# Patient Record
Sex: FEMALE | NOT HISPANIC OR LATINO | Employment: OTHER | URBAN - METROPOLITAN AREA
[De-identification: names, ages, dates, MRNs, and addresses within clinical notes are randomized per-mention and may not be internally consistent; named-entity substitution may affect disease eponyms.]

---

## 2017-08-11 ENCOUNTER — TRANSFERRED RECORDS (OUTPATIENT)
Dept: HEALTH INFORMATION MANAGEMENT | Facility: CLINIC | Age: 62
End: 2017-08-11

## 2017-11-03 ENCOUNTER — TRANSFERRED RECORDS (OUTPATIENT)
Dept: HEALTH INFORMATION MANAGEMENT | Facility: CLINIC | Age: 62
End: 2017-11-03

## 2017-11-03 LAB
EJECTION FRACTION: 63
EJECTION FRACTION: 90

## 2018-10-03 ENCOUNTER — TRANSFERRED RECORDS (OUTPATIENT)
Dept: HEALTH INFORMATION MANAGEMENT | Facility: CLINIC | Age: 63
End: 2018-10-03

## 2018-11-07 ENCOUNTER — MEDICAL CORRESPONDENCE (OUTPATIENT)
Dept: HEALTH INFORMATION MANAGEMENT | Facility: CLINIC | Age: 63
End: 2018-11-07

## 2018-12-11 ENCOUNTER — REFERRAL (OUTPATIENT)
Dept: TRANSPLANT | Facility: CLINIC | Age: 63
End: 2018-12-11

## 2018-12-11 NOTE — LETTER
19    Ethel Blow  [id number]  [place] [address]      Dear Ethel,    Thank you for your interest in the Transplant Center at [facility], [facility]. We look forward to being a part of your care team and assisting you through the transplant process.    As we discussed, your transplant coordinator is Tiffany Mcdaniels (Kidney).  You may call your coordinator at any time with questions or concerns.  Your first scheduled call will be on 1/15/19.  If this needs to change, call [phone].    Please complete the followin. Fill out and return the enclosed forms    Authorization for Electronic Communication    Authorization to Discuss Protected Health Information    Authorization for Release of Protected Health Information    Authorization for Care Everywhere Release of Information    2. Sign up for:    [email or web address], access to your electronic medical record (see enclosed pamphlet)    [employer], a transplant education website    You can use these tools to learn more about your transplant, communicate with your care team, and track your medical details    Sincerely,    Solid Organ Transplant  [facility], [facility]    cc: Evens Encinas MD; Ilsa Oh

## 2019-01-02 ENCOUNTER — DOCUMENTATION ONLY (OUTPATIENT)
Dept: TRANSPLANT | Facility: CLINIC | Age: 64
End: 2019-01-02

## 2019-01-02 VITALS — HEIGHT: 59 IN | WEIGHT: 130 LBS | BODY MASS INDEX: 26.21 KG/M2

## 2019-01-02 SDOH — HEALTH STABILITY: MENTAL HEALTH: HOW OFTEN DO YOU HAVE A DRINK CONTAINING ALCOHOL?: NEVER

## 2019-01-02 ASSESSMENT — MIFFLIN-ST. JEOR: SCORE: 1045.31

## 2019-01-02 NOTE — TELEPHONE ENCOUNTER
PCP: Ilsa Oh   Referring Provider: Evens Encinas  Referring Diagnosis: Glomerulosclerosis    Insurance information:  Medicare  Policy vidales:  self  Subscriber/policy/ID number:  [id number]  Group Number:     Insurance Info:  Policy Vidales: Spouse Maxim  Policy #      Is patient in a group home/assisted living? no  Does patient have a guardian? no    Referral intake process completed.  Patient is aware that after financial approval is received, medical records will be requested.   Patient confirmed for a callback from transplant coordinator on 1/15/19. (within 2 weeks)  Tentative evaluation date 1/28/19. (within 4 weeks)    Confirmed coordinator will discuss evaluation process in more detail at the time of their call.   Patient is aware of the need to arrange age appropriate cancer screening, vaccinations, and dental care.  Reminded patient to complete questionnaire, complete medical records release, and review packet prior to evaluation visit .  Assessed patient for special needs (ie--wheelchair, assistance, guardian, and ):  no   Patient instructed to call [phone] with questions.

## 2019-01-15 DIAGNOSIS — I10 ESSENTIAL HYPERTENSION: ICD-10-CM

## 2019-01-15 DIAGNOSIS — G47.33 OBSTRUCTIVE SLEEP APNEA: ICD-10-CM

## 2019-01-15 DIAGNOSIS — Z76.82 ORGAN TRANSPLANT CANDIDATE: ICD-10-CM

## 2019-01-15 DIAGNOSIS — N18.9 CHRONIC RENAL FAILURE: ICD-10-CM

## 2019-01-15 DIAGNOSIS — N05.1 FSGS (FOCAL SEGMENTAL GLOMERULOSCLEROSIS): ICD-10-CM

## 2019-01-15 DIAGNOSIS — E78.5 HYPERLIPIDEMIA: ICD-10-CM

## 2019-01-15 NOTE — TELEPHONE ENCOUNTER
Call placed to Ethel today as scheduled.   answered with her in the background.  He answered most of my questions but she interjected as well.  He says she is being prepared for access and is quickly becoming more symptomatic with anemia, fatigue, nausea, most recent GFR-12.  PMH; FSGS, Htn, hypothyroid, SADIE on C-pap.  He states they have been educating themselves on kidney transplant (You-tube, etc).  They had limited time to talk with me but I described the typical evaluation process and provided my contact information (but informed them I won't be her coordinator for very long).  Orders to .    Health maintenance:  Pap: no longer does since CAYLA in 2003.  Mammogram: is due.  Colonoscopy: done 1/5/2011-normal (repeat in 10 years;2021).  Dental: has been undergoing implants and bridge work so should be up to date.  Vaccines: will probably need Pneumovax.   Records show HBcAb+ with HbsAb+, HBsAg- but she knows nothing of this.  Has had + Mantoux in past but never treated for TB.

## 2019-01-22 ENCOUNTER — TELEPHONE (OUTPATIENT)
Dept: TRANSPLANT | Facility: CLINIC | Age: 64
End: 2019-01-22

## 2019-01-22 ENCOUNTER — DOCUMENTATION ONLY (OUTPATIENT)
Dept: TRANSPLANT | Facility: CLINIC | Age: 64
End: 2019-01-22

## 2019-01-28 ENCOUNTER — ANCILLARY PROCEDURE (OUTPATIENT)
Dept: GENERAL RADIOLOGY | Facility: CLINIC | Age: 64
End: 2019-01-28
Payer: MEDICARE

## 2019-01-28 ENCOUNTER — DOCUMENTATION ONLY (OUTPATIENT)
Dept: TRANSPLANT | Facility: CLINIC | Age: 64
End: 2019-01-28

## 2019-01-28 ENCOUNTER — OFFICE VISIT (OUTPATIENT)
Dept: TRANSPLANT | Facility: CLINIC | Age: 64
End: 2019-01-28
Attending: TRANSPLANT SURGERY
Payer: MEDICARE

## 2019-01-28 ENCOUNTER — ALLIED HEALTH/NURSE VISIT (OUTPATIENT)
Dept: RESEARCH | Facility: CLINIC | Age: 64
End: 2019-01-28

## 2019-01-28 ENCOUNTER — ALLIED HEALTH/NURSE VISIT (OUTPATIENT)
Dept: TRANSPLANT | Facility: CLINIC | Age: 64
End: 2019-01-28
Attending: INTERNAL MEDICINE
Payer: MEDICARE

## 2019-01-28 ENCOUNTER — APPOINTMENT (OUTPATIENT)
Dept: LAB | Facility: CLINIC | Age: 64
End: 2019-01-28
Payer: MEDICARE

## 2019-01-28 ENCOUNTER — ANCILLARY PROCEDURE (OUTPATIENT)
Dept: CARDIOLOGY | Facility: CLINIC | Age: 64
End: 2019-01-28
Payer: MEDICARE

## 2019-01-28 VITALS
WEIGHT: 129.5 LBS | SYSTOLIC BLOOD PRESSURE: 167 MMHG | HEART RATE: 83 BPM | OXYGEN SATURATION: 100 % | BODY MASS INDEX: 26.11 KG/M2 | HEIGHT: 59 IN | DIASTOLIC BLOOD PRESSURE: 80 MMHG | TEMPERATURE: 97.7 F

## 2019-01-28 DIAGNOSIS — D63.1 ANEMIA IN STAGE 5 CHRONIC KIDNEY DISEASE, NOT ON CHRONIC DIALYSIS (H): ICD-10-CM

## 2019-01-28 DIAGNOSIS — Z76.82 ORGAN TRANSPLANT CANDIDATE: Primary | ICD-10-CM

## 2019-01-28 DIAGNOSIS — N18.9 CHRONIC RENAL FAILURE: ICD-10-CM

## 2019-01-28 DIAGNOSIS — E78.2 MIXED HYPERLIPIDEMIA: ICD-10-CM

## 2019-01-28 DIAGNOSIS — Z00.6 EXAMINATION OF PARTICIPANT IN CLINICAL TRIAL: Primary | ICD-10-CM

## 2019-01-28 DIAGNOSIS — N05.1 FSGS (FOCAL SEGMENTAL GLOMERULOSCLEROSIS): ICD-10-CM

## 2019-01-28 DIAGNOSIS — Z76.82 ORGAN TRANSPLANT CANDIDATE: ICD-10-CM

## 2019-01-28 DIAGNOSIS — I10 ESSENTIAL HYPERTENSION: ICD-10-CM

## 2019-01-28 DIAGNOSIS — Z87.891 FORMER SMOKER: ICD-10-CM

## 2019-01-28 DIAGNOSIS — G47.33 OBSTRUCTIVE SLEEP APNEA: ICD-10-CM

## 2019-01-28 DIAGNOSIS — I15.0 RENOVASCULAR HYPERTENSION: ICD-10-CM

## 2019-01-28 DIAGNOSIS — E78.5 HYPERLIPIDEMIA: ICD-10-CM

## 2019-01-28 DIAGNOSIS — Z11.59 ENCOUNTER FOR SCREENING FOR OTHER VIRAL DISEASES: ICD-10-CM

## 2019-01-28 DIAGNOSIS — Z01.818 PRE-TRANSPLANT EVALUATION FOR CKD (CHRONIC KIDNEY DISEASE): Primary | ICD-10-CM

## 2019-01-28 DIAGNOSIS — N18.5 CHRONIC RENAL FAILURE, STAGE 5 (H): ICD-10-CM

## 2019-01-28 DIAGNOSIS — R76.8 HEPATITIS B CORE ANTIBODY POSITIVE: ICD-10-CM

## 2019-01-28 DIAGNOSIS — N18.5 ANEMIA IN STAGE 5 CHRONIC KIDNEY DISEASE, NOT ON CHRONIC DIALYSIS (H): ICD-10-CM

## 2019-01-28 DIAGNOSIS — N18.6 ESRD (END STAGE RENAL DISEASE) (H): Primary | ICD-10-CM

## 2019-01-28 LAB
ABO + RH BLD: NORMAL
ALBUMIN SERPL-MCNC: 3.6 G/DL (ref 3.4–5)
ALBUMIN UR-MCNC: >499 MG/DL
ALP SERPL-CCNC: 52 U/L (ref 40–150)
ALT SERPL W P-5'-P-CCNC: 27 U/L (ref 0–50)
ANION GAP SERPL CALCULATED.3IONS-SCNC: 10 MMOL/L (ref 3–14)
APPEARANCE UR: CLEAR
APTT PPP: 29 SEC (ref 22–37)
AST SERPL W P-5'-P-CCNC: 23 U/L (ref 0–45)
BASOPHILS # BLD AUTO: 0 10E9/L (ref 0–0.2)
BASOPHILS NFR BLD AUTO: 0.4 %
BILIRUB SERPL-MCNC: 0.4 MG/DL (ref 0.2–1.3)
BILIRUB UR QL STRIP: NEGATIVE
BLOOD BANK CMNT PATIENT-IMP: NORMAL
BLOOD BANK CMNT PATIENT-IMP: NORMAL
BUN SERPL-MCNC: 38 MG/DL (ref 7–30)
CALCIUM SERPL-MCNC: 9.2 MG/DL (ref 8.5–10.1)
CHLORIDE SERPL-SCNC: 104 MMOL/L (ref 94–109)
CO2 SERPL-SCNC: 26 MMOL/L (ref 20–32)
COLOR UR AUTO: YELLOW
CREAT SERPL-MCNC: 3.74 MG/DL (ref 0.52–1.04)
DIFFERENTIAL METHOD BLD: ABNORMAL
EOSINOPHIL # BLD AUTO: 0.2 10E9/L (ref 0–0.7)
EOSINOPHIL NFR BLD AUTO: 2.3 %
ERYTHROCYTE [DISTWIDTH] IN BLOOD BY AUTOMATED COUNT: 13.3 % (ref 10–15)
GFR SERPL CREATININE-BSD FRML MDRD: 12 ML/MIN/{1.73_M2}
GLUCOSE SERPL-MCNC: 130 MG/DL (ref 70–99)
GLUCOSE UR STRIP-MCNC: NEGATIVE MG/DL
HCT VFR BLD AUTO: 30.8 % (ref 35–47)
HGB BLD-MCNC: 9.3 G/DL (ref 11.7–15.7)
HGB UR QL STRIP: NEGATIVE
IMM GRANULOCYTES # BLD: 0 10E9/L (ref 0–0.4)
IMM GRANULOCYTES NFR BLD: 0.4 %
INR PPP: 0.94 (ref 0.86–1.14)
KETONES UR STRIP-MCNC: NEGATIVE MG/DL
LEUKOCYTE ESTERASE UR QL STRIP: ABNORMAL
LYMPHOCYTES # BLD AUTO: 2.2 10E9/L (ref 0.8–5.3)
LYMPHOCYTES NFR BLD AUTO: 32 %
MCH RBC QN AUTO: 29.9 PG (ref 26.5–33)
MCHC RBC AUTO-ENTMCNC: 30.2 G/DL (ref 31.5–36.5)
MCV RBC AUTO: 99 FL (ref 78–100)
MONOCYTES # BLD AUTO: 0.3 10E9/L (ref 0–1.3)
MONOCYTES NFR BLD AUTO: 4.8 %
MUCOUS THREADS #/AREA URNS LPF: PRESENT /LPF
NEUTROPHILS # BLD AUTO: 4.1 10E9/L (ref 1.6–8.3)
NEUTROPHILS NFR BLD AUTO: 60.1 %
NITRATE UR QL: NEGATIVE
NRBC # BLD AUTO: 0 10*3/UL
NRBC BLD AUTO-RTO: 0 /100
PH UR STRIP: 5 PH (ref 5–7)
PHOSPHATE SERPL-MCNC: 3.8 MG/DL (ref 2.5–4.5)
PLATELET # BLD AUTO: 363 10E9/L (ref 150–450)
POTASSIUM SERPL-SCNC: 3.4 MMOL/L (ref 3.4–5.3)
PROT SERPL-MCNC: 7.8 G/DL (ref 6.8–8.8)
RBC # BLD AUTO: 3.11 10E12/L (ref 3.8–5.2)
RBC #/AREA URNS AUTO: 0 /HPF (ref 0–2)
SODIUM SERPL-SCNC: 140 MMOL/L (ref 133–144)
SOURCE: ABNORMAL
SP GR UR STRIP: 1.01 (ref 1–1.03)
SPECIMEN EXP DATE BLD: NORMAL
SPECIMEN EXP DATE BLD: NORMAL
SQUAMOUS #/AREA URNS AUTO: <1 /HPF (ref 0–1)
UROBILINOGEN UR STRIP-MCNC: 0 MG/DL (ref 0–2)
WBC # BLD AUTO: 6.9 10E9/L (ref 4–11)
WBC #/AREA URNS AUTO: 4 /HPF (ref 0–5)

## 2019-01-28 PROCEDURE — G0499 HEPB SCREEN HIGH RISK INDIV: HCPCS | Performed by: NURSE PRACTITIONER

## 2019-01-28 PROCEDURE — 86480 TB TEST CELL IMMUN MEASURE: CPT | Performed by: NURSE PRACTITIONER

## 2019-01-28 PROCEDURE — 86905 BLOOD TYPING RBC ANTIGENS: CPT | Performed by: NURSE PRACTITIONER

## 2019-01-28 PROCEDURE — 86705 HEP B CORE ANTIBODY IGM: CPT | Performed by: NURSE PRACTITIONER

## 2019-01-28 PROCEDURE — 86850 RBC ANTIBODY SCREEN: CPT | Performed by: NURSE PRACTITIONER

## 2019-01-28 PROCEDURE — 36415 COLL VENOUS BLD VENIPUNCTURE: CPT | Performed by: NURSE PRACTITIONER

## 2019-01-28 PROCEDURE — 86706 HEP B SURFACE ANTIBODY: CPT | Performed by: NURSE PRACTITIONER

## 2019-01-28 PROCEDURE — 86147 CARDIOLIPIN ANTIBODY EA IG: CPT | Performed by: NURSE PRACTITIONER

## 2019-01-28 PROCEDURE — 87517 HEPATITIS B DNA QUANT: CPT | Performed by: NURSE PRACTITIONER

## 2019-01-28 PROCEDURE — 85610 PROTHROMBIN TIME: CPT | Performed by: NURSE PRACTITIONER

## 2019-01-28 PROCEDURE — 85670 THROMBIN TIME PLASMA: CPT | Performed by: NURSE PRACTITIONER

## 2019-01-28 PROCEDURE — 86886 COOMBS TEST INDIRECT TITER: CPT | Performed by: NURSE PRACTITIONER

## 2019-01-28 PROCEDURE — G0463 HOSPITAL OUTPT CLINIC VISIT: HCPCS | Mod: ZF

## 2019-01-28 PROCEDURE — 85730 THROMBOPLASTIN TIME PARTIAL: CPT | Performed by: NURSE PRACTITIONER

## 2019-01-28 PROCEDURE — 86900 BLOOD TYPING SEROLOGIC ABO: CPT | Mod: 91 | Performed by: NURSE PRACTITIONER

## 2019-01-28 PROCEDURE — 86787 VARICELLA-ZOSTER ANTIBODY: CPT | Performed by: NURSE PRACTITIONER

## 2019-01-28 PROCEDURE — 81241 F5 GENE: CPT | Performed by: NURSE PRACTITIONER

## 2019-01-28 PROCEDURE — G0472 HEP C SCREEN HIGH RISK/OTHER: HCPCS | Performed by: NURSE PRACTITIONER

## 2019-01-28 PROCEDURE — 85730 THROMBOPLASTIN TIME PARTIAL: CPT | Mod: 91 | Performed by: NURSE PRACTITIONER

## 2019-01-28 PROCEDURE — 86780 TREPONEMA PALLIDUM: CPT | Performed by: NURSE PRACTITIONER

## 2019-01-28 PROCEDURE — 85025 COMPLETE CBC W/AUTO DIFF WBC: CPT | Performed by: NURSE PRACTITIONER

## 2019-01-28 PROCEDURE — 86644 CMV ANTIBODY: CPT | Performed by: NURSE PRACTITIONER

## 2019-01-28 PROCEDURE — 84100 ASSAY OF PHOSPHORUS: CPT | Performed by: NURSE PRACTITIONER

## 2019-01-28 PROCEDURE — 86665 EPSTEIN-BARR CAPSID VCA: CPT | Performed by: NURSE PRACTITIONER

## 2019-01-28 PROCEDURE — 85613 RUSSELL VIPER VENOM DILUTED: CPT | Performed by: NURSE PRACTITIONER

## 2019-01-28 PROCEDURE — 40000866 ZZHCL STATISTIC HIV 1/2 ANTIGEN/ANTIBODY PRETRANSPLANT ONLY: Performed by: NURSE PRACTITIONER

## 2019-01-28 PROCEDURE — 81240 F2 GENE: CPT | Performed by: NURSE PRACTITIONER

## 2019-01-28 PROCEDURE — 80053 COMPREHEN METABOLIC PANEL: CPT | Performed by: NURSE PRACTITIONER

## 2019-01-28 PROCEDURE — 86704 HEP B CORE ANTIBODY TOTAL: CPT | Performed by: NURSE PRACTITIONER

## 2019-01-28 PROCEDURE — 81001 URINALYSIS AUTO W/SCOPE: CPT | Performed by: NURSE PRACTITIONER

## 2019-01-28 RX ORDER — BUMETANIDE 1 MG/1
2 TABLET ORAL 2 TIMES DAILY
Status: ON HOLD | COMMUNITY
End: 2020-09-06

## 2019-01-28 RX ORDER — UREA 10 %
500 LOTION (ML) TOPICAL DAILY
COMMUNITY
End: 2020-10-26

## 2019-01-28 RX ORDER — CHLORAL HYDRATE 500 MG
1 CAPSULE ORAL DAILY
COMMUNITY
End: 2020-10-26

## 2019-01-28 RX ORDER — ATORVASTATIN CALCIUM 40 MG/1
40 TABLET, FILM COATED ORAL DAILY
Status: ON HOLD | COMMUNITY
Start: 2019-01-14 | End: 2020-09-06

## 2019-01-28 RX ORDER — FENOFIBRATE 134 MG/1
CAPSULE ORAL
COMMUNITY
Start: 2019-01-14 | End: 2020-05-08

## 2019-01-28 RX ORDER — ASPIRIN 81 MG/1
81 TABLET ORAL DAILY
COMMUNITY
End: 2020-10-26

## 2019-01-28 RX ORDER — LANOLIN ALCOHOL/MO/W.PET/CERES
6 CREAM (GRAM) TOPICAL AT BEDTIME
COMMUNITY

## 2019-01-28 RX ORDER — MULTIVITAMIN WITH IRON
100 TABLET ORAL DAILY
COMMUNITY
End: 2020-10-26

## 2019-01-28 RX ORDER — LEVOTHYROXINE SODIUM 112 UG/1
TABLET ORAL
COMMUNITY
Start: 2019-01-14 | End: 2023-11-01

## 2019-01-28 RX ORDER — AMLODIPINE BESYLATE 10 MG/1
10 TABLET ORAL
Status: ON HOLD | COMMUNITY
Start: 2018-10-19 | End: 2020-09-06

## 2019-01-28 RX ORDER — CALCITRIOL 0.25 UG/1
0.25 CAPSULE, LIQUID FILLED ORAL
Status: ON HOLD | COMMUNITY
End: 2020-09-06

## 2019-01-28 ASSESSMENT — MIFFLIN-ST. JEOR: SCORE: 1038.04

## 2019-01-28 NOTE — PROGRESS NOTES
"  Assessment and Plan:  # Kidney transplant evaluation - patient is a good candidate overall. Benefits of a living donor transplant were discussed.  # CKD with a biopsy in 2012 that showed \"global glomerulocslerosis\" -  with history of a atrophic right kidney (7.7cm ). She is nearing dialysis with an recent AVF placed and a current serum creatinine of 3.74 and eGFR of 12. Follows with Dr. Encinas. When ready, she would likely benefit from a kidney transplant.   # Cardiac risk  - no history of cardiac disease or events. ECHO pending. Given cardiac risk factors, will need cardiac risk assessment.   # Former smoker - with an 18-pack-year smoking history, quit in 1994. Will need PFTs.   #History of elevated free light chains (8/2017) - with normal SPEP and K/L ratio.   #Hepatitis B core positive, antibody undetectable, antigen negative - will check DNA PCR. If negative, plan will be to vaccinate and recheck titers 6 weeks after 1/3 vaccines.   #Health maintenance - mammogram UTD from 1/2019, s/p hysterectomy d/t fibroids. Normal colonoscopy in 2011.       Discussed the risks and benefits of a transplant, including the risk of surgery and immunosuppression medications.  Patients overall evaluation will be discussed in the Transplant Program's regular meeting with a final recommendation on the patients suitability for transplant to be made at that time.  Patient was seen in conjunction with Dr. Devon Vinson as part of a shared visit.      Evaluation:  Ethel Thompson was seen in consultation at the request of Dr. Keegan Urbina for evaluation as a potential Kidney transplant recipient.    Reason for Visit:  Ethel Thompson is a 64 year old female with CKD from FSGS, who presents for Kidney transplant evaluation.    HPI:  Ms. Thompson is a 64-year-old [place] female, that presents with CKD with a biopsy in 2012 showing \"global glomerulosclerosis\" that was done in [place]. She also has history of a small right kidney measuring 7.7 " "cm. While living in  for a few years her renal function was followed both by nephrology and PCP, however, she reports missing labs for a \"few years\" sometime around [id number]. She and her  moved back to [place] in September 2018 and was found to have significant renal decline and established care with [facility] with Dr. Encinas. She presents today with a current serum creatinine of 3.74 and eGFR of 12. She had a fistula placed last week with worsening uremic symptoms including nausea, poor appetite and poor energy. She is making good urine output and denies dysuria, hematuria or trouble emptying her bladder or starting her stream. She has no history of cardiac disease or events. She use to exercise regularly, but not as much anymore with worsening uremic symptoms. With exertion she denies chest pain, shortness of breath or claudication symptoms. She is a former smoker an 18-pack-year smoking history, quit iin 1994. Additional PMG includes hearing loss,  hypothyroid, history of elevated free light chains with a normal ratio.            Kidney Disease Hx:        Kidney Disease Dx: GN        Biopsy Proven: Yes; 2012          On Dialysis: No       Primary Nephrologist: Dr. Encinas          Medical Hx:       h/o HTN: Yes         h/o DM:  No       h/o Protein in Urine: Yes        h/o Blood in Urine:  No       h/o Kidney Stones:  No       h/o UTI: No       h/o Chronic NSAID Use: No         Previous Transplant Hx:        No         Transplant Sensitization Hx:       Previous Tx: No       Blood Transfusion: No       Pregnancy: Yes         Uremic Symptoms:       Fatigue: Yes; Nausea: Yes; Poor Appetite: Yes;         Cardiovascular Hx:       h/o Cardiac Issues: No       Exercise Tolerance: no chest pain or shortness of breath with exertion.         Health Maintenance:       Colonoscopy: Up to date, Mammogram: Up to date, PAP: Not indicated and Dental: Up to date         Potential Donor(s): No    ROS:  A comprehensive review of " systems was obtained and negative, except as noted in the HPI or PMH.    PMH:   Medical record was reviewed and PMH was discussed with patient and noted below.  Past Medical History:   Diagnosis Date     Chronic renal failure      Glomerulonephritis, focal sclerosing      Herpes simplex      Hyperlipidemia      Hypertension      Hypothyroidism      SADIE on CPAP        PSH:   Past Surgical History:   Procedure Laterality Date     ABDOMINOPLASTY      tummy tuck- [place]     BIOPSY  2016    kidney, [place] Dr. Hammonds     COLONOSCOPY  2010    x 2, [facility]     COSMETIC SURGERY       EYE SURGERY Bilateral 2017    cataracts-[place]     GYN SURGERY       x 2, [facility], [place]     GYN SURGERY      hysterectomy     ORTHOPEDIC SURGERY  2016    trigger finger- both hands, [place]     VASCULAR SURGERY Left 2018    dialysis access-[facility] [place] [facility], [place]     Personal or family history of bleeding or anesthesia problems: No    Family Hx:  Family History   Problem Relation Age of Onset     Hypertension Mother      Anuerysm Mother      Cancer No family hx of      Kidney Disease No family hx of      Diabetes No family hx of        Personal Hx:   Social History     Socioeconomic History     Marital status:      Spouse name: Not on file     Number of children: Not on file     Years of education: Not on file     Highest education level: Not on file   Social Needs     Financial resource strain: Not on file     Food insecurity - worry: Not on file     Food insecurity - inability: Not on file     Transportation needs - medical: Not on file     Transportation needs - non-medical: Not on file   Occupational History     Not on file   Tobacco Use     Smoking status: Former Smoker     Packs/day: 1.00     Types: Cigarettes     Start date:      Last attempt to quit:      Years since quittin.0     Smokeless tobacco: Never Used   Substance and Sexual Activity     Alcohol use: No      "Frequency: Never     Drug use: No     Sexual activity: Not on file   Other Topics Concern     Parent/sibling w/ CABG, MI or angioplasty before 65F 55M? Not Asked   Social History Narrative     Not on file       Allergies:  Allergies   Allergen Reactions     Amoxicillin-Pot Clavulanate Nausea and Vomiting       Medications:  Prior to Admission medications    Not on File       Vitals:  /80   Pulse 83   Temp 97.7  F (36.5  C)   Ht 1.499 m (4' 11\")   Wt 58.7 kg (129 lb 8 oz)   SpO2 100%   BMI 26.16 kg/m      Exam:  GENERAL APPEARANCE: alert and no distress  HENT: mouth without ulcers or lesions  LYMPHATICS: no cervical or supraclavicular nodes  RESP: lungs clear to auscultation - no rales, rhonchi or wheezes  CV: regular rhythm, normal rate, no rub, no murmur  FEMORAL PULSES: 2+ equal bilaterally.   EDEMA: no LE edema bilaterally  ABDOMEN: soft, nondistended, nontender, bowel sounds normal  MS: extremities normal - no gross deformities noted, no evidence of inflammation in joints, no muscle tenderness  SKIN: no rash    Results:   Recent Results (from the past 336 hour(s))   UA with Microscopic reflex to Culture    Collection Time: 01/28/19  1:34 PM   Result Value Ref Range    Color Urine Yellow     Appearance Urine Clear     Glucose Urine Negative NEG^Negative mg/dL    Bilirubin Urine Negative NEG^Negative    Ketones Urine Negative NEG^Negative mg/dL    Specific Gravity Urine 1.013 1.003 - 1.035    Blood Urine Negative NEG^Negative    pH Urine 5.0 5.0 - 7.0 pH    Protein Albumin Urine >499 (A) NEG^Negative mg/dL    Urobilinogen mg/dL 0.0 0.0 - 2.0 mg/dL    Nitrite Urine Negative NEG^Negative    Leukocyte Esterase Urine Trace (A) NEG^Negative    Source Midstream Urine     WBC Urine 4 0 - 5 /HPF    RBC Urine 0 0 - 2 /HPF    Squamous Epithelial /HPF Urine <1 0 - 1 /HPF    Mucous Urine Present (A) NEG^Negative /LPF   ABO type [[id number]]    Collection Time: 01/28/19  1:49 PM   Result Value Ref Range    ABO B     " RH(D) Pos     Specimen Expires 01/31/2019    ABO type [[id number]]    Collection Time: 01/28/19  1:51 PM   Result Value Ref Range    ABO B     RH(D) Pos     Specimen Expires 01/31/2019    Antibody titer red cell [[id number]]    Collection Time: 01/28/19  1:51 PM   Result Value Ref Range    Antibody Titer Anti A1: IgM 64, IgG 64  Anti A2: IgM 16, IgG 8      Blood Group A Subtype [[id number]]    Collection Time: 01/28/19  1:51 PM   Result Value Ref Range    Antigen Type Canceled, Test credited     Blood Bank Comment PT IS B POS. TESTING NOT INDICATED. 1/28/19 WW    Comprehensive metabolic panel [LAB17]    Collection Time: 01/28/19  1:51 PM   Result Value Ref Range    Sodium 140 133 - 144 mmol/L    Potassium 3.4 3.4 - 5.3 mmol/L    Chloride 104 94 - 109 mmol/L    Carbon Dioxide 26 20 - 32 mmol/L    Anion Gap 10 3 - 14 mmol/L    Glucose 130 (H) 70 - 99 mg/dL    Urea Nitrogen 38 (H) 7 - 30 mg/dL    Creatinine 3.74 (H) 0.52 - 1.04 mg/dL    GFR Estimate 12 (L) >60 mL/min/[1.73_m2]    GFR Estimate If Black 14 (L) >60 mL/min/[1.73_m2]    Calcium 9.2 8.5 - 10.1 mg/dL    Bilirubin Total 0.4 0.2 - 1.3 mg/dL    Albumin 3.6 3.4 - 5.0 g/dL    Protein Total 7.8 6.8 - 8.8 g/dL    Alkaline Phosphatase 52 40 - 150 U/L    ALT 27 0 - 50 U/L    AST 23 0 - 45 U/L   Phosphorus    Collection Time: 01/28/19  1:51 PM   Result Value Ref Range    Phosphorus 3.8 2.5 - 4.5 mg/dL   CBC with platelets differential [[id number]]    Collection Time: 01/28/19  1:51 PM   Result Value Ref Range    WBC 6.9 4.0 - 11.0 10e9/L    RBC Count 3.11 (L) 3.8 - 5.2 10e12/L    Hemoglobin 9.3 (L) 11.7 - 15.7 g/dL    Hematocrit 30.8 (L) 35.0 - 47.0 %    MCV 99 78 - 100 fl    MCH 29.9 26.5 - 33.0 pg    MCHC 30.2 (L) 31.5 - 36.5 g/dL    RDW 13.3 10.0 - 15.0 %    Platelet Count 363 150 - 450 10e9/L    Diff Method Automated Method     % Neutrophils 60.1 %    % Lymphocytes 32.0 %    % Monocytes 4.8 %    % Eosinophils 2.3 %    % Basophils 0.4 %    % Immature Granulocytes 0.4 %     Nucleated RBCs 0 0 /100    Absolute Neutrophil 4.1 1.6 - 8.3 10e9/L    Absolute Lymphocytes 2.2 0.8 - 5.3 10e9/L    Absolute Monocytes 0.3 0.0 - 1.3 10e9/L    Absolute Eosinophils 0.2 0.0 - 0.7 10e9/L    Absolute Basophils 0.0 0.0 - 0.2 10e9/L    Abs Immature Granulocytes 0.0 0 - 0.4 10e9/L    Absolute Nucleated RBC 0.0    Cardiolipin Diya IgG and IgM [LAB 6836]    Collection Time: 01/28/19  1:51 PM   Result Value Ref Range    Cardiolipin Antibody IgG <1.6 0.0 - 19.9 GPL-U/mL    Cardiolipin Antibody IgM 0.2 0.0 - 19.9 MPL-U/mL   INR [[id number]]    Collection Time: 01/28/19  1:51 PM   Result Value Ref Range    INR 0.94 0.86 - 1.14   Partial thromboplastin time [LAB56]    Collection Time: 01/28/19  1:51 PM   Result Value Ref Range    PTT 29 22 - 37 sec   Thrombin time [[id number]]    Collection Time: 01/28/19  1:51 PM   Result Value Ref Range    Thrombin Time 18.8 13.0 - 19.0 sec   CMV Antibody IgG [[id number]]    Collection Time: 01/28/19  1:51 PM   Result Value Ref Range    CMV Antibody IgG >8.0 (H) 0.0 - 0.8 AI   EBV Capsid Antibody IgG [[id number]]    Collection Time: 01/28/19  1:51 PM   Result Value Ref Range    EBV Capsid Antibody IgG >8.0 (H) 0.0 - 0.8 AI   Hepatitis B core antibody [[id number]]    Collection Time: 01/28/19  1:51 PM   Result Value Ref Range    Hepatitis B Core Diya Reactive (AA) NR^Nonreactive   Hepatitis B Surface Antibody [[id number]]    Collection Time: 01/28/19  1:51 PM   Result Value Ref Range    Hepatitis B Surface Antibody 1.45 <8.00 m[IU]/mL   Hepatitis B surface antigen [[id number]]    Collection Time: 01/28/19  1:51 PM   Result Value Ref Range    Hep B Surface Agn Nonreactive NR^Nonreactive   Hepatitis C antibody [[id number]]    Collection Time: 01/28/19  1:51 PM   Result Value Ref Range    Hepatitis C Antibody Nonreactive NR^Nonreactive   HIV Antigen Antibody Combo Pretransplant    Collection Time: 01/28/19  1:51 PM   Result Value Ref Range    HIV Antigen Antibody Combo Pretransplant Nonreactive NR^Nonreactive    Varicella Zoster Virus Antibody IgG [[id number]]    Collection Time: 01/28/19  1:51 PM   Result Value Ref Range    Varicella Zoster Virus Antibody IgG 4.4 (H) 0.0 - 0.8 AI   Treponema Abs w Reflex to RPR and Titer    Collection Time: 01/28/19  1:51 PM   Result Value Ref Range    Treponema Antibodies Nonreactive NR^Nonreactive   Antibody screen red cell    Collection Time: 01/28/19  1:51 PM   Result Value Ref Range    Antibody Screen Neg    EKG 12-lead, tracing only [EKG1]    Collection Time: 01/28/19  3:06 PM   Result Value Ref Range    Interpretation ECG Click View Image link to view waveform and result

## 2019-01-28 NOTE — PROGRESS NOTES
Surgery Clinical Trials Office Notification of Study Eligibility  Study Name: Randomized Controlled Trial to Evaluate the Effect of Lost Wage Reimbursement to Potential Kidney  Donors On Living Donation Rates (Donor Lost Wages Study) (IRB #[id number])    ICF Version Date / IRB Approval Date: 11-AUG-2018/ 21-AUG-2018    Date of Approach/Consent: 28-JAN-2019    The subject was screened and meets all of the inclusion criteria.   The subject was told:  -that the study involves research  -the purpose of the research study  -the expected duration of the study and the approximate number of subject sought  -of any benefits to the subject or others that may be expected from the research  -how the confidentiality of records would be maintained  -who to contact if they have questions related to the research study or questions regarding research subjects' rights  -that participation is completely voluntary and that their decision to or not to participate will have no impact on their relationships with the N and the staff    The use of historical information (lab or assessments) used for the purpose of the study was approved by subject.  The subject was fully aware that we would be reviewing their medical record for the study.  The subject demonstrated an understanding of what the study involved.  Specifically, how this study differed from standard of care at our center and what was required of the subject as part of the study.  The subject reviewed the consent form and was given the opportunity to ask questions before signing.  Questions and concerns were answered by the study staff and/or study physician.  A copy of the signed informed consent document was provided to the subject.  The consent require the use of a :   [] Yes [x]  No     A 'short form' consent was used:     [] Yes [x] No         If yes, provide name of witness:  N/A  The subject required a legally-authorized representative (LAR) to sign on their  behalf:                                                   [] Yes  [x] No   If yes, please record name of LAR:  N/A    Questions to Evaluate Subject Comprehension of Study  Adult or Legally Authorized Representative (LAR) for a Dependent:  Question: Adequate Response? If No, explain what actions were taken   What is the purpose of this study and how long will it take? [x] Yes  [] No   Will you be required to do anything extra during the course of the study? [x] Yes  [] No   What risks are involved in participating in this study? [x] Yes  [] No

## 2019-01-28 NOTE — PROGRESS NOTES
Transplant Surgery Consult Note     Medical record number: [id number]  YOB: 1955,   Consult requested by Dr. Encinas for evaluation of kidney transplant candidacy.    Assessment and Recommendations:Ms. Thompson appears to be a good candidate for kidney transplantation and has a good understanding of the risks and benefits of this approach to the management of renal failure. The following issues should be addressed prior to finalizing her transplant candidacy:     Cardiology eval      The majority of our visit was spent in counselling, discussing the medical and surgical risks of kidney transplantation. We discussed approximate wait time and how that is influenced by issues such as blood type and sensitization (PRA) and access to a living donor. I contrasted potential waiting time for living vs  donor kidneys from  normal (0-85%) or higher (%) kidney donor profile index (KDPI) donors and their associated outcomes. I would not recommend this individual to consider kidneys from high KDPI donors. The reason for this decision is best summarized as: improved long term graft survival. Potential surgical complications of kidney transplantation include bleeding, superficial or deep wound complications (infection, hernia, lymphocele), ureteral anastomotic failure (leak or stenosis), graft thrombosis, need for reoperation and other issues such as cardiac complications, pneumonia, deep venous thrombosis, pulmonary embolism, post transplant diabetes and death. The potential for recurrent disease or need for retransplantation was also addressed. We discussed the possible need for ureteral stent (and subsequent removal), and the utility of protocol biopsy and laboratory studies to evaluate for rejection or recurrent disease. We discussed the risk of graft rejection, our center's average graft and patient survival rates, immunosuppression protocols, as well as the potential opportunity to participate in  clinical trials.  We also discussed the average length of stay, recovery process, and posttransplant lab and monitoring protocol.  I emphasized the need for strict immunosuppression medication adherence and the potential for complications of immunosuppression such as skin cancer or lymphoma, as well as a very low but not zero risk of donor-derived disease transmission risks (infection, cancer). Ms. Thompson asked good questions and her candidacy will be reviewed at our Multidisciplinary Selection Committee. Thank you for the opportunity to participate in Ms. Thompson's care.      Total time: 45 minutes  Counselling time: 40 minutes    EDGAR Cardenas MD  ---------------------------------------------------------------------------------------------------    HPI: Ms. Thompson has End stage renal failure due to focal segmental glomerulosclerosis (FSGS). The patient is non-diabetic.       The patient is not on dialysis.    Has potential kidney donors:  Doesn't know .  Interested in participation in paired exchange if a donor is willing: Doesn't know     The patient has the following pertinent history:       No    Yes  Dialysis:    [x]      [] via:       Blood Transfusion                  [x]      []  Number of units:   Most recently:  Pregnancy:    []      [x] Number:  twice     Previous Transplant:  [x]      [] Details:    Cancer    [x]      [] Comment:   Kidney stones   [x]      [] Comment:      Recurrent infections  [x]      []  Type:                  Bladder dysfunction  [x]      [] Cause:    Claudication   [x]      [] Distance:    Previous Amputation  [x]      [] Cause:     Chronic anticoagulation  [x]      [] Indication:   Yazidi  [x]      []      Past Medical History:   Diagnosis Date     Chronic renal failure      Glomerulonephritis, focal sclerosing      Herpes simplex      Hyperlipidemia      Hypertension      Hypothyroidism      SADIE on CPAP      Past Surgical History:   Procedure  Laterality Date     ABDOMINOPLASTY  2001    tummy tuck- [place]     BIOPSY  2016    kidney, [place] Dr. Hammonds     COLONOSCOPY  2010    x 2, [facility]     COSMETIC SURGERY       EYE SURGERY Bilateral 2017    cataracts-[place]     GYN SURGERY       x 2, [place], [place]     GYN SURGERY      hysterectomy     ORTHOPEDIC SURGERY  2016    trigger finger- both hands, [place]     VASCULAR SURGERY Left 2018    dialysis access-[place], [place] [facility], [place]     Family History   Problem Relation Age of Onset     Hypertension Mother      Anuerysm Mother      Cancer No family hx of      Kidney Disease No family hx of      Diabetes No family hx of      Social History     Socioeconomic History     Marital status:      Spouse name: Not on file     Number of children: Not on file     Years of education: Not on file     Highest education level: Not on file   Social Needs     Financial resource strain: Not on file     Food insecurity - worry: Not on file     Food insecurity - inability: Not on file     Transportation needs - medical: Not on file     Transportation needs - non-medical: Not on file   Occupational History     Not on file   Tobacco Use     Smoking status: Former Smoker     Packs/day: 1.00     Types: Cigarettes     Start date:      Last attempt to quit:      Years since quittin.0     Smokeless tobacco: Never Used   Substance and Sexual Activity     Alcohol use: No     Frequency: Never     Drug use: No     Sexual activity: Not on file   Other Topics Concern     Parent/sibling w/ CABG, MI or angioplasty before 65F 55M? Not Asked   Social History Narrative     Not on file       ROS:   CONSTITUTIONAL:  No fevers or chills  EYES: negative for icterus  ENT:  negative for hearing loss, tinnitus and sore throat  RESPIRATORY:  negative for cough, sputum, dyspnea  CARDIOVASCULAR:  negative for chest pain. Positive for  Fatigue  Renal Insufficiency  GASTROINTESTINAL:  negative for  nausea, vomiting, diarrhea or constipation  GENITOURINARY:  negative for incontinence, dysuria, bladder emptying problems  HEME:  No easy bruising  INTEGUMENT:  negative for rash and pruritus  NEURO:  Negative for headache, seizure disorder  Allergies:   Allergies   Allergen Reactions     Amoxicillin-Pot Clavulanate Nausea and Vomiting     Medications:  Prescription Medications as of 1/28/2019       Rx Number Disp Refills Start End Last Dispensed Date Next Fill Date Owning Pharmacy    amLODIPine (NORVASC) 10 MG tablet    10/19/2018        Sig: Take 10 mg by mouth    Class: Historical    Route: Oral    aspirin 81 MG EC tablet        [address]    Sig: Take 81 mg by mouth daily    Class: Historical    Route: Oral    atorvastatin (LIPITOR) 40 MG tablet    1/14/2019        Class: Historical    bumetanide (BUMEX) 1 MG tablet        [address] [facility]    Sig: Take 1 mg by mouth daily    Class: Historical    Route: Oral    calcitRIOL (ROCALTROL) 0.25 MCG capsule        [address] [facility]    Sig: Take 0.25 mcg by mouth daily    Class: Historical    Route: Oral    fenofibrate micronized (LOFIBRA) 134 MG capsule    1/14/2019        Class: Historical    fish oil-omega-3 fatty acids 1000 MG capsule        [address] [facility]    Sig: Take 2 g by mouth 3 times daily    Class: Historical    Route: Oral    levothyroxine (SYNTHROID/LEVOTHROID) 112 MCG tablet    1/14/2019        Class: Historical    melatonin 3 MG tablet            Sig: Take 3 mg by mouth    Class: Historical    Route: Oral    ranitidine (ZANTAC) 300 MG tablet    3/17/2018        Class: Historical    vitamin B complex with vitamin C (VITAMIN  B COMPLEX) tablet        [facility]  [address]    Sig: Take 1 tablet by mouth daily    Class: Historical    Route: Oral    vitamin B-12 (CYANOCOBALAMIN) 500 MCG tablet        [address]    Sig: Take 500 mcg by mouth daily    Class: Historical    Route: Oral    vitamin B6 (PYRIDOXINE) 100 MG tablet        [address]    Sig: Take 100 mg by mouth daily    Class: Historical    Route: Oral        Exam:   Temp:  [97.7  F (36.5  C)] 97.7  F (36.5  C)  Pulse:  [83] 83  BP: (167)/(80) 167/80  SpO2:  [100 %] 100 %  Appearance: in no apparent distress.   Skin: normal  Head and Neck: Normal, no rashes or jaundice  Respiratory: easy respirations, no audible wheezing.  Cardiovascular: RRR  Abdomen: rounded, Surgical scars consistent with history   Extremeties: femoral 2+/2+, Edema, none  Neuro: without deficit     Diagnostics:   No results found for this or any previous visit (from the past 672 hour(s)).  No results found for: CPRA

## 2019-01-28 NOTE — PROGRESS NOTES
Patient was seen by myself, Dr. Devon Vinson, in conjunction with Yennifer Topete, as part of a shared visit.    I personally reviewed past medical and surgical history, vital signs, medications and labs.  Present and past medical history, along with significant physical exam findings were all reviewed with SHAHRIAR.    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  Patient was diagnosed with CKD in 2012 with biopsy. She moved to  at that time, missed labs for a few years. Kidney function has deteriorated and last GFR was 12 ml / min.     She had AVF placed. Has nausea, low energy and is planning HD once AVF mature.     No donors.    CAD risk: no known CAD. No prior workup. No symptoms with exertion. Poor exercise tolerance of late thought due to uremia    Ca screens: up to date.    Hx of cigs - quit a few years ago. 18 pack years.     No cp, sob, no n/v/d, no f/s/c.     Key management decisions made by me and discussed with SHAHRIAR:  1. Kidney transplant evaluation - patient is a good candidate overall. Benefits of a living donor transplant were discussed.  The patient has no living donors at this time however she has not asked anyone either.  I have recommended that she aggressively seek out a living donor as she is imminently needing to start dialysis in the coming weeks.  As she is not yet on dialysis I have recommended that she become listed as inactive until she completes her evaluation to accrue as much time as possible while not yet on dialysis.  2. 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    3. Cad risk: The patient has risk of coronary artery disease due to chronic kidney disease hypertension and age and as such will need evaluation by cardiology.  4. Ca screens: The patient's cancer  screens are up-to-date and we will continue with monitoring this while she is wait listed.  5. Hx of tobacco use: The patient quit smoking multiple years ago.  We will perform pulmonary function test due to her exposure.

## 2019-01-28 NOTE — LETTER
1/28/2019      RE: Ethel Thompson  [address] [facility] [place] [address]             Assessment and Plan:  # Kidney transplant evaluation - patient is a good candidate overall. Benefits of a living donor transplant were discussed.  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 Follows with Dr. Encinas. When ready, she would likely benefit from a kidney transplant.   # Cardiac risk  - no history of cardiac disease or events. ECHO pending. Given cardiac risk factors, will need cardiac risk assessment.   # Former smoker - with an 18-pack-year smoking history, quit in 1994. Will need PFTs.   #History of elevated free light chains (8/2017) - with normal SPEP and K/L ratio.   #Hepatitis B core positive, antibody undetectable, antigen negative - will check DNA PCR. If negative, plan will be to vaccinate and recheck titers 6 weeks after 1/3 vaccines.   #Health maintenance - mammogram UTD from 1/2019, s/p hysterectomy d/t fibroids. Normal colonoscopy in 2011.       Discussed the risks and benefits of a transplant, including the risk of surgery and immunosuppression medications.  Patients overall evaluation will be discussed in the Transplant Program's regular meeting with a final recommendation on the patients suitability for transplant to be made at that time.  Patient was seen in conjunction with Dr. Devon Vinson as part of a shared visit.      Evaluation:  Ethel Thompson was seen in consultation at the request of Dr. Keegan Urbina for evaluation as a potential Kidney transplant recipient.    Reason for Visit:  Ethel Thompson is a 64 year old female with CKD from FSGS, who presents for Kidney transplant evaluation.    HPI:  Ms. 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 Of note, she had an US in 2004 showing a small right kidney measuring 7.7 cm. While living in  for a few years her renal function was followed both by nephrology and PCP, however, she reports missing labs for a \"few years\" sometime around [id number]. She and her  moved back to [place] in September and was found to have significant renal decline and was started following with [facility] again with Dr. Encinas. She presents today with a current serum creatinine of 3.74 and eGFR of 12. She had a fistula placed last week with worsening uremic symptoms including nausea, poor appetite and poor energy. She is making good urine output and denies dysuria, hematuria or trouble emptying her bladder or starting her stream. She has no history of cardiac disease or events. She use to exercise regularly, but not as much anymore with worsening uremic symptoms. With exertion she denies chest pain, shortness of breath or claudication symptoms. She is a former smoker an 18-pack-year smoking history, quit iin 1994. Additional PMG includes hearing loss,  hypothyroid, history of elevated free light chains with a normal ratio.            Kidney Disease Hx:        Kidney Disease Dx: GN        Biopsy Proven: Yes; 2012          On Dialysis: No       Primary Nephrologist: Dr. Encinas          Medical Hx:       h/o HTN: Yes         h/o DM:  No       h/o Protein in Urine: Yes        h/o Blood in Urine:  No       h/o Kidney Stones:  No       h/o UTI: No       h/o Chronic NSAID Use: No         Previous Transplant Hx:        No         Transplant Sensitization Hx:       Previous Tx: No       Blood Transfusion: No       Pregnancy: Yes         Uremic Symptoms:       Fatigue: Yes; Nausea: Yes; Poor Appetite: Yes;         Cardiovascular Hx:       h/o Cardiac Issues: No       Exercise Tolerance: no chest pain or shortness of breath with exertion.         Health Maintenance:       Colonoscopy: Up to date, " Mammogram: Up to date, PAP: Not indicated and Dental: Up to date         Potential Donor(s): No    ROS:  A comprehensive review of systems was obtained and negative, except as noted in the HPI or PMH.    PMH:   Medical record was reviewed and PMH was discussed with patient and noted below.  Past Medical History:   Diagnosis Date     Chronic renal failure      Glomerulonephritis, focal sclerosing      Herpes simplex      Hyperlipidemia      Hypertension      Hypothyroidism      SADIE on CPAP        PSH:   Past Surgical History:   Procedure Laterality Date     ABDOMINOPLASTY  2001    tummy tuck- [place]     BIOPSY  2016    kidney, [place] Dr. Hammonds     COLONOSCOPY  2010    x 2,      COSMETIC SURGERY       EYE SURGERY Bilateral 2017    cataracts-[place]     GYN SURGERY       x 2, [place], [place]     GYN SURGERY      hysterectomy     ORTHOPEDIC SURGERY  2016    trigger finger- both hands, [place]     VASCULAR SURGERY Left 2018    [facility]ialysis access-[facility]     Personal or family history of bleeding or anesthesia problems: No    Family Hx:  Family History   Problem Relation Age of Onset     Hypertension Mother      Anuerysm Mother      Cancer No family hx of      Kidney Disease No family hx of      Diabetes No family hx of        Personal Hx:   Social History     Socioeconomic History     Marital status:      Spouse name: Not on file     Number of children: Not on file     Years of education: Not on file     Highest education level: Not on file   Social Needs     Financial resource strain: Not on file     Food insecurity - worry: Not on file     Food insecurity - inability: Not on file     Transportation needs - medical: Not on file     Transportation needs - non-medical: Not on file   Occupational History     Not on file   Tobacco Use     Smoking status: Former Smoker     Packs/day: 1.00     Types: Cigarettes     Start date:      Last attempt to quit:  "1995     Years since quittin.0     Smokeless tobacco: Never Used   Substance and Sexual Activity     Alcohol use: No     Frequency: Never     Drug use: No     Sexual activity: Not on file   Other Topics Concern     Parent/sibling w/ CABG, MI or angioplasty before 65F 55M? Not Asked   Social History Narrative     Not on file       Allergies:  Allergies   Allergen Reactions     Amoxicillin-Pot Clavulanate Nausea and Vomiting       Medications:  Prior to Admission medications    Not on File       Vitals:  /80   Pulse 83   Temp 97.7  F (36.5  C)   Ht 1.499 m (4' 11\")   Wt 58.7 kg (129 lb 8 oz)   SpO2 100%   BMI 26.16 kg/m       Exam:  GENERAL APPEARANCE: alert and no distress  HENT: mouth without ulcers or lesions  LYMPHATICS: no cervical or supraclavicular nodes  RESP: lungs clear to auscultation - no rales, rhonchi or wheezes  CV: regular rhythm, normal rate, no rub, no murmur  FEMORAL PULSES: 2+ equal bilaterally.   EDEMA: no LE edema bilaterally  ABDOMEN: soft, nondistended, nontender, bowel sounds normal  MS: extremities normal - no gross deformities noted, no evidence of inflammation in joints, no muscle tenderness  SKIN: no rash    Results:   Recent Results (from the past 336 hour(s))   UA with Microscopic reflex to Culture    Collection Time: 19  1:34 PM   Result Value Ref Range    Color Urine Yellow     Appearance Urine Clear     Glucose Urine Negative NEG^Negative mg/dL    Bilirubin Urine Negative NEG^Negative    Ketones Urine Negative NEG^Negative mg/dL    Specific Gravity Urine 1.013 1.003 - 1.035    Blood Urine Negative NEG^Negative    pH Urine 5.0 5.0 - 7.0 pH    Protein Albumin Urine >499 (A) NEG^Negative mg/dL    Urobilinogen mg/dL 0.0 0.0 - 2.0 mg/dL    Nitrite Urine Negative NEG^Negative    Leukocyte Esterase Urine Trace (A) NEG^Negative    Source Midstream Urine     WBC Urine 4 0 - 5 /HPF    RBC Urine 0 0 - 2 /HPF    Squamous Epithelial /HPF Urine <1 0 - 1 /HPF    Mucous Urine " Present (A) NEG^Negative /LPF   ABO type [QUI0104]    Collection Time: 01/28/19  1:49 PM   Result Value Ref Range    ABO B     RH(D) Pos     Specimen Expires 01/31/2019    ABO type [[id number]]    Collection Time: 01/28/19  1:51 PM   Result Value Ref Range    ABO B     RH(D) Pos     Specimen Expires 01/31/2019    Antibody titer red cell [[id number]]    Collection Time: 01/28/19  1:51 PM   Result Value Ref Range    Antibody Titer Anti A1: IgM 64, IgG 64  Anti A2: IgM 16, IgG 8      Blood Group A Subtype [[id number]]    Collection Time: 01/28/19  1:51 PM   Result Value Ref Range    Antigen Type Canceled, Test credited     Blood Bank Comment PT IS B POS. TESTING NOT INDICATED. 1/28/19 WW    Comprehensive metabolic panel [LAB17]    Collection Time: 01/28/19  1:51 PM   Result Value Ref Range    Sodium 140 133 - 144 mmol/L    Potassium 3.4 3.4 - 5.3 mmol/L    Chloride 104 94 - 109 mmol/L    Carbon Dioxide 26 20 - 32 mmol/L    Anion Gap 10 3 - 14 mmol/L    Glucose 130 (H) 70 - 99 mg/dL    Urea Nitrogen 38 (H) 7 - 30 mg/dL    Creatinine 3.74 (H) 0.52 - 1.04 mg/dL    GFR Estimate 12 (L) >60 mL/min/[1.73_m2]    GFR Estimate If Black 14 (L) >60 mL/min/[1.73_m2]    Calcium 9.2 8.5 - 10.1 mg/dL    Bilirubin Total 0.4 0.2 - 1.3 mg/dL    Albumin 3.6 3.4 - 5.0 g/dL    Protein Total 7.8 6.8 - 8.8 g/dL    Alkaline Phosphatase 52 40 - 150 U/L    ALT 27 0 - 50 U/L    AST 23 0 - 45 U/L   Phosphorus    Collection Time: 01/28/19  1:51 PM   Result Value Ref Range    Phosphorus 3.8 2.5 - 4.5 mg/dL   CBC with platelets differential [AHT192]    Collection Time: 01/28/19  1:51 PM   Result Value Ref Range    WBC 6.9 4.0 - 11.0 10e9/L    RBC Count 3.11 (L) 3.8 - 5.2 10e12/L    Hemoglobin 9.3 (L) 11.7 - 15.7 g/dL    Hematocrit 30.8 (L) 35.0 - 47.0 %    MCV 99 78 - 100 fl    MCH 29.9 26.5 - 33.0 pg    MCHC 30.2 (L) 31.5 - 36.5 g/dL    RDW 13.3 10.0 - 15.0 %    Platelet Count 363 150 - 450 10e9/L    Diff Method Automated Method     % Neutrophils 60.1 %    %  Lymphocytes 32.0 %    % Monocytes 4.8 %    % Eosinophils 2.3 %    % Basophils 0.4 %    % Immature Granulocytes 0.4 %    Nucleated RBCs 0 0 /100    Absolute Neutrophil 4.1 1.6 - 8.3 10e9/L    Absolute Lymphocytes 2.2 0.8 - 5.3 10e9/L    Absolute Monocytes 0.3 0.0 - 1.3 10e9/L    Absolute Eosinophils 0.2 0.0 - 0.7 10e9/L    Absolute Basophils 0.0 0.0 - 0.2 10e9/L    Abs Immature Granulocytes 0.0 0 - 0.4 10e9/L    Absolute Nucleated RBC 0.0    Cardiolipin Diya IgG and IgM [LAB 6836]    Collection Time: 01/28/19  1:51 PM   Result Value Ref Range    Cardiolipin Antibody IgG <1.6 0.0 - 19.9 GPL-U/mL    Cardiolipin Antibody IgM 0.2 0.0 - 19.9 MPL-U/mL   INR [[id number]]    Collection Time: 01/28/19  1:51 PM   Result Value Ref Range    INR 0.94 0.86 - 1.14   Partial thromboplastin time [LAB56]    Collection Time: 01/28/19  1:51 PM   Result Value Ref Range    PTT 29 22 - 37 sec   Thrombin time [[id number]]    Collection Time: 01/28/19  1:51 PM   Result Value Ref Range    Thrombin Time 18.8 13.0 - 19.0 sec   CMV Antibody IgG [[id number]]    Collection Time: 01/28/19  1:51 PM   Result Value Ref Range    CMV Antibody IgG >8.0 (H) 0.0 - 0.8 AI   EBV Capsid Antibody IgG [[id number]]    Collection Time: 01/28/19  1:51 PM   Result Value Ref Range    EBV Capsid Antibody IgG >8.0 (H) 0.0 - 0.8 AI   Hepatitis B core antibody [[id number]]    Collection Time: 01/28/19  1:51 PM   Result Value Ref Range    Hepatitis B Core Diya Reactive (AA) NR^Nonreactive   Hepatitis B Surface Antibody [[id number]]    Collection Time: 01/28/19  1:51 PM   Result Value Ref Range    Hepatitis B Surface Antibody 1.45 <8.00 m[IU]/mL   Hepatitis B surface antigen [MBD268]    Collection Time: 01/28/19  1:51 PM   Result Value Ref Range    Hep B Surface Agn Nonreactive NR^Nonreactive   Hepatitis C antibody [TRL950]    Collection Time: 01/28/19  1:51 PM   Result Value Ref Range    Hepatitis C Antibody Nonreactive NR^Nonreactive   HIV Antigen Antibody Combo Pretransplant    Collection  Time: 01/28/19  1:51 PM   Result Value Ref Range    HIV Antigen Antibody Combo Pretransplant Nonreactive NR^Nonreactive   Varicella Zoster Virus Antibody IgG [[id number]]    Collection Time: 01/28/19  1:51 PM   Result Value Ref Range    Varicella Zoster Virus Antibody IgG 4.4 (H) 0.0 - 0.8 AI   Treponema Abs w Reflex to RPR and Titer    Collection Time: 01/28/19  1:51 PM   Result Value Ref Range    Treponema Antibodies Nonreactive NR^Nonreactive   Antibody screen red cell    Collection Time: 01/28/19  1:51 PM   Result Value Ref Range    Antibody Screen Neg    EKG 12-lead, tracing only [EKG1]    Collection Time: 01/28/19  3:06 PM   Result Value Ref Range    Interpretation ECG Click View Image link to view waveform and result            Patient was seen by myself, Dr. Devon Vinson, in conjunction with Yennifer Topete, as part of a shared visit.    I personally reviewed past medical and surgical history, vital signs, medications and labs.  Present and past medical history, along with significant physical exam findings were all reviewed with SHAHRIAR.    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  Patient was diagnosed with CKD in 2012 with biopsy. She moved to  at that time, missed labs for a few years. Kidney function has deteriorated and last GFR was 12 ml / min.     She had AVF placed. Has nausea, low energy and is planning HD once AVF mature.     No donors.    CAD risk: no known CAD. No prior workup. No symptoms with exertion. Poor exercise tolerance of late thought due to uremia    Ca screens: up to date.    Hx of cigs - quit a few years ago. 18 pack years.     No cp, sob, no n/v/d, no f/s/c.     Key management decisions made by me and discussed with SHAHRIAR:  1. Kidney transplant evaluation - patient is a good candidate overall. Benefits of a living donor transplant were discussed.  The patient has no living  donors at this time however she has not asked anyone either.  I have recommended that she aggressively seek out a living donor as she is imminently needing to start dialysis in the coming weeks.  As she is not yet on dialysis I have recommended that she become listed as inactive until she completes her evaluation to accrue as much time as possible while not yet on dialysis.  2. CKD from GN: We will get the results of her prior biopsy from [place] to inform her of any risk of recurrence of glomerulonephritis in her transplanted kidney.    3. Cad risk: The patient has risk of coronary artery disease due to chronic kidney disease hypertension and age and as such will need evaluation by cardiology.  4. Ca screens: The patient's cancer screens are up-to-date and we will continue with monitoring this while she is wait listed.  5. Hx of tobacco use: The patient quit smoking multiple years ago.  We will perform pulmonary function test due to her exposure.  LETTY

## 2019-01-28 NOTE — NURSING NOTE
"Chief Complaint   Patient presents with     Transplant Evaluation     kidney     Blood pressure 167/80, pulse 83, temperature 97.7  F (36.5  C), height 1.499 m (4' 11\"), weight 58.7 kg (129 lb 8 oz), SpO2 100 %.    Fátima Rubio CMA     "

## 2019-01-28 NOTE — PROGRESS NOTES
"Kidney Transplant Referral - 12/4/2018  Ethel Thompson attended the pre-transplant patient education class today along with her . The [facility] website pre-transplant modules were viewed; class participants were educated on using the site. Both were attentive and engaged during class. Both asked good questions which I answered to the best of my ability.   Content reviewed:    Living Donation and how to access that program    Paired exchange    Kidney Donor Profile Index (KDPI)    Waiting list issues (right to decline without penalty, high PHS risk donors, what to expect when called with an offer)    Hospital experience,  length of stay , need to stay locally post-discharge (2-4 weeks)    Surgical options (with pictures)                             Post-surgery lifting and driving restrictions    Post-transplant routines, frequency of lab work and clinic visits    Need to stay locally post-discharge (2-4 weeks)    Role of Transplant Coordinator    Participants were informed of the benefits of transplant as well as potential risks such as infection, cancer, and death.  The need for total adherence with immunosuppression medications and following transplant regimens was stressed.  The overall evaluation/approval/listing process was reviewed.        The patient was provided with the following documents:  What You Need to Know About a Kidney Transplant  Adult Kidney Transplant - A Guide for Patients  SRTR Data Sheet - Kidney  Brochure - Kidney Allocation  Brochure - Multiple Listing and Waiting Time Transfer  What Every Patient Needs to Know (UNOS)  UNOS Facts and Figures  Finding a Donor  [facility] - Quick Start Guide  KDPI Consent  Receipt of Information form    Ethel Thompson signed the  Receipt of Information for Organ Transplant Recipient.\" She was provided Juanita Hankins's business card and instructed to call with additional questions.      Summary    Team s concerns/comments: No significant " concerns from surg/neph/SW. BMI 26. Recommend requesting native kidney biopsy path from 2012 done in [place]    Candidacy category: GREEN     Action/Plan: Continue evaluation    Expected Selection Meeting Discussion: 02-
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
## 2019-01-28 NOTE — PROGRESS NOTES
Surgery Clinical Trials Office Notification of Study Randomization  Study Name: Randomized Controlled Trial to Evaluate the Effect of Lost Wage Reimbursement to Potential Kidney  Donors On Living Donation Rates (Donor Lost Wages Study) (IRB #[id number])    ICF Version Date / IRB Approval Date: 11-AUG-2018 / 21-AUG-2018    Date of Approach/Consent: 28-JAN-2019  Patient consented to participate in the Donor Lost Wages study and has been randomized to the Treatment arm of the study (donors ARE eligible for wage reimbursement).  Patient has been informed of the results.

## 2019-01-28 NOTE — PROGRESS NOTES
Outpatient MNT: Kidney Transplant Evaluation    Current BMI: 26.2 (HT 59 in,  lbs/59 kg)  BMI is within criteria of <35 for kidney transplant     Time Spent: 30 minutes  Visit Type: Initial  Referring Physician: Ciara  Pt accompanied by: her , Maxim     History of previous txp: none  Dialysis: no    Nutrition Assessment  Pt is not on dialysis but does have fistula in place. The only recommendation that she has received about diet is to limit protein <60 g/day. Pt has taken this somewhat to an extreme and has limited all animal proteins and reports she accidentally follows a vegan diet. She noticed her eGFR improved a few points after eating less protein, so she thinks this is directly correlated and nervous to eat protein.     Appetite: poor x 3-6 months; reports taste is off and has some nausea     Vitamins, Supplements, Pertinent Meds: omega 3, B12, B complex, B6, vit D, iron, vit C with zinc   Herbal Medicines/Supplements: melatonin     Diet Recall  Breakfast Cereal with almond milk + fruit or brown rice with kimchi and cucumber/other veggies    Lunch Vegan meat s/w with some cheese or homemade soup   Dinner White rice, tofu, some beans/lentils    Snacks None; used to take a whey protein shake b/t meals mixed with water, but no longer does this   Beverages Water, coffee, some tea    Alcohol None    Dining out 2x/month      Physical Activity  None now d/t fatigue; most recently (1 year ago) she would walk on the treadmill and/or bike 5x/week      Anthropometrics  Height:   59 in   BMI:    26.2    Weight Status:Overweight BMI 25-29.9   Weight:  129 lbs            IBW (lb): 98  % IBW: 132    Wt Hx: Stable weight per pt. Edema has resolved     Adj/dosing BW: 106 lbs/48 kg       Malnutrition  % Intake: Decreased intake does not meet criteria for malnutrition   % Weight Loss: None noted  Subcutaneous Fat Loss: None  Muscle Loss: None  Fluid Accumulation/Edema: None noted  Malnutrition Diagnosis: Patient  does not meet two of the above criteria necessary for diagnosing malnutrition     Estimated Nutrition Needs  Energy  7850-7712     (25-30 kcal/kg for maintenance)     Protein  29-37    (0.6-0.8 g/kg for CKD)        Fluid  1 ml/kcal or per MD   Micronutrient   Na+: <2000 mg/day  K+: 6157-2483 mg/day  Phos: 800-1000 mg/day            Nutrition Diagnosis  Food and nutrition related knowledge deficit r/t pre kidney transplant eval AEB pt verbalized not hearing pre/post transplant diet guidelines.    Nutrition Intervention  Nutrition education provided:  Discussed sodium intake (low sodium foods and drinks, seasoning food without salt and tips for low sodium diet).    Spent majority of visit today discussing need for adequate protein and ramifications of very little protein intake. Acknowledged pt's mental state that she described - where her GFR improved with reduction in dietary protein intake. Pt can consume 40 g protein/day safely. We discussed various forms of protein and that she doesn't need to consume 40 g/day, but more than she currently is eating. Could also do 1 protein shake/day as an alternative to more protein-rich foods. Also discussed the fact that her protein needs with increase significantly when she starts dialysis.    Reviewed post txp diet guidelines in brief (will review in further detail post txp):  (1) Review of proper food safety measures d/t immunosuppressant therapy post-op and increased risk for food-borne illness    (2) Avoid the following post txp d/t risk for rejection, unknown effects on the organs, and/or potential interactions with immunosuppressants:  - Herbal, Chinese, holistic, chiropractic, natural, alternative medicines and supplements  - Detoxes and cleanses  - Weight loss pills  - Protein powders or other products with extracts or herbs (ie green tea extract)    (3) Med regimen and possible side effects    Patient Understanding: Pt verbalized understanding of education  provided.  Expected Compliance: Fair/Good  Follow-Up Plans: PRN     Nutrition Goals  1. Limit Na+ <2000mg/day  2. Pt to verbalize understanding of 3 aspects of post txp education provided  3. Increase protein intake, up to 40 g/day prior to dialysis start    Provided pt with contact info.   Laura Topete RD, LD  Pgr [phone]

## 2019-01-28 NOTE — LETTER
2019       RE: Ethel Thompson  [address]   [place] [address]     Dear Colleague,    Thank you for referring your patient, Ethel Thompson, to the [facility] at [facility]. Please see a copy of my visit note below.        Transplant Surgery Consult Note     Medical record number: [id number]  YOB: 1955,   Consult requested by Dr. Encinas for evaluation of kidney transplant candidacy.    Assessment and Recommendations:Ms. Thompson appears to be a good candidate for kidney transplantation and has a good understanding of the risks and benefits of this approach to the management of renal failure. The following issues should be addressed prior to finalizing her transplant candidacy:     Cardiology eval      The majority of our visit was spent in counselling, discussing the medical and surgical risks of kidney transplantation. We discussed approximate wait time and how that is influenced by issues such as blood type and sensitization (PRA) and access to a living donor. I contrasted potential waiting time for living vs  donor kidneys from  normal (0-85%) or higher (%) kidney donor profile index (KDPI) donors and their associated outcomes. I would not recommend this individual to consider kidneys from high KDPI donors. The reason for this decision is best summarized as: improved long term graft survival. Potential surgical complications of kidney transplantation include bleeding, superficial or deep wound complications (infection, hernia, lymphocele), ureteral anastomotic failure (leak or stenosis), graft thrombosis, need for reoperation and other issues such as cardiac complications, pneumonia, deep venous thrombosis, pulmonary embolism, post transplant diabetes and death. The potential for recurrent disease or need for retransplantation was also addressed. We discussed the possible need for ureteral stent (and subsequent removal), and the  utility of protocol biopsy and laboratory studies to evaluate for rejection or recurrent disease. We discussed the risk of graft rejection, our center's average graft and patient survival rates, immunosuppression protocols, as well as the potential opportunity to participate in clinical trials.  We also discussed the average length of stay, recovery process, and posttransplant lab and monitoring protocol.  I emphasized the need for strict immunosuppression medication adherence and the potential for complications of immunosuppression such as skin cancer or lymphoma, as well as a very low but not zero risk of donor-derived disease transmission risks (infection, cancer). Ms. Thompson asked good questions and her candidacy will be reviewed at our Multidisciplinary Selection Committee. Thank you for the opportunity to participate in Ms. Thompson's care.      Total time: 45 minutes  Counselling time: 40 minutes    EDGAR Cardenas MD  ---------------------------------------------------------------------------------------------------    HPI: Ms. Thompson has End stage renal failure due to focal segmental glomerulosclerosis (FSGS). The patient is non-diabetic.       The patient is not on dialysis.    Has potential kidney donors:  Doesn't know .  Interested in participation in paired exchange if a donor is willing: Doesn't know     The patient has the following pertinent history:       No    Yes  Dialysis:    [x]      [] via:       Blood Transfusion                  [x]      []  Number of units:   Most recently:  Pregnancy:    []      [x] Number:  twice     Previous Transplant:  [x]      [] Details:    Cancer    [x]      [] Comment:   Kidney stones   [x]      [] Comment:      Recurrent infections  [x]      []  Type:                  Bladder dysfunction  [x]      [] Cause:    Claudication   [x]      [] Distance:    Previous Amputation  [x]      [] Cause:     Chronic anticoagulation  [x]      [] Indication:    Spiritism  [x]      []      Past Medical History:   Diagnosis Date     Chronic renal failure      Glomerulonephritis, focal sclerosing      Herpes simplex      Hyperlipidemia      Hypertension      Hypothyroidism      SADIE on CPAP      Past Surgical History:   Procedure Laterality Date     ABDOMINOPLASTY  2001    tummy tuck- [place]     BIOPSY  2016    kidney, [place] [address]     COLONOSCOPY  2010    x 2, [facility]     COSMETIC SURGERY       EYE SURGERY Bilateral 2017    cataracts-[place]     GYN SURGERY       x 2, [place], [place]     GYN SURGERY      hysterectomy     ORTHOPEDIC SURGERY  2016    trigger finger- both hands, [place]     VASCULAR SURGERY Left 2018    dialysis access-[place], [place] [facility], [place]     Family History   Problem Relation Age of Onset     Hypertension Mother      Anuerysm Mother      Cancer No family hx of      Kidney Disease No family hx of      Diabetes No family hx of      Social History     Socioeconomic History     Marital status:      Spouse name: Not on file     Number of children: Not on file     Years of education: Not on file     Highest education level: Not on file   Social Needs     Financial resource strain: Not on file     Food insecurity - worry: Not on file     Food insecurity - inability: Not on file     Transportation needs - medical: Not on file     Transportation needs - non-medical: Not on file   Occupational History     Not on file   Tobacco Use     Smoking status: Former Smoker     Packs/day: 1.00     Types: Cigarettes     Start date:      Last attempt to quit:      Years since quittin.0     Smokeless tobacco: Never Used   Substance and Sexual Activity     Alcohol use: No     Frequency: Never     Drug use: No     Sexual activity: Not on file   Other Topics Concern     Parent/sibling w/ CABG, MI or angioplasty before 65F 55M? Not Asked   Social History Narrative     Not on file       ROS:   CONSTITUTIONAL:   No fevers or chills  EYES: negative for icterus  ENT:  negative for hearing loss, tinnitus and sore throat  RESPIRATORY:  negative for cough, sputum, dyspnea  CARDIOVASCULAR:  negative for chest pain. Positive for  Fatigue  Renal Insufficiency  GASTROINTESTINAL:  negative for nausea, vomiting, diarrhea or constipation  GENITOURINARY:  negative for incontinence, dysuria, bladder emptying problems  HEME:  No easy bruising  INTEGUMENT:  negative for rash and pruritus  NEURO:  Negative for headache, seizure disorder  Allergies:   Allergies   Allergen Reactions     Amoxicillin-Pot Clavulanate Nausea and Vomiting     Medications:  Prescription Medications as of 1/28/2019       Rx Number Disp Refills Start End Last Dispensed Date Next Fill Date Owning Pharmacy    amLODIPine (NORVASC) 10 MG tablet    10/19/2018        Sig: Take 10 mg by mouth    Class: Historical    Route: Oral    aspirin 81 MG EC tablet        [place] [address]    Sig: Take 81 mg by mouth daily    Class: Historical    Route: Oral    atorvastatin (LIPITOR) 40 MG tablet    1/14/2019        Class: Historical    bumetanide (BUMEX) 1 MG tablet        [place] [address]    Sig: Take 1 mg by mouth daily    Class: Historical    Route: Oral    calcitRIOL (ROCALTROL) 0.25 MCG capsule        [address]    Sig: Take 0.25 mcg by mouth daily    Class: Historical    Route: Oral    fenofibrate micronized (LOFIBRA) 134 MG capsule    1/14/2019        Class: Historical    fish oil-omega-3 fatty acids 1000 MG capsule        [address]    Sig: Take 2 g by mouth 3 times daily    Class: Historical    Route: Oral    levothyroxine (SYNTHROID/LEVOTHROID) 112 MCG tablet    1/14/2019        Class: Historical     melatonin 3 MG tablet            Sig: Take 3 mg by mouth    Class: Historical    Route: Oral    ranitidine (ZANTAC) 300 MG tablet    3/17/2018        Class: Historical    vitamin B complex with vitamin C (VITAMIN  B COMPLEX) tablet        [address]    Sig: Take 1 tablet by mouth daily    Class: Historical    Route: Oral    vitamin B-12 (CYANOCOBALAMIN) 500 MCG tablet        [address]    Sig: Take 500 mcg by mouth daily    Class: Historical    Route: Oral    vitamin B6 (PYRIDOXINE) 100 MG tablet        [address]    Sig: Take 100 mg by mouth daily    Class: Historical    Route: Oral        Exam:   Temp:  [97.7  F (36.5  C)] 97.7  F (36.5  C)  Pulse:  [83] 83  BP: (167)/(80) 167/80  SpO2:  [100 %] 100 %  Appearance: in no apparent distress.   Skin: normal  Head and Neck: Normal, no rashes or jaundice  Respiratory: easy respirations, no audible wheezing.  Cardiovascular: RRR  Abdomen: rounded, Surgical scars consistent with history   Extremeties: femoral 2+/2+, Edema, none  Neuro: without deficit     Diagnostics:   No results found for this or any previous visit (from the past 672 hour(s)).  No results found for: CPRA  PKE

## 2019-01-29 ENCOUNTER — TELEPHONE (OUTPATIENT)
Dept: TRANSPLANT | Facility: CLINIC | Age: 64
End: 2019-01-29

## 2019-01-29 LAB
BLD GP AB SCN SERPL QL: NORMAL
BLD GP AB SCN TITR SERPL: NORMAL {TITER}
CARDIOLIPIN ANTIBODY IGG: <1.6 GPL-U/ML (ref 0–19.9)
CARDIOLIPIN ANTIBODY IGM: 0.2 MPL-U/ML (ref 0–19.9)
CMV IGG SERPL QL IA: >8 AI (ref 0–0.8)
EBV VCA IGG SER QL IA: >8 AI (ref 0–0.8)
HBV CORE AB SERPL QL IA: REACTIVE
HBV CORE IGM SERPL QL IA: NONREACTIVE
HBV SURFACE AB SERPL IA-ACNC: 1.45 M[IU]/ML
HBV SURFACE AG SERPL QL IA: NONREACTIVE
HCV AB SERPL QL IA: NONREACTIVE
HIV 1+2 AB+HIV1 P24 AG SERPL QL IA: NONREACTIVE
INTERPRETATION ECG - MUSE: NORMAL
T PALLIDUM AB SER QL: NONREACTIVE
THROMBIN TIME: 18.8 SEC (ref 13–19)
VZV IGG SER QL IA: 4.4 AI (ref 0–0.8)

## 2019-01-29 NOTE — TELEPHONE ENCOUNTER
Call from  who says [facility], casually telling his nurse there about Ethel's visit to [facility] to start the transplant process.  Per Maxim, this nurse volunteered to be tested as a donor (is ABO-O).  [facility].  I reviewed the donor process with Maxim.

## 2019-01-30 LAB
GAMMA INTERFERON BACKGROUND BLD IA-ACNC: 0.05 IU/ML
LA PPP-IMP: NEGATIVE
M TB IFN-G BLD-IMP: NEGATIVE
M TB IFN-G CD4+ BCKGRND COR BLD-ACNC: >10 IU/ML
MITOGEN IGNF BCKGRD COR BLD-ACNC: 0 IU/ML
MITOGEN IGNF BCKGRD COR BLD-ACNC: 0 IU/ML

## 2019-01-31 LAB
A* LOCUS: NORMAL
A*: NORMAL
ABTEST METHOD: NORMAL
B* LOCUS: NORMAL
B*: NORMAL
BW-1: NORMAL
BW-2: NORMAL
C* LOCUS: NORMAL
C*: NORMAL
COPATH REPORT: NORMAL
DPA1* LOCUS NMDP: NORMAL
DPA1* NMDP: NORMAL
DPA1*: NORMAL
DPA1*LOCUS: NORMAL
DPB1* LOCUS NMDP: NORMAL
DPB1* NMDP: NORMAL
DPB1*: NORMAL
DPB1*LOCUS: NORMAL
DQA1*: NORMAL
DQA1*LOCUS: NORMAL
DQB1* LOCUS: NORMAL
DQB1*: NORMAL
DRB1* LOCUS: NORMAL
DRB1*: NORMAL
DRB3* LOCUS: NORMAL
DRB4*: NORMAL
DRSSO TEST METHOD: NORMAL
PROTOCOL CUTOFF: NORMAL
SA1 CELL: NORMAL
SA1 COMMENTS: NORMAL
SA1 HI RISK ABY: NORMAL
SA1 MOD RISK ABY: NORMAL
SA1 TEST METHOD: NORMAL
SA2 CELL: NORMAL
SA2 COMMENTS: NORMAL
SA2 HI RISK ABY UA: NORMAL
SA2 MOD RISK ABY: NORMAL
SA2 TEST METHOD: NORMAL
UNACCEPTABLE ANTIGEN: NORMAL
UNOS CPRA: 0

## 2019-02-01 LAB
HBV DNA SERPL NAA+PROBE-ACNC: NORMAL [IU]/ML
HBV DNA SERPL NAA+PROBE-LOG IU: NORMAL {LOG_IU}/ML

## 2019-02-05 ENCOUNTER — DOCUMENTATION ONLY (OUTPATIENT)
Dept: TRANSPLANT | Facility: CLINIC | Age: 64
End: 2019-02-05

## 2019-02-06 ENCOUNTER — COMMITTEE REVIEW (OUTPATIENT)
Dept: TRANSPLANT | Facility: CLINIC | Age: 64
End: 2019-02-06

## 2019-02-06 NOTE — COMMITTEE REVIEW
Abdominal Committee Review Note     Evaluation Date: 1/28/2019  Committee Review Date: 2/6/2019    Organ being evaluated for: Kidney    Transplant Phase: Evaluation  Transplant Status: Active    Transplant Coordinator: Tiffany Mcdaniels  Transplant Surgeon:       Referring Physician: Evens Encinas    Primary Diagnosis:   Secondary Diagnosis:     Committee Review Members:  Nephrology Devon Vinson MD, Mikel Topete, APRN CNP   Nutrition Laura Topete, YUMIKO    - Clinical Pratibha Vazquez, [facility], Krystyna Fitzgerald, MSW   Transplant Daniela Ambrocio PA-C, Mary Molina, RN, Monika Tinoco, NP, Juanita Hankins, SUN, Lara Landa, RN, Tiffany Mcdaniels, SUN, Heri Ribeiro MD, Angel Luis Sheridan MD, Christiane Granados RN   Transplant Surgery Billy Clinton MD       Transplant Eligibility: Irreversible chronic kidney disease treated w/dialysis or expected need for dialysis    Committee Review Decision: Approved for Kidney list ON HOLD.    Relative Contraindications: None    Absolute Contraindications: None    Committee Chair Heri Ribeiro MD verbally attested to the committee's decision.    Committee Discussion Details: Team thinks Ethel will be a good candidate.  Dr Vinson reviewed kidney biopsy report.  OK to list to hold now.    Outstanding issues:  1.  Cardiology consult  2.  PFTs  3.  Hep B core atb positive; neg DNA.  Vaccinate & repeat titers in 6 weeks.    I spoke with Ethel and her .  They state understanding of plan.

## 2019-02-11 ENCOUNTER — DOCUMENTATION ONLY (OUTPATIENT)
Dept: TRANSPLANT | Facility: CLINIC | Age: 64
End: 2019-02-11

## 2019-02-12 DIAGNOSIS — Z76.82 ORGAN TRANSPLANT CANDIDATE: ICD-10-CM

## 2019-02-12 DIAGNOSIS — N18.6 ESRD (END STAGE RENAL DISEASE) (H): ICD-10-CM

## 2019-02-13 ENCOUNTER — TELEPHONE (OUTPATIENT)
Dept: TRANSPLANT | Facility: CLINIC | Age: 64
End: 2019-02-13

## 2019-02-13 NOTE — TELEPHONE ENCOUNTER
Pt's , Maxim, called. Hoping to speak with our  to schedule remaining items needed for active status consideration on the kidney transplant list. Provided Maxim with our 's contact information and transferred him. Also provided coordinator's contact information.     Items needing to be scheduled:  1. Cards  2. Abd US r/o AAA  3. PFTs    Maxim states they are working w/ pt's nephrologist for Hep B vaccines and will complete titers.

## 2019-02-14 ENCOUNTER — DOCUMENTATION ONLY (OUTPATIENT)
Dept: CARE COORDINATION | Facility: CLINIC | Age: 64
End: 2019-02-14

## 2019-02-15 ENCOUNTER — HEALTH MAINTENANCE LETTER (OUTPATIENT)
Age: 64
End: 2019-02-15

## 2019-02-25 ENCOUNTER — ANCILLARY PROCEDURE (OUTPATIENT)
Dept: ULTRASOUND IMAGING | Facility: CLINIC | Age: 64
End: 2019-02-25
Attending: INTERNAL MEDICINE
Payer: MEDICARE

## 2019-02-25 ENCOUNTER — OFFICE VISIT (OUTPATIENT)
Dept: CARDIOLOGY | Facility: CLINIC | Age: 64
End: 2019-02-25
Attending: INTERNAL MEDICINE
Payer: MEDICARE

## 2019-02-25 VITALS
SYSTOLIC BLOOD PRESSURE: 145 MMHG | HEART RATE: 62 BPM | BODY MASS INDEX: 26.51 KG/M2 | HEIGHT: 59 IN | WEIGHT: 131.5 LBS | DIASTOLIC BLOOD PRESSURE: 74 MMHG | OXYGEN SATURATION: 100 %

## 2019-02-25 DIAGNOSIS — N18.5 CHRONIC RENAL FAILURE, STAGE 5 (H): ICD-10-CM

## 2019-02-25 DIAGNOSIS — N05.1 FSGS (FOCAL SEGMENTAL GLOMERULOSCLEROSIS): ICD-10-CM

## 2019-02-25 DIAGNOSIS — Z01.818 PRE-TRANSPLANT EVALUATION FOR CKD (CHRONIC KIDNEY DISEASE): ICD-10-CM

## 2019-02-25 DIAGNOSIS — Z76.82 ORGAN TRANSPLANT CANDIDATE: ICD-10-CM

## 2019-02-25 DIAGNOSIS — I12.9 RENAL HYPERTENSION: Primary | ICD-10-CM

## 2019-02-25 DIAGNOSIS — Z87.891 FORMER SMOKER: ICD-10-CM

## 2019-02-25 DIAGNOSIS — N18.6 ESRD (END STAGE RENAL DISEASE) (H): ICD-10-CM

## 2019-02-25 PROCEDURE — 99204 OFFICE O/P NEW MOD 45 MIN: CPT | Mod: GC | Performed by: INTERNAL MEDICINE

## 2019-02-25 PROCEDURE — 93005 ELECTROCARDIOGRAM TRACING: CPT | Mod: ZF

## 2019-02-25 PROCEDURE — G0463 HOSPITAL OUTPT CLINIC VISIT: HCPCS

## 2019-02-25 ASSESSMENT — PAIN SCALES - GENERAL: PAINLEVEL: NO PAIN (0)

## 2019-02-25 ASSESSMENT — MIFFLIN-ST. JEOR: SCORE: 1047.11

## 2019-02-25 NOTE — LETTER
2/25/2019      RE: Ethel Thompson  [address] [facility] [address]       Dear Colleague,    Thank you for the opportunity to participate in the care of your patient, Ethel Thompson, at the [facility]. Please see a copy of my visit note below.    CARDIOLOGY CLINIC NOTE  02/25/2019    HPI:  The patient is a 64-year-old female with a past medical history significant for FSGS and resultant chronic kidney disease who presents for cardiology evaluation prior to proposed kidney transplant.      The patient reports originally being diagnosed with kidney issues in 1986 when she was diagnosed with preeclampsia.  She reports that in 2004 she developed proteinuria.  She had been able to maintain a preserved renal function by following a vegan diet and healthy lifestyle.  Unfortunately, over the past 2-3 years she has had a significant worsening of her renal function.  She has been followed in [place].  She moved to the [facility] in September of this past year.  She is now being evaluated as a part of our renal transplant program which is what prompted her referral.    From a symptomatic standpoint, she does endorse dyspnea, especially worse when walking up steps.  Per her and her , this dyspnea is parallel to the worsening of her renal function.  She has no chest pressure, chest pain, or chest tightness associated with this.  She has no orthopnea or paroxysmal nocturnal dyspnea.    She is a previous 5-10-pack-year smoker.  Her blood pressure has also worsened with her worsening renal function, however this has been managed by her nephrologist.  She has no family history of coronary artery disease.  She is a rare drinker.  She is no family history or personal history of diabetes.    She has been managed with aspirin and atorvastatin.    ROS:  A complete 10-point ROS was negative except as above.    PAST MEDICAL HISTORY:  Past Medical History:   Diagnosis Date      Anemia in chronic kidney disease      Chronic renal failure      GERD (gastroesophageal reflux disease)      Glomerulonephritis, focal sclerosing      Hepatitis B core antibody positive      Herpes simplex      Hyperlipidemia      Hypertension      Hypothyroidism      SADIE on CPAP        PAST SURGICAL HISTORY:  Past Surgical History:   Procedure Laterality Date     ABDOMINOPLASTY      tummy tuck- [place]     BIOPSY  2016    kidney, [place] [address] [facility]     COLONOSCOPY  2010    x 2, [facility]     COSMETIC SURGERY       EYE SURGERY Bilateral 2017    cataracts-[place]     GYN SURGERY       x 2, [place], [place]     GYN SURGERY      hysterectomy     ORTHOPEDIC SURGERY  2016    trigger finger- both hands, [place]     VASCULAR SURGERY Left 2018    dialysis access-[place], [facility] [place]       FAMILY HISTORY:  Family History   Problem Relation Age of Onset     Hypertension Mother      Anuerysm Mother      Cancer No family hx of      Kidney Disease No family hx of      Diabetes No family hx of        SOCIAL HISTORY:  Social History     Socioeconomic History     Marital status:      Spouse name: Not on file     Number of children: Not on file     Years of education: Not on file     Highest education level: Not on file   Occupational History     Not on file   Social Needs     Financial resource strain: Not on file     Food insecurity:     Worry: Not on file     Inability: Not on file     Transportation needs:     Medical: Not on file     Non-medical: Not on file   Tobacco Use     Smoking status: Former Smoker     Packs/day: 1.00     Types: Cigarettes     Start date:      Last attempt to quit:      Years since quittin.1     Smokeless tobacco: Never Used   Substance and Sexual Activity     Alcohol use: No     Frequency: Never     Drug use: No     Sexual activity: Not on file   Lifestyle     Physical activity:     Days per week: Not on file     Minutes per session: Not  "on file     Stress: Not on file   Relationships     Social connections:     Talks on phone: Not on file     Gets together: Not on file     Attends Restorationist service: Not on file     Active member of club or organization: Not on file     Attends meetings of clubs or organizations: Not on file     Relationship status: Not on file     Intimate partner violence:     Fear of current or ex partner: Not on file     Emotionally abused: Not on file     Physically abused: Not on file     Forced sexual activity: Not on file   Other Topics Concern     Parent/sibling w/ CABG, MI or angioplasty before 65F 55M? Not Asked   Social History Narrative     Not on file       MEDICATIONS:  Current Outpatient Medications   Medication     amLODIPine (NORVASC) 10 MG tablet     aspirin 81 MG EC tablet     atorvastatin (LIPITOR) 40 MG tablet     bumetanide (BUMEX) 1 MG tablet     calcitRIOL (ROCALTROL) 0.25 MCG capsule     fenofibrate micronized (LOFIBRA) 134 MG capsule     fish oil-omega-3 fatty acids 1000 MG capsule     levothyroxine (SYNTHROID/LEVOTHROID) 112 MCG tablet     melatonin 3 MG tablet     Psyllium (METAMUCIL FIBER PO)     ranitidine (ZANTAC) 300 MG tablet     vitamin B complex with vitamin C (VITAMIN  B COMPLEX) tablet     vitamin B-12 (CYANOCOBALAMIN) 500 MCG tablet     vitamin B6 (PYRIDOXINE) 100 MG tablet     No current facility-administered medications for this visit.        ALLERGIES:     Allergies   Allergen Reactions     Amoxicillin-Pot Clavulanate Nausea and Vomiting       PHYSICAL EXAM:  /74 (BP Location: Right arm, Patient Position: Chair, Cuff Size: Adult Regular)   Pulse 62   Ht 1.499 m (4' 11\")   Wt 59.6 kg (131 lb 8 oz)   SpO2 100%   BMI 26.56 kg/m     In general, the patient is a pleasant female in no apparent distress.      HEENT: NC/AT.  PERRLA.  EOMI.  Sclerae white, not injected.    Neck: Carotids 2+ bilaterally without bruits.  No jugular venous distension.   Lymph: No cervical adenopathy. No " thyromegaly.   Heart: RRR. Normal S1, S2. No murmur, rub, click, or gallop. There is no heave.    Lungs: Clear bilaterally.  No rhonchi, wheezes, rales.   GI: Soft, nontender, nondistended.   Extremities: No edema.  LUE fistula in place. The pulses are 2+at the radial on the R and DP bilaterally.  Neuro: grossly non focal.   Skin: no rashes.  Endocrine: no thyromegaly  Musculoskeletal: no joint swelling or tenderness, gait normal.  Psych: pleasant and conversant      LABS:    Chemistry panel:   Recent Labs   Lab Test 01/28/19  1351      POTASSIUM 3.4   CHLORIDE 104   CO2 26   ANIONGAP 10   *   BUN 38*   CR 3.74*   EVELIO 9.2   GFRESTIMATED 12*   AST 23   ALT 27       CBC:   Recent Labs   Lab Test 01/28/19  1351   WBC 6.9   RBC 3.11*   HGB 9.3*   HCT 30.8*   MCV 99   MCH 29.9   MCHC 30.2*   RDW 13.3          Lipid panel 10/18/18  CHOLESTEROL,TOTAL 231  TRIGLYCERIDES 166  HDL CHOLESTEROL 67  NON-HDL CHOLESTEROL 164  CHOL/HDL RATIO           3.45  LDL CHOLESTEROL 131      CARDIAC DATA:    ECG today - sinus rhythm, non specific TWI in V2 (appears new from prior ECG)      TTE 1/28/19  Global and regional left ventricular function is normal with an EF of 60-65%.  Mild concentric wall thickening consistent with left ventricular hypertrophy is present.  Grade I or early diastolic dysfunction.  Right ventricular function, chamber size, wall motion, and thickness are normal.  Estimated mean right atrial pressure is 3 mmHg (normal).  The inferior vena cava is normal.  No pericardial effusion is present.      ASSESSMENT/PLAN:  In summary, this is a 64-year-old female with a medical history notable for FSGS now with progressively worsening renal function who is presenting today for cardiac risk assessment prior to planned kidney transplant.    1.  Cardiac risk assessment: Her current RCRI score is 1, placing her at a 6% chance of major adverse cardiac event. She is of intermediate probability of having coronary  artery disease and it is unclear to which her dyspnea may be attributable to this.  Given that she is not on dialysis, coronary angiogram, coronary CT, and MRI are currently contraindicated.  She reports previously having had a stress test within the last 5 months. We will attempt to obtain records of this.     2.  Hypertension, uncontrolled, defer management to her current nephrologist.  Recently increased amlodipine.  3.  Hyperlipidemia: Currently on atorvastatin 40 mg.  Last .  4.  Chronic kidney disease stage V, secondary to FSGS.  5.  History of preeclampsia  6.  History of tobacco use    Return to clinic pending results of stress test.    Pt seen and discussed with Dr. Canales.    Stveen Carter MD  Cardiology Fellow, PGY-4  [phone]    ATTENDING ATTESTATION:  This patient has been seen and examined by me February 25, 2019 with Dr. Carter. I have reviewed the vitals, laboratory and imaging data relevant to this patient's care. I have edited this note to reflect our joint assessment and plan, and discussed the plan with the patient.    Ethel Thompson is a 64 year old year old female with     End stage renal disease  Preeclampia in 1986  Normal biventricular function  Essential hypertension  Mixed dyslipidemia  NYHA Class II     Patient is without angina or heart failure symptoms. She is euvolemic on exam, blood pressure is mildly elevated. ECG shows sinus rhythm and labs reviewed. Recent LDL-c 131 mg/dL, hemoglobin is low in the 9 gm/dL range, GFR ~12.  Echo from Jan 2019 notable for normal left and right ventricular size and function with no significant valvular disease or pulmonary hypertensi    Her cardiac risk factors including dyslipidemia, hypertension and renal disease. She is on guideline directed medical therapy of aspirin, lipitor and antihypertensives. She informs me that she underwent a stress test in the last 6 months at [place] that I do not have for review today. We will attempt to get this to  ensure that she does not need additional testing.  She also informs me that she has a living donor.     Follow up here in 2 years provided she is active on the kidney transplant list, this is also if the above stress test is without any significant abnormalities.    Joann Canales MD, MS  Staff Cardiologist, [facility]  Pager: [phone]

## 2019-02-25 NOTE — NURSING NOTE
Chief Complaint   Patient presents with     New Patient      risk stratify for kidney transplant     Vitals were taken and medications were reconciled. EKG was performed.    Ciara Maza CMA    9:43 AM

## 2019-02-25 NOTE — PROGRESS NOTES
CARDIOLOGY CLINIC NOTE  02/25/2019    HPI:  The patient is a 64-year-old female with a past medical history significant for FSGS and resultant chronic kidney disease who presents for cardiology evaluation prior to proposed kidney transplant.      The patient reports originally being diagnosed with kidney issues in 1986 when she was diagnosed with preeclampsia.  She reports that in 2004 she developed proteinuria.  She had been able to maintain a preserved renal function by following a vegan diet and healthy lifestyle.  Unfortunately, over the past 2-3 years she has had a significant worsening of her renal function.  She has been followed in [place].  She moved to the [facility] in September of this past year.  She is now being evaluated as a part of our renal transplant program which is what prompted her referral.    From a symptomatic standpoint, she does endorse dyspnea, especially worse when walking up steps.  Per her and her , this dyspnea is parallel to the worsening of her renal function.  She has no chest pressure, chest pain, or chest tightness associated with this.  She has no orthopnea or paroxysmal nocturnal dyspnea.    She is a previous 5-10-pack-year smoker.  Her blood pressure has also worsened with her worsening renal function, however this has been managed by her nephrologist.  She has no family history of coronary artery disease.  She is a rare drinker.  She is no family history or personal history of diabetes.    She has been managed with aspirin and atorvastatin.    ROS:  A complete 10-point ROS was negative except as above.    PAST MEDICAL HISTORY:  Past Medical History:   Diagnosis Date     Anemia in chronic kidney disease      Chronic renal failure      GERD (gastroesophageal reflux disease)      Glomerulonephritis, focal sclerosing      Hepatitis B core antibody positive      Herpes simplex      Hyperlipidemia      Hypertension      Hypothyroidism      SADIE on CPAP        PAST SURGICAL  HISTORY:  Past Surgical History:   Procedure Laterality Date     ABDOMINOPLASTY  2001    tummy tuck- [place]     BIOPSY  2016    kidney, [place] [address]     COLONOSCOPY  2010    x 2, [facility]     COSMETIC SURGERY       EYE SURGERY Bilateral 2017    cataracts-[place]     GYN SURGERY       x 2, [place], [place]     GYN SURGERY      hysterectomy     ORTHOPEDIC SURGERY  2016    trigger finger- both hands, [place]     VASCULAR SURGERY Left 2018    dialysis access[place], [facility], [place]       FAMILY HISTORY:  Family History   Problem Relation Age of Onset     Hypertension Mother      Anuerysm Mother      Cancer No family hx of      Kidney Disease No family hx of      Diabetes No family hx of        SOCIAL HISTORY:  Social History     Socioeconomic History     Marital status:      Spouse name: Not on file     Number of children: Not on file     Years of education: Not on file     Highest education level: Not on file   Occupational History     Not on file   Social Needs     Financial resource strain: Not on file     Food insecurity:     Worry: Not on file     Inability: Not on file     Transportation needs:     Medical: Not on file     Non-medical: Not on file   Tobacco Use     Smoking status: Former Smoker     Packs/day: 1.00     Types: Cigarettes     Start date:      Last attempt to quit:      Years since quittin.1     Smokeless tobacco: Never Used   Substance and Sexual Activity     Alcohol use: No     Frequency: Never     Drug use: No     Sexual activity: Not on file   Lifestyle     Physical activity:     Days per week: Not on file     Minutes per session: Not on file     Stress: Not on file   Relationships     Social connections:     Talks on phone: Not on file     Gets together: Not on file     Attends [employer] service: Not on file     Active member of club or organization: Not on file     Attends meetings of clubs or organizations: Not on file      "Relationship status: Not on file     Intimate partner violence:     Fear of current or ex partner: Not on file     Emotionally abused: Not on file     Physically abused: Not on file     Forced sexual activity: Not on file   Other Topics Concern     Parent/sibling w/ CABG, MI or angioplasty before 65F 55M? Not Asked   Social History Narrative     Not on file       MEDICATIONS:  Current Outpatient Medications   Medication     amLODIPine (NORVASC) 10 MG tablet     aspirin 81 MG EC tablet     atorvastatin (LIPITOR) 40 MG tablet     bumetanide (BUMEX) 1 MG tablet     calcitRIOL (ROCALTROL) 0.25 MCG capsule     fenofibrate micronized (LOFIBRA) 134 MG capsule     fish oil-omega-3 fatty acids 1000 MG capsule     levothyroxine (SYNTHROID/LEVOTHROID) 112 MCG tablet     melatonin 3 MG tablet     Psyllium (METAMUCIL FIBER PO)     ranitidine (ZANTAC) 300 MG tablet     vitamin B complex with vitamin C (VITAMIN  B COMPLEX) tablet     vitamin B-12 (CYANOCOBALAMIN) 500 MCG tablet     vitamin B6 (PYRIDOXINE) 100 MG tablet     No current facility-administered medications for this visit.        ALLERGIES:     Allergies   Allergen Reactions     Amoxicillin-Pot Clavulanate Nausea and Vomiting       PHYSICAL EXAM:  /74 (BP Location: Right arm, Patient Position: Chair, Cuff Size: Adult Regular)   Pulse 62   Ht 1.499 m (4' 11\")   Wt 59.6 kg (131 lb 8 oz)   SpO2 100%   BMI 26.56 kg/m    In general, the patient is a pleasant female in no apparent distress.      HEENT: NC/AT.  PERRLA.  EOMI.  Sclerae white, not injected.    Neck: Carotids 2+ bilaterally without bruits.  No jugular venous distension.   Lymph: No cervical adenopathy. No thyromegaly.   Heart: RRR. Normal S1, S2. No murmur, rub, click, or gallop. There is no heave.    Lungs: Clear bilaterally.  No rhonchi, wheezes, rales.   GI: Soft, nontender, nondistended.   Extremities: No edema.  LUE fistula in place. The pulses are 2+at the radial on the R and DP " bilaterally.  Neuro: grossly non focal.   Skin: no rashes.  Endocrine: no thyromegaly  Musculoskeletal: no joint swelling or tenderness, gait normal.  Psych: pleasant and conversant      LABS:    Chemistry panel:   Recent Labs   Lab Test 01/28/19  1351      POTASSIUM 3.4   CHLORIDE 104   CO2 26   ANIONGAP 10   *   BUN 38*   CR 3.74*   EVELIO 9.2   GFRESTIMATED 12*   AST 23   ALT 27       CBC:   Recent Labs   Lab Test 01/28/19  1351   WBC 6.9   RBC 3.11*   HGB 9.3*   HCT 30.8*   MCV 99   MCH 29.9   MCHC 30.2*   RDW 13.3          Lipid panel 10/18/18  CHOLESTEROL,TOTAL 231  TRIGLYCERIDES 166  HDL CHOLESTEROL 67  NON-HDL CHOLESTEROL 164  CHOL/HDL RATIO           3.45  LDL CHOLESTEROL 131      CARDIAC DATA:    ECG today - sinus rhythm, non specific TWI in V2 (appears new from prior ECG)      TTE 1/28/19  Global and regional left ventricular function is normal with an EF of 60-65%.  Mild concentric wall thickening consistent with left ventricular hypertrophy is present.  Grade I or early diastolic dysfunction.  Right ventricular function, chamber size, wall motion, and thickness are normal.  Estimated mean right atrial pressure is 3 mmHg (normal).  The inferior vena cava is normal.  No pericardial effusion is present.      ASSESSMENT/PLAN:  In summary, this is a 64-year-old female with a medical history notable for FSGS now with progressively worsening renal function who is presenting today for cardiac risk assessment prior to planned kidney transplant.    1.  Cardiac risk assessment: Her current RCRI score is 1, placing her at a 6% chance of major adverse cardiac event. She is of intermediate probability of having coronary artery disease and it is unclear to which her dyspnea may be attributable to this.  Given that she is not on dialysis, coronary angiogram, coronary CT, and MRI are currently contraindicated.  She reports previously having had a stress test within the last 5 months. We will attempt to  obtain records of this.     2.  Hypertension, uncontrolled, defer management to her current nephrologist.  Recently increased amlodipine.  3.  Hyperlipidemia: Currently on atorvastatin 40 mg.  Last .  4.  Chronic kidney disease stage V, secondary to FSGS.  5.  History of preeclampsia  6.  History of tobacco use    Return to clinic pending results of stress test.    Pt seen and discussed with Dr. Canales.    Steven Carter MD  Cardiology Fellow, PGY-4  [phone]    ATTENDING ATTESTATION:  This patient has been seen and examined by me February 25, 2019 with Dr. Carter. I have reviewed the vitals, laboratory and imaging data relevant to this patient's care. I have edited this note to reflect our joint assessment and plan, and discussed the plan with the patient.    Ethel Thompson is a 64 year old year old female with     End stage renal disease  Preeclampia in 1986  Normal biventricular function  Essential hypertension  Mixed dyslipidemia  NYHA Class II     Patient is without angina or heart failure symptoms. She is euvolemic on exam, blood pressure is mildly elevated. ECG shows sinus rhythm and labs reviewed. Recent LDL-c 131 mg/dL, hemoglobin is low in the 9 gm/dL range, GFR ~12.  Echo from Jan 2019 notable for normal left and right ventricular size and function with no significant valvular disease or pulmonary hypertensi    Her cardiac risk factors including dyslipidemia, hypertension and renal disease. She is on guideline directed medical therapy of aspirin, lipitor and antihypertensives. She informs me that she underwent a stress test in the last 6 months at [place] that I do not have for review today. We will attempt to get this to ensure that she does not need additional testing.  She also informs me that she has a living donor.     Follow up here in 2 years provided she is active on the kidney transplant list, this is also if the above stress test is without any significant abnormalities.    Joann Canales MD,  MS  Staff Cardiologist, [facility]  Pager: [phone]

## 2019-02-26 ENCOUNTER — TELEPHONE (OUTPATIENT)
Dept: TRANSPLANT | Facility: CLINIC | Age: 64
End: 2019-02-26

## 2019-02-26 LAB — INTERPRETATION ECG - MUSE: NORMAL

## 2019-02-26 NOTE — TELEPHONE ENCOUNTER
Pt's , Maxim, called. [facility] nephrology nurse is finding pt's recent stress test and will forward results to our office for Dr. Canales to review. Pt has started her Hep B vaccine series (Received one vaccine)

## 2019-02-28 LAB
DLCOUNC-%PRED-PRE: 103 %
DLCOUNC-PRE: 17.19 ML/MIN/MMHG
DLCOUNC-PRED: 16.62 ML/MIN/MMHG
ERV-%PRED-PRE: 76 %
ERV-PRE: 0.44 L
ERV-PRED: 0.57 L
EXPTIME-PRE: 7.34 SEC
FEF2575-%PRED-PRE: 98 %
FEF2575-PRE: 1.72 L/SEC
FEF2575-PRED: 1.74 L/SEC
FEFMAX-%PRED-PRE: 106 %
FEFMAX-PRE: 5.62 L/SEC
FEFMAX-PRED: 5.26 L/SEC
FEV1-%PRED-PRE: 109 %
FEV1-PRE: 2.02 L
FEV1FEV6-PRE: 77 %
FEV1FEV6-PRED: 80 %
FEV1FVC-PRE: 77 %
FEV1FVC-PRED: 80 %
FEV1SVC-PRE: 80 %
FEV1SVC-PRED: 73 %
FIFMAX-PRE: 4.25 L/SEC
FRCPLETH-%PRED-PRE: 107 %
FRCPLETH-PRE: 2.6 L
FRCPLETH-PRED: 2.42 L
FVC-%PRED-PRE: 113 %
FVC-PRE: 2.62 L
FVC-PRED: 2.3 L
IC-%PRED-PRE: 106 %
IC-PRE: 2.09 L
IC-PRED: 1.96 L
RVPLETH-%PRED-PRE: 123 %
RVPLETH-PRE: 2.16 L
RVPLETH-PRED: 1.75 L
TLCPLETH-%PRED-PRE: 114 %
TLCPLETH-PRE: 4.69 L
TLCPLETH-PRED: 4.1 L
VA-%PRED-PRE: 96 %
VA-PRE: 3.7 L
VC-%PRED-PRE: 99 %
VC-PRE: 2.52 L
VC-PRED: 2.53 L

## 2019-03-06 ENCOUNTER — TELEPHONE (OUTPATIENT)
Dept: TRANSPLANT | Facility: CLINIC | Age: 64
End: 2019-03-06

## 2019-03-06 NOTE — TELEPHONE ENCOUNTER
Maxim, pt's , called to see if we had received pt's stress test, which we have not. [facility]. Once stress test received, will forward to cardiology team.

## 2019-03-13 ENCOUNTER — TELEPHONE (OUTPATIENT)
Dept: TRANSPLANT | Facility: CLINIC | Age: 64
End: 2019-03-13

## 2019-03-13 NOTE — TELEPHONE ENCOUNTER
Coordinator called pt and spoke to , Maxim. Received stress test dating 11/3/17. Will forward to cardiology.

## 2019-03-14 ENCOUNTER — MEDICAL CORRESPONDENCE (OUTPATIENT)
Dept: TRANSPLANT | Facility: CLINIC | Age: 64
End: 2019-03-14

## 2019-03-20 NOTE — TELEPHONE ENCOUNTER
Coordinator spoke with pt's , Maxim. Still waiting to hear back from cardiology if pt is cleared from transplant. Will call back once hear back from cardiology.

## 2019-03-26 ENCOUNTER — TELEPHONE (OUTPATIENT)
Dept: CARDIOLOGY | Facility: CLINIC | Age: 64
End: 2019-03-26

## 2019-03-26 NOTE — TELEPHONE ENCOUNTER
Called patient and let them know that Dr. Canales will review the stress test [place].

## 2019-04-02 ENCOUNTER — DOCUMENTATION ONLY (OUTPATIENT)
Dept: TRANSPLANT | Facility: CLINIC | Age: 64
End: 2019-04-02

## 2019-04-02 ENCOUNTER — TELEPHONE (OUTPATIENT)
Dept: TRANSPLANT | Facility: CLINIC | Age: 64
End: 2019-04-02

## 2019-04-02 DIAGNOSIS — I10 ESSENTIAL HYPERTENSION: Primary | ICD-10-CM

## 2019-04-02 DIAGNOSIS — R06.09 DYSPNEA ON EXERTION: ICD-10-CM

## 2019-04-02 NOTE — TELEPHONE ENCOUNTER
Reviewed pt has been approved by cardiology and her status was changed to active on kidney wait list. Pt phone numbers reviewed in EPIC. Reviewed what to do when pt gets a call with a kidney offer.  Reviewed general components of inpt stay and post-transplant routines including frequent appointments here as an outpt x1-2 weeks post-transplant.  Reviewed that pt will be receiving a listing letter and ALA order in the mail.  Next ALA sample is due end of April (pt will have drawn at local lab).  Encouraged pt to share living donor web site to anyone who expresses interest in getting tested as a living donor. Requested pt contact me with any changes in her health, insurance, or contact information.  Instructed pt to call with any questions.  verbalized good understanding and had no further questions.       UNOS updated, immunology & PFR notified, change in status letter routed to admin team to send out.

## 2019-04-02 NOTE — PROGRESS NOTES
Called  and let him know that Dr. Canales had reviewed the stress test from 2017 and will have patient see cardiology in fall of 2019 with another stress test if not transplanted.

## 2019-04-03 ENCOUNTER — TEAM CONFERENCE (OUTPATIENT)
Dept: TRANSPLANT | Facility: CLINIC | Age: 64
End: 2019-04-03

## 2019-04-03 NOTE — TELEPHONE ENCOUNTER
TEAM CONFERENCE:     ATTENDEES: Kandaswamy, Spong, Keys, Marita, Cristóbal, Coordinators, Social Work, Pharmacy, Finance, and Dietary    DISCUSSION: Pt approved by cardiology for transplant.    OUTCOME: Approved for active status

## 2019-04-08 ENCOUNTER — TELEPHONE (OUTPATIENT)
Dept: TRANSPLANT | Facility: CLINIC | Age: 64
End: 2019-04-08

## 2019-04-08 NOTE — TELEPHONE ENCOUNTER
Coordinator spoke with pt's , Maxim. Maxim was wondering if they could get any idea if there were any responders to pt's FB posting on needing a donor. Coordinator explained recipient team is unaware of any potential donors and is only notified by the donor team is a donor is approved. Also explained to Maxim that any potential donor that has diabetes would not be able to donate, even if diabetes is well controlled. Maxim verbalized understanding and has no further questions at this time.

## 2019-04-15 ENCOUNTER — TELEPHONE (OUTPATIENT)
Dept: TRANSPLANT | Facility: CLINIC | Age: 64
End: 2019-04-15

## 2019-04-15 NOTE — TELEPHONE ENCOUNTER
called to see what donor rules are for smokers. Reviewed per donor team they need to stop smoking three weeks prior to surgery.

## 2019-06-17 ENCOUNTER — TELEPHONE (OUTPATIENT)
Dept: TRANSPLANT | Facility: CLINIC | Age: 64
End: 2019-06-17

## 2019-06-17 DIAGNOSIS — Z76.82 ORGAN TRANSPLANT CANDIDATE: ICD-10-CM

## 2019-06-17 DIAGNOSIS — N18.6 ESRD (END STAGE RENAL DISEASE) (H): ICD-10-CM

## 2019-06-17 NOTE — LETTER
SAMPLES ARE NEXT DUE:  ASAP & EVERY 12 WEEKS THEREAFTER    PHYSICIAN ORDER   ALA/PRA BLOOD      DATE & TIME ISSUED: 2019  PATIENT NAME: Ethel Thompson   : 1955     [facility] MR# [if applicable]: [id number]     DIAGNOSIS/ICD-10  CODE: Z76.82 Awaiting Organ Transplant, N18.6 ESRD    EXPIRES: (1 YEAR AFTER DATE ISSUED)  DRAW AND SEND SAMPLES EVERY 12 Weeks - Please include 2 patient identifiers on all tubes.      1. Please draw 20ml of blood in red top (plain) tube for Antileukocyte Antibody (ALA or PRA).   2. Label tubes with the patient s name, , and complete lab slip.       3. Mailers, lab slips with instructions are sent to patient separately.      4. Call the Outreach Lab at [phone] to reorder mailers.       5. Mail blood to (this address is also on the mailers):    [facility]  [facility]  [address]  [address] [place] [address]  [phone]          Heri Ribeiro M.D., FACS  Professor of Surgery

## 2019-06-17 NOTE — TELEPHONE ENCOUNTER
Spoke with patient's , Maxim, prem reports that Ethel has started home PD and is working with [facility]. Writer informed Maxim that we would send [facility] Ethel's ALA orders to be done very 12 weeks. Encouraged patient to call with any questions or concerns.

## 2019-06-18 NOTE — PROGRESS NOTES
"Sent ALA/PRA Mailers with labeled tubes to [facility]  [employer]   Tuesday, June 18, 2019 Wednesday, June 19, 2019 End of Day   [facility]  [place] [address]   [facility] [place] [address]     Message:  --   [facility]  --   Other Packaging: 10.00\" x 10.00\" x 10.00\" inches 8.00 lbs. (billed weight)    [id number] Track this shipment    NOTE:  Tracking information may not be available until several hours after the carrier picks up the package(s). Carriers normally  in the late afternoon.   [id number]    [id number]    [id number]    If you have questions regarding this shipment, have the tracking number ready and then contact UPS directly:        Or contact the facility listed in the Shipped Through section above.       "

## 2019-08-05 DIAGNOSIS — N18.6 ESRD (END STAGE RENAL DISEASE) (H): ICD-10-CM

## 2019-08-05 DIAGNOSIS — Z76.82 ORGAN TRANSPLANT CANDIDATE: ICD-10-CM

## 2019-08-14 LAB
PROTOCOL CUTOFF: NORMAL
SA1 CELL: NORMAL
SA1 COMMENTS: NORMAL
SA1 HI RISK ABY: NORMAL
SA1 MOD RISK ABY: NORMAL
SA1 TEST METHOD: NORMAL
SA2 CELL: NORMAL
SA2 COMMENTS: NORMAL
SA2 HI RISK ABY UA: NORMAL
SA2 MOD RISK ABY: NORMAL
SA2 TEST METHOD: NORMAL
UNACCEPTABLE ANTIGEN: NORMAL
UNOS CPRA: 29

## 2019-09-11 ENCOUNTER — DOCUMENTATION ONLY (OUTPATIENT)
Dept: TRANSPLANT | Facility: CLINIC | Age: 64
End: 2019-09-11

## 2019-09-11 DIAGNOSIS — Z76.82 ORGAN TRANSPLANT CANDIDATE: ICD-10-CM

## 2019-09-11 DIAGNOSIS — Z01.810 ENCOUNTER FOR PRE-OPERATIVE CARDIOVASCULAR CLEARANCE: Primary | ICD-10-CM

## 2019-09-11 DIAGNOSIS — N18.6 ESRD (END STAGE RENAL DISEASE) (H): ICD-10-CM

## 2019-11-05 DIAGNOSIS — Z76.82 ORGAN TRANSPLANT CANDIDATE: ICD-10-CM

## 2019-11-05 DIAGNOSIS — N18.6 ESRD (END STAGE RENAL DISEASE) (H): ICD-10-CM

## 2019-11-25 ENCOUNTER — OFFICE VISIT (OUTPATIENT)
Dept: CARDIOLOGY | Facility: CLINIC | Age: 64
End: 2019-11-25
Attending: INTERNAL MEDICINE
Payer: MEDICARE

## 2019-11-25 VITALS
OXYGEN SATURATION: 98 % | SYSTOLIC BLOOD PRESSURE: 135 MMHG | DIASTOLIC BLOOD PRESSURE: 70 MMHG | HEIGHT: 59 IN | BODY MASS INDEX: 26.04 KG/M2 | WEIGHT: 129.2 LBS | HEART RATE: 83 BPM

## 2019-11-25 DIAGNOSIS — E78.5 DYSLIPIDEMIA: ICD-10-CM

## 2019-11-25 DIAGNOSIS — I12.9 RENAL HYPERTENSION: ICD-10-CM

## 2019-11-25 DIAGNOSIS — Z76.82 ORGAN TRANSPLANT CANDIDATE: Primary | ICD-10-CM

## 2019-11-25 LAB — INTERPRETATION ECG - MUSE: NORMAL

## 2019-11-25 PROCEDURE — 93005 ELECTROCARDIOGRAM TRACING: CPT | Mod: ZF

## 2019-11-25 PROCEDURE — 99213 OFFICE O/P EST LOW 20 MIN: CPT | Mod: ZP | Performed by: INTERNAL MEDICINE

## 2019-11-25 PROCEDURE — G0463 HOSPITAL OUTPT CLINIC VISIT: HCPCS | Mod: 25,ZF

## 2019-11-25 ASSESSMENT — MIFFLIN-ST. JEOR: SCORE: 1036.68

## 2019-11-25 ASSESSMENT — PAIN SCALES - GENERAL: PAINLEVEL: NO PAIN (0)

## 2019-11-25 NOTE — PATIENT INSTRUCTIONS
Patient Instructions:  It was a pleasure to see you in the cardiology clinic today.      If you have any questions, call  Yin Huston RN, at [phone].  Press Option #1 for the [facility], and then press Option #4  We are encouraging the use of NEXAGEt to communicate with your HealthCare Provider    Note the new or changes to your medications: None  Stop the following medications: None    Please follow up with Dr. Canales in 1 year with Echo prior, please have lipid panel drawn by your primary care provider.      If you have an urgent need after hours (8:00 am to 4:30 pm) please call [phone] and ask for the cardiology fellow on call.

## 2019-11-25 NOTE — PROGRESS NOTES
CARDIOLOGY CLINIC NOTE  2019    HPI:  The patient is a 64-year-old female with a past medical history significant for FSGS and resultant chronic kidney disease who presents for cardiology evaluation prior to proposed kidney transplant.      The patient reports originally being diagnosed with kidney issues in  when she was diagnosed with preeclampsia. She is now being evaluated as a part of our renal transplant program which is what prompted her referral.    From a symptomatic standpoint, she does endorse fatigue and exercise toelrance in parallel to the worsening of her renal function.  She is now on peritoneal dialysis 7 days a week. She has no chest pressure, chest pain, or chest tightness associated with this.  She has no orthopnea or paroxysmal nocturnal dyspnea.      ROS:  A complete 10-point ROS was negative except as above.    PAST MEDICAL HISTORY:  Past Medical History:   Diagnosis Date     Anemia in chronic kidney disease      Chronic renal failure      GERD (gastroesophageal reflux disease)      Glomerulonephritis, focal sclerosing      Hepatitis B core antibody positive      Herpes simplex      Hyperlipidemia      Hypertension      Hypothyroidism      SADIE on CPAP        PAST SURGICAL HISTORY:  Past Surgical History:   Procedure Laterality Date     ABDOMINOPLASTY      tummy tuck- [place]     BIOPSY  2016    kidney, [place] Dr[address] [facility]     COLONOSCOPY  2010    x 2, [facility]     COSMETIC SURGERY       EYE SURGERY Bilateral 2017    cataracts-[place]     GYN SURGERY       x 2, [facility], [place]     GYN SURGERY      hysterectomy     ORTHOPEDIC SURGERY  2016    trigger finger- both hands, [place]     VASCULAR SURGERY Left 2018    [facility] [place] [facility]       FAMILY HISTORY:  Family History   Problem Relation Age of Onset     Hypertension Mother      Anuerysm Mother      Cancer No family hx of      Kidney Disease No family hx of      Diabetes No  family hx of        SOCIAL HISTORY:  Social History     Socioeconomic History     Marital status:      Spouse name: Not on file     Number of children: Not on file     Years of education: Not on file     Highest education level: Not on file   Occupational History     Not on file   Social Needs     Financial resource strain: Not on file     Food insecurity:     Worry: Not on file     Inability: Not on file     Transportation needs:     Medical: Not on file     Non-medical: Not on file   Tobacco Use     Smoking status: Former Smoker     Packs/day: 1.00     Types: Cigarettes     Start date:      Last attempt to quit:      Years since quittin.1     Smokeless tobacco: Never Used   Substance and Sexual Activity     Alcohol use: No     Frequency: Never     Drug use: No     Sexual activity: Not on file   Lifestyle     Physical activity:     Days per week: Not on file     Minutes per session: Not on file     Stress: Not on file   Relationships     Social connections:     Talks on phone: Not on file     Gets together: Not on file     Attends [employer] service: Not on file     Active member of club or organization: Not on file     Attends meetings of clubs or organizations: Not on file     Relationship status: Not on file     Intimate partner violence:     Fear of current or ex partner: Not on file     Emotionally abused: Not on file     Physically abused: Not on file     Forced sexual activity: Not on file   Other Topics Concern     Parent/sibling w/ CABG, MI or angioplasty before 65F 55M? Not Asked   Social History Narrative     Not on file       MEDICATIONS:  Current Outpatient Medications   Medication     amLODIPine (NORVASC) 10 MG tablet     aspirin 81 MG EC tablet     atorvastatin (LIPITOR) 40 MG tablet     bumetanide (BUMEX) 1 MG tablet     calcitRIOL (ROCALTROL) 0.25 MCG capsule     fenofibrate micronized (LOFIBRA) 134 MG capsule     fish oil-omega-3 fatty acids 1000 MG capsule     levothyroxine  "(SYNTHROID/LEVOTHROID) 112 MCG tablet     melatonin 3 MG tablet     Psyllium (METAMUCIL FIBER PO)     ranitidine (ZANTAC) 300 MG tablet     vitamin B complex with vitamin C (VITAMIN  B COMPLEX) tablet     vitamin B-12 (CYANOCOBALAMIN) 500 MCG tablet     vitamin B6 (PYRIDOXINE) 100 MG tablet     No current facility-administered medications for this visit.        ALLERGIES:     Allergies   Allergen Reactions     Amoxicillin-Pot Clavulanate Nausea and Vomiting       PHYSICAL EXAM:  /70 (BP Location: Right arm, Patient Position: Chair, Cuff Size: Adult Regular)   Pulse 83   Ht 1.499 m (4' 11\")   Wt 58.6 kg (129 lb 3.2 oz)   SpO2 98%   BMI 26.10 kg/m    In general, the patient is a pleasant female in no apparent distress.      HEENT: NC/AT.  PERRLA.  EOMI.  Sclerae white, not injected.    Neck: Carotids 2+ bilaterally without bruits.  No jugular venous distension.   Lymph: No cervical adenopathy. No thyromegaly.   Heart: RRR. Normal S1, S2. No murmur, rub, click, or gallop. There is no heave.    Lungs: Clear bilaterally.  No rhonchi, wheezes, rales.   GI: Soft, nontender, nondistended.   Extremities: No edema.  LUE fistula in place. The pulses are 2+at the radial on the R and DP bilaterally.  Neuro: grossly non focal.   Skin: no rashes.  Endocrine: no thyromegaly  Musculoskeletal: no joint swelling or tenderness, gait normal.  Psych: pleasant and conversant      LABS:    Chemistry panel:   Recent Labs   Lab Test 01/28/19  1351      POTASSIUM 3.4   CHLORIDE 104   CO2 26   ANIONGAP 10   *   BUN 38*   CR 3.74*   EVELIO 9.2   GFRESTIMATED 12*   AST 23   ALT 27       CBC:   Recent Labs   Lab Test 01/28/19  1351   WBC 6.9   RBC 3.11*   HGB 9.3*   HCT 30.8*   MCV 99   MCH 29.9   MCHC 30.2*   RDW 13.3          Lipid panel 10/18/18  CHOLESTEROL,TOTAL 231  TRIGLYCERIDES 166  HDL CHOLESTEROL 67  NON-HDL CHOLESTEROL 164  CHOL/HDL RATIO           3.45  LDL CHOLESTEROL 131      CARDIAC DATA:    ECG today - " sinus rhythm, no acute ST - T [facility]        Nuclear medicine cardiac MPI stress test 10/7/2019 ([facility])    Other Result Information   This result has an attachment that is not available.   Result Narrative   *THIS IS A DUAL READ REPORT INVOLVING CARDIOLOGY AND RADIOLOGY. BE SURE TO   READ THE RADIOLOGY OVER READ LOCATED AT THE BOTTOM OF THIS REPORT FOR   POTENTIAL INCIDENTAL FINDINGS.*    PHARMACOLOGIC MYOCARDIAL PERFUSION IMAGING REPORT  REST/STRESS SINGLE ISOTOPE ONE DAY PHARMACOLOGIC GATED SPECT IMAGING WITH   CT ATTENUATION CORRECTION AND CORONARY CALCIUM SCORING    Study: [place] CARDIAC MPI STRESS TEST-DUAL READ [[id number]]    FINAL CONCLUSION  1. Pharmacologic stress myocardial perfusion imaging is NORMAL.  2. The pharmacological stress ECG was negative for ischemia and does not   meet criteria for ischemia    3. Overall left ventricular systolic function was normal without wall   motion abnormalities.   4. There are no prior studies available for comparison.      5. The semi-quantitative calcium score is moderately abnormal found in the   LAD territory. (Reference norms: Minimal Calcium: 1-10; Mild Calcium:   ; Moderate Calcium: 101-400; High Calcium: 401 and above).  6. Please see radiology report for non-cardiac findings.         TTE 1/28/19  Global and regional left ventricular function is normal with an EF of 60-65%.  Mild concentric wall thickening consistent with left ventricular hypertrophy is present.  Grade I or early diastolic dysfunction.  Right ventricular function, chamber size, wall motion, and thickness are normal.  Estimated mean right atrial pressure is 3 mmHg (normal).  The inferior vena cava is normal.  No pericardial effusion is present.      ASSESSMENT/PLAN:    Ethel Thompson is a 64 year old year old female with     End stage renal disease - secondary to FSGS.  Peritoneal dialysis   Preeclampia in 1986  Normal biventricular function  Essential hypertension  Mixed  dyslipidemia  NYHA Class II  Recent MPI stress test 10/2019 without inducible ischemia     Patient is without angina or heart failure symptoms. She is euvolemic on exam, blood pressure is mildly elevated. ECG shows sinus rhythm and labs reviewed. Recent LDL-c 131 mg/dL, hemoglobin is low in the 9 gm/dL range, GFR ~12.  Echo from Jan 2019 notable for normal left and right ventricular size and function with no significant valvular disease or pulmonary hypertension.    Her cardiac risk factors including dyslipidemia, hypertension and renal disease are being addressed. She is on guideline directed medical therapy of aspirin, lipitor and antihypertensives. We reviewed her recent stress test that did not indicate any inducible ischemia. She also informs me that she does not have a living donor at this point.     Follow up here in 2 years provided she is active on the kidney transplant list.    Joann Canales MD, MS  Staff Cardiologist, [facility]  Pager: [phone]

## 2019-11-25 NOTE — LETTER
2019      RE: Ethel Thompson  [address] [place] [facility] [address]       Dear Colleague,    Thank you for the opportunity to participate in the care of your patient, Ethel Thompson, at the [facility]. Please see a copy of my visit note below.    CARDIOLOGY CLINIC NOTE  2019    HPI:  The patient is a 64-year-old female with a past medical history significant for FSGS and resultant chronic kidney disease who presents for cardiology evaluation prior to proposed kidney transplant.      The patient reports originally being diagnosed with kidney issues in  when she was diagnosed with preeclampsia. She is now being evaluated as a part of our renal transplant program which is what prompted her referral.    From a symptomatic standpoint, she does endorse fatigue and exercise toelrance in parallel to the worsening of her renal function.  She is now on peritoneal dialysis 7 days a week. She has no chest pressure, chest pain, or chest tightness associated with this.  She has no orthopnea or paroxysmal nocturnal dyspnea.      ROS:  A complete 10-point ROS was negative except as above.    PAST MEDICAL HISTORY:  Past Medical History:   Diagnosis Date     Anemia in chronic kidney disease      Chronic renal failure      GERD (gastroesophageal reflux disease)      Glomerulonephritis, focal sclerosing      Hepatitis B core antibody positive      Herpes simplex      Hyperlipidemia      Hypertension      Hypothyroidism      SADIE on CPAP        PAST SURGICAL HISTORY:  Past Surgical History:   Procedure Laterality Date     ABDOMINOPLASTY      tummy tuck- [place]     BIOPSY  2016    kidney, [place] Dr[facility]     COLONOSCOPY  2010    x 2, [facility]     COSMETIC SURGERY       EYE SURGERY Bilateral 2017    cataracts-[place]     GYN SURGERY       x 2, [facility], [place]     GYN SURGERY      hysterectomy     ORTHOPEDIC SURGERY  2016    trigger finger-  both hands, [place]     VASCULAR SURGERY Left 2018    [place]ialysis access[place] [facility] [place]       FAMILY HISTORY:  Family History   Problem Relation Age of Onset     Hypertension Mother      Anuerysm Mother      Cancer No family hx of      Kidney Disease No family hx of      Diabetes No family hx of        SOCIAL HISTORY:  Social History     Socioeconomic History     Marital status:      Spouse name: Not on file     Number of children: Not on file     Years of education: Not on file     Highest education level: Not on file   Occupational History     Not on file   Social Needs     Financial resource strain: Not on file     Food insecurity:     Worry: Not on file     Inability: Not on file     Transportation needs:     Medical: Not on file     Non-medical: Not on file   Tobacco Use     Smoking status: Former Smoker     Packs/day: 1.00     Types: Cigarettes     Start date:      Last attempt to quit:      Years since quittin.     Smokeless tobacco: Never Used   Substance and Sexual Activity     Alcohol use: No     Frequency: Never     Drug use: No     Sexual activity: Not on file   Lifestyle     Physical activity:     Days per week: Not on file     Minutes per session: Not on file     Stress: Not on file   Relationships     Social connections:     Talks on phone: Not on file     Gets together: Not on file     Attends [employer] service: Not on file     Active member of club or organization: Not on file     Attends meetings of clubs or organizations: Not on file     Relationship status: Not on file     Intimate partner violence:     Fear of current or ex partner: Not on file     Emotionally abused: Not on file     Physically abused: Not on file     Forced sexual activity: Not on file   Other Topics Concern     Parent/sibling w/ CABG, MI or angioplasty before 65F 55M? Not Asked   Social History Narrative     Not on file       MEDICATIONS:  Current Outpatient Medications   Medication  "    amLODIPine (NORVASC) 10 MG tablet     aspirin 81 MG EC tablet     atorvastatin (LIPITOR) 40 MG tablet     bumetanide (BUMEX) 1 MG tablet     calcitRIOL (ROCALTROL) 0.25 MCG capsule     fenofibrate micronized (LOFIBRA) 134 MG capsule     fish oil-omega-3 fatty acids 1000 MG capsule     levothyroxine (SYNTHROID/LEVOTHROID) 112 MCG tablet     melatonin 3 MG tablet     Psyllium (METAMUCIL FIBER PO)     ranitidine (ZANTAC) 300 MG tablet     vitamin B complex with vitamin C (VITAMIN  B COMPLEX) tablet     vitamin B-12 (CYANOCOBALAMIN) 500 MCG tablet     vitamin B6 (PYRIDOXINE) 100 MG tablet     No current facility-administered medications for this visit.        ALLERGIES:     Allergies   Allergen Reactions     Amoxicillin-Pot Clavulanate Nausea and Vomiting       PHYSICAL EXAM:  /70 (BP Location: Right arm, Patient Position: Chair, Cuff Size: Adult Regular)   Pulse 83   Ht 1.499 m (4' 11\")   Wt 58.6 kg (129 lb 3.2 oz)   SpO2 98%   BMI 26.10 kg/m     In general, the patient is a pleasant female in no apparent distress.      HEENT: NC/AT.  PERRLA.  EOMI.  Sclerae white, not injected.    Neck: Carotids 2+ bilaterally without bruits.  No jugular venous distension.   Lymph: No cervical adenopathy. No thyromegaly.   Heart: RRR. Normal S1, S2. No murmur, rub, click, or gallop. There is no heave.    Lungs: Clear bilaterally.  No rhonchi, wheezes, rales.   GI: Soft, nontender, nondistended.   Extremities: No edema.  LUE fistula in place. The pulses are 2+at the radial on the R and DP bilaterally.  Neuro: grossly non focal.   Skin: no rashes.  Endocrine: no thyromegaly  Musculoskeletal: no joint swelling or tenderness, gait normal.  Psych: pleasant and conversant      LABS:    Chemistry panel:   Recent Labs   Lab Test 01/28/19  1351      POTASSIUM 3.4   CHLORIDE 104   CO2 26   ANIONGAP 10   *   BUN 38*   CR 3.74*   EVELIO 9.2   GFRESTIMATED 12*   AST 23   ALT 27       CBC:   Recent Labs   Lab Test " 01/28/19  1351   WBC 6.9   RBC 3.11*   HGB 9.3*   HCT 30.8*   MCV 99   MCH 29.9   MCHC 30.2*   RDW 13.3          Lipid panel 10/18/18  CHOLESTEROL,TOTAL 231  TRIGLYCERIDES 166  HDL CHOLESTEROL 67  NON-HDL CHOLESTEROL 164  CHOL/HDL RATIO           3.45  LDL CHOLESTEROL 131      CARDIAC DATA:    ECG today - sinus rhythm, no acute ST - T [facility]        Nuclear medicine cardiac MPI stress test 10/7/2019 ([facility])    Other Result Information   This result has an attachment that is not available.   Result Narrative   *THIS IS A DUAL READ REPORT INVOLVING CARDIOLOGY AND RADIOLOGY. BE SURE TO   READ THE RADIOLOGY OVER READ LOCATED AT THE BOTTOM OF THIS REPORT FOR   POTENTIAL INCIDENTAL FINDINGS.*    PHARMACOLOGIC MYOCARDIAL PERFUSION IMAGING REPORT  REST/STRESS SINGLE ISOTOPE ONE DAY PHARMACOLOGIC GATED SPECT IMAGING WITH   CT ATTENUATION CORRECTION AND CORONARY CALCIUM SCORING    Study: [place] CARDIAC MPI STRESS TEST-DUAL READ [[id number]]    FINAL CONCLUSION  1. Pharmacologic stress myocardial perfusion imaging is NORMAL.  2. The pharmacological stress ECG was negative for ischemia and does not   meet criteria for ischemia    3. Overall left ventricular systolic function was normal without wall   motion abnormalities.   4. There are no prior studies available for comparison.      5. The semi-quantitative calcium score is moderately abnormal found in the   LAD territory. (Reference norms: Minimal Calcium: 1-10; Mild Calcium:   ; Moderate Calcium: 101-400; High Calcium: 401 and above).  6. Please see radiology report for non-cardiac findings.         TTE 1/28/19  Global and regional left ventricular function is normal with an EF of 60-65%.  Mild concentric wall thickening consistent with left ventricular hypertrophy is present.  Grade I or early diastolic dysfunction.  Right ventricular function, chamber size, wall motion, and thickness are normal.  Estimated mean right atrial pressure is 3 mmHg  (normal).  The inferior vena cava is normal.  No pericardial effusion is present.      ASSESSMENT/PLAN:    Ethel Thompson is a 64 year old year old female with     End stage renal disease - secondary to FSGS.  Peritoneal dialysis   Preeclampia in 1986  Normal biventricular function  Essential hypertension  Mixed dyslipidemia  NYHA Class II  Recent MPI stress test 10/2019 without inducible ischemia     Patient is without angina or heart failure symptoms. She is euvolemic on exam, blood pressure is mildly elevated. ECG shows sinus rhythm and labs reviewed. Recent LDL-c 131 mg/dL, hemoglobin is low in the 9 gm/dL range, GFR ~12.  Echo from Jan 2019 notable for normal left and right ventricular size and function with no significant valvular disease or pulmonary hypertension.    Her cardiac risk factors including dyslipidemia, hypertension and renal disease are being addressed. She is on guideline directed medical therapy of aspirin, lipitor and antihypertensives. We reviewed her recent stress test that did not indicate any inducible ischemia. She also informs me that she does not have a living donor at this point.     Follow up here in 2 years provided she is active on the kidney transplant list.    Joann Canales MD, MS  Staff Cardiologist, [facility]  Pager: [phone]

## 2019-11-25 NOTE — NURSING NOTE
Chief Complaint   Patient presents with     Follow Up     Return Visit, waiting on transplant.      Vitals were taken and medications were reconciled. EKG was performed.    Sonya French  11:34 AM

## 2020-01-14 DIAGNOSIS — Z76.82 ORGAN TRANSPLANT CANDIDATE: ICD-10-CM

## 2020-01-14 DIAGNOSIS — N18.6 ESRD (END STAGE RENAL DISEASE) (H): ICD-10-CM

## 2020-02-23 ENCOUNTER — HEALTH MAINTENANCE LETTER (OUTPATIENT)
Age: 65
End: 2020-02-23

## 2020-04-15 DIAGNOSIS — Z76.82 ORGAN TRANSPLANT CANDIDATE: ICD-10-CM

## 2020-04-15 DIAGNOSIS — N18.6 ESRD (END STAGE RENAL DISEASE) (H): ICD-10-CM

## 2020-05-07 NOTE — PROGRESS NOTES
"Ethel Thompson is a 65 year old female who is being evaluated via a billable telephone visit.       The patient has been notified of following:      \"This telephone visit will be conducted via a call between you and your physician/provider. We have found that certain health care needs can be provided without the need for a physical exam.  This service lets us provide the care you need with a short phone conversation.  If a prescription is necessary we can send it directly to your pharmacy.  If lab work is needed we can place an order for that and you can then stop by our lab to have the test done at a later time.     Telephone visits are billed at different rates depending on your insurance coverage. During this emergency period, for some insurers they may be billed the same as an in-person visit.  Please reach out to your insurance provider with any questions.     If during the course of the call the physician/provider feels a telephone visit is not appropriate, you will not be charged for this service.\"     Patient has given verbal consent for Telephone visit?  Yes     What phone number would you like to be contacted at? [phone]     How would you like to obtain your AVS? MyChart     Weight and height given by the patient and medications were reconciled.      Ciara Maza CMA     12:30 PM    Phone call duration: 12 min    CARDIOLOGY CLINIC FOLLOW UP    HPI:  Ms. Ethel Thompson is a 64 yo female who receives primary care within the [facility] system. She has been seen by my colleague Dr. Joann Canales for preoperative evaluation for kidney transplant. She presents today to check in again. I am meeting her for the first time.    Ms. Thompson is a pleasant women with no prior CV events. 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 She is currently treated with peritoneal dialysis daily. Weight fluctuates a little bit but is able to adjust the PD solution and decrease weight. She lives in a 3 story house and is " able to walk up 2-3 flights of stairs without chest pain or dyspnea. She denies palpitations, lightheadedness, orthopnea, PND, LE edema.     BP today: 135/78    11/29/19 Lipid panel at [facility]      HDL 42  LDL 55    Last stress test was in November 2019 and was normal.     ASSESSMENT AND PLAN  Ms. 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 She is on the kidney transplant list. She is able to perform >4 METS of activity without agina or heart failure symptoms. She does have some evidence of CAD on her calcium scan. Given this disease and her risk factors, a low dose aspirin could be considered. She plans to discuss this with her PCP. She is already on a high intensity statin with well controlled LDL. Her blood pressure control is adequate.      No additional cardiovascular workup is needed prior to surgery. Follow up for repeat evaluation in 2 years if still on the transplant list.     Thank you for the opportunity to participate in the care of Ms. Ethel Thompson. Please feel free to contact me with any additional questions or concerns.    Tutu Magana MD    Preventive Cardiology  Pager: [phone]    PAST MEDICAL HISTORY:  Patient Active Problem List   Diagnosis     Elevated serum immunoglobulin free light chain level     Essential hypertension     FSGS (focal segmental glomerulosclerosis)     Hyperlipidemia     Hypothyroidism     SADIE on CPAP     Sensorineural hearing loss (SNHL) of both ears     Anemia in chronic kidney disease     GERD (gastroesophageal reflux disease)     Hypertension     Hepatitis B core antibody positive     Past Medical History:   Diagnosis Date     Anemia in chronic kidney disease      Chronic renal failure      GERD (gastroesophageal reflux disease)      Glomerulonephritis, focal sclerosing      Hepatitis B core antibody positive      Herpes simplex      Hyperlipidemia      Hypertension      Hypothyroidism      SADIE on CPAP         CURRENT MEDICATIONS:  Current Outpatient Medications   Medication Sig Dispense Refill     amLODIPine (NORVASC) 10 MG tablet Take 10 mg by mouth       atorvastatin (LIPITOR) 40 MG tablet        bumetanide (BUMEX) 1 MG tablet Take 1 mg by mouth daily       calcitRIOL (ROCALTROL) 0.25 MCG capsule Take 0.25 mcg by mouth daily       famotidine (PEPCID) 20 MG tablet Take 20 mg by mouth 2 times daily       fish oil-omega-3 fatty acids 1000 MG capsule Take 2 g by mouth 3 times daily       levothyroxine (SYNTHROID/LEVOTHROID) 112 MCG tablet        melatonin 3 MG tablet Take 3 mg by mouth       Psyllium (METAMUCIL FIBER PO) Per pt uses one tablespoon per day       vitamin B-12 (CYANOCOBALAMIN) 500 MCG tablet Take 500 mcg by mouth daily       vitamin B6 (PYRIDOXINE) 100 MG tablet Take 100 mg by mouth daily       aspirin 81 MG EC tablet Take 81 mg by mouth daily       QUEtiapine (SEROQUEL) 25 MG tablet        ranitidine (ZANTAC) 300 MG tablet        vitamin B complex with vitamin C (VITAMIN  B COMPLEX) tablet Take 1 tablet by mouth daily         PAST SURGICAL HISTORY:  Past Surgical History:   Procedure Laterality Date     ABDOMINOPLASTY  2001    tummy tuck- [place]     BIOPSY  2016    kidney, [place] Dr. Hammonds     COLONOSCOPY  2010    x 2, [facility]     COSMETIC SURGERY       EYE SURGERY Bilateral 2017    cataracts-[place]     GYN SURGERY       x 2, [facility], [place]     GYN SURGERY      hysterectomy     ORTHOPEDIC SURGERY  2016    trigger finger- both hands, [place]     VASCULAR SURGERY Left 2018    dialysis access-[place] [place] [facility], [place]       ALLERGIES  Amoxicillin-pot clavulanate    FAMILY HX:  Family History   Problem Relation Age of Onset     Hypertension Mother      Anuerysm Mother      Cancer No family hx of      Kidney Disease No family hx of      Diabetes No family hx of        SOCIAL HX:  Social History     Tobacco Use     Smoking status: Former Smoker     Packs/day: 1.00      "Types: Cigarettes     Start date:      Last attempt to quit:      Years since quittin.3     Smokeless tobacco: Never Used   Substance Use Topics     Alcohol use: No     Frequency: Never     Drug use: No        ROS:  Comprehensive ROS is negative except as noted in HPI.    VITAL SIGNS:  Ht 1.499 m (4' 11\")   Wt 57.2 kg (126 lb)   BMI 25.45 kg/m    Body mass index is 25.45 kg/m .  Wt Readings from Last 2 Encounters:   20 57.2 kg (126 lb)   19 58.6 kg (129 lb 3.2 oz)       PHYSICAL EXAM  No physical exam for this telephone encounter.     LABS: personally reviewed  CMP  Recent Labs   Lab Test 19  1351      POTASSIUM 3.4   CHLORIDE 104   CO2 26   ANIONGAP 10   *   BUN 38*   CR 3.74*   GFRESTIMATED 12*   GFRESTBLACK 14*   EVELIO 9.2   PHOS 3.8   PROTTOTAL 7.8   ALBUMIN 3.6   BILITOTAL 0.4   ALKPHOS 52   AST 23   ALT 27     CBC  Recent Labs   Lab Test 19  1351   WBC 6.9   RBC 3.11*   HGB 9.3*   HCT 30.8*   MCV 99   MCH 29.9   MCHC 30.2*   RDW 13.3        INR  Recent Labs   Lab Test 19  1351   INR 0.94     No lab results found.    Most recent EKG: reviewed  19: NSR, prolonged QT    Most recent ECHO: reviewed  1/15/19 TTE  Global and regional left ventricular function is normal with an EF of 60-65%.  Mild concentric wall thickening consistent with left ventricular hypertrophy is present.  Grade I or early diastolic dysfunction.  Right ventricular function, chamber size, wall motion, and thickness are normal.  Estimated mean right atrial pressure is 3 mmHg (normal).  The inferior vena cava is normal.  No pericardial effusion is present.     No prior for comparison.    Most recent STRESS TEST:  reviewed and discussed with the patient  10/7/19 Nuclear MPI at [facility]  FINAL CONCLUSION  1. Pharmacologic stress myocardial perfusion imaging is NORMAL.  2. The pharmacological stress ECG was negative for ischemia and does not meet criteria for ischemia  3. Overall " left ventricular systolic function was normal without wall   motion abnormalities.   4. There are no prior studies available for comparison.    5. The semi-quantitative calcium score is moderately abnormal found in the LAD territory. (Reference norms: Minimal Calcium: 1-10; Mild Calcium: ; Moderate Calcium: 101-400; High Calcium: 401 and above).  6. Please see radiology report for non-cardiac findings.

## 2020-05-08 ENCOUNTER — VIRTUAL VISIT (OUTPATIENT)
Dept: CARDIOLOGY | Facility: CLINIC | Age: 65
End: 2020-05-08
Attending: INTERNAL MEDICINE
Payer: MEDICARE

## 2020-05-08 VITALS — HEIGHT: 59 IN | WEIGHT: 126 LBS | BODY MASS INDEX: 25.4 KG/M2

## 2020-05-08 DIAGNOSIS — I15.0 RENOVASCULAR HYPERTENSION: ICD-10-CM

## 2020-05-08 DIAGNOSIS — E78.2 MIXED HYPERLIPIDEMIA: ICD-10-CM

## 2020-05-08 DIAGNOSIS — Z01.810 PRE-OPERATIVE CARDIOVASCULAR EXAMINATION: Primary | ICD-10-CM

## 2020-05-08 PROCEDURE — 99442 ZZC PHYSICIAN TELEPHONE EVALUATION 11-20 MIN: CPT | Mod: ZP | Performed by: INTERNAL MEDICINE

## 2020-05-08 RX ORDER — FAMOTIDINE 20 MG/1
20 TABLET, FILM COATED ORAL EVERY EVENING
COMMUNITY

## 2020-05-08 RX ORDER — QUETIAPINE FUMARATE 25 MG/1
TABLET, FILM COATED ORAL
Status: ON HOLD | COMMUNITY
Start: 2020-03-10 | End: 2020-09-04

## 2020-05-08 ASSESSMENT — PAIN SCALES - GENERAL: PAINLEVEL: NO PAIN (0)

## 2020-05-08 ASSESSMENT — MIFFLIN-ST. JEOR: SCORE: 1017.16

## 2020-05-08 NOTE — PROGRESS NOTES
"Ethel Thompson is a 65 year old female who is being evaluated via a billable telephone visit.      The patient has been notified of following:     \"This telephone visit will be conducted via a call between you and your physician/provider. We have found that certain health care needs can be provided without the need for a physical exam.  This service lets us provide the care you need with a short phone conversation.  If a prescription is necessary we can send it directly to your pharmacy.  If lab work is needed we can place an order for that and you can then stop by our lab to have the test done at a later time.    Telephone visits are billed at different rates depending on your insurance coverage. During this emergency period, for some insurers they may be billed the same as an in-person visit.  Please reach out to your insurance provider with any questions.    If during the course of the call the physician/provider feels a telephone visit is not appropriate, you will not be charged for this service.\"    Patient has given verbal consent for Telephone visit?  Yes    What phone number would you like to be contacted at? [phone]    How would you like to obtain your AVS? MyChart    Weight and height given by the patient and medications were reconciled.     Ciara Maza CMA    12:30 PM                    "

## 2020-05-08 NOTE — PATIENT INSTRUCTIONS
"You were seen today in the Cardiovascular Clinic at the [facility].     Cardiology Providers you saw during your visit: Dr. Tutu Magana    Diagnosis:   No diagnosis found.     Results: Discussed with you today stress test, labs      Orders:   No orders of the defined types were placed in this encounter.      Recommendations:   1. Talk with your primary doctor about restarting a low dose aspirin.  2. No additional workup needed before your kidney transplant.  3. Follow up in 2 years if you are still on the transplant list.       Please feel free to call me with any questions or concerns.       Petty LAWTON LPN     Questions: [phone]  First press #1 for [facility] for \"Medical Questions\" to reach us Cardiology Nurses.   [phone]   First press #1 for the [facility] and then press #1     On Call Cardiologist for after hours or on weekends: [phone]   option #4 and ask to speak to the on-call Cardiologist.          If you need a medication refill please contact your pharmacy.  Please allow 3 business days for your refill to be completed.  --------------------------------------------------------------    "

## 2020-06-17 NOTE — PROGRESS NOTES
"Ethel Thompson is a 65 year old female who is being evaluated via a billable video visit.      The patient has been notified of following:     \"This video visit will be conducted via a call between you and your physician/provider. We have found that certain health care needs can be provided without the need for an in-person physical exam.  This service lets us provide the care you need with a video conversation.  If a prescription is necessary we can send it directly to your pharmacy.  If lab work is needed we can place an order for that and you can then stop by our lab to have the test done at a later time.    Video visits are billed at different rates depending on your insurance coverage.  Please reach out to your insurance provider with any questions.    If during the course of the call the physician/provider feels a video visit is not appropriate, you will not be charged for this service.\"    Patient has given verbal consent for Video visit? Yes    Will anyone else be joining your video visit? No        Video-Visit Details    Type of service:  Video Visit    Video Start Time: 1500  Video End Time: 1530    Originating Location (pt. Location): Home    Distant Location (provider location):  [facility]     Platform used for Video Visit: CODY Crump CNP          Assessment and Plan:  #Kidney Transplant Wait List Evaluation: Patient is a good candidate overall. Patient should be active on the wait list pending completion of health maintenance and neuropsych evaluation.     # ESKD from focal segmental glomerulosclerosis (FSGS): with a biopsy in 2012, will request these biopsy records from [facility]) as they are still not available. History of atrophic right kidney. Although doing OK on dialysis (HD->PD) since June 2019, she would likely benefit from a kidney transplant.     # Cardiac Risk: a 1/2019 ECHO showed normal EF and a 10/2019 stress test was negative. She had a " virtual visit with cardiology in May 2020 and was cleared for 2 years. She is asymptomatic with exertion.    # New cognitive impairment, behavioral changes: with an admit in 3/2020 for AMS and a psychiatry consult noting visual and auditory hallucinations, crying spells and poor concentration. Neuropsychological testing was strongly recommended, but not yet has been complete. Her delirium has significantly improved, however, she still reports ongoing trouble with her memory. She does appear to be managing medical follow up, PD and medications OK. Will need neuropsychology evaluation for further assessment.     # MGUS: will check SPEP and serum FLC.     # Health Maintenance:  colonoscopy (normal) UTD, needs mammogram, no longer needs pap smears as she is s/p total hysterectomy for fibroids. Dental UTD.       Discussed the risks and benefits of a transplant, including the risk of surgery and immunosuppression medications.    KDPI: We discussed approximate remaining wait time and how that is influenced by issues such as blood type and sensitization (PRA) and access to a living donor. I contrasted potential waiting time for living vs  donor kidneys from  normal (0-85%) or higher (%) kidney donor profile index (KDPI) donors and their associated outcomes. I would recommend Ms. Thompson to consider kidneys from high KDPI donors. The reason for this decision is best summarized as: decreased dialysis related morbidity/mortality, accepting lower kidney graft survival rates.    Patient s overall re-evaluation may require further discussion in the Transplant Program s multidisciplinary selection committee for a final recommendation on the patient s suitability for transplant.     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     Date of Initial Transplant Evaluation:  2019        Current  Transplant Phase: Waitlist: Active  Official UNOS Listing Date: 2/7/2019  Blood Type: B       cPRA: 29       Date of cPRA: 1/14/2020  Transplant Coordinator: Mary Molina Transplant Office phone number [phone]     Previous Medical Issues:  # PFTs: normal in 2019  # Cardiology: completed.       History of Present Illness:   Ethel Thompson is a 65-year-old female, originally from [place], who presents for wait list evaluation with history of ESKD from FSGS with a kidney biopsy in 2012,  atrophic right kidney, long standing hypertension, on  dialysis since June 2019, former tobacco use and heptatitis B core blanquita positive.            Interim Events:   Since she was last seen in 2018, she started dialysis in June 2019 and later transitioned from hemo to peritoneal dialysis. This has been going well without any episodes of peritonitis.  She continues to follow with Dr. Encinas.  She had one admission in 3/2020 with AMS of unclear cause and all medical work up was unremarkable.  A psychiatry consult noted recent changes in behavior including visual and auditory hallucinations, crying spells and poor concentration. Neuropsychological testing was strongly recommended, but not yet has been complete. Her delirium has significantly improved, however, she still reports trouble with her memory.           Kidney Disease:        Kidney Disease Dx: Focal segmental glomerulosclerosis (FSGS)        On Dialysis: Yes, Date initiated: June 2019 and Dialysis Type: PD;       Primary Nephrologist: Dr. Encinas          Cardiac/Vascular Disease History:       Known CAD: No       Known PAD/Claudication Symptoms: No       Known Heart Failure: No       Arrhythmia:  No       Pulmonary Hypertension: No        Valvular Disease: No       Other: None       New Cardiac/Vascular Events: No         Functional Capacity/Frailty:        Able to walk around the store for 30-45 minutes, limited by fatigue.          Fatigue/Decreased Energy: [] No [x] Yes     Chest Pain or SOB with Exertion: [x] No [] Yes    Significant Weight Change: [x] No [] Yes    Nausea, Vomiting or Diarrhea: [] No [x] Yes Some nausea   Fever, Sweats or Chills:  [x] No [] Yes    Leg Swelling [x] No [] Yes        Other Pertinent Transplant Surgical Issues:  Recent Blood Transfusion: No  Previous Abdominal Transplant: No  Bladder Dysfunction: No  Chronic/Recurrent Infections: No  Chronic Anticoagulation: No  Jehovah s Witness: No    Review Of Systems:  A comprehensive review of systems was obtained and negative, except as noted in the HPI or PMH.     Active Problem List:   Patient Active Problem List   Diagnosis     Elevated serum immunoglobulin free light chain level     Essential hypertension     FSGS (focal segmental glomerulosclerosis)     Hyperlipidemia     Hypothyroidism     SADIE on CPAP     Sensorineural hearing loss (SNHL) of both ears     Anemia in chronic kidney disease     GERD (gastroesophageal reflux disease)     Hypertension     Hepatitis B core antibody positive       Personal History:  Social History     Socioeconomic History     Marital status:      Spouse name: Not on file     Number of children: Not on file     Years of education: Not on file     Highest education level: Not on file   Occupational History     Not on file   Social Needs     Financial resource strain: Not on file     Food insecurity     Worry: Not on file     Inability: Not on file     Transportation needs     Medical: Not on file     Non-medical: Not on file   Tobacco Use     Smoking status: Former Smoker     Packs/day: 1.00     Types: Cigarettes     Start date:      Last attempt to quit:      Years since quittin.4     Smokeless tobacco: Never Used   Substance and Sexual Activity     Alcohol use: No     Frequency: Never     Drug use: No     Sexual activity: Not on file   Lifestyle     Physical activity     Days per week: Not on file     Minutes per session: Not on file     Stress: Not on file    Relationships     Social connections     Talks on phone: Not on file     Gets together: Not on file     Attends Restorationist service: Not on file     Active member of club or organization: Not on file     Attends meetings of clubs or organizations: Not on file     Relationship status: Not on file     Intimate partner violence     Fear of current or ex partner: Not on file     Emotionally abused: Not on file     Physically abused: Not on file     Forced sexual activity: Not on file   Other Topics Concern     Parent/sibling w/ CABG, MI or angioplasty before 65F 55M? Not Asked   Social History Narrative     Not on file        Allergies:  Allergies   Allergen Reactions     Amoxicillin-Pot Clavulanate Nausea and Vomiting        Medications:  Current Outpatient Medications   Medication Sig     amLODIPine (NORVASC) 10 MG tablet Take 10 mg by mouth     aspirin 81 MG EC tablet Take 81 mg by mouth daily     atorvastatin (LIPITOR) 40 MG tablet      bumetanide (BUMEX) 1 MG tablet Take 1 mg by mouth daily     calcitRIOL (ROCALTROL) 0.25 MCG capsule Take 0.25 mcg by mouth daily     famotidine (PEPCID) 20 MG tablet Take 20 mg by mouth 2 times daily     fish oil-omega-3 fatty acids 1000 MG capsule Take 2 g by mouth 3 times daily     levothyroxine (SYNTHROID/LEVOTHROID) 112 MCG tablet      melatonin 3 MG tablet Take 3 mg by mouth     Psyllium (METAMUCIL FIBER PO) Per pt uses one tablespoon per day     vitamin B-12 (CYANOCOBALAMIN) 500 MCG tablet Take 500 mcg by mouth daily     vitamin B6 (PYRIDOXINE) 100 MG tablet Take 100 mg by mouth daily     QUEtiapine (SEROQUEL) 25 MG tablet      ranitidine (ZANTAC) 300 MG tablet      vitamin B complex with vitamin C (VITAMIN  B COMPLEX) tablet Take 1 tablet by mouth daily     No current facility-administered medications for this visit.         Vitals:  There were no vitals taken for this visit.     Exam:  GENERAL: Healthy, alert and no distress  EYES: Eyes grossly normal to inspection.  No  discharge or erythema, or obvious scleral/conjunctival abnormalities.  RESP: No audible wheeze, cough, or visible cyanosis.  No visible retractions or increased work of breathing.    SKIN: Visible skin clear. No significant rash, abnormal pigmentation or lesions.  NEURO: Cranial nerves grossly intact.  Mentation and speech appropriate for age.  PSYCH: Mentation appears normal, affect normal/bright, judgement and insight intact, normal speech and appearance well-groomed.

## 2020-06-18 ENCOUNTER — VIRTUAL VISIT (OUTPATIENT)
Dept: NEPHROLOGY | Facility: CLINIC | Age: 65
End: 2020-06-18
Attending: INTERNAL MEDICINE
Payer: MEDICARE

## 2020-06-18 ENCOUNTER — VIRTUAL VISIT (OUTPATIENT)
Dept: TRANSPLANT | Facility: CLINIC | Age: 65
End: 2020-06-18
Attending: NURSE PRACTITIONER
Payer: MEDICARE

## 2020-06-18 DIAGNOSIS — N18.6 END STAGE RENAL DISEASE (H): Primary | ICD-10-CM

## 2020-06-18 DIAGNOSIS — Z76.82 ORGAN TRANSPLANT CANDIDATE: ICD-10-CM

## 2020-06-18 DIAGNOSIS — R41.3 MEMORY DEFICITS: ICD-10-CM

## 2020-06-18 DIAGNOSIS — R41.89 COGNITIVE IMPAIRMENT: ICD-10-CM

## 2020-06-18 DIAGNOSIS — N05.1 FSGS (FOCAL SEGMENTAL GLOMERULOSCLEROSIS): ICD-10-CM

## 2020-06-18 DIAGNOSIS — Z76.82 AWAITING ORGAN TRANSPLANT: Primary | ICD-10-CM

## 2020-06-18 DIAGNOSIS — N25.81 SECONDARY HYPERPARATHYROIDISM (H): ICD-10-CM
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
## 2020-06-18 NOTE — LETTER
"6/18/2020       RE: Ethel Thompson  [address] [facility] [place] [address]     Dear Colleague,    Thank you for referring your patient, Ethel Thompson, to the [facility] NEPHROLOGY at [facility]. Please see a copy of my visit note below.    Ethel Thompson is a 65 year old female who is being evaluated via a billable video visit.      The patient has been notified of following:     \"This video visit will be conducted via a call between you and your physician/provider. We have found that certain health care needs can be provided without the need for an in-person physical exam.  This service lets us provide the care you need with a video conversation.  If a prescription is necessary we can send it directly to your pharmacy.  If lab work is needed we can place an order for that and you can then stop by our lab to have the test done at a later time.    Video visits are billed at different rates depending on your insurance coverage.  Please reach out to your insurance provider with any questions.    If during the course of the call the physician/provider feels a video visit is not appropriate, you will not be charged for this service.\"    Patient has given verbal consent for Video visit? Yes    Will anyone else be joining your video visit? No        Video-Visit Details    Type of service:  Video Visit    Video Start Time: 1500  Video End Time: 1530    Originating Location (pt. Location): Home    Distant Location (provider location):  [facility] NEPHROLOGY     Platform used for Video Visit: CODY Ibrahim CNP          Assessment and Plan:  #Kidney Transplant Wait List Evaluation: Patient is a good candidate overall. Patient should be active on the wait list pending completion of health maintenance and neuropsych evaluation.     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 History of atrophic right kidney. Although doing OK on dialysis (HD->PD) since 2019, she would likely benefit from a kidney transplant.     # Cardiac Risk: a 2019 ECHO showed normal EF and a 10/2019 stress test was negative. She had a virtual visit with cardiology in May 2020 and was cleared for 2 years. She is asymptomatic with exertion.    # New cognitive impairment, behavioral changes: with an admit in 3/2020 for AMS and a psychiatry consult noting visual and auditory hallucinations, crying spells and poor concentration. Neuropsychological testing was strongly recommended, but not yet has been complete. Her delirium has significantly improved, however, she still reports ongoing trouble with her memory. She does appear to be managing medical follow up, PD and medications OK. Will need neuropsychology evaluation for further assessment.     # MGUS: will check SPEP and serum FLC.     # Health Maintenance:  colonoscopy (normal) UTD, needs mammogram, no longer needs pap smears as she is s/p total hysterectomy for fibroids. Dental UTD.       Discussed the risks and benefits of a transplant, including the risk of surgery and immunosuppression medications.    KDPI: We discussed approximate remaining wait time and how that is influenced by issues such as blood type and sensitization (PRA) and access to a living donor. I contrasted potential waiting time for living vs  donor kidneys from  normal (0-85%) or higher (%) kidney donor profile index (KDPI) donors and their associated outcomes. I would recommend Ms. Thompson to consider kidneys from high KDPI donors. The reason for this decision is best summarized as: decreased dialysis related morbidity/mortality, accepting lower kidney graft survival rates.    Patient s overall re-evaluation may require further discussion in the Transplant Program s multidisciplinary selection committee for a final recommendation on the patient s  suitability for transplant.     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     Date of Initial Transplant Evaluation:  1/2019        Current Transplant Phase: Waitlist: Active  Official UNOS Listing Date: 2/7/2019  Blood Type: B       cPRA: 29       Date of cPRA: 1/14/2020  Transplant Coordinator: Mary Molina Transplant Office phone number [phone]     Previous Medical Issues:  # PFTs: normal in 2019  # Cardiology: completed.       History of Present Illness:   Ethel Thompson is a 65-year-old female, originally from [place], who presents for wait list evaluation with history of ESKD from FSGS with a kidney biopsy in 2012,  atrophic right kidney, long standing hypertension, on  dialysis since June 2019, former tobacco use and heptatitis B core blanquita positive.            Interim Events:   Since she was last seen in 2018, she started dialysis in June 2019 and later transitioned from hemo to peritoneal dialysis. This has been going well without any episodes of peritonitis.  She continues to follow with Dr. Encinas.  She had one admission in 3/2020 with AMS of unclear cause and all medical work up was unremarkable.  A psychiatry consult noted recent changes in behavior including visual and auditory hallucinations, crying spells and poor concentration. Neuropsychological testing was strongly recommended, but not yet has been complete. Her delirium has significantly improved, however, she still reports trouble with her memory.           Kidney Disease:        Kidney Disease Dx: Focal segmental glomerulosclerosis (FSGS)        On Dialysis: Yes, Date initiated: June 2019 and Dialysis Type: PD;       Primary Nephrologist: Dr. Encinas          Cardiac/Vascular Disease History:       Known CAD: No       Known PAD/Claudication Symptoms: No       Known Heart Failure: No       Arrhythmia:  No       Pulmonary  Hypertension: No        Valvular Disease: No       Other: None       New Cardiac/Vascular Events: No         Functional Capacity/Frailty:        Able to walk around the store for 30-45 minutes, limited by fatigue.          Fatigue/Decreased Energy: [] No [x] Yes    Chest Pain or SOB with Exertion: [x] No [] Yes    Significant Weight Change: [x] No [] Yes    Nausea, Vomiting or Diarrhea: [] No [x] Yes Some nausea   Fever, Sweats or Chills:  [x] No [] Yes    Leg Swelling [x] No [] Yes        Other Pertinent Transplant Surgical Issues:  Recent Blood Transfusion: No  Previous Abdominal Transplant: No  Bladder Dysfunction: No  Chronic/Recurrent Infections: No  Chronic Anticoagulation: No  Jehovah s Witness: No    Review Of Systems:  A comprehensive review of systems was obtained and negative, except as noted in the HPI or PMH.     Active Problem List:   Patient Active Problem List   Diagnosis     Elevated serum immunoglobulin free light chain level     Essential hypertension     FSGS (focal segmental glomerulosclerosis)     Hyperlipidemia     Hypothyroidism     SADIE on CPAP     Sensorineural hearing loss (SNHL) of both ears     Anemia in chronic kidney disease     GERD (gastroesophageal reflux disease)     Hypertension     Hepatitis B core antibody positive       Personal History:  Social History     Socioeconomic History     Marital status:      Spouse name: Not on file     Number of children: Not on file     Years of education: Not on file     Highest education level: Not on file   Occupational History     Not on file   Social Needs     Financial resource strain: Not on file     Food insecurity     Worry: Not on file     Inability: Not on file     Transportation needs     Medical: Not on file     Non-medical: Not on file   Tobacco Use     Smoking status: Former Smoker     Packs/day: 1.00     Types: Cigarettes     Start date:      Last attempt to quit:      Years since quittin.4     Smokeless tobacco:  Never Used   Substance and Sexual Activity     Alcohol use: No     Frequency: Never     Drug use: No     Sexual activity: Not on file   Lifestyle     Physical activity     Days per week: Not on file     Minutes per session: Not on file     Stress: Not on file   Relationships     Social connections     Talks on phone: Not on file     Gets together: Not on file     Attends Sabianism service: Not on file     Active member of club or organization: Not on file     Attends meetings of clubs or organizations: Not on file     Relationship status: Not on file     Intimate partner violence     Fear of current or ex partner: Not on file     Emotionally abused: Not on file     Physically abused: Not on file     Forced sexual activity: Not on file   Other Topics Concern     Parent/sibling w/ CABG, MI or angioplasty before 65F 55M? Not Asked   Social History Narrative     Not on file        Allergies:  Allergies   Allergen Reactions     Amoxicillin-Pot Clavulanate Nausea and Vomiting        Medications:  Current Outpatient Medications   Medication Sig     amLODIPine (NORVASC) 10 MG tablet Take 10 mg by mouth     aspirin 81 MG EC tablet Take 81 mg by mouth daily     atorvastatin (LIPITOR) 40 MG tablet      bumetanide (BUMEX) 1 MG tablet Take 1 mg by mouth daily     calcitRIOL (ROCALTROL) 0.25 MCG capsule Take 0.25 mcg by mouth daily     famotidine (PEPCID) 20 MG tablet Take 20 mg by mouth 2 times daily     fish oil-omega-3 fatty acids 1000 MG capsule Take 2 g by mouth 3 times daily     levothyroxine (SYNTHROID/LEVOTHROID) 112 MCG tablet      melatonin 3 MG tablet Take 3 mg by mouth     Psyllium (METAMUCIL FIBER PO) Per pt uses one tablespoon per day     vitamin B-12 (CYANOCOBALAMIN) 500 MCG tablet Take 500 mcg by mouth daily     vitamin B6 (PYRIDOXINE) 100 MG tablet Take 100 mg by mouth daily     QUEtiapine (SEROQUEL) 25 MG tablet      ranitidine (ZANTAC) 300 MG tablet      vitamin B complex with vitamin C (VITAMIN  B COMPLEX)  tablet Take 1 tablet by mouth daily     No current facility-administered medications for this visit.         Vitals:  There were no vitals taken for this visit.     Exam:  GENERAL: Healthy, alert and no distress  EYES: Eyes grossly normal to inspection.  No discharge or erythema, or obvious scleral/conjunctival abnormalities.  RESP: No audible wheeze, cough, or visible cyanosis.  No visible retractions or increased work of breathing.    SKIN: Visible skin clear. No significant rash, abnormal pigmentation or lesions.  NEURO: Cranial nerves grossly intact.  Mentation and speech appropriate for age.  PSYCH: Mentation appears normal, affect normal/bright, judgement and insight intact, normal speech and appearance well-groomed.        Again, thank you for allowing me to participate in the care of your patient.      Sincerely,    CODY Gan CNP
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
## 2020-06-18 NOTE — PROGRESS NOTES
"Ethel Thompson is a 65 year old female who is being evaluated via a billable video visit.      The patient has been notified of following:     \"This video visit will be conducted via a call between you and your physician/provider. We have found that certain health care needs can be provided without the need for an in-person physical exam.  This service lets us provide the care you need with a video conversation.  If a prescription is necessary we can send it directly to your pharmacy.  If lab work is needed we can place an order for that and you can then stop by our lab to have the test done at a later time.    Video visits are billed at different rates depending on your insurance coverage.  Please reach out to your insurance provider with any questions.    If during the course of the call the physician/provider feels a video visit is not appropriate, you will not be charged for this service.\"    Patient has given verbal consent for Video visit? Yes    Will anyone else be joining your video visit? No  {If patient encounters technical issues they should call [phone] :[id number]}      Video-Visit Details    Type of service:  Video Visit    Video Start Time: {video visit start/end time:152948}  Video End Time: {video visit start/end time:152948}    Originating Location (pt. Location): {video visit patient location:332541::\"Home\"}    Distant Location (provider location):  [facility] NEPHROLOGY     Platform used for Video Visit: {Virtual Visit Platforms:886427::\"[employer]\"}    {signature options:839591}        "

## 2020-06-23 ENCOUNTER — DOCUMENTATION ONLY (OUTPATIENT)
Dept: TRANSPLANT | Facility: CLINIC | Age: 65
End: 2020-06-23

## 2020-06-23 ENCOUNTER — TELEPHONE (OUTPATIENT)
Dept: TRANSPLANT | Facility: CLINIC | Age: 65
End: 2020-06-23

## 2020-06-23 NOTE — TELEPHONE ENCOUNTER
Spoke with patient's  Maxim, who asked that we send orders to dialysis center to draw. Due to the complex nature of these tests, I requested they go to their nearest [facility] lab for draws; requested admin email orders to Maxim at [email or web address].

## 2020-06-25 DIAGNOSIS — N18.6 END STAGE RENAL DISEASE (H): ICD-10-CM

## 2020-06-25 DIAGNOSIS — Z76.82 ORGAN TRANSPLANT CANDIDATE: ICD-10-CM

## 2020-06-25 PROCEDURE — 84165 PROTEIN E-PHORESIS SERUM: CPT | Performed by: NURSE PRACTITIONER

## 2020-06-25 PROCEDURE — 00000402 ZZHCL STATISTIC TOTAL PROTEIN: Performed by: NURSE PRACTITIONER

## 2020-06-25 PROCEDURE — 83883 ASSAY NEPHELOMETRY NOT SPEC: CPT | Performed by: NURSE PRACTITIONER

## 2020-06-25 PROCEDURE — 36415 COLL VENOUS BLD VENIPUNCTURE: CPT | Performed by: NURSE PRACTITIONER

## 2020-06-26 LAB
ALBUMIN SERPL ELPH-MCNC: 4.5 G/DL (ref 3.7–5.1)
ALPHA1 GLOB SERPL ELPH-MCNC: 0.3 G/DL (ref 0.2–0.4)
ALPHA2 GLOB SERPL ELPH-MCNC: 0.8 G/DL (ref 0.5–0.9)
B-GLOBULIN SERPL ELPH-MCNC: 0.7 G/DL (ref 0.6–1)
GAMMA GLOB SERPL ELPH-MCNC: 0.8 G/DL (ref 0.7–1.6)
M PROTEIN SERPL ELPH-MCNC: 0 G/DL
PROT PATTERN SERPL ELPH-IMP: NORMAL

## 2020-06-28 LAB
KAPPA LC UR-MCNC: 12.56 MG/DL (ref 0.33–1.94)
KAPPA LC/LAMBDA SER: 1.24 {RATIO} (ref 0.26–1.65)
LAMBDA LC SERPL-MCNC: 10.15 MG/DL (ref 0.57–2.63)

## 2020-07-07 DIAGNOSIS — Z76.82 ORGAN TRANSPLANT CANDIDATE: ICD-10-CM

## 2020-07-07 DIAGNOSIS — N18.6 ESRD (END STAGE RENAL DISEASE) (H): ICD-10-CM

## 2020-07-08 ENCOUNTER — DOCUMENTATION ONLY (OUTPATIENT)
Dept: TRANSPLANT | Facility: CLINIC | Age: 65
End: 2020-07-08

## 2020-07-09 DIAGNOSIS — N18.6 ESRD (END STAGE RENAL DISEASE) (H): ICD-10-CM

## 2020-07-09 DIAGNOSIS — Z76.82 ORGAN TRANSPLANT CANDIDATE: ICD-10-CM

## 2020-07-17 ENCOUNTER — DOCUMENTATION ONLY (OUTPATIENT)
Dept: TRANSPLANT | Facility: CLINIC | Age: 65
End: 2020-07-17

## 2020-09-03 ENCOUNTER — APPOINTMENT (OUTPATIENT)
Dept: GENERAL RADIOLOGY | Facility: CLINIC | Age: 65
DRG: 652 | End: 2020-09-03
Attending: NURSE PRACTITIONER
Payer: MEDICARE

## 2020-09-03 ENCOUNTER — ORGAN (OUTPATIENT)
Dept: TRANSPLANT | Facility: CLINIC | Age: 65
End: 2020-09-03

## 2020-09-03 ENCOUNTER — RESULTS ONLY (OUTPATIENT)
Dept: OTHER | Facility: CLINIC | Age: 65
End: 2020-09-03

## 2020-09-03 ENCOUNTER — DOCUMENTATION ONLY (OUTPATIENT)
Dept: TRANSPLANT | Facility: CLINIC | Age: 65
End: 2020-09-03

## 2020-09-03 ENCOUNTER — ANESTHESIA (OUTPATIENT)
Dept: SURGERY | Facility: CLINIC | Age: 65
DRG: 652 | End: 2020-09-03
Payer: MEDICARE

## 2020-09-03 ENCOUNTER — HOSPITAL ENCOUNTER (INPATIENT)
Facility: CLINIC | Age: 65
LOS: 3 days | Discharge: HOME | DRG: 652 | End: 2020-09-06
Attending: SURGERY | Admitting: SURGERY
Payer: MEDICARE

## 2020-09-03 ENCOUNTER — ANESTHESIA EVENT (OUTPATIENT)
Dept: SURGERY | Facility: CLINIC | Age: 65
DRG: 652 | End: 2020-09-03
Payer: MEDICARE

## 2020-09-03 DIAGNOSIS — Z94.0 KIDNEY REPLACED BY TRANSPLANT: Primary | ICD-10-CM

## 2020-09-03 DIAGNOSIS — Z76.82 AWAITING ORGAN TRANSPLANT: Primary | ICD-10-CM

## 2020-09-03 LAB
ABO + RH BLD: NORMAL
ABO + RH BLD: NORMAL
ALBUMIN SERPL-MCNC: 3.5 G/DL (ref 3.4–5)
ALBUMIN UR-MCNC: 30 MG/DL
ALP SERPL-CCNC: 102 U/L (ref 40–150)
ALT SERPL W P-5'-P-CCNC: 52 U/L (ref 0–50)
ANION GAP SERPL CALCULATED.3IONS-SCNC: 10 MMOL/L (ref 3–14)
APPEARANCE UR: CLEAR
APTT PPP: 28 SEC (ref 22–37)
AST SERPL W P-5'-P-CCNC: 34 U/L (ref 0–45)
BACTERIA #/AREA URNS HPF: ABNORMAL /HPF
BASE DEFICIT BLDV-SCNC: 0.1 MMOL/L
BASE DEFICIT BLDV-SCNC: 5.4 MMOL/L
BASOPHILS # BLD AUTO: 0 10E9/L (ref 0–0.2)
BASOPHILS NFR BLD AUTO: 0.4 %
BILIRUB SERPL-MCNC: 0.4 MG/DL (ref 0.2–1.3)
BILIRUB UR QL STRIP: NEGATIVE
BLD GP AB SCN SERPL QL: NORMAL
BLD PROD TYP BPU: NORMAL
BLD PROD TYP BPU: NORMAL
BLD UNIT ID BPU: 0
BLOOD BANK CMNT PATIENT-IMP: NORMAL
BLOOD PRODUCT CODE: NORMAL
BPU ID: NORMAL
BUN SERPL-MCNC: 45 MG/DL (ref 7–30)
CA-I BLD-MCNC: 4.4 MG/DL (ref 4.4–5.2)
CA-I BLD-MCNC: 5.7 MG/DL (ref 4.4–5.2)
CALCIUM SERPL-MCNC: 9.7 MG/DL (ref 8.5–10.1)
CHLORIDE SERPL-SCNC: 97 MMOL/L (ref 94–109)
CHOLEST SERPL-MCNC: 196 MG/DL
CO2 SERPL-SCNC: 29 MMOL/L (ref 20–32)
COLOR UR AUTO: YELLOW
CREAT SERPL-MCNC: 6.78 MG/DL (ref 0.52–1.04)
DIFFERENTIAL METHOD BLD: ABNORMAL
EOSINOPHIL # BLD AUTO: 0.3 10E9/L (ref 0–0.7)
EOSINOPHIL NFR BLD AUTO: 3.5 %
ERYTHROCYTE [DISTWIDTH] IN BLOOD BY AUTOMATED COUNT: 13.2 % (ref 10–15)
GFR SERPL CREATININE-BSD FRML MDRD: 6 ML/MIN/{1.73_M2}
GLUCOSE BLD-MCNC: 117 MG/DL (ref 70–99)
GLUCOSE BLD-MCNC: 120 MG/DL (ref 70–99)
GLUCOSE BLDC GLUCOMTR-MCNC: 100 MG/DL (ref 70–99)
GLUCOSE SERPL-MCNC: 118 MG/DL (ref 70–99)
GLUCOSE UR STRIP-MCNC: NEGATIVE MG/DL
HBA1C MFR BLD: 5.4 % (ref 0–5.6)
HCO3 BLDV-SCNC: 21 MMOL/L (ref 21–28)
HCO3 BLDV-SCNC: 25 MMOL/L (ref 21–28)
HCT VFR BLD AUTO: 30.1 % (ref 35–47)
HDLC SERPL-MCNC: 43 MG/DL
HGB BLD-MCNC: 7.5 G/DL (ref 11.7–15.7)
HGB BLD-MCNC: 8.4 G/DL (ref 11.7–15.7)
HGB BLD-MCNC: 9.8 G/DL (ref 11.7–15.7)
HGB UR QL STRIP: ABNORMAL
IMM GRANULOCYTES # BLD: 0.1 10E9/L (ref 0–0.4)
IMM GRANULOCYTES NFR BLD: 1 %
INR PPP: 0.89 (ref 0.86–1.14)
KETONES UR STRIP-MCNC: NEGATIVE MG/DL
LABORATORY COMMENT REPORT: NORMAL
LACTATE BLD-SCNC: 3.5 MMOL/L (ref 0.7–2)
LACTATE BLD-SCNC: 4.6 MMOL/L (ref 0.7–2)
LDLC SERPL CALC-MCNC: ABNORMAL MG/DL
LEUKOCYTE ESTERASE UR QL STRIP: NEGATIVE
LYMPHOCYTES # BLD AUTO: 1.8 10E9/L (ref 0.8–5.3)
LYMPHOCYTES NFR BLD AUTO: 22.8 %
MCH RBC QN AUTO: 35 PG (ref 26.5–33)
MCHC RBC AUTO-ENTMCNC: 32.6 G/DL (ref 31.5–36.5)
MCV RBC AUTO: 108 FL (ref 78–100)
MONOCYTES # BLD AUTO: 0.6 10E9/L (ref 0–1.3)
MONOCYTES NFR BLD AUTO: 7.7 %
MUCOUS THREADS #/AREA URNS LPF: PRESENT /LPF
NEUTROPHILS # BLD AUTO: 5 10E9/L (ref 1.6–8.3)
NEUTROPHILS NFR BLD AUTO: 64.6 %
NITRATE UR QL: NEGATIVE
NONHDLC SERPL-MCNC: 154 MG/DL
NRBC # BLD AUTO: 0 10*3/UL
NRBC BLD AUTO-RTO: 0 /100
NUM BPU REQUESTED: 2
O2/TOTAL GAS SETTING VFR VENT: 30 %
O2/TOTAL GAS SETTING VFR VENT: 33 %
PCO2 BLDV: 44 MM HG (ref 40–50)
PCO2 BLDV: 44 MM HG (ref 40–50)
PH BLDV: 7.29 PH (ref 7.32–7.43)
PH BLDV: 7.37 PH (ref 7.32–7.43)
PH UR STRIP: 6.5 PH (ref 5–7)
PLATELET # BLD AUTO: 249 10E9/L (ref 150–450)
PO2 BLDV: 39 MM HG (ref 25–47)
PO2 BLDV: 45 MM HG (ref 25–47)
POTASSIUM BLD-SCNC: 3.3 MMOL/L (ref 3.4–5.3)
POTASSIUM BLD-SCNC: 3.6 MMOL/L (ref 3.4–5.3)
POTASSIUM SERPL-SCNC: 3.3 MMOL/L (ref 3.4–5.3)
PROT SERPL-MCNC: 7.6 G/DL (ref 6.8–8.8)
PROT UR-MCNC: 0.97 G/L
PROT/CREAT 24H UR: 1.92 G/G CR (ref 0–0.2)
RBC # BLD AUTO: 2.8 10E12/L (ref 3.8–5.2)
RBC #/AREA URNS AUTO: <1 /HPF (ref 0–2)
SARS-COV-2 RNA SPEC QL NAA+PROBE: NEGATIVE
SARS-COV-2 RNA SPEC QL NAA+PROBE: NORMAL
SODIUM BLD-SCNC: 137 MMOL/L (ref 133–144)
SODIUM BLD-SCNC: 139 MMOL/L (ref 133–144)
SODIUM SERPL-SCNC: 135 MMOL/L (ref 133–144)
SOURCE: ABNORMAL
SP GR UR STRIP: 1.01 (ref 1–1.03)
SPECIMEN EXP DATE BLD: NORMAL
SPECIMEN SOURCE: NORMAL
SPECIMEN SOURCE: NORMAL
SQUAMOUS #/AREA URNS AUTO: <1 /HPF (ref 0–1)
TRANSFUSION STATUS PATIENT QL: NORMAL
TRANSFUSION STATUS PATIENT QL: NORMAL
TRIGL SERPL-MCNC: 617 MG/DL
UROBILINOGEN UR STRIP-MCNC: NORMAL MG/DL (ref 0–2)
WBC # BLD AUTO: 7.8 10E9/L (ref 4–11)
WBC #/AREA URNS AUTO: <1 /HPF (ref 0–5)

## 2020-09-03 PROCEDURE — 25000125 ZZHC RX 250: Performed by: NURSE PRACTITIONER

## 2020-09-03 PROCEDURE — 71046 X-RAY EXAM CHEST 2 VIEWS: CPT

## 2020-09-03 PROCEDURE — 00000146 ZZHCL STATISTIC GLUCOSE BY METER IP

## 2020-09-03 PROCEDURE — 87389 HIV-1 AG W/HIV-1&-2 AB AG IA: CPT | Performed by: NURSE PRACTITIONER

## 2020-09-03 PROCEDURE — 82947 ASSAY GLUCOSE BLOOD QUANT: CPT

## 2020-09-03 PROCEDURE — 82803 BLOOD GASES ANY COMBINATION: CPT

## 2020-09-03 PROCEDURE — 86901 BLOOD TYPING SEROLOGIC RH(D): CPT | Performed by: NURSE PRACTITIONER

## 2020-09-03 PROCEDURE — 85730 THROMBOPLASTIN TIME PARTIAL: CPT | Performed by: NURSE PRACTITIONER

## 2020-09-03 PROCEDURE — 71000015 ZZH RECOVERY PHASE 1 LEVEL 2 EA ADDTL HR: Performed by: SURGERY

## 2020-09-03 PROCEDURE — 40000170 ZZH STATISTIC PRE-PROCEDURE ASSESSMENT II: Performed by: SURGERY

## 2020-09-03 PROCEDURE — 12000026 ZZH R&B TRANSPLANT

## 2020-09-03 PROCEDURE — 37000008 ZZH ANESTHESIA TECHNICAL FEE, 1ST 30 MIN: Performed by: SURGERY

## 2020-09-03 PROCEDURE — 25800030 ZZH RX IP 258 OP 636: Performed by: STUDENT IN AN ORGANIZED HEALTH CARE EDUCATION/TRAINING PROGRAM

## 2020-09-03 PROCEDURE — 80053 COMPREHEN METABOLIC PANEL: CPT | Performed by: NURSE PRACTITIONER

## 2020-09-03 PROCEDURE — 81200002 ZZH ACQUISITION KIDNEY CADAVER

## 2020-09-03 PROCEDURE — 36415 COLL VENOUS BLD VENIPUNCTURE: CPT | Performed by: NURSE PRACTITIONER

## 2020-09-03 PROCEDURE — 0TY10Z0 TRANSPLANTATION OF LEFT KIDNEY, ALLOGENEIC, OPEN APPROACH: ICD-10-PCS | Performed by: SURGERY

## 2020-09-03 PROCEDURE — 84156 ASSAY OF PROTEIN URINE: CPT | Performed by: NURSE PRACTITIONER

## 2020-09-03 PROCEDURE — 36000062 ZZH SURGERY LEVEL 4 1ST 30 MIN - UMMC: Performed by: SURGERY

## 2020-09-03 PROCEDURE — 27210794 ZZH OR GENERAL SUPPLY STERILE: Performed by: SURGERY

## 2020-09-03 PROCEDURE — U0003 INFECTIOUS AGENT DETECTION BY NUCLEIC ACID (DNA OR RNA); SEVERE ACUTE RESPIRATORY SYNDROME CORONAVIRUS 2 (SARS-COV-2) (CORONAVIRUS DISEASE [COVID-19]), AMPLIFIED PROBE TECHNIQUE, MAKING USE OF HIGH THROUGHPUT TECHNOLOGIES AS DESCRIBED BY CMS-2020-01-R: HCPCS | Performed by: NURSE PRACTITIONER

## 2020-09-03 PROCEDURE — 30233S1 TRANSFUSION OF NONAUTOLOGOUS GLOBULIN INTO PERIPHERAL VEIN, PERCUTANEOUS APPROACH: ICD-10-PCS | Performed by: SURGERY

## 2020-09-03 PROCEDURE — 40000065 ZZH STATISTIC EKG NON-CHARGEABLE

## 2020-09-03 PROCEDURE — 83036 HEMOGLOBIN GLYCOSYLATED A1C: CPT | Performed by: NURSE PRACTITIONER

## 2020-09-03 PROCEDURE — 86665 EPSTEIN-BARR CAPSID VCA: CPT | Performed by: NURSE PRACTITIONER

## 2020-09-03 PROCEDURE — 87086 URINE CULTURE/COLONY COUNT: CPT | Performed by: NURSE PRACTITIONER

## 2020-09-03 PROCEDURE — 0WPG03Z REMOVAL OF INFUSION DEVICE FROM PERITONEAL CAVITY, OPEN APPROACH: ICD-10-PCS | Performed by: SURGERY

## 2020-09-03 PROCEDURE — 84295 ASSAY OF SERUM SODIUM: CPT

## 2020-09-03 PROCEDURE — P9016 RBC LEUKOCYTES REDUCED: HCPCS | Performed by: NURSE PRACTITIONER

## 2020-09-03 PROCEDURE — 81001 URINALYSIS AUTO W/SCOPE: CPT | Performed by: NURSE PRACTITIONER

## 2020-09-03 PROCEDURE — 71000014 ZZH RECOVERY PHASE 1 LEVEL 2 FIRST HR: Performed by: SURGERY

## 2020-09-03 PROCEDURE — 25000128 H RX IP 250 OP 636: Performed by: STUDENT IN AN ORGANIZED HEALTH CARE EDUCATION/TRAINING PROGRAM

## 2020-09-03 PROCEDURE — 25000566 ZZH SEVOFLURANE, EA 15 MIN: Performed by: SURGERY

## 2020-09-03 PROCEDURE — 86645 CMV ANTIBODY IGM: CPT | Performed by: NURSE PRACTITIONER

## 2020-09-03 PROCEDURE — 82330 ASSAY OF CALCIUM: CPT

## 2020-09-03 PROCEDURE — 86923 COMPATIBILITY TEST ELECTRIC: CPT | Performed by: NURSE PRACTITIONER

## 2020-09-03 PROCEDURE — G0499 HEPB SCREEN HIGH RISK INDIV: HCPCS | Performed by: NURSE PRACTITIONER

## 2020-09-03 PROCEDURE — 27110028 ZZH OR GENERAL SUPPLY NON-STERILE: Performed by: SURGERY

## 2020-09-03 PROCEDURE — 86850 RBC ANTIBODY SCREEN: CPT | Performed by: NURSE PRACTITIONER

## 2020-09-03 PROCEDURE — G0472 HEP C SCREEN HIGH RISK/OTHER: HCPCS | Performed by: NURSE PRACTITIONER

## 2020-09-03 PROCEDURE — 80061 LIPID PANEL: CPT | Performed by: NURSE PRACTITIONER

## 2020-09-03 PROCEDURE — 86705 HEP B CORE ANTIBODY IGM: CPT | Performed by: NURSE PRACTITIONER

## 2020-09-03 PROCEDURE — 85025 COMPLETE CBC W/AUTO DIFF WBC: CPT | Performed by: NURSE PRACTITIONER

## 2020-09-03 PROCEDURE — 25800030 ZZH RX IP 258 OP 636: Performed by: NURSE PRACTITIONER

## 2020-09-03 PROCEDURE — 86900 BLOOD TYPING SEROLOGIC ABO: CPT | Performed by: NURSE PRACTITIONER

## 2020-09-03 PROCEDURE — 86644 CMV ANTIBODY: CPT | Performed by: NURSE PRACTITIONER

## 2020-09-03 PROCEDURE — 93010 ELECTROCARDIOGRAM REPORT: CPT | Mod: 59 | Performed by: INTERNAL MEDICINE

## 2020-09-03 PROCEDURE — 40000141 ZZH STATISTIC PERIPHERAL IV START W/O US GUIDANCE

## 2020-09-03 PROCEDURE — 85610 PROTHROMBIN TIME: CPT | Performed by: NURSE PRACTITIONER

## 2020-09-03 PROCEDURE — 84132 ASSAY OF SERUM POTASSIUM: CPT

## 2020-09-03 PROCEDURE — 25000128 H RX IP 250 OP 636: Performed by: NURSE PRACTITIONER

## 2020-09-03 PROCEDURE — 83605 ASSAY OF LACTIC ACID: CPT

## 2020-09-03 PROCEDURE — 37000009 ZZH ANESTHESIA TECHNICAL FEE, EACH ADDTL 15 MIN: Performed by: SURGERY

## 2020-09-03 PROCEDURE — 36000064 ZZH SURGERY LEVEL 4 EA 15 ADDTL MIN - UMMC: Performed by: SURGERY

## 2020-09-03 PROCEDURE — 25000125 ZZHC RX 250: Performed by: STUDENT IN AN ORGANIZED HEALTH CARE EDUCATION/TRAINING PROGRAM

## 2020-09-03 PROCEDURE — C2617 STENT, NON-COR, TEM W/O DEL: HCPCS | Performed by: SURGERY

## 2020-09-03 DEVICE — STENT URETERAL PERCUFLEX PLUS 4.8FRX16CM M0061751980
Type: IMPLANTABLE DEVICE | Site: URETER | Status: NON-FUNCTIONAL
Removed: 2020-09-03

## 2020-09-03 RX ORDER — HEPARIN SODIUM 1000 [USP'U]/ML
INJECTION, SOLUTION INTRAVENOUS; SUBCUTANEOUS PRN
Status: DISCONTINUED | OUTPATIENT
Start: 2020-09-03 | End: 2020-09-04

## 2020-09-03 RX ORDER — MANNITOL 20 G/100ML
INJECTION, SOLUTION INTRAVENOUS PRN
Status: DISCONTINUED | OUTPATIENT
Start: 2020-09-03 | End: 2020-09-04

## 2020-09-03 RX ORDER — LIDOCAINE 40 MG/G
CREAM TOPICAL
Status: DISCONTINUED | OUTPATIENT
Start: 2020-09-03 | End: 2020-09-04

## 2020-09-03 RX ORDER — CALCIUM CHLORIDE 100 MG/ML
INJECTION INTRAVENOUS; INTRAVENTRICULAR PRN
Status: DISCONTINUED | OUTPATIENT
Start: 2020-09-03 | End: 2020-09-04

## 2020-09-03 RX ORDER — LIDOCAINE 40 MG/G
CREAM TOPICAL
Status: DISCONTINUED | OUTPATIENT
Start: 2020-09-03 | End: 2020-09-04 | Stop reason: HOSPADM

## 2020-09-03 RX ORDER — DEXAMETHASONE SODIUM PHOSPHATE 4 MG/ML
INJECTION, SOLUTION INTRA-ARTICULAR; INTRALESIONAL; INTRAMUSCULAR; INTRAVENOUS; SOFT TISSUE PRN
Status: DISCONTINUED | OUTPATIENT
Start: 2020-09-03 | End: 2020-09-04

## 2020-09-03 RX ORDER — SODIUM CHLORIDE, SODIUM GLUCONATE, SODIUM ACETATE, POTASSIUM CHLORIDE AND MAGNESIUM CHLORIDE 526; 502; 368; 37; 30 MG/100ML; MG/100ML; MG/100ML; MG/100ML; MG/100ML
INJECTION, SOLUTION INTRAVENOUS CONTINUOUS PRN
Status: DISCONTINUED | OUTPATIENT
Start: 2020-09-03 | End: 2020-09-04

## 2020-09-03 RX ORDER — CEFUROXIME SODIUM 1.5 G/16ML
1.5 INJECTION, POWDER, FOR SOLUTION INTRAVENOUS ONCE
Status: COMPLETED | OUTPATIENT
Start: 2020-09-03 | End: 2020-09-03

## 2020-09-03 RX ORDER — LIDOCAINE HYDROCHLORIDE 20 MG/ML
INJECTION, SOLUTION INFILTRATION; PERINEURAL PRN
Status: DISCONTINUED | OUTPATIENT
Start: 2020-09-03 | End: 2020-09-04

## 2020-09-03 RX ORDER — ACETAMINOPHEN 325 MG/1
975 TABLET ORAL ONCE
Status: DISCONTINUED | OUTPATIENT
Start: 2020-09-03 | End: 2020-09-04 | Stop reason: HOSPADM

## 2020-09-03 RX ORDER — SODIUM CHLORIDE, SODIUM LACTATE, POTASSIUM CHLORIDE, CALCIUM CHLORIDE 600; 310; 30; 20 MG/100ML; MG/100ML; MG/100ML; MG/100ML
INJECTION, SOLUTION INTRAVENOUS CONTINUOUS
Status: DISCONTINUED | OUTPATIENT
Start: 2020-09-03 | End: 2020-09-04 | Stop reason: HOSPADM

## 2020-09-03 RX ORDER — PROPOFOL 10 MG/ML
INJECTION, EMULSION INTRAVENOUS PRN
Status: DISCONTINUED | OUTPATIENT
Start: 2020-09-03 | End: 2020-09-04

## 2020-09-03 RX ORDER — FENTANYL CITRATE 50 UG/ML
INJECTION, SOLUTION INTRAMUSCULAR; INTRAVENOUS PRN
Status: DISCONTINUED | OUTPATIENT
Start: 2020-09-03 | End: 2020-09-04

## 2020-09-03 RX ORDER — FUROSEMIDE 10 MG/ML
INJECTION INTRAMUSCULAR; INTRAVENOUS PRN
Status: DISCONTINUED | OUTPATIENT
Start: 2020-09-03 | End: 2020-09-04

## 2020-09-03 RX ADMIN — PHENYLEPHRINE HYDROCHLORIDE 100 MCG: 10 INJECTION INTRAVENOUS at 22:31

## 2020-09-03 RX ADMIN — CALCIUM CHLORIDE 200 MG: 100 INJECTION, SOLUTION INTRAVENOUS at 23:53

## 2020-09-03 RX ADMIN — MANNITOL 12.5 G: 20 INJECTION, SOLUTION INTRAVENOUS at 23:42

## 2020-09-03 RX ADMIN — SODIUM CHLORIDE, SODIUM GLUCONATE, SODIUM ACETATE, POTASSIUM CHLORIDE AND MAGNESIUM CHLORIDE: 526; 502; 368; 37; 30 INJECTION, SOLUTION INTRAVENOUS at 20:43

## 2020-09-03 RX ADMIN — PHENYLEPHRINE HYDROCHLORIDE 200 MCG: 10 INJECTION INTRAVENOUS at 21:15

## 2020-09-03 RX ADMIN — PHENYLEPHRINE HYDROCHLORIDE 100 MCG: 10 INJECTION INTRAVENOUS at 21:35

## 2020-09-03 RX ADMIN — ANTI-THYMOCYTE GLOBULIN (RABBIT) 125 MG: 5 INJECTION, POWDER, LYOPHILIZED, FOR SOLUTION INTRAVENOUS at 21:30

## 2020-09-03 RX ADMIN — PROPOFOL 20 MG: 10 INJECTION, EMULSION INTRAVENOUS at 20:52

## 2020-09-03 RX ADMIN — SODIUM CHLORIDE, SODIUM GLUCONATE, SODIUM ACETATE, POTASSIUM CHLORIDE AND MAGNESIUM CHLORIDE: 526; 502; 368; 37; 30 INJECTION, SOLUTION INTRAVENOUS at 21:12

## 2020-09-03 RX ADMIN — LIDOCAINE HYDROCHLORIDE 60 MG: 20 INJECTION, SOLUTION INFILTRATION; PERINEURAL at 20:50

## 2020-09-03 RX ADMIN — FENTANYL CITRATE 50 MCG: 50 INJECTION, SOLUTION INTRAMUSCULAR; INTRAVENOUS at 21:46

## 2020-09-03 RX ADMIN — DEXAMETHASONE SODIUM PHOSPHATE 4 MG: 4 INJECTION, SOLUTION INTRA-ARTICULAR; INTRALESIONAL; INTRAMUSCULAR; INTRAVENOUS; SOFT TISSUE at 21:41

## 2020-09-03 RX ADMIN — ROCURONIUM BROMIDE 30 MG: 10 INJECTION INTRAVENOUS at 21:37

## 2020-09-03 RX ADMIN — MIDAZOLAM 2 MG: 1 INJECTION INTRAMUSCULAR; INTRAVENOUS at 22:10

## 2020-09-03 RX ADMIN — PROPOFOL 90 MG: 10 INJECTION, EMULSION INTRAVENOUS at 20:50

## 2020-09-03 RX ADMIN — FUROSEMIDE 60 MG: 10 INJECTION, SOLUTION INTRAVENOUS at 23:42

## 2020-09-03 RX ADMIN — CEFUROXIME SODIUM 1.5 G: 1.5 INJECTION, POWDER, FOR SOLUTION INTRAVENOUS at 21:12

## 2020-09-03 RX ADMIN — CALCIUM CHLORIDE 300 MG: 100 INJECTION, SOLUTION INTRAVENOUS at 23:50

## 2020-09-03 RX ADMIN — PHENYLEPHRINE HYDROCHLORIDE 50 MCG: 10 INJECTION INTRAVENOUS at 22:27

## 2020-09-03 RX ADMIN — CALCIUM CHLORIDE 300 MG: 100 INJECTION, SOLUTION INTRAVENOUS at 23:44

## 2020-09-03 RX ADMIN — HEPARIN SODIUM 2000 UNITS: 1000 INJECTION INTRAVENOUS; SUBCUTANEOUS at 23:02

## 2020-09-03 RX ADMIN — FENTANYL CITRATE 100 MCG: 50 INJECTION, SOLUTION INTRAMUSCULAR; INTRAVENOUS at 20:50

## 2020-09-03 RX ADMIN — ROCURONIUM BROMIDE 10 MG: 10 INJECTION INTRAVENOUS at 23:26

## 2020-09-03 RX ADMIN — ROCURONIUM BROMIDE 40 MG: 10 INJECTION INTRAVENOUS at 20:51

## 2020-09-03 RX ADMIN — PHENYLEPHRINE HYDROCHLORIDE 200 MCG: 10 INJECTION INTRAVENOUS at 21:28

## 2020-09-03 RX ADMIN — MYCOPHENOLATE MOFETIL 1000 MG: 500 INJECTION, POWDER, LYOPHILIZED, FOR SOLUTION INTRAVENOUS at 21:30

## 2020-09-03 RX ADMIN — FENTANYL CITRATE 100 MCG: 50 INJECTION, SOLUTION INTRAMUSCULAR; INTRAVENOUS at 21:41

## 2020-09-03 RX ADMIN — SODIUM CHLORIDE 500 MG: 9 INJECTION, SOLUTION INTRAVENOUS at 21:12

## 2020-09-03 ASSESSMENT — MIFFLIN-ST. JEOR: SCORE: 1015.8

## 2020-09-03 ASSESSMENT — ACTIVITIES OF DAILY LIVING (ADL): ADLS_ACUITY_SCORE: 20

## 2020-09-03 ASSESSMENT — LIFESTYLE VARIABLES: TOBACCO_USE: 1

## 2020-09-03 NOTE — PROGRESS NOTES
PATHOLOGY HLA CROSSMATCH CONSULTATION: DONOR/RECIPIENT  VIRTUAL CROSSMATCH - Kidney  Consultation Date: 9/3/2020  Consultation Requested by: Dr. Vera    Regarding: Compatibility of  donor organ UNOS #[id number] from OPO: [place] with Ethel Thompson    Findings: Regarding a virtual crossmatch between Ethel Thompson and  donor listed above (match ID [id number]):  The most recent (2020) and 4 additional patient serum/sera  were analyzed.  The patient has no antibodies listed with HLA specificity against the donor organ.      Record Review Indicates: I personally reviewed the most recent serum, the historic peak sera, and all other sera with solid-phase HLA Single Antigen test results:  The patient has no HLA antibodies against the donor organ.     The results of this virtual XM are:   -most recent serum: compatible   -peak #1: compatible  -peak #2: compatible    Disclaimer: Clinical judgement must take into account other factors, such as non-HLA antibodies not detected in the assay. The VXM gives probabilities only.  The probability does not account for the potential for auto-antibodies that may be present in the patient's serum.  These autoantibodies may render the physical crossmatch falsely positive, and would be detected by an autologous crossmatch.  When possible, confirm findings with prospective allogeneic and autologous flow crossmatches before going to transplant as clinically indicated.     Medina Segura MD  Medical Director, Immunology/Histocompatibility Laboratory

## 2020-09-03 NOTE — H&P
[facility] History and Physical    Ethel Thompson MRN# [id number]   Age: 65 year old YOB: 1955     Date of Admission:  9/3/2020    Primary care provider: Joann Canales          Assessment and Plan:   65 year old female with a significant past medical history of ESKD secondary to FSGS, initially on HD since June 2019, now on PD. Makes some urine. PMH also significant for atrophic right kidney, MGUS, HTN, Hep B core antibody positive and former tobacco use (PFTs normal in 2019).     -Admit to 7A, kidney transplant surgery  -NPO  -Consent to be completed by surgeon  -Labs  -EKG  -CXR  -STAT COVID  -Crossmatch   -Transplant nephrology consult  -PTA med rec to be completed     D/w team who discussed with staff         Chief Complaint:   Admitted for possible kidney transplant.     History is obtained from the patient         History of Present Illness:   This patient is a 65 year old female with a significant past medical history of ESKD secondary to FSGS, initially on HD since June 2019, now on PD. Makes moderate amount of urine. PMH also significant for atrophic right kidney, MGUS, HTN, Hep B core antibody positive and former tobacco use (PFTs normal in 2019).      ECHO  In 1/2019 showed normal EF and a 10/2019 stress test was negative. She had a virtual visit with cardiology in May 2020 and was cleared for 2 years. She is asymptomatic with exertion.    Last PRA 29%.     Colonoscopy in 2011 without concern. Mammogram completed per patient    Recent admit for AMS. Patient was advised to complete neuropsychiatric testing.    Patient is currently feeling well. Denies SOB, chest pain, nausea, vomiting.           Past Medical History:   I have reviewed this patient's past medical history         Past Surgical History:   I have reviewed this patient's past surgical history         Social History:   I have reviewed this patient's social history         Family History:   I have reviewed this patient's family  history         Immunizations:   Immunizations are up to date         Allergies:   All allergies reviewed and addressed         Medications:     Current Facility-Administered Medications   Medication     acetaminophen (TYLENOL) tablet 975 mg     anti-thymocyte globulin (THYMOGLOBULIN - Rabbit) 2 mg/kg in sodium chloride 0.9 % intermittent infusion     cefuroxime (ZINACEF) 1.5 g vial to attach to  ml bag for ADULTS or NS 50 ml bag for PEDS     lidocaine (LMX4) cream     lidocaine 1 % 0.1-1 mL     methylPREDNISolone sodium succinate (solu-MEDROL) 500 mg in sodium chloride 0.9 % 100 mL intermittent infusion     mycophenolate mofetil (CELLCEPT) 1,000 mg in D5W intra-operative intermittent infusion     sodium chloride (PF) 0.9% PF flush 3 mL     sodium chloride (PF) 0.9% PF flush 3 mL             Review of Systems:   CONSTITUTIONAL: NEGATIVE for fever, chills, change in weight  ENT/MOUTH: NEGATIVE for ear, mouth and throat problems  RESP: NEGATIVE for significant cough or SOB  CV: NEGATIVE for chest pain, palpitations or peripheral edema         Physical Exam:   Vitals were reviewed  Abdomen:   No scars, normal bowel sounds, soft, non-distended, non-tender, no masses palpated, no hepatosplenomegally            Data:   Labs, CXR & EKG pending.   COVID swab sent     Aicha Lorenz PA-C   I have reviewed history, examined patient and discussed plan with the fellow/resident/SHAHRIAR.    I concur with the findings in this note.    Time spent on admission activities: 45 minutes.

## 2020-09-03 NOTE — TELEPHONE ENCOUNTER
"TRANSPLANT OR REPORT    Organ: Kidney  Laterality (if known): Pending  Organ Location: Local    UNOS ID: [id number]  Donor OR Time: 4:00 PM  Expected/Actual Cross Clamp Time: 5:30 PM  Expected Organ Arrival Time: 6:00 PM    Surgeon: Quintin  Time in OR: 7:00 PM  Time in 3C (N/A for LI): 6:00 PM    Recipient Details  Admission ETA: 4:45 PM  Unit: Pending  Isolation: No  Latex Allergy: No  : No   Diagnosis: ESRD    Liver Transplants  Bypass: NA  Hemodialysis: NA  ~ \"RENAL STAFF TEACHING SERVICE MEDICINE\" : NA  ~ CRRT Resource Nurse: NICOLE  (Telephone Number for CRRT [phone])    Kidney/Panc Transplants  XM Status (Need to wait for XM?): No- will go with vXM    Liver or KP/PA Recipients:  Can Vessels be Banked: NICOLE      Transplant Coordinator Contact Info: Victoria [phone] until 7:00 PM then Cristiane [phone]      Vessel Bank Information  Transplant hospitals must not store a donor s extra vessels if the donor has tested positive for any of the following:   - HIV by antibody, antigen, or nucleic acid test (MARIAM)   - Hepatitis B surface antigen (HBsAg)   - Hepatitis B (HBV) by MARIAM   - Hepatitis C (HCV) by antibody or MARIAM     Extra vessels from donors that do not test positive for HIV, HBV, or HCV as above may be stored      "

## 2020-09-04 ENCOUNTER — DOCUMENTATION ONLY (OUTPATIENT)
Dept: TRANSPLANT | Facility: CLINIC | Age: 65
End: 2020-09-04

## 2020-09-04 ENCOUNTER — APPOINTMENT (OUTPATIENT)
Dept: ULTRASOUND IMAGING | Facility: CLINIC | Age: 65
DRG: 652 | End: 2020-09-04
Attending: SURGERY
Payer: MEDICARE

## 2020-09-04 ENCOUNTER — APPOINTMENT (OUTPATIENT)
Dept: ULTRASOUND IMAGING | Facility: CLINIC | Age: 65
DRG: 652 | End: 2020-09-04
Attending: NURSE PRACTITIONER
Payer: MEDICARE

## 2020-09-04 ENCOUNTER — APPOINTMENT (OUTPATIENT)
Dept: GENERAL RADIOLOGY | Facility: CLINIC | Age: 65
DRG: 652 | End: 2020-09-04
Attending: SURGERY
Payer: MEDICARE

## 2020-09-04 LAB
ALBUMIN SERPL-MCNC: 2.3 G/DL (ref 3.4–5)
ALP SERPL-CCNC: 60 U/L (ref 40–150)
ALT SERPL W P-5'-P-CCNC: 41 U/L (ref 0–50)
ANION GAP SERPL CALCULATED.3IONS-SCNC: 14 MMOL/L (ref 3–14)
APTT PPP: 29 SEC (ref 22–37)
AST SERPL W P-5'-P-CCNC: 38 U/L (ref 0–45)
BACTERIA SPEC CULT: NORMAL
BASE DEFICIT BLDA-SCNC: 3.1 MMOL/L
BILIRUB DIRECT SERPL-MCNC: 0.1 MG/DL (ref 0–0.2)
BILIRUB SERPL-MCNC: 1.1 MG/DL (ref 0.2–1.3)
BLD PROD TYP BPU: NORMAL
BLD PROD TYP BPU: NORMAL
BLD UNIT ID BPU: 0
BLD UNIT ID BPU: 0
BLOOD PRODUCT CODE: NORMAL
BLOOD PRODUCT CODE: NORMAL
BPU ID: NORMAL
BPU ID: NORMAL
BUN SERPL-MCNC: 42 MG/DL (ref 7–30)
CALCIUM SERPL-MCNC: 8.8 MG/DL (ref 8.5–10.1)
CHLORIDE SERPL-SCNC: 101 MMOL/L (ref 94–109)
CK SERPL-CCNC: 84 U/L (ref 30–225)
CMV IGG SERPL QL IA: >8 AI (ref 0–0.8)
CMV IGM SERPL QL IA: <0.2 AI (ref 0–0.8)
CO2 SERPL-SCNC: 22 MMOL/L (ref 20–32)
CREAT SERPL-MCNC: 6.07 MG/DL (ref 0.52–1.04)
DONOR IDENTIFICATION: NORMAL
DSA COMMENTS: NORMAL
DSA PRESENT: NO
DSA TEST METHOD: NORMAL
EBV VCA IGG SER QL IA: >8 AI (ref 0–0.8)
EBV VCA IGM SER QL IA: <0.2 AI (ref 0–0.8)
ERYTHROCYTE [DISTWIDTH] IN BLOOD BY AUTOMATED COUNT: 16.6 % (ref 10–15)
FIBRINOGEN PPP-MCNC: 266 MG/DL (ref 200–420)
GFR SERPL CREATININE-BSD FRML MDRD: 7 ML/MIN/{1.73_M2}
GLUCOSE SERPL-MCNC: 144 MG/DL (ref 70–99)
HBV CORE IGM SERPL QL IA: NONREACTIVE
HBV SURFACE AG SERPL QL IA: NONREACTIVE
HCO3 BLD-SCNC: 23 MMOL/L (ref 21–28)
HCT VFR BLD AUTO: 25.8 % (ref 35–47)
HCV AB SERPL QL IA: NONREACTIVE
HGB BLD-MCNC: 6.3 G/DL (ref 11.7–15.7)
HGB BLD-MCNC: 6.5 G/DL (ref 11.7–15.7)
HGB BLD-MCNC: 7.9 G/DL (ref 11.7–15.7)
HGB BLD-MCNC: 8.3 G/DL (ref 11.7–15.7)
HGB BLD-MCNC: 8.6 G/DL (ref 11.7–15.7)
HIV 1+2 AB+HIV1 P24 AG SERPL QL IA: NONREACTIVE
INR PPP: 1.08 (ref 0.86–1.14)
INTERPRETATION ECG - MUSE: NORMAL
LACTATE BLD-SCNC: 3.4 MMOL/L (ref 0.7–2)
LACTATE BLD-SCNC: 4.7 MMOL/L (ref 0.7–2)
LACTATE BLD-SCNC: 5.7 MMOL/L (ref 0.7–2)
LACTATE BLD-SCNC: 6 MMOL/L (ref 0.7–2)
LACTATE BLD-SCNC: 6.5 MMOL/L (ref 0.7–2)
LACTATE BLD-SCNC: 7.1 MMOL/L (ref 0.7–2)
MAGNESIUM SERPL-MCNC: 2.5 MG/DL (ref 1.6–2.3)
MCH RBC QN AUTO: 34.1 PG (ref 26.5–33)
MCHC RBC AUTO-ENTMCNC: 33.3 G/DL (ref 31.5–36.5)
MCV RBC AUTO: 102 FL (ref 78–100)
O2/TOTAL GAS SETTING VFR VENT: ABNORMAL %
ORGAN: NORMAL
PCO2 BLD: 43 MM HG (ref 35–45)
PH BLD: 7.33 PH (ref 7.35–7.45)
PHOSPHATE SERPL-MCNC: 4.4 MG/DL (ref 2.5–4.5)
PLATELET # BLD AUTO: 139 10E9/L (ref 150–450)
PO2 BLD: 31 MM HG (ref 80–105)
POTASSIUM SERPL-SCNC: 3.3 MMOL/L (ref 3.4–5.3)
POTASSIUM SERPL-SCNC: 3.4 MMOL/L (ref 3.4–5.3)
POTASSIUM SERPL-SCNC: 3.6 MMOL/L (ref 3.4–5.3)
POTASSIUM SERPL-SCNC: 3.9 MMOL/L (ref 3.4–5.3)
POTASSIUM SERPL-SCNC: 4 MMOL/L (ref 3.4–5.3)
PROT SERPL-MCNC: 5.2 G/DL (ref 6.8–8.8)
RBC # BLD AUTO: 2.52 10E12/L (ref 3.8–5.2)
SODIUM SERPL-SCNC: 137 MMOL/L (ref 133–144)
SPECIMEN SOURCE: NORMAL
TRANSFUSION STATUS PATIENT QL: NORMAL
WBC # BLD AUTO: 7.7 10E9/L (ref 4–11)

## 2020-09-04 PROCEDURE — 25000132 ZZH RX MED GY IP 250 OP 250 PS 637: Mod: GY | Performed by: STUDENT IN AN ORGANIZED HEALTH CARE EDUCATION/TRAINING PROGRAM

## 2020-09-04 PROCEDURE — 25800030 ZZH RX IP 258 OP 636: Performed by: NURSE PRACTITIONER

## 2020-09-04 PROCEDURE — 12000026 ZZH R&B TRANSPLANT

## 2020-09-04 PROCEDURE — 36600 WITHDRAWAL OF ARTERIAL BLOOD: CPT

## 2020-09-04 PROCEDURE — 25000128 H RX IP 250 OP 636: Performed by: STUDENT IN AN ORGANIZED HEALTH CARE EDUCATION/TRAINING PROGRAM

## 2020-09-04 PROCEDURE — 25000131 ZZH RX MED GY IP 250 OP 636 PS 637: Mod: GY

## 2020-09-04 PROCEDURE — 36415 COLL VENOUS BLD VENIPUNCTURE: CPT | Performed by: SURGERY

## 2020-09-04 PROCEDURE — 36415 COLL VENOUS BLD VENIPUNCTURE: CPT | Performed by: NURSE PRACTITIONER

## 2020-09-04 PROCEDURE — 25000128 H RX IP 250 OP 636: Performed by: SURGERY

## 2020-09-04 PROCEDURE — 93979 VASCULAR STUDY: CPT | Mod: TC

## 2020-09-04 PROCEDURE — 99291 CRITICAL CARE FIRST HOUR: CPT | Performed by: PHYSICIAN ASSISTANT

## 2020-09-04 PROCEDURE — 83605 ASSAY OF LACTIC ACID: CPT | Performed by: NURSE PRACTITIONER

## 2020-09-04 PROCEDURE — 84100 ASSAY OF PHOSPHORUS: CPT | Performed by: SURGERY

## 2020-09-04 PROCEDURE — 83605 ASSAY OF LACTIC ACID: CPT

## 2020-09-04 PROCEDURE — 80076 HEPATIC FUNCTION PANEL: CPT | Performed by: SURGERY

## 2020-09-04 PROCEDURE — P9016 RBC LEUKOCYTES REDUCED: HCPCS | Performed by: NURSE PRACTITIONER

## 2020-09-04 PROCEDURE — 82550 ASSAY OF CK (CPK): CPT | Performed by: SURGERY

## 2020-09-04 PROCEDURE — 85018 HEMOGLOBIN: CPT | Performed by: NURSE PRACTITIONER

## 2020-09-04 PROCEDURE — 25000125 ZZHC RX 250: Performed by: STUDENT IN AN ORGANIZED HEALTH CARE EDUCATION/TRAINING PROGRAM

## 2020-09-04 PROCEDURE — 25000128 H RX IP 250 OP 636: Performed by: NURSE PRACTITIONER

## 2020-09-04 PROCEDURE — 85018 HEMOGLOBIN: CPT | Performed by: SURGERY

## 2020-09-04 PROCEDURE — 36592 COLLECT BLOOD FROM PICC: CPT | Performed by: SURGERY

## 2020-09-04 PROCEDURE — 84132 ASSAY OF SERUM POTASSIUM: CPT | Performed by: SURGERY

## 2020-09-04 PROCEDURE — 85027 COMPLETE CBC AUTOMATED: CPT | Performed by: SURGERY

## 2020-09-04 PROCEDURE — 25000131 ZZH RX MED GY IP 250 OP 636 PS 637: Mod: GY | Performed by: SURGERY

## 2020-09-04 PROCEDURE — 80048 BASIC METABOLIC PNL TOTAL CA: CPT | Performed by: SURGERY

## 2020-09-04 PROCEDURE — 40000275 ZZH STATISTIC RCP TIME EA 10 MIN

## 2020-09-04 PROCEDURE — 40000985 XR CHEST PORT 1 VW

## 2020-09-04 PROCEDURE — 27210995 ZZH RX 272: Performed by: SURGERY

## 2020-09-04 PROCEDURE — 85384 FIBRINOGEN ACTIVITY: CPT | Performed by: SURGERY

## 2020-09-04 PROCEDURE — 25800030 ZZH RX IP 258 OP 636: Performed by: SURGERY

## 2020-09-04 PROCEDURE — 87517 HEPATITIS B DNA QUANT: CPT | Performed by: SURGERY

## 2020-09-04 PROCEDURE — 85610 PROTHROMBIN TIME: CPT | Performed by: SURGERY

## 2020-09-04 PROCEDURE — 36592 COLLECT BLOOD FROM PICC: CPT | Performed by: NURSE PRACTITIONER

## 2020-09-04 PROCEDURE — 25000132 ZZH RX MED GY IP 250 OP 250 PS 637: Mod: GY | Performed by: SURGERY

## 2020-09-04 PROCEDURE — 76705 ECHO EXAM OF ABDOMEN: CPT

## 2020-09-04 PROCEDURE — 82803 BLOOD GASES ANY COMBINATION: CPT | Performed by: NURSE PRACTITIONER

## 2020-09-04 PROCEDURE — 85730 THROMBOPLASTIN TIME PARTIAL: CPT | Performed by: SURGERY

## 2020-09-04 PROCEDURE — P9041 ALBUMIN (HUMAN),5%, 50ML: HCPCS | Performed by: NURSE PRACTITIONER

## 2020-09-04 PROCEDURE — 76776 US EXAM K TRANSPL W/DOPPLER: CPT

## 2020-09-04 PROCEDURE — 83735 ASSAY OF MAGNESIUM: CPT | Performed by: SURGERY

## 2020-09-04 RX ORDER — FLUTICASONE PROPIONATE 50 MCG
1 SPRAY, SUSPENSION (ML) NASAL 2 TIMES DAILY PRN
COMMUNITY
End: 2023-06-22

## 2020-09-04 RX ORDER — ONDANSETRON 4 MG/1
4 TABLET, ORALLY DISINTEGRATING ORAL EVERY 30 MIN PRN
Status: DISCONTINUED | OUTPATIENT
Start: 2020-09-04 | End: 2020-09-04 | Stop reason: HOSPADM

## 2020-09-04 RX ORDER — ACETAMINOPHEN 325 MG/1
975 TABLET ORAL EVERY 8 HOURS
Status: DISCONTINUED | OUTPATIENT
Start: 2020-09-04 | End: 2020-09-06 | Stop reason: HOSPADM

## 2020-09-04 RX ORDER — DEXTROSE, SODIUM CHLORIDE, SODIUM LACTATE, POTASSIUM CHLORIDE, AND CALCIUM CHLORIDE 5; .6; .31; .03; .02 G/100ML; G/100ML; G/100ML; G/100ML; G/100ML
INJECTION, SOLUTION INTRAVENOUS CONTINUOUS
Status: DISCONTINUED | OUTPATIENT
Start: 2020-09-04 | End: 2020-09-04

## 2020-09-04 RX ORDER — ACETAMINOPHEN 325 MG/1
650 TABLET ORAL EVERY 4 HOURS PRN
Status: DISCONTINUED | OUTPATIENT
Start: 2020-09-07 | End: 2020-09-06 | Stop reason: HOSPADM

## 2020-09-04 RX ORDER — ONDANSETRON 4 MG/1
4 TABLET, ORALLY DISINTEGRATING ORAL EVERY 6 HOURS PRN
Status: DISCONTINUED | OUTPATIENT
Start: 2020-09-04 | End: 2020-09-06 | Stop reason: HOSPADM

## 2020-09-04 RX ORDER — AMOXICILLIN 250 MG
1 CAPSULE ORAL 2 TIMES DAILY
Status: DISCONTINUED | OUTPATIENT
Start: 2020-09-04 | End: 2020-09-06 | Stop reason: HOSPADM

## 2020-09-04 RX ORDER — DIPHENHYDRAMINE HCL 25 MG
25 CAPSULE ORAL EVERY 6 HOURS PRN
Status: DISCONTINUED | OUTPATIENT
Start: 2020-09-04 | End: 2020-09-06 | Stop reason: HOSPADM

## 2020-09-04 RX ORDER — LIDOCAINE 4 G/G
1 PATCH TOPICAL
Status: DISCONTINUED | OUTPATIENT
Start: 2020-09-04 | End: 2020-09-06 | Stop reason: HOSPADM

## 2020-09-04 RX ORDER — METHOCARBAMOL 500 MG/1
500 TABLET, FILM COATED ORAL 4 TIMES DAILY PRN
Status: DISCONTINUED | OUTPATIENT
Start: 2020-09-04 | End: 2020-09-06 | Stop reason: HOSPADM

## 2020-09-04 RX ORDER — ATORVASTATIN CALCIUM 10 MG/1
10 TABLET, FILM COATED ORAL DAILY
Status: DISCONTINUED | OUTPATIENT
Start: 2020-09-05 | End: 2020-09-04

## 2020-09-04 RX ORDER — MAGNESIUM OXIDE 400 MG/1
400 TABLET ORAL
Status: DISCONTINUED | OUTPATIENT
Start: 2020-09-05 | End: 2020-09-06 | Stop reason: HOSPADM

## 2020-09-04 RX ORDER — SEVELAMER CARBONATE 800 MG/1
TABLET, FILM COATED ORAL
Status: ON HOLD | COMMUNITY
End: 2020-09-06

## 2020-09-04 RX ORDER — ATORVASTATIN CALCIUM 20 MG/1
20 TABLET, FILM COATED ORAL DAILY
Status: DISCONTINUED | OUTPATIENT
Start: 2020-09-05 | End: 2020-09-06 | Stop reason: HOSPADM

## 2020-09-04 RX ORDER — PREDNISONE 5 MG/1
5 TABLET ORAL DAILY
Status: DISCONTINUED | OUTPATIENT
Start: 2020-10-01 | End: 2020-09-06 | Stop reason: HOSPADM

## 2020-09-04 RX ORDER — ASPIRIN 81 MG/1
81 TABLET ORAL DAILY
Status: DISCONTINUED | OUTPATIENT
Start: 2020-09-04 | End: 2020-09-06 | Stop reason: HOSPADM

## 2020-09-04 RX ORDER — PREDNISONE 20 MG/1
40 TABLET ORAL DAILY
Status: DISCONTINUED | OUTPATIENT
Start: 2020-09-08 | End: 2020-09-06 | Stop reason: HOSPADM

## 2020-09-04 RX ORDER — LEVOTHYROXINE SODIUM 112 UG/1
112 TABLET ORAL
Status: DISCONTINUED | OUTPATIENT
Start: 2020-09-04 | End: 2020-09-06 | Stop reason: HOSPADM

## 2020-09-04 RX ORDER — ONDANSETRON 2 MG/ML
4 INJECTION INTRAMUSCULAR; INTRAVENOUS EVERY 6 HOURS PRN
Status: DISCONTINUED | OUTPATIENT
Start: 2020-09-04 | End: 2020-09-06 | Stop reason: HOSPADM

## 2020-09-04 RX ORDER — MYCOPHENOLATE MOFETIL 250 MG/1
750 CAPSULE ORAL
Status: DISCONTINUED | OUTPATIENT
Start: 2020-09-04 | End: 2020-09-06 | Stop reason: HOSPADM

## 2020-09-04 RX ORDER — NALOXONE HYDROCHLORIDE 0.4 MG/ML
.1-.4 INJECTION, SOLUTION INTRAMUSCULAR; INTRAVENOUS; SUBCUTANEOUS
Status: ACTIVE | OUTPATIENT
Start: 2020-09-04 | End: 2020-09-05

## 2020-09-04 RX ORDER — PREDNISONE 20 MG/1
20 TABLET ORAL DAILY
Status: DISCONTINUED | OUTPATIENT
Start: 2020-09-10 | End: 2020-09-06 | Stop reason: HOSPADM

## 2020-09-04 RX ORDER — PANTOPRAZOLE SODIUM 40 MG/1
40 TABLET, DELAYED RELEASE ORAL DAILY
Status: DISCONTINUED | OUTPATIENT
Start: 2020-09-04 | End: 2020-09-06 | Stop reason: HOSPADM

## 2020-09-04 RX ORDER — SULFAMETHOXAZOLE AND TRIMETHOPRIM 400; 80 MG/1; MG/1
1 TABLET ORAL
Status: DISCONTINUED | OUTPATIENT
Start: 2020-09-04 | End: 2020-09-06 | Stop reason: HOSPADM

## 2020-09-04 RX ORDER — PREDNISONE 10 MG/1
10 TABLET ORAL DAILY
Status: DISCONTINUED | OUTPATIENT
Start: 2020-09-24 | End: 2020-09-06 | Stop reason: HOSPADM

## 2020-09-04 RX ORDER — METHYLPREDNISOLONE SODIUM SUCCINATE 125 MG/2ML
100 INJECTION, POWDER, LYOPHILIZED, FOR SOLUTION INTRAMUSCULAR; INTRAVENOUS ONCE
Status: DISCONTINUED | OUTPATIENT
Start: 2020-09-06 | End: 2020-09-04

## 2020-09-04 RX ORDER — PREDNISONE 10 MG/1
10 TABLET ORAL DAILY
Status: DISCONTINUED | OUTPATIENT
Start: 2020-09-25 | End: 2020-09-04

## 2020-09-04 RX ORDER — DIPHENHYDRAMINE HYDROCHLORIDE 50 MG/ML
25 INJECTION INTRAMUSCULAR; INTRAVENOUS EVERY 6 HOURS PRN
Status: DISCONTINUED | OUTPATIENT
Start: 2020-09-04 | End: 2020-09-06 | Stop reason: HOSPADM

## 2020-09-04 RX ORDER — FUROSEMIDE 10 MG/ML
40 INJECTION INTRAMUSCULAR; INTRAVENOUS
Status: COMPLETED | OUTPATIENT
Start: 2020-09-04 | End: 2020-09-06

## 2020-09-04 RX ORDER — PREDNISONE 5 MG/1
5 TABLET ORAL DAILY
Status: DISCONTINUED | OUTPATIENT
Start: 2020-10-02 | End: 2020-09-04

## 2020-09-04 RX ORDER — SODIUM CHLORIDE 9 MG/ML
1000 INJECTION, SOLUTION INTRAVENOUS CONTINUOUS PRN
Status: DISCONTINUED | OUTPATIENT
Start: 2020-09-04 | End: 2020-09-04

## 2020-09-04 RX ORDER — HYDROMORPHONE HYDROCHLORIDE 1 MG/ML
.3-.5 INJECTION, SOLUTION INTRAMUSCULAR; INTRAVENOUS; SUBCUTANEOUS EVERY 5 MIN PRN
Status: DISCONTINUED | OUTPATIENT
Start: 2020-09-04 | End: 2020-09-04 | Stop reason: HOSPADM

## 2020-09-04 RX ORDER — AMOXICILLIN 250 MG
2 CAPSULE ORAL 2 TIMES DAILY
Status: DISCONTINUED | OUTPATIENT
Start: 2020-09-04 | End: 2020-09-06 | Stop reason: HOSPADM

## 2020-09-04 RX ORDER — SODIUM CHLORIDE, SODIUM LACTATE, POTASSIUM CHLORIDE, CALCIUM CHLORIDE 600; 310; 30; 20 MG/100ML; MG/100ML; MG/100ML; MG/100ML
INJECTION, SOLUTION INTRAVENOUS CONTINUOUS
Status: DISCONTINUED | OUTPATIENT
Start: 2020-09-04 | End: 2020-09-04 | Stop reason: HOSPADM

## 2020-09-04 RX ORDER — METHYLPREDNISOLONE SODIUM SUCCINATE 125 MG/2ML
100 INJECTION, POWDER, LYOPHILIZED, FOR SOLUTION INTRAMUSCULAR; INTRAVENOUS ONCE
Status: COMPLETED | OUTPATIENT
Start: 2020-09-05 | End: 2020-09-05

## 2020-09-04 RX ORDER — FENTANYL CITRATE 50 UG/ML
25-50 INJECTION, SOLUTION INTRAMUSCULAR; INTRAVENOUS EVERY 5 MIN PRN
Status: DISCONTINUED | OUTPATIENT
Start: 2020-09-04 | End: 2020-09-04 | Stop reason: HOSPADM

## 2020-09-04 RX ORDER — LANOLIN ALCOHOL/MO/W.PET/CERES
3 CREAM (GRAM) TOPICAL
Status: DISCONTINUED | OUTPATIENT
Start: 2020-09-04 | End: 2020-09-06 | Stop reason: HOSPADM

## 2020-09-04 RX ORDER — OXYCODONE HYDROCHLORIDE 5 MG/1
5 TABLET ORAL
Status: DISCONTINUED | OUTPATIENT
Start: 2020-09-04 | End: 2020-09-05

## 2020-09-04 RX ORDER — PREDNISONE 20 MG/1
60 TABLET ORAL DAILY
Status: DISCONTINUED | OUTPATIENT
Start: 2020-09-06 | End: 2020-09-06 | Stop reason: HOSPADM

## 2020-09-04 RX ORDER — VALGANCICLOVIR 450 MG/1
450 TABLET, FILM COATED ORAL
Status: DISCONTINUED | OUTPATIENT
Start: 2020-09-07 | End: 2020-09-06 | Stop reason: HOSPADM

## 2020-09-04 RX ORDER — FUROSEMIDE 10 MG/ML
60 INJECTION INTRAMUSCULAR; INTRAVENOUS ONCE
Status: COMPLETED | OUTPATIENT
Start: 2020-09-04 | End: 2020-09-04

## 2020-09-04 RX ORDER — PREDNISONE 20 MG/1
40 TABLET ORAL DAILY
Status: DISCONTINUED | OUTPATIENT
Start: 2020-09-09 | End: 2020-09-04

## 2020-09-04 RX ORDER — ALBUMIN, HUMAN INJ 5% 5 %
25 SOLUTION INTRAVENOUS ONCE
Status: COMPLETED | OUTPATIENT
Start: 2020-09-04 | End: 2020-09-04

## 2020-09-04 RX ORDER — ONDANSETRON 2 MG/ML
4 INJECTION INTRAMUSCULAR; INTRAVENOUS EVERY 30 MIN PRN
Status: DISCONTINUED | OUTPATIENT
Start: 2020-09-04 | End: 2020-09-04 | Stop reason: HOSPADM

## 2020-09-04 RX ORDER — VALACYCLOVIR HYDROCHLORIDE 500 MG/1
500 TABLET, FILM COATED ORAL DAILY
Status: ON HOLD | COMMUNITY
End: 2020-09-06

## 2020-09-04 RX ORDER — HYDROMORPHONE HCL/0.9% NACL/PF 0.2MG/0.2
0.2 SYRINGE (ML) INTRAVENOUS
Status: DISCONTINUED | OUTPATIENT
Start: 2020-09-04 | End: 2020-09-05

## 2020-09-04 RX ORDER — NALOXONE HYDROCHLORIDE 0.4 MG/ML
.1-.4 INJECTION, SOLUTION INTRAMUSCULAR; INTRAVENOUS; SUBCUTANEOUS
Status: DISCONTINUED | OUTPATIENT
Start: 2020-09-04 | End: 2020-09-06 | Stop reason: HOSPADM

## 2020-09-04 RX ORDER — SODIUM CHLORIDE 450 MG/100ML
INJECTION, SOLUTION INTRAVENOUS CONTINUOUS PRN
Status: DISCONTINUED | OUTPATIENT
Start: 2020-09-04 | End: 2020-09-04

## 2020-09-04 RX ORDER — PREDNISONE 20 MG/1
20 TABLET ORAL DAILY
Status: DISCONTINUED | OUTPATIENT
Start: 2020-09-11 | End: 2020-09-04

## 2020-09-04 RX ADMIN — HYDROMORPHONE HYDROCHLORIDE 0.5 MG: 1 INJECTION, SOLUTION INTRAMUSCULAR; INTRAVENOUS; SUBCUTANEOUS at 00:10

## 2020-09-04 RX ADMIN — SODIUM CHLORIDE, SODIUM LACTATE, POTASSIUM CHLORIDE, CALCIUM CHLORIDE AND DEXTROSE MONOHYDRATE: 5; 600; 310; 30; 20 INJECTION, SOLUTION INTRAVENOUS at 02:20

## 2020-09-04 RX ADMIN — FENTANYL CITRATE 25 MCG: 50 INJECTION, SOLUTION INTRAMUSCULAR; INTRAVENOUS at 02:53

## 2020-09-04 RX ADMIN — TACROLIMUS 1.5 MG: 1 CAPSULE ORAL at 17:13

## 2020-09-04 RX ADMIN — SODIUM CHLORIDE 1000 ML: 9 INJECTION, SOLUTION INTRAVENOUS at 02:57

## 2020-09-04 RX ADMIN — FENTANYL CITRATE 25 MCG: 50 INJECTION, SOLUTION INTRAMUSCULAR; INTRAVENOUS at 02:27

## 2020-09-04 RX ADMIN — METHYLPREDNISOLONE SODIUM SUCCINATE 250 MG: 125 INJECTION, POWDER, LYOPHILIZED, FOR SOLUTION INTRAMUSCULAR; INTRAVENOUS at 15:47

## 2020-09-04 RX ADMIN — SODIUM CHLORIDE 500 ML: 9 INJECTION, SOLUTION INTRAVENOUS at 07:38

## 2020-09-04 RX ADMIN — HYDROMORPHONE HYDROCHLORIDE 0.5 MG: 1 INJECTION, SOLUTION INTRAMUSCULAR; INTRAVENOUS; SUBCUTANEOUS at 00:06

## 2020-09-04 RX ADMIN — SODIUM CHLORIDE 20 MG: 9 INJECTION, SOLUTION INTRAVENOUS at 16:35

## 2020-09-04 RX ADMIN — LEVOTHYROXINE SODIUM 112 MCG: 0.11 TABLET ORAL at 07:44

## 2020-09-04 RX ADMIN — LIDOCAINE 1 PATCH: 560 PATCH PERCUTANEOUS; TOPICAL; TRANSDERMAL at 07:35

## 2020-09-04 RX ADMIN — DIPHENHYDRAMINE HYDROCHLORIDE 25 MG: 25 CAPSULE ORAL at 14:00

## 2020-09-04 RX ADMIN — DEXTROSE AND SODIUM CHLORIDE: 5; 900 INJECTION, SOLUTION INTRAVENOUS at 20:09

## 2020-09-04 RX ADMIN — FUROSEMIDE 40 MG: 10 INJECTION, SOLUTION INTRAVENOUS at 07:36

## 2020-09-04 RX ADMIN — FUROSEMIDE 60 MG: 10 INJECTION, SOLUTION INTRAVENOUS at 02:13

## 2020-09-04 RX ADMIN — SULFAMETHOXAZOLE AND TRIMETHOPRIM 1 TABLET: 400; 80 TABLET ORAL at 11:00

## 2020-09-04 RX ADMIN — MYCOPHENOLATE MOFETIL 750 MG: 250 CAPSULE ORAL at 17:13

## 2020-09-04 RX ADMIN — MELATONIN TAB 3 MG 3 MG: 3 TAB at 22:38

## 2020-09-04 RX ADMIN — DOCUSATE SODIUM 50 MG AND SENNOSIDES 8.6 MG 2 TABLET: 8.6; 5 TABLET, FILM COATED ORAL at 07:34

## 2020-09-04 RX ADMIN — ONDANSETRON 4 MG: 2 INJECTION INTRAMUSCULAR; INTRAVENOUS at 03:16

## 2020-09-04 RX ADMIN — DIPHENHYDRAMINE HYDROCHLORIDE 25 MG: 25 CAPSULE ORAL at 06:02

## 2020-09-04 RX ADMIN — ALBUMIN HUMAN 25 G: 0.05 INJECTION, SOLUTION INTRAVENOUS at 13:08

## 2020-09-04 RX ADMIN — DEXTROSE AND SODIUM CHLORIDE: 5; 900 INJECTION, SOLUTION INTRAVENOUS at 09:24

## 2020-09-04 RX ADMIN — ROCURONIUM BROMIDE 10 MG: 10 INJECTION INTRAVENOUS at 00:50

## 2020-09-04 RX ADMIN — DOCUSATE SODIUM 50 MG AND SENNOSIDES 8.6 MG 2 TABLET: 8.6; 5 TABLET, FILM COATED ORAL at 20:10

## 2020-09-04 RX ADMIN — FUROSEMIDE 40 MG: 10 INJECTION, SOLUTION INTRAVENOUS at 15:47

## 2020-09-04 RX ADMIN — MYCOPHENOLATE MOFETIL 750 MG: 250 CAPSULE ORAL at 07:44

## 2020-09-04 RX ADMIN — ASPIRIN 81 MG: 81 TABLET, COATED ORAL at 07:34

## 2020-09-04 RX ADMIN — ACETAMINOPHEN 975 MG: 325 TABLET, FILM COATED ORAL at 06:02

## 2020-09-04 RX ADMIN — ACETAMINOPHEN 975 MG: 325 TABLET, FILM COATED ORAL at 22:38

## 2020-09-04 RX ADMIN — OXYCODONE HYDROCHLORIDE 5 MG: 5 TABLET ORAL at 20:54

## 2020-09-04 RX ADMIN — PANTOPRAZOLE SODIUM 40 MG: 40 TABLET, DELAYED RELEASE ORAL at 07:34

## 2020-09-04 RX ADMIN — ACETAMINOPHEN 975 MG: 325 TABLET, FILM COATED ORAL at 14:00

## 2020-09-04 RX ADMIN — OXYCODONE HYDROCHLORIDE 5 MG: 5 TABLET ORAL at 14:00

## 2020-09-04 RX ADMIN — OXYCODONE HYDROCHLORIDE 5 MG: 5 TABLET ORAL at 10:59

## 2020-09-04 ASSESSMENT — ACTIVITIES OF DAILY LIVING (ADL)
ADLS_ACUITY_SCORE: 23
ADLS_ACUITY_SCORE: 19
ADLS_ACUITY_SCORE: 19
ADLS_ACUITY_SCORE: 21
ADLS_ACUITY_SCORE: 19

## 2020-09-04 ASSESSMENT — PAIN DESCRIPTION - DESCRIPTORS: DESCRIPTORS: DISCOMFORT

## 2020-09-04 NOTE — PROGRESS NOTES
Rapid Response Team Note    Admission Diagnosis: ESRD (end stage renal disease) (H) [N18.6]     Hospital Course   Brief Summary of events leading to rapid response:   A rapid response was called for Ethel Thompson due to lactic acidosis >4 (NOT sepsis protocol).      The patients is not known to have an infection.    Significant Comorbidities:   s/p kidney transplant 9/3 just returned to floor from OR 3 hours ago    Medications   Scheduled     acetaminophen  975 mg Oral Q8H     aspirin  81 mg Oral Daily     [START ON 2020] atorvastatin  10 mg Oral Daily     furosemide  40 mg Intravenous BID     levothyroxine  112 mcg Oral QAM AC     lidocaine  1 patch Transdermal Q24H     lidocaine   Transdermal Q8H     [START ON 2020] magnesium oxide  400 mg Oral Daily with lunch     mycophenolate  750 mg Oral BID IS     pantoprazole  40 mg Oral Daily     senna-docusate  1 tablet Oral BID    Or     senna-docusate  2 tablet Oral BID     sodium chloride (PF)  10 mL Intracatheter Q8H     sulfamethoxazole-trimethoprim  1 tablet Oral Once per day on      tacrolimus  1.5 mg Oral BID IS     [START ON 2020] valGANciclovir  450 mg Oral Once per day on       PRN   [START ON 2020] acetaminophen, diphenhydrAMINE **OR** diphenhydrAMINE, HYDROmorphone, methocarbamol, naloxone, naloxone, ondansetron **OR** ondansetron, oxyCODONE IR, sodium chloride (PF), NaCl, NaCl   Allergies   Allergies   Allergen Reactions     Amoxicillin-Pot Clavulanate Nausea and Vomiting        Physical Exam   Temp: 98.1  F (36.7  C) Temp  Min: 97.9  F (36.6  C)  Max: 98.8  F (37.1  C)  Resp: 13 Resp  Min: 10  Max: 20  SpO2: 100 % SpO2  Min: 98 %  Max: 100 %  Pulse: 96 Pulse  Min: 89  Max: 98    No data recorded  BP: 130/67 Systolic (24hrs), Av , Min:122 , Max:142   Diastolic (24hrs), Av, Min:60, Max:84     I/Os: I/O last 3 completed shifts:  In: 2912.92 [I.V.:412.92]  Out: 770 [Urine:620; Blood:150]     Exam:   General: in no acute  distress  Mental Status: AAOx4.  Having mild discomfort over RLQ and nausea but otherwise no SOB.   Heart RRR  Lung clear bilaterally    Significant Results and Procedures   Lactic Acid:   Recent Labs   Lab Test 09/04/20  0624 09/03/20  2359 09/03/20  2242   LACT 5.7* 4.6* 3.5*     CBC:   Recent Labs   Lab Test 09/04/20  0544 09/04/20  0212 09/03/20  2359  09/03/20  1756 01/28/19  1351   WBC  --  7.7  --   --  7.8 6.9   HGB 7.9* 8.6* 8.4*   < > 9.8* 9.3*   HCT  --  25.8*  --   --  30.1* 30.8*   PLT  --  139*  --   --  249 363    < > = values in this interval not displayed.        Assessment   In assessment a rapid response was called on Ethel Thompson due to lactic acidosis.     This patient is post kidney transplant and just returned from PACU. I spoke with transplant surgery and they were concerned about uptrending lactate and thus this repeat lactate was ordered.  There is no abnormal vital sign triggered protocol, however, per transplant surgery, this ongoing rise is concerning in the setting of acute post transplant, which could indicate complications. The patient is NOT critically ill at this time. No evidence of sepsis    Sepsis Evaluation   NO EVIDENCE OF SEPSIS at this time.  Vital sign, physical exam, and lab findings are likely due to post op setting but concerning for possible complications.    Plan   - spoke with transplant surgery, they will evaluate and determine IVF need or additional antibiotic need (currently on valgancyclovir, bactrim, received ancef).  Given stable VS, this would be the safest approach at this time.    Disposition: The patient will remain on the current unit. We will continue to monitor this patient closely..    The transplant surgery primary team was able to be reached and they are in agreement with the above plan.    Reassessment   Reassessment and plan follow-up will be performed by the primary team. The current agreed upon plan for reassessment/follow-up includes bedside exam and  will be completed in 30 minutes.      Time Spent on this Encounter   Total Critical Care time spent by me, excluding procedures, was 15 minutes.    Karolina Dave MD  [facility] RRT [facility]COM Job Code Contact #[phone]

## 2020-09-04 NOTE — PROVIDER NOTIFICATION
-------------------CRITICAL LAB VALUE-------------------    Lab Value: Lactic Acid 7.1  Time of notification: 9:36 AM  MD notified: Alicia Garcia CNP  Patient status:  Temp:  [97.7  F (36.5  C)-98.8  F (37.1  C)] 97.7  F (36.5  C)  Pulse:  [89-98] 93  Resp:  [10-20] 14  BP: (121-142)/(60-84) 121/66  SpO2:  [98 %-100 %] 100 %  Orders received: None at this time.

## 2020-09-04 NOTE — PROGRESS NOTES
Admission          9/4/2020  4:00 AM  -----------------------------------------------------------  Reason for admission: DDKT  Primary team notified of pt arrival.  Admitted from: PACU  Via: bed  Accompanied by: self  Belongings: Placed in closet; valuables sent home with family  Admission Profile: incomplete  Teaching: orientation to unit and call light- call light within reach, call don't fall, use of console, meal times, when to call for the RN, and enforced importance of safety   Access: PIV, CVC  Telemetry: Hardwired  Ht./Wt.: no, pt still needs to stand for weight  Code Status verified on armband: yes  2 RN Skin Assessment Completed By: Kingsley Li RN & Travis Lemus RN   Med Rec completed: no  Bed surface reassessed with algorithm and charted: yes  New bed surface ordered: no    Pt status: stable    Temp:  [97.9  F (36.6  C)-98.8  F (37.1  C)] 98.1  F (36.7  C)  Pulse:  [89-98] 98  Resp:  [10-20] 10  BP: (122-142)/(60-84) 137/65  SpO2:  [98 %-100 %] 100 %

## 2020-09-04 NOTE — PROGRESS NOTES
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 Makes some urine. PMH also significant for atrophic right kidney, MGUS, HTN, Hep B core antibody positive and former tobacco use (PFTs normal in 2019), now s/p DDKT.    Pt reports moderate pain. Denies SOB, chest pain, or dizziness. No BM / flatus. Voiding with Plata.    BP (!) 142/84 (Cuff Size: Adult Regular)   Temp 98  F (36.7  C)   Resp 14   Ht 1.499 m (4' 11\")   Wt 57 kg (125 lb 11.2 oz)   SpO2 100%   BMI 25.39 kg/m       downtrending to 80  CVP 7 downtrending to 5    Gen: A&O x3, NAD, resting in bed  Chest: breathing non-labored on 2L NC  Abdomen: soft, non-tender, non-distended, r abdominal incision  Incision: clean, dry, intact  Extremities: warm and well perfused  Plata with light yellow pinkish urine output    Post op Renal US - patent w/ RIs 0.71 to 0.75    Continue plan of care per primary team. Please call with any questions.    Rito Lyons MD JENNIFER  PGY-1 Surgery  Pager [phone]      "

## 2020-09-04 NOTE — PHARMACY-ADMISSION MEDICATION HISTORY
Admission Medication History Completed by Pharmacy    See [facility] Admission Navigator for allergy information, preferred outpatient pharmacy, prior to admission medications and immunization status.     Medication History Sources:     Patient's , at bedside, over the phone per COVID-19 precaution, outside dispense records per Jeimy     Changes made to PTA medication list (reason):    Added: Flonase, sevelamer valacyclovir    Deleted: Quetiapine (patient only taking briefly for single episode of delirium, per ), ranitidine, vitamin B complex    Changed:   o Atorvastatin (previously no sig)  o Bumetanide to 2mg tablets; Take 2 mg (1 tablet) by mouth twice daily  o Calcitriol to 1 capsule daily in the morning  o Famotidine to 1 tablet daily every evening  o Fish oil-omega-2 fatty acid to 1 capsule daily  o Levothyroxine to 112 mcg daily every Monday, Tuesday, Wednesday, Thursday, Friday  o Melatonin to 1 tablet by mouth at bedtime    Additional Information:    Patient's  is reliable historian who was able to name strengths and frequencies of most medications, and was able to confirm questions with patient at bedside.    Prior to Admission medications    Medication Sig Last Dose Taking? Auth Provider   amLODIPine (NORVASC) 10 MG tablet Take 10 mg by mouth 9/3/2020 at 1500 Yes Reported, Patient   aspirin 81 MG EC tablet Take 81 mg by mouth daily 9/3/2020 at 0800 Yes Reported, Patient   atorvastatin (LIPITOR) 40 MG tablet Take 40 mg by mouth daily  9/3/2020 at 1500 Yes Reported, Patient   bumetanide (BUMEX) 1 MG tablet Take 2 mg by mouth 2 times daily  9/3/2020 at 1600 Yes Reported, Patient   calcitRIOL (ROCALTROL) 0.25 MCG capsule Take 0.25 mcg by mouth daily before breakfast  9/3/2020 at AM Yes Reported, Patient   famotidine (PEPCID) 20 MG tablet Take 20 mg by mouth every evening  9/2/2020 at 1800 Yes Reported, Patient   fish oil-omega-3 fatty acids 1000 MG capsule Take 1 g by mouth daily   9/3/2020 at 0800 Yes Reported, Patient   fluticasone (FLONASE) 50 MCG/ACT nasal spray Spray 1 spray into both nostrils 2 times daily as needed for rhinitis or allergies Past Month at PRN Yes Unknown, Entered By History   levothyroxine (SYNTHROID/LEVOTHROID) 112 MCG tablet Take 112 mcg (1 tablet) by mouth every Monday, Tuesday, Wednesday, Thursday, Friday. 9/3/2020 at 1500 Yes Reported, Patient   melatonin 3 MG tablet Take 3 mg by mouth At Bedtime  9/2/2020 at 2000 Yes Reported, Patient   Psyllium (METAMUCIL FIBER PO) Take one tablespoon by mouth per day every evening 9/2/2020 at 2000 Yes Reported, Patient   sevelamer carbonate (RENVELA) 800 MG tablet Take 2 tablets by mouth three times daily with meals and take 2 tablets twice daily with snacks. 9/3/2020 at 1500 (3 Doses) Yes Unknown, Entered By History   vitamin B-12 (CYANOCOBALAMIN) 500 MCG tablet Take 500 mcg by mouth daily 9/3/2020 at 0800 Yes Reported, Patient   vitamin B6 (PYRIDOXINE) 100 MG tablet Take 100 mg by mouth daily 9/3/2020 at 0800 Yes Reported, Patient   valACYclovir (VALTREX) 500 MG tablet Take 500 mg by mouth daily As needed for each out break More than a month at PRN  Unknown, Entered By History       Date completed: 09/04/20    Medication history completed by: Ky Galeas, PharmD Candidate 2021

## 2020-09-04 NOTE — PROGRESS NOTES
Pt arrived to unit 6B room 33-1 from PACU via bed. Pt A&O X4, VSS, afebrile, HR sinus rhythm 90's, BP's 130's/60's, O2 sats > 90% on 2L via NC, Capno in place, CO2's 40's, IPI's > 8, CVP's 5. LS clear, BS+ X4, CMS intact. Pt sleepy but arousable, reported itchiness, reported tolerable incisional pain. CVC X3 in R neck, blue port patent & infusing D5/LR @ 100 mL/hr, white port patent & infusing NS @ TKO, brown patent & transduced for CVP's. PIV in R arm patent & SL. Pt has abdominal binder in place, RLQ incision HENRIK & WDL w/ liquid bandage, LLQ Primapore dressing CDI. R big/2nd toe has small scrape, covered w/ bandage. Plata patent & draining clear maria del carmen urine adequately (see I&O's), no BM. Tolerating ice chips with no nausea/emesis. Has not been out of bed yet. Has call light within reach, able to make needs known, will continue to monitor per POC and update primary team with changes.

## 2020-09-04 NOTE — PROGRESS NOTES
Rapid Response Team Note    Admission Diagnosis: ESRD (end stage renal disease) (H) [N18.6] s/p renal tranplant    Hospital Course   Brief Summary of events leading to rapid response:   A rapid response was called for Ethel Thompson due to repeat elevated LA>4 (not triggered by sepsis BPA).      The patients is not known to have an infection.    Significant Comorbidities:   ESRD s/p renal tranplant 9/3    Medications   Scheduled     acetaminophen  975 mg Oral Q8H     aspirin  81 mg Oral Daily     [START ON 2020] atorvastatin  10 mg Oral Daily     furosemide  40 mg Intravenous BID     levothyroxine  112 mcg Oral QAM AC     lidocaine  1 patch Transdermal Q24H     lidocaine   Transdermal Q8H     [START ON 2020] magnesium oxide  400 mg Oral Daily with lunch     mycophenolate  750 mg Oral BID IS     pantoprazole  40 mg Oral Daily     senna-docusate  1 tablet Oral BID    Or     senna-docusate  2 tablet Oral BID     sodium chloride (PF)  10 mL Intracatheter Q8H     sulfamethoxazole-trimethoprim  1 tablet Oral Once per day on      tacrolimus  1.5 mg Oral BID IS     [START ON 2020] valGANciclovir  450 mg Oral Once per day on       PRN   [START ON 2020] acetaminophen, diphenhydrAMINE **OR** diphenhydrAMINE, HYDROmorphone, methocarbamol, naloxone, naloxone, ondansetron **OR** ondansetron, oxyCODONE IR, sodium chloride (PF), NaCl, NaCl   Allergies   Allergies   Allergen Reactions     Amoxicillin-Pot Clavulanate Nausea and Vomiting        Physical Exam   Temp: 97.7  F (36.5  C) Temp  Min: 97.7  F (36.5  C)  Max: 98.8  F (37.1  C)  Resp: 14 Resp  Min: 10  Max: 20  SpO2: 100 % SpO2  Min: 98 %  Max: 100 %  Pulse: 93 Pulse  Min: 89  Max: 98    No data recorded  BP: 121/66 Systolic (24hrs), Av , Min:121 , Max:142   Diastolic (24hrs), Av, Min:60, Max:84     I/Os: I/O last 3 completed shifts:  In: 2912.92 [I.V.:412.92]  Out: 770 [Urine:620; Blood:150]     Exam:   General: in no acute  distress  Mental Status: baseline mental status.  LUNGS: CTAB  CV: RRR  ABD: renal transplant incision healing well without e/o infection or dehiscence  SKIN: No acute rashes noted on exposed areas  EXT: Trace BLE edema  NEURO: AAOx3    Significant Results and Procedures   Lactic Acid:   Recent Labs   Lab Test 09/04/20  0908 09/04/20  0624 09/03/20  2359   LACT 7.1* 5.7* 4.6*     CBC:   Recent Labs   Lab Test 09/04/20  0544 09/04/20  0212 09/03/20  2359  09/03/20  1756 01/28/19  1351   WBC  --  7.7  --   --  7.8 6.9   HGB 7.9* 8.6* 8.4*   < > 9.8* 9.3*   HCT  --  25.8*  --   --  30.1* 30.8*   PLT  --  139*  --   --  249 363    < > = values in this interval not displayed.        Assessment   In assessment a rapid response was called on Ethel Thompson due to lactic acidosis.     This presentation is likely due to hypovolemia and worsened by the comorbidities listed above. The patient is NOT critically ill at this time.    Sepsis Evaluation   NO EVIDENCE OF SEPSIS at this time.  Vital sign, physical exam, and lab findings are likely due to hypovolemia s/p renal transplant earlier in the morning.    Plan   D/w primary team and they are aware of repeat elevated LA and are in process of continuing to work it up.     Disposition: The patient will remain on the current unit. We will continue to monitor this patient closely..    The Transplant Surgery primary team was able to be reached and they are in agreement with the above plan.    Reassessment   Reassessment and plan follow-up will be performed by the primary team. The current agreed upon plan for reassessment/follow-up includes bedside exam, vitals check, neuro check and further workup that may include but not limited to repeat labs and imaging and will be completed in 30 minutes.      Time Spent on this Encounter   Total Critical Care time spent by me, excluding procedures, was 30 minutes.    Michael Pritchard PA-C  [facility] [place] RRT [facility] Job Code Contact #[id number]

## 2020-09-04 NOTE — PROGRESS NOTES
09/04/20 1600   Call Information   Date of Call 09/04/20   Time of Call 1604   Name of person requesting the team Laura MOY   Title of person requesting team RN   RRT Arrival time 1606   Time RRT ended 1615   Reason for call   Type of RRT Adult   Primary reason for call Sepsis suspected   Sepsis Suspected Elevated Lactate level   Was patient transferred from the ED, ICU, or PACU within last 24 hours prior to RRT call? Yes   SBAR   Situation LA 6.3   Background see previous   Notable History/Conditions   (see previous)   Assessment pt is alert and oriented. AVSS.    Interventions Other (describe)  (hgb 6.5, 1 unit PRBC's)   Patient Outcome   Patient Outcome Stabilized on unit   RRT Team   Attending/Primary/Covering Physician Transplant   Date Attending Physician notified 09/04/20   Time Attending Physician notified 1604   Physician(s) Danya MOY   Post RRT Intervention Assessment   Post RRT Assessment Stable/Improved   Date Follow Up Done 09/04/20   Time Follow Up Done 1815   Comments pt had transfered to , recieving PRBC's currently...

## 2020-09-04 NOTE — PROVIDER NOTIFICATION
09/04/20 0600   Call Information   Date of Call 09/04/20   Time of Call 0655   Name of person requesting the team Kingsley   Title of person requesting team RN   RRT Arrival time 0657   Time RRT ended 0710   Reason for call   Type of RRT Adult   Primary reason for call Sepsis suspected   Sepsis Suspected Elevated Lactate level   Was patient transferred from the ED, ICU, or PACU within last 24 hours prior to RRT call? 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 Makes moderate amount of urine. PMH also significant for atrophic right kidney, MGUS, HTN, Hep B core antibody positive and former tobacco use (PFTs normal in 2019).    Notable History/Conditions Hypertension;Organ failure;Recent surgery;Transplant  (Kidney transplant)   Assessment AOx4, AVSS, LS clear, pain at surgical site   Interventions Fluid bolus;Labs   Patient Outcome   Patient Outcome Stabilized on unit   RRT Team   Physician(s) Karolina Dave MD   Lead RN Gladys Casey     Fresh post-op that came up from her kidney tx at 0400.  Lactic acid protocol did not flag but checked and result was 5.7 so RRT was called.      CODY Osorio-CNP (Transplant) ordered a bolus to be given and lactic acid recheck at 0900.      Will continue to monitor closely.

## 2020-09-04 NOTE — PROGRESS NOTES
Brief Nephrology Note     Patient was briefly seen in PACU just before being brought to the OR. Dialysis consent was obtained. Patient is aware of the risks from her transplant, and understands that her kidney may not function as expected right away. She has a PD catheter internal jugular place, and a LUE AVF with moderate thrill and no bruit.     Her PD catheter may remain in place after surgery, at the surgeon discretion.    Full consult note to follow tomorrow.    Jody Franco  Nephrology Fellow

## 2020-09-04 NOTE — PROGRESS NOTES
Rapid Response Team Note    Admission Diagnosis: ESRD (end stage renal disease) (H) [N18.6]     Hospital Course   Brief Summary of events leading to rapid response:   A rapid response was called for Ethel Thompson due to elevated lactate      The patients is not known to have an infection.    Significant Comorbidities:   Immunosuppression from recent kidney transplant    Medications   Scheduled     acetaminophen  975 mg Oral Q8H     aspirin  81 mg Oral Daily     [START ON 9/5/2020] atorvastatin  20 mg Oral Daily     basiliximab (SIMULECT) infusion  20 mg Intravenous Once     [START ON 9/8/2020] basiliximab (SIMULECT) infusion  20 mg Intravenous Once     furosemide  40 mg Intravenous BID     levothyroxine  112 mcg Oral QAM AC     lidocaine  1 patch Transdermal Q24H     lidocaine   Transdermal Q8H     [START ON 9/5/2020] magnesium oxide  400 mg Oral Daily with lunch     [START ON 9/5/2020] methylPREDNISolone  100 mg Intravenous Once     mycophenolate  750 mg Oral BID IS     pantoprazole  40 mg Oral Daily     [START ON 9/6/2020] predniSONE  60 mg Oral Daily    Followed by     [START ON 9/8/2020] predniSONE  40 mg Oral Daily    Followed by     [START ON 9/10/2020] predniSONE  20 mg Oral Daily    Followed by     [START ON 9/17/2020] predniSONE  15 mg Oral Daily    Followed by     [START ON 9/24/2020] predniSONE  10 mg Oral Daily    Followed by     [START ON 10/1/2020] predniSONE  5 mg Oral Daily     senna-docusate  1 tablet Oral BID    Or     senna-docusate  2 tablet Oral BID     sodium chloride (PF)  10 mL Intracatheter Q8H     sulfamethoxazole-trimethoprim  1 tablet Oral Once per day on Mon Wed Fri     tacrolimus  1.5 mg Oral BID IS     [START ON 9/7/2020] valGANciclovir  450 mg Oral Once per day on Mon Thu      PRN   [START ON 9/7/2020] acetaminophen, diphenhydrAMINE **OR** diphenhydrAMINE, HYDROmorphone, methocarbamol, naloxone, naloxone, ondansetron **OR** ondansetron, oxyCODONE IR, sodium chloride (PF)   Allergies    Allergies   Allergen Reactions     Amoxicillin-Pot Clavulanate Nausea and Vomiting        Physical Exam   Temp: 98.8  F (37.1  C) Temp  Min: 97.7  F (36.5  C)  Max: 98.8  F (37.1  C)  Resp: 14 Resp  Min: 10  Max: 20  SpO2: 98 % SpO2  Min: 98 %  Max: 100 %  Pulse: 93 Pulse  Min: 89  Max: 102    No data recorded  BP: 134/64 Systolic (24hrs), Av , Min:121 , Max:142   Diastolic (24hrs), Av, Min:60, Max:84     I/Os: I/O last 3 completed shifts:  In: 4840.42 [P.O.:920; I.V.:920.42; IV Piggyback:500]  Out: 1770 [Urine:1620; Blood:150]       Significant Results and Procedures   Lactic Acid:   Recent Labs   Lab Test 20  1544 20  1408 20  1118   LACT 4.7* 6.5* 6.0*     CBC:   Recent Labs   Lab Test 20  1544 20  1408 20  0544 20  0212  20  1756 19  1351   WBC  --   --   --  7.7  --  7.8 6.9   HGB 6.3* 6.5* 7.9* 8.6*   < > 9.8* 9.3*   HCT  --   --   --  25.8*  --  30.1* 30.8*   PLT  --   --   --  139*  --  249 363    < > = values in this interval not displayed.        Assessment   In assessment a rapid response was called on Ethel Stollfatoumata due to lactic acidosis.     This presentation is likely due to kidney transplant and anemia and worsened by the comorbidities listed above. The patient is NOT critically ill at this time.    Sepsis Evaluation   NO EVIDENCE OF SEPSIS at this time.  Vital sign, physical exam, and lab findings are likely due to kidney transplant and anemia.    Plan   -Transfuse 1U PRBC  -Repeat lacte in 4hr      Disposition: The patient will remain on the current unit. We will continue to monitor this patient closely..    The kidney transplant primary team was able to be reached and they are in agreement with the above plan.    Reassessment   Reassessment and plan follow-up will be performed by the primary team. The current agreed upon plan for reassessment/follow-up includes the following labs: lactate and will be completed in 4 hours.    Time Spent on  this Encounter   Total Critical Care time spent by me, excluding procedures, was 30 minutes.    BEE Jarvis  [facility] [place] RRT AMCOM Job Code Contact #[phone]

## 2020-09-04 NOTE — OP NOTE
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 Left kidney transplant,  Donation after Brain Death, Right  iliac fossa, without vascular reconstruction. A J-J ureteral stent was not placed.  2. Kidney allograft preparation on Back Table  3. Removal of peritoneal dialysis catheter.      Surgeon: Heri Ribeiro MD    Fellow/Assistant:  Manas Bansal, co-surgeon, Kj Vera, fellow, Gilda Peres, resident. There was no qualified resident to assist with this procedure.    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 After discussing the risks and benefits of proceeding, the patient agreed to proceed with surgery and provided informed consent.  Findings: Integrity of recipient artery: Normal. Small donor left kidney with single vessels. Recipient artery and vein normal quality. Anastomoses to external iliac vessels. Good urine output on reperfusion. URETERAL STENT NOT USED. Liche Gregoir anterior multistitch anastomosis.     Intraoperative Events: None    Final ABO/Crossmatch verification: After the donor organ arrived to the operating room and prior to anastomosis, I participated in the transplant pre-verification upon organ receipt timeout by visually verifying the donor ID, organ and laterality, donor blood type, recipient unique identifier, recipient blood type, and that the donor and recipient are blood type compatible. The crossmatch was done prospectively; the T cell flow crossmatch result was negative and B cell flow crossmatch result was  negative prior to anastomosis.  The patient received Thymoglobulin, Cellcept and Solumedrol on induction.    Donor Organ Information:   Donor UNOS ID:  [id number]    Donor arterial clamp on:  9/3/2020  5:59 PM    Total ischemic time:  354 min    Cold ischemic time:  312 min    Warm ischemic time:  42 min    Preservation fluid:  UW      Back Table Details:   Procedure:  Bench preparation of the kidney allograft for transplantation without vascular reconstruction    Surgeon:  VENKAT GARCIA    Faculty Co-Surgeon:  TOMY MEANS    Fellow/Assistant:  Kj Vera, fellow, resident Sparkle    Donor arrival to recipient room:  9/3/2020  7:45 PM    Graft injury:  No    Graft biopsy:  yes    Organ received on:  Ice    Pump resistance:      Pump flow:      Arterial anatomy:  Single    Donor arterial quality:  Normal    Venous anatomy:  Single    Ureteral anatomy:  Single    Any reconstruction:  No    Artery:  single, normal quality    Vein:  single, normal quality     Complications: None.    Findings: Normal. As above      None.    Back Table Preparation:  The donor kidney was received and inspected. It had been flushed with UW. The graft was prepared on the back table by removing perinephric fat and ligating venous tributaries and lymphatics. The ureter was also cleaned of excess tissue. If required, reconstruction was performed as detailed above. The kidney was stored in iced cold preservation solution until ready for transplantation. Faculty was present for the critical portions of the procedure.    Operative Procedure:   Arterial anastomosis start:  9/3/2020 11:11 PM    Arterial unclamp:  9/3/2020 11:53 PM    Extra vessels used:   no      The patient was brought to the operating room, placed in a supine position, and a time out was performed. Sequential compression devices were placed on both lower extremities and general endotracheal anesthesia was induced.  The patient was given IV antibiotics  and a Plata catheter. A central line was placed by Anesthesia service. The abdomen was then shaved, prepped, and draped in the usual sterile fashion.  An incision was made in the right lower quadrant and carried down through the subcutaneous tissue and the abdominal wall fascia. If encountered, the epigastric vessels were ligated in continuity, divided and secured with surgical clips. The right iliac artery and vein were exposed. The retractor system was placed and the lymphatics overlying the vessels were serially ligated and divided.     The patient was heparinized. We applied atraumatic vascular clamps and the donor kidney was brought to the operative field. We made a venotomy and the renal vein was anastomosed to the recipient right External Iliac vein in an end-to-side fashion. An arteriotomy was made and the donor renal artery was anastomosed to the recipient right external iliac artery  in an end to side fashion. The patient was simultaneously loaded with IV mannitol, Lasix and volume. The renal artery was protected and the clamps were removed. After several cardiac cycles, we opened the renal artery and the kidney had Good reperfusion and was soft and pink .    The transplant ureter was managed by creating a Liche (anterior multistitch) anastomosis with absorbable suture. No The kidney made Yes urine prior to implantation.    Hemostasis was obtained, the anastomoses inspected, and the kidney placed in the iliac fossa. After placement, the vessel lay was inspected and found to be acceptable. The kidney position was Retroperitoneal. The field was irrigated with antibiotic solution. No drain was placed. The retractor was removed and the abdominal wall fascia reapproximated. Subcutaneous tissues were irrigated and hemostasis obtained.  The skin was reapproximated with running subcuticular stitch and dermabond was applied. Following this we turned attention to removal of the peritoneal dialysis catheter that had  been separately prepped but excluded on draping. The prior fascial cuff insertion site was opened and the subcutaneous tissues were dissected free of the fascial cuff. The cuff was removed from the fascia. The fascia was closed with a single figure of 8 prolene stitch. The wound was closed in layers and dressed with dermabond. The subcutaneous cuff was also dissected free and the catheter removed from the prior skin exit site. This site was packed and allowed to drain. All needle, sponge and instrument counts were correct x 2. The patient was awakened, extubated, and transferred to PACU for post-op monitoring. Faculty was present for key portions of the procedure.    I was present during the key portions of the procedure, and I was immediately available for the entire procedure.

## 2020-09-04 NOTE — PROVIDER NOTIFICATION
-------------------CRITICAL LAB VALUE-------------------    Lab Value: Lactic 5.7  Time of notification: 7:00 AM  MD notified: Dr. Valenzuela  Patient status: stable  Temp:  [97.9  F (36.6  C)-98.8  F (37.1  C)] 98.1  F (36.7  C)  Pulse:  [89-98] 96  Resp:  [10-20] 13  BP: (122-142)/(60-84) 130/67  SpO2:  [98 %-100 %] 100 %  Orders received: no new orders at this time, will continue to monitor.

## 2020-09-04 NOTE — OR NURSING
Spoke with patient spouse, Maxim. This RN had gone to  to retrieve spouse form room but hospital visiting hours were complete for the day. Spouse and patient understood. Spouse will return to hospital to see patient at 0800.

## 2020-09-04 NOTE — PROGRESS NOTES
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 Made some urine prior to admission. PMH also significant for atrophic right kidney, MGUS, HTN, Hep B core antibody positive, and recent hypomanic episode. S/p  donor kidney transplant with ureteral stent and removal of PD catheter on 2020.    Graft function: POD #0. Cr 6.8->6.1, acceptable UOP.   Immunosuppression management: Induction with thymoglobulin, basiliximab, and steroid pulse. PRA 29%.  Thymo: 125mg, 2.2mg/kg completed.  Basiliximab: Will receive doses POD #1 & 5 (ordered).  Methylpred:  500mg halie-op  MMF: 750mg BID  Tacro: Goal level 8-10  Complexity of management:High. Contributing factors: induction.  Hematology:   Anemia: Hgb 7.9, monitor.  Cardiorespiratory:   Hx HTN: Antihypertensives currently on hold. Monitor.  GI/Nutrition:   Diet: ADAT  Bowel regimen:  Senna/docusate.  Endocrine:   Hx hypothyroidism: Continue synthroid.  Fluid/Electrolytes: MIVF: D5NS @ 100  Elevated lactate: Starting halie-op, now up to 7.1. VSS and asymptomatic aside from expected incisional discomfort. No sign of sepsis or hypoperfusion of extremities. No acute abdomen. No hypoxia. Will check US liver and mesentery.   : Plata to remain due to new surgical anastomosis  Infectious disease: Afebrile  Prophylaxis: DVT, fall, GI, viral (Valcyte), pneumocystis (Bactrim)  Disposition: 6B    Medical Decision Making: Medium  Subsequent visit 84399 (moderate level decision making)    SHAHRIAR/Fellow/Resident Provider: Alicia Garcia NP [id number]    Faculty: Heri Ribeiro M.D.  _________________________________________________________________  Transplant History:   9/3/2020 (Kidney), Postoperative day: 1     Interval History: History is obtained from the patient  Overnight events: Mild incisional pain. Mild abdominal distention, + flatus. Early AM nausea now resolved.    ROS:   NOBLE  "10-point review of systems was negative except as noted above.    Meds:    acetaminophen  975 mg Oral Q8H     albumin human  25 g Intravenous Once     aspirin  81 mg Oral Daily     [START ON 9/5/2020] atorvastatin  20 mg Oral Daily     furosemide  40 mg Intravenous BID     levothyroxine  112 mcg Oral QAM AC     lidocaine  1 patch Transdermal Q24H     lidocaine   Transdermal Q8H     [START ON 9/5/2020] magnesium oxide  400 mg Oral Daily with lunch     mycophenolate  750 mg Oral BID IS     pantoprazole  40 mg Oral Daily     senna-docusate  1 tablet Oral BID    Or     senna-docusate  2 tablet Oral BID     sodium chloride (PF)  10 mL Intracatheter Q8H     sulfamethoxazole-trimethoprim  1 tablet Oral Once per day on Mon Wed Fri     tacrolimus  1.5 mg Oral BID IS     [START ON 9/7/2020] valGANciclovir  450 mg Oral Once per day on Mon Thu       Physical Exam:     Admit Weight: 57 kg (125 lb 11.2 oz)    Current vitals:   /67 (BP Location: Right arm)   Pulse 89   Temp 98  F (36.7  C) (Oral)   Resp 12   Ht 1.499 m (4' 11\")   Wt 57 kg (125 lb 11.2 oz)   SpO2 98%   BMI 25.39 kg/m      Vital sign ranges:    Temp:  [97.7  F (36.5  C)-98.8  F (37.1  C)] 98  F (36.7  C)  Pulse:  [89-98] 89  Resp:  [10-20] 12  BP: (121-142)/(60-84) 125/67  SpO2:  [98 %-100 %] 98 %    General Appearance: in no apparent distress.   Skin: Warm, perfused  Heart: NSR  Lungs: Unlabored  Abdomen: The abdomen is mildly distended, tympanic, non-tender, incision covered  : andre is present.  Urine is yellow.  Extremities: edema: none, strength 5/5  Neurologic: A&Ox4    Data:   CMP  Recent Labs   Lab 09/04/20  1012 09/04/20  0544 09/04/20  0212 09/03/20  2359 09/03/20  2242 09/03/20  1756   NA  --   --  137 137 139 135   POTASSIUM  --  3.9 4.0 3.6 3.3* 3.3*   CHLORIDE  --   --  101  --   --  97   CO2  --   --  22  --   --  29   GLC  --   --  144* 120* 117* 118*   BUN  --   --  42*  --   --  45*   CR  --   --  6.07*  --   --  6.78*   GFRESTIMATED "  --   --  7*  --   --  6*   GFRESTBLACK  --   --  8*  --   --  7*   EVELIO  --   --  8.8  --   --  9.7   ICAW  --   --   --  5.7* 4.4  --    MAG  --   --  2.5*  --   --   --    PHOS  --   --  4.4  --   --   --    ALBUMIN 2.3*  --   --   --   --  3.5   BILITOTAL 1.1  --   --   --   --  0.4   ALKPHOS 60  --   --   --   --  102   AST 38  --   --   --   --  34   ALT 41  --   --   --   --  52*     CBC  Recent Labs   Lab 09/04/20  0544 09/04/20  0212  09/03/20  1756   HGB 7.9* 8.6*   < > 9.8*   WBC  --  7.7  --  7.8   PLT  --  139*  --  249   A1C  --   --   --  5.4    < > = values in this interval not displayed.

## 2020-09-04 NOTE — ANESTHESIA PROCEDURE NOTES
Central Line Procedure Note  Staff -   Anesthesiologist:  Demetria Mcdonald MD  Resident/Fellow: Radha Hemphill MD    Performed By: resident        Location: In OR after induction  Procedure Start/Stop Times:     patient identified, IV checked, site marked, risks and benefits discussed, informed consent, monitors and equipment checked, pre-op evaluation and at physician/surgeon's request      Correct Patient: Yes      Correct Position: Yes      Correct Site: Yes      Correct Procedure: Yes      Correct Laterality:  Yes    Site Marked:  Yes  Line Placement:     Procedure:  Central Line    Insertion laterality:  Right    Insertion site:  Internal Jugular    Position:  Trendelenburg    Sterility preparation included the following: hand hygiene performed prior to central venous catheter insertion, maximum sterile barriers were used: cap, mask, sterile gown, sterile gloves, and large sterile sheet, antiseptic used during central venous catheter insertion and skin prep agent completely dried prior to procedure         Injection Technique:  Ultrasound guided    Sterile Ultrasound Technique:  Sterile probe cover and Sterile gel    Vein evaluated via U/S for patency/adequacy of catheter insertion and is adequate.  Using realtime U/S imaging the vein was punctured, and needle was observed entering vein on U/S      A permanent image is NOT entered into the patient's record.      Local skin infiltration:  None    Catheter size:  7 Fr, 3 lumen, 20 cm    Catheter length at skin (cm):  13    Cath secured with: suture      Dressing:  Tegaderm and Biopatch    Complications:  None obvious    Blood aspirated all lumens: Yes      All Lumens Flushed: Yes      Verification method:  Placement to be verified post-op{

## 2020-09-04 NOTE — PROVIDER NOTIFICATION
09/04/20 1400   Call Information   Date of Call 09/04/20   Time of Call 1434   Name of person requesting the team Laura MOY   Title of person requesting team RN   RRT Arrival time 1437   Time RRT ended 1445   Reason for call   Type of RRT Adult   Primary reason for call Sepsis suspected   Sepsis Suspected Elevated Lactate level   Was patient transferred from the ED, ICU, or PACU within last 24 hours prior to RRT call? Yes   SBAR   Situation LA 6.5   Background see previous   Notable History/Conditions   (see previous)   Assessment pt is alert and oriented. VSS for pt.    Adjustments to Recommend continue to monitor and recheck LA   Patient Outcome   Patient Outcome Stabilized on unit   RRT Team   Attending/Primary/Covering Physician Transplant   Date Attending Physician notified 09/04/20   Time Attending Physician notified 1434   Physician(s) Michael MOY   Post RRT Intervention Assessment   Post RRT Assessment Stable/Improved   Date Follow Up Done 09/04/20   Time Follow Up Done 1614   Comments repeat RRT was called for ontinued elevated lactic

## 2020-09-04 NOTE — TELEPHONE ENCOUNTER
Organ Offer Encounter Information    Organ Offer Information  Organ offer date & time:  9/3/2020  3:21 PM  Coordinator/Fellow/Attending name:  Cassia Jean RN   Organ(s):   Organ UNOS ID Match Run ID Comment Organ Laterality   Kidney [id number] MNOP       Recent infections?:  No    New medications?:  No Recent pregnancy?:  No   Angicoagulation medications?:  Yes (Comment: 81 mg ASA) Recent vaccinations?:  No   Recent blood transfusions?:  No Recent hospitalizations?:  No   Has your insurance changed in the last 6-12 months?:  Neg    Patient last dialyzed:  9/2/2020 10:00 PM  Dialysis type:  Peritoneal  Discussed organ offer with:  Patient, Spouse  Patient/Caregiver name:  Ethel and Maxim Thompson  Discussed risk category with Patient/Other:  N/A  Understood donor criteria, verbalized understanding  Patient/Other asked to speak to a surgeon?:  No  Discussed program-specific outcomes:  Did not have questions regarding SRTR  Right to decline organ offer without penalty, Patient/Other:  Aware of option to decline without penalty  Organ offer decision status Patient/Other:  Accepted Offer  Organ disposition:  Transplanted  Additional Comments:  9/3/2020 3:21 PM  Kidney: Local DD  MD: Quintin/Chuy  OPO Contact: Ciara Joaquin [phone]  VXM Results: Compatible, no DSA  Plan (XM, NPO, Donor OR): Reviewed offer with /Mrs Thompson.  Made NPO and gave admission instructions.  Donor OR is 4:00 PM.  Will arrive between 4:30-5:00 PM.  In the past month, have you had:    Any close contact with a suspect or laboratory-confirmed COVID-19 patient: No  Travel anywhere: No    Fever: No  Cough: No  Short of Breath: No  Rash: No    Admissions: Done 3:39 PM- Bridget  Unit: Done 3:37 PM Ivelisse; may not have a bed ready  Update Provider Entering Orders (XM Plan & COVID Testing):  Done 3:55- Aicha  Immunology: Done3:43 PM [facility]  Inpatient Lab (COVID Testing [phone], Option 2): Done 3:54 PM- Arieo  Book OR: Done 3:45 PM- Peggy;  will no wait for xmatch  Vessel Storage Confirmation: NA  Blood Bank: Done 3:51 PM- Guido  Research: NA  TransNet/ABO Verification: Pending  Add Organ: Pending  Cassia Jean RN, BA  On Call Organ Coordinator    9/3/2020 8:18 PM:  Patient getting LEFT kidney per Dr. Yin. Notified OR and blood bank. Printed OR ppw and added organ.  Sada Abdalla  Transplant Coordinator        Attestation I have discussed all of the above with the Patient/Legal Guardian/Caregiver regarding this organ offer.:  Yes  Coordinator/Fellow/Attending name:  Cassia Jean RN

## 2020-09-04 NOTE — PROGRESS NOTES
Care Coordinator Progress Note    Admission Date/Time:  9/3/2020  Attending MD:  Heri Ribeiro MD    Data  Chart reviewed, discussed with interdisciplinary team.   Patient was admitted for:  Kidney Transplant    Concerns with insurance coverage for discharge needs: None identified  Current Living Situation: Patient lives with   Support System: Involved and supportive  Services Involved: PD  Transportation at Discharge:   Transportation to Medical Appointments:   Barriers to Discharge: Medical clearance    Coordination of Care and Referrals: Provided patient/family with options for Home Care.     Assessment  Pt s/p DD Kidney transplant 9/3/20. I have met [facility], [facility] Pt and , Maxim to assist with discharge planning. Prior to admission Pt was independent living with her  in [facility]. I have informed Pt and  of daily ATC Clinic visits starting the morning after discharge.  Home Care discussed which Pt ti s agreeable to. Choice of Home Care agencies offered, [facility] is preferred.  I have  Made a referral to [facility] and added Nursing to the discharge orders.     Plan  Anticipated Discharge Date:  TBD  Anticipated Discharge Plan:  Discharge to home.  Intermittent Nursing visits provided by  Home Care following completion of daily ATC Clinic visits.    Lilliana Blanton RN   6B care coordinator #[phone]

## 2020-09-04 NOTE — PROVIDER NOTIFICATION
09/04/20 0900   Call Information   Date of Call 09/04/20   Time of Call 0926   Name of person requesting the team Laura MOY   Title of person requesting team RN   RRT Arrival time 0929   Time RRT ended 0935   Reason for call   Type of RRT Adult   Primary reason for call Sepsis suspected   Sepsis Suspected Elevated Lactate level   Was patient transferred from the ED, ICU, or PACU within last 24 hours prior to RRT call? Yes   SBAR   Situation LA 7.1   Background see previous   Notable History/Conditions Hypertension;Organ failure;Transplant;Recent surgery  (just came to floor after kidney transplant at 0400)   Assessment pt is alert and oriented. AVSS surgical site pain   Interventions   (possible abdominal scan)   Patient Outcome   Patient Outcome Stabilized on unit   RRT Team   Attending/Primary/Covering Physician Transplant   Date Attending Physician notified 09/04/20   Time Attending Physician notified 0930   Physician(s) Ivan Brasher   RT Liset   Post RRT Intervention Assessment   Post RRT Assessment Stable/Improved   Date Follow Up Done 09/04/20   Time Follow Up Done 1111   Comments pt just abdominal ultrasound.ABG drawn..  will continue to monitor

## 2020-09-04 NOTE — PROGRESS NOTES
Patient removed from [facility] waitlist after  donor kidney transplant. [facility]OS ID [id number].   Donor PHS increased risk: No.   If Yes, MD documentation NA.

## 2020-09-04 NOTE — PROGRESS NOTES
"CLINICAL NUTRITION SERVICES - ASSESSMENT NOTE     Nutrition Prescription    RECOMMENDATIONS FOR MDs/PROVIDERS TO ORDER:  Liberalize diet as medically able to promote calorie/protien intake to promote healing.     Malnutrition Status:    Unable to determine due to inability to complete all parameters of malnutrition    Recommendations already ordered by Registered Dietitian (RD):  Discharge diet instruction written     Future/Additional Recommendations:  If suspect eating poorly, recommend starting oral nutrition supplements. If intakes are variable, recommend start calorie count.     Education review on acutely increased energy and protein needs, 6-8 weeks post surgery and long term recommendation for heart healthy (low saturated fat, low sodium) diet and food safety precautions.        REASON FOR ASSESSMENT  Pt is a 65 year old female seen by the dietitian for MD Order- Assess & Educate post-op SOT    CLINICAL HISTORY   past medical history of ESKD secondary to FSGS, initially on HD since June 2019, now on PD. Makes some urine. PMH also significant for atrophic right kidney, MGUS, HTN, Hep B core antibody positive and former tobacco use     NUTRITION HISTORY  Patient saw outpatient RD prior to transplant 1/2019.   Unable to reach patient today     CURRENT NUTRITION ORDERS  Diet: Regular  Intake/Tolerance: No intakes documented     LABS  K+ 3.9 (WNL)  Creatinine 6.07 (H- down trending)   Po4 4.4 (WNL)    Medications  Thymoglobulin   Lasix   Levothyroxine   Mycophenolate  Protonix   Tacrolimus     ANTHROPOMETRICS  Height: 149.9 cm (4' 11\")  Most Recent Weight: 57 kg (125 lb  11.2oz)    IBW: 44.3 kg  BMI: Overweight BMI 25-29.9  Weight History:   Wt Readings from Last 15 Encounters:   09/03/20 57 kg (125 lb 11.2 oz)   05/08/20 57.2 kg (126 lb)   11/25/19 58.6 kg (129 lb 3.2 oz)   02/25/19 59.6 kg (131 lb 8 oz)   01/28/19 58.7 kg (129 lb 8 oz)   01/02/19 59 kg (130 lb)     Dosing Weight: 47 kg (adjusted)     ASSESSED " NUTRITION NEEDS:  Estimated Energy Needs: 6596-5815 kcals (30-35 Kcal/Kg)  Justification: increased needs post-op SOT  Estimated Protein Needs: 61-94 grams protein (1.3-2 gm/Kg)  Justification: increased needs post op SOT  Estimated Fluid Needs: 1083-8925  mL (25-30 mL/Kg)  Justification: maintenance, or per MD pending fluid status and adequate UOP    PHYSICAL FINDINGS  Reviewed per physician/nursign notes.     MALNUTRITION  % Intake: Unable to assess  % Weight Loss: None noted  Subcutaneous Fat Loss: Unable to assess  Muscle Loss: Unable to assess  Fluid Accumulation/Edema: None noted  Malnutrition Diagnosis: Unable to determine due to inability to complete all parameters of malnutrition     NUTRITION DIAGNOSIS:  Food and nutrition-related knowledge deficit r/t length of time since previous post-transplant education AEB patient verbal report, review of chart record, and MD consult for nutrition education.     INTERVENTIONS  Implementation  Nutrition Education:  Provided handout: Post-transplant diet guidelines and Food Safety Booklet     Goals  1. Patient will verbalize understanding of 3 important aspects of post-transplant diet guidelines.   2. PO intake >50% meals TID.    Monitoring/Evaluation  Progress toward goals will be monitored and evaluated per protocol.    Roma Quiñonez RD, LD  6B pager: [phone]

## 2020-09-04 NOTE — PROGRESS NOTES
"S: Elevated lactate 5.7 post-op (was 4.6 in OR). Reporting minor incisional pain, mild nausea, mild abdominal distention, and mild pain in right elbow/high forearm. Denies dyspnea, N/T in extremities, other pain, or any other complaint.     O: Vital signs:  Temp: 98.1  F (36.7  C) Temp src: Axillary BP: 130/67 Pulse: 96   Resp: 13 SpO2: 100 % O2 Device: Nasal cannula Oxygen Delivery: 2 LPM Height: 149.9 cm (4' 11\") Weight: 57 kg (125 lb 11.2 oz)  Estimated body mass index is 25.39 kg/m  as calculated from the following:    Height as of this encounter: 1.499 m (4' 11\").    Weight as of this encounter: 57 kg (125 lb 11.2 oz).     Gen: NAD  CV: RRR  Resp: 2L O2  GI: Abdomen mildly distended L>R, tympanic. Incision covered. PD site dermabond.  : Plata, cl yellow  M/S: Peripheral pulses palpable, all extremities warm. +1 edema RUE  Neuro: A&Ox4  Skin: Pink, warm, no obvious bruising    A/P: Isidra and post-op elevated lactate. No obvious source. VSS.  -Check CK  -Give 500ml bolus  -Surgeon on call notified  -Monitor hgb as ordered  -Recheck lactate after bolus    -Alicia Garcia NP             "

## 2020-09-04 NOTE — PHARMACY-TRANSPLANT NOTE
Adult Kidney Transplant Post Operative Note    65 year old female s/p  donor kidney transplant on  for Focal segmental glomerular sclerosis.      Planned immunosuppression regimen per kidney transplant protocol:  INDUCTION: thymoglobulin to total ~ 2mg/kg x1 and  methylprednisolone IV daily x 3 doses and basilixumab 20 mg POD1 and POD5.  MAINTENANCE:  mycophenolate and tacrolimus with goal trough levels of 8-10 mcg/L for first 6 months post-transplant.    Opportunistic pathogen prophylaxis includes: trimethoprim/sulfamethoxazole and valganciclovir.    Patient is not enrolled in medication study.    Pharmacy will monitor for medication interactions and immunosuppression levels in conjunction with the team. Medication therapy needs for discharge planning will continue to be addressed throughout the current admission via multidisciplinary rounds and order review.  Pharmacy will make recommendations as appropriate.    Ky Galeas, PharmD Candidate

## 2020-09-04 NOTE — PROVIDER NOTIFICATION
Time of notification: 2:43 PM  Lactic acid 6.5  Provider notified: Alicia Garcia CNP  Patient status:   Temp:  [97.7  F (36.5  C)-98.8  F (37.1  C)] 98  F (36.7  C)  Pulse:  [] 94  Resp:  [10-20] 12  BP: (121-142)/(60-84) 132/66  SpO2:  [98 %-100 %] 98 %  Orders received: continue to monitor. Recheck at 1600

## 2020-09-04 NOTE — PROGRESS NOTES
{ TIP  Improve documentation with new tip texts.  DO NOT DELETE TIPS. Once your note is signed tips will disappear. Learn more- Malnutrition tip sheet, Sepsis tip sheet, & QuickLearn videos                                    :[id number]}      Rapid Response Team Note    Admission Diagnosis: ESRD (end stage renal disease) (H) [N18.6]     Hospital Course   Brief Summary of events leading to rapid response:   A rapid response was called for Ethel Thompson due to recurrent lactic acidosis >4.    The patients is not known to have an infection.    Significant Comorbidities:   ESRD, POD#1 renal transplant    Medications   Scheduled     acetaminophen  975 mg Oral Q8H     aspirin  81 mg Oral Daily     [START ON 9/5/2020] atorvastatin  20 mg Oral Daily     basiliximab (SIMULECT) infusion  20 mg Intravenous Once     [START ON 9/8/2020] basiliximab (SIMULECT) infusion  20 mg Intravenous Once     furosemide  40 mg Intravenous BID     levothyroxine  112 mcg Oral QAM AC     lidocaine  1 patch Transdermal Q24H     lidocaine   Transdermal Q8H     [START ON 9/5/2020] magnesium oxide  400 mg Oral Daily with lunch     methylPREDNISolone  250 mg Intravenous Once     [START ON 9/5/2020] methylPREDNISolone  100 mg Intravenous Once     mycophenolate  750 mg Oral BID IS     pantoprazole  40 mg Oral Daily     [START ON 9/6/2020] predniSONE  60 mg Oral Daily    Followed by     [START ON 9/8/2020] predniSONE  40 mg Oral Daily    Followed by     [START ON 9/10/2020] predniSONE  20 mg Oral Daily    Followed by     [START ON 9/17/2020] predniSONE  15 mg Oral Daily    Followed by     [START ON 9/24/2020] predniSONE  10 mg Oral Daily    Followed by     [START ON 10/1/2020] predniSONE  5 mg Oral Daily     senna-docusate  1 tablet Oral BID    Or     senna-docusate  2 tablet Oral BID     sodium chloride (PF)  10 mL Intracatheter Q8H     sulfamethoxazole-trimethoprim  1 tablet Oral Once per day on Mon Wed Fri     tacrolimus  1.5 mg Oral BID IS     [START ON  2020] valGANciclovir  450 mg Oral Once per day on Mon Thu      PRN   [START ON 2020] acetaminophen, diphenhydrAMINE **OR** diphenhydrAMINE, HYDROmorphone, methocarbamol, naloxone, naloxone, ondansetron **OR** ondansetron, oxyCODONE IR, sodium chloride (PF)   Allergies   Allergies   Allergen Reactions     Amoxicillin-Pot Clavulanate Nausea and Vomiting        Physical Exam   Temp: 98  F (36.7  C) Temp  Min: 97.7  F (36.5  C)  Max: 98.8  F (37.1  C)  Resp: 12 Resp  Min: 10  Max: 20  SpO2: 98 % SpO2  Min: 98 %  Max: 100 %  Pulse: 94 Pulse  Min: 89  Max: 102    No data recorded  BP: 132/66 Systolic (24hrs), Av , Min:121 , Max:142   Diastolic (24hrs), Av, Min:60, Max:84     I/Os: I/O last 3 completed shifts:  In: 2912.92 [I.V.:412.92]  Out: 770 [Urine:620; Blood:150]     Exam:   General: in no acute distress  Mental Status: baseline mental status.  Unchanged from prior exam today    Significant Results and Procedures   Lactic Acid:   Recent Labs   Lab Test 20  1408 20  1118 20  0908   LACT 6.5* 6.0* 7.1*     CBC:   Recent Labs   Lab Test 20  0544 20  0212 20  2359  20  1756 19  1351   WBC  --  7.7  --   --  7.8 6.9   HGB 7.9* 8.6* 8.4*   < > 9.8* 9.3*   HCT  --  25.8*  --   --  30.1* 30.8*   PLT  --  139*  --   --  249 363    < > = values in this interval not displayed.        Assessment   In assessment a rapid response was called on Ethel Stollfatoumata due to lactic acidosis.     This presentation is likely due to hypovolemia, recent renal transplant and possibly liver disease of unclear severity as per today's RUQ abd US and worsened by the comorbidities listed above. The patient is NOT critically ill at this time.    Sepsis Evaluation   NO EVIDENCE OF SEPSIS at this time.  Vital sign, physical exam, and lab findings are likely due to hypovolemia, recent renal transplant, and possibly new diagnosis of liver disease of unclear severity based on today's RUQ abd  US.    Plan   Treatment of recurrent elevated LA>4 per transplant team    Disposition: The patient is to be transferred to station 7A for closer clinical monitoring. .    The Transplant Surgery primary team was able to be reached and they are in agreement with the above plan.    Reassessment   Reassessment and plan follow-up will be performed by the primary team. The current agreed upon plan for reassessment/follow-up includes but not limited to repeat lactic acid, and possibly repeat imaging and labs and will be completed in 2 hours.      Time Spent on this Encounter   Total Critical Care time spent by me, excluding procedures, was 30 minutes.    Michael Pritchard PA-C  [facility] RRT [facility] Job Code Contact #[phone]

## 2020-09-04 NOTE — PROVIDER NOTIFICATION
09/04/20 0600   Post RRT Intervention Assessment   Post RRT Assessment Stable/Improved   Date Follow Up Done 09/04/20   Time Follow Up Done 0845

## 2020-09-04 NOTE — ANESTHESIA PREPROCEDURE EVALUATION
"Anesthesia Pre-Procedure Evaluation    Patient: Ethel Thompson   MRN:     [id number] Gender:   female   Age:    65 year old :      1955        Preoperative Diagnosis: ESRD (end stage renal disease) (H) [N18.6]   Procedure(s):  TRANSPLANT, KIDNEY, RECIPIENT,  DONOR     LABS:  CBC:   Lab Results   Component Value Date    WBC 7.8 2020    WBC 6.9 2019    HGB 9.8 (L) 2020    HGB 9.3 (L) 2019    HCT 30.1 (L) 2020    HCT 30.8 (L) 2019     2020     2019     BMP:   Lab Results   Component Value Date     2020     2019    POTASSIUM 3.3 (L) 2020    POTASSIUM 3.4 2019    CHLORIDE 97 2020    CHLORIDE 104 2019    CO2 29 2020    CO2 26 2019    BUN 45 (H) 2020    BUN 38 (H) 2019    CR 6.78 (H) 2020    CR 3.74 (H) 2019     (H) 2020     (H) 2019     COAGS:   Lab Results   Component Value Date    PTT 28 2020    INR 0.89 2020     POC:   Lab Results   Component Value Date     (H) 2020     OTHER:   Lab Results   Component Value Date    A1C 5.4 2020    EVELIO 9.7 2020    PHOS 3.8 2019    ALBUMIN 3.5 2020    PROTTOTAL 7.6 2020    ALT 52 (H) 2020    AST 34 2020    ALKPHOS 102 2020    BILITOTAL 0.4 2020        Preop Vitals    BP Readings from Last 3 Encounters:   19 135/70   19 145/74   19 167/80    Pulse Readings from Last 3 Encounters:   19 83   19 62   19 83      Resp Readings from Last 3 Encounters:   20 20    SpO2 Readings from Last 3 Encounters:   20 99%   19 98%   19 100%      Temp Readings from Last 1 Encounters:   19 36.5  C (97.7  F)    Ht Readings from Last 1 Encounters:   20 1.499 m (4' 11\")      Wt Readings from Last 1 Encounters:   20 57 kg (125 lb 11.2 oz)    Estimated body mass index is 25.39 " "kg/m  as calculated from the following:    Height as of this encounter: 1.499 m (4' 11\").    Weight as of this encounter: 57 kg (125 lb 11.2 oz).     LDA:  Peripheral IV 20 Right Lower forearm (Active)   Site Assessment WDL 20   Line Status Infusing 20   Phlebitis Scale 0-->no symptoms 20   Infiltration Scale 0 20   Infiltration Site Treatment Method  None 20   Extravasation? No 20   Number of days: 0       ETT (Active)   Number of days: 0        Past Medical History:   Diagnosis Date     Anemia in chronic kidney disease      Chronic renal failure      GERD (gastroesophageal reflux disease)      Glomerulonephritis, focal sclerosing      Hepatitis B core antibody positive      Herpes simplex      Hyperlipidemia      Hypertension      Hypothyroidism      Memory deficits      SADIE on CPAP      Secondary hyperparathyroidism (H)       Past Surgical History:   Procedure Laterality Date     ABDOMINOPLASTY      tummy tuck- [place]     BIOPSY  2016    kidney, [facility]     COLONOSCOPY  2010    x 2, [facility]     COSMETIC SURGERY       EYE SURGERY Bilateral 2017    cataracts-[facility]     GYN SURGERY       x 2, [place], [place]     GYN SURGERY      hysterectomy     ORTHOPEDIC SURGERY  2016    trigger finger- both hands, [place]     VASCULAR SURGERY Left 2018    dialysis access-[place], [facility], [place]      Allergies   Allergen Reactions     Amoxicillin-Pot Clavulanate Nausea and Vomiting        Anesthesia Evaluation     .             ROS/MED HX    ENT/Pulmonary: Comment: PFTs wnl 2019    (+)sleep apnea, tobacco use, Past use uses CPAP , . .    Neurologic: Comment: Recent admission for AMS      Cardiovascular: Comment: EF 60%, stress nl 10/19    (+) Dyslipidemia, hypertension----. : . . . :. .       METS/Exercise Tolerance:  >4 METS   Hematologic:         Musculoskeletal:         GI/Hepatic:     (+) GERD     "   Renal/Genitourinary:     (+) chronic renal disease, type: ESRD, Pt requires dialysis,       Endo:     (+) thyroid problem hypothyroidism, .      Psychiatric:         Infectious Disease:         Malignancy:         Other:                     JOG FRANCISCO AN PHYSICAL EXAM    Assessment:   ASA SCORE: 3    H&P: History and physical reviewed and following examination; no interval change.   Smoking Status:  Non-Smoker/Unknown   NPO Status: NPO Appropriate     Plan:   Anes. Type:  General   Pre-Medication: None   Induction:  IV (Standard)   Airway: ETT; Oral   Access/Monitoring: PIV; CVL   Maintenance: Balanced     Postop Plan:   Postop Pain: Opioids  Postop Sedation/Airway: Not planned  Disposition: Inpatient/Admit     PONV Management:   Adult Risk Factors: Female, Non-Smoker, Postop Opioids   Prevention: Ondansetron, Dexamethasone     CONSENT: Direct conversation   Plan and risks discussed with: Patient                      Demetria Mcdonald MD

## 2020-09-04 NOTE — CONSULTS
BRIEF SOCIAL WORK NOTE:    SOT SW will complete kidney transplant assessment and 2728 form on 9/8/2020. If patient has been discharged, SW will reach out to OP SW to complete assessment and 2728 form.     RONEN Abbott, LGSW  7A   Ph: [phone]  Pager: [phone]

## 2020-09-04 NOTE — PLAN OF CARE
"Transfer  Transferred to: 7A   Via:bed  Reason for transfer:Pt no longer appropriate for 6B- improved patient condition  Family: Aware of transfer  Belongings: Packed and sent with pt  Chart: Delivered with pt to next unit  Medications: Meds sent to new unit with pt  Report given to: David RN  Pt status:  /60   Pulse 90   Temp 98.5  F (36.9  C) (Oral)   Resp 12   Ht 1.499 m (4' 11\")   Wt 57 kg (125 lb 11.2 oz)   SpO2 99%   BMI 25.39 kg/m      "

## 2020-09-04 NOTE — CONSULTS
[facility], [place]  Transplant Nephrology Consult  Date of Admission:  9/3/2020  Today's Date: 09/04/2020  Requesting physician: Heri Ribeiro MD    Recommendations:  - Monitor Hb and consider OR takeback for increasing fluid (blood) perinephric fluid collection   -For lactic acidosis consider echo, CT A/P to look for signs of organ ischemia  -Repeat UPCR tmrw AM due to FSGS diagnosis  -Simulect today     Assessment & Plan   # DDKT: Trend down with UOP ~100cc/hr   - Baseline Cr ~ not at baseline yet   - Proteinuria: FSGS diagnosis with UPCR 1.9g/g pre op. Obtain UPCR on 9/5 and then weekly   - Date DSA Last Checked: Sep/2020      Latest DSA: No DSA at time of transplant   - BK Viremia: Not checked post transplant   - PD Catheter removed intraop   -Stent in place, remove 4-6 weeks post op    # Immunosuppression: Tacrolimus immediate release (goal 8-10), Mycophenolate mofetil (dose 750 mg every 12 hours) and Prednisone (dose taper)   - Changes: Not at this time    -Moderate risk induction. Give simulect today    # Infection Prophylaxis:   - PJP: Sulfa/TMP (Bactrim)  - CMV: Valcyte x3m, CMV IgG recipient +    # Hypertension: Controlled;  Goal BP: < 150/90   - Volume status: Mildly hypervolemic  EDW ~ 54kg   - Changes: Not at this time    # Elevated Blood Glucose: Glucose generally running ~ 120   - Management as per primary team.    # Anemia in Chronic Renal Disease: Hgb: Trend down      JHONATAN: No   - Iron studies: Unknown at this time, but checked with dialysis    -Hb downtrending, renal txp U/ with fluid collection 8cm, concerning for bleeding. With rising lactate consider repeat imaging and possibly OR Takeback per transplant surgery.    -Transfuse for Hb<7    # Mineral Bone Disorder:   - Calcium; level: Normal        On supplement: No  - Phosphorus; level: Normal        On supplement: No     # Lactic acidosis:    -Unclear etiology. Pt is perfusing it seems, good UOP,  normal liver  function.    -Recommend continuing to trend lactate, obtain echo,  obtain CT A/P to look for evidence of intestinal  hypoperfusion, consider central venous mixed O2   -Consider OR takeback for concern for bleeding and to  look for evidence of intestinal hypoperfusion   -Pt not on Metformin, no longer receiving propofol, low  concern for toxic alcohols    # FSGS:   -Recommend weekly UPCR to start 9/5. Pre op UPCR  1.9g/g        # Transplant History:  Etiology of Kidney Failure: Focal segmental glomerulosclerosis (FSGS)  Tx: DDKT  Transplant: 9/3/2020 (Kidney)  Donor Type: Donation after Brain Death Donor Class:   Crossmatch at time of Tx: negative  Significant changes in immunosuppression: None  Significant transplant-related complications: None    Recommendations were communicated to the primary team verbally.      Carter Wilson MD  Pager: [phone]    REASON FOR CONSULT   Post kidney transplant, immunosuppression    History of Present Illness   Ethel Thompson is a 65 year old female with PMH of FSGS, ESRD on PD with RRT start date 6/2019, now s/p DDKT on 9/3/2020 with rising lactic acid, good UOP for whom transplant nephrology was consulted for assistance with management. The patient staets that she has mild pain. She has andre in place with 100cc/hr UOP. She was noted to have increasing lactic acid but Bps are adequate with MAPs all>70. She does have renal transplant U/S with 8.7cm collection adjacent to the superior pole concerning for bleeding with Hb drop. She denies abdominal pain, fever, chills, N/V/D.     Review of Systems    The 10 point Review of Systems is negative other than noted in the HPI or here.     Past Medical History    I have reviewed this patient's medical history and updated it with pertinent information if needed.   Past Medical History:   Diagnosis Date     Anemia in chronic kidney disease      Chronic renal failure      GERD (gastroesophageal reflux disease)      Glomerulonephritis, focal  sclerosing      Hepatitis B core antibody positive      Herpes simplex      Hyperlipidemia      Hypertension      Hypothyroidism      Memory deficits      SADIE on CPAP      Secondary hyperparathyroidism (H)        Past Surgical History   I have reviewed this patient's surgical history and updated it with pertinent information if needed.  Past Surgical History:   Procedure Laterality Date     ABDOMINOPLASTY      tummy tuck- [place]     BIOPSY  2016    kidney, [place] Dr. Hammonds     COLONOSCOPY  2010    x 2, [facility]     COSMETIC SURGERY       EYE SURGERY Bilateral 2017    cataracts-[place]     GYN SURGERY       x 2, [place], [place]     GYN SURGERY      hysterectomy     ORTHOPEDIC SURGERY  2016    trigger finger- both hands, [place]     VASCULAR [facility]GERY Left 2018    [facility] [place] [facility]       Family History   I have reviewed this patient's family history and updated it with pertinent information if needed.   Family History   Problem Relation Age of Onset     Hypertension Mother      Anuerysm Mother      Cancer No family hx of      Kidney Disease No family hx of      Diabetes No family hx of        Social History   I have reviewed this patient's social history and updated it with pertinent information if needed. Ethel Thomposn  reports that she quit smoking about 25 years ago. Her smoking use included cigarettes. She started smoking about 40 years ago. She smoked 1.00 pack per day. She has never used smokeless tobacco. She reports that she does not drink alcohol or use drugs.    Allergies   Allergies   Allergen Reactions     Amoxicillin-Pot Clavulanate Nausea and Vomiting     Prior to Admission Medications     acetaminophen  975 mg Oral Q8H     aspirin  81 mg Oral Daily     [START ON 2020] atorvastatin  10 mg Oral Daily     furosemide  40 mg Intravenous BID     levothyroxine  112 mcg Oral QAM AC     lidocaine  1 patch Transdermal Q24H     lidocaine   Transdermal  "Q8H     [START ON 2020] magnesium oxide  400 mg Oral Daily with lunch     mycophenolate  750 mg Oral BID IS     pantoprazole  40 mg Oral Daily     senna-docusate  1 tablet Oral BID    Or     senna-docusate  2 tablet Oral BID     sodium chloride (PF)  10 mL Intracatheter Q8H     sulfamethoxazole-trimethoprim  1 tablet Oral Once per day on      tacrolimus  1.5 mg Oral BID IS     [START ON 2020] valGANciclovir  450 mg Oral Once per day on Mon Th       dextrose 5% and 0.9% NaCl 100 mL/hr at 20 1000     NaCl       NaCl 50 mL/hr at 20 1000       Physical Exam   Temp  Av  F (36.7  C)  Min: 97.7  F (36.5  C)  Max: 98.8  F (37.1  C)      Pulse  Av.9  Min: 89  Max: 98 Resp  Av  Min: 10  Max: 20  SpO2  Av.7 %  Min: 98 %  Max: 100 %    CVP (mmHg): 6 mmHgBP 121/66 (BP Location: Right arm)   Pulse 93   Temp 97.7  F (36.5  C) (Oral)   Resp 14   Ht 1.499 m (4' 11\")   Wt 57 kg (125 lb 11.2 oz)   SpO2 100%   BMI 25.39 kg/m     Date 20 0700 - 20 0659   Shift 6007-6220 6982-6572 2680-1029 24 Hour Total   INTAKE   P.O. 300   300   I.V. 357.5   357.5   IV Piggyback 500   500   Shift Total(mL/kg) 1157.5(20.3)   1157.5(20.3)   OUTPUT   Urine 575   575   Shift Total(mL/kg) 575(10.08)   575(10.08)   Weight (kg) 57.02 57.02 57.02 57.02      Admit Weight: 57 kg (125 lb 11.2 oz)     GENERAL APPEARANCE: alert and no distress, facial puffiness  HENT: mouth without ulcers or lesions  LYMPHATICS: no cervical or supraclavicular nodes  RESP: lungs clear to auscultation - no rales, rhonchi or wheezes  CV: regular rhythm, normal rate, no rub, no murmur  EDEMA: no LE edema bilaterally  ABDOMEN: soft, nondistended, nontender, bowel sounds normal  MS: extremities normal - no gross deformities noted, no evidence of inflammation in joints, no muscle tenderness. RLQ incision well approximated  SKIN: no rash  : andre in place    Data   CMP  Recent Labs   Lab 20  0544 20  0212 " 09/03/20 2359 09/03/20 2242 09/03/20 1756   NA  --  137 137 139 135   POTASSIUM 3.9 4.0 3.6 3.3* 3.3*   CHLORIDE  --  101  --   --  97   CO2  --  22  --   --  29   ANIONGAP  --  14  --   --  10   GLC  --  144* 120* 117* 118*   BUN  --  42*  --   --  45*   CR  --  6.07*  --   --  6.78*   GFRESTIMATED  --  7*  --   --  6*   GFRESTBLACK  --  8*  --   --  7*   EVELIO  --  8.8  --   --  9.7   MAG  --  2.5*  --   --   --    PHOS  --  4.4  --   --   --    PROTTOTAL  --   --   --   --  7.6   ALBUMIN  --   --   --   --  3.5   BILITOTAL  --   --   --   --  0.4   ALKPHOS  --   --   --   --  102   AST  --   --   --   --  34   ALT  --   --   --   --  52*     CBC  Recent Labs   Lab 09/04/20  0544 09/04/20 0212 09/03/20 2359 09/03/20 2242 09/03/20 1756   HGB 7.9* 8.6* 8.4* 7.5* 9.8*   WBC  --  7.7  --   --  7.8   RBC  --  2.52*  --   --  2.80*   HCT  --  25.8*  --   --  30.1*   MCV  --  102*  --   --  108*   MCH  --  34.1*  --   --  35.0*   MCHC  --  33.3  --   --  32.6   RDW  --  16.6*  --   --  13.2   PLT  --  139*  --   --  249     INR  Recent Labs   Lab 09/03/20 1756   INR 0.89   PTT 28     ABG  Recent Labs   Lab 09/03/20 2359 09/03/20 2242   O2PER 33.0 30.0      Urine Studies  Recent Labs   Lab Test 09/03/20  1905 01/28/19  1334   COLOR Yellow Yellow   APPEARANCE Clear Clear   URINEGLC Negative Negative   URINEBILI Negative Negative   URINEKETONE Negative Negative   SG 1.008 1.013   UBLD Small* Negative   URINEPH 6.5 5.0   PROTEIN 30* >499*   NITRITE Negative Negative   LEUKEST Negative Trace*   RBCU <1 0   WBCU <1 4     Recent Labs   Lab Test 09/03/20  1905   UTPG 1.92*     PTH  No lab results found.  Iron Studies  No lab results found.    IMAGING:  All imaging studies reviewed by me.

## 2020-09-04 NOTE — ANESTHESIA POSTPROCEDURE EVALUATION
Anesthesia POST Procedure Evaluation    Patient: Ethel Thompson   MRN:     [id number] Gender:   female   Age:    65 year old :      1955        Preoperative Diagnosis: ESRD (end stage renal disease) (H) [N18.6]   Procedure(s):  TRANSPLANT, KIDNEY, RECIPIENT,  DONOR and removal of peritoneal dialysis catheter   Postop Comments: No value filed.     Anesthesia Type: General       Disposition: Admission   Postop Pain Control: Uneventful            Sign Out: Well controlled pain   PONV: No   Neuro/Psych: Uneventful            Sign Out: Acceptable/Baseline neuro status   Airway/Respiratory: Uneventful            Sign Out: Acceptable/Baseline resp. status   CV/Hemodynamics: Uneventful            Sign Out: Acceptable CV status   Other NRE: NONE   DID A NON-ROUTINE EVENT OCCUR? No         Last Anesthesia Record Vitals:  CRNA VITALS  2020 0130 - 2020 0230      2020             EKG:  Sinus rhythm          Last PACU Vitals:  Vitals Value Taken Time   /67 2020  7:00 AM   Temp 36.7  C (98.1  F) 2020  4:00 AM   Pulse 91 2020  7:07 AM   Resp 13 2020  7:00 AM   SpO2 100 % 2020  7:07 AM   Temp src     NIBP     Pulse     SpO2     Resp     Temp     Ht Rate     Temp 2     Vitals shown include unvalidated device data.      Electronically Signed By: Demetria Mcdonald MD, 2020, 7:08 AM

## 2020-09-05 LAB
ALBUMIN SERPL-MCNC: 2.8 G/DL (ref 3.4–5)
ALP SERPL-CCNC: 53 U/L (ref 40–150)
ALT SERPL W P-5'-P-CCNC: 52 U/L (ref 0–50)
ANION GAP SERPL CALCULATED.3IONS-SCNC: 9 MMOL/L (ref 3–14)
AST SERPL W P-5'-P-CCNC: 50 U/L (ref 0–45)
BASOPHILS # BLD AUTO: 0 10E9/L (ref 0–0.2)
BASOPHILS NFR BLD AUTO: 0.1 %
BILIRUB DIRECT SERPL-MCNC: 0.2 MG/DL (ref 0–0.2)
BILIRUB SERPL-MCNC: 0.4 MG/DL (ref 0.2–1.3)
BUN SERPL-MCNC: 44 MG/DL (ref 7–30)
CALCIUM SERPL-MCNC: 8.2 MG/DL (ref 8.5–10.1)
CHLORIDE SERPL-SCNC: 106 MMOL/L (ref 94–109)
CO2 SERPL-SCNC: 25 MMOL/L (ref 20–32)
CREAT SERPL-MCNC: 3.43 MG/DL (ref 0.52–1.04)
DIFFERENTIAL METHOD BLD: ABNORMAL
EOSINOPHIL # BLD AUTO: 0 10E9/L (ref 0–0.7)
EOSINOPHIL NFR BLD AUTO: 0 %
ERYTHROCYTE [DISTWIDTH] IN BLOOD BY AUTOMATED COUNT: 17.2 % (ref 10–15)
GFR SERPL CREATININE-BSD FRML MDRD: 13 ML/MIN/{1.73_M2}
GLUCOSE SERPL-MCNC: 181 MG/DL (ref 70–99)
HBV DNA SERPL NAA+PROBE-ACNC: NORMAL [IU]/ML
HBV DNA SERPL NAA+PROBE-LOG IU: NORMAL {LOG_IU}/ML
HCT VFR BLD AUTO: 22 % (ref 35–47)
HGB BLD-MCNC: 7.3 G/DL (ref 11.7–15.7)
HGB BLD-MCNC: 8 G/DL (ref 11.7–15.7)
HGB BLD-MCNC: 8.7 G/DL (ref 11.7–15.7)
IMM GRANULOCYTES # BLD: 0.1 10E9/L (ref 0–0.4)
IMM GRANULOCYTES NFR BLD: 0.9 %
LACTATE BLD-SCNC: 2.5 MMOL/L (ref 0.7–2)
LACTATE BLD-SCNC: 3 MMOL/L (ref 0.7–2)
LACTATE BLD-SCNC: 3.2 MMOL/L (ref 0.7–2)
LACTATE BLD-SCNC: 3.2 MMOL/L (ref 0.7–2)
LACTATE BLD-SCNC: 3.3 MMOL/L (ref 0.7–2)
LACTATE BLD-SCNC: 3.9 MMOL/L (ref 0.7–2)
LYMPHOCYTES # BLD AUTO: 0.2 10E9/L (ref 0.8–5.3)
LYMPHOCYTES NFR BLD AUTO: 1.2 %
MAGNESIUM SERPL-MCNC: 2.4 MG/DL (ref 1.6–2.3)
MCH RBC QN AUTO: 33.3 PG (ref 26.5–33)
MCHC RBC AUTO-ENTMCNC: 33.2 G/DL (ref 31.5–36.5)
MCV RBC AUTO: 101 FL (ref 78–100)
MONOCYTES # BLD AUTO: 0.4 10E9/L (ref 0–1.3)
MONOCYTES NFR BLD AUTO: 3.2 %
NEUTROPHILS # BLD AUTO: 13.1 10E9/L (ref 1.6–8.3)
NEUTROPHILS NFR BLD AUTO: 94.6 %
NRBC # BLD AUTO: 0 10*3/UL
NRBC BLD AUTO-RTO: 0 /100
PHOSPHATE SERPL-MCNC: 4.7 MG/DL (ref 2.5–4.5)
PLATELET # BLD AUTO: 96 10E9/L (ref 150–450)
POTASSIUM SERPL-SCNC: 3.3 MMOL/L (ref 3.4–5.3)
POTASSIUM SERPL-SCNC: 3.4 MMOL/L (ref 3.4–5.3)
PROT SERPL-MCNC: 5.4 G/DL (ref 6.8–8.8)
PROT UR-MCNC: 0.55 G/L
PROT/CREAT 24H UR: 1.17 G/G CR (ref 0–0.2)
RBC # BLD AUTO: 2.19 10E12/L (ref 3.8–5.2)
SODIUM SERPL-SCNC: 140 MMOL/L (ref 133–144)
WBC # BLD AUTO: 13.9 10E9/L (ref 4–11)

## 2020-09-05 PROCEDURE — 84100 ASSAY OF PHOSPHORUS: CPT | Performed by: SURGERY

## 2020-09-05 PROCEDURE — 85018 HEMOGLOBIN: CPT | Performed by: SURGERY

## 2020-09-05 PROCEDURE — 25000132 ZZH RX MED GY IP 250 OP 250 PS 637: Mod: GY | Performed by: STUDENT IN AN ORGANIZED HEALTH CARE EDUCATION/TRAINING PROGRAM

## 2020-09-05 PROCEDURE — 84132 ASSAY OF SERUM POTASSIUM: CPT | Performed by: SURGERY

## 2020-09-05 PROCEDURE — 25000131 ZZH RX MED GY IP 250 OP 636 PS 637: Mod: GY | Performed by: SURGERY

## 2020-09-05 PROCEDURE — 25000128 H RX IP 250 OP 636: Performed by: SURGERY

## 2020-09-05 PROCEDURE — 83735 ASSAY OF MAGNESIUM: CPT | Performed by: SURGERY

## 2020-09-05 PROCEDURE — 85025 COMPLETE CBC W/AUTO DIFF WBC: CPT | Performed by: SURGERY

## 2020-09-05 PROCEDURE — 25000131 ZZH RX MED GY IP 250 OP 636 PS 637: Mod: GY

## 2020-09-05 PROCEDURE — 80076 HEPATIC FUNCTION PANEL: CPT | Performed by: SURGERY

## 2020-09-05 PROCEDURE — 12000026 ZZH R&B TRANSPLANT

## 2020-09-05 PROCEDURE — 80048 BASIC METABOLIC PNL TOTAL CA: CPT | Performed by: SURGERY

## 2020-09-05 PROCEDURE — 36592 COLLECT BLOOD FROM PICC: CPT

## 2020-09-05 PROCEDURE — 25000132 ZZH RX MED GY IP 250 OP 250 PS 637: Mod: GY | Performed by: SURGERY

## 2020-09-05 PROCEDURE — 84156 ASSAY OF PROTEIN URINE: CPT | Performed by: NURSE PRACTITIONER

## 2020-09-05 PROCEDURE — 83605 ASSAY OF LACTIC ACID: CPT

## 2020-09-05 PROCEDURE — 25000132 ZZH RX MED GY IP 250 OP 250 PS 637: Mod: GY | Performed by: NURSE PRACTITIONER

## 2020-09-05 PROCEDURE — 85018 HEMOGLOBIN: CPT

## 2020-09-05 PROCEDURE — 25800030 ZZH RX IP 258 OP 636: Performed by: NURSE PRACTITIONER

## 2020-09-05 PROCEDURE — 25000128 H RX IP 250 OP 636: Performed by: NURSE PRACTITIONER

## 2020-09-05 PROCEDURE — 36592 COLLECT BLOOD FROM PICC: CPT | Performed by: SURGERY

## 2020-09-05 RX ORDER — ACETAMINOPHEN 325 MG/1
325 TABLET ORAL ONCE
Status: COMPLETED | OUTPATIENT
Start: 2020-09-05 | End: 2020-09-05

## 2020-09-05 RX ORDER — POTASSIUM CHLORIDE 1.5 G/1.58G
20 POWDER, FOR SOLUTION ORAL ONCE
Status: COMPLETED | OUTPATIENT
Start: 2020-09-05 | End: 2020-09-05

## 2020-09-05 RX ORDER — OXYCODONE HYDROCHLORIDE 5 MG/1
5 TABLET ORAL EVERY 4 HOURS PRN
Status: DISCONTINUED | OUTPATIENT
Start: 2020-09-05 | End: 2020-09-06 | Stop reason: HOSPADM

## 2020-09-05 RX ORDER — FAMOTIDINE 20 MG/1
20 TABLET, FILM COATED ORAL 2 TIMES DAILY PRN
Status: DISCONTINUED | OUTPATIENT
Start: 2020-09-05 | End: 2020-09-06 | Stop reason: HOSPADM

## 2020-09-05 RX ORDER — LANOLIN ALCOHOL/MO/W.PET/CERES
3 CREAM (GRAM) TOPICAL ONCE
Status: COMPLETED | OUTPATIENT
Start: 2020-09-05 | End: 2020-09-05

## 2020-09-05 RX ADMIN — TACROLIMUS 1.5 MG: 1 CAPSULE ORAL at 19:10

## 2020-09-05 RX ADMIN — MYCOPHENOLATE MOFETIL 750 MG: 250 CAPSULE ORAL at 08:14

## 2020-09-05 RX ADMIN — DEXTROSE AND SODIUM CHLORIDE: 5; 900 INJECTION, SOLUTION INTRAVENOUS at 06:13

## 2020-09-05 RX ADMIN — MELATONIN TAB 3 MG 3 MG: 3 TAB at 02:45

## 2020-09-05 RX ADMIN — TACROLIMUS 1.5 MG: 1 CAPSULE ORAL at 08:15

## 2020-09-05 RX ADMIN — Medication 1 TABLET: at 04:50

## 2020-09-05 RX ADMIN — MAGNESIUM OXIDE 400 MG: 400 TABLET ORAL at 12:24

## 2020-09-05 RX ADMIN — OXYCODONE HYDROCHLORIDE 5 MG: 5 TABLET ORAL at 11:37

## 2020-09-05 RX ADMIN — ACETAMINOPHEN 975 MG: 325 TABLET, FILM COATED ORAL at 22:58

## 2020-09-05 RX ADMIN — ACETAMINOPHEN 975 MG: 325 TABLET, FILM COATED ORAL at 05:55

## 2020-09-05 RX ADMIN — OXYCODONE HYDROCHLORIDE 5 MG: 5 TABLET ORAL at 21:32

## 2020-09-05 RX ADMIN — LIDOCAINE 1 PATCH: 560 PATCH PERCUTANEOUS; TOPICAL; TRANSDERMAL at 08:25

## 2020-09-05 RX ADMIN — LEVOTHYROXINE SODIUM 112 MCG: 0.11 TABLET ORAL at 08:14

## 2020-09-05 RX ADMIN — DEXTROSE AND SODIUM CHLORIDE: 5; 900 INJECTION, SOLUTION INTRAVENOUS at 19:51

## 2020-09-05 RX ADMIN — ATORVASTATIN CALCIUM 20 MG: 20 TABLET, FILM COATED ORAL at 08:14

## 2020-09-05 RX ADMIN — METHYLPREDNISOLONE SODIUM SUCCINATE 100 MG: 125 INJECTION, POWDER, FOR SOLUTION INTRAMUSCULAR; INTRAVENOUS at 08:18

## 2020-09-05 RX ADMIN — FUROSEMIDE 40 MG: 10 INJECTION, SOLUTION INTRAVENOUS at 15:40

## 2020-09-05 RX ADMIN — FUROSEMIDE 40 MG: 10 INJECTION, SOLUTION INTRAVENOUS at 08:17

## 2020-09-05 RX ADMIN — MYCOPHENOLATE MOFETIL 750 MG: 250 CAPSULE ORAL at 19:09

## 2020-09-05 RX ADMIN — FAMOTIDINE 20 MG: 20 TABLET ORAL at 11:40

## 2020-09-05 RX ADMIN — DOCUSATE SODIUM 50 MG AND SENNOSIDES 8.6 MG 2 TABLET: 8.6; 5 TABLET, FILM COATED ORAL at 19:10

## 2020-09-05 RX ADMIN — ASPIRIN 81 MG: 81 TABLET, COATED ORAL at 08:14

## 2020-09-05 RX ADMIN — PANTOPRAZOLE SODIUM 40 MG: 40 TABLET, DELAYED RELEASE ORAL at 08:14

## 2020-09-05 RX ADMIN — DOCUSATE SODIUM 50 MG AND SENNOSIDES 8.6 MG 2 TABLET: 8.6; 5 TABLET, FILM COATED ORAL at 08:31

## 2020-09-05 RX ADMIN — POTASSIUM CHLORIDE 20 MEQ: 1.5 POWDER, FOR SOLUTION ORAL at 08:31

## 2020-09-05 RX ADMIN — MELATONIN TAB 3 MG 3 MG: 3 TAB at 22:04

## 2020-09-05 RX ADMIN — ACETAMINOPHEN 975 MG: 325 TABLET, FILM COATED ORAL at 14:34

## 2020-09-05 RX ADMIN — ACETAMINOPHEN 325 MG: 325 TABLET, FILM COATED ORAL at 02:44

## 2020-09-05 RX ADMIN — OXYCODONE HYDROCHLORIDE 5 MG: 5 TABLET ORAL at 07:04

## 2020-09-05 ASSESSMENT — ACTIVITIES OF DAILY LIVING (ADL)
ADLS_ACUITY_SCORE: 20
ADLS_ACUITY_SCORE: 19
ADLS_ACUITY_SCORE: 19
ADLS_ACUITY_SCORE: 20
ADLS_ACUITY_SCORE: 19
ADLS_ACUITY_SCORE: 19

## 2020-09-05 ASSESSMENT — MIFFLIN-ST. JEOR
SCORE: 1078.63
SCORE: 1068.42

## 2020-09-05 ASSESSMENT — PAIN DESCRIPTION - DESCRIPTORS: DESCRIPTORS: ACHING

## 2020-09-05 NOTE — PROGRESS NOTES
Rapid Response Team Note    Admission Diagnosis: ESRD (end stage renal disease) (H) [N18.6]   S/p Left  donor kidney transplant on 20    Hospital Course   Brief Summary of events leading to rapid response:   A rapid response was called for Ethel Thompson due to elevated lactic acid.  Scheduled lactic acid 3.9 this AM.  Vitals stable.  Patient denies any complaints other than pain at surgical site, which is stable and well controlled with current regimen.    The patients is not known to have an infection.    Significant Comorbidities:   ESRD  kidney  Transplant (date: 20)    Medications   Scheduled     acetaminophen  975 mg Oral Q8H     aspirin  81 mg Oral Daily     atorvastatin  20 mg Oral Daily     [START ON 2020] basiliximab (SIMULECT) infusion  20 mg Intravenous Once     furosemide  40 mg Intravenous BID     levothyroxine  112 mcg Oral QAM AC     lidocaine  1 patch Transdermal Q24H     lidocaine   Transdermal Q8H     magnesium oxide  400 mg Oral Daily with lunch     mycophenolate  750 mg Oral BID IS     pantoprazole  40 mg Oral Daily     [START ON 2020] predniSONE  60 mg Oral Daily    Followed by     [START ON 2020] predniSONE  40 mg Oral Daily    Followed by     [START ON 9/10/2020] predniSONE  20 mg Oral Daily    Followed by     [START ON 2020] predniSONE  15 mg Oral Daily    Followed by     [START ON 2020] predniSONE  10 mg Oral Daily    Followed by     [START ON 10/1/2020] predniSONE  5 mg Oral Daily     senna-docusate  1 tablet Oral BID    Or     senna-docusate  2 tablet Oral BID     sodium chloride (PF)  10 mL Intracatheter Q8H     sulfamethoxazole-trimethoprim  1 tablet Oral Once per day on      tacrolimus  1.5 mg Oral BID IS     [START ON 2020] valGANciclovir  450 mg Oral Once per day on       PRN   [START ON 2020] acetaminophen, diphenhydrAMINE **OR** diphenhydrAMINE, HYDROmorphone, melatonin, methocarbamol, naloxone, ondansetron **OR**  ondansetron, oxyCODONE IR, sodium chloride (PF)   Allergies   Allergies   Allergen Reactions     Amoxicillin-Pot Clavulanate Nausea and Vomiting        Physical Exam   Temp: 98.5  F (36.9  C) Temp  Min: 98  F (36.7  C)  Max: 98.8  F (37.1  C)  Resp: 16 Resp  Min: 12  Max: 18  SpO2: 97 % SpO2  Min: 97 %  Max: 99 %  Pulse: 91 Pulse  Min: 87  Max: 102    No data recorded  BP: 124/71 Systolic (24hrs), Av , Min:124 , Max:152   Diastolic (24hrs), Av, Min:60, Max:77     I/Os: I/O last 3 completed shifts:  In: 4955.83 [P.O.:1480; I.V.:2475.83; IV Piggyback:500]  Out: 2775 [Urine:2775]     Exam:   General: in no acute distress  Mental Status: AAOx4.   HEENT:  No scleral icterus. Mucous membranes moist.   Cardiovascular:  RRR. S1, S2. No murmur. Pedal pulses 2+ bilaterally.  Respiratory:  Normal effort. Lungs CTAB. No wheezing, rhonchi or rales.  Gastrointestinal:  Abdomen soft, non-distended. Active bowel sounds. Slightly tender around incisions. No guarding or rebound. No erythema, induration or purulent drainage noted at surgical sites.     Neurological:  Grossly non-focal. Moves all extremities.    Extremities:  Warm. No peripheral edema. No calf tenderness.   Skin:  Dry. No visible rash.     Significant Results and Procedures   Lactic Acid:   Recent Labs   Lab Test 20  0806 20  0535 20  0203   LACT 3.9* 2.5* 3.3*     CBC:   Recent Labs   Lab Test 20  0535 20  0203 20  2128  20  0212  20  1756   WBC 13.9*  --   --   --  7.7  --  7.8   HGB 7.3* 8.7* 8.3*   < > 8.6*   < > 9.8*   HCT 22.0*  --   --   --  25.8*  --  30.1*   PLT 96*  --   --   --  139*  --  249    < > = values in this interval not displayed.        Assessment   In assessment a rapid response was called on Ethel Thompson due to lactic acidosis.     This presentation is likely due to anemia and recent kidney transplant and worsened by the comorbidities listed above. The patient is NOT critically ill at this  time.    Noted to have an overall down-trending lactic acid level following transplant on 9/4.  Patient does not meet SIRS criteria given labs and vitals.  Rising WBC noted, suspect secondary to high dose IV steroids following transplant.      Sepsis Evaluation   NO EVIDENCE OF SEPSIS at this time.  Vital sign, physical exam, and lab findings are likely due to anemia and recent kidney transplant.    Plan   - trend lactate per primary team    Disposition: The patient will remain on the current unit. We will continue to monitor this patient closely..    The Transplant Surgery primary team was able to be reached and they are in agreement with the above plan.    Reassessment   Reassessment and plan follow-up will be performed by the primary team. The current agreed upon plan for reassessment/follow-up includes bedside exam, vitals check and the following labs: repeat lactate per primary team and will be completed at primary team's discretion.    Bedside nurse to notify provider responsible for patient reassessment or primary team if no reassessment indicated for lactic acidosis.    Time Spent on this Encounter   Total Critical Care time spent by me, excluding procedures, was <30 minutes.    Elio Landeros PA-C  [facility] Job Code Contact #[phone]

## 2020-09-05 NOTE — PLAN OF CARE
"BP (!) 146/73 (BP Location: Right arm)   Pulse 89   Temp 98.6  F (37  C) (Oral)   Resp 18   Ht 1.499 m (4' 11\")   Wt 62.3 kg (137 lb 4.8 oz)   SpO2 99%   BMI 27.73 kg/m      0732-1389  Hypertensive, did not meet parameters to DONOVAN sawyer on RA. A+Ox4. Plata in place, good UOP. No BM since surgery, +BS and passing gas. Regular diet. Tylenol given x1 and oxy given x1 for incisional pain. Billie-seltzer given x1 for heart burn. Up SBA, bed alarm on due to impulsiveness, setting off. R triple lumen internal jugular, D5NS @100ml/hr, other 2 lumens SL. Abdominal incision x2 derma bonded (PD cath removed and kidney incision). Preventative mepilex on coccyx, UTV. L fistula, +bruit and thrill, no dressing. Will continue to monitor and notify team with changes.   "

## 2020-09-05 NOTE — PLAN OF CARE
"VS: BP (!) 146/73 (BP Location: Right arm)   Pulse 89   Temp 98.6  F (37  C) (Oral)   Resp 18   Ht 1.499 m (4' 11\")   Wt 57 kg (125 lb 11.2 oz)   SpO2 99%   BMI 25.39 kg/m      Current condition: Pt. Arrived to floor at 1745. Stable on RA  Neuro: WDL, impulsive   Cardio: WDL  Respiratory: WDL  GI/: Voiding adequate amounts into andre. No BM, but passing gas and positive bowel sounds.   Skin: WDL  Diet:   Regular.   Labs: Q4 lactic and Hgb. Last lactic: 3.4 Last Hgb: 8.3  LDA:  RCVC 3x lumen with d5NS at 100mL/hr. Andre. Abd inc x2 dermabonded HENRIK.   Mobility:  Up with stand by. Is impulsive and will stand up and walk with disregard to lines.   Pain: abd pain and right elbow pain.   PRN medications: Oxycodone x1.   Changes: Arrived to unit.   Plan of Care: Will continue to monitor and assess for any changes.   "

## 2020-09-05 NOTE — PROGRESS NOTES
[facility], [place]  Transplant Nephrology Consult  Date of Admission:  9/3/2020  Today's Date: 09/05/2020  Requesting physician: Heri Ribeiro MD    Recommendations:  - renal function improving  - f/up UPC given FSGS hx  - consider repeat US kidney to monitor fluid collection  - monitor FK trough tomorrow    Assessment & Plan   # DDKT: Trend down    - Baseline Cr ~ not at baseline yet   - Proteinuria: FSGS diagnosis with UPCR 1.9g/g pre op. Obtain UPCR on 9/5 and then weekly   - Date DSA Last Checked: Sep/2020      Latest DSA: No DSA at time of transplant   - BK Viremia: Not checked post transplant   - PD Catheter removed intraop   -Stent in place, remove 4-6 weeks post op    # Immunosuppression: Tacrolimus immediate release (goal 8-10), Mycophenolate mofetil (dose 750 mg every 12 hours) and Prednisone (dose taper)   - Changes: Not at this time    -Moderate risk induction. Give simulect today    # Infection Prophylaxis:   - PJP: Sulfa/TMP (Bactrim)  - CMV: Valcyte x3m, CMV IgG recipient +    # Hypertension: Controlled;  Goal BP: < 150/90   - Volume status: Mildly hypervolemic  EDW ~ 54kg   - Changes: Not at this time    # Elevated Blood Glucose: Glucose generally running ~ 120   - Management as per primary team.    # Anemia in Chronic Renal Disease: Hgb: Trend down      JHONATAN: No   - Iron studies: Unknown at this time, but checked with dialysis    -Hb downtrended, stable on repeat again today, lactate downtrending--                  # Mineral Bone Disorder:   - Calcium; level: Normal        On supplement: No  - Phosphorus; level: Normal        On supplement: No     # Lactic acidosis:    - likely hypoperfusion in the setting of blood loss anemia, improved with prbc    # FSGS:   -Recommend weekly UPCR to start 9/5. Pre op UPCR  1.9g/g        # Transplant History:  Etiology of Kidney Failure: Focal segmental glomerulosclerosis (FSGS)  Tx: DDKT  Transplant: 9/3/2020 (Kidney)  Donor Type:  Donation after Brain Death Donor Class:   Crossmatch at time of Tx: negative  Significant changes in immunosuppression: None  Significant transplant-related complications: None    Recommendations were communicated to the primary team verbally.      Jordy Dewitt MD  Pager: [phone]    Interval Hx  Feels ok  Reports some constipation  Urine output  ml/hr, bp controlled      Review of Systems    The 4 point Review of Systems is negative other than noted in the HPI or here.     Prior to Admission Medications     acetaminophen  975 mg Oral Q8H     aspirin  81 mg Oral Daily     atorvastatin  20 mg Oral Daily     [START ON 2020] basiliximab (SIMULECT) infusion  20 mg Intravenous Once     furosemide  40 mg Intravenous BID     levothyroxine  112 mcg Oral QAM AC     lidocaine  1 patch Transdermal Q24H     lidocaine   Transdermal Q8H     magnesium oxide  400 mg Oral Daily with lunch     mycophenolate  750 mg Oral BID IS     pantoprazole  40 mg Oral Daily     [START ON 2020] predniSONE  60 mg Oral Daily    Followed by     [START ON 2020] predniSONE  40 mg Oral Daily    Followed by     [START ON 9/10/2020] predniSONE  20 mg Oral Daily    Followed by     [START ON 2020] predniSONE  15 mg Oral Daily    Followed by     [START ON 2020] predniSONE  10 mg Oral Daily    Followed by     [START ON 10/1/2020] predniSONE  5 mg Oral Daily     senna-docusate  1 tablet Oral BID    Or     senna-docusate  2 tablet Oral BID     sodium chloride (PF)  10 mL Intracatheter Q8H     sulfamethoxazole-trimethoprim  1 tablet Oral Once per day on      tacrolimus  1.5 mg Oral BID IS     [START ON 2020] valGANciclovir  450 mg Oral Once per day on Mon Thu       dextrose 5% and 0.9% NaCl 50 mL/hr at 20 1229       Physical Exam   Temp  Av  F (36.7  C)  Min: 97.7  F (36.5  C)  Max: 98.8  F (37.1  C)      Pulse  Av.9  Min: 89  Max: 98 Resp  Av  Min: 10  Max: 20  SpO2  Av.7 %  Min: 98 %  Max:  "100 %    CVP (mmHg): 6 mmHgBP 124/71 (BP Location: Right arm)   Pulse 89   Temp 98.5  F (36.9  C) (Oral)   Resp 16   Ht 1.499 m (4' 11\")   Wt 62.3 kg (137 lb 4.8 oz)   SpO2 98%   BMI 27.73 kg/m     Date 09/04/20 0700 - 09/05/20 0659   Shift 8864-3033 8627-2297 7547-0448 24 Hour Total   INTAKE   P.O. 300   300   I.V. 357.5   357.5   IV Piggyback 500   500   Shift Total(mL/kg) 1157.5(20.3)   1157.5(20.3)   OUTPUT   Urine 575   575   Shift Total(mL/kg) 575(10.08)   575(10.08)   Weight (kg) 57.02 57.02 57.02 57.02      Admit Weight: 57 kg (125 lb 11.2 oz)     GENERAL APPEARANCE: alert and no distress, facial puffiness  HENT: mouth without ulcers or lesions  LYMPHATICS: no cervical or supraclavicular nodes  RESP: lungs clear to auscultation - no rales, rhonchi or wheezes  CV: regular rhythm, normal rate, no rub, no murmur  EDEMA: no LE edema bilaterally  ABDOMEN: soft, nondistended, nontender, bowel sounds normal  MS: extremities normal - no gross deformities noted, no evidence of inflammation in joints, no muscle tenderness. RLQ incision well approximated  SKIN: no rash  : andre in place    Data   CMP  Recent Labs   Lab 09/05/20  0535 09/05/20  0203 09/04/20  2106 09/04/20  1544  09/04/20  1012  09/04/20  0212 09/03/20  2359 09/03/20  2242 09/03/20  1756     --   --   --   --   --   --  137 137 139 135   POTASSIUM 3.3* 3.4 3.3* 3.4   < >  --    < > 4.0 3.6 3.3* 3.3*   CHLORIDE 106  --   --   --   --   --   --  101  --   --  97   CO2 25  --   --   --   --   --   --  22  --   --  29   ANIONGAP 9  --   --   --   --   --   --  14  --   --  10   *  --   --   --   --   --   --  144* 120* 117* 118*   BUN 44*  --   --   --   --   --   --  42*  --   --  45*   CR 3.43*  --   --   --   --   --   --  6.07*  --   --  6.78*   GFRESTIMATED 13*  --   --   --   --   --   --  7*  --   --  6*   GFRESTBLACK 15*  --   --   --   --   --   --  8*  --   --  7*   EVELIO 8.2*  --   --   --   --   --   --  8.8  --   --  9.7 "   MAG 2.4*  --   --   --   --   --   --  2.5*  --   --   --    PHOS 4.7*  --   --   --   --   --   --  4.4  --   --   --    PROTTOTAL  --   --   --   --   --  5.2*  --   --   --   --  7.6   ALBUMIN  --   --   --   --   --  2.3*  --   --   --   --  3.5   BILITOTAL  --   --   --   --   --  1.1  --   --   --   --  0.4   ALKPHOS  --   --   --   --   --  60  --   --   --   --  102   AST  --   --   --   --   --  38  --   --   --   --  34   ALT  --   --   --   --   --  41  --   --   --   --  52*    < > = values in this interval not displayed.     CBC  Recent Labs   Lab 09/05/20  0940 09/05/20  0535 09/05/20  0203 09/04/20 2128  09/04/20  0212 09/03/20  1756   HGB 8.0* 7.3* 8.7* 8.3*   < > 8.6*   < > 9.8*   WBC  --  13.9*  --   --   --  7.7  --  7.8   RBC  --  2.19*  --   --   --  2.52*  --  2.80*   HCT  --  22.0*  --   --   --  25.8*  --  30.1*   MCV  --  101*  --   --   --  102*  --  108*   MCH  --  33.3*  --   --   --  34.1*  --  35.0*   MCHC  --  33.2  --   --   --  33.3  --  32.6   RDW  --  17.2*  --   --   --  16.6*  --  13.2   PLT  --  96*  --   --   --  139*  --  249    < > = values in this interval not displayed.     INR  Recent Labs   Lab 09/04/20  1544 09/03/20  1756   INR 1.08 0.89   PTT 29 28     ABG  Recent Labs   Lab 09/04/20  1118 09/03/20  2359 09/03/20  2242   PH 7.33*  --   --    PCO2 43  --   --    PO2 31*  --   --    HCO3 23  --   --    O2PER REPORT AMENDED: 33.0 30.0      Urine Studies  Recent Labs   Lab Test 09/03/20 1905 01/28/19  1334   COLOR Yellow Yellow   APPEARANCE Clear Clear   URINEGLC Negative Negative   URINEBILI Negative Negative   URINEKETONE Negative Negative   SG 1.008 1.013   UBLD Small* Negative   URINEPH 6.5 5.0   PROTEIN 30* >499*   NITRITE Negative Negative   LEUKEST Negative Trace*   RBCU <1 0   WBCU <1 4     Recent Labs   Lab Test 09/03/20 1905   UTPG 1.92*     PTH  No lab results found.  Iron Studies  No lab results found.    IMAGING:  All imaging studies reviewed by me.

## 2020-09-05 NOTE — PLAN OF CARE
"/74 (BP Location: Right arm)   Pulse 90   Temp 98.3  F (36.8  C) (Oral)   Resp 16   Ht 1.499 m (4' 11\")   Wt 62.3 kg (137 lb 4.8 oz)   SpO2 95%   BMI 27.73 kg/m    1478-3100  VSS on RA. Pt had lactic of 3.9 at 0800 this am, rapid response was called per protocol. Pt then had a lactic of 3.2 at 1200, team was made aware and they did not want a rapid response called. Pt received PRN oxycodone 1x for incisional pain. Pt denied any nausea, regular diet. Pt has internal jugular that has D5NS at 50. PT has PIV SL. Pt has andre with 1250 out this shift. NO BM but passing gas-senna was given. Pt us SBA due to lines. Bed alarm on due to pts impulsiveness and disregard to LDAs. Pt incision CDI and approximated with staples. Plan for andre out at 0600 9/6 per order. Continue plan  of care and notify MD of any changes.   "

## 2020-09-05 NOTE — PROGRESS NOTES
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 Made some urine prior to admission. PMH also significant for atrophic right kidney, MGUS, HTN, Hep B core antibody positive, and recent hypomanic episode. S/p  donor kidney transplant with ureteral stent and removal of PD catheter on 2020.    Graft function: POD #2. Cr 6.1 -> 3.43, acceptable UOP.   Immunosuppression management: Induction with thymoglobulin, basiliximab, and steroid pulse. PRA 29%.  Thymo: 125mg, 2.2mg/kg completed.  Basiliximab: Received first dose POD1, will receive next POD 5 (ordered).  Methylpred:  500mg halie-op, 250 POD1, and 100mg today  MMF: 750mg BID  Tacro: Goal level 8-10, 1.5mg BID started 20 evening  Complexity of management:High. Contributing factors: induction.  Hematology:   Anemia: Received 1u pRBC yesterday for Hb 6.3, responded appropriately. This AM Hb 7.3 then 8.0 on recheck, will continue to monitor   Cardiorespiratory:   Hx HTN: Antihypertensives currently on hold. Monitor.  GI/Nutrition:   Diet: Regular diet  Bowel regimen:  Senna/docusate.  Endocrine:   Hx hypothyroidism: Continue synthroid.  Fluid/Electrolytes: MIVF: D5NS @ 100. Fluid up, Lasix 40 IV BID. Likely decrease mIVF rate following lactate recheck at 1200.  Elevated lactate: Starting halie-op, decreased throughout the day yesterday to 2.5, increased again to 3.5 0800, will recheck at 1200. VSS and asymptomatic aside from expected incisional discomfort. No sign of sepsis or hypoperfusion of extremities. No acute abdomen. No hypoxia. Abdominal ultrasound yesterday showed 5.0x2.1x8.7 halie transplant fluid collection and mildly elevated RI and velocities likely related to post transplant edema. CK level was normal.   : Plata to remain due to new surgical anastomosis  Infectious disease: Afebrile  Prophylaxis: DVT, fall, GI, viral (Valcyte), pneumocystis  (Bactrim)  Disposition: 7A    Medical Decision Making: Medium  Subsequent visit 04122 (moderate level decision making)    SHAHRIAR/Fellow/Resident Provider: Gilda Peres DO, PGY1    Faculty: Heri Ribeiro M.D.  _________________________________________________________________  Transplant History:   9/3/2020 (Kidney), Postoperative day: 2     Interval History: History is obtained from the patient  Overnight events: Mild incisional pain. Mild abdominal distention, + flatus. Denies nausea or vomiting. Tolerating diet. Ambulates well.     ROS:   A 10-point review of systems was negative except as noted above.    Meds:    acetaminophen  975 mg Oral Q8H     aspirin  81 mg Oral Daily     atorvastatin  20 mg Oral Daily     [START ON 9/8/2020] basiliximab (SIMULECT) infusion  20 mg Intravenous Once     furosemide  40 mg Intravenous BID     levothyroxine  112 mcg Oral QAM AC     lidocaine  1 patch Transdermal Q24H     lidocaine   Transdermal Q8H     magnesium oxide  400 mg Oral Daily with lunch     mycophenolate  750 mg Oral BID IS     pantoprazole  40 mg Oral Daily     [START ON 9/6/2020] predniSONE  60 mg Oral Daily    Followed by     [START ON 9/8/2020] predniSONE  40 mg Oral Daily    Followed by     [START ON 9/10/2020] predniSONE  20 mg Oral Daily    Followed by     [START ON 9/17/2020] predniSONE  15 mg Oral Daily    Followed by     [START ON 9/24/2020] predniSONE  10 mg Oral Daily    Followed by     [START ON 10/1/2020] predniSONE  5 mg Oral Daily     senna-docusate  1 tablet Oral BID    Or     senna-docusate  2 tablet Oral BID     sodium chloride (PF)  10 mL Intracatheter Q8H     sulfamethoxazole-trimethoprim  1 tablet Oral Once per day on Mon Wed Fri     tacrolimus  1.5 mg Oral BID IS     [START ON 9/7/2020] valGANciclovir  450 mg Oral Once per day on Mon Thu       Physical Exam:     Admit Weight: 57 kg (125 lb 11.2 oz)    Current vitals:   /71 (BP Location: Right arm)   Pulse 91   Temp 98.5  F (36.9  C) (Oral)   " Resp 16   Ht 1.499 m (4' 11\")   Wt 62.3 kg (137 lb 4.8 oz)   SpO2 97%   BMI 27.73 kg/m      Vital sign ranges:    Temp:  [97.7  F (36.5  C)-98.8  F (37.1  C)] 98.5  F (36.9  C)  Pulse:  [] 91  Resp:  [12-18] 16  BP: (121-152)/(60-77) 124/71  SpO2:  [97 %-99 %] 97 %    General Appearance: in no apparent distress.   Skin: Warm, perfused  Heart: NSR  Lungs: Unlabored  Abdomen: The abdomen is mildly distended, tympanic, non-tender, incision c/d/i with staples in place. No erythema, edema or other signs of superficial infection  : andre is present.  Urine is yellow.  Extremities: edema: none, strength 5/5  Neurologic: A&Ox4    Data:   CMP  Recent Labs   Lab 09/05/20  0535 09/05/20  0203  09/04/20  1012  09/04/20  0212 09/03/20  2359 09/03/20  2242 09/03/20  1756     --   --   --   --  137 137 139 135   POTASSIUM 3.3* 3.4   < >  --    < > 4.0 3.6 3.3* 3.3*   CHLORIDE 106  --   --   --   --  101  --   --  97   CO2 25  --   --   --   --  22  --   --  29   *  --   --   --   --  144* 120* 117* 118*   BUN 44*  --   --   --   --  42*  --   --  45*   CR 3.43*  --   --   --   --  6.07*  --   --  6.78*   GFRESTIMATED 13*  --   --   --   --  7*  --   --  6*   GFRESTBLACK 15*  --   --   --   --  8*  --   --  7*   EVELIO 8.2*  --   --   --   --  8.8  --   --  9.7   ICAW  --   --   --   --   --   --  5.7* 4.4  --    MAG 2.4*  --   --   --   --  2.5*  --   --   --    PHOS 4.7*  --   --   --   --  4.4  --   --   --    ALBUMIN  --   --   --  2.3*  --   --   --   --  3.5   BILITOTAL  --   --   --  1.1  --   --   --   --  0.4   ALKPHOS  --   --   --  60  --   --   --   --  102   AST  --   --   --  38  --   --   --   --  34   ALT  --   --   --  41  --   --   --   --  52*    < > = values in this interval not displayed.     CBC  Recent Labs   Lab 09/05/20  0535 09/05/20  0203  09/04/20  0212  09/03/20  1756   HGB 7.3* 8.7*   < > 8.6*   < > 9.8*   WBC 13.9*  --   --  7.7  --  7.8   PLT 96*  --   --  139*  --  249   A1C  " --   --   --   --   --  5.4    < > = values in this interval not displayed.     Renal Transplant ultrasound 9/4/20                                                               IMPRESSION:  1. Normal grayscale evaluation of the right lower quadrant transplant  kidney. There is a 5.0 x 2.1 x 8.7 cm peritransplant fluid collection  at the superior renal pole.  2. Mildly elevated resistive indices and velocities throughout the  transplant arterial vasculature. Findings are likely secondary to post  transplant edema. Attention on short-term follow-up.    Abdominal ultrasound 9/4/20  Impression:   1.  Very poor sonographic visualization. Nonvisualization of numerous  vascular structures including main portal vein, hepatic veins and main  hepatic artery. There does appear to be some normal antegrade flow in  portions of visualized right and left portal veins and flow within  portions of visualized right and left hepatic arteries.  2.  Hepatic steatosis suspected. Limited evaluation for any focal  liver lesions.  3.  Layering sludge in the gallbladder suspected.

## 2020-09-05 NOTE — PROVIDER NOTIFICATION
Notified Intern at 0840 AM regarding critical results read back.      Spoke with: Dr. Peres, Kidney Intern  Orders pending.    Comments: Pt had lactic of 3.9, most recent of serial lactic levels.  RRT called and Kidney Intern informed.  RN present at bedside, frequent VS initiated.

## 2020-09-06 VITALS
DIASTOLIC BLOOD PRESSURE: 78 MMHG | HEART RATE: 87 BPM | SYSTOLIC BLOOD PRESSURE: 141 MMHG | BODY MASS INDEX: 28.13 KG/M2 | OXYGEN SATURATION: 95 % | RESPIRATION RATE: 16 BRPM | WEIGHT: 139.55 LBS | HEIGHT: 59 IN | TEMPERATURE: 98.1 F

## 2020-09-06 PROBLEM — Z76.82 KIDNEY TRANSPLANT CANDIDATE: Status: RESOLVED | Noted: 2020-09-03 | Resolved: 2020-09-06

## 2020-09-06 PROBLEM — D64.9 ACUTE ANEMIA: Status: ACTIVE | Noted: 2020-09-06

## 2020-09-06 PROBLEM — R79.89 ELEVATED LACTIC ACID LEVEL: Status: ACTIVE | Noted: 2020-09-06

## 2020-09-06 LAB
ABO + RH BLD: NORMAL
ABO + RH BLD: NORMAL
ANION GAP SERPL CALCULATED.3IONS-SCNC: 7 MMOL/L (ref 3–14)
BASOPHILS # BLD AUTO: 0 10E9/L (ref 0–0.2)
BASOPHILS NFR BLD AUTO: 0.1 %
BLD GP AB SCN SERPL QL: NORMAL
BLD PROD TYP BPU: NORMAL
BLD PROD TYP BPU: NORMAL
BLD UNIT ID BPU: 0
BLOOD BANK CMNT PATIENT-IMP: NORMAL
BLOOD PRODUCT CODE: NORMAL
BPU ID: NORMAL
BUN SERPL-MCNC: 40 MG/DL (ref 7–30)
CALCIUM SERPL-MCNC: 8.3 MG/DL (ref 8.5–10.1)
CHLORIDE SERPL-SCNC: 109 MMOL/L (ref 94–109)
CO2 SERPL-SCNC: 26 MMOL/L (ref 20–32)
CREAT SERPL-MCNC: 2.06 MG/DL (ref 0.52–1.04)
DIFFERENTIAL METHOD BLD: ABNORMAL
EOSINOPHIL # BLD AUTO: 0 10E9/L (ref 0–0.7)
EOSINOPHIL NFR BLD AUTO: 0 %
ERYTHROCYTE [DISTWIDTH] IN BLOOD BY AUTOMATED COUNT: 17.2 % (ref 10–15)
GFR SERPL CREATININE-BSD FRML MDRD: 24 ML/MIN/{1.73_M2}
GLUCOSE SERPL-MCNC: 156 MG/DL (ref 70–99)
HCT VFR BLD AUTO: 21.1 % (ref 35–47)
HGB BLD-MCNC: 6.9 G/DL (ref 11.7–15.7)
HGB BLD-MCNC: 7.1 G/DL (ref 11.7–15.7)
HGB BLD-MCNC: 8.2 G/DL (ref 11.7–15.7)
IMM GRANULOCYTES # BLD: 0.2 10E9/L (ref 0–0.4)
IMM GRANULOCYTES NFR BLD: 1.8 %
LACTATE BLD-SCNC: 1.8 MMOL/L (ref 0.7–2)
LACTATE BLD-SCNC: 2.1 MMOL/L (ref 0.7–2)
LACTATE BLD-SCNC: 2.9 MMOL/L (ref 0.7–2)
LYMPHOCYTES # BLD AUTO: 0.2 10E9/L (ref 0.8–5.3)
LYMPHOCYTES NFR BLD AUTO: 1.8 %
MAGNESIUM SERPL-MCNC: 2.4 MG/DL (ref 1.6–2.3)
MCH RBC QN AUTO: 33.8 PG (ref 26.5–33)
MCHC RBC AUTO-ENTMCNC: 32.7 G/DL (ref 31.5–36.5)
MCV RBC AUTO: 103 FL (ref 78–100)
MONOCYTES # BLD AUTO: 0.6 10E9/L (ref 0–1.3)
MONOCYTES NFR BLD AUTO: 6.1 %
NEUTROPHILS # BLD AUTO: 8.9 10E9/L (ref 1.6–8.3)
NEUTROPHILS NFR BLD AUTO: 90.2 %
NRBC # BLD AUTO: 0 10*3/UL
NRBC BLD AUTO-RTO: 0 /100
NUM BPU REQUESTED: 2
PHOSPHATE SERPL-MCNC: 3 MG/DL (ref 2.5–4.5)
PLATELET # BLD AUTO: 92 10E9/L (ref 150–450)
POTASSIUM SERPL-SCNC: 3 MMOL/L (ref 3.4–5.3)
RBC # BLD AUTO: 2.04 10E12/L (ref 3.8–5.2)
SODIUM SERPL-SCNC: 142 MMOL/L (ref 133–144)
SPECIMEN EXP DATE BLD: NORMAL
TACROLIMUS BLD-MCNC: <3 UG/L (ref 5–15)
TME LAST DOSE: ABNORMAL H
TRANSFUSION STATUS PATIENT QL: NORMAL
TRANSFUSION STATUS PATIENT QL: NORMAL
WBC # BLD AUTO: 9.9 10E9/L (ref 4–11)

## 2020-09-06 PROCEDURE — 25000132 ZZH RX MED GY IP 250 OP 250 PS 637: Mod: GY | Performed by: SURGERY

## 2020-09-06 PROCEDURE — 86923 COMPATIBILITY TEST ELECTRIC: CPT | Performed by: STUDENT IN AN ORGANIZED HEALTH CARE EDUCATION/TRAINING PROGRAM

## 2020-09-06 PROCEDURE — P9016 RBC LEUKOCYTES REDUCED: HCPCS | Performed by: STUDENT IN AN ORGANIZED HEALTH CARE EDUCATION/TRAINING PROGRAM

## 2020-09-06 PROCEDURE — 25000132 ZZH RX MED GY IP 250 OP 250 PS 637: Mod: GY

## 2020-09-06 PROCEDURE — 85025 COMPLETE CBC W/AUTO DIFF WBC: CPT | Performed by: SURGERY

## 2020-09-06 PROCEDURE — 25000128 H RX IP 250 OP 636: Performed by: SURGERY

## 2020-09-06 PROCEDURE — 25000132 ZZH RX MED GY IP 250 OP 250 PS 637: Mod: GY | Performed by: STUDENT IN AN ORGANIZED HEALTH CARE EDUCATION/TRAINING PROGRAM

## 2020-09-06 PROCEDURE — 85018 HEMOGLOBIN: CPT | Performed by: STUDENT IN AN ORGANIZED HEALTH CARE EDUCATION/TRAINING PROGRAM

## 2020-09-06 PROCEDURE — 25000131 ZZH RX MED GY IP 250 OP 636 PS 637: Mod: GY

## 2020-09-06 PROCEDURE — 36415 COLL VENOUS BLD VENIPUNCTURE: CPT

## 2020-09-06 PROCEDURE — 86900 BLOOD TYPING SEROLOGIC ABO: CPT | Performed by: STUDENT IN AN ORGANIZED HEALTH CARE EDUCATION/TRAINING PROGRAM

## 2020-09-06 PROCEDURE — 25000132 ZZH RX MED GY IP 250 OP 250 PS 637: Mod: GY | Performed by: NURSE PRACTITIONER

## 2020-09-06 PROCEDURE — 25000131 ZZH RX MED GY IP 250 OP 636 PS 637: Mod: GY | Performed by: SURGERY

## 2020-09-06 PROCEDURE — 80197 ASSAY OF TACROLIMUS: CPT | Performed by: SURGERY

## 2020-09-06 PROCEDURE — 86850 RBC ANTIBODY SCREEN: CPT | Performed by: STUDENT IN AN ORGANIZED HEALTH CARE EDUCATION/TRAINING PROGRAM

## 2020-09-06 PROCEDURE — 36415 COLL VENOUS BLD VENIPUNCTURE: CPT | Performed by: SURGERY

## 2020-09-06 PROCEDURE — 83735 ASSAY OF MAGNESIUM: CPT | Performed by: SURGERY

## 2020-09-06 PROCEDURE — 36592 COLLECT BLOOD FROM PICC: CPT | Performed by: STUDENT IN AN ORGANIZED HEALTH CARE EDUCATION/TRAINING PROGRAM

## 2020-09-06 PROCEDURE — 80048 BASIC METABOLIC PNL TOTAL CA: CPT | Performed by: SURGERY

## 2020-09-06 PROCEDURE — 25000131 ZZH RX MED GY IP 250 OP 636 PS 637: Mod: GY | Performed by: NURSE PRACTITIONER

## 2020-09-06 PROCEDURE — 36592 COLLECT BLOOD FROM PICC: CPT

## 2020-09-06 PROCEDURE — 86901 BLOOD TYPING SEROLOGIC RH(D): CPT | Performed by: STUDENT IN AN ORGANIZED HEALTH CARE EDUCATION/TRAINING PROGRAM

## 2020-09-06 PROCEDURE — 84100 ASSAY OF PHOSPHORUS: CPT | Performed by: SURGERY

## 2020-09-06 PROCEDURE — 83605 ASSAY OF LACTIC ACID: CPT

## 2020-09-06 PROCEDURE — 85018 HEMOGLOBIN: CPT

## 2020-09-06 RX ORDER — PREDNISONE 5 MG/1
5 TABLET ORAL DAILY
Qty: 7 TABLET | Refills: 0 | Status: SHIPPED | OUTPATIENT
Start: 2020-10-01 | End: 2020-09-06

## 2020-09-06 RX ORDER — LIDOCAINE 4 G/G
1 PATCH TOPICAL EVERY 24 HOURS
Qty: 7 PATCH | Refills: 0 | Status: SHIPPED | OUTPATIENT
Start: 2020-09-06 | End: 2020-09-14

## 2020-09-06 RX ORDER — MAGNESIUM OXIDE 400 MG/1
400 TABLET ORAL
Qty: 30 TABLET | Refills: 5 | Status: SHIPPED | OUTPATIENT
Start: 2020-09-06 | End: 2020-09-07

## 2020-09-06 RX ORDER — PREDNISONE 20 MG/1
40 TABLET ORAL DAILY
Qty: 4 TABLET | Refills: 0 | Status: SHIPPED | OUTPATIENT
Start: 2020-09-08 | End: 2020-09-06

## 2020-09-06 RX ORDER — VALGANCICLOVIR 450 MG/1
TABLET, FILM COATED ORAL
Qty: 60 TABLET | Refills: 2 | Status: SHIPPED | OUTPATIENT
Start: 2020-09-06 | End: 2020-09-07

## 2020-09-06 RX ORDER — POTASSIUM CHLORIDE 750 MG/1
40 TABLET, EXTENDED RELEASE ORAL ONCE
Status: COMPLETED | OUTPATIENT
Start: 2020-09-06 | End: 2020-09-06

## 2020-09-06 RX ORDER — PREDNISONE 5 MG/1
15 TABLET ORAL DAILY
Qty: 21 TABLET | Refills: 0 | Status: SHIPPED | OUTPATIENT
Start: 2020-09-17 | End: 2020-09-06

## 2020-09-06 RX ORDER — PREDNISONE 10 MG/1
10 TABLET ORAL DAILY
Qty: 7 TABLET | Refills: 0 | Status: SHIPPED | OUTPATIENT
Start: 2020-09-24 | End: 2020-09-06

## 2020-09-06 RX ORDER — PREDNISONE 20 MG/1
20 TABLET ORAL DAILY
Qty: 7 TABLET | Refills: 0 | Status: SHIPPED | OUTPATIENT
Start: 2020-09-10 | End: 2020-09-06

## 2020-09-06 RX ORDER — BISACODYL 10 MG
10 SUPPOSITORY, RECTAL RECTAL DAILY PRN
Status: DISCONTINUED | OUTPATIENT
Start: 2020-09-06 | End: 2020-09-06 | Stop reason: HOSPADM

## 2020-09-06 RX ORDER — PREDNISONE 10 MG/1
TABLET ORAL
Qty: 49 TABLET | Refills: 0 | Status: SHIPPED | OUTPATIENT
Start: 2020-09-06 | End: 2020-10-26

## 2020-09-06 RX ORDER — TACROLIMUS 1 MG/1
2 CAPSULE ORAL 2 TIMES DAILY
Qty: 120 CAPSULE | Refills: 11 | Status: SHIPPED | OUTPATIENT
Start: 2020-09-06 | End: 2020-09-07

## 2020-09-06 RX ORDER — MYCOPHENOLATE MOFETIL 250 MG/1
750 CAPSULE ORAL 2 TIMES DAILY
Qty: 180 CAPSULE | Refills: 11 | Status: SHIPPED | OUTPATIENT
Start: 2020-09-06 | End: 2020-09-07

## 2020-09-06 RX ORDER — TACROLIMUS 1 MG/1
2 CAPSULE ORAL
Status: DISCONTINUED | OUTPATIENT
Start: 2020-09-06 | End: 2020-09-06 | Stop reason: HOSPADM

## 2020-09-06 RX ORDER — ONDANSETRON 4 MG/1
4 TABLET, ORALLY DISINTEGRATING ORAL EVERY 6 HOURS PRN
Qty: 5 TABLET | Refills: 0 | Status: SHIPPED | OUTPATIENT
Start: 2020-09-06 | End: 2020-10-26

## 2020-09-06 RX ORDER — AMOXICILLIN 250 MG
1 CAPSULE ORAL 2 TIMES DAILY
Qty: 60 TABLET | Refills: 0 | Status: SHIPPED | OUTPATIENT
Start: 2020-09-06 | End: 2020-10-26

## 2020-09-06 RX ORDER — ACETAMINOPHEN 325 MG/1
650 TABLET ORAL EVERY 4 HOURS PRN
Qty: 1 BOTTLE | Refills: 0 | Status: SHIPPED | OUTPATIENT
Start: 2020-09-07

## 2020-09-06 RX ORDER — ACETAMINOPHEN 325 MG/1
325 TABLET ORAL ONCE
Status: COMPLETED | OUTPATIENT
Start: 2020-09-06 | End: 2020-09-06

## 2020-09-06 RX ORDER — ATORVASTATIN CALCIUM 40 MG/1
20 TABLET, FILM COATED ORAL DAILY
Start: 2020-09-06 | End: 2023-11-01

## 2020-09-06 RX ORDER — TACROLIMUS 0.5 MG/1
CAPSULE ORAL
Qty: 30 CAPSULE | Refills: 0 | Status: SHIPPED | OUTPATIENT
Start: 2020-09-06 | End: 2020-09-18

## 2020-09-06 RX ORDER — SULFAMETHOXAZOLE AND TRIMETHOPRIM 400; 80 MG/1; MG/1
1 TABLET ORAL DAILY
Qty: 30 TABLET | Refills: 11 | Status: SHIPPED | OUTPATIENT
Start: 2020-09-06 | End: 2020-09-07

## 2020-09-06 RX ORDER — OXYCODONE HYDROCHLORIDE 5 MG/1
5 TABLET ORAL EVERY 4 HOURS PRN
Qty: 16 TABLET | Refills: 0 | Status: SHIPPED | OUTPATIENT
Start: 2020-09-06 | End: 2020-09-09

## 2020-09-06 RX ORDER — PREDNISONE 20 MG/1
60 TABLET ORAL DAILY
Qty: 3 TABLET | Refills: 0 | Status: SHIPPED | OUTPATIENT
Start: 2020-09-07 | End: 2020-09-06

## 2020-09-06 RX ADMIN — OXYCODONE HYDROCHLORIDE 5 MG: 5 TABLET ORAL at 05:47

## 2020-09-06 RX ADMIN — FUROSEMIDE 40 MG: 10 INJECTION, SOLUTION INTRAVENOUS at 08:06

## 2020-09-06 RX ADMIN — OXYCODONE HYDROCHLORIDE 5 MG: 5 TABLET ORAL at 14:07

## 2020-09-06 RX ADMIN — TACROLIMUS 1.5 MG: 1 CAPSULE ORAL at 08:08

## 2020-09-06 RX ADMIN — BISACODYL 10 MG: 10 SUPPOSITORY RECTAL at 10:00

## 2020-09-06 RX ADMIN — DOCUSATE SODIUM 50 MG AND SENNOSIDES 8.6 MG 2 TABLET: 8.6; 5 TABLET, FILM COATED ORAL at 08:07

## 2020-09-06 RX ADMIN — ACETAMINOPHEN 650 MG: 325 TABLET, FILM COATED ORAL at 13:17

## 2020-09-06 RX ADMIN — POTASSIUM CHLORIDE 40 MEQ: 750 TABLET, EXTENDED RELEASE ORAL at 08:07

## 2020-09-06 RX ADMIN — PREDNISONE 60 MG: 20 TABLET ORAL at 08:07

## 2020-09-06 RX ADMIN — PANTOPRAZOLE SODIUM 40 MG: 40 TABLET, DELAYED RELEASE ORAL at 08:08

## 2020-09-06 RX ADMIN — LIDOCAINE 1 PATCH: 560 PATCH PERCUTANEOUS; TOPICAL; TRANSDERMAL at 08:08

## 2020-09-06 RX ADMIN — OXYCODONE HYDROCHLORIDE 5 MG: 5 TABLET ORAL at 01:33

## 2020-09-06 RX ADMIN — LEVOTHYROXINE SODIUM 112 MCG: 0.11 TABLET ORAL at 08:08

## 2020-09-06 RX ADMIN — ATORVASTATIN CALCIUM 20 MG: 20 TABLET, FILM COATED ORAL at 08:07

## 2020-09-06 RX ADMIN — OXYCODONE HYDROCHLORIDE 5 MG: 5 TABLET ORAL at 10:06

## 2020-09-06 RX ADMIN — ASPIRIN 81 MG: 81 TABLET, COATED ORAL at 08:07

## 2020-09-06 RX ADMIN — ACETAMINOPHEN 975 MG: 325 TABLET, FILM COATED ORAL at 05:47

## 2020-09-06 RX ADMIN — MYCOPHENOLATE MOFETIL 750 MG: 250 CAPSULE ORAL at 08:08

## 2020-09-06 RX ADMIN — MAGNESIUM OXIDE 400 MG: 400 TABLET ORAL at 13:18

## 2020-09-06 RX ADMIN — ACETAMINOPHEN 325 MG: 325 TABLET, FILM COATED ORAL at 00:47

## 2020-09-06 ASSESSMENT — ACTIVITIES OF DAILY LIVING (ADL)
ADLS_ACUITY_SCORE: 20
ADLS_ACUITY_SCORE: 18
ADLS_ACUITY_SCORE: 20

## 2020-09-06 NOTE — PLAN OF CARE
"5538-6724  BP (!) 146/70 (BP Location: Right arm)   Pulse 91   Temp 98.4  F (36.9  C) (Oral)   Resp 16   Ht 1.499 m (4' 11\")   Wt 63.3 kg (139 lb 8.8 oz)   SpO2 95%   BMI 28.19 kg/m    VSS on RA. Pt  Received one unit of blood. Denied nausea, regular diet. Pt has internal jugular that has blood infusing in white lumen, others SL. PIV removed this shift due to pt discharging after blood. Pt voiding on own. Pt received PRN suppository and a tap water enema thi shift. PT has had 3 BMS since.   Pt us SBA. . Pt incision CDI and approximated with staples. Plan ofr dischage after blood and internal jugular removal.   "

## 2020-09-06 NOTE — PLAN OF CARE
"BP (!) 156/85 (BP Location: Right arm)   Pulse 95   Temp 98.2  F (36.8  C) (Oral)   Resp 16   Ht 1.499 m (4' 11\")   Wt 63.3 kg (139 lb 8.8 oz)   SpO2 97%   BMI 28.19 kg/m      9848-5666  Hypertensive, did not meet parameters to DONOVAN sawyer on RA. A+Ox4. Plata pulled at 6am, good UOP prior to removal. Pt due to void @1200, hat in toilet. No BM since surgery, +BS and is passing gas. Pt tried multiple times overnight to have a BM with no success. Sticky note placed to team.  Regular diet. Incisional pain controlled with scheduled Tylenol, prn Tylenol and oxy x2. R triple lumen internal jugular w/D5NS @50ml/hr, other lumens SL. Abdominal incision derma bonded, open to air. Incision from PD cath removal also derma bonded. L fistula +bruit and thrill, no dressing. Lactic checks q4, 2.1 and 1.8 overnight. Rapid response to be called for lactic greater than 4 . Hemoglobin 6.9 (team notified of critical value), 7.1 on recheck. Will continue to monitor and notify team with changes.     "

## 2020-09-06 NOTE — DISCHARGE SUMMARY
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 Made some urine prior to admission. PMH also significant for atrophic right kidney, MGUS, HTN, Hep B core antibody positive, and recent hypomanic episode. S/p  donor kidney transplant and removal of PD catheter on 2020.    Hospital Course   Kidney transplant: Left kidney from DBD donor transplanted to R groin without stent and PD catheter removal 9/3/20. PRA 29%. Perinephric fluid collection noted on post-op US. Creatinine improved to 2.1 by day of discharge with good urine output.  Immunosuppression:  Induction immunosuppression with thymoglobulin 125mg (2.2mg/kg), basiliximab (dose given POD #1 and schedule outpatient for POD #5), and steroid taper. Maintenance immunosuppression with tacrolimus, prednisone taper, and mycophenolate. Tacrolimus goal level 8-10 (12 hour trough). Tacro dose increased on day of discharge. Infectious prophylaxis with valganciclovir (x12 weeks) and bactrim (indefinitely).     Transplant coordinator: [phone]  Donor type: DBD  DSA at time of transplant:  None  Ureteral stent: Stent was removed intra-op, NO STENT  CMV:  Donor positive / Recipient positive  EBV:  Donor positive / Recipient positive  Thymoglobulin: 125mg (2.2mg/kg)  Basiliximab: Will receive 40mg total    Elevated lactate: Elevated lactate halie- and post-op to 7.1. Unclear etiology. VSS and completely asymptomatic aside from minor expected incisional pain. No sign of sepsis or hypoperfusion. No acute abdomen.  No hypoxia. Liver US was poor quality but showed forward flow and suspected steatosis. Mesenteric US was attempted but non diagnostic. Lactate trended down prior to discharge.     Anemia: Hgb 6.3 on POD1, received 1 unit of blood and responded appropriately. POD3 Hgb fell to 6.9 and she received one more unit prior to discharge. She remained hemodynamically stable with normal vitals. May be due in part to hemodilution as weight is up ~5kg.    History of HTN: Amlodipine currently on hold. BP controlled.    Hypokalemia: Secondary to lasix. Replaced prior to discharge.    Significant Results and Procedures    Procedure date:  Case start 9/3/20, complete 09/04/20    Preoperative diagnosis:  End Stage renal failure due to focal segmental glomerulosclerosis (FSGS)    Postoperative diagnosis:  Same    Procedure:  1. Left kidney transplant,  Donation after Brain Death, Right  iliac fossa, without vascular reconstruction. A J-J ureteral stent was not placed.  2. Kidney allograft preparation on Back Table  3. Removal of peritoneal dialysis catheter.       Surgeon: Heri Ribeiro MD       - Renal transplant ultrasound 9/4/20  IMPRESSION:   1. Normal grayscale evaluation of the right lower quadrant transplant  kidney. There is a 5.0 x 2.1 x 8.7 cm peritransplant fluid collection  at the superior renal pole.  2. Mildly elevated resistive indices and velocities throughout the  transplant arterial vasculature. Findings are likely secondary to post  transplant edema. Attention on short-term follow-up    Pending Results     Unresulted Labs Ordered in the Past 30 Days of this Admission     Date and Time Order Name Status Description    9/6/2020 0932 Hemoglobin In process           Code Status   Full    Primary Care Physician   Joann Canales    Physical Exam   Temp: 98.1  F (36.7  C) Temp src: Oral BP: 134/71 Pulse: 85   Resp: 16 SpO2: 97 % O2 Device: None (Room air)    Vitals:    09/03/20 1818 09/05/20 0207 09/05/20 2347   Weight:  57 kg (125 lb 11.2 oz) 62.3 kg (137 lb 4.8 oz) 63.3 kg (139 lb 8.8 oz)     Vital Signs with Ranges  Temp:  [98.1  F (36.7  C)-98.7  F (37.1  C)] 98.1  F (36.7  C)  Pulse:  [85-96] 89  Resp:  [16] 16  BP: (134-156)/() 135/101  SpO2:  [95 %-98 %] 95 %  I/O last 3 completed shifts:  In: 2625 [P.O.:1000; I.V.:1625]  Out: 2400 [Urine:2400]    General Appearance: in no apparent distress.   Skin: Warm, well perfused  Heart: Perfused, no cyanosis  Lungs: Unlabored on room air, no wheezes or cough  Abdomen: The abdomen is mildly distended, tympanic, appropriately tender around surgical site, incision c/d/i with dissolvable sutures and dermabond. No erythema, edema or other signs of superficial infection   : andre removed.   Extremities: edema: none, strength 5/5  Neurologic: A&Ox4    Time Spent on this Encounter   IGilda DO, personally saw the patient today and spent greater than 30 minutes discharging this patient.    Discharge Disposition   Discharged to home  Condition at discharge: Stable    Consultations This Hospital Stay   NEPHROLOGY KIDNEY/PANCREAS TRANSPLANT ADULT IP CONSULT  VASCULAR ACCESS CARE ADULT IP CONSULT  SOT MEDICATION HISTORY IP PHARMACY CONSULT  SOCIAL WORK IP CONSULT  PHARMACY IP CONSULT  NUTRITION SERVICES ADULT IP CONSULT  NEPHROLOGY KIDNEY/PANCREAS TRANSPLANT ADULT IP CONSULT    Discharge Orders      Home care nursing referral      MD face to face encounter    Documentation of Face to Face and Certification for Home Health Services    I certify that patient: Ethel Thompson is under my care and that I, or a nurse practitioner or physician's assistant working with me, had a face-to-face encounter that meets the physician face-to-face encounter requirements with this patient on: 9/6/20    This encounter with the patient was in whole, or in part, for the following medical condition, which is the primary reason for home health care: Kidney Transplant    I certify that, based on my findings,  the following services are medically necessary home health services: Nursing    Further, I certify that my clinical findings support that this patient is homebound (i.e. absences from home require considerable and taxing effort and are for medical reasons or [employer] services or infrequently or of short duration when for other reasons.    Based on the above findings. I certify that this patient is confined to the home and needs intermittent skilled nursing care, physical therapy and/or speech therapy.  The patient is under my care, and I have initiated the establishment of the plan of care.  This patient will be followed by a physician who will periodically review the plan of care.  Physician/Provider to provide follow up care: Joann Canales    Attending hospital physician (the Medicare certified CRISTHIAN provider):   Physician Signature: See electronic signature associated with these discharge orders.  Date: 9/6/2020     Reason for your hospital stay    Kidney replaced by transplant     Adult [facility] Follow-up and recommended labs and tests    1. [facility] ([address] [place] [address])  Starting Monday 9/7 at 8AM.  Please bring all medications, lab book, and medication card with you.  Do not take your morning dose of tacrolimus until after labs are drawn.     2. Labs daily in [facility] and then every Monday, Thursday: BMP, CBC, tacrolimus level. Magnesium and phosphorus weekly.  3. Dr. Ribeiro, Transplant Clinic, 1-2 weeks  4. Basiliximab infusion in [facility] on 9/8.     Activity    Your activity upon discharge: No lifting greater than 10 pounds for at least 6 weeks, no driving while taking narcotic pain medications, avoid bath tubs, hot tubs, and swimming for at least 3 weeks. Avoid gatherings with people that do not live in your house, wear a mask and social distance in public.     When to contact your care team    Notify your coordinator if you have pain over your kidney, fever  greater than 100F, redness or drainage at incision, or decreased urine output.    Notify your coordinator immediately if you are ever unable to take your immunosuppressive medications for any reason.    Transplant Coordinator [phone]     Wound care and dressings    Instructions to care for your wound at home: Keep your incision clean and dry. Wash daily with soap and water in the shower, but do not soak or scrub.     Diet    Follow this diet upon discharge: Regular diet. No undercooked meat or fish.     Discharge Medications   Current Discharge Medication List      START taking these medications    Details   acetaminophen (TYLENOL) 325 MG tablet Take 2 tablets (650 mg) by mouth every 4 hours as needed for pain  Qty: 1 Bottle, Refills: 0    Associated Diagnoses: Kidney replaced by transplant      Lidocaine (LIDOCARE) 4 % Patch Place 1 patch onto the skin every 24 hours To prevent lidocaine toxicity, patient should be patch free for 12 hrs daily.  Qty: 7 patch, Refills: 0    Associated Diagnoses: Kidney replaced by transplant      magnesium oxide (MAG-OX) 400 MG tablet Take 1 tablet (400 mg) by mouth daily (with lunch)  Qty: 30 tablet, Refills: 5    Associated Diagnoses: Kidney replaced by transplant      mycophenolate (GENERIC EQUIVALENT) 250 MG capsule Take 3 capsules (750 mg) by mouth 2 times daily  Qty: 180 capsule, Refills: 11    Associated Diagnoses: Kidney replaced by transplant      ondansetron (ZOFRAN-ODT) 4 MG ODT tab Take 1 tablet (4 mg) by mouth every 6 hours as needed for nausea or vomiting  Qty: 5 tablet, Refills: 0    Associated Diagnoses: Kidney replaced by transplant      oxyCODONE (ROXICODONE) 5 MG tablet Take 1 tablet (5 mg) by mouth every 4 hours as needed for moderate to severe pain  Qty: 16 tablet, Refills: 0    Associated Diagnoses: Kidney replaced by transplant      predniSONE (DELTASONE) 10 MG tablet On 9/7 take 60mg (6 tabs)  9/8-9/9 take 40mg (4 tabs daily)  9/10-9/16 take 20mg (2 tabs  daily)  9/17-9/23 take 15mg (1.5 tabs daily)  9/24-9/30 take 10mg (1 tab daily)  10/1-10/7 take 5mg (half tab daily)  Qty: 49 tablet, Refills: 0    Associated Diagnoses: Kidney replaced by transplant      senna-docusate (SENOKOT-S/PERICOLACE) 8.6-50 MG tablet Take 1 tablet by mouth 2 times daily Hold for loose stools  Qty: 60 tablet, Refills: 0    Associated Diagnoses: Kidney replaced by transplant      sulfamethoxazole-trimethoprim (BACTRIM) 400-80 MG tablet Take 1 tablet by mouth daily  Qty: 30 tablet, Refills: 11    Associated Diagnoses: Kidney replaced by transplant      tacrolimus (GENERIC EQUIVALENT) 0.5 MG capsule Take 1 cap by mouth twice daily as directed by Transplant Center for dose adjustment  Qty: 30 capsule, Refills: 0    Associated Diagnoses: Kidney replaced by transplant      tacrolimus (GENERIC EQUIVALENT) 1 MG capsule Take 2 capsules (2 mg) by mouth 2 times daily  Qty: 120 capsule, Refills: 11    Associated Diagnoses: Kidney replaced by transplant      valGANciclovir (VALCYTE) 450 MG tablet Take 1 tab every other day. Titrate dose up to a max of 2 tabs (900 mg) by mouth daily when directed by your transplant team.  Qty: 60 tablet, Refills: 2    Associated Diagnoses: Kidney replaced by transplant         CONTINUE these medications which have CHANGED    Details   atorvastatin (LIPITOR) 40 MG tablet Take 0.5 tablets (20 mg) by mouth daily    Associated Diagnoses: Kidney replaced by transplant         CONTINUE these medications which have NOT CHANGED    Details   aspirin 81 MG EC tablet Take 81 mg by mouth daily      famotidine (PEPCID) 20 MG tablet Take 20 mg by mouth every evening       fish oil-omega-3 fatty acids 1000 MG capsule Take 1 g by mouth daily       fluticasone (FLONASE) 50 MCG/ACT nasal spray Spray 1 spray into both nostrils 2 times daily as needed for rhinitis or allergies      levothyroxine (SYNTHROID/LEVOTHROID) 112 MCG tablet Take 112 mcg (1 tablet) by mouth every Monday, Tuesday,  Wednesday, Thursday, Friday.      melatonin 3 MG tablet Take 3 mg by mouth At Bedtime       Psyllium (METAMUCIL FIBER PO) Take one tablespoon by mouth per day every evening      vitamin B-12 (CYANOCOBALAMIN) 500 MCG tablet Take 500 mcg by mouth daily      vitamin B6 (PYRIDOXINE) 100 MG tablet Take 100 mg by mouth daily         STOP taking these medications       amLODIPine (NORVASC) 10 MG tablet Comments:   Reason for Stopping:         bumetanide (BUMEX) 1 MG tablet Comments:   Reason for Stopping:         calcitRIOL (ROCALTROL) 0.25 MCG capsule Comments:   Reason for Stopping:         sevelamer carbonate (RENVELA) 800 MG tablet Comments:   Reason for Stopping:         valACYclovir (VALTREX) 500 MG tablet Comments:   Reason for Stopping:             Allergies   Allergies   Allergen Reactions     Amoxicillin-Pot Clavulanate Nausea and Vomiting     Data   Most Recent 3 CBC's:  Recent Labs   Lab Test 09/06/20  0615 09/06/20  0430 09/05/20  0940 09/05/20 0535  09/04/20  0212   WBC  --  9.9  --  13.9*  --  7.7   HGB 7.1* 6.9* 8.0* 7.3*   < > 8.6*   MCV  --  103*  --  101*  --  102*   PLT  --  92*  --  96*  --  139*    < > = values in this interval not displayed.      Most Recent 3 BMP's:  Recent Labs   Lab Test 09/06/20  0430 09/05/20 0535 09/05/20  0203  09/04/20  0212    140  --   --  137   POTASSIUM 3.0* 3.3* 3.4   < > 4.0   CHLORIDE 109 106  --   --  101   CO2 26 25  --   --  22   BUN 40* 44*  --   --  42*   CR 2.06* 3.43*  --   --  6.07*   ANIONGAP 7 9  --   --  14   EVELIO 8.3* 8.2*  --   --  8.8   * 181*  --   --  144*    < > = values in this interval not displayed.     Most Recent 2 LFT's:  Recent Labs   Lab Test 09/05/20 0535 09/04/20  1012   AST 50* 38   ALT 52* 41   ALKPHOS 53 60   BILITOTAL 0.4 1.1     Renal transplant ultrasound 9/4/20  IMPRESSION:  1. Normal grayscale evaluation of the right lower quadrant transplant  kidney. There is a 5.0 x 2.1 x 8.7 cm peritransplant fluid collection  at the  superior renal pole.  2. Mildly elevated resistive indices and velocities throughout the  transplant arterial vasculature. Findings are likely secondary to post  transplant edema. Attention on short-term follow-up.    CXR 9/4/20  IMPRESSION:  1. Right transjugular central venous catheter tip projects at the mid  SVC.  2. Findings suggesting mild pulmonary edema and bibasilar atelectasis.  Infection is a less likely consideration.    Abdominal Ultrasound 9/4/20  Impression:   1.  Very poor sonographic visualization. Nonvisualization of numerous  vascular structures including main portal vein, hepatic veins and main  hepatic artery. There does appear to be some normal antegrade flow in  portions of visualized right and left portal veins and flow within  portions of visualized right and left hepatic arteries.  2.  Hepatic steatosis suspected. Limited evaluation for any focal  liver lesions.  3.  Layering sludge in the gallbladder suspected    I have reviewed history, examined patient and discussed plan with the fellow/resident/SHAHRIAR.    I concur with the findings in this note.    Time spent on discharge activities: 45 minutes.

## 2020-09-06 NOTE — PLAN OF CARE
"VS: BP (!) 151/65 (BP Location: Right arm)   Pulse 96   Temp 98.7  F (37.1  C) (Oral)   Resp 16   Ht 1.499 m (4' 11\")   Wt 62.3 kg (137 lb 4.8 oz)   SpO2 98%   BMI 27.73 kg/m      Current condition: Stable on RA  Neuro: WDL  Cardio: WDL  Respiratory: WDL  GI/: Voiding adequate amounts into andre. No BM but passing gas and positive bowel sounds.   Skin: WDL  Diet:   Regular.   Labs: Q4 Lactic's. Latest 3.2, Per team to call rapid if lactic over 4.   LDA:  RCVC triple lumen with d5NS at 50. RPIV saline locked. Left AV fistula, abd incisions derma bonded. Andre.   Mobility:  Up with stand by.   Pain: abd pain.   PRN medications:   Plan of Care: Will continue to monitor and assess for any changes.   "

## 2020-09-06 NOTE — PROGRESS NOTES
[facility], [place]  Transplant Nephrology Consult  Date of Admission:  9/3/2020  Today's Date: 09/06/2020  Requesting physician: Heri Ribeiro MD    Recommendations:  - renal function improving  - increase tac to 2 mg po bid, trough 9/8  - prednisone taper (s/p moderate risk induction: ATG, simulect)  -replete K  - further drop in H/H & lactic acidosis of unclear etiology  - consider repeat US kidney to monitor fluid collection      Assessment & Plan   # DDKT: Trend down    - Baseline Cr ~ not at baseline yet   - Proteinuria: +1.1 g/g on 9/5 (1.9 g/g preop)   - Date DSA Last Checked: Sep/2020      Latest DSA: No DSA at time of transplant   - BK Viremia: Not checked post transplant   - PD Catheter removed intraop   -Stent in place, remove 4-6 weeks post op    # Immunosuppression: Tacrolimus immediate release (goal 8-10), Mycophenolate mofetil (dose 750 mg every 12 hours) and Prednisone (dose taper)   - Changes: increase tac to 2 mg po bid, monitor trough on 9/8   -Moderate risk induction. S/p ATG, simulect POD1, 5, pred taper  # Infection Prophylaxis:   - PJP: Sulfa/TMP (Bactrim)  - CMV: Valcyte x3m, CMV IgG recipient +    # Hypertension: Controlled;  Goal BP: < 150/90   - Volume status: Mildly hypervolemic  EDW ~ 54kg   - Changes: Not at this time    # Elevated Blood Glucose: Glucose generally running ~ 120   - Management as per primary team.    # Anemia in Chronic Renal Disease: Hgb: Trend down      JHONATAN: No   - Iron studies: Unknown at this time, but checked with dialysis    -Hb downtrended, stable on repeat again today, lactate downtrending--                  # Mineral Bone Disorder:   - Calcium; level: Normal        On supplement: No  - Phosphorus; level: Normal        On supplement: No     # Lactic acidosis:    - likely hypoperfusion in the setting of blood loss anemia, improved with prbc    # FSGS:   -Recommend weekly UPCR Pre op UPCR  1.9g/g--->1.1 g/g on 9/5        #  Transplant History:  Etiology of Kidney Failure: Focal segmental glomerulosclerosis (FSGS)  Tx: DDKT  Transplant: 9/3/2020 (Kidney)  Donor Type: Donation after Brain Death Donor Class:   Crossmatch at time of Tx: negative  Significant changes in immunosuppression: None  Significant transplant-related complications: None    Recommendations were communicated to the primary team verbally.      Jordy Dewitt MD      Interval Hx  Patient feels ok, hemodynamically stable  Constipated, no nausea/vomiting, pain well controlled  No signs or sx's of bleed      Review of Systems    The 4 point Review of Systems is negative other than noted in the HPI or here.     Prior to Admission Medications     acetaminophen  975 mg Oral Q8H     aspirin  81 mg Oral Daily     atorvastatin  20 mg Oral Daily     [START ON 2020] basiliximab (SIMULECT) infusion  20 mg Intravenous Once     levothyroxine  112 mcg Oral QAM AC     lidocaine  1 patch Transdermal Q24H     lidocaine   Transdermal Q8H     magnesium oxide  400 mg Oral Daily with lunch     mycophenolate  750 mg Oral BID IS     pantoprazole  40 mg Oral Daily     predniSONE  60 mg Oral Daily    Followed by     [START ON 2020] predniSONE  40 mg Oral Daily    Followed by     [START ON 9/10/2020] predniSONE  20 mg Oral Daily    Followed by     [START ON 2020] predniSONE  15 mg Oral Daily    Followed by     [START ON 2020] predniSONE  10 mg Oral Daily    Followed by     [START ON 10/1/2020] predniSONE  5 mg Oral Daily     senna-docusate  1 tablet Oral BID    Or     senna-docusate  2 tablet Oral BID     sodium chloride (PF)  10 mL Intracatheter Q8H     sulfamethoxazole-trimethoprim  1 tablet Oral Once per day on      tacrolimus  2 mg Oral BID IS     [START ON 2020] valGANciclovir  450 mg Oral Once per day on u         Physical Exam   Temp  Av  F (36.7  C)  Min: 97.7  F (36.5  C)  Max: 98.8  F (37.1  C)      Pulse  Av.9  Min: 89  Max: 98 Resp   "Av  Min: 10  Max: 20  SpO2  Av.7 %  Min: 98 %  Max: 100 %    CVP (mmHg): 6 mmHgBP (!) (P) 142/80   Pulse (P) 86   Temp 98.1  F (36.7  C) (Oral)   Resp (P) 16   Ht 1.499 m (4' 11\")   Wt 63.3 kg (139 lb 8.8 oz)   SpO2 (P) 94%   BMI 28.19 kg/m     Date 20 07 - 20 0659   Shift 4678-7169 1929-6349 9415-6946 24 Hour Total   INTAKE   P.O. 300   300   I.V. 357.5   357.5   IV Piggyback 500   500   Shift Total(mL/kg) 1157.5(20.3)   1157.5(20.3)   OUTPUT   Urine 575   575   Shift Total(mL/kg) 575(10.08)   575(10.08)   Weight (kg) 57.02 57.02 57.02 57.02      Admit Weight: 57 kg (125 lb 11.2 oz)     GENERAL APPEARANCE: alert and no distress, facial puffiness  HENT: mouth without ulcers or lesions  LYMPHATICS: no cervical or supraclavicular nodes  RESP: lungs clear to auscultation - no rales, rhonchi or wheezes  CV: regular rhythm, normal rate, no rub, no murmur  EDEMA: no LE edema bilaterally  ABDOMEN: soft, nondistended, nontender, bowel sounds normal, RLQ allograft non tender  MS: extremities normal - no gross deformities noted, no evidence of inflammation in joints, no muscle tenderness. RLQ incision well approximated  SKIN: no rash  : andre in place    Data   CMP  Recent Labs   Lab 20  0430 20  0535 20  0203 20  2106  20  1012  20  0212 20  2359  20  1756    140  --   --   --   --   --  137 137   < > 135   POTASSIUM 3.0* 3.3* 3.4 3.3*   < >  --    < > 4.0 3.6   < > 3.3*   CHLORIDE 109 106  --   --   --   --   --  101  --   --  97   CO2 26 25  --   --   --   --   --  22  --   --  29   ANIONGAP 7 9  --   --   --   --   --  14  --   --  10   * 181*  --   --   --   --   --  144* 120*   < > 118*   BUN 40* 44*  --   --   --   --   --  42*  --   --  45*   CR 2.06* 3.43*  --   --   --   --   --  6.07*  --   --  6.78*   GFRESTIMATED 24* 13*  --   --   --   --   --  7*  --   --  6*   GFRESTBLACK 28* 15*  --   --   --   --   --  8*  --   --  " 7*   EVELIO 8.3* 8.2*  --   --   --   --   --  8.8  --   --  9.7   MAG 2.4* 2.4*  --   --   --   --   --  2.5*  --   --   --    PHOS 3.0 4.7*  --   --   --   --   --  4.4  --   --   --    PROTTOTAL  --  5.4*  --   --   --  5.2*  --   --   --   --  7.6   ALBUMIN  --  2.8*  --   --   --  2.3*  --   --   --   --  3.5   BILITOTAL  --  0.4  --   --   --  1.1  --   --   --   --  0.4   ALKPHOS  --  53  --   --   --  60  --   --   --   --  102   AST  --  50*  --   --   --  38  --   --   --   --  34   ALT  --  52*  --   --   --  41  --   --   --   --  52*    < > = values in this interval not displayed.     CBC  Recent Labs   Lab 09/06/20  1217 09/06/20  0615 09/06/20  0430 09/05/20  0940 09/05/20  0535  09/04/20  0212  09/03/20  1756   HGB 8.2* 7.1* 6.9* 8.0* 7.3*   < > 8.6*   < > 9.8*   WBC  --   --  9.9  --  13.9*  --  7.7  --  7.8   RBC  --   --  2.04*  --  2.19*  --  2.52*  --  2.80*   HCT  --   --  21.1*  --  22.0*  --  25.8*  --  30.1*   MCV  --   --  103*  --  101*  --  102*  --  108*   MCH  --   --  33.8*  --  33.3*  --  34.1*  --  35.0*   MCHC  --   --  32.7  --  33.2  --  33.3  --  32.6   RDW  --   --  17.2*  --  17.2*  --  16.6*  --  13.2   PLT  --   --  92*  --  96*  --  139*  --  249    < > = values in this interval not displayed.     INR  Recent Labs   Lab 09/04/20  1544 09/03/20  1756   INR 1.08 0.89   PTT 29 28     ABG  Recent Labs   Lab 09/04/20  1118 09/03/20  2359 09/03/20  2242   PH 7.33*  --   --    PCO2 43  --   --    PO2 31*  --   --    HCO3 23  --   --    O2PER REPORT AMENDED: 33.0 30.0      Urine Studies  Recent Labs   Lab Test 09/03/20  1905 01/28/19  1334   COLOR Yellow Yellow   APPEARANCE Clear Clear   URINEGLC Negative Negative   URINEBILI Negative Negative   URINEKETONE Negative Negative   SG 1.008 1.013   UBLD Small* Negative   URINEPH 6.5 5.0   PROTEIN 30* >499*   NITRITE Negative Negative   LEUKEST Negative Trace*   RBCU <1 0   WBCU <1 4     Recent Labs   Lab Test 09/05/20 0930 09/03/20 1905    UTPG 1.17* 1.92*     PTH  No lab results found.  Iron Studies  No lab results found.    IMAGING:  All imaging studies reviewed by me.

## 2020-09-06 NOTE — PROGRESS NOTES
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 Made some urine prior to admission. PMH also significant for atrophic right kidney, MGUS, HTN, Hep B core antibody positive, and recent hypomanic episode. S/p  donor kidney transplant with ureteral stent and removal of PD catheter on 2020.    Graft function: POD #3, Cr 2.06, acceptable UOP.   Immunosuppression management: Induction with thymoglobulin, basiliximab, and steroid pulse. PRA 29%.  Thymo: 125mg, 2.2mg/kg completed.  Basiliximab: Received first dose POD1, will receive next POD 5 (ordered).  Methylpred:  500mg halie-op, 250 POD1, 100mg POD2. Start taper today (20) 60mgx2 days, 40mg x2 days, then 20mg x7days  MMF: 750mg BID  Tacro: Goal level 8-10, 1.5mg BID started 20 evening, increased to 2.0 BID today. Tac level <3 this AM. Redraw 48 hours.  Complexity of management:High. Contributing factors: induction.  Hematology:   Anemia: Received 1u pRBC 20 for Hb 6.3, responded appropriately. This AM Hb 6.9 then 7.1 on recheck, 1u of blood this afternoon per patient request. Will continue to monitor Hb in outpatient follow up labs  Cardiorespiratory:   Hx HTN: Antihypertensives currently on hold. Monitor. sBP 130's.  GI/Nutrition:   Diet: Regular diet  Bowel regimen:  Senna/docusate. Dulcolex suppository for constipation. Enema per pt. Request.  Endocrine:   Hx hypothyroidism: Continue synthroid.  Fluid/Electrolytes: MIVF: discontinued. Hypervolemic, Lasix 40 IV this AM, then discontinue current BID dosing. K replaced this AM for K:3.0.  Elevated lactate: Starting halie-op, trended down since. 2.9 this AM. VSS and asymptomatic aside from expected incisional discomfort. No sign of sepsis or hypoperfusion of extremities. No acute abdomen. No hypoxia. Abdominal ultrasound 20 showed 5.0x2.1x8.7 halie transplant fluid collection and mildly elevated  RI and velocities likely related to post transplant edema. CK level was normal.   : Plata removed at 0600  Infectious disease: Afebrile  Prophylaxis: DVT, fall, GI, viral (Valcyte), pneumocystis (Bactrim)  Disposition: 7A. Home later today.    Medical Decision Making: Medium  Subsequent visit 35613 (moderate level decision making)    SHAHRIAR/Fellow/Resident Provider: Gilda Peres DO, PGY1    Faculty: Heri Ribeiro M.D.  _________________________________________________________________  Transplant History:   9/3/2020 (Kidney), Postoperative day: 3     Interval History: History is obtained from the patient  Overnight events: Mild incisional pain. Mild abdominal distention, + flatus, feels constipated, suppository this AM has not helped yet. Asymptomatic despite low Hb but requesting to get a unit before she discharges to be sure. Denies nausea or vomiting. Tolerating diet. Ambulates well. Ready to go home following medication education, 1u of blood, and enema.    ROS:   A 10-point review of systems was negative except as noted above.    Meds:    acetaminophen  975 mg Oral Q8H     aspirin  81 mg Oral Daily     atorvastatin  20 mg Oral Daily     [START ON 9/8/2020] basiliximab (SIMULECT) infusion  20 mg Intravenous Once     furosemide  40 mg Intravenous BID     levothyroxine  112 mcg Oral QAM AC     lidocaine  1 patch Transdermal Q24H     lidocaine   Transdermal Q8H     magnesium oxide  400 mg Oral Daily with lunch     mycophenolate  750 mg Oral BID IS     pantoprazole  40 mg Oral Daily     potassium chloride  40 mEq Oral Once     predniSONE  60 mg Oral Daily    Followed by     [START ON 9/8/2020] predniSONE  40 mg Oral Daily    Followed by     [START ON 9/10/2020] predniSONE  20 mg Oral Daily    Followed by     [START ON 9/17/2020] predniSONE  15 mg Oral Daily    Followed by     [START ON 9/24/2020] predniSONE  10 mg Oral Daily    Followed by     [START ON 10/1/2020] predniSONE  5 mg Oral Daily     senna-docusate  1  "tablet Oral BID    Or     senna-docusate  2 tablet Oral BID     sodium chloride (PF)  10 mL Intracatheter Q8H     sulfamethoxazole-trimethoprim  1 tablet Oral Once per day on Mon Wed Fri     tacrolimus  1.5 mg Oral BID IS     [START ON 9/7/2020] valGANciclovir  450 mg Oral Once per day on Mon Thu       Physical Exam:     Admit Weight: 57 kg (125 lb 11.2 oz)    Current vitals:   /71 (BP Location: Right arm)   Pulse 85   Temp 98.1  F (36.7  C) (Oral)   Resp 16   Ht 1.499 m (4' 11\")   Wt 63.3 kg (139 lb 8.8 oz)   SpO2 97%   BMI 28.19 kg/m      Vital sign ranges:    Temp:  [98.1  F (36.7  C)-98.7  F (37.1  C)] 98.1  F (36.7  C)  Pulse:  [85-96] 85  Resp:  [16] 16  BP: (124-156)/(65-85) 134/71  SpO2:  [95 %-98 %] 97 %    General Appearance: in no apparent distress.   Skin: Warm, perfused  Heart: NSR  Lungs: Unlabored  Abdomen: The abdomen is mildly distended, tympanic, non-tender, incision c/d/i with staples in place. No erythema, edema or other signs of superficial infection  : andre removed.   Extremities: edema: none, strength 5/5  Neurologic: A&Ox4    Data:   CMP  Recent Labs   Lab 09/06/20  0430 09/05/20  0535  09/04/20  1012  09/03/20  2359 09/03/20  2242    140  --   --    < > 137 139   POTASSIUM 3.0* 3.3*   < >  --    < > 3.6 3.3*   CHLORIDE 109 106  --   --    < >  --   --    CO2 26 25  --   --    < >  --   --    * 181*  --   --    < > 120* 117*   BUN 40* 44*  --   --    < >  --   --    CR 2.06* 3.43*  --   --    < >  --   --    GFRESTIMATED 24* 13*  --   --    < >  --   --    GFRESTBLACK 28* 15*  --   --    < >  --   --    EVELIO 8.3* 8.2*  --   --    < >  --   --    ICAW  --   --   --   --   --  5.7* 4.4   MAG 2.4* 2.4*  --   --    < >  --   --    PHOS 3.0 4.7*  --   --    < >  --   --    ALBUMIN  --  2.8*  --  2.3*  --   --   --    BILITOTAL  --  0.4  --  1.1  --   --   --    ALKPHOS  --  53  --  60  --   --   --    AST  --  50*  --  38  --   --   --    ALT  --  52*  --  41  --   --   " --     < > = values in this interval not displayed.     CBC  Recent Labs   Lab 09/06/20  0615 09/06/20  0430  09/05/20  0535  09/03/20  1756   HGB 7.1* 6.9*   < > 7.3*   < > 9.8*   WBC  --  9.9  --  13.9*   < > 7.8   PLT  --  92*  --  96*   < > 249   A1C  --   --   --   --   --  5.4    < > = values in this interval not displayed.     Renal Transplant ultrasound 9/4/20                                                               IMPRESSION:  1. Normal grayscale evaluation of the right lower quadrant transplant  kidney. There is a 5.0 x 2.1 x 8.7 cm peritransplant fluid collection  at the superior renal pole.  2. Mildly elevated resistive indices and velocities throughout the  transplant arterial vasculature. Findings are likely secondary to post  transplant edema. Attention on short-term follow-up.    Abdominal ultrasound 9/4/20  Impression:   1.  Very poor sonographic visualization. Nonvisualization of numerous  vascular structures including main portal vein, hepatic veins and main  hepatic artery. There does appear to be some normal antegrade flow in  portions of visualized right and left portal veins and flow within  portions of visualized right and left hepatic arteries.  2.  Hepatic steatosis suspected. Limited evaluation for any focal  liver lesions.  3.  Layering sludge in the gallbladder suspected.

## 2020-09-06 NOTE — DISCHARGE SUMMARY
DISCHARGE:  D: Patient with orders to discharge to home .   I: Discharge instructions, medications, & follow ups reviewed with Pt. And . Copy of discharge summary given to patient.  IJ removed. All belongings packed & sent with patient. Medications filled & picked up at discharge pharmacy. Paperwork faxed.   A: Patient in stable condition. AVSS. Patient had no further questions regarding discharge instructions and medications. Patient transferred out by wheelchair & left with .   P: Plan for discharge pharmacy then to home.

## 2020-09-07 ENCOUNTER — PATIENT OUTREACH (OUTPATIENT)
Dept: CARE COORDINATION | Facility: CLINIC | Age: 65
End: 2020-09-07

## 2020-09-07 ENCOUNTER — INFUSION THERAPY VISIT (OUTPATIENT)
Dept: INFUSION THERAPY | Facility: CLINIC | Age: 65
DRG: 652 | End: 2020-09-07
Attending: NURSE PRACTITIONER
Payer: MEDICARE

## 2020-09-07 ENCOUNTER — TELEPHONE (OUTPATIENT)
Dept: TRANSPLANT | Facility: CLINIC | Age: 65
End: 2020-09-07

## 2020-09-07 VITALS
SYSTOLIC BLOOD PRESSURE: 150 MMHG | TEMPERATURE: 98 F | BODY MASS INDEX: 28.18 KG/M2 | DIASTOLIC BLOOD PRESSURE: 79 MMHG | OXYGEN SATURATION: 98 % | WEIGHT: 139.5 LBS | HEART RATE: 97 BPM

## 2020-09-07 DIAGNOSIS — N18.6 END STAGE RENAL DISEASE (H): Primary | ICD-10-CM

## 2020-09-07 DIAGNOSIS — Z94.0 KIDNEY REPLACED BY TRANSPLANT: ICD-10-CM

## 2020-09-07 DIAGNOSIS — E83.39 HYPOPHOSPHATEMIA: Primary | ICD-10-CM

## 2020-09-07 LAB
ANION GAP SERPL CALCULATED.3IONS-SCNC: 7 MMOL/L (ref 3–14)
BASOPHILS # BLD AUTO: 0 10E9/L (ref 0–0.2)
BASOPHILS NFR BLD AUTO: 0.2 %
BUN SERPL-MCNC: 41 MG/DL (ref 7–30)
CALCIUM SERPL-MCNC: 8.9 MG/DL (ref 8.5–10.1)
CHLORIDE SERPL-SCNC: 108 MMOL/L (ref 94–109)
CO2 SERPL-SCNC: 26 MMOL/L (ref 20–32)
CREAT SERPL-MCNC: 1.48 MG/DL (ref 0.52–1.04)
DIFFERENTIAL METHOD BLD: ABNORMAL
EOSINOPHIL # BLD AUTO: 0 10E9/L (ref 0–0.7)
EOSINOPHIL NFR BLD AUTO: 0 %
ERYTHROCYTE [DISTWIDTH] IN BLOOD BY AUTOMATED COUNT: 16.2 % (ref 10–15)
GFR SERPL CREATININE-BSD FRML MDRD: 36 ML/MIN/{1.73_M2}
GLUCOSE SERPL-MCNC: 222 MG/DL (ref 70–99)
HCT VFR BLD AUTO: 31.1 % (ref 35–47)
HGB BLD-MCNC: 10.7 G/DL (ref 11.7–15.7)
IMM GRANULOCYTES # BLD: 0.3 10E9/L (ref 0–0.4)
IMM GRANULOCYTES NFR BLD: 2.8 %
LYMPHOCYTES # BLD AUTO: 0.2 10E9/L (ref 0.8–5.3)
LYMPHOCYTES NFR BLD AUTO: 1.8 %
MAGNESIUM SERPL-MCNC: 2.3 MG/DL (ref 1.6–2.3)
MCH RBC QN AUTO: 33.9 PG (ref 26.5–33)
MCHC RBC AUTO-ENTMCNC: 34.4 G/DL (ref 31.5–36.5)
MCV RBC AUTO: 98 FL (ref 78–100)
MONOCYTES # BLD AUTO: 0.4 10E9/L (ref 0–1.3)
MONOCYTES NFR BLD AUTO: 4.4 %
NEUTROPHILS # BLD AUTO: 8.9 10E9/L (ref 1.6–8.3)
NEUTROPHILS NFR BLD AUTO: 90.8 %
NRBC # BLD AUTO: 0 10*3/UL
NRBC BLD AUTO-RTO: 0 /100
PHOSPHATE SERPL-MCNC: 1.9 MG/DL (ref 2.5–4.5)
PLATELET # BLD AUTO: 134 10E9/L (ref 150–450)
POTASSIUM SERPL-SCNC: 3.6 MMOL/L (ref 3.4–5.3)
RBC # BLD AUTO: 3.16 10E12/L (ref 3.8–5.2)
SODIUM SERPL-SCNC: 141 MMOL/L (ref 133–144)
TACROLIMUS BLD-MCNC: <3 UG/L (ref 5–15)
TME LAST DOSE: ABNORMAL H
WBC # BLD AUTO: 9.8 10E9/L (ref 4–11)

## 2020-09-07 PROCEDURE — 83735 ASSAY OF MAGNESIUM: CPT | Performed by: NURSE PRACTITIONER

## 2020-09-07 PROCEDURE — 85025 COMPLETE CBC W/AUTO DIFF WBC: CPT | Performed by: NURSE PRACTITIONER

## 2020-09-07 PROCEDURE — 80048 BASIC METABOLIC PNL TOTAL CA: CPT | Performed by: NURSE PRACTITIONER

## 2020-09-07 PROCEDURE — 84100 ASSAY OF PHOSPHORUS: CPT | Performed by: NURSE PRACTITIONER

## 2020-09-07 PROCEDURE — 80197 ASSAY OF TACROLIMUS: CPT | Performed by: NURSE PRACTITIONER

## 2020-09-07 RX ORDER — MAGNESIUM OXIDE 400 MG/1
400 TABLET ORAL
Qty: 30 TABLET | Refills: 5 | Status: SHIPPED | OUTPATIENT
Start: 2020-09-07 | End: 2021-02-23

## 2020-09-07 RX ORDER — SULFAMETHOXAZOLE AND TRIMETHOPRIM 400; 80 MG/1; MG/1
1 TABLET ORAL DAILY
Qty: 30 TABLET | Refills: 11 | Status: SHIPPED | OUTPATIENT
Start: 2020-09-07 | End: 2021-01-27

## 2020-09-07 RX ORDER — VALGANCICLOVIR 450 MG/1
TABLET, FILM COATED ORAL
Qty: 60 TABLET | Refills: 2 | Status: SHIPPED | OUTPATIENT
Start: 2020-09-07 | End: 2020-09-18

## 2020-09-07 RX ORDER — MYCOPHENOLATE MOFETIL 250 MG/1
750 CAPSULE ORAL 2 TIMES DAILY
Qty: 180 CAPSULE | Refills: 11 | Status: SHIPPED | OUTPATIENT
Start: 2020-09-07 | End: 2020-09-18

## 2020-09-07 RX ORDER — TACROLIMUS 1 MG/1
4 CAPSULE ORAL 2 TIMES DAILY
Qty: 240 CAPSULE | Refills: 11 | Status: SHIPPED | OUTPATIENT
Start: 2020-09-07 | End: 2020-09-18

## 2020-09-07 NOTE — LETTER
"    9/7/2020         RE: Ethel Thompson  [address]  [place] [address]        Dear Colleague,    Thank you for referring your patient, Ethel Thompson, to the [facility]. Please see a copy of my visit note below.    Ethel Thompson came to [facility] today for a lab and assess following a kidney  transplant on 9/4.      Discharge date: 9/6  Transplant coordinator: Tiffany BOUDREAUX  Phone number patient can be reached at: [phone] ()      Physical Assessment:  See physical assessment located under \"Document Flowsheets\".  Incision site: c/d/i dermabond. No s/s infection  Lines: NA  Plata: NA  Urine clarity: pink tinged per pt report, good UOP  Hydration: Pt reports being very thirsty, understands fluid intake recommendations but also reports being up 14 lbs from dry weight   Nutrition: Appetite suppressed; intermittent nausea, no vomiting. Nausea resolves by eating or drinking something per pt   Last BM: Likely constipated. Pt reports receiving enema inpatient yesterday followed by BM x3, nothing since. Verbalized understanding of bowel med regimen and importance of adherence while on oxy  Pain: pt reports after taking 1 oxy, pain is 'barely there'; incisional only      Laboratory tests: Standard labs drawn.    Plan of care for today: Labs drawn and pt assessed. Post transplant education reviewed with pt and spouse. Medications reviewed. Pt reports she did not receive med box; writer unable to obtain on a holiday- will need at tomorrow's appt. Labs and vitals reviewed via telephone with provider. Orders received for phos supp and return tomorrow.     Medication changes: Start phosphorus 500 mg BID    Medications administered: pt self administered morning meds      Patient education:    The following teaching topics were addressed: Importance of drinking 2L of non-caffeinated fluids daily, Incisional care, Signs/symptoms of infection, Good handwashing, Phone numbers to call with " concenrs (Transplant coordinator, [facility] [address] and [facility]) and Plan of care   Patient verbalized understanding and all questions answered.    Drug level:  Tac level today to be followed by transplant coordinators. Pt was unclear about med order and took 1 mg tacrolimus last evening. Pt's med card updated to reflect current order of 2 mg Q12H. Pt took 2mg this morning after labs.     Total nursing time: 45 mins    Discharge Plan    Pt will follow up with [facility] tomorrow for LBA.   Discharge instructions reviewed with patient: YES  Patient/Representative verbalized understanding, all questions answered: YES    Discharged from unit at    with whom: spouse to home.    Mohini Mccabe RN      Again, thank you for allowing me to participate in the care of your patient.        Sincerely,        [facility]

## 2020-09-07 NOTE — PROGRESS NOTES
"Ethel Thompson came to [facility] today for a lab and assess following a kidney  transplant on 9/4.      Discharge date: 9/6  Transplant coordinator: Tiffany BOUDREAUX  Phone number patient can be reached at: [phone] (M)      Physical Assessment:  See physical assessment located under \"Document Flowsheets\".  Incision site: c/d/i dermabond. No s/s infection  Lines: NA  Plata: NA  Urine clarity: pink tinged per pt report, good UOP  Hydration: Pt reports being very thirsty, understands fluid intake recommendations but also reports being up 14 lbs from dry weight   Nutrition: Appetite suppressed; intermittent nausea, no vomiting. Nausea resolves by eating or drinking something per pt   Last BM: Likely constipated. Pt reports receiving enema inpatient yesterday followed by BM x3, nothing since. Verbalized understanding of bowel med regimen and importance of adherence while on oxy  Pain: pt reports after taking 1 oxy, pain is 'barely there'; incisional only      Laboratory tests: Standard labs drawn.    Plan of care for today: Labs drawn and pt assessed. Post transplant education reviewed with pt and spouse. Medications reviewed. Pt reports she did not receive med box; writer unable to obtain on a holiday- will need at tomorrow's appt. Labs and vitals reviewed via telephone with provider. Orders received for phos supp and return tomorrow.     Medication changes: Start phosphorus 500 mg BID    Medications administered: pt self administered morning meds      Patient education:    The following teaching topics were addressed: Importance of drinking 2L of non-caffeinated fluids daily, Incisional care, Signs/symptoms of infection, Good handwashing, Phone numbers to call with concenrs (Transplant coordinator, [facility] and [facility]) and Plan of care   Patient verbalized understanding and all questions answered.    Drug level:  Tac level today to be followed by transplant coordinators. Pt was unclear about med order and took 1 mg tacrolimus " last evening. Pt's med card updated to reflect current order of 2 mg Q12H. Pt took 2mg this morning after labs.     Total nursing time: 45 mins    Discharge Plan    [facility].   Discharge instructions reviewed with patient: YES  Patient/Representative verbalized understanding, all questions answered: YES    Discharged from unit at    with whom: spouse to home.    Mohini Mccabe RN

## 2020-09-07 NOTE — PROGRESS NOTES
Date: 9/8/2020 Status: Malathi   Time: 8:00 AM Length: 60   Visit Type: UMP KIDNEY TX DISCHARGE [[id number]] DANIELLE: [id number]   Provider: Henry Sifuentes MD Department:  SPECIALTY INFUSION   Special Needs: Hearing Impaired Comments: Wears hearing aids Uses C-pap @ [facility]     Patient has clinic visit within 24-48 hours of Discharge so no post DC follow up call is needed

## 2020-09-07 NOTE — TELEPHONE ENCOUNTER
Ethel is a post kidney transplant patient who discharged 09/06/2020.   Outpatient [facility] pharmacist notified for medication review with patient.     MARYJO Ramon.Ph.  [facility]  [phone] pager

## 2020-09-07 NOTE — TELEPHONE ENCOUNTER
ISSUE:   Tacrolimus IR level < 3 on 9/7, goal 8-10, dose 2 mg BID.    PLAN:   Please call patient and confirm this was an accurate 12-hour trough. Verify Tacrolimus IR dose 2 mg BID. Confirm no new medications or illness. Confirm no missed doses. If accurate trough and accurate dose, increase tacrolimus dose to 4 mg BID and repeat labs on 9/8/2020.    OUTCOME:   Spoke with patient's , Maxim, , they confirm accurate trough level and they only took 1 mg instead of the 2 mg last night, but did take 2 mg this morning after blood draw. Patient's , Maxim, confirmed dose change to 4 mg BID and to repeat labs tomorrow. Orders sent to preferred pharmacy for dose change and lab for repeat labs. Patient voiced understanding of plan.   Maxim reports that they prefer to use [facility] pharmacy for medications moving forward.

## 2020-09-07 NOTE — PHARMACY-TRANSPLANT NOTE
Solid Organ Transplant Recipient Prior to Discharge Note    65 year old female s/p kidney transplant on 9/3/2020.    Pharmacy has monitored for medication interactions and immunosuppression levels in conjunction with the multidisciplinary team. In anticipation for discharge, medication therapy needs have been addressed daily throughout the current admission via multidisciplinary rounds and/or discussions, order verification, daily clinical pharmacy review, and communication with prescribers.  Alyssa Jones, Pharm.D., [facility]  Pager [phone]

## 2020-09-08 ENCOUNTER — RESULTS ONLY (OUTPATIENT)
Dept: OTHER | Facility: CLINIC | Age: 65
End: 2020-09-08

## 2020-09-08 ENCOUNTER — OFFICE VISIT (OUTPATIENT)
Dept: INFUSION THERAPY | Facility: CLINIC | Age: 65
End: 2020-09-08
Attending: INTERNAL MEDICINE
Payer: MEDICARE

## 2020-09-08 VITALS
TEMPERATURE: 97.8 F | SYSTOLIC BLOOD PRESSURE: 181 MMHG | OXYGEN SATURATION: 99 % | DIASTOLIC BLOOD PRESSURE: 93 MMHG | HEART RATE: 80 BPM | WEIGHT: 142 LBS | BODY MASS INDEX: 28.68 KG/M2

## 2020-09-08 DIAGNOSIS — N18.6 ESRD (END STAGE RENAL DISEASE) (H): ICD-10-CM

## 2020-09-08 DIAGNOSIS — Z76.82 ORGAN TRANSPLANT CANDIDATE: ICD-10-CM

## 2020-09-08 DIAGNOSIS — Z94.0 KIDNEY REPLACED BY TRANSPLANT: Primary | ICD-10-CM

## 2020-09-08 DIAGNOSIS — Z48.298 AFTERCARE FOLLOWING ORGAN TRANSPLANT: ICD-10-CM

## 2020-09-08 DIAGNOSIS — Z79.899 ENCOUNTER FOR LONG-TERM CURRENT USE OF MEDICATION: ICD-10-CM

## 2020-09-08 DIAGNOSIS — N25.81 SECONDARY RENAL HYPERPARATHYROIDISM (H): ICD-10-CM

## 2020-09-08 DIAGNOSIS — N18.5 ANEMIA OF CHRONIC RENAL FAILURE, STAGE 5 (H): ICD-10-CM

## 2020-09-08 DIAGNOSIS — E83.39 LOW PHOSPHATE LEVELS: ICD-10-CM

## 2020-09-08 DIAGNOSIS — Z48.298 CARE AFTER ORGAN TRANSPLANT: ICD-10-CM

## 2020-09-08 DIAGNOSIS — N18.6 END STAGE RENAL DISEASE (H): Primary | ICD-10-CM

## 2020-09-08 DIAGNOSIS — Z13.21 ENCOUNTER FOR VITAMIN DEFICIENCY SCREENING: ICD-10-CM

## 2020-09-08 DIAGNOSIS — Z94.0 KIDNEY REPLACED BY TRANSPLANT: ICD-10-CM

## 2020-09-08 DIAGNOSIS — D63.1 ANEMIA OF CHRONIC RENAL FAILURE, STAGE 5 (H): ICD-10-CM

## 2020-09-08 DIAGNOSIS — B37.0 THRUSH: ICD-10-CM

## 2020-09-08 DIAGNOSIS — I15.0 RENOVASCULAR HYPERTENSION: Primary | ICD-10-CM

## 2020-09-08 DIAGNOSIS — R79.0 LOW MAGNESIUM LEVEL: ICD-10-CM

## 2020-09-08 DIAGNOSIS — E55.9 VITAMIN D DEFICIENCY: ICD-10-CM

## 2020-09-08 LAB
ANION GAP SERPL CALCULATED.3IONS-SCNC: 5 MMOL/L (ref 3–14)
ANISOCYTOSIS BLD QL SMEAR: SLIGHT
BASOPHILS # BLD AUTO: 0 10E9/L (ref 0–0.2)
BASOPHILS NFR BLD AUTO: 0 %
BUN SERPL-MCNC: 34 MG/DL (ref 7–30)
CALCIUM SERPL-MCNC: 8.4 MG/DL (ref 8.5–10.1)
CELL TYPE ALLO: NORMAL
CELL TYPE AUTO: NORMAL
CHANNELSHIFTALLOB1: -13
CHANNELSHIFTALLOB2: -18
CHANNELSHIFTALLOT1: -46
CHANNELSHIFTALLOT2: -46
CHANNELSHIFTAUTOB1: -68
CHANNELSHIFTAUTOB2: -68
CHANNELSHIFTAUTOT1: -43
CHANNELSHIFTAUTOT2: -40
CHLORIDE SERPL-SCNC: 110 MMOL/L (ref 94–109)
CO2 SERPL-SCNC: 27 MMOL/L (ref 20–32)
COMMENT ALLOB2: NORMAL
CREAT SERPL-MCNC: 0.96 MG/DL (ref 0.52–1.04)
CROSSMATCHDATEALLO: NORMAL
CROSSMATCHDATEAUTO: NORMAL
DIFFERENTIAL METHOD BLD: ABNORMAL
DONOR ALLO: NORMAL
DONOR AUTO: NORMAL
DONORCELLDATE ALLO: NORMAL
DONORCELLDATE AUTO: NORMAL
EOSINOPHIL # BLD AUTO: 0 10E9/L (ref 0–0.7)
EOSINOPHIL NFR BLD AUTO: 0 %
ERYTHROCYTE [DISTWIDTH] IN BLOOD BY AUTOMATED COUNT: 15.6 % (ref 10–15)
GFR SERPL CREATININE-BSD FRML MDRD: 61 ML/MIN/{1.73_M2}
GLUCOSE SERPL-MCNC: 210 MG/DL (ref 70–99)
HCT VFR BLD AUTO: 29.9 % (ref 35–47)
HGB BLD-MCNC: 10.1 G/DL (ref 11.7–15.7)
LYMPHOCYTES # BLD AUTO: 0.2 10E9/L (ref 0.8–5.3)
LYMPHOCYTES NFR BLD AUTO: 3.6 %
MAGNESIUM SERPL-MCNC: 2.3 MG/DL (ref 1.6–2.3)
MCH RBC QN AUTO: 34 PG (ref 26.5–33)
MCHC RBC AUTO-ENTMCNC: 33.8 G/DL (ref 31.5–36.5)
MCV RBC AUTO: 101 FL (ref 78–100)
MONOCYTES # BLD AUTO: 0.1 10E9/L (ref 0–1.3)
MONOCYTES NFR BLD AUTO: 1.8 %
MYELOCYTES # BLD: 0.3 10E9/L
MYELOCYTES NFR BLD MANUAL: 4.5 %
NEUTROPHILS # BLD AUTO: 5.6 10E9/L (ref 1.6–8.3)
NEUTROPHILS NFR BLD AUTO: 90.1 %
NRBC # BLD AUTO: 0.1 10*3/UL
NRBC BLD AUTO-RTO: 1 /100
PHOSPHATE SERPL-MCNC: 1.1 MG/DL (ref 2.5–4.5)
PLATELET # BLD AUTO: 144 10E9/L (ref 150–450)
PLATELET # BLD EST: ABNORMAL 10*3/UL
POS CUT OFF ALLO B: >93
POS CUT OFF ALLO T: >79
POS CUT OFF AUTO B: >93
POS CUT OFF AUTO T: >79
POTASSIUM SERPL-SCNC: 3.4 MMOL/L (ref 3.4–5.3)
RBC # BLD AUTO: 2.97 10E12/L (ref 3.8–5.2)
RESULT ALLO B1: NORMAL
RESULT ALLO B2: NORMAL
RESULT ALLO T1: NORMAL
RESULT ALLO T2: NORMAL
RESULT AUTO B1: NORMAL
RESULT AUTO B2: NORMAL
RESULT AUTO T1: NORMAL
RESULT AUTO T2: NORMAL
SA1 CELL: NORMAL
SA1 COMMENTS: NORMAL
SA1 HI RISK ABY: NORMAL
SA1 MOD RISK ABY: NORMAL
SA1 TEST METHOD: NORMAL
SA2 CELL: NORMAL
SA2 COMMENTS: NORMAL
SA2 HI RISK ABY UA: NORMAL
SA2 MOD RISK ABY: NORMAL
SA2 TEST METHOD: NORMAL
SERUM DATE ALLO B1: NORMAL
SERUM DATE ALLO B2: NORMAL
SERUM DATE ALLO T1: NORMAL
SERUM DATE ALLO T2: NORMAL
SERUM DATE AUTO B1: NORMAL
SERUM DATE AUTO B2: NORMAL
SERUM DATE AUTO T1: NORMAL
SERUM DATE AUTO T2: NORMAL
SODIUM SERPL-SCNC: 142 MMOL/L (ref 133–144)
TACROLIMUS BLD-MCNC: 6 UG/L (ref 5–15)
TESTMETHODALLO: NORMAL
TESTMETHODAUTO: NORMAL
TME LAST DOSE: 1900 H
TREATMENT ALLO B1: NORMAL
TREATMENT ALLO B2: NORMAL
TREATMENT ALLO T1: NORMAL
TREATMENT ALLO T2: NORMAL
TREATMENT AUTO B1: NORMAL
TREATMENT AUTO B2: NORMAL
TREATMENT AUTO T1: NORMAL
TREATMENT AUTO T2: NORMAL
UNACCEPTABLE ANTIGEN: NORMAL
UNOS CPRA: 29
WBC # BLD AUTO: 6.2 10E9/L (ref 4–11)

## 2020-09-08 PROCEDURE — 25000132 ZZH RX MED GY IP 250 OP 250 PS 637: Mod: GY,ZF

## 2020-09-08 PROCEDURE — 25800030 ZZH RX IP 258 OP 636: Mod: ZF | Performed by: NURSE PRACTITIONER

## 2020-09-08 PROCEDURE — 84100 ASSAY OF PHOSPHORUS: CPT | Performed by: INTERNAL MEDICINE

## 2020-09-08 PROCEDURE — 80048 BASIC METABOLIC PNL TOTAL CA: CPT | Performed by: INTERNAL MEDICINE

## 2020-09-08 PROCEDURE — 96365 THER/PROPH/DIAG IV INF INIT: CPT

## 2020-09-08 PROCEDURE — 80197 ASSAY OF TACROLIMUS: CPT | Performed by: INTERNAL MEDICINE

## 2020-09-08 PROCEDURE — 86828 HLA CLASS I&II ANTIBODY QUAL: CPT | Mod: XU | Performed by: SURGERY

## 2020-09-08 PROCEDURE — 86832 HLA CLASS I HIGH DEFIN QUAL: CPT | Performed by: SURGERY

## 2020-09-08 PROCEDURE — 25000128 H RX IP 250 OP 636: Mod: ZF | Performed by: NURSE PRACTITIONER

## 2020-09-08 PROCEDURE — 86833 HLA CLASS II HIGH DEFIN QUAL: CPT | Performed by: SURGERY

## 2020-09-08 PROCEDURE — 83735 ASSAY OF MAGNESIUM: CPT | Performed by: INTERNAL MEDICINE

## 2020-09-08 PROCEDURE — 36415 COLL VENOUS BLD VENIPUNCTURE: CPT | Performed by: INTERNAL MEDICINE

## 2020-09-08 PROCEDURE — 85025 COMPLETE CBC W/AUTO DIFF WBC: CPT | Performed by: INTERNAL MEDICINE

## 2020-09-08 PROCEDURE — G0463 HOSPITAL OUTPT CLINIC VISIT: HCPCS | Mod: 25

## 2020-09-08 RX ORDER — NYSTATIN 100000/ML
500000 SUSPENSION, ORAL (FINAL DOSE FORM) ORAL 4 TIMES DAILY
Qty: 280 ML | Refills: 0 | Status: SHIPPED | OUTPATIENT
Start: 2020-09-08 | End: 2020-09-22

## 2020-09-08 RX ORDER — POTASSIUM CHLORIDE 1.5 G/1.58G
40 POWDER, FOR SOLUTION ORAL ONCE
Status: COMPLETED | OUTPATIENT
Start: 2020-09-08 | End: 2020-09-08

## 2020-09-08 RX ORDER — AMLODIPINE BESYLATE 5 MG/1
5 TABLET ORAL DAILY
Qty: 90 TABLET | Refills: 3 | Status: SHIPPED | OUTPATIENT
Start: 2020-09-08 | End: 2020-10-05

## 2020-09-08 RX ORDER — FUROSEMIDE 20 MG
20 TABLET ORAL DAILY
Qty: 30 TABLET | Refills: 3 | Status: SHIPPED | OUTPATIENT
Start: 2020-09-08 | End: 2020-09-15

## 2020-09-08 RX ADMIN — SODIUM CHLORIDE 20 MG: 9 INJECTION, SOLUTION INTRAVENOUS at 08:38

## 2020-09-08 RX ADMIN — POTASSIUM CHLORIDE 40 MEQ: 1.5 POWDER, FOR SOLUTION ORAL at 09:15

## 2020-09-08 NOTE — PATIENT INSTRUCTIONS
Start Amlodipine 5 mg daily  Start Lasix 20 mg daily   Start Vitamin D3 2000 units once daily   Start Phosphorus 500 mg twice daily      Bring the medications on your med card that have stars by them tomorrow so that we can fill your box.

## 2020-09-08 NOTE — PROGRESS NOTES
ACUTE TRANSPLANT NEPHROLOGY VISIT    Assessment & Plan   # DDKT: Trend down               - Baseline Cr ~ TBD. But has trended down well to 0.96 now              - Proteinuria: FSGS diagnosis with UPCR 1.9g/g pre op.                 UPCR on 9/5 : 1.17 g/gCr              - Date DSA Last Checked: Sep/2020      Latest DSA: No DSA at time of transplant              - BK Viremia: Not checked post transplant              - PD Catheter removed intraop              -Stent in place, remove 4-6 weeks post op              - PRA 29%. Perinephric fluid collection noted on post-op US.      # Immunosuppression: Tacrolimus immediate release (goal 8-10), Mycophenolate mofetil (dose 750 mg every 12 hours) and Prednisone (dose taper)  Induction immunosuppression with thymoglobulin 125mg (2.2mg/kg), basiliximab (dose given POD #1 and schedule outpatient for POD #5  TODAY), and steroid taper.             - Changes: Not at this time               -Moderate risk induction. Give simulect today   mg daily  Tacrolimus 4 mg BID --> Tac level < 3 ( 9/7)   On Steroid taper    # Infection Prophylaxis:   - PJP: Sulfa/TMP (Bactrim)  - CMV: Valcyte x3m, CMV IgG recipient +. Based on improved function will increase dose to 450 mg BID   - Thrush - start Nystatin swish 4 times daily     # Hypertension: Controlled;   Goal BP: < 150/90              - Volume status: Mildly hypervolemic             EDW ~ 54kg. Today weight is  64.4 kg  Wt Readings from Last 3 Encounters:   09/08/20 64.4 kg (142 lb)   09/07/20 63.3 kg (139 lb 8 oz)   09/05/20 63.3 kg (139 lb 8.8 oz)                   - Changes: Yes. Start amlodipine 5 mg daily and Lasix 20 mg Daily       # Electrolytes              - mild hypokalemia 3.4 - will give KCL 40 MEQ x1 dose today . Repeat labs tomorrow               - Magnesium normal . On mag oxide supplement 400 mg daily               - Phos  LOW  At 1.1  On phosporus supplement - she will pick it up today and start     # Anemia :  Iron  panel - not recently checked       # Elevated Blood Glucose: Glucose generally running ~ 120              - Management as per primary team.     # Anemia in Chronic Renal Disease: Hgb:  Hb 10.1 . Patient needed prbc transfusion post transplant .                       -  JHONATAN: No              - Iron studies: Unknown at this time, but checked with dialysis               -Hb downtrended, stable on repeat again today, lactate downtrending--                   # Mineral Bone Disorder:   - Calcium; level: Normal        On supplement: No  - Phosphorus; level: Normal    On supplement: No               - restart Vit D 2000 units daily     # FSGS:              - Recommend weekly UPCR              - Pre op UPCR  1.9g/gCr              - UPCR on  : 1.17 g/gCr             # Transplant History:  Etiology of Kidney Failure: Focal segmental glomerulosclerosis (FSGS)  Tx: DDKT  Transplant: 9/3/2020 (Kidney)  Donor Type: Donation after Brain Death        Donor Class:   Crossmatch at time of Tx: negative  Significant changes in immunosuppression: None  Significant transplant-related complications: None     Recommendations were communicated to the primary team verbally.     Transplant Office Phone Number: [phone]    Assessment and plan was discussed with the patient and she voiced her understanding and agreement.    Return visit: No follow-ups on file.    Dinah Strange MD    Chief Complaint   Ms. Thompson is a 65 year old here for routine follow up and hospital follow up.     History of Present Illness      65 year old female with history of ESKD secondary to FSGS, on PD. Made some urine prior to admission. PMH also significant for atrophic right kidney, MGUS, HTN, Hep B core antibody positive, and recent hypomanic episode. S/p  donor kidney transplant and removal of PD catheter on 2020.    Patient feels bloated and her weight has gone up     Recent Hospitalizations:  [] No [x] Yes    New Medical Issues: [] No [x] Yes  feeling bloated   Decreased energy: [x] No [] Yes    Chest pain or SOB with exertion:  [x] No [] Yes    Appetite change or weight change: [x] No [] Yes    Nausea, vomiting or diarrhea:  [x] No [] Yes    Fever, sweats or chills: [x] No [] Yes    Leg swelling: [] No [x] Yes      Home BP: Not checked    Review of Systems   A comprehensive review of systems was obtained and negative, except as noted in the HPI or PMH.    Problem List   Patient Active Problem List   Diagnosis     Elevated serum immunoglobulin free light chain level     Renovascular hypertension     FSGS (focal segmental glomerulosclerosis)     Hyperlipidemia     Hypothyroidism     SADIE on CPAP     Sensorineural hearing loss (SNHL) of both ears     Anemia in chronic kidney disease     GERD (gastroesophageal reflux disease)     Hypertension     Hepatitis B core antibody positive     End stage renal disease (H)     Secondary hyperparathyroidism (H)     Memory deficits     Kidney replaced by transplant     Immunosuppressed status (H)     Elevated lactic acid level     Acute anemia       Social History   Social History     Tobacco Use     Smoking status: Former Smoker     Packs/day: 1.00     Types: Cigarettes     Start date:      Last attempt to quit:      Years since quittin.7     Smokeless tobacco: Never Used   Substance Use Topics     Alcohol use: No     Frequency: Never     Drug use: No       Allergies   Allergies   Allergen Reactions     Amoxicillin-Pot Clavulanate Nausea and Vomiting       Medications   Current Outpatient Medications   Medication Sig     acetaminophen (TYLENOL) 325 MG tablet Take 2 tablets (650 mg) by mouth every 4 hours as needed for pain     aspirin 81 MG EC tablet Take 81 mg by mouth daily     atorvastatin (LIPITOR) 40 MG tablet Take 0.5 tablets (20 mg) by mouth daily     famotidine (PEPCID) 20 MG tablet Take 20 mg by mouth every evening      fish oil-omega-3 fatty acids 1000 MG capsule Take 1 g by mouth daily       fluticasone (FLONASE) 50 MCG/ACT nasal spray Spray 1 spray into both nostrils 2 times daily as needed for rhinitis or allergies     levothyroxine (SYNTHROID/LEVOTHROID) 112 MCG tablet Take 112 mcg (1 tablet) by mouth every Monday, Tuesday, Wednesday, Thursday, Friday.     Lidocaine (LIDOCARE) 4 % Patch Place 1 patch onto the skin every 24 hours To prevent lidocaine toxicity, patient should be patch free for 12 hrs daily.     magnesium oxide (MAG-OX) 400 MG tablet Take 1 tablet (400 mg) by mouth daily (with lunch)     melatonin 3 MG tablet Take 3 mg by mouth At Bedtime      mycophenolate (GENERIC EQUIVALENT) 250 MG capsule Take 3 capsules (750 mg) by mouth 2 times daily     ondansetron (ZOFRAN-ODT) 4 MG ODT tab Take 1 tablet (4 mg) by mouth every 6 hours as needed for nausea or vomiting     oxyCODONE (ROXICODONE) 5 MG tablet Take 1 tablet (5 mg) by mouth every 4 hours as needed for moderate to severe pain     phosphorus tablet 250 mg (PHOSPHORUS TABLET 250 MG) 250 MG per tablet Take 2 tablets (500 mg) by mouth 2 times daily     predniSONE (DELTASONE) 10 MG tablet On 9/7 take 60mg (6 tabs)  9/8-9/9 take 40mg (4 tabs daily)  9/10-9/16 take 20mg (2 tabs daily)  9/17-9/23 take 15mg (1.5 tabs daily)  9/24-9/30 take 10mg (1 tab daily)  10/1-10/7 take 5mg (half tab daily)     Psyllium (METAMUCIL FIBER PO) Take one tablespoon by mouth per day every evening     senna-docusate (SENOKOT-S/PERICOLACE) 8.6-50 MG tablet Take 1 tablet by mouth 2 times daily Hold for loose stools     sulfamethoxazole-trimethoprim (BACTRIM) 400-80 MG tablet Take 1 tablet by mouth daily     tacrolimus (GENERIC EQUIVALENT) 0.5 MG capsule Take 1 cap by mouth twice daily as directed by Transplant Center for dose adjustment     tacrolimus (GENERIC EQUIVALENT) 1 MG capsule Take 4 capsules (4 mg) by mouth 2 times daily     valGANciclovir (VALCYTE) 450 MG tablet Take 1 tab every other day. Titrate dose up to a max of 2 tabs (900 mg) by mouth daily when  directed by your transplant team.     vitamin B-12 (CYANOCOBALAMIN) 500 MCG tablet Take 500 mcg by mouth daily     vitamin B6 (PYRIDOXINE) 100 MG tablet Take 100 mg by mouth daily     No current facility-administered medications for this visit.      Facility-Administered Medications Ordered in Other Visits   Medication     basiliximab (SIMULECT) 20 mg in sodium chloride 0.9 % 50 mL infusion     There are no discontinued medications.    Physical Exam   Vital Signs: There were no vitals taken for this visit.    GENERAL APPEARANCE: alert and no distress  HENT:Thrush present   LYMPHATICS: no cervical or supraclavicular nodes  RESP: lungs clear to auscultation - no rales, rhonchi or wheezes  CV: regular rhythm, normal rate, no rub, no murmur  EDEMA: 1+ bilaterally LE edema   ABDOMEN: soft, nondistended, nontender, bowel sounds normal  MS: extremities normal - no gross deformities noted, no evidence of inflammation in joints, no muscle tenderness  SKIN: no rash    Data     Renal Latest Ref Rng & Units 9/8/2020 9/7/2020 9/6/2020   Na 133 - 144 mmol/L 142 141 142   K 3.4 - 5.3 mmol/L 3.4 3.6 3.0(L)   Cl 94 - 109 mmol/L 110(H) 108 109   CO2 20 - 32 mmol/L 27 26 26   BUN 7 - 30 mg/dL 34(H) 41(H) 40(H)   Cr 0.52 - 1.04 mg/dL 0.96 1.48(H) 2.06(H)   Glucose 70 - 99 mg/dL 210(H) 222(H) 156(H)   Ca  8.5 - 10.1 mg/dL 8.4(L) 8.9 8.3(L)   Mg 1.6 - 2.3 mg/dL 2.3 2.3 2.4(H)     Bone Health Latest Ref Rng & Units 9/8/2020 9/7/2020 9/6/2020   Phos 2.5 - 4.5 mg/dL 1.1(L) 1.9(L) 3.0     Heme Latest Ref Rng & Units 9/8/2020 9/7/2020 9/6/2020   WBC 4.0 - 11.0 10e9/L 6.2 9.8 -   Hgb 11.7 - 15.7 g/dL 10.1(L) 10.7(L) 8.2(L)   Plt 150 - 450 10e9/L 144(L) 134(L) -   ABSOLUTE NEUTROPHIL 1.6 - 8.3 10e9/L - 8.9(H) -   ABSOLUTE LYMPHOCYTES 0.8 - 5.3 10e9/L - 0.2(L) -   ABSOLUTE MONOCYTES 0.0 - 1.3 10e9/L - 0.4 -   ABSOLUTE EOSINOPHILS 0.0 - 0.7 10e9/L - 0.0 -   ABSOLUTE BASOPHILS 0.0 - 0.2 10e9/L - 0.0 -   ABS IMMATURE GRANULOCYTES 0 - 0.4 10e9/L -  0.3 -   ABSOLUTE NUCLEATED RBC - - 0.0 -     Liver Latest Ref Rng & Units 9/5/2020 9/4/2020 9/3/2020   AP 40 - 150 U/L 53 60 102   TBili 0.2 - 1.3 mg/dL 0.4 1.1 0.4   DBili 0.0 - 0.2 mg/dL 0.2 0.1 -   ALT 0 - 50 U/L 52(H) 41 52(H)   AST 0 - 45 U/L 50(H) 38 34   Tot Protein 6.8 - 8.8 g/dL 5.4(L) 5.2(L) 7.6   Albumin 3.4 - 5.0 g/dL 2.8(L) 2.3(L) 3.5     Pancreas Latest Ref Rng & Units 9/3/2020   A1C 0 - 5.6 % 5.4        [facility] Txp Virology Latest Ref Rng & Units 9/3/2020 1/28/2019   EBV CAPSID ANTIBODY IGG 0.0 - 0.8 AI >8.0(H) >8.0(H)   Hep B Core NR:Nonreactive - Reactive(AA)        Recent Labs   Lab Test 09/06/20  0430 09/07/20  0738   DOSTAC Not Provided Not Provided   TACROL <3.0* <3.0*          Patient was seen and evaluated by me, Henry Sifuentes MD. I have reviewed the note and agree with the the plan of care as documented by the fellow.

## 2020-09-08 NOTE — PROGRESS NOTES
"  Ethel Thompson came to [facility] today for a lab and assess following a kidney  transplant on 9/4.      Discharge date: 9/6  Transplant coordinator: Tiffany BOUDREAUX  Phone number patient can be reached at: [phone] (M)      Physical Assessment:  See physical assessment located under \"Document Flowsheets\".  Incision site: c/d/i dermabond. No s/s infection  Lines: NA  Plata: NA  Urine clarity: pink tinged per pt report, good UOP  Hydration: Encouraged to continue fluid intake recommendations despite fluid retention and lasix   Nutrition: Appetite suppressed; intermittent nausea, no vomiting. Nausea resolves by eating or drinking something per pt   Last BM: constipated. Did not take senna yesterday- encouraged to do so today and added to med box. Verbalized understanding of bowel med regimen and importance of adherence while on oxy  Pain: pt reports after taking 1 oxy, pain is 'barely there'; incisional only      Laboratory tests: Standard labs drawn.    Plan of care for today: Labs drawn and pt assessed. Post transplant education reviewed with pt and spouse. Medications reviewed. Pt reports she did not receive med box; writer called pharmacy for med box which was obtained and filled with pt. Pt seen by fellow and Dr Sifuentes; new order below as well as one time 40 meq PO K+ administered in [facility]. POD 5 Simulect administered per orders.   Administrations This Visit     basiliximab (SIMULECT) 20 mg in sodium chloride 0.9 % 50 mL infusion     Admin Date  09/08/2020 Action  New Bag Dose  20 mg Rate  120 mL/hr Route  Intravenous Administered By  Mohini Mccabe RN                Medication changes:   Start phosphorus 500 mg BID   Start amlodipine 5mg at bedtime   Start furosemide 20 mg daily  Start Nystatin QID    Medications administered: pt self administered morning meds      Patient education:    The following teaching topics were addressed: Importance of drinking 2L of non-caffeinated fluids daily, Incisional care, Signs/symptoms of " infection, Good handwashing, Phone numbers to call with concenrs (Transplant coordinator, [facility]) and Plan of care   Patient verbalized understanding and all questions answered.    Drug level:  Tac level today to be followed by transplant coordinators. (increased to 4mg last evening per pt report.)    Total nursing time: 40 mins    Discharge Plan    Pt will follow up with [facility] tomorrow for LBA.   Discharge instructions reviewed with patient: YES  Patient/Representative verbalized understanding, all questions answered: YES    Discharged from unit at    with whom: spouse to home.    Mohini Mccabe RN

## 2020-09-08 NOTE — LETTER
9/8/2020     RE: Ethel Thompson  [address]  [place] [address]    Dear Colleague,    Thank you for referring your patient, Ethel Thompson, to the [facility]. Please see a copy of my visit note below.    ACUTE TRANSPLANT NEPHROLOGY VISIT    Assessment & Plan   # DDKT: Trend down               - Baseline Cr ~ TBD. But has trended down well to 0.96 now              - Proteinuria: FSGS diagnosis with UPCR 1.9g/g pre op.                 UPCR on 9/5 : 1.17 g/gCr              - Date DSA Last Checked: Sep/2020      Latest DSA: No DSA at time of transplant              - BK Viremia: Not checked post transplant              - PD Catheter removed intraop              -Stent in place, remove 4-6 weeks post op              - PRA 29%. Perinephric fluid collection noted on post-op US.      # Immunosuppression: Tacrolimus immediate release (goal 8-10), Mycophenolate mofetil (dose 750 mg every 12 hours) and Prednisone (dose taper)  Induction immunosuppression with thymoglobulin 125mg (2.2mg/kg), basiliximab (dose given POD #1 and schedule outpatient for POD #5  TODAY), and steroid taper.             - Changes: Not at this time               -Moderate risk induction. Give simulect today   mg daily  Tacrolimus 4 mg BID --> Tac level < 3 ( 9/7)   On Steroid taper    # Infection Prophylaxis:   - PJP: Sulfa/TMP (Bactrim)  - CMV: Valcyte x3m, CMV IgG recipient +. Based on improved function will increase dose to 450 mg BID   - Thrush - start Nystatin swish 4 times daily     # Hypertension: Controlled;   Goal BP: < 150/90              - Volume status: Mildly hypervolemic             EDW ~ 54kg. Today weight is  64.4 kg  Wt Readings from Last 3 Encounters:   09/08/20 64.4 kg (142 lb)   09/07/20 63.3 kg (139 lb 8 oz)   09/05/20 63.3 kg (139 lb 8.8 oz)                   - Changes: Yes. Start amlodipine 5 mg daily and Lasix 20 mg Daily       # Electrolytes              - mild hypokalemia  3.4 - will give KCL 40 MEQ x1 dose today . Repeat labs tomorrow               - Magnesium normal . On mag oxide supplement 400 mg daily               - Phos  LOW  At 1.1  On phosporus supplement - she will pick it up today and start     # Anemia :  Iron panel - not recently checked       # Elevated Blood Glucose: Glucose generally running ~ 120              - Management as per primary team.     # Anemia in Chronic Renal Disease: Hgb:  Hb 10.1 . Patient needed prbc transfusion post transplant .                       -  JHONATAN: No              - Iron studies: Unknown at this time, but checked with dialysis               -Hb downtrended, stable on repeat again today, lactate downtrending--                   # Mineral Bone Disorder:   - Calcium; level: Normal        On supplement: No  - Phosphorus; level: Normal    On supplement: No               - restart Vit D 2000 units daily     # FSGS:              - Recommend weekly UPCR              - Pre op UPCR  1.9g/gCr              - UPCR on 9/5 : 1.17 g/gCr             # Transplant History:  Etiology of Kidney Failure: Focal segmental glomerulosclerosis (FSGS)  Tx: DDKT  Transplant: 9/3/2020 (Kidney)  Donor Type: Donation after Brain Death        Donor Class:   Crossmatch at time of Tx: negative  Significant changes in immunosuppression: None  Significant transplant-related complications: None     Recommendations were communicated to the primary team verbally.     Transplant Office Phone Number: [phone]    Assessment and plan was discussed with the patient and she voiced her understanding and agreement.    Return visit: No follow-ups on file.    Dinah Strange MD    Chief Complaint   Ms. Thompson is a 65 year old here for routine follow up and hospital follow up.     History of Present Illness      65 year old female with history of ESKD secondary to FSGS, on PD. Made some urine prior to admission. PMH also significant for atrophic right kidney, MGUS, HTN, Hep B core antibody  positive, and recent hypomanic episode. S/p  donor kidney transplant and removal of PD catheter on 2020.    Patient feels bloated and her weight has gone up     Recent Hospitalizations:  [] No [x] Yes    New Medical Issues: [] No [x] Yes feeling bloated   Decreased energy: [x] No [] Yes    Chest pain or SOB with exertion:  [x] No [] Yes    Appetite change or weight change: [x] No [] Yes    Nausea, vomiting or diarrhea:  [x] No [] Yes    Fever, sweats or chills: [x] No [] Yes    Leg swelling: [] No [x] Yes      Home BP: Not checked    Review of Systems   A comprehensive review of systems was obtained and negative, except as noted in the HPI or PMH.    Problem List   Patient Active Problem List   Diagnosis     Elevated serum immunoglobulin free light chain level     Renovascular hypertension     FSGS (focal segmental glomerulosclerosis)     Hyperlipidemia     Hypothyroidism     SADIE on CPAP     Sensorineural hearing loss (SNHL) of both ears     Anemia in chronic kidney disease     GERD (gastroesophageal reflux disease)     Hypertension     Hepatitis B core antibody positive     End stage renal disease (H)     Secondary hyperparathyroidism (H)     Memory deficits     Kidney replaced by transplant     Immunosuppressed status (H)     Elevated lactic acid level     Acute anemia       Social History   Social History     Tobacco Use     Smoking status: Former Smoker     Packs/day: 1.00     Types: Cigarettes     Start date:      Last attempt to quit:      Years since quittin.7     Smokeless tobacco: Never Used   Substance Use Topics     Alcohol use: No     Frequency: Never     Drug use: No       Allergies   Allergies   Allergen Reactions     Amoxicillin-Pot Clavulanate Nausea and Vomiting       Medications   Current Outpatient Medications   Medication Sig     acetaminophen (TYLENOL) 325 MG tablet Take 2 tablets (650 mg) by mouth every 4 hours as needed for pain     aspirin 81 MG EC tablet Take 81 mg  by mouth daily     atorvastatin (LIPITOR) 40 MG tablet Take 0.5 tablets (20 mg) by mouth daily     famotidine (PEPCID) 20 MG tablet Take 20 mg by mouth every evening      fish oil-omega-3 fatty acids 1000 MG capsule Take 1 g by mouth daily      fluticasone (FLONASE) 50 MCG/ACT nasal spray Spray 1 spray into both nostrils 2 times daily as needed for rhinitis or allergies     levothyroxine (SYNTHROID/LEVOTHROID) 112 MCG tablet Take 112 mcg (1 tablet) by mouth every Monday, Tuesday, Wednesday, Thursday, Friday.     Lidocaine (LIDOCARE) 4 % Patch Place 1 patch onto the skin every 24 hours To prevent lidocaine toxicity, patient should be patch free for 12 hrs daily.     magnesium oxide (MAG-OX) 400 MG tablet Take 1 tablet (400 mg) by mouth daily (with lunch)     melatonin 3 MG tablet Take 3 mg by mouth At Bedtime      mycophenolate (GENERIC EQUIVALENT) 250 MG capsule Take 3 capsules (750 mg) by mouth 2 times daily     ondansetron (ZOFRAN-ODT) 4 MG ODT tab Take 1 tablet (4 mg) by mouth every 6 hours as needed for nausea or vomiting     oxyCODONE (ROXICODONE) 5 MG tablet Take 1 tablet (5 mg) by mouth every 4 hours as needed for moderate to severe pain     phosphorus tablet 250 mg (PHOSPHORUS TABLET 250 MG) 250 MG per tablet Take 2 tablets (500 mg) by mouth 2 times daily     predniSONE (DELTASONE) 10 MG tablet On 9/7 take 60mg (6 tabs)  9/8-9/9 take 40mg (4 tabs daily)  9/10-9/16 take 20mg (2 tabs daily)  9/17-9/23 take 15mg (1.5 tabs daily)  9/24-9/30 take 10mg (1 tab daily)  10/1-10/7 take 5mg (half tab daily)     Psyllium (METAMUCIL FIBER PO) Take one tablespoon by mouth per day every evening     senna-docusate (SENOKOT-S/PERICOLACE) 8.6-50 MG tablet Take 1 tablet by mouth 2 times daily Hold for loose stools     sulfamethoxazole-trimethoprim (BACTRIM) 400-80 MG tablet Take 1 tablet by mouth daily     tacrolimus (GENERIC EQUIVALENT) 0.5 MG capsule Take 1 cap by mouth twice daily as directed by Transplant Center for dose  adjustment     tacrolimus (GENERIC EQUIVALENT) 1 MG capsule Take 4 capsules (4 mg) by mouth 2 times daily     valGANciclovir (VALCYTE) 450 MG tablet Take 1 tab every other day. Titrate dose up to a max of 2 tabs (900 mg) by mouth daily when directed by your transplant team.     vitamin B-12 (CYANOCOBALAMIN) 500 MCG tablet Take 500 mcg by mouth daily     vitamin B6 (PYRIDOXINE) 100 MG tablet Take 100 mg by mouth daily     No current facility-administered medications for this visit.      Facility-Administered Medications Ordered in Other Visits   Medication     basiliximab (SIMULECT) 20 mg in sodium chloride 0.9 % 50 mL infusion     There are no discontinued medications.    Physical Exam   Vital Signs: There were no vitals taken for this visit.    GENERAL APPEARANCE: alert and no distress  HENT:Thrush present   LYMPHATICS: no cervical or supraclavicular nodes  RESP: lungs clear to auscultation - no rales, rhonchi or wheezes  CV: regular rhythm, normal rate, no rub, no murmur  EDEMA: 1+ bilaterally LE edema   ABDOMEN: soft, nondistended, nontender, bowel sounds normal  MS: extremities normal - no gross deformities noted, no evidence of inflammation in joints, no muscle tenderness  SKIN: no rash    Data     Renal Latest Ref Rng & Units 9/8/2020 9/7/2020 9/6/2020   Na 133 - 144 mmol/L 142 141 142   K 3.4 - 5.3 mmol/L 3.4 3.6 3.0(L)   Cl 94 - 109 mmol/L 110(H) 108 109   CO2 20 - 32 mmol/L 27 26 26   BUN 7 - 30 mg/dL 34(H) 41(H) 40(H)   Cr 0.52 - 1.04 mg/dL 0.96 1.48(H) 2.06(H)   Glucose 70 - 99 mg/dL 210(H) 222(H) 156(H)   Ca  8.5 - 10.1 mg/dL 8.4(L) 8.9 8.3(L)   Mg 1.6 - 2.3 mg/dL 2.3 2.3 2.4(H)     Bone Health Latest Ref Rng & Units 9/8/2020 9/7/2020 9/6/2020   Phos 2.5 - 4.5 mg/dL 1.1(L) 1.9(L) 3.0     Heme Latest Ref Rng & Units 9/8/2020 9/7/2020 9/6/2020   WBC 4.0 - 11.0 10e9/L 6.2 9.8 -   Hgb 11.7 - 15.7 g/dL 10.1(L) 10.7(L) 8.2(L)   Plt 150 - 450 10e9/L 144(L) 134(L) -   ABSOLUTE NEUTROPHIL 1.6 - 8.3 10e9/L -  8.9(H) -   ABSOLUTE LYMPHOCYTES 0.8 - 5.3 10e9/L - 0.2(L) -   ABSOLUTE MONOCYTES 0.0 - 1.3 10e9/L - 0.4 -   ABSOLUTE EOSINOPHILS 0.0 - 0.7 10e9/L - 0.0 -   ABSOLUTE BASOPHILS 0.0 - 0.2 10e9/L - 0.0 -   ABS IMMATURE GRANULOCYTES 0 - 0.4 10e9/L - 0.3 -   ABSOLUTE NUCLEATED RBC - - 0.0 -     Liver Latest Ref Rng & Units 9/5/2020 9/4/2020 9/3/2020   AP 40 - 150 U/L 53 60 102   TBili 0.2 - 1.3 mg/dL 0.4 1.1 0.4   DBili 0.0 - 0.2 mg/dL 0.2 0.1 -   ALT 0 - 50 U/L 52(H) 41 52(H)   AST 0 - 45 U/L 50(H) 38 34   Tot Protein 6.8 - 8.8 g/dL 5.4(L) 5.2(L) 7.6   Albumin 3.4 - 5.0 g/dL 2.8(L) 2.3(L) 3.5     Pancreas Latest Ref Rng & Units 9/3/2020   A1C 0 - 5.6 % 5.4        [facility] Txp Virology Latest Ref Rng & Units 9/3/2020 1/28/2019   EBV CAPSID ANTIBODY IGG 0.0 - 0.8 AI >8.0(H) >8.0(H)   Hep B Core NR:Nonreactive - Reactive(AA)        Recent Labs   Lab Test 09/06/20  0430 09/07/20  0738   DOSTAC Not Provided Not Provided   TACROL <3.0* <3.0*          Patient was seen and evaluated by me, Henry Sifuentes MD. I have reviewed the note and agree with the the plan of care as documented by the fellow.    Again, thank you for allowing me to participate in the care of your patient.      Sincerely,      Henry Sifuentes MD

## 2020-09-08 NOTE — LETTER
"    9/8/2020         RE: Ethel Thompson  [address]  [place] [address]        Dear Colleague,    Thank you for referring your patient, Ethel Thompson, to the [facility] SPECIAL[facility] AND PROCEDURE. Please see a copy of my visit note below.      Ethel Thompson came to [facility] today for a lab and assess following a kidney  transplant on 9/4.      Discharge date: 9/6  Transplant coordinator: Tiffany BOUDREAUX  Phone number patient can be reached at: [phone] ()      Physical Assessment:  See physical assessment located under \"Document Flowsheets\".  Incision site: c/d/i dermabond. No s/s infection  Lines: NA  Plata: NA  Urine clarity: pink tinged per pt report, good UOP  Hydration: Encouraged to continue fluid intake recommendations despite fluid retention and lasix   Nutrition: Appetite suppressed; intermittent nausea, no vomiting. Nausea resolves by eating or drinking something per pt   Last BM: constipated. Did not take senna yesterday- encouraged to do so today and added to med box. Verbalized understanding of bowel med regimen and importance of adherence while on oxy  Pain: pt reports after taking 1 oxy, pain is 'barely there'; incisional only      Laboratory tests: Standard labs drawn.    Plan of care for today: Labs drawn and pt assessed. Post transplant education reviewed with pt and spouse. Medications reviewed. Pt reports she did not receive med box; writer called pharmacy for med box which was obtained and filled with pt. Pt seen by fellow and Dr Sifuentes; new order below as well as one time 40 meq PO K+ administered in [facility]. POD 5 Simulect administered per orders.   Administrations This Visit     basiliximab (SIMULECT) 20 mg in sodium chloride 0.9 % 50 mL infusion     Admin Date  09/08/2020 Action  New Bag Dose  20 mg Rate  120 mL/hr Route  Intravenous Administered By  Mohini Mccabe, SUN                Medication changes:   Start phosphorus 500 mg BID   Start amlodipine 5mg at bedtime   Start furosemide " 20 mg daily  Start Nystatin QID    Medications administered: pt self administered morning meds      Patient education:    The following teaching topics were addressed: Importance of drinking 2L of non-caffeinated fluids daily, Incisional care, Signs/symptoms of infection, Good handwashing, Phone numbers to call with concenrs (Transplant coordinator, [facility]) and Plan of care   Patient verbalized understanding and all questions answered.    Drug level:  Tac level today to be followed by transplant coordinators. (increased to 4mg last evening per pt report.)    Total nursing time: 40 mins    Discharge Plan    Pt will follow up with [facility] tomorrow for LBA.   Discharge instructions reviewed with patient: YES  Patient/Representative verbalized understanding, all questions answered: YES    Discharged from unit at    with whom: spouse to home.    Mohini Mccabe RN      Again, thank you for allowing me to participate in the care of your patient.        Sincerely,        [facility]

## 2020-09-09 ENCOUNTER — ALLIED HEALTH/NURSE VISIT (OUTPATIENT)
Dept: TRANSPLANT | Facility: CLINIC | Age: 65
End: 2020-09-09

## 2020-09-09 ENCOUNTER — OFFICE VISIT (OUTPATIENT)
Dept: TRANSPLANT | Facility: CLINIC | Age: 65
End: 2020-09-09

## 2020-09-09 ENCOUNTER — OFFICE VISIT (OUTPATIENT)
Dept: INFUSION THERAPY | Facility: CLINIC | Age: 65
End: 2020-09-09
Attending: INTERNAL MEDICINE
Payer: MEDICARE

## 2020-09-09 ENCOUNTER — TELEPHONE (OUTPATIENT)
Dept: TRANSPLANT | Facility: CLINIC | Age: 65
End: 2020-09-09

## 2020-09-09 VITALS
TEMPERATURE: 97.8 F | HEART RATE: 82 BPM | BODY MASS INDEX: 28.76 KG/M2 | OXYGEN SATURATION: 99 % | SYSTOLIC BLOOD PRESSURE: 169 MMHG | DIASTOLIC BLOOD PRESSURE: 90 MMHG | WEIGHT: 142.4 LBS

## 2020-09-09 DIAGNOSIS — N25.81 SECONDARY RENAL HYPERPARATHYROIDISM (H): ICD-10-CM

## 2020-09-09 DIAGNOSIS — D63.1 ANEMIA OF CHRONIC RENAL FAILURE, STAGE 5 (H): ICD-10-CM

## 2020-09-09 DIAGNOSIS — N18.5 ANEMIA OF CHRONIC RENAL FAILURE, STAGE 5 (H): ICD-10-CM

## 2020-09-09 DIAGNOSIS — Z94.0 KIDNEY REPLACED BY TRANSPLANT: Primary | ICD-10-CM

## 2020-09-09 DIAGNOSIS — K59.00 CONSTIPATION, UNSPECIFIED CONSTIPATION TYPE: ICD-10-CM

## 2020-09-09 DIAGNOSIS — Z94.0 KIDNEY REPLACED BY TRANSPLANT: ICD-10-CM

## 2020-09-09 DIAGNOSIS — R79.0 LOW MAGNESIUM LEVEL: ICD-10-CM

## 2020-09-09 DIAGNOSIS — E83.39 LOW PHOSPHATE LEVELS: ICD-10-CM

## 2020-09-09 DIAGNOSIS — Z13.21 ENCOUNTER FOR VITAMIN DEFICIENCY SCREENING: ICD-10-CM

## 2020-09-09 DIAGNOSIS — E55.9 VITAMIN D DEFICIENCY: ICD-10-CM

## 2020-09-09 DIAGNOSIS — Z48.298 CARE AFTER ORGAN TRANSPLANT: Primary | ICD-10-CM

## 2020-09-09 DIAGNOSIS — Z94.0 STATUS POST KIDNEY TRANSPLANT: Primary | ICD-10-CM

## 2020-09-09 DIAGNOSIS — Z48.298 CARE AFTER ORGAN TRANSPLANT: ICD-10-CM

## 2020-09-09 LAB
ALBUMIN SERPL-MCNC: 2.9 G/DL (ref 3.4–5)
ANION GAP SERPL CALCULATED.3IONS-SCNC: 4 MMOL/L (ref 3–14)
ANISOCYTOSIS BLD QL SMEAR: SLIGHT
BASOPHILS # BLD AUTO: 0.1 10E9/L (ref 0–0.2)
BASOPHILS NFR BLD AUTO: 0.9 %
BUN SERPL-MCNC: 32 MG/DL (ref 7–30)
CALCIUM SERPL-MCNC: 8.6 MG/DL (ref 8.5–10.1)
CHLORIDE SERPL-SCNC: 111 MMOL/L (ref 94–109)
CO2 SERPL-SCNC: 29 MMOL/L (ref 20–32)
CREAT SERPL-MCNC: 1.03 MG/DL (ref 0.52–1.04)
DEPRECATED CALCIDIOL+CALCIFEROL SERPL-MC: 20 UG/L (ref 20–75)
DIFFERENTIAL METHOD BLD: ABNORMAL
EOSINOPHIL # BLD AUTO: 0 10E9/L (ref 0–0.7)
EOSINOPHIL NFR BLD AUTO: 0 %
ERYTHROCYTE [DISTWIDTH] IN BLOOD BY AUTOMATED COUNT: 15.6 % (ref 10–15)
FERRITIN SERPL-MCNC: 1757 NG/ML (ref 8–252)
GFR SERPL CREATININE-BSD FRML MDRD: 56 ML/MIN/{1.73_M2}
GLUCOSE SERPL-MCNC: 112 MG/DL (ref 70–99)
HCT VFR BLD AUTO: 28.7 % (ref 35–47)
HGB BLD-MCNC: 9.6 G/DL (ref 11.7–15.7)
IRON SATN MFR SERPL: 92 % (ref 15–46)
IRON SERPL-MCNC: 162 UG/DL (ref 35–180)
LYMPHOCYTES # BLD AUTO: 0.3 10E9/L (ref 0.8–5.3)
LYMPHOCYTES NFR BLD AUTO: 4.4 %
MAGNESIUM SERPL-MCNC: 2 MG/DL (ref 1.6–2.3)
MCH RBC QN AUTO: 33.8 PG (ref 26.5–33)
MCHC RBC AUTO-ENTMCNC: 33.4 G/DL (ref 31.5–36.5)
MCV RBC AUTO: 101 FL (ref 78–100)
METAMYELOCYTES # BLD: 0.1 10E9/L
METAMYELOCYTES NFR BLD MANUAL: 0.9 %
MONOCYTES # BLD AUTO: 0.9 10E9/L (ref 0–1.3)
MONOCYTES NFR BLD AUTO: 14 %
NEUTROPHILS # BLD AUTO: 5.2 10E9/L (ref 1.6–8.3)
NEUTROPHILS NFR BLD AUTO: 78.9 %
PHOSPHATE SERPL-MCNC: 1.1 MG/DL (ref 2.5–4.5)
PLATELET # BLD AUTO: 143 10E9/L (ref 150–450)
PLATELET # BLD EST: ABNORMAL 10*3/UL
POTASSIUM SERPL-SCNC: 3.9 MMOL/L (ref 3.4–5.3)
PROMYELOCYTES # BLD MANUAL: 0.1 10E9/L
PROMYELOCYTES NFR BLD MANUAL: 0.9 %
PTH-INTACT SERPL-MCNC: 357 PG/ML (ref 18–80)
RBC # BLD AUTO: 2.84 10E12/L (ref 3.8–5.2)
SODIUM SERPL-SCNC: 144 MMOL/L (ref 133–144)
TACROLIMUS BLD-MCNC: 7.6 UG/L (ref 5–15)
TIBC SERPL-MCNC: 176 UG/DL (ref 240–430)
TME LAST DOSE: NORMAL H
WBC # BLD AUTO: 6.6 10E9/L (ref 4–11)

## 2020-09-09 PROCEDURE — 83735 ASSAY OF MAGNESIUM: CPT | Performed by: INTERNAL MEDICINE

## 2020-09-09 PROCEDURE — 85025 COMPLETE CBC W/AUTO DIFF WBC: CPT | Performed by: INTERNAL MEDICINE

## 2020-09-09 PROCEDURE — 83970 ASSAY OF PARATHORMONE: CPT | Performed by: INTERNAL MEDICINE

## 2020-09-09 PROCEDURE — 83540 ASSAY OF IRON: CPT | Performed by: INTERNAL MEDICINE

## 2020-09-09 PROCEDURE — 80197 ASSAY OF TACROLIMUS: CPT | Performed by: INTERNAL MEDICINE

## 2020-09-09 PROCEDURE — 83550 IRON BINDING TEST: CPT | Performed by: INTERNAL MEDICINE

## 2020-09-09 PROCEDURE — 36415 COLL VENOUS BLD VENIPUNCTURE: CPT | Performed by: INTERNAL MEDICINE

## 2020-09-09 PROCEDURE — 80069 RENAL FUNCTION PANEL: CPT | Performed by: INTERNAL MEDICINE

## 2020-09-09 PROCEDURE — 82306 VITAMIN D 25 HYDROXY: CPT | Performed by: INTERNAL MEDICINE

## 2020-09-09 PROCEDURE — 82728 ASSAY OF FERRITIN: CPT | Performed by: INTERNAL MEDICINE

## 2020-09-09 PROCEDURE — G0463 HOSPITAL OUTPT CLINIC VISIT: HCPCS

## 2020-09-09 RX ORDER — OXYCODONE HYDROCHLORIDE 5 MG/1
5 TABLET ORAL EVERY 8 HOURS PRN
Qty: 10 TABLET | Refills: 0 | Status: SHIPPED | OUTPATIENT
Start: 2020-09-09 | End: 2020-09-17

## 2020-09-09 NOTE — PATIENT INSTRUCTIONS
The medications on your med card that have a star by them still need to be added to your md box- please do so.    Transplant coordinator's office [phone]    Your transplant coordinator is Tiffany Paul [phone]    To reach the after hours transplant coordinator please call [phone] and ask the  for the on-call adult kidney transplant coordinator.     Dear Ethel Thompson    Thank you for choosing [facility] ([facility]) for your transplant cares.  The following information is a summary of our appointment as well as important reminders.      We look forward in seeing you on your next appointment here at [facility] ([facility]).  Please don t hesitate to call us at [phone] to reschedule any of your appointments or to speak with one of the [facility] registered nurses.  It was a pleasure taking care of you today.    Sincerely,  Mohini    [facility]  [address] [place] [address]  [id number]  Phone:  [phone]

## 2020-09-09 NOTE — TELEPHONE ENCOUNTER
Post Kidney and Pancreas Transplant Team Conference  Date: 9/9/2020  Transplant Coordinator: Tiffany Paul RN     Attendees:  [x]  Dr. Sheridan  [x] Bridget Marquis LPN     [x]  Dr. Sifuentes [] Tiffany Paul RN [] Alyssa Parisi LPN   [x]  Dr. Wilson [] Sade Mckeon, RN     [] Ade Marx RN [x] Patrick Mireles, NilaD   [x] Dr. Awad [x] Hui Crowley, SUN    [] Dr. Ribeiro [] Cesar Max RN    [x] Dr. Alonzo [] Jennie Dela Cruz, SUN    [x] Dr. Phoenix [] Janett Smith, RN     [] Aleja Francis, SUN    [x] Dr. Dewitt [x] Crissy Pulido, SUN    [x] Monika Tinoco, YAKELIN [] Laura Romero, RN        Verbal Plan Read Back:   No changes at this time    Routed to RN Coordinator   Bridget Marquis LPN

## 2020-09-09 NOTE — TELEPHONE ENCOUNTER
ISSUE:   Tacrolimus IR level 6 on 9/8, goal 8-10, dose 4 mg BID.    PLAN:   Please call patient and confirm this was an accurate 12-hour trough. Verify Tacrolimus IR dose 4 mg BID. Confirm no new medications or illness.   Confirm dose not recently INCREASED by Dr. Sifuentes in clinic.  If accurate trough and accurate dose, increase Tacrolimus IR dose to 5 mg BID and repeat labs as scheduled.    Hui Crowley RN, BSN, [facility]  Transplant Care Coordinator      OUTCOME:   Spoke with patient, they confirm accurate trough level and current dose 4 mg BID. Patient is currently in [facility] and had her labs drawn again today.  Patient just increased her dose.  Mohini was present during phone conversation and we will wait for lab results from today before increasing dose.

## 2020-09-09 NOTE — LETTER
2020         RE: Ethel Thompson  [address]  [place] [address]        Dear Colleague,    Thank you for referring your patient, Ethel Thompson, to the [facility] SOLID ORGAN TRANSPLANT. Please see a copy of my visit note below.    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 Made some urine prior to admission. PMH also significant for atrophic right kidney, MGUS, HTN, Hep B core antibody positive, and recent hypomanic episode. S/p  donor kidney transplant with ureteral stent and removal of PD catheter on 2020.    Denies any fevers, chills, nausea or vomiting      No hematuria, dysuria or frequency.     States she is having incisional pain. She states it is a dull ache.      Transplant History:    Transplant Type:  DDKT  Donor Type: Donation after Brain Death   Transplant Date:  9/3/2020 (Kidney)   Ureteral Stent:  Yes   Crossmatch:  negative   DSA at Tx:  No  Baseline Cr: TBD   DeNovo DSA: No    Acute Rejection Hx:  No    Present Maintenance Immunosuppression:  Tacrolimus and Mycophenolate mofetil    CMV IgG Ab Discordance:  No  EBV IgG Ab Discordance:  No    BK Viremia:  No  EBV Viremia:  No    Transplant Coordinator: Tiffany Paul     Transplant Office Phone Number: [phone]     Immunosuppressant Medications     Immunosuppressive Agents Disp Start End     mycophenolate (GENERIC EQUIVALENT) 250 MG capsule    180 capsule 2020     Sig - Route: Take 4 capsules (1,000 mg) by mouth 2 times daily - Oral    Class: E-Prescribe     tacrolimus (GENERIC EQUIVALENT) 0.5 MG capsule    60 capsule 2020     Sig - Route: Take 1 capsule (0.5 mg) by mouth 2 times daily Take total dose 4.5 mg twice per day - Oral    Class: E-Prescribe     tacrolimus (GENERIC EQUIVALENT) 1 MG capsule    240 capsule 2020     Sig - Route: Take 4 capsules (4 mg) by mouth 2 times daily Take tacrolimus  4.5 mg twice per day  - Oral    Class: E-Prescribe          Possible Immunosuppression-related side effects:   []             headache  []             vivid dreams  []             irritability  []             cognitive difficuties  []             fine tremor  []             nausea  []             diarrhea  []             neuropathy      []             edema  []             renal calcineurin toxicity  []             hyperkalemia  []             post-transplant diabetes  []             decreased appetite  []             increased appetite  []             other:  []             none    Prescription Medications as of 9/28/2020       Rx Number Disp Refills Start End Last Dispensed Date Next Fill Date Owning Pharmacy    acetaminophen (TYLENOL) 325 MG tablet  1 Bottle 0 9/7/2020    [place], [place] - [address] [facility]    Sig: Take 2 tablets (650 mg) by mouth every 4 hours as needed for pain    Class: E-Prescribe    Route: Oral    amLODIPine (NORVASC) 5 MG tablet  90 tablet 3 9/8/2020    [place], [place] - [address] [place] [address] 1-735    Sig: Take 1 tablet (5 mg) by mouth daily    Class: E-Prescribe    Route: Oral    aspirin (ASA) 81 MG EC tablet  90 tablet 3 9/22/2020    [address] - [address] [facility]    Sig: Take 1 tablet (81 mg) by mouth daily    Class: E-Prescribe    Route: Oral    aspirin 81 MG EC tablet        [place], [place] - [address] [place] [address] 1-375    Sig: Take 81 mg by mouth daily    Class: Historical    Route: Oral    atorvastatin (LIPITOR) 40 MG tablet    9/6/2020        Sig: Take 0.5 tablets (20 mg) by mouth daily    Class: No Print Out    Route: Oral    calcium carbonate 600 mg-vitamin D 400 units (CALTRATE) 600-400 MG-UNIT per tablet            Sig: Take 1 tablet by mouth 2 times daily    Class: Historical    Route: Oral    famotidine (PEPCID) 20 MG tablet        [address]  [facility] [address] [facility]    Sig: Take 20 mg by mouth every evening     Class: Historical    Route: Oral    fish oil-omega-3 fatty acids 1000 MG capsule        [address] 7[address]768    Sig: Take 1 g by mouth daily     Class: Historical    Route: Oral    fluticasone (FLONASE) 50 MCG/ACT nasal spray            Sig: Spray 1 spray into both nostrils 2 times daily as needed for rhinitis or allergies    Class: Historical    Route: Both Nostrils    furosemide (LASIX) 20 MG tablet  30 tablet 1 9/15/2020    [address] [address] [facility]    Sig: Take 1 tablet (20 mg) by mouth daily    Class: E-Prescribe    Route: Oral    gabapentin (NEURONTIN) 100 MG capsule  30 capsule 0 2020    [address] [address] [facility]    Simg in AM, 200mg in PM    Class: E-Prescribe    levothyroxine (SYNTHROID/LEVOTHROID) 112 MCG tablet    2019        Sig: Take 112 mcg (1 tablet) by mouth every Monday, Tuesday, Wednesday, Thursday, Friday.    Class: Historical    Lidocaine (LIDOCARE) 4 % Patch  10 patch 3 2020    [address] [address] [facility]    Sig: Place 1 patch onto the skin every 24 hours To prevent lidocaine toxicity, patient should be patch free for 12 hrs daily.    Class: E-Prescribe    Route: Transdermal    magnesium oxide (MAG-OX) 400 MG tablet  30 tablet 5 2020    [address] [address] [facility]    Sig: Take 1 tablet (400 mg) by mouth daily (with lunch)    Class: E-Prescribe    Route: Oral    melatonin 3 MG tablet            Sig: Take 6 mg by mouth At Bedtime     Class: Historical    Route: Oral    mycophenolate (GENERIC EQUIVALENT) 250 MG capsule  180 capsule 11 2020    [address] [address] [facility]    Sig: Take 4 capsules (1,000 mg) by mouth 2 times daily    Class:  E-Prescribe    Route: Oral    ondansetron (ZOFRAN-ODT) 4 MG ODT tab  5 tablet 0 9/6/2020    [address]    Sig: Take 1 tablet (4 mg) by mouth every 6 hours as needed for nausea or vomiting    Class: E-Prescribe    Route: Oral    phosphorus tablet 250 mg (PHOSPHORUS TABLET 250 MG) 250 MG per tablet  30 tablet 0 9/11/2020    [id number] [place] [address]    Sig: Take 2 tablets (500 mg) by mouth 2 times daily    Class: E-Prescribe    Route: Oral    polyethylene glycol (MIRALAX) 17 GM/Dose powder  507 g 0 9/14/2020    [id number] [place] [address]    Sig: Take 17 g (1 capful) by mouth daily    Class: E-Prescribe    Route: Oral    polyethylene glycol (MIRALAX) 17 GM/Dose powder            Sig: Take 1 capful by mouth daily    Class: Historical    Route: Oral    predniSONE (DELTASONE) 10 MG tablet  49 tablet 0 9/6/2020    [address]    Sig: On 9/7 take 60mg (6 tabs)  9/8-9/9 take 40mg (4 tabs daily)  9/10-9/16 take 20mg (2 tabs daily)  9/17-9/23 take 15mg (1.5 tabs daily)  9/24-9/30 take 10mg (1 tab daily)  10/1-10/7 take 5mg (half tab daily)    Class: Local Print    Renewals     Renewal requests to authorizing provider (Alicia Garcia, CODY CNP) <b>prohibited</b>          senna-docusate (SENOKOT-S/PERICOLACE) 8.6-50 MG tablet  60 tablet 0 9/6/2020    [address]    Sig: Take 1 tablet by mouth 2 times daily Hold for loose stools    Class: E-Prescribe    Route: Oral    sulfamethoxazole-trimethoprim (BACTRIM) 400-80 MG tablet  30 tablet 11 9/7/2020    [address] [address] [facility]    Sig: Take 1 tablet by mouth daily    Class: E-Prescribe    Route: Oral    tacrolimus (GENERIC EQUIVALENT) 0.5 MG capsule  60 capsule 11 9/22/2020    [facility] [address] [place] [address] [facility]    Sig: Take 1 capsule (0.5 mg) by mouth 2 times daily Take total dose 4.5 mg twice per day    Class: E-Prescribe    Route: Oral    tacrolimus (GENERIC EQUIVALENT) 1 MG capsule  240 capsule 11 9/22/2020    [address] [address] [facility]    Sig: Take 4 capsules (4 mg) by mouth 2 times daily Take tacrolimus  4.5 mg twice per day    Class: E-Prescribe    Route: Oral    traMADol (ULTRAM) 50 MG tablet  14 tablet 0 9/23/2020    [address]    Sig: Take 1 tablet (50 mg) by mouth every 8 hours as needed for severe pain    Class: Local Print    Route: Oral    valGANciclovir (VALCYTE) 450 MG tablet  60 tablet 2 9/18/2020    [address] [address] [facility]    Sig: Take 2 tablets (900 mg) by mouth daily    Class: Historical    Route: Oral    vitamin B-12 (CYANOCOBALAMIN) 500 MCG tablet        [address]    Sig: Take 500 mcg by mouth daily    Class: Historical    Route: Oral    vitamin B6 (PYRIDOXINE) 100 MG tablet        [address]    Sig: Take 100 mg by mouth daily    Class: Historical    Route: Oral    Vitamin D, Cholecalciferol, 25 MCG (1000 UT) TABS            Sig: Take 2,000 Units by mouth daily    Class: Historical    Route: Oral          O:      General Appearance: in no apparent distress.   Skin: Normal, no rashes or jaundice  Heart: regular rate and rhythm, normal S1 and S2  Lungs: easy respirations, no audible wheezing.  Abdomen: rounded, The wound is dry and intact, without hernia. The abdomen is non-tender. The kidney graft is not tender.  There is no ascites.  Extremities: Tremor absent.   Edema: present bilaterally. Mild      Transplant Immunosuppression Labs Latest Ref Rng & Units  9/28/2020 9/24/2020 9/21/2020 9/18/2020 9/14/2020   Tacro Level 5.0 - 15.0 ug/L - 9.6 11.5 7.3 7.1   Tacro Level - - Not Provided 09/20/20 @ 2000 LAST DOSE 09/17/20 @ 2000 Not Provided   Creat 0.52 - 1.04 mg/dL 0.98 0.94 0.98 0.96 0.92   BUN 7 - 30 mg/dL 29 28 29 33(H) 24   WBC 4.0 - 11.0 10e9/L 5.5 5.1 5.8 7.0 8.7   Neutrophil % 76.2 77.2 - - -   ANEU 1.6 - 8.3 10e9/L 4.2 4.0 - - -       Chemistries:   Recent Labs   Lab Test 09/28/20  0804   BUN 29   CR 0.98   GFRESTIMATED 60*   *     Lab Results   Component Value Date    A1C 5.4 09/03/2020     Recent Labs   Lab Test 09/09/20  0637 09/05/20  0535   ALBUMIN 2.9* 2.8*   BILITOTAL  --  0.4   ALKPHOS  --  53   AST  --  50*   ALT  --  52*     Urine Studies:  Recent Labs   Lab Test 09/03/20  1905   COLOR Yellow   APPEARANCE Clear   URINEGLC Negative   URINEBILI Negative   URINEKETONE Negative   SG 1.008   UBLD Small*   URINEPH 6.5   PROTEIN 30*   NITRITE Negative   LEUKEST Negative   RBCU <1   WBCU <1     Recent Labs   Lab Test 09/05/20  0930 09/03/20  1905   UTPG 1.17* 1.92*     Hematology:   Recent Labs   Lab Test 09/28/20  0804 09/24/20  0803 09/21/20  0805   HGB 10.3* 9.9* 10.3*    282 253   WBC 5.5 5.1 5.8     Coags:   Recent Labs   Lab Test 09/04/20  1544 09/03/20  1756   INR 1.08 0.89     HLA antibodies:   SA1 Hi Risk Diya   Date Value Ref Range Status   09/08/2020 None  Final     SA1 Mod Risk Diya   Date Value Ref Range Status   09/08/2020 B:76  Final     SA2 Hi Risk Diya   Date Value Ref Range Status   09/08/2020 None  Final     SA2 Mod Risk Diya   Date Value Ref Range Status   09/08/2020 None  Final       Assessment: Ethel Thompson is doing fairly well s/p DDKT:  Issues we addressed during her visit include:    Plan:    1. Graft function: stable  2. Immunosuppression Management: No change continue MMF and prograf .  Complexity of management:Medium.  Contributing factors: recent txp  3. Pain: has incisional pain.  Use pain medications as directed.   Lidocaine patches as directed.  Informed patient she would have some pain  Followup: as directed    Total Time: 20 min,   Counselling Time: 10 min.          Again, thank you for allowing me to participate in the care of your patient.        Sincerely,        Monika Tinoco NP

## 2020-09-09 NOTE — PROGRESS NOTES
"Ethel Thompson came to [facility] today for a lab and assess following a kidney  transplant on 9/4.      Discharge date: 9/6  Transplant coordinator: Tiffany BOUDREAUX  Phone number patient can be reached at: [phone] (M)      Physical Assessment:  See physical assessment located under \"Document Flowsheets\".  Incision site: c/d/i dermabond. No s/s infection; bruising down to groin and upper thigh  Lines: NA  Plata: NA  Urine clarity: pink tinged per pt report, good UOP  Hydration: Encouraged to continue fluid intake recommendations despite fluid retention and lasix   Nutrition: Appetite suppressed; intermittent nausea described , no vomiting. Nausea resolves by eating or drinking something per pt   Last BM: constipated. Reports small BM this morning but increased abd pain. Now having senna x 2 days; pt encouraged to incorporate metamucil as well.   Pain: pt reports increased abd pain today 7/10 before pain meds; requesting more pain meds.   Laboratory tests: Standard labs drawn.    Plan of care for today: Labs drawn and pt assessed. Post transplant education reviewed with pt and spouse. Medications reviewed, med box updated with yesterdays changes. Pt seen by fellow and Dr Sifuentes, and Monika Tinoco; new orders received to start miralax in addition to senna.       Medication changes:   Start Miralax     Medications administered: pt self administered morning meds      Patient education:    The following teaching topics were addressed: Importance of drinking 2L of non-caffeinated fluids daily, Incisional care, Signs/symptoms of infection, Good handwashing, Phone numbers to call with concenrs (Transplant coordinator, [facility] and [facility]) and Plan of care   Patient verbalized understanding and all questions answered.    Drug level:  Tac level today to be followed by transplant coordinators.    Total nursing time: 40 mins    Discharge Plan    Pt will follow up with home care labs Monday & Thurs, writer called an spoke with Charlene at " [facility] and voicemail left with [facility] Bridget Chang. Message sent to [place] to schedule in transplant clinic video visit 9/18 per MD request.   Discharge instructions reviewed with patient: YES  Patient/Representative verbalized understanding, all questions answered: YES    Discharged from unit with spouse to home.    Mohini Mccabe RN

## 2020-09-09 NOTE — LETTER
"    9/9/2020         RE: Ethel Thompson  [address]  [place] [address]        Dear Colleague,    Thank you for referring your patient, Ethel Thompson, to the [facility] PROCED[facility]. Please see a copy of my visit note below.    Ethel Thompson came to [facility] today for a lab and assess following a kidney  transplant on 9/4.      Discharge date: 9/6  Transplant coordinator: Tiffany BOUDREAUX  Phone number patient can be reached at: [phone] ()      Physical Assessment:  See physical assessment located under \"Document Flowsheets\".  Incision site: c/d/i dermabond. No s/s infection; bruising down to groin and upper thigh  Lines: NA  Plata: NA  Urine clarity: pink tinged per pt report, good UOP  Hydration: Encouraged to continue fluid intake recommendations despite fluid retention and lasix   Nutrition: Appetite suppressed; intermittent nausea described , no vomiting. Nausea resolves by eating or drinking something per pt   Last BM: constipated. Reports small BM this morning but increased abd pain. Now having senna x 2 days; pt encouraged to incorporate metamucil as well.   Pain: pt reports increased abd pain today 7/10 before pain meds; requesting more pain meds.   Laboratory tests: Standard labs drawn.    Plan of care for today: Labs drawn and pt assessed. Post transplant education reviewed with pt and spouse. Medications reviewed, med box updated with yesterdays changes. Pt seen by fellow and Dr Sifuentes, and Monika Tinoco; new orders received to start miralax in addition to senna.       Medication changes:   Start Miralax     Medications administered: pt self administered morning meds      Patient education:    The following teaching topics were addressed: Importance of drinking 2L of non-caffeinated fluids daily, Incisional care, Signs/symptoms of infection, Good handwashing, Phone numbers to call with concenrs (Transplant coordinator, Unit [facility] and [facility]) and Plan of care   Patient " verbalized understanding and all questions answered.    Drug level:  Tac level today to be followed by transplant coordinators.    Total nursing time: 40 mins    Discharge Plan    Pt will follow up with home care labs Monday & Thurs, writer called an spoke with Charlene at [facility] and voicemail left with [facility] Bridget Chang. Message sent to [place] to schedule in transplant clinic video visit 9/18 per MD request.   Discharge instructions reviewed with patient: YES  Patient/Representative verbalized understanding, all questions answered: YES    Discharged from unit with spouse to home.    Mohini Mccabe RN      Again, thank you for allowing me to participate in the care of your patient.        Sincerely,        [facility] [place]

## 2020-09-09 NOTE — PATIENT INSTRUCTIONS
RECOMMENDATIONS   Start taking miralax  oncce and senna together to improve constipation  Do urine tests   Today   Continue to push fluids  Check BP and Heart rate daily  Do your scheduled labs

## 2020-09-09 NOTE — PROGRESS NOTES
ACUTE TRANSPLANT NEPHROLOGY VISIT    Assessment & Plan      RECOMMENDATIONS   Start taking miralax  oncce and senna together to improve constipation  Do urine tests   Today   Continue to push fluids  Check BP and Heart rate daily  Do your scheduled labs   F/u with video visit next Friday with Transplant nephrology clinic      # DDKT: Trend down               - Baseline Cr ~ TBD.  Currently 1.03              - Proteinuria: FSGS diagnosis with UPCR 1.9g/g pre op.                 UPCR on 9/5 : 1.17 g/gCr --> will               - Date DSA Last Checked: Sep/2020      Latest DSA: No DSA at time of transplant              - BK Viremia: Not checked post transplant              - PD Catheter removed intraop              -Stent in place, remove 4-6 weeks post op              - PRA 29%. Perinephric fluid collection noted on post-op US.      # Immunosuppression: Tacrolimus immediate release (goal 8-10), Mycophenolate mofetil (dose 750 mg every 12 hours) and Prednisone (dose taper)  Induction immunosuppression with thymoglobulin 125mg (2.2mg/kg), basiliximab (dose given POD #1 and schedule outpatient for POD #5  TODAY), and steroid taper.             - Changes: Not at this time               -Moderate risk induction. Give simulect today   mg daily  Tacrolimus 4 mg BID --> Tac level < 3 ( 9/7)   On Steroid taper     # Infection Prophylaxis:   - PJP: Sulfa/TMP (Bactrim)  - CMV: Valcyte x3m, CMV IgG recipient +. Based on improved function will increase dose to 450 mg BID   - Thrush - start Nystatin swish 4 times daily     # Hypertension: Controlled;   Goal BP: < 150/90              - Volume status: Mildly hypervolemic             EDW ~ 54kg. Today weight is  64.4 kg  Wt Readings from Last 3 Encounters:   09/09/20 64.6 kg (142 lb 6.4 oz)   09/08/20 64.4 kg (142 lb)   09/07/20 63.3 kg (139 lb 8 oz)     Continue Amlodipine 5 mg daily  Continue Lasix 20 mg daily                - Changes: Yes. No      # Electrolytes               -   Potassium : Hypokalemia resolved with KCL 40 meq oral on 9/8/2020  Magnesium normal . On mag oxide supplement 400 mg daily       # Elevated Blood Glucose: Glucose improved  222 ( 9/7) --> 210 ( 9/8)_ --> 112 ( 9/9)   9/8 blood sugar was not fasting              - Management as per primary team.     # Anemia in Chronic Renal Disease: Hgb:  Hb 10.1 . Patient needed prbc transfusion post transplant  JHONATNA: No   Hb 10.1 --> 9.6  Ferritin 1757  Iron sat  92                 # Mineral Bone Disorder:   - Calcium; level: Normal        On supplement: No  - Phosphorus; level: LOW  At 1.1  On phosporus supplement - Phosphorus tablets 500 mg  BID               - Restart Vit D 2000 units daily     # FSGS:              - Recommend weekly UPCR              - Pre op UPCR  1.9g/gCr              - UPCR on 9/5 : 1.17 g/gCr              - Will check UPCR with next visit           # Transplant History:  Etiology of Kidney Failure: Focal segmental glomerulosclerosis (FSGS)  Tx: DDKT  Transplant: 9/3/2020 (Kidney)  Donor Type: Donation after Brain Death        Donor Class:   Crossmatch at time of Tx: negative  Significant changes in immunosuppression: None  Significant transplant-related complications: None     Recommendations were communicated to the primary team verbally.     Transplant Office Phone Number: [phone]     Assessment and plan was discussed with the patient and she voiced her understanding and agreement.     Return visit: 1 week      Dinah Strange MD  Patient staffed with Dr Sifuentes          Return visit: as scheduled    Dinah Strange MD    Chief Complaint   Ms. Thompson is a 65 year old here for routine follow up.     History of Present Illness   Post transplant acute care  Visit  C/o pain in at incision site   Had 2 BM   Afebrile      Recent Hospitalizations:  [x] No [x] Yes    New Medical Issues: [x] No [] Yes    Decreased energy: [x] No [] Yes    Chest pain or SOB with exertion:  [x] No [] Yes    Appetite change or  weight change: [x] No [] Yes    Nausea, vomiting or diarrhea:  [x] No [] Yes    Fever, sweats or chills: [x] No [] Yes    Leg swelling: [x] No [] Yes        Review of Systems   A comprehensive review of systems was obtained and negative, except as noted in the HPI or PMH.    Problem List   Patient Active Problem List   Diagnosis     Elevated serum immunoglobulin free light chain level     Renovascular hypertension     FSGS (focal segmental glomerulosclerosis)     Hyperlipidemia     Hypothyroidism     SADIE on CPAP     Sensorineural hearing loss (SNHL) of both ears     Anemia in chronic kidney disease     GERD (gastroesophageal reflux disease)     Hypertension     Hepatitis B core antibody positive     End stage renal disease (H)     Secondary hyperparathyroidism (H)     Memory deficits     Kidney replaced by transplant     Immunosuppressed status (H)     Elevated lactic acid level     Acute anemia       Social History   Social History     Tobacco Use     Smoking status: Former Smoker     Packs/day: 1.00     Types: Cigarettes     Start date:      Last attempt to quit:      Years since quittin.7     Smokeless tobacco: Never Used   Substance Use Topics     Alcohol use: No     Frequency: Never     Drug use: No       Allergies   Allergies   Allergen Reactions     Amoxicillin-Pot Clavulanate Nausea and Vomiting       Medications   Current Outpatient Medications   Medication Sig     acetaminophen (TYLENOL) 325 MG tablet Take 2 tablets (650 mg) by mouth every 4 hours as needed for pain     amLODIPine (NORVASC) 5 MG tablet Take 1 tablet (5 mg) by mouth daily     aspirin 81 MG EC tablet Take 81 mg by mouth daily     atorvastatin (LIPITOR) 40 MG tablet Take 0.5 tablets (20 mg) by mouth daily     famotidine (PEPCID) 20 MG tablet Take 20 mg by mouth every evening      fish oil-omega-3 fatty acids 1000 MG capsule Take 1 g by mouth daily      fluticasone (FLONASE) 50 MCG/ACT nasal spray Spray 1 spray into both nostrils  2 times daily as needed for rhinitis or allergies     furosemide (LASIX) 20 MG tablet Take 1 tablet (20 mg) by mouth daily     levothyroxine (SYNTHROID/LEVOTHROID) 112 MCG tablet Take 112 mcg (1 tablet) by mouth every Monday, Tuesday, Wednesday, Thursday, Friday.     Lidocaine (LIDOCARE) 4 % Patch Place 1 patch onto the skin every 24 hours To prevent lidocaine toxicity, patient should be patch free for 12 hrs daily.     magnesium oxide (MAG-OX) 400 MG tablet Take 1 tablet (400 mg) by mouth daily (with lunch)     melatonin 3 MG tablet Take 3 mg by mouth At Bedtime      mycophenolate (GENERIC EQUIVALENT) 250 MG capsule Take 3 capsules (750 mg) by mouth 2 times daily     nystatin (MYCOSTATIN) 118549 UNIT/ML suspension Take 5 mLs (500,000 Units) by mouth 4 times daily for 14 days     ondansetron (ZOFRAN-ODT) 4 MG ODT tab Take 1 tablet (4 mg) by mouth every 6 hours as needed for nausea or vomiting     oxyCODONE (ROXICODONE) 5 MG tablet Take 1 tablet (5 mg) by mouth every 4 hours as needed for moderate to severe pain     phosphorus tablet 250 mg (PHOSPHORUS TABLET 250 MG) 250 MG per tablet Take 2 tablets (500 mg) by mouth 2 times daily     predniSONE (DELTASONE) 10 MG tablet On 9/7 take 60mg (6 tabs)  9/8-9/9 take 40mg (4 tabs daily)  9/10-9/16 take 20mg (2 tabs daily)  9/17-9/23 take 15mg (1.5 tabs daily)  9/24-9/30 take 10mg (1 tab daily)  10/1-10/7 take 5mg (half tab daily)     Psyllium (METAMUCIL FIBER PO) Take one tablespoon by mouth per day every evening     senna-docusate (SENOKOT-S/PERICOLACE) 8.6-50 MG tablet Take 1 tablet by mouth 2 times daily Hold for loose stools     sulfamethoxazole-trimethoprim (BACTRIM) 400-80 MG tablet Take 1 tablet by mouth daily     tacrolimus (GENERIC EQUIVALENT) 0.5 MG capsule Take 1 cap by mouth twice daily as directed by Transplant Center for dose adjustment     tacrolimus (GENERIC EQUIVALENT) 1 MG capsule Take 4 capsules (4 mg) by mouth 2 times daily     valGANciclovir (VALCYTE)  450 MG tablet Take 1 tab every other day. Titrate dose up to a max of 2 tabs (900 mg) by mouth daily when directed by your transplant team.     vitamin B-12 (CYANOCOBALAMIN) 500 MCG tablet Take 500 mcg by mouth daily     vitamin B6 (PYRIDOXINE) 100 MG tablet Take 100 mg by mouth daily     No current facility-administered medications for this visit.      There are no discontinued medications.    Physical Exam   Vital Signs: reviewed     GENERAL APPEARANCE: alert and no distress  HENT: mouth without ulcers or lesions  LYMPHATICS: no cervical or supraclavicular nodes  RESP: lungs clear to auscultation - no rales, rhonchi or wheezes  CV: regular rhythm, normal rate, no rub, no murmur  EDEMA: no LE edema bilaterally  ABDOMEN: soft, mild TTP in incision site . Nondistended,  bowel sounds normal  MS: extremities normal - no gross deformities noted, no evidence of inflammation in joints, no muscle tenderness  SKIN: no rash    Data     Renal Latest Ref Rng & Units 9/9/2020 9/8/2020 9/7/2020   Na 133 - 144 mmol/L 144 142 141   K 3.4 - 5.3 mmol/L 3.9 3.4 3.6   Cl 94 - 109 mmol/L 111(H) 110(H) 108   CO2 20 - 32 mmol/L 29 27 26   BUN 7 - 30 mg/dL 32(H) 34(H) 41(H)   Cr 0.52 - 1.04 mg/dL 1.03 0.96 1.48(H)   Glucose 70 - 99 mg/dL 112(H) 210(H) 222(H)   Ca  8.5 - 10.1 mg/dL 8.6 8.4(L) 8.9   Mg 1.6 - 2.3 mg/dL 2.0 2.3 2.3     Bone Health Latest Ref Rng & Units 9/9/2020 9/8/2020 9/7/2020   Phos 2.5 - 4.5 mg/dL 1.1(L) 1.1(L) 1.9(L)     Heme Latest Ref Rng & Units 9/9/2020 9/8/2020 9/7/2020   WBC 4.0 - 11.0 10e9/L 6.6 6.2 9.8   Hgb 11.7 - 15.7 g/dL 9.6(L) 10.1(L) 10.7(L)   Plt 150 - 450 10e9/L 143(L) 144(L) 134(L)   ABSOLUTE NEUTROPHIL 1.6 - 8.3 10e9/L 5.2 5.6 8.9(H)   ABSOLUTE LYMPHOCYTES 0.8 - 5.3 10e9/L 0.3(L) 0.2(L) 0.2(L)   ABSOLUTE MONOCYTES 0.0 - 1.3 10e9/L 0.9 0.1 0.4   ABSOLUTE EOSINOPHILS 0.0 - 0.7 10e9/L 0.0 0.0 0.0   ABSOLUTE BASOPHILS 0.0 - 0.2 10e9/L 0.1 0.0 0.0   ABS IMMATURE GRANULOCYTES 0 - 0.4 10e9/L - - 0.3    ABSOLUTE NUCLEATED RBC - - 0.1 0.0     Liver Latest Ref Rng & Units 9/9/2020 9/5/2020 9/4/2020   AP 40 - 150 U/L - 53 60   TBili 0.2 - 1.3 mg/dL - 0.4 1.1   DBili 0.0 - 0.2 mg/dL - 0.2 0.1   ALT 0 - 50 U/L - 52(H) 41   AST 0 - 45 U/L - 50(H) 38   Tot Protein 6.8 - 8.8 g/dL - 5.4(L) 5.2(L)   Albumin 3.4 - 5.0 g/dL 2.9(L) 2.8(L) 2.3(L)     Pancreas Latest Ref Rng & Units 9/3/2020   A1C 0 - 5.6 % 5.4     Iron studies Latest Ref Rng & Units 9/9/2020   Iron 35 - 180 ug/dL 162   Iron sat 15 - 46 % 92(H)   Ferritin 8 - 252 ng/mL 1,757(H)     [facility] Txp Virology Latest Ref Rng & Units 9/3/2020 1/28/2019   EBV CAPSID ANTIBODY IGG 0.0 - 0.8 AI >8.0(H) >8.0(H)   Hep B Core NR:Nonreactive - Reactive(AA)        Recent Labs   Lab Test 09/06/20  0430 09/07/20  0738 09/08/20  0637   DOSTAC Not Provided Not Provided 1,900   TACROL <3.0* <3.0* 6.0
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
## 2020-09-10 ENCOUNTER — TELEPHONE (OUTPATIENT)
Dept: TRANSPLANT | Facility: CLINIC | Age: 65
End: 2020-09-10

## 2020-09-10 DIAGNOSIS — Z94.0 KIDNEY REPLACED BY TRANSPLANT: Primary | ICD-10-CM

## 2020-09-10 LAB
ANION GAP SERPL CALCULATED.3IONS-SCNC: 5 MMOL/L (ref 3–14)
BLD PROD TYP BPU: NORMAL
BLD UNIT ID BPU: 0
BLOOD PRODUCT CODE: NORMAL
BPU ID: NORMAL
BUN SERPL-MCNC: 28 MG/DL (ref 7–30)
CALCIUM SERPL-MCNC: 8.3 MG/DL (ref 8.5–10.1)
CHLORIDE SERPL-SCNC: 110 MMOL/L (ref 94–109)
CO2 SERPL-SCNC: 28 MMOL/L (ref 20–32)
CREAT SERPL-MCNC: 0.92 MG/DL (ref 0.52–1.04)
ERYTHROCYTE [DISTWIDTH] IN BLOOD BY AUTOMATED COUNT: 15.9 % (ref 10–15)
GFR SERPL CREATININE-BSD FRML MDRD: 65 ML/MIN/{1.73_M2}
GLUCOSE SERPL-MCNC: 110 MG/DL (ref 70–99)
HCT VFR BLD AUTO: 30.7 % (ref 35–47)
HGB BLD-MCNC: 9.9 G/DL (ref 11.7–15.7)
MCH RBC QN AUTO: 32.9 PG (ref 26.5–33)
MCHC RBC AUTO-ENTMCNC: 32.2 G/DL (ref 31.5–36.5)
MCV RBC AUTO: 102 FL (ref 78–100)
PLATELET # BLD AUTO: 177 10E9/L (ref 150–450)
POTASSIUM SERPL-SCNC: 4.1 MMOL/L (ref 3.4–5.3)
RBC # BLD AUTO: 3.01 10E12/L (ref 3.8–5.2)
SODIUM SERPL-SCNC: 143 MMOL/L (ref 133–144)
TACROLIMUS BLD-MCNC: 9.2 UG/L (ref 5–15)
TME LAST DOSE: NORMAL H
TRANSFUSION STATUS PATIENT QL: NORMAL
TRANSFUSION STATUS PATIENT QL: NORMAL
WBC # BLD AUTO: 7.8 10E9/L (ref 4–11)

## 2020-09-10 PROCEDURE — 80048 BASIC METABOLIC PNL TOTAL CA: CPT | Performed by: SURGERY

## 2020-09-10 PROCEDURE — 85027 COMPLETE CBC AUTOMATED: CPT | Performed by: SURGERY

## 2020-09-10 PROCEDURE — 80197 ASSAY OF TACROLIMUS: CPT | Performed by: SURGERY

## 2020-09-10 NOTE — TELEPHONE ENCOUNTER
Provider Call: General    Reason for call: Bridget indicated they saw Ethel this morning to do her admission to Home Care. They were unable to do Ethel's admission in 1 day due to staff constraints so Bridget would like permission for them to complete admission on Monday when Ethel's next transplant labs are due. If you could call Bridget back and let her know that's ok, she would appreciate it. Thank you.    Call back needed? Yes    Return Call Needed  Same as documented in contacts section  When to return call?: Same day: Route High Priority

## 2020-09-11 ENCOUNTER — TELEPHONE (OUTPATIENT)
Dept: TRANSPLANT | Facility: CLINIC | Age: 65
End: 2020-09-11

## 2020-09-11 ENCOUNTER — ALLIED HEALTH/NURSE VISIT (OUTPATIENT)
Dept: PHARMACY | Facility: CLINIC | Age: 65
End: 2020-09-11
Payer: OTHER GOVERNMENT

## 2020-09-11 DIAGNOSIS — E83.39 HYPOPHOSPHATEMIA: ICD-10-CM

## 2020-09-11 DIAGNOSIS — R60.9 EDEMA, UNSPECIFIED TYPE: ICD-10-CM

## 2020-09-11 DIAGNOSIS — Z94.0 KIDNEY REPLACED BY TRANSPLANT: Primary | ICD-10-CM

## 2020-09-11 DIAGNOSIS — R52 PAIN: ICD-10-CM

## 2020-09-11 LAB
DONOR IDENTIFICATION: NORMAL
DSA COMMENTS: NORMAL
DSA PRESENT: NO
DSA TEST METHOD: NORMAL
INTERFERING SUBST TEST METHOD: NORMAL
INTERFERING SUBSTANCE COMMENT: NORMAL
INTERFERING SUBSTANCE RESULT: NORMAL
INTERFERING SUBSTANCE: NORMAL
ORGAN: NORMAL
SA1 CELL: NORMAL
SA1 COMMENTS: NORMAL
SA1 HI RISK ABY: NORMAL
SA1 MOD RISK ABY: NORMAL
SA1 TEST METHOD: NORMAL
SA2 CELL: NORMAL
SA2 COMMENTS: NORMAL
SA2 HI RISK ABY UA: NORMAL
SA2 MOD RISK ABY: NORMAL
SA2 TEST METHOD: NORMAL
UNACCEPTABLE ANTIGEN: NORMAL
UNOS CPRA: 29

## 2020-09-11 PROCEDURE — 99605 MTMS BY PHARM NP 15 MIN: CPT | Performed by: PHARMACIST

## 2020-09-11 PROCEDURE — 99607 MTMS BY PHARM ADDL 15 MIN: CPT | Performed by: PHARMACIST

## 2020-09-11 RX ORDER — MULTIVIT-MIN/IRON/FOLIC ACID/K 18-600-40
2000 CAPSULE ORAL DAILY
COMMUNITY
End: 2020-11-18

## 2020-09-11 RX ORDER — POLYETHYLENE GLYCOL 3350 17 G/17G
1 POWDER, FOR SOLUTION ORAL DAILY
COMMUNITY
End: 2020-10-26

## 2020-09-11 NOTE — TELEPHONE ENCOUNTER
Provider Call: Home Care  Route to N  Facility Name: [facility]  Facility Location: [place]  Reason for Call: has questions regarding the Magnesium   Callback needed? Yes    Return Call Needed  Same as documented in contacts section  When to return call?: Same day: Route High Priority

## 2020-09-11 NOTE — TELEPHONE ENCOUNTER
Spoke with Maxim who is requesting neutra phos be sent to [facility] pharmacy in [place]. Rx sent.

## 2020-09-11 NOTE — TELEPHONE ENCOUNTER
"Patient Call: Voicemail  Date/Time: 9/11/20 12:14PM  Reason for call: Maxim calling on behalf of pt to speak with Tiffany (coordinator). Tiffany told Maxim to \"call this number\" and noted that the phone number in the pt's discharge paperwork is incorrect and needs to be changed in [facility]'s system.     Maxim calling as 1 (one) prescription for the patient was never called into their pharmacy and they need it called in ASAP so they can get it filled/picked up today.    Please call Maxim back at [phone]    "

## 2020-09-11 NOTE — PATIENT INSTRUCTIONS
Recommendations from today's MTM visit:                                                      1. Increase your water intake up to 60 ounces (which is about five to six 12 ounce bottles).    2. Prednisone taper:  9/10-9/16 take 20mg (2 tabs daily) 9/17-9/23 take 15mg (1.5 tabs daily) 9/24-9/30 take 10mg (1 tab daily) 10/1-10/7 take 5mg (half tab daily)    3. I will have Tiffany send over Furosemide and Amlodipine refills to [facility] for you.     It was great to speak with you today.  I value your experience and would be very thankful for your time with providing feedback on our clinic survey. You may receive a survey via email or text message in the next few days.     Next MTM visit: 9/16 at 11AM    To schedule another MTM appointment, please call the clinic directly or you may call the MTM scheduling line at [phone] or toll-free at [phone].     My Clinical Pharmacist's contact information:                                                      It was a pleasure talking with you today!  Please feel free to contact me with any questions or concerns you have.      Patrick Mireles, PharmD  MTM Pharmacist    Phone: [phone]

## 2020-09-11 NOTE — PROGRESS NOTES
MTM ENCOUNTER  SUBJECTIVE/OBJECTIVE:                           Ethel Thompson is a 65 year old female called for a transitions of care visit. She was discharged from [facility] on 9/6/20 for kidney transplant. Patient was accompanied by Maxim, her .     Patient consented to a telehealth visit: yes  Telemedicine Visit Details  Type of service:  Telephone visit  Start Time: 11:06 AM  End Time: 11:49 AM  Originating Location (pt. Location): Home  Distant Location (provider location):  [facility] MEDICATION THERAPY MANAGEMENT  Mode of Communication:  Telephone    Chief Complaint: Initial post txp med review.  1. Prescriptions sent to [facility].    Allergies/ADRs: Reviewed in EHR  Tobacco: She reports that she quit smoking about 25 years ago. Her smoking use included cigarettes. She started smoking about 40 years ago. She smoked 1.00 pack per day. She has never used smokeless tobacco.  Alcohol: not currently using  Caffeine: 2-3 cups/day of coffee,  2 water bottles.   PMH: Reviewed in EHR    Medication Adherence/Access: Patient uses pill box(es) and has assistance with medication administration: family member.  Patient takes medications 3 time(s) per day. 8am and 8pm 2pm  Per patient, misses medication 0 times per week.   Medication barriers: none.   The patient fills medications at [place]: NO, fills medications at [facility].  Stools: 1-2 soft BMs daily. Senna 2 tablet BID, Miralax 17g daily    Renal Transplant:  Current immunosuppressants include TAC 4mg BID and MMF 750mg BID, Prednisone taper unsure of taper schedule.  Pt reports no side effects  Transplant date: 9/3/20  Estimated Creatinine Clearance: 61.3 mL/min (based on SCr of 0.92 mg/dL).  CMV prophylaxis: CrCl >/=60 mL/minute: Valcyte 900mg daily Treat 3 months post tx   PCP prophylaxis: Bactrim S S daily  Antifungal Prophylaxis: Nystatin 5mL QID until gone.  PPI use: Famotidine 20mg prn.   Current supplements for replacement: Phosphorus 500mg BID (appetite is good),  Magnesium 400mg once daily, Vitamin D 2000 units daily.  Holding Vitamin B supplements and Fish oil currently.   Magnesium   Date Value Ref Range Status   09/09/2020 2.0 1.6 - 2.3 mg/dL Final   Tx Coordinator: Sandro Paul MD: Dr. Sifuentes, Using Med Card: Yes  Immunizations: annual flu shot 2019; [id number]: unknown ; Prevnar 13: 2019; TDaP:  2019; Shingrix: unknown    Edema: Pt is taking Furosemide 20mg daily, restarted Amlodipine 5mg daily this week, Today: 115/52, and 140/83, Yesterday 160/90, a little dizzy when standing up.   Weights: over the last few days 139-142#, pretty stable. Dry weight is ~125#  BP Readings from Last 3 Encounters:   09/09/20 (!) 169/90   09/08/20 (!) 181/93   09/07/20 (!) 150/79     Pain: Pt is taking APAP 650mg every 6 hours, Oxycodone 5mg every 4-8 hours, Lidocaine patches 4% daily.     Today's Vitals: There were no vitals taken for this visit.      ASSESSMENT:                            Medication Adherence: No issues identified. Pt unsure of prednisone taper, we discussed this in length. [facility] and [facility]semide.     Renal Transplant: See above.    Edema: Patient having some orthostatic hypotension, possibly due to not drinking enough water (only 2x 12 ounces a day. Instructed patient to increase. She has not noticed a increase in Edema since restarting Amlodipine, but we will continue to monitor this. Weight is up about 15# from dry weight with little change. Will follow up next week.     Pain: Stable.    PLAN:                          Post Discharge Medication Reconciliation Status: discharge medications reconciled, continue medications without change.    Tiffany Beverly...  1. Pt is requesting Amlodipine and Furosemide refills be sent over the [facility]  2. FYI patients weight stable at ~140#. Pt states dry weight is ~125#.   3. Having some orthostatic hypotension, but only drinking about 24 ounces of water daily. Instructed to increase to 60  ounces.    on 9/7 take 60mg (6 tabs) 9/8-9/9 take 40mg (4 tabs daily)    I spent 40 minutes with this patient today. I offer these suggestions for consideration by txp team. A copy of the visit note was provided to the patient's referring provider.    Will follow up in 1 week.    The patient was given a summary of these recommendations.     Patrick Mireles PharmD  [facility] Pharmacist    Phone: [phone]

## 2020-09-11 NOTE — TELEPHONE ENCOUNTER
Patient Call: Voicemail  Date/Time: 091120 12:14 pm  Reason for call: Maxim calling to speak to Tiffany, patient was discharged but is missing a prescription that was not called in, please call them at [phone], also noted that our contact number is wrong on the discharge paperwork.

## 2020-09-11 NOTE — TELEPHONE ENCOUNTER
RNCC returned call to April evans to dose mag ox at any time, as long as it is >2h away from either of the MMF dosing times.    April voiced understanding and will relay the information to Ethel.

## 2020-09-11 NOTE — TELEPHONE ENCOUNTER
Patient Call: Medication Refill  Route to LPN  Instruct the patient to first contact their pharmacy. If they have called their pharmacy and require further assistance, route to LPN.    Pharmacy Name: [facility]  Pharmacy Location: [facility]   Name of Medication: Phosphorus Dose: 250 mg   When will the patient be out of this medication?: Less than 3 days (Route high priority)

## 2020-09-14 ENCOUNTER — TELEPHONE (OUTPATIENT)
Dept: CARE COORDINATION | Facility: CLINIC | Age: 65
End: 2020-09-14

## 2020-09-14 ENCOUNTER — TELEPHONE (OUTPATIENT)
Dept: TRANSPLANT | Facility: CLINIC | Age: 65
End: 2020-09-14

## 2020-09-14 ENCOUNTER — OFFICE VISIT (OUTPATIENT)
Dept: TRANSPLANT | Facility: CLINIC | Age: 65
End: 2020-09-14
Attending: NURSE PRACTITIONER
Payer: MEDICARE

## 2020-09-14 ENCOUNTER — ANCILLARY PROCEDURE (OUTPATIENT)
Dept: ULTRASOUND IMAGING | Facility: CLINIC | Age: 65
End: 2020-09-14
Attending: NURSE PRACTITIONER
Payer: MEDICARE

## 2020-09-14 VITALS
TEMPERATURE: 98.3 F | BODY MASS INDEX: 27.63 KG/M2 | HEART RATE: 72 BPM | DIASTOLIC BLOOD PRESSURE: 79 MMHG | SYSTOLIC BLOOD PRESSURE: 158 MMHG | WEIGHT: 136.8 LBS | OXYGEN SATURATION: 98 %

## 2020-09-14 DIAGNOSIS — K59.00 CONSTIPATION, UNSPECIFIED CONSTIPATION TYPE: ICD-10-CM

## 2020-09-14 DIAGNOSIS — M79.2 NERVE PAIN: Primary | ICD-10-CM

## 2020-09-14 DIAGNOSIS — R60.0 LEG EDEMA, RIGHT: ICD-10-CM

## 2020-09-14 DIAGNOSIS — Z94.0 KIDNEY REPLACED BY TRANSPLANT: ICD-10-CM

## 2020-09-14 LAB
ANION GAP SERPL CALCULATED.3IONS-SCNC: 9 MMOL/L (ref 3–14)
BUN SERPL-MCNC: 24 MG/DL (ref 7–30)
CALCIUM SERPL-MCNC: 8.3 MG/DL (ref 8.5–10.1)
CHLORIDE SERPL-SCNC: 110 MMOL/L (ref 94–109)
CO2 SERPL-SCNC: 23 MMOL/L (ref 20–32)
CREAT SERPL-MCNC: 0.92 MG/DL (ref 0.52–1.04)
ERYTHROCYTE [DISTWIDTH] IN BLOOD BY AUTOMATED COUNT: 16.6 % (ref 10–15)
GFR SERPL CREATININE-BSD FRML MDRD: 65 ML/MIN/{1.73_M2}
GLUCOSE SERPL-MCNC: 101 MG/DL (ref 70–99)
HCT VFR BLD AUTO: 30.7 % (ref 35–47)
HGB BLD-MCNC: 9.8 G/DL (ref 11.7–15.7)
MAGNESIUM SERPL-MCNC: 1.6 MG/DL (ref 1.6–2.3)
MCH RBC QN AUTO: 33.2 PG (ref 26.5–33)
MCHC RBC AUTO-ENTMCNC: 31.9 G/DL (ref 31.5–36.5)
MCV RBC AUTO: 104 FL (ref 78–100)
MYCOPHENOLATE SERPL LC/MS/MS-MCNC: 0.31 MG/L (ref 1–3.5)
MYCOPHENOLATE-G SERPL LC/MS/MS-MCNC: 64 MG/L (ref 30–95)
PHOSPHATE SERPL-MCNC: 2.4 MG/DL (ref 2.5–4.5)
PLATELET # BLD AUTO: 237 10E9/L (ref 150–450)
POTASSIUM SERPL-SCNC: 4.7 MMOL/L (ref 3.4–5.3)
RBC # BLD AUTO: 2.95 10E12/L (ref 3.8–5.2)
SODIUM SERPL-SCNC: 142 MMOL/L (ref 133–144)
TACROLIMUS BLD-MCNC: 7.1 UG/L (ref 5–15)
TME LAST DOSE: ABNORMAL H
TME LAST DOSE: NORMAL H
WBC # BLD AUTO: 8.7 10E9/L (ref 4–11)

## 2020-09-14 PROCEDURE — 80197 ASSAY OF TACROLIMUS: CPT | Performed by: SURGERY

## 2020-09-14 PROCEDURE — 80048 BASIC METABOLIC PNL TOTAL CA: CPT | Performed by: SURGERY

## 2020-09-14 PROCEDURE — 83735 ASSAY OF MAGNESIUM: CPT | Performed by: SURGERY

## 2020-09-14 PROCEDURE — 80180 DRUG SCRN QUAN MYCOPHENOLATE: CPT | Performed by: SURGERY

## 2020-09-14 PROCEDURE — G0463 HOSPITAL OUTPT CLINIC VISIT: HCPCS | Mod: ZF

## 2020-09-14 PROCEDURE — 84100 ASSAY OF PHOSPHORUS: CPT | Performed by: SURGERY

## 2020-09-14 PROCEDURE — 85027 COMPLETE CBC AUTOMATED: CPT | Performed by: SURGERY

## 2020-09-14 RX ORDER — GABAPENTIN 100 MG/1
100 CAPSULE ORAL AT BEDTIME
Qty: 30 CAPSULE | Refills: 0 | Status: SHIPPED | OUTPATIENT
Start: 2020-09-14 | End: 2020-09-17

## 2020-09-14 RX ORDER — LIDOCAINE 4 G/G
1 PATCH TOPICAL EVERY 24 HOURS
Qty: 10 PATCH | Refills: 3 | Status: SHIPPED | OUTPATIENT
Start: 2020-09-14 | End: 2021-01-06

## 2020-09-14 RX ORDER — POLYETHYLENE GLYCOL 3350 17 G/17G
1 POWDER, FOR SOLUTION ORAL DAILY
Qty: 507 G | Refills: 0 | Status: SHIPPED | OUTPATIENT
Start: 2020-09-14 | End: 2021-01-06

## 2020-09-14 ASSESSMENT — PAIN SCALES - GENERAL: PAINLEVEL: EXTREME PAIN (8)

## 2020-09-14 NOTE — TELEPHONE ENCOUNTER
..[facility] now requests orders and shares plan of care/discharge summaries for some patients through [employer].  Please REPLY TO THIS MESSAGE OR ROUTE BACK TO THE AUTHOR in order to give authorization for orders when needed.  This is considered a verbal order, you will still receive a faxed copy of orders for signature.  Thank you for your assistance in improving collaboration for our patients.    ORDER  -- SN 1w1, 2w3 for labs and assessment of medications and post transplant care. -- PT to eval and treat for strengthening and core muscle work post transplant/abdominal surgery.     MD SUMMARY/PLAN OF CARE  SITUATION Met with patient and spoue for home care admission after she had a kidney transplant on 9/3/2020. Home care admission done in 2 visits, today is part 2. She was admitted at [facility] from 9/3 to 9/6/2020. Labs drawn today from right arm and sent to [facility] lab. Lungs are clear and denies SOB. Feels fluid retention throughout her body, especially in the abdomen, upper thighs and ankles. At first, she felt her face was puffy, but that is resolving. Weight this morning is 138.6lb. Weight initially after surgery was 142lb. Her goal weight is 120lb. Upper thighs have bruising from surgery, particularly the right thigh/groin area. Abdominal pain radiates to this area. Educated on using ice packs and lidocaine patches in the area (but not at the same time). Continue to monitor your weight. Fluid retention will decrease as your body adjusts to the new kidney. She has oral pain medications as well. Surgical site on the right lower abdomen is intact with surgical glue. WOUND MEASURES 20 x 0.3 cm. Glue came off from the most distal end of the wound, wound edges are well approximated. WOUND 2 old PD site is healed and intact since 1st home care visit on 9/10. WOUND 3 PD site on left lower abdomen MEASURES 2.8 x 0.3cm and is open to air with scabbing present. No discharge from any surgical  sites. No s/sx infection. Her body is weak with poor endurance r/t lack of activity during the last 1 1/2 years of dialysis as well as the surgery. She ambulates without an AD and is able to climb the stairs in her home. But she gets tired easily and must rest. She is not able to go for walks with her  or cook meals in the kitchen and clean the house as she would like. She is looking forward to getting back into physical shape, with more energy. She found dialysis tx to be exhausting. She has a healthy appetite and reports eating a variety of foods. She has been having normal BMs since returning home, with scheduled senna S. She is passing urine without any difficulty, voiding about every 2-3 hours during the day and at night.  Denies incontinence.    BACKGROUND 64 yo female who lives in a single family home with her . She immigrated from [place] and has traveled the country with her , who is a reitred  [employer] member. She was on Peritoneal dialysis for 1 1/2 years, doing this from home. Also has a hemodialysis fistula, but it was never used because the PD worked well. Medical hx includes hx ESRD secondary to FSGS, atrophic right kidney, MGUS, HTN, Hep B core antibody positive, acute anemia and elevated lactic acid, and recent hypomanic episode. S/P  donor kidney transplant and removal of PD catheter on 2020. She has also had 2  sections when she was younger. She has been on blood pressure medications since her pregnancy 30 years ago.    ANALYSIS This recent transplant patient needs education and assessment post transplant to understand her medications, infection prevention and transplant care. She will likely rebound quickly and will be referred to outpatient setting for labs when ready.    RECOMMENDATION Skilled nursing 1w1, 2w3 for labs and assessment of medications and post transplant care. PT to eval and treat for strengthening and core muscle work post  transplant/abdominal surgery.     Thank you for this referral.  April Mast RN  [phone]

## 2020-09-14 NOTE — NURSING NOTE
Chief Complaint   Patient presents with     RECHECK     Wound Check; pain meds, not sleeping and Tyenol not enough     Blood pressure (!) 158/79, pulse 72, temperature 98.3  F (36.8  C), temperature source Oral, weight 62.1 kg (136 lb 12.8 oz), SpO2 98 %.    Erna Rosen, CMA

## 2020-09-14 NOTE — LETTER
2020         RE: Ethel Blow  [address]  [place] [address]      Transplant Surgery Progress Note    Transplants:  9/3/2020 (Kidney);   S: 65 year old female with history of ESKD secondary to FSGS, on PD. Made some urine prior to admission. PMH also significant for atrophic right kidney, MGUS, HTN, Hep B core antibody positive, and recent hypomanic episode. S/p  donor kidney transplant with ureteral stent and removal of PD catheter on 2020.    Denies any fevers, chills, nausea or vomiting      No hematuria, dysuria or frequency.     States she is having incisional pain. She states it feels like a sharp pain.      She also states she's constipated.        Transplant History:    Transplant Type:  DDKT  Donor Type: Donation after Brain Death   Transplant Date:  9/3/2020 (Kidney)   Ureteral Stent:  Yes   Crossmatch:  negative   DSA at Tx:  No  Baseline Cr: TBD   DeNovo DSA: No    Acute Rejection Hx:  No    Present Maintenance Immunosuppression:  Tacrolimus and Mycophenolate mofetil    CMV IgG Ab Discordance:  No  EBV IgG Ab Discordance:  No    BK Viremia:  No  EBV Viremia:  No    Transplant Coordinator: Tiffany Paul     Transplant Office Phone Number: [phone]     Immunosuppressant Medications     Immunosuppressive Agents Disp Start End     mycophenolate (GENERIC EQUIVALENT) 250 MG capsule    180 capsule 2020     Sig - Route: Take 4 capsules (1,000 mg) by mouth 2 times daily - Oral    Class: E-Prescribe     tacrolimus (GENERIC EQUIVALENT) 0.5 MG capsule    60 capsule 2020     Sig - Route: Take 1 capsule (0.5 mg) by mouth 2 times daily Take total dose 4.5 mg twice per day - Oral    Class: E-Prescribe     tacrolimus (GENERIC EQUIVALENT) 1 MG capsule    240 capsule 2020     Sig - Route: Take 4 capsules (4 mg) by mouth 2 times daily Take tacrolimus  4.5 mg twice per day - Oral    Class: E-Prescribe          Possible Immunosuppression-related side effects:   []              headache  []             vivid dreams  []             irritability  []             cognitive difficuties  []             fine tremor  []             nausea  []             diarrhea  []             neuropathy      []             edema  []             renal calcineurin toxicity  []             hyperkalemia  []             post-transplant diabetes  []             decreased appetite  []             increased appetite  []             other:  []             none    Prescription Medications as of 9/28/2020       Rx Number Disp Refills Start End Last Dispensed Date Next Fill Date Owning Pharmacy    acetaminophen (TYLENOL) 325 MG tablet  1 Bottle 0 9/7/2020    [place], [place] - [address] [facility]    Sig: Take 2 tablets (650 mg) by mouth every 4 hours as needed for pain    Class: E-Prescribe    Route: Oral    amLODIPine (NORVASC) 5 MG tablet  90 tablet 3 9/8/2020    [address]    Sig: Take 1 tablet (5 mg) by mouth daily    Class: E-Prescribe    Route: Oral    aspirin (ASA) 81 MG EC tablet  90 tablet 3 9/22/2020    [address] [address] [facility]    Sig: Take 1 tablet (81 mg) by mouth daily    Class: E-Prescribe    Route: Oral    aspirin 81 MG EC tablet        [address]    Sig: Take 81 mg by mouth daily    Class: Historical    Route: Oral    atorvastatin (LIPITOR) 40 MG tablet    9/6/2020        Sig: Take 0.5 tablets (20 mg) by mouth daily    Class: No Print Out    Route: Oral    calcium carbonate 600 mg-vitamin D 400 units (CALTRATE) 600-400 MG-UNIT per tablet            Sig: Take 1 tablet by mouth 2 times daily    Class: Historical    Route: Oral    famotidine (PEPCID) 20 MG tablet        [address] [address] [facility]    Sig: Take 20 mg by mouth every evening     Class: Historical     Route: Oral    fish oil-omega-3 fatty acids 1000 MG capsule        [address]    Sig: Take 1 g by mouth daily     Class: Historical    Route: Oral    fluticasone (FLONASE) 50 MCG/ACT nasal spray            Sig: Spray 1 spray into both nostrils 2 times daily as needed for rhinitis or allergies    Class: Historical    Route: Both Nostrils    furosemide (LASIX) 20 MG tablet  30 tablet 1 9/15/2020    [address] [address] [facility]    Sig: Take 1 tablet (20 mg) by mouth daily    Class: E-Prescribe    Route: Oral    gabapentin (NEURONTIN) 100 MG capsule  30 capsule 0 2020    [address] [address] [facility]    Simg in AM, 200mg in PM    Class: E-Prescribe    levothyroxine (SYNTHROID/LEVOTHROID) 112 MCG tablet    2019        Sig: Take 112 mcg (1 tablet) by mouth every Monday, Tuesday, Wednesday, Thursday, Friday.    Class: Historical    Lidocaine (LIDOCARE) 4 % Patch  10 patch 3 2020    [address] [address] [employer] [facility]    Sig: Place 1 patch onto the skin every 24 hours To prevent lidocaine toxicity, patient should be patch free for 12 hrs daily.    Class: E-Prescribe    Route: Transdermal    magnesium oxide (MAG-OX) 400 MG tablet  30 tablet 5 2020    [address] [address] [facility]    Sig: Take 1 tablet (400 mg) by mouth daily (with lunch)    Class: E-Prescribe    Route: Oral    melatonin 3 MG tablet            Sig: Take 6 mg by mouth At Bedtime     Class: Historical    Route: Oral    mycophenolate (GENERIC EQUIVALENT) 250 MG capsule  180 capsule 11 2020    [address] [address] [facility]    Sig: Take 4 capsules (1,000 mg) by mouth 2 times daily    Class: E-Prescribe    Route: Oral    ondansetron (ZOFRAN-ODT) 4 MG ODT tab  5 tablet 0 2020    [facility]  [address]    Sig: Take 1 tablet (4 mg) by mouth every 6 hours as needed for nausea or vomiting    Class: E-Prescribe    Route: Oral    phosphorus tablet 250 mg (PHOSPHORUS TABLET 250 MG) 250 MG per tablet  30 tablet 0 9/11/2020    [address] [address] [employer] [facility]    Sig: Take 2 tablets (500 mg) by mouth 2 times daily    Class: E-Prescribe    Route: Oral    polyethylene glycol (MIRALAX) 17 GM/Dose powder  507 g 0 9/14/2020    [address] [address] [facility]    Sig: Take 17 g (1 capful) by mouth daily    Class: E-Prescribe    Route: Oral    polyethylene glycol (MIRALAX) 17 GM/Dose powder            Sig: Take 1 capful by mouth daily    Class: Historical    Route: Oral    predniSONE (DELTASONE) 10 MG tablet  49 tablet 0 9/6/2020    [address]    Sig: On 9/7 take 60mg (6 tabs)  9/8-9/9 take 40mg (4 tabs daily)  9/10-9/16 take 20mg (2 tabs daily)  9/17-9/23 take 15mg (1.5 tabs daily)  9/24-9/30 take 10mg (1 tab daily)  10/1-10/7 take 5mg (half tab daily)    Class: Local Print    Renewals     Renewal requests to authorizing provider (Alicia Garcia, CODY HERNÁNDEZ) <b>prohibited</b>          senna-docusate (SENOKOT-S/PERICOLACE) 8.6-50 MG tablet  60 tablet 0 9/6/2020    [address]    Sig: Take 1 tablet by mouth 2 times daily Hold for loose stools    Class: E-Prescribe    Route: Oral    sulfamethoxazole-trimethoprim (BACTRIM) 400-80 MG tablet  30 tablet 11 9/7/2020    [address] [address] [facility]    Sig: Take 1 tablet by mouth daily    Class: E-Prescribe    Route: Oral    tacrolimus (GENERIC EQUIVALENT) 0.5 MG capsule  60 capsule 11 9/22/2020    [address] [place] - [address] [facility]    Sig: Take 1 capsule (0.5 mg) by mouth 2 times daily Take  total dose 4.5 mg twice per day    Class: E-Prescribe    Route: Oral    tacrolimus (GENERIC EQUIVALENT) 1 MG capsule  240 capsule 11 9/22/2020    [address] [address] [facility]    Sig: Take 4 capsules (4 mg) by mouth 2 times daily Take tacrolimus  4.5 mg twice per day    Class: E-Prescribe    Route: Oral    traMADol (ULTRAM) 50 MG tablet  14 tablet 0 9/23/2020    [address]    Sig: Take 1 tablet (50 mg) by mouth every 8 hours as needed for severe pain    Class: Local Print    Route: Oral    valGANciclovir (VALCYTE) 450 MG tablet  60 tablet 2 9/18/2020    [address] [address] [facility]    Sig: Take 2 tablets (900 mg) by mouth daily    Class: Historical    Route: Oral    vitamin B-12 (CYANOCOBALAMIN) 500 MCG tablet        [address]    Sig: Take 500 mcg by mouth daily    Class: Historical    Route: Oral    vitamin B6 (PYRIDOXINE) 100 MG tablet        [address]    Sig: Take 100 mg by mouth daily    Class: Historical    Route: Oral    Vitamin D, Cholecalciferol, 25 MCG (1000 UT) TABS            Sig: Take 2,000 Units by mouth daily    Class: Historical    Route: Oral          O:      General Appearance: in no apparent distress.   Skin: Normal, no rashes or jaundice  Heart: regular rate and rhythm, normal S1 and S2  Lungs: easy respirations, no audible wheezing.  Abdomen: rounded, The wound is dry and intact, without hernia. The abdomen is non-tender. The kidney graft is not tender.  There is no ascites.  Extremities: Tremor absent.   Edema: present bilaterally. Mild      Transplant Immunosuppression Labs Latest Ref Rng & Units 9/28/2020 9/24/2020 9/21/2020 9/18/2020 9/14/2020   Tacro Level 5.0 - 15.0 ug/L - 9.6 11.5 7.3 7.1   Tacro Level - -  Not Provided 09/20/20 @ 2000 LAST DOSE 09/17/20 @ 2000 Not Provided   Creat 0.52 - 1.04 mg/dL 0.98 0.94 0.98 0.96 0.92   BUN 7 - 30 mg/dL 29 28 29 33(H) 24   WBC 4.0 - 11.0 10e9/L 5.5 5.1 5.8 7.0 8.7   Neutrophil % 76.2 77.2 - - -   ANEU 1.6 - 8.3 10e9/L 4.2 4.0 - - -       Chemistries:   Recent Labs   Lab Test 09/28/20  0804   BUN 29   CR 0.98   GFRESTIMATED 60*   *     Lab Results   Component Value Date    A1C 5.4 09/03/2020     Recent Labs   Lab Test 09/09/20  0637 09/05/20  0535   ALBUMIN 2.9* 2.8*   BILITOTAL  --  0.4   ALKPHOS  --  53   AST  --  50*   ALT  --  52*     Urine Studies:  Recent Labs   Lab Test 09/03/20  1905   COLOR Yellow   APPEARANCE Clear   URINEGLC Negative   URINEBILI Negative   URINEKETONE Negative   SG 1.008   UBLD Small*   URINEPH 6.5   PROTEIN 30*   NITRITE Negative   LEUKEST Negative   RBCU <1   WBCU <1     Recent Labs   Lab Test 09/05/20  0930 09/03/20  1905   UTPG 1.17* 1.92*     Hematology:   Recent Labs   Lab Test 09/28/20  0804 09/24/20  0803 09/21/20  0805   HGB 10.3* 9.9* 10.3*    282 253   WBC 5.5 5.1 5.8     Coags:   Recent Labs   Lab Test 09/04/20  1544 09/03/20  1756   INR 1.08 0.89     HLA antibodies:   SA1 Hi Risk Diya   Date Value Ref Range Status   09/08/2020 None  Final     SA1 Mod Risk Diya   Date Value Ref Range Status   09/08/2020 B:76  Final     SA2 Hi Risk Diya   Date Value Ref Range Status   09/08/2020 None  Final     SA2 Mod Risk Diya   Date Value Ref Range Status   09/08/2020 None  Final       Assessment: Ethel Thompson is doing fairly well s/p DDKT:  Issues we addressed during her visit include:    Plan:    1. Graft function: stable  2. Immunosuppression Management: No change continue MMF and prograf .  Complexity of management:Medium.  Contributing factors: recent txp  3. Nerve pain: gabapentin as directed. Lidocaine patches  4. Edema:  Duplex ultrasound ordered to rule out DVT.    Followup: as directed    Total Time: 20 min,   Counselling Time: 10  jossy.          Monika iTnoco, NP

## 2020-09-14 NOTE — TELEPHONE ENCOUNTER
September 14, 2020 10:32 AM -  AIVERSE1: message to [facility] pt today at 1130 w jose /sami

## 2020-09-15 ENCOUNTER — TELEPHONE (OUTPATIENT)
Dept: TRANSPLANT | Facility: CLINIC | Age: 65
End: 2020-09-15

## 2020-09-15 DIAGNOSIS — Z94.0 KIDNEY REPLACED BY TRANSPLANT: ICD-10-CM

## 2020-09-15 DIAGNOSIS — I15.0 RENOVASCULAR HYPERTENSION: ICD-10-CM

## 2020-09-15 DIAGNOSIS — Z48.298 CARE AFTER ORGAN TRANSPLANT: ICD-10-CM

## 2020-09-15 RX ORDER — FUROSEMIDE 20 MG
20 TABLET ORAL DAILY
Qty: 30 TABLET | Refills: 1 | Status: SHIPPED | OUTPATIENT
Start: 2020-09-15 | End: 2020-10-04

## 2020-09-15 NOTE — TELEPHONE ENCOUNTER
Issue    Tacrolimus  Level 7.1 below goal level  Confirmed taking  4.0 mg twice per day   Increased tacrolimus  To 4.5 mg twice per day     Issue 2 MPA level 0.3   Confirmed taking  Cellcept mycophenolate mofetil 750 mg twice per day   Increased  Cellcept mycophenolate mofetil to 1000 mg twice per day     Reviewed the above medications adjustments  With  Maxim Davenport sleeping at the time of the phone call  Confirmed no fevers , weight down 3 lbs  Confirmed Rx are up to date     Issue 3 - Maxim stated that Ethel continues to have pain post kidney transplant   Plan for follow up with Monika Tinoco Transplant Surgery NP  In clinic

## 2020-09-15 NOTE — TELEPHONE ENCOUNTER
[facility] Call Center    Phone Message    May a detailed message be left on voicemail: yes     Reason for Call: Order(s): Home Care Orders: Skilled Nursinw1, 2w3 for labs and assessment of medications and post transplant care. -- PT to eval and treat for strengthening and core muscle work post transplant/abdominal surgery.     Action Taken: Message routed to:  [facility] cardio    Travel Screening: Not Applicable

## 2020-09-16 ENCOUNTER — TELEPHONE (OUTPATIENT)
Dept: TRANSPLANT | Facility: CLINIC | Age: 65
End: 2020-09-16

## 2020-09-16 ENCOUNTER — ALLIED HEALTH/NURSE VISIT (OUTPATIENT)
Dept: PHARMACY | Facility: CLINIC | Age: 65
End: 2020-09-16
Payer: OTHER GOVERNMENT

## 2020-09-16 DIAGNOSIS — R52 PAIN: ICD-10-CM

## 2020-09-16 DIAGNOSIS — Z94.0 KIDNEY REPLACED BY TRANSPLANT: Primary | ICD-10-CM

## 2020-09-16 DIAGNOSIS — R60.9 EDEMA, UNSPECIFIED TYPE: ICD-10-CM

## 2020-09-16 PROCEDURE — 99606 MTMS BY PHARM EST 15 MIN: CPT | Performed by: PHARMACIST

## 2020-09-16 NOTE — PATIENT INSTRUCTIONS
Recommendations from today's MTM visit:                                                      1. Start taking Calcium/D 500-600mg twice daily.     It was great to speak with you today.  I value your experience and would be very thankful for your time with providing feedback on our clinic survey. You may receive a survey via email or text message in the next few days.     Next MTM visit: 2 months    To schedule another MTM appointment, please call the clinic directly or you may call the MTM scheduling line at [phone] or toll-free at [phone].     My Clinical Pharmacist's contact information:                                                      It was a pleasure talking with you today!  Please feel free to contact me with any questions or concerns you have.      Patrick Mireles, PharmJERED  MTM Pharmacist    Phone: [phone]

## 2020-09-16 NOTE — TELEPHONE ENCOUNTER
[facility] Call Center    Phone Message    May a detailed message be left on voicemail: yes     Reason for Call: Other: April calling in againg checking on status of verbal orders, she needs them ASAP as pt is due to be seen tomorrow, please call back as soon as possible, thank you      Action Taken: Message routed to:  [facility] ([facility] cardio     Travel Screening: Not Applicable

## 2020-09-16 NOTE — PROGRESS NOTES
MTM ENCOUNTER  SUBJECTIVE/OBJECTIVE:                           Ethel Thompson is a 65 year old female called for a follow-up visit. She was referred to me from txp team. Patient was accompanied by Maxim, her . Today's visit is a follow-up MTM visit from 9/11     Patient consented to a telehealth visit: yes  Telemedicine Visit Details  Type of service:  Telephone visit  Start Time: 11:03 AM  End Time: 11:15  Originating Location (pt. Location): Home  Distant Location (provider location):  [facility] MEDICATION THERAPY MANAGEMENT  Mode of Communication:  Telephone    Chief Complaint: Follow-up on BP and Edema.   140# last visit, 137# today. Swelling in right leg is more than left. Pain increased in leg, started Gabapentin 200mg at bedtime.   BP: 149/70 this morning, 136/68, 132/64    Allergies/ADRs: Reviewed in EHR  Tobacco: She reports that she quit smoking about 25 years ago. Her smoking use included cigarettes. She started smoking about 40 years ago. She smoked 1.00 pack per day. She has never used smokeless tobacco.  Alcohol: not currently using  Drinking around 2 L per patient  PMH: Reviewed in EHR    Medication Adherence/Access: Patient uses pill box(es) and has assistance with medication administration: family member, Maxim.  Patient takes medications 3 time(s) per day. 8am and 8pm 2pm  Per patient, misses medication 0 times per week.   Medication barriers: none.     Renal Transplant:  Current immunosuppressants include TAC 4.5mg BID and MMF 1000mg BID, Prednisone taper.  Pt reports no side effects  Transplant date: 9/3/20  Estimated Creatinine Clearance: 59 mL/min (based on SCr of 0.92 mg/dL).  CMV prophylaxis: CrCl >/=60 mL/minute: Valcyte 900mg daily Treat 3 months post tx   PCP prophylaxis: Bactrim S S daily  Antifungal Prophylaxis: Nystatin 5mL QID until gone.  PPI use: Famotidine 20mg prn.   Current supplements for replacement: Phosphorus 500mg BID (appetite is good), Magnesium 400mg once daily, Vitamin D  2000 units daily.  Holding Vitamin B supplements and Fish oil currently.   Magnesium   Date Value Ref Range Status   09/09/2020 2.0 1.6 - 2.3 mg/dL Final   Tx Coordinator: Sandro Paul MD: Dr. Sifuentes, Using Med Card: Yes  Immunizations: annual flu shot 2019; [id number]: unknown ; Prevnar 13: 2019; TDaP:  2019; Shingrix: unknown    Edema/ HTN: Pt is taking Furosemide 20mg daily, restarted Amlodipine 5mg daily this week. Home BPs: 149/70, 136/68, 132/64  Current weight: 137#, down from 140# from last visit. Dry weight is ~125#  Still having edema, worse in right leg.   BP Readings from Last 3 Encounters:   09/14/20 (!) 158/79   09/09/20 (!) 169/90   09/08/20 (!) 181/93       Pain: Pt is taking APAP 650mg every 6 hours, Lidocaine patches 4% daily. Pain is in incision moving down leg. Start Gabapentin 200mg at bedtime for this.     Today's Vitals: There were no vitals taken for this visit.      ASSESSMENT:                            Medication Adherence: No issues identified.    Renal Transplant:  Start Calcium/D BID. Calcium levels low.     Edema/ HTN: BP improved and weights decreasing.     Pain: Stable.    PLAN:                            Pt to...  1. Start Calcium/D BID.     I spent 15 minutes with this patient today. I offer these suggestions for consideration by txp team. A copy of the visit note was provided to the patient's referring provider.    Will follow up in 2 months    The patient was given a summary of these recommendations.     Patrick Mireles, PharmD  MTM Pharmacist    Phone: [phone]

## 2020-09-16 NOTE — TELEPHONE ENCOUNTER
Post Kidney and Pancreas Transplant Team Conference  Date: 9/16/2020  Transplant Coordinator: Tiffany Paul     Attendees:  [x]  Dr. Sheridan  [x] Bridget Mraquis LPN     [x]  Dr. Sifuentes [x] Tiffany Paul RN [] Alyssa Parisi LPN   []  Dr. Dewitt [] Sade Mckeon, SUN    [x]  Dr. Wilson [] Ade Marx, SUN [x] Patrick Mireles, PharmD   [x] Dr. Awad [x] Hui Crowley, SUN    [] Dr. Ribeiro [] Cesar Max RN    [x] Dr. Alonzo [] Jennie Dela Cruz, SUN    [x] Dr. Phoenix [] Janett Smith RN    []  Dr. Bansal [] Aleja Francis, SUN    [] Surgery Fellow [x] Crissy Pulido RN    [x] Monika Tinoco, YAKELIN [] Laura Romero RN        Verbal Plan Read Back:   Instruct pt to bring in pill bottles to next clinic visit, discard of narcs    Routed to RN Coordinator   Bridget Marquis LPN

## 2020-09-17 ENCOUNTER — TELEPHONE (OUTPATIENT)
Dept: TRANSPLANT | Facility: CLINIC | Age: 65
End: 2020-09-17

## 2020-09-17 ENCOUNTER — OFFICE VISIT (OUTPATIENT)
Dept: TRANSPLANT | Facility: CLINIC | Age: 65
End: 2020-09-17
Attending: NURSE PRACTITIONER
Payer: MEDICARE

## 2020-09-17 ENCOUNTER — ANCILLARY PROCEDURE (OUTPATIENT)
Dept: ULTRASOUND IMAGING | Facility: CLINIC | Age: 65
End: 2020-09-17
Attending: NURSE PRACTITIONER
Payer: MEDICARE

## 2020-09-17 VITALS
OXYGEN SATURATION: 98 % | WEIGHT: 135 LBS | BODY MASS INDEX: 27.27 KG/M2 | DIASTOLIC BLOOD PRESSURE: 84 MMHG | SYSTOLIC BLOOD PRESSURE: 181 MMHG | HEART RATE: 78 BPM

## 2020-09-17 DIAGNOSIS — Z94.0 KIDNEY REPLACED BY TRANSPLANT: ICD-10-CM

## 2020-09-17 DIAGNOSIS — M79.2 NERVE PAIN: ICD-10-CM

## 2020-09-17 DIAGNOSIS — G89.18 POST-OP PAIN: Primary | ICD-10-CM

## 2020-09-17 RX ORDER — GABAPENTIN 100 MG/1
CAPSULE ORAL
Qty: 30 CAPSULE | Refills: 0 | Status: SHIPPED | OUTPATIENT
Start: 2020-09-17 | End: 2021-01-06

## 2020-09-17 RX ORDER — TRAMADOL HYDROCHLORIDE 50 MG/1
50 TABLET ORAL EVERY 8 HOURS PRN
Qty: 10 TABLET | Refills: 0 | Status: SHIPPED | OUTPATIENT
Start: 2020-09-17 | End: 2020-09-23

## 2020-09-17 NOTE — TELEPHONE ENCOUNTER
Provider Call: General    Reason for call: April left a message re: Ethel. They requested Home Care orders from Primary Care physician who is actually Cardiology team. They have not responded to her. April thinks they were referred to Transplant Dept based on some notes she is seeing in Epic but she is not sure. Wondering if your team would be willing to give them verbal orders for Home Care. Patient needs another visit tomorrow morning for labs. Thank you.    Call back needed? Yes    Return Call Needed  Same as documented in contacts section  When to return call?: Same day: Route High Priority

## 2020-09-17 NOTE — LETTER
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 Made some urine prior to admission. PMH also significant for atrophic right kidney, MGUS, HTN, Hep B core antibody positive, and recent hypomanic episode. S/p  donor kidney transplant with ureteral stent and removal of PD catheter on 2020.    Denies any fevers, chills, nausea or vomiting      No hematuria, dysuria or frequency.    Patient is having thigh pain.        Transplant History:    Transplant Type:  DDKT  Donor Type: Donation after Brain Death   Transplant Date:  9/3/2020 (Kidney)   Ureteral Stent:  Yes   Crossmatch:  negative   DSA at Tx:  No  Baseline Cr: TBD   DeNovo DSA: No    Acute Rejection Hx:  No    Present Maintenance Immunosuppression:  Tacrolimus and Mycophenolate mofetil    CMV IgG Ab Discordance:  No  EBV IgG Ab Discordance:  No    BK Viremia:  No  EBV Viremia:  No    Transplant Coordinator: Tiffany Paul     Transplant Office Phone Number: [phone]     Immunosuppressant Medications     Immunosuppressive Agents Disp Start End     mycophenolate (GENERIC EQUIVALENT) 250 MG capsule    180 capsule 2020     Sig - Route: Take 4 capsules (1,000 mg) by mouth 2 times daily - Oral    Class: E-Prescribe     tacrolimus (GENERIC EQUIVALENT) 0.5 MG capsule    60 capsule 2020     Sig - Route: Take 1 capsule (0.5 mg) by mouth 2 times daily Take total dose 4.5 mg twice per day - Oral    Class: E-Prescribe     tacrolimus (GENERIC EQUIVALENT) 1 MG capsule    240 capsule 2020     Sig - Route: Take 4 capsules (4 mg) by mouth 2 times daily Take tacrolimus  4.5 mg twice per day - Oral    Class: E-Prescribe          Possible Immunosuppression-related side effects:   []             headache  []             vivid dreams  []             irritability  []             cognitive  difficuties  []             fine tremor  []             nausea  []             diarrhea  []             neuropathy      []             edema  []             renal calcineurin toxicity  []             hyperkalemia  []             post-transplant diabetes  []             decreased appetite  []             increased appetite  []             other:  []             none    Prescription Medications as of 10/5/2020       Rx Number Disp Refills Start End Last Dispensed Date Next Fill Date Owning Pharmacy    acetaminophen (TYLENOL) 325 MG tablet  1 Bottle 0 9/7/2020    [facility] - [place], [place] - [address] [facility]    Sig: Take 2 tablets (650 mg) by mouth every 4 hours as needed for pain    Class: E-Prescribe    Route: Oral    amLODIPine (NORVASC) 5 MG tablet  180 tablet 11 10/5/2020    [address] [address] [facility]    Sig: Take 2 tablets (10 mg) by mouth daily    Class: E-Prescribe    Route: Oral    aspirin (ASA) 81 MG EC tablet  90 tablet 3 9/22/2020    [address] [address] [facility]    Sig: Take 1 tablet (81 mg) by mouth daily    Class: E-Prescribe    Route: Oral    aspirin 81 MG EC tablet        [place], [place] - [address] [facility] [phone]    Sig: Take 81 mg by mouth daily    Class: Historical    Route: Oral    atorvastatin (LIPITOR) 40 MG tablet    9/6/2020        Sig: Take 0.5 tablets (20 mg) by mouth daily    Class: No Print Out    Route: Oral    calcium carbonate 600 mg-vitamin D 400 units (CALTRATE) 600-400 MG-UNIT per tablet            Sig: Take 1 tablet by mouth 2 times daily    Class: Historical    Route: Oral    famotidine (PEPCID) 20 MG tablet        [address] [address] [facility]    Sig: Take 20 mg by mouth every evening     Class: Historical    Route: Oral    fish oil-omega-3 fatty acids 1000 MG capsule        [facility]  [place], [place] - [address] [facility] [phone]    Sig: Take 1 g by mouth daily     Class: Historical    Route: Oral    fluticasone (FLONASE) 50 MCG/ACT nasal spray            Sig: Spray 1 spray into both nostrils 2 times daily as needed for rhinitis or allergies    Class: Historical    Route: Both Nostrils    gabapentin (NEURONTIN) 100 MG capsule  30 capsule 0 2020    [address] [address] [facility]    Simg in AM, 200mg in PM    Class: E-Prescribe    levothyroxine (SYNTHROID/LEVOTHROID) 112 MCG tablet    2019        Sig: Take 112 mcg (1 tablet) by mouth every Monday, Tuesday, Wednesday, Thursday, Friday.    Class: Historical    Lidocaine (LIDOCARE) 4 % Patch  10 patch 3 2020    [address] [address] [facility]    Sig: Place 1 patch onto the skin every 24 hours To prevent lidocaine toxicity, patient should be patch free for 12 hrs daily.    Class: E-Prescribe    Route: Transdermal    magnesium oxide (MAG-OX) 400 MG tablet  30 tablet 5 2020    [address] [address] [facility]    Sig: Take 1 tablet (400 mg) by mouth daily (with lunch)    Class: E-Prescribe    Route: Oral    melatonin 3 MG tablet            Sig: Take 6 mg by mouth At Bedtime     Class: Historical    Route: Oral    mycophenolate (GENERIC EQUIVALENT) 250 MG capsule  180 capsule 11 2020    [address] [address] [facility]    Sig: Take 4 capsules (1,000 mg) by mouth 2 times daily    Class: E-Prescribe    Route: Oral    ondansetron (ZOFRAN-ODT) 4 MG ODT tab  5 tablet 0 2020    [place], [place] - [address] [facility]    Sig: Take 1 tablet (4 mg) by mouth every 6 hours as needed for nausea or vomiting    Class: E-Prescribe    Route: Oral    phosphorus tablet 250 mg (PHOSPHORUS TABLET 250 MG) 250 MG per tablet  30 tablet 0 2020    [address]  [address] [facility]    Sig: Take 2 tablets (500 mg) by mouth 2 times daily    Class: E-Prescribe    Route: Oral    polyethylene glycol (MIRALAX) 17 GM/Dose powder  507 g 0 9/14/2020    [address] [address] [facility]    Sig: Take 17 g (1 capful) by mouth daily    Class: E-Prescribe    Route: Oral    polyethylene glycol (MIRALAX) 17 GM/Dose powder            Sig: Take 1 capful by mouth daily    Class: Historical    Route: Oral    predniSONE (DELTASONE) 10 MG tablet  49 tablet 0 9/6/2020    [address]    Sig: On 9/7 take 60mg (6 tabs)  9/8-9/9 take 40mg (4 tabs daily)  9/10-9/16 take 20mg (2 tabs daily)  9/17-9/23 take 15mg (1.5 tabs daily)  9/24-9/30 take 10mg (1 tab daily)  10/1-10/7 take 5mg (half tab daily)    Class: Local Print    Renewals     Renewal requests to authorizing provider (Alicia Garcia, CODY HERNÁNDEZ) <b>prohibited</b>          senna-docusate (SENOKOT-S/PERICOLACE) 8.6-50 MG tablet  60 tablet 0 9/6/2020    [address]    Sig: Take 1 tablet by mouth 2 times daily Hold for loose stools    Class: E-Prescribe    Route: Oral    sulfamethoxazole-trimethoprim (BACTRIM) 400-80 MG tablet  30 tablet 11 9/7/2020    [address] [address] [facility]    Sig: Take 1 tablet by mouth daily    Class: E-Prescribe    Route: Oral    tacrolimus (GENERIC EQUIVALENT) 0.5 MG capsule  60 capsule 11 9/22/2020    [id number] [place] [address]    Sig: Take 1 capsule (0.5 mg) by mouth 2 times daily Take total dose 4.5 mg twice per day    Class: E-Prescribe    Route: Oral    tacrolimus (GENERIC EQUIVALENT) 1 MG capsule  240 capsule 11 9/22/2020    [address] [address] [facility]    Sig: Take 4 capsules (4 mg) by mouth 2 times daily Take tacrolimus  4.5 mg twice per day    Class:  E-Prescribe    Route: Oral    traMADol (ULTRAM) 50 MG tablet  14 tablet 0 9/23/2020    [address]    Sig: Take 1 tablet (50 mg) by mouth every 8 hours as needed for severe pain    Class: Local Print    Route: Oral    valGANciclovir (VALCYTE) 450 MG tablet  60 tablet 2 9/18/2020    [address] [address] [facility]    Sig: Take 2 tablets (900 mg) by mouth daily    Class: Historical    Route: Oral    vitamin B-12 (CYANOCOBALAMIN) 500 MCG tablet        [address]    Sig: Take 500 mcg by mouth daily    Class: Historical    Route: Oral    vitamin B6 (PYRIDOXINE) 100 MG tablet        [address]    Sig: Take 100 mg by mouth daily    Class: Historical    Route: Oral    Vitamin D, Cholecalciferol, 25 MCG (1000 UT) TABS            Sig: Take 2,000 Units by mouth daily    Class: Historical    Route: Oral          O:   Temp:  [97.5  F (36.4  C)] 97.5  F (36.4  C)  Pulse:  [78] 78  BP: (169)/(81) 169/81  SpO2:  [98 %] 98 %  General Appearance: in no apparent distress.   Skin: Normal, no rashes or jaundice  Heart: regular rate and rhythm, normal S1 and S2  Lungs: easy respirations, no audible wheezing.  Abdomen: rounded, The wound is dry and intact, without hernia. The abdomen is non-tender. The kidney graft is not tender.  There is no ascites.  Extremities: Tremor absent.   Edema: present bilaterally. Mild      Transplant Immunosuppression Labs Latest Ref Rng & Units 10/5/2020 10/1/2020 9/28/2020 9/24/2020 9/21/2020   Tacro Level 5.0 - 15.0 ug/L - 11.8 11.1 9.6 11.5   Tacro Level - - Not Provided Not Provided Not Provided 09/20/20 @ 2000   Creat 0.52 - 1.04 mg/dL 1.01 0.93 0.98 0.94 0.98   BUN 7 - 30 mg/dL 24 29 29 28 29   WBC 4.0 - 11.0 10e9/L 4.9 5.6 5.5 5.1 5.8   Neutrophil % - 73.6  76.2 77.2 -   ANEU 1.6 - 8.3 10e9/L - 4.1 4.2 4.0 -       Chemistries:   Recent Labs   Lab Test 10/05/20  0910   BUN 24   CR 1.01   GFRESTIMATED 58*   *     Lab Results   Component Value Date    A1C 5.4 09/03/2020     Recent Labs   Lab Test 09/09/20  0637 09/05/20  0535   ALBUMIN 2.9* 2.8*   BILITOTAL  --  0.4   ALKPHOS  --  53   AST  --  50*   ALT  --  52*     Urine Studies:  Recent Labs   Lab Test 10/05/20  0912   COLOR Yellow   APPEARANCE Clear   URINEGLC Negative   URINEBILI Negative   URINEKETONE Negative   SG 1.015   UBLD Moderate*   URINEPH 5.0   PROTEIN Negative   NITRITE Negative   LEUKEST Small*   RBCU 8*   WBCU 12*     Recent Labs   Lab Test 10/05/20  0912 09/05/20  0930   UTPG 0.60* 1.17*     Hematology:   Recent Labs   Lab Test 10/05/20  0910 10/01/20  0810 09/28/20  0804   HGB 10.9* 10.5* 10.3*    286 303   WBC 4.9 5.6 5.5     Coags:   Recent Labs   Lab Test 09/04/20  1544 09/03/20  1756   INR 1.08 0.89     HLA antibodies:   SA1 Hi Risk Diya   Date Value Ref Range Status   09/08/2020 None  Final     SA1 Mod Risk Diya   Date Value Ref Range Status   09/08/2020 B:76  Final     SA2 Hi Risk Diya   Date Value Ref Range Status   09/08/2020 None  Final     SA2 Mod Risk Diya   Date Value Ref Range Status   09/08/2020 None  Final       Assessment: Ethel Thompson is doing fairly well s/p DDKT:  Issues we addressed during her visit include:    Plan:    1. Graft function: stable  2. Immunosuppression Management: No change continue MMF and prograf .  Complexity of management:Medium.  Contributing factors: recent txp  3. Nerve pain: gabapentin as directed. Lidocaine patches  Followup: as directed    Total Time: 20 min,   Counselling Time: 10 min.          Monika Tinoco NP

## 2020-09-17 NOTE — TELEPHONE ENCOUNTER
Returned call to pt, spoke with her  as she was sleeping. Orders for PT post transplant are to go to the transplant team. Message sent to transplant to discuss at pt's appt today. Yin Huston RN CORE Care Coordinator

## 2020-09-17 NOTE — NURSING NOTE
Chief Complaint   Patient presents with     RECHECK     Kidney post op     Blood pressure (!) 181/84, pulse 78, weight 61.2 kg (135 lb), SpO2 98 %.    Crista Barrientos on 9/17/2020 at 3:17 PM

## 2020-09-17 NOTE — Clinical Note
9/17/2020         RE: Ethel Thompson  [address] [place] [facility] [address]        Dear Colleague,    Thank you for referring your patient, Ethel Thompson, to the [facility] TRANSPL[facility]. Please see a copy of my visit note below.    No notes on file    Again, thank you for allowing me to participate in the care of your patient.        Sincerely,        Monika Tinoco NP   4

## 2020-09-18 ENCOUNTER — TELEPHONE (OUTPATIENT)
Dept: TRANSPLANT | Facility: CLINIC | Age: 65
End: 2020-09-18

## 2020-09-18 ENCOUNTER — MEDICAL CORRESPONDENCE (OUTPATIENT)
Dept: HEALTH INFORMATION MANAGEMENT | Facility: CLINIC | Age: 65
End: 2020-09-18

## 2020-09-18 ENCOUNTER — VIRTUAL VISIT (OUTPATIENT)
Dept: NEPHROLOGY | Facility: CLINIC | Age: 65
End: 2020-09-18
Attending: INTERNAL MEDICINE
Payer: MEDICARE

## 2020-09-18 VITALS — SYSTOLIC BLOOD PRESSURE: 142 MMHG | HEART RATE: 64 BPM | DIASTOLIC BLOOD PRESSURE: 68 MMHG

## 2020-09-18 DIAGNOSIS — Z94.0 KIDNEY REPLACED BY TRANSPLANT: ICD-10-CM

## 2020-09-18 DIAGNOSIS — Z94.0 KIDNEY REPLACED BY TRANSPLANT: Primary | ICD-10-CM

## 2020-09-18 DIAGNOSIS — N05.1 FSGS (FOCAL SEGMENTAL GLOMERULOSCLEROSIS): Primary | ICD-10-CM

## 2020-09-18 LAB
ANION GAP SERPL CALCULATED.3IONS-SCNC: 3 MMOL/L (ref 3–14)
BUN SERPL-MCNC: 33 MG/DL (ref 7–30)
CALCIUM SERPL-MCNC: 9 MG/DL (ref 8.5–10.1)
CHLORIDE SERPL-SCNC: 110 MMOL/L (ref 94–109)
CO2 SERPL-SCNC: 25 MMOL/L (ref 20–32)
CREAT SERPL-MCNC: 0.96 MG/DL (ref 0.52–1.04)
ERYTHROCYTE [DISTWIDTH] IN BLOOD BY AUTOMATED COUNT: 17.1 % (ref 10–15)
GFR SERPL CREATININE-BSD FRML MDRD: 61 ML/MIN/{1.73_M2}
GLUCOSE SERPL-MCNC: 112 MG/DL (ref 70–99)
HCT VFR BLD AUTO: 32 % (ref 35–47)
HGB BLD-MCNC: 10.1 G/DL (ref 11.7–15.7)
MCH RBC QN AUTO: 33.8 PG (ref 26.5–33)
MCHC RBC AUTO-ENTMCNC: 31.6 G/DL (ref 31.5–36.5)
MCV RBC AUTO: 107 FL (ref 78–100)
PLATELET # BLD AUTO: 259 10E9/L (ref 150–450)
POTASSIUM SERPL-SCNC: 5.1 MMOL/L (ref 3.4–5.3)
RBC # BLD AUTO: 2.99 10E12/L (ref 3.8–5.2)
SODIUM SERPL-SCNC: 138 MMOL/L (ref 133–144)
TACROLIMUS BLD-MCNC: 7.3 UG/L (ref 5–15)
TME LAST DOSE: NORMAL H
WBC # BLD AUTO: 7 10E9/L (ref 4–11)

## 2020-09-18 PROCEDURE — 80048 BASIC METABOLIC PNL TOTAL CA: CPT | Performed by: SURGERY

## 2020-09-18 PROCEDURE — 80197 ASSAY OF TACROLIMUS: CPT | Performed by: SURGERY

## 2020-09-18 PROCEDURE — 85027 COMPLETE CBC AUTOMATED: CPT | Performed by: SURGERY

## 2020-09-18 RX ORDER — MYCOPHENOLATE MOFETIL 250 MG/1
1000 CAPSULE ORAL 2 TIMES DAILY
Qty: 180 CAPSULE | Refills: 11 | Status: SHIPPED | OUTPATIENT
Start: 2020-09-18 | End: 2021-02-01

## 2020-09-18 RX ORDER — TACROLIMUS 0.5 MG/1
CAPSULE ORAL
Qty: 60 CAPSULE | Refills: 3 | Status: SHIPPED | OUTPATIENT
Start: 2020-09-18 | End: 2020-09-22

## 2020-09-18 RX ORDER — TACROLIMUS 1 MG/1
4 CAPSULE ORAL 2 TIMES DAILY
Qty: 240 CAPSULE | Refills: 11 | Status: SHIPPED | OUTPATIENT
Start: 2020-09-18 | End: 2020-09-22

## 2020-09-18 RX ORDER — VALGANCICLOVIR 450 MG/1
450 TABLET, FILM COATED ORAL DAILY
Qty: 60 TABLET | Refills: 2 | COMMUNITY
Start: 2020-09-18 | End: 2021-01-06

## 2020-09-18 NOTE — PROGRESS NOTES
ACUTE TRANSPLANT NEPHROLOGY VISIT  Assessment & Plan    # DDKT: Stable              - Baseline Cr ~ 0.9-1.0mg/dL              - Proteinuria: 1.17g/g               - Date DSA Last Checked: Sep/2020      Latest DSA: No              - BK Viremia: Not checked post transplant                 Check weekly UPCR given h/o FSGS. Stent in place and will be removed at post-op 4-6 weeks. Stable creatinine, and we will continue IS as below. U/S yesterday shows an enlargement of the fluid collection, as well as a new fluid collection [id number]. We have asked that surgery comment on the fluid collection size and if this is amenable to drainage. Given that she has stable Hgb and stable creatinine, we would favor watchful waiting. If she develops fevers, chills, leukocytosis, we should get a culture of the fluid and start antibiotics for presumed infected seroma.     # Immunosuppression: Tacrolimus immediate release (goal 8-10), Mycophenolate mofetil (dose 1000mg every 12 hours) and Prednisone (dose taper)  Induction immunosuppression with thymoglobulin 125mg (2.2mg/kg), basiliximab (dose given POD #1 and POD #5 ), and steroid taper.             - Changes: Not at this time FK trough was 7 & dose uptitrated to 4.5 mg po bid, MMF up to 1g bid on 9/14 as MPA subtherapeutic    # Infection Prophylaxis:   - PJP: Sulfa/TMP (Bactrim)  - CMV: Valcyte x 12 weeks  - Thrush: Nystatin     # Hypertension: Controlled;   Goal BP: < 150/90              - Volume status: Mildly hypervolemic             EDW ~ 54kg              - Changes: No, continue lasix 20mg daily    # Anemia in Chronic Renal Disease: Hgb: Stable      JHONATAN: No   - Iron studies: Low iron saturation, but high ferritin    # Mineral Bone Disorder:   - Secondary renal hyperparathyroidism; PTH level: Moderately elevated (301-600 pg/ml)        On treatment: None  - Vitamin D; level: Low normal        On supplement: Yes  - Calcium; level: Normal        On supplement: Yes  - Phosphorus; level:  Low normal        On supplement: Yes    # Electrolytes:   - Potassium; level: Normal        On supplement: No  - Magnesium; level: Normal        On supplement: Yes  - Bicarbonate; level: Normal        On supplement: No  - Sodium; level: Normal    # Medical Compliance: Yes     # Transplant History:  Etiology of Kidney Failure: Focal segmental glomerulosclerosis (FSGS)  Tx: DDKT  Transplant: 9/3/2020 (Kidney)  Donor Type: Donation after Brain Death        Donor Class:   Crossmatch at time of Tx: negative  Significant changes in immunosuppression: None  Significant transplant-related complications: None     Recommendations were communicated to the primary team verbally.     Transplant Office Phone Number: [phone]     Assessment and plan was discussed with the patient and she voiced her understanding and agreement.     Return visit: at 1 month post-transplant    Jody Franco MD    Chief Complaint   Ms. Thompson is a 65 year old here for routine follow up.     History of Present Illness     Patient reports incisional pain at the lower end of her allograft. Patient has tried gabapentin but that did not help. She reports a good appetite and eating meat + rice. Her activity and fluid intake is hindered by her abdominal pain.     She reports no chest pain and no shortness of breath. She reports low energy levels due to the pain, and has been taking tramadol and opioids to help with the pain. No fevers, no chills, no night sweats. She reports that miralax is helping with her bowel movements. She reports no diarrhea. She has improving leg swelling.     Recent Hospitalizations:  [x] No [x] Yes    New Medical Issues: [x] No [] Yes    Decreased energy: [] No [x] Yes Improving   Chest pain or SOB with exertion:  [x] No [] Yes    Appetite change or weight change: [x] No [] Yes    Nausea, vomiting or diarrhea:  [x] No [] Yes    Fever, sweats or chills: [x] No [] Yes    Leg swelling: [x] No [] Yes      BP: ~142/68    Review of  Systems   A comprehensive review of systems was obtained and negative, except as noted in the HPI or PMH.    Problem List   Patient Active Problem List   Diagnosis     Elevated serum immunoglobulin free light chain level     Renovascular hypertension     FSGS (focal segmental glomerulosclerosis)     Hyperlipidemia     Hypothyroidism     SADIE on CPAP     Sensorineural hearing loss (SNHL) of both ears     Anemia in chronic kidney disease     GERD (gastroesophageal reflux disease)     Hypertension     Hepatitis B core antibody positive     End stage renal disease (H)     Secondary hyperparathyroidism (H)     Memory deficits     Kidney replaced by transplant     Immunosuppressed status (H)     Elevated lactic acid level     Acute anemia       Social History   Social History     Tobacco Use     Smoking status: Former Smoker     Packs/day: 1.00     Types: Cigarettes     Start date:      Last attempt to quit:      Years since quittin.7     Smokeless tobacco: Never Used   Substance Use Topics     Alcohol use: No     Frequency: Never     Drug use: No       Allergies   Allergies   Allergen Reactions     Amoxicillin-Pot Clavulanate Nausea and Vomiting       Medications   Current Outpatient Medications   Medication Sig     acetaminophen (TYLENOL) 325 MG tablet Take 2 tablets (650 mg) by mouth every 4 hours as needed for pain     amLODIPine (NORVASC) 5 MG tablet Take 1 tablet (5 mg) by mouth daily     aspirin 81 MG EC tablet Take 81 mg by mouth daily     atorvastatin (LIPITOR) 40 MG tablet Take 0.5 tablets (20 mg) by mouth daily     calcium carbonate 600 mg-vitamin D 400 units (CALTRATE) 600-400 MG-UNIT per tablet Take 1 tablet by mouth 2 times daily     famotidine (PEPCID) 20 MG tablet Take 20 mg by mouth every evening      fish oil-omega-3 fatty acids 1000 MG capsule Take 1 g by mouth daily      fluticasone (FLONASE) 50 MCG/ACT nasal spray Spray 1 spray into both nostrils 2 times daily as needed for rhinitis or  allergies     furosemide (LASIX) 20 MG tablet Take 1 tablet (20 mg) by mouth daily     gabapentin (NEURONTIN) 100 MG capsule 100mg in AM, 200mg in PM     levothyroxine (SYNTHROID/LEVOTHROID) 112 MCG tablet Take 112 mcg (1 tablet) by mouth every Monday, Tuesday, Wednesday, Thursday, Friday.     Lidocaine (LIDOCARE) 4 % Patch Place 1 patch onto the skin every 24 hours To prevent lidocaine toxicity, patient should be patch free for 12 hrs daily.     magnesium oxide (MAG-OX) 400 MG tablet Take 1 tablet (400 mg) by mouth daily (with lunch)     melatonin 3 MG tablet Take 6 mg by mouth At Bedtime      mycophenolate (GENERIC EQUIVALENT) 250 MG capsule Take 3 capsules (750 mg) by mouth 2 times daily     nystatin (MYCOSTATIN) 083608 UNIT/ML suspension Take 5 mLs (500,000 Units) by mouth 4 times daily for 14 days     phosphorus tablet 250 mg (PHOSPHORUS TABLET 250 MG) 250 MG per tablet Take 2 tablets (500 mg) by mouth 2 times daily     polyethylene glycol (MIRALAX) 17 GM/Dose powder Take 17 g (1 capful) by mouth daily     polyethylene glycol (MIRALAX) 17 GM/Dose powder Take 1 capful by mouth daily     predniSONE (DELTASONE) 10 MG tablet On 9/7 take 60mg (6 tabs)  9/8-9/9 take 40mg (4 tabs daily)  9/10-9/16 take 20mg (2 tabs daily)  9/17-9/23 take 15mg (1.5 tabs daily)  9/24-9/30 take 10mg (1 tab daily)  10/1-10/7 take 5mg (half tab daily)     senna-docusate (SENOKOT-S/PERICOLACE) 8.6-50 MG tablet Take 1 tablet by mouth 2 times daily Hold for loose stools     sulfamethoxazole-trimethoprim (BACTRIM) 400-80 MG tablet Take 1 tablet by mouth daily     tacrolimus (GENERIC EQUIVALENT) 0.5 MG capsule Take total dose 4.5 mg twice per day     tacrolimus (GENERIC EQUIVALENT) 1 MG capsule Take 4 capsules (4 mg) by mouth 2 times daily Take tacrolimus  4.5 mg twice per day     traMADol (ULTRAM) 50 MG tablet Take 1 tablet (50 mg) by mouth every 8 hours as needed for severe pain     valGANciclovir (VALCYTE) 450 MG tablet Take 1 tab every  other day. Titrate dose up to a max of 2 tabs (900 mg) by mouth daily when directed by your transplant team.     vitamin B-12 (CYANOCOBALAMIN) 500 MCG tablet Take 500 mcg by mouth daily     vitamin B6 (PYRIDOXINE) 100 MG tablet Take 100 mg by mouth daily     Vitamin D, Cholecalciferol, 25 MCG (1000 UT) TABS Take 2,000 Units by mouth daily     ondansetron (ZOFRAN-ODT) 4 MG ODT tab Take 1 tablet (4 mg) by mouth every 6 hours as needed for nausea or vomiting (Patient not taking: Reported on 9/11/2020)     No current facility-administered medications for this visit.      There are no discontinued medications.    Physical Exam   Vital Signs: reviewed     No physical exam for this telemedicine visit.    Data     Renal Latest Ref Rng & Units 9/14/2020 9/10/2020 9/9/2020   Na 133 - 144 mmol/L 142 143 144   K 3.4 - 5.3 mmol/L 4.7 4.1 3.9   Cl 94 - 109 mmol/L 110(H) 110(H) 111(H)   CO2 20 - 32 mmol/L 23 28 29   BUN 7 - 30 mg/dL 24 28 32(H)   Cr 0.52 - 1.04 mg/dL 0.92 0.92 1.03   Glucose 70 - 99 mg/dL 101(H) 110(H) 112(H)   Ca  8.5 - 10.1 mg/dL 8.3(L) 8.3(L) 8.6   Mg 1.6 - 2.3 mg/dL 1.6 - 2.0     Bone Health Latest Ref Rng & Units 9/14/2020 9/9/2020 9/8/2020   Phos 2.5 - 4.5 mg/dL 2.4(L) 1.1(L) 1.1(L)   PTHi 18 - 80 pg/mL - 357(H) -   Vit D Def 20 - 75 ug/L - 20 -     Heme Latest Ref Rng & Units 9/14/2020 9/10/2020 9/9/2020   WBC 4.0 - 11.0 10e9/L 8.7 7.8 6.6   Hgb 11.7 - 15.7 g/dL 9.8(L) 9.9(L) 9.6(L)   Plt 150 - 450 10e9/L 237 177 143(L)   ABSOLUTE NEUTROPHIL 1.6 - 8.3 10e9/L - - 5.2   ABSOLUTE LYMPHOCYTES 0.8 - 5.3 10e9/L - - 0.3(L)   ABSOLUTE MONOCYTES 0.0 - 1.3 10e9/L - - 0.9   ABSOLUTE EOSINOPHILS 0.0 - 0.7 10e9/L - - 0.0   ABSOLUTE BASOPHILS 0.0 - 0.2 10e9/L - - 0.1   ABS IMMATURE GRANULOCYTES 0 - 0.4 10e9/L - - -   ABSOLUTE NUCLEATED RBC - - - -     Liver Latest Ref Rng & Units 9/9/2020 9/5/2020 9/4/2020   AP 40 - 150 U/L - 53 60   TBili 0.2 - 1.3 mg/dL - 0.4 1.1   DBili 0.0 - 0.2 mg/dL - 0.2 0.1   ALT 0 - 50 U/L -  52(H) 41   AST 0 - 45 U/L - 50(H) 38   Tot Protein 6.8 - 8.8 g/dL - 5.4(L) 5.2(L)   Albumin 3.4 - 5.0 g/dL 2.9(L) 2.8(L) 2.3(L)     Pancreas Latest Ref Rng & Units 9/3/2020   A1C 0 - 5.6 % 5.4     Iron studies Latest Ref Rng & Units 9/9/2020   Iron 35 - 180 ug/dL 162   Iron sat 15 - 46 % 92(H)   Ferritin 8 - 252 ng/mL 1,757(H)     [facility] Txp Virology Latest Ref Rng & Units 9/3/2020 1/28/2019   EBV CAPSID ANTIBODY IGG 0.0 - 0.8 AI >8.0(H) >8.0(H)   Hep B Core NR:Nonreactive - Reactive(AA)        Recent Labs   Lab Test 09/09/20  0636 09/10/20  0815 09/14/20  0815   DOSTAC 80,820,201,900 Not Provided Not Provided   TACROL 7.6 9.2 7.1     Recent Labs   Lab Test 09/14/20  0815   DOSMPA Not Provided   MPACID 0.31*   MPAG 64.0     Physician Attestation   I, Jordy Dewitt MD, saw this patient and agree with the findings and plan of care as documented in the note.      Items personally reviewed/procedural attestation: vitals, labs and imaging and agree with the interpretation documented in the note.    Jordy Dewitt MD

## 2020-09-18 NOTE — PROGRESS NOTES
"Ethel Thompson is a 65 year old female who is being evaluated via a billable video visit.      The patient has been notified of following:     \"This video visit will be conducted via a call between you and your physician/provider. We have found that certain health care needs can be provided without the need for an in-person physical exam.  This service lets us provide the care you need with a video conversation.  If a prescription is necessary we can send it directly to your pharmacy.  If lab work is needed we can place an order for that and you can then stop by our lab to have the test done at a later time.    Video visits are billed at different rates depending on your insurance coverage.  Please reach out to your insurance provider with any questions.    If during the course of the call the physician/provider feels a video visit is not appropriate, you will not be charged for this service.\"    Patient has given verbal consent for Video visit? Yes  How would you like to obtain your AVS? Mail a copy  If you are dropped from the video visit, the video invite should be resent to: Send to e-mail at: [email or web address]  Will anyone else be joining your video visit? No    Video-Visit Details    Type of service:  Video Visit    Video Start Time: 9:00  Video End Time: 9:45    Originating Location (pt. Location): Home    Distant Location (provider location):  [facility] NE[facility]     Platform used for Video Visit: Sri Franco MD        "

## 2020-09-18 NOTE — TELEPHONE ENCOUNTER
Increased tacrolimus 7.3 below goal level   Confirm taking tacrolimus  4.5 mg twice per day   Increased tacrolimus    To 5 mg twice per day  Michelle( )  verbalized understanding  And changed medication card      +++++++++++++++++++++++++++++++  PT referral sent  Via home care   ----- Message -----   From: Tiffany Paul RN   Sent: 9/17/2020  11:06 AM CDT   To: Monika Tinoco NP   Subject: RE: Requesting PT                                 Say ok will set up via home care -     ----- Message -----   From: Yin Huston RN   Sent: 9/17/2020   9:51 AM CDT   To: Tiffany Paul RN   Subject: Requesting PT                                     Yin Self here, Dr. Canales's RN. Pt called asking for a referral for PT post surgery, Dr. Canales would like that to come from your clinic. The pt has an appt this afternoon in your clinic and would like to discuss the referral.     Thanks!   Yin

## 2020-09-18 NOTE — LETTER
"9/18/2020       RE: Ethel Thompson  [address] [place] [facility] [address]     Dear Colleague,    Thank you for referring your patient, Ethel Thompson, to the [facility] NEPHROLOGY at [facility]. Please see a copy of my visit note below.    Ethel Thompson is a 65 year old female who is being evaluated via a billable video visit.      The patient has been notified of following:     \"This video visit will be conducted via a call between you and your physician/provider. We have found that certain health care needs can be provided without the need for an in-person physical exam.  This service lets us provide the care you need with a video conversation.  If a prescription is necessary we can send it directly to your pharmacy.  If lab work is needed we can place an order for that and you can then stop by our lab to have the test done at a later time.    Video visits are billed at different rates depending on your insurance coverage.  Please reach out to your insurance provider with any questions.    If during the course of the call the physician/provider feels a video visit is not appropriate, you will not be charged for this service.\"    Patient has given verbal consent for Video visit? Yes  How would you like to obtain your AVS? Mail a copy  If you are dropped from the video visit, the video invite should be resent to: Send to e-mail at: [email or web address]  Will anyone else be joining your video visit? No    Video-Visit Details    Type of service:  Video Visit    Video Start Time: 9:00  Video End Time: 9:45    Originating Location (pt. Location): Home    Distant Location (provider location):  [facility] NEPHROLOGY     Platform used for Video Visit: Sri Franco MD          ACUTE TRANSPLANT NEPHROLOGY VISIT  Assessment & Plan    # DDKT: Stable              - Baseline Cr ~ 0.9-1.0mg/dL              - Proteinuria: 1.17g/g               - Date DSA Last Checked: Sep/2020      Latest DSA: " No              - BK Viremia: Not checked post transplant                 Check weekly UPCR given h/o FSGS. Stent in place and will be removed at post-op 4-6 weeks. Stable creatinine, and we will continue IS as below. U/S yesterday shows an enlargement of the fluid collection, as well as a new fluid collection [id number]. We have asked that surgery comment on the fluid collection size and if this is amenable to drainage. Given that she has stable Hgb and stable creatinine, we would favor watchful waiting. If she develops fevers, chills, leukocytosis, we should get a culture of the fluid and start antibiotics for presumed infected seroma.     # Immunosuppression: Tacrolimus immediate release (goal 8-10), Mycophenolate mofetil (dose 1000mg every 12 hours) and Prednisone (dose taper)  Induction immunosuppression with thymoglobulin 125mg (2.2mg/kg), basiliximab (dose given POD #1 and POD #5 ), and steroid taper.             - Changes: Not at this time FK trough was 7 & dose uptitrated to 4.5 mg po bid, MMF up to 1g bid on 9/14 as MPA subtherapeutic    # Infection Prophylaxis:   - PJP: Sulfa/TMP (Bactrim)  - CMV: Valcyte x 12 weeks  - Thrush: Nystatin     # Hypertension: Controlled;   Goal BP: < 150/90              - Volume status: Mildly hypervolemic             EDW ~ 54kg              - Changes: No, continue lasix 20mg daily    # Anemia in Chronic Renal Disease: Hgb: Stable      JHONATAN: No   - Iron studies: Low iron saturation, but high ferritin    # Mineral Bone Disorder:   - Secondary renal hyperparathyroidism; PTH level: Moderately elevated (301-600 pg/ml)        On treatment: None  - Vitamin D; level: Low normal        On supplement: Yes  - Calcium; level: Normal        On supplement: Yes  - Phosphorus; level: Low normal        On supplement: Yes    # Electrolytes:   - Potassium; level: Normal        On supplement: No  - Magnesium; level: Normal        On supplement: Yes  - Bicarbonate; level: Normal        On  supplement: No  - Sodium; level: Normal    # Medical Compliance: Yes     # Transplant History:  Etiology of Kidney Failure: Focal segmental glomerulosclerosis (FSGS)  Tx: DDKT  Transplant: 9/3/2020 (Kidney)  Donor Type: Donation after Brain Death        Donor Class:   Crossmatch at time of Tx: negative  Significant changes in immunosuppression: None  Significant transplant-related complications: None     Recommendations were communicated to the primary team verbally.     Transplant Office Phone Number: [phone]     Assessment and plan was discussed with the patient and she voiced her understanding and agreement.     Return visit: at 1 month post-transplant    Jody Franco MD    Chief Complaint   Ms. Thompson is a 65 year old here for routine follow up.     History of Present Illness     Patient reports incisional pain at the lower end of her allograft. Patient has tried gabapentin but that did not help. She reports a good appetite and eating meat + rice. Her activity and fluid intake is hindered by her abdominal pain.     She reports no chest pain and no shortness of breath. She reports low energy levels due to the pain, and has been taking tramadol and opioids to help with the pain. No fevers, no chills, no night sweats. She reports that miralax is helping with her bowel movements. She reports no diarrhea. She has improving leg swelling.     Recent Hospitalizations:  [x] No [x] Yes    New Medical Issues: [x] No [] Yes    Decreased energy: [] No [x] Yes Improving   Chest pain or SOB with exertion:  [x] No [] Yes    Appetite change or weight change: [x] No [] Yes    Nausea, vomiting or diarrhea:  [x] No [] Yes    Fever, sweats or chills: [x] No [] Yes    Leg swelling: [x] No [] Yes      BP: ~142/68    Review of Systems   A comprehensive review of systems was obtained and negative, except as noted in the HPI or PMH.    Problem List   Patient Active Problem List   Diagnosis     Elevated serum immunoglobulin free light  chain level     Renovascular hypertension     FSGS (focal segmental glomerulosclerosis)     Hyperlipidemia     Hypothyroidism     SADIE on CPAP     Sensorineural hearing loss (SNHL) of both ears     Anemia in chronic kidney disease     GERD (gastroesophageal reflux disease)     Hypertension     Hepatitis B core antibody positive     End stage renal disease (H)     Secondary hyperparathyroidism (H)     Memory deficits     Kidney replaced by transplant     Immunosuppressed status (H)     Elevated lactic acid level     Acute anemia       Social History   Social History     Tobacco Use     Smoking status: Former Smoker     Packs/day: 1.00     Types: Cigarettes     Start date:      Last attempt to quit:      Years since quittin.7     Smokeless tobacco: Never Used   Substance Use Topics     Alcohol use: No     Frequency: Never     Drug use: No       Allergies   Allergies   Allergen Reactions     Amoxicillin-Pot Clavulanate Nausea and Vomiting       Medications   Current Outpatient Medications   Medication Sig     acetaminophen (TYLENOL) 325 MG tablet Take 2 tablets (650 mg) by mouth every 4 hours as needed for pain     amLODIPine (NORVASC) 5 MG tablet Take 1 tablet (5 mg) by mouth daily     aspirin 81 MG EC tablet Take 81 mg by mouth daily     atorvastatin (LIPITOR) 40 MG tablet Take 0.5 tablets (20 mg) by mouth daily     calcium carbonate 600 mg-vitamin D 400 units (CALTRATE) 600-400 MG-UNIT per tablet Take 1 tablet by mouth 2 times daily     famotidine (PEPCID) 20 MG tablet Take 20 mg by mouth every evening      fish oil-omega-3 fatty acids 1000 MG capsule Take 1 g by mouth daily      fluticasone (FLONASE) 50 MCG/ACT nasal spray Spray 1 spray into both nostrils 2 times daily as needed for rhinitis or allergies     furosemide (LASIX) 20 MG tablet Take 1 tablet (20 mg) by mouth daily     gabapentin (NEURONTIN) 100 MG capsule 100mg in AM, 200mg in PM     levothyroxine (SYNTHROID/LEVOTHROID) 112 MCG tablet Take  112 mcg (1 tablet) by mouth every Monday, Tuesday, Wednesday, Thursday, Friday.     Lidocaine (LIDOCARE) 4 % Patch Place 1 patch onto the skin every 24 hours To prevent lidocaine toxicity, patient should be patch free for 12 hrs daily.     magnesium oxide (MAG-OX) 400 MG tablet Take 1 tablet (400 mg) by mouth daily (with lunch)     melatonin 3 MG tablet Take 6 mg by mouth At Bedtime      mycophenolate (GENERIC EQUIVALENT) 250 MG capsule Take 3 capsules (750 mg) by mouth 2 times daily     nystatin (MYCOSTATIN) 436668 UNIT/ML suspension Take 5 mLs (500,000 Units) by mouth 4 times daily for 14 days     phosphorus tablet 250 mg (PHOSPHORUS TABLET 250 MG) 250 MG per tablet Take 2 tablets (500 mg) by mouth 2 times daily     polyethylene glycol (MIRALAX) 17 GM/Dose powder Take 17 g (1 capful) by mouth daily     polyethylene glycol (MIRALAX) 17 GM/Dose powder Take 1 capful by mouth daily     predniSONE (DELTASONE) 10 MG tablet On 9/7 take 60mg (6 tabs)  9/8-9/9 take 40mg (4 tabs daily)  9/10-9/16 take 20mg (2 tabs daily)  9/17-9/23 take 15mg (1.5 tabs daily)  9/24-9/30 take 10mg (1 tab daily)  10/1-10/7 take 5mg (half tab daily)     senna-docusate (SENOKOT-S/PERICOLACE) 8.6-50 MG tablet Take 1 tablet by mouth 2 times daily Hold for loose stools     sulfamethoxazole-trimethoprim (BACTRIM) 400-80 MG tablet Take 1 tablet by mouth daily     tacrolimus (GENERIC EQUIVALENT) 0.5 MG capsule Take total dose 4.5 mg twice per day     tacrolimus (GENERIC EQUIVALENT) 1 MG capsule Take 4 capsules (4 mg) by mouth 2 times daily Take tacrolimus  4.5 mg twice per day     traMADol (ULTRAM) 50 MG tablet Take 1 tablet (50 mg) by mouth every 8 hours as needed for severe pain     valGANciclovir (VALCYTE) 450 MG tablet Take 1 tab every other day. Titrate dose up to a max of 2 tabs (900 mg) by mouth daily when directed by your transplant team.     vitamin B-12 (CYANOCOBALAMIN) 500 MCG tablet Take 500 mcg by mouth daily     vitamin B6 (PYRIDOXINE)  100 MG tablet Take 100 mg by mouth daily     Vitamin D, Cholecalciferol, 25 MCG (1000 UT) TABS Take 2,000 Units by mouth daily     ondansetron (ZOFRAN-ODT) 4 MG ODT tab Take 1 tablet (4 mg) by mouth every 6 hours as needed for nausea or vomiting (Patient not taking: Reported on 9/11/2020)     No current facility-administered medications for this visit.      There are no discontinued medications.    Physical Exam   Vital Signs: reviewed     No physical exam for this telemedicine visit.    Data     Renal Latest Ref Rng & Units 9/14/2020 9/10/2020 9/9/2020   Na 133 - 144 mmol/L 142 143 144   K 3.4 - 5.3 mmol/L 4.7 4.1 3.9   Cl 94 - 109 mmol/L 110(H) 110(H) 111(H)   CO2 20 - 32 mmol/L 23 28 29   BUN 7 - 30 mg/dL 24 28 32(H)   Cr 0.52 - 1.04 mg/dL 0.92 0.92 1.03   Glucose 70 - 99 mg/dL 101(H) 110(H) 112(H)   Ca  8.5 - 10.1 mg/dL 8.3(L) 8.3(L) 8.6   Mg 1.6 - 2.3 mg/dL 1.6 - 2.0     Bone Health Latest Ref Rng & Units 9/14/2020 9/9/2020 9/8/2020   Phos 2.5 - 4.5 mg/dL 2.4(L) 1.1(L) 1.1(L)   PTHi 18 - 80 pg/mL - 357(H) -   Vit D Def 20 - 75 ug/L - 20 -     Heme Latest Ref Rng & Units 9/14/2020 9/10/2020 9/9/2020   WBC 4.0 - 11.0 10e9/L 8.7 7.8 6.6   Hgb 11.7 - 15.7 g/dL 9.8(L) 9.9(L) 9.6(L)   Plt 150 - 450 10e9/L 237 177 143(L)   ABSOLUTE NEUTROPHIL 1.6 - 8.3 10e9/L - - 5.2   ABSOLUTE LYMPHOCYTES 0.8 - 5.3 10e9/L - - 0.3(L)   ABSOLUTE MONOCYTES 0.0 - 1.3 10e9/L - - 0.9   ABSOLUTE EOSINOPHILS 0.0 - 0.7 10e9/L - - 0.0   ABSOLUTE BASOPHILS 0.0 - 0.2 10e9/L - - 0.1   ABS IMMATURE GRANULOCYTES 0 - 0.4 10e9/L - - -   ABSOLUTE NUCLEATED RBC - - - -     Liver Latest Ref Rng & Units 9/9/2020 9/5/2020 9/4/2020   AP 40 - 150 U/L - 53 60   TBili 0.2 - 1.3 mg/dL - 0.4 1.1   DBili 0.0 - 0.2 mg/dL - 0.2 0.1   ALT 0 - 50 U/L - 52(H) 41   AST 0 - 45 U/L - 50(H) 38   Tot Protein 6.8 - 8.8 g/dL - 5.4(L) 5.2(L)   Albumin 3.4 - 5.0 g/dL 2.9(L) 2.8(L) 2.3(L)     Pancreas Latest Ref Rng & Units 9/3/2020   A1C 0 - 5.6 % 5.4     Iron studies Latest  Ref Rng & Units 9/9/2020   Iron 35 - 180 ug/dL 162   Iron sat 15 - 46 % 92(H)   Ferritin 8 - 252 ng/mL 1,757(H)     [facility] Txp Virology Latest Ref Rng & Units 9/3/2020 1/28/2019   EBV CAPSID ANTIBODY IGG 0.0 - 0.8 AI >8.0(H) >8.0(H)   Hep B Core NR:Nonreactive - Reactive(AA)        Recent Labs   Lab Test 09/09/20  0636 09/10/20  0815 09/14/20  0815   DOSTAC 80,820,201,900 Not Provided Not Provided   TACROL 7.6 9.2 7.1     Recent Labs   Lab Test 09/14/20 0815   DOSMPA Not Provided   MPACID 0.31*   MPAG 64.0     Physician Attestation   I, Jordy Dewitt MD, saw this patient and agree with the findings and plan of care as documented in the note.      Items personally reviewed/procedural attestation: vitals, labs and imaging and agree with the interpretation documented in the note.    Jordy Dewitt MD      Again, thank you for allowing me to participate in the care of your patient.      Sincerely,    Early Post Transplant

## 2020-09-18 NOTE — TELEPHONE ENCOUNTER
Spoke to home care nurse  -  Confirmed that transplant  Labs should be drawn under the transplant provider - immediately post transplant     Confirmed transplant  Labs drawn this morning  Sept 18 ,2020

## 2020-09-20 NOTE — TELEPHONE ENCOUNTER
Monika Tinoco NP Huepfel, Mary K, RN               Pls start her on baby asa per iris and repeat ultrasound in 2 weeks

## 2020-09-21 ENCOUNTER — TELEPHONE (OUTPATIENT)
Dept: TRANSPLANT | Facility: CLINIC | Age: 65
End: 2020-09-21

## 2020-09-21 ENCOUNTER — MEDICAL CORRESPONDENCE (OUTPATIENT)
Dept: HEALTH INFORMATION MANAGEMENT | Facility: CLINIC | Age: 65
End: 2020-09-21

## 2020-09-21 LAB
ANION GAP SERPL CALCULATED.3IONS-SCNC: 7 MMOL/L (ref 3–14)
BUN SERPL-MCNC: 29 MG/DL (ref 7–30)
CALCIUM SERPL-MCNC: 9.2 MG/DL (ref 8.5–10.1)
CHLORIDE SERPL-SCNC: 110 MMOL/L (ref 94–109)
CO2 SERPL-SCNC: 24 MMOL/L (ref 20–32)
CREAT SERPL-MCNC: 0.98 MG/DL (ref 0.52–1.04)
ERYTHROCYTE [DISTWIDTH] IN BLOOD BY AUTOMATED COUNT: 17.2 % (ref 10–15)
GFR SERPL CREATININE-BSD FRML MDRD: 60 ML/MIN/{1.73_M2}
GLUCOSE SERPL-MCNC: 102 MG/DL (ref 70–99)
HCT VFR BLD AUTO: 32.6 % (ref 35–47)
HGB BLD-MCNC: 10.3 G/DL (ref 11.7–15.7)
MAGNESIUM SERPL-MCNC: 1.8 MG/DL (ref 1.6–2.3)
MCH RBC QN AUTO: 34.2 PG (ref 26.5–33)
MCHC RBC AUTO-ENTMCNC: 31.6 G/DL (ref 31.5–36.5)
MCV RBC AUTO: 108 FL (ref 78–100)
PHOSPHATE SERPL-MCNC: 2.1 MG/DL (ref 2.5–4.5)
PLATELET # BLD AUTO: 253 10E9/L (ref 150–450)
POTASSIUM SERPL-SCNC: 5.2 MMOL/L (ref 3.4–5.3)
RBC # BLD AUTO: 3.01 10E12/L (ref 3.8–5.2)
SODIUM SERPL-SCNC: 141 MMOL/L (ref 133–144)
TACROLIMUS BLD-MCNC: 11.5 UG/L (ref 5–15)
TME LAST DOSE: NORMAL H
WBC # BLD AUTO: 5.8 10E9/L (ref 4–11)

## 2020-09-21 PROCEDURE — 84100 ASSAY OF PHOSPHORUS: CPT | Performed by: SURGERY

## 2020-09-21 PROCEDURE — 80197 ASSAY OF TACROLIMUS: CPT | Performed by: SURGERY

## 2020-09-21 PROCEDURE — 85027 COMPLETE CBC AUTOMATED: CPT | Performed by: SURGERY

## 2020-09-21 PROCEDURE — 80048 BASIC METABOLIC PNL TOTAL CA: CPT | Performed by: SURGERY

## 2020-09-21 PROCEDURE — 83735 ASSAY OF MAGNESIUM: CPT | Performed by: SURGERY

## 2020-09-22 DIAGNOSIS — Z94.0 KIDNEY REPLACED BY TRANSPLANT: Primary | ICD-10-CM

## 2020-09-22 RX ORDER — TACROLIMUS 1 MG/1
4 CAPSULE ORAL 2 TIMES DAILY
Qty: 240 CAPSULE | Refills: 11 | Status: SHIPPED | OUTPATIENT
Start: 2020-09-22 | End: 2020-10-13

## 2020-09-22 RX ORDER — TACROLIMUS 0.5 MG/1
0.5 CAPSULE ORAL 2 TIMES DAILY
Qty: 60 CAPSULE | Refills: 11 | Status: SHIPPED | OUTPATIENT
Start: 2020-09-22 | End: 2020-10-13

## 2020-09-22 NOTE — TELEPHONE ENCOUNTER
Spoke with the patient and confirmed  Appointment with Monika Mcqueen on 9/24/20.  Informed patient an itinerary can be accessed on [employer].

## 2020-09-22 NOTE — TELEPHONE ENCOUNTER
Issue tacrolimus  11.5 slightly above goal level   Task   Please call Maxim  And Ethel   Confirm taking 5 mg twice per day of tacrolimus    Confirm 12 hour trough level   If the above is accurate    Lower tacrolimus  4.5 mg twice per day   Task 2 confirm today's weight

## 2020-09-22 NOTE — TELEPHONE ENCOUNTER
Heavy message sent to patient regarding:  Issue tacrolimus  11.5 slightly above goal level   Task   Please call Maxim  And Ethel   Confirm taking 5 mg twice per day of tacrolimus    Confirm 12 hour trough level   If the above is accurate    Lower tacrolimus  4.5 mg twice per day   Task 2 confirm today's weight

## 2020-09-22 NOTE — TELEPHONE ENCOUNTER
Called Maxim and Ethel general review   Report no fevers over the weekend ,  /70 reports no dizziness   No issues with eating   Overall swelling has decreased   Weight dropped to 132 lbs or 6 lbs over the weekend   Discharge weight ~140#. Pt states dry weight is ~125#.  Currently taking Furosemide 20mg daily. .  Recommend to hold  furosemide  If weight continues to decreased    Issue pain post transplant  -   Pain has not increased since last clinic visit - remains same    No issues with sleeping at this time   Plan to follow up in clinic with Dr Manas Yin

## 2020-09-22 NOTE — TELEPHONE ENCOUNTER
Spoke to patients  regarding:  Issue tacrolimus  11.5 slightly above goal level   Confirmed taking 5 mg twice per day of tacrolimus    Confirmed 12 hour trough level   Patient verbalizes understanding to lower tacrolimus dose to 4.5 mg twice per day   Patient confirms current weight = 131 lbs

## 2020-09-23 ENCOUNTER — TELEPHONE (OUTPATIENT)
Dept: TRANSPLANT | Facility: CLINIC | Age: 65
End: 2020-09-23

## 2020-09-23 ENCOUNTER — OFFICE VISIT (OUTPATIENT)
Dept: TRANSPLANT | Facility: CLINIC | Age: 65
End: 2020-09-23
Attending: SURGERY
Payer: MEDICARE

## 2020-09-23 VITALS
HEART RATE: 78 BPM | BODY MASS INDEX: 26.9 KG/M2 | DIASTOLIC BLOOD PRESSURE: 75 MMHG | OXYGEN SATURATION: 98 % | SYSTOLIC BLOOD PRESSURE: 167 MMHG | WEIGHT: 133.2 LBS

## 2020-09-23 DIAGNOSIS — G89.18 POST-OP PAIN: ICD-10-CM

## 2020-09-23 RX ORDER — TRAMADOL HYDROCHLORIDE 50 MG/1
50 TABLET ORAL EVERY 8 HOURS PRN
Qty: 14 TABLET | Refills: 0 | Status: SHIPPED | OUTPATIENT
Start: 2020-09-23 | End: 2020-10-26

## 2020-09-23 ASSESSMENT — PAIN SCALES - GENERAL: PAINLEVEL: SEVERE PAIN (7)

## 2020-09-23 NOTE — PROGRESS NOTES
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 Graft functioning well. Initially had RLE bruising and swelling postoperatively, but this has improved significantly. Does have some pain in her right groin with position changes, but this is also improving. Reports better appetite and energy level also. No complaints otherwise.     Transplant History:    Transplant Type:  DDKT  Donor Type: Donation after Brain Death   Transplant Date:  9/3/2020 (Kidney)   Ureteral Stent:  No   Crossmatch:  negative   DSA at Tx:  No  Baseline Cr: 0.96   DeNovo DSA: No    Acute Rejection Hx:  No    Present Maintenance Immunosuppression:  Tacrolimus and Mycophenolic acid and prednisone taper (intermediate induction protocol)    CMV IgG Ab Discordance:  No R+  EBV IgG Ab Discordance:  No  R+    BK Viremia:  No  EBV Viremia:  No    Transplant Coordinator: Tiffany Paul     Transplant Office Phone Number: [phone]     Immunosuppressant Medications     Immunosuppressive Agents Disp Start End     mycophenolate (GENERIC EQUIVALENT) 250 MG capsule    180 capsule 9/18/2020     Sig - Route: Take 4 capsules (1,000 mg) by mouth 2 times daily - Oral    Class: E-Prescribe     tacrolimus (GENERIC EQUIVALENT) 0.5 MG capsule    60 capsule 9/22/2020     Sig - Route: Take 1 capsule (0.5 mg) by mouth 2 times daily Take total dose 4.5 mg twice per day - Oral    Class: E-Prescribe     tacrolimus (GENERIC EQUIVALENT) 1 MG capsule    240 capsule 9/22/2020     Sig - Route: Take 4 capsules (4 mg) by mouth 2 times daily Take tacrolimus  4.5 mg twice per day - Oral    Class: E-Prescribe          Possible Immunosuppression-related side effects:   []             headache  []             vivid dreams  []             irritability  []             cognitive difficuties  []             fine tremor  []             nausea  []             diarrhea  []             neuropathy      []              edema  []             renal calcineurin toxicity  []             hyperkalemia  []             post-transplant diabetes  []             decreased appetite  []             increased appetite  []             other:  []             none    Prescription Medications as of 9/23/2020       Rx Number Disp Refills Start End Last Dispensed Date Next Fill Date Owning Pharmacy    acetaminophen (TYLENOL) 325 MG tablet  1 Bottle 0 9/7/2020    [place], [place] - [address] [facility]    Sig: Take 2 tablets (650 mg) by mouth every 4 hours as needed for pain    Class: E-Prescribe    Route: Oral    amLODIPine (NORVASC) 5 MG tablet  90 tablet 3 9/8/2020    [address]    Sig: Take 1 tablet (5 mg) by mouth daily    Class: E-Prescribe    Route: Oral    aspirin (ASA) 81 MG EC tablet  90 tablet 3 9/22/2020    [id number] [place] [address]    Sig: Take 1 tablet (81 mg) by mouth daily    Class: E-Prescribe    Route: Oral    aspirin 81 MG EC tablet        [address]    Sig: Take 81 mg by mouth daily    Class: Historical    Route: Oral    atorvastatin (LIPITOR) 40 MG tablet    9/6/2020        Sig: Take 0.5 tablets (20 mg) by mouth daily    Class: No Print Out    Route: Oral    calcium carbonate 600 mg-vitamin D 400 units (CALTRATE) 600-400 MG-UNIT per tablet            Sig: Take 1 tablet by mouth 2 times daily    Class: Historical    Route: Oral    famotidine (PEPCID) 20 MG tablet        [address]    Sig: Take 20 mg by mouth every evening     Class: Historical    Route: Oral    fish oil-omega-3 fatty acids 1000 MG capsule        [address]    Sig: Take 1 g by mouth daily     Class: Historical    Route: Oral     fluticasone (FLONASE) 50 MCG/ACT nasal spray            Sig: Spray 1 spray into both nostrils 2 times daily as needed for rhinitis or allergies    Class: Historical    Route: Both Nostrils    furosemide (LASIX) 20 MG tablet  30 tablet 1 9/15/2020    [address] [address] [email or web address]    Sig: Take 1 tablet (20 mg) by mouth daily    Class: E-Prescribe    Route: Oral    gabapentin (NEURONTIN) 100 MG capsule  30 capsule 0 2020    [address] [address] [facility]    Simg in AM, 200mg in PM    Class: E-Prescribe    levothyroxine (SYNTHROID/LEVOTHROID) 112 MCG tablet    2019        Sig: Take 112 mcg (1 tablet) by mouth every Monday, Tuesday, Wednesday, Thursday, Friday.    Class: Historical    Lidocaine (LIDOCARE) 4 % Patch  10 patch 3 2020    [address] [address] [email or web address]    Sig: Place 1 patch onto the skin every 24 hours To prevent lidocaine toxicity, patient should be patch free for 12 hrs daily.    Class: E-Prescribe    Route: Transdermal    magnesium oxide (MAG-OX) 400 MG tablet  30 tablet 5 2020    [address] [address] [facility]    Sig: Take 1 tablet (400 mg) by mouth daily (with lunch)    Class: E-Prescribe    Route: Oral    melatonin 3 MG tablet            Sig: Take 6 mg by mouth At Bedtime     Class: Historical    Route: Oral    mycophenolate (GENERIC EQUIVALENT) 250 MG capsule  180 capsule 11 2020    [address] [address] [facility]    Sig: Take 4 capsules (1,000 mg) by mouth 2 times daily    Class: E-Prescribe    Route: Oral    nystatin (MYCOSTATIN) 716378 UNIT/ML suspension (Ended)  280 mL 0 2020   [place] [id number] [facility] [address]    Sig: Take 5 mLs (500,000 Units) by mouth 4 times daily for 14 days    Class: E-Prescribe    Route: Oral    ondansetron (ZOFRAN-ODT)  4 MG ODT tab  5 tablet 0 9/6/2020    [address]    Sig: Take 1 tablet (4 mg) by mouth every 6 hours as needed for nausea or vomiting    Class: E-Prescribe    Route: Oral    phosphorus tablet 250 mg (PHOSPHORUS TABLET 250 MG) 250 MG per tablet  30 tablet 0 9/11/2020    [address] [address] [facility]    Sig: Take 2 tablets (500 mg) by mouth 2 times daily    Class: E-Prescribe    Route: Oral    polyethylene glycol (MIRALAX) 17 GM/Dose powder  507 g 0 9/14/2020    [address] [address] [facility]    Sig: Take 17 g (1 capful) by mouth daily    Class: E-Prescribe    Route: Oral    polyethylene glycol (MIRALAX) 17 GM/Dose powder            Sig: Take 1 capful by mouth daily    Class: Historical    Route: Oral    predniSONE (DELTASONE) 10 MG tablet  49 tablet 0 9/6/2020    [address]    Sig: On 9/7 take 60mg (6 tabs)  9/8-9/9 take 40mg (4 tabs daily)  9/10-9/16 take 20mg (2 tabs daily)  9/17-9/23 take 15mg (1.5 tabs daily)  9/24-9/30 take 10mg (1 tab daily)  10/1-10/7 take 5mg (half tab daily)    Class: Local Print    Renewals     Renewal requests to authorizing provider (Alicia Garcia, CODY CNP) <b>prohibited</b>          senna-docusate (SENOKOT-S/PERICOLACE) 8.6-50 MG tablet  60 tablet 0 9/6/2020    [address]    Sig: Take 1 tablet by mouth 2 times daily Hold for loose stools    Class: E-Prescribe    Route: Oral    sulfamethoxazole-trimethoprim (BACTRIM) 400-80 MG tablet  30 tablet 11 9/7/2020    [address] [address] [facility]    Sig: Take 1 tablet by mouth daily    Class: E-Prescribe    Route: Oral    tacrolimus (GENERIC EQUIVALENT) 0.5 MG capsule  60 capsule 11 9/22/2020    [address] [address] [facility]     Sig: Take 1 capsule (0.5 mg) by mouth 2 times daily Take total dose 4.5 mg twice per day    Class: E-Prescribe    Route: Oral    tacrolimus (GENERIC EQUIVALENT) 1 MG capsule  240 capsule 11 9/22/2020    [address]    Sig: Take 4 capsules (4 mg) by mouth 2 times daily Take tacrolimus  4.5 mg twice per day    Class: E-Prescribe    Route: Oral    traMADol (ULTRAM) 50 MG tablet  14 tablet 0 9/23/2020    [address]    Sig: Take 1 tablet (50 mg) by mouth every 8 hours as needed for severe pain    Class: Local Print    Route: Oral    valGANciclovir (VALCYTE) 450 MG tablet  60 tablet 2 9/18/2020    [address] [address] [facility]    Sig: Take 2 tablets (900 mg) by mouth daily    Class: Historical    Route: Oral    vitamin B-12 (CYANOCOBALAMIN) 500 MCG tablet        [address]    Sig: Take 500 mcg by mouth daily    Class: Historical    Route: Oral    vitamin B6 (PYRIDOXINE) 100 MG tablet        [address]    Sig: Take 100 mg by mouth daily    Class: Historical    Route: Oral    Vitamin D, Cholecalciferol, 25 MCG (1000 UT) TABS            Sig: Take 2,000 Units by mouth daily    Class: Historical    Route: Oral          O:   Pulse:  [78] 78  BP: (167)/(75) 167/75  SpO2:  [98 %] 98 %  General Appearance: in no apparent distress.   Skin: Normal, no rashes or jaundice  Heart: regular rate and rhythm, normal S1 and S2  Lungs: easy respirations, no audible wheezing.  Abdomen: abdomen soft, minimal halie-incisional tenderness. Skin healing well, no hernia. PD cath removal site also healing well.   Extremities: Tremor absent.   Edema: absent.     Transplant Immunosuppression Labs Latest Ref Rng & Units 9/21/2020 9/18/2020 9/14/2020  9/10/2020 9/9/2020   Tacro Level 5.0 - 15.0 ug/L 11.5 7.3 7.1 9.2 7.6   Tacro Level - 09/20/20 @ 2000 LAST DOSE 09/17/20 @ 2000 Not Provided Not Provided 80,820,201,900   Creat 0.52 - 1.04 mg/dL 0.98 0.96 0.92 0.92 1.03   BUN 7 - 30 mg/dL 29 33(H) 24 28 32(H)   WBC 4.0 - 11.0 10e9/L 5.8 7.0 8.7 7.8 6.6   Neutrophil % - - - - 78.9   ANEU 1.6 - 8.3 10e9/L - - - - 5.2       Chemistries:   Recent Labs   Lab Test 09/21/20  0805   BUN 29   CR 0.98   GFRESTIMATED 60*   *     Lab Results   Component Value Date    A1C 5.4 09/03/2020     Recent Labs   Lab Test 09/09/20  0637 09/05/20  0535   ALBUMIN 2.9* 2.8*   BILITOTAL  --  0.4   ALKPHOS  --  53   AST  --  50*   ALT  --  52*     Urine Studies:  Recent Labs   Lab Test 09/03/20  1905   COLOR Yellow   APPEARANCE Clear   URINEGLC Negative   URINEBILI Negative   URINEKETONE Negative   SG 1.008   UBLD Small*   URINEPH 6.5   PROTEIN 30*   NITRITE Negative   LEUKEST Negative   RBCU <1   WBCU <1     Recent Labs   Lab Test 09/05/20  0930 09/03/20  1905   UTPG 1.17* 1.92*     Hematology:   Recent Labs   Lab Test 09/21/20  0805 09/18/20  0805 09/14/20  0815   HGB 10.3* 10.1* 9.8*    259 237   WBC 5.8 7.0 8.7     Coags:   Recent Labs   Lab Test 09/04/20  1544 09/03/20  1756   INR 1.08 0.89     HLA antibodies:   SA1 Hi Risk Diya   Date Value Ref Range Status   09/08/2020 None  Final     SA1 Mod Risk Diya   Date Value Ref Range Status   09/08/2020 B:76  Final     SA2 Hi Risk Diya   Date Value Ref Range Status   09/08/2020 None  Final     SA2 Mod Risk Diya   Date Value Ref Range Status   09/08/2020 None  Final       Assessment: Ethel Thompson is doing well s/p DDKT:  Issues we addressed during her visit include:    -pain control: improving. Renew Rx for tramadol for two weeks and reassess.    Plan:    1. Graft function: good, stable.   2. Immunosuppression Management: No change tacrolimus 4 BID, cellcept 1000 BID, steroid taper.  Complexity of management:Medium.  Contributing factors:  anemia  3. Ultrasound findings: two perinephric collections noted no most recent US, as well as slightly elevated velocity of flow at arterial anastomosis. Will obtain f/u US in two weeks to monitor.  Followup: two weeks for repeat ultrasound. Other appointments as scheduled.    Total Time: 15 min,   Counselling Time: 10 min.      Manas Bansal MD

## 2020-09-23 NOTE — LETTER
9/23/2020         RE: Ethel Thompson  [address] [place] [address] [address]  [place] [address]        Dear Colleague,    Thank you for referring your patient, Ethel Thompson, to the [facility] SOLID ORGAN TRANSPLANT. Please see a copy of my visit note below.    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 Graft functioning well. Initially had RLE bruising and swelling postoperatively, but this has improved significantly. Does have some pain in her right groin with position changes, but this is also improving. Reports better appetite and energy level also. No complaints otherwise.     Transplant History:    Transplant Type:  DDKT  Donor Type: Donation after Brain Death   Transplant Date:  9/3/2020 (Kidney)   Ureteral Stent:  No   Crossmatch:  negative   DSA at Tx:  No  Baseline Cr: 0.96   DeNovo DSA: No    Acute Rejection Hx:  No    Present Maintenance Immunosuppression:  Tacrolimus and Mycophenolic acid and prednisone taper (intermediate induction protocol)    CMV IgG Ab Discordance:  No R+  EBV IgG Ab Discordance:  No  R+    BK Viremia:  No  EBV Viremia:  No    Transplant Coordinator: Tiffany Paul     Transplant Office Phone Number: [phone]     Immunosuppressant Medications     Immunosuppressive Agents Disp Start End     mycophenolate (GENERIC EQUIVALENT) 250 MG capsule    180 capsule 9/18/2020     Sig - Route: Take 4 capsules (1,000 mg) by mouth 2 times daily - Oral    Class: E-Prescribe     tacrolimus (GENERIC EQUIVALENT) 0.5 MG capsule    60 capsule 9/22/2020     Sig - Route: Take 1 capsule (0.5 mg) by mouth 2 times daily Take total dose 4.5 mg twice per day - Oral    Class: E-Prescribe     tacrolimus (GENERIC EQUIVALENT) 1 MG capsule    240 capsule 9/22/2020     Sig - Route: Take 4 capsules (4 mg) by mouth 2 times daily Take tacrolimus  4.5 mg twice per day - Oral    Class: E-Prescribe          Possible Immunosuppression-related side effects:   []              headache  []             vivid dreams  []             irritability  []             cognitive difficuties  []             fine tremor  []             nausea  []             diarrhea  []             neuropathy      []             edema  []             renal calcineurin toxicity  []             hyperkalemia  []             post-transplant diabetes  []             decreased appetite  []             increased appetite  []             other:  []             none    Prescription Medications as of 9/23/2020       Rx Number Disp Refills Start End Last Dispensed Date Next Fill Date Owning Pharmacy    acetaminophen (TYLENOL) 325 MG tablet  1 Bottle 0 9/7/2020    [place], [place] - [address] [facility]    Sig: Take 2 tablets (650 mg) by mouth every 4 hours as needed for pain    Class: E-Prescribe    Route: Oral    amLODIPine (NORVASC) 5 MG tablet  90 tablet 3 9/8/2020    [address]    Sig: Take 1 tablet (5 mg) by mouth daily    Class: E-Prescribe    Route: Oral    aspirin (ASA) 81 MG EC tablet  90 tablet 3 9/22/2020    [address] [address] [employer] [facility]    Sig: Take 1 tablet (81 mg) by mouth daily    Class: E-Prescribe    Route: Oral    aspirin 81 MG EC tablet        [address]    Sig: Take 81 mg by mouth daily    Class: Historical    Route: Oral    atorvastatin (LIPITOR) 40 MG tablet    9/6/2020        Sig: Take 0.5 tablets (20 mg) by mouth daily    Class: No Print Out    Route: Oral    calcium carbonate 600 mg-vitamin D 400 units (CALTRATE) 600-400 MG-UNIT per tablet            Sig: Take 1 tablet by mouth 2 times daily    Class: Historical    Route: Oral    famotidine (PEPCID) 20 MG tablet        [address] [address] [employer] [facility]    Sig: Take 20 mg by mouth every evening     Class:  Historical    Route: Oral    fish oil-omega-3 fatty acids 1000 MG capsule        [address]    Sig: Take 1 g by mouth daily     Class: Historical    Route: Oral    fluticasone (FLONASE) 50 MCG/ACT nasal spray            Sig: Spray 1 spray into both nostrils 2 times daily as needed for rhinitis or allergies    Class: Historical    Route: Both Nostrils    furosemide (LASIX) 20 MG tablet  30 tablet 1 9/15/2020    [place] [id number] [facility]    Sig: Take 1 tablet (20 mg) by mouth daily    Class: E-Prescribe    Route: Oral    gabapentin (NEURONTIN) 100 MG capsule  30 capsule 0 2020    [place] [id number] [facility]    Simg in AM, 200mg in PM    Class: E-Prescribe    levothyroxine (SYNTHROID/LEVOTHROID) 112 MCG tablet    2019        Sig: Take 112 mcg (1 tablet) by mouth every Monday, Tuesday, Wednesday, Thursday, Friday.    Class: Historical    Lidocaine (LIDOCARE) 4 % Patch  10 patch 3 2020    [place] [id number] [facility]    Sig: Place 1 patch onto the skin every 24 hours To prevent lidocaine toxicity, patient should be patch free for 12 hrs daily.    Class: E-Prescribe    Route: Transdermal    magnesium oxide (MAG-OX) 400 MG tablet  30 tablet 5 2020    [place] [id number] [facility]    Sig: Take 1 tablet (400 mg) by mouth daily (with lunch)    Class: E-Prescribe    Route: Oral    melatonin 3 MG tablet            Sig: Take 6 mg by mouth At Bedtime     Class: Historical    Route: Oral    mycophenolate (GENERIC EQUIVALENT) 250 MG capsule  180 capsule 11 2020    [place] [id number] [facility]    Sig: Take 4 capsules (1,000 mg) by mouth 2 times daily    Class: E-Prescribe    Route: Oral    nystatin (MYCOSTATIN) 100090 UNIT/ML suspension (Ended)  280 mL 0  9/8/2020 9/22/2020   [place], [place] - [address] [facility] [address]    Sig: Take 5 mLs (500,000 Units) by mouth 4 times daily for 14 days    Class: E-Prescribe    Route: Oral    ondansetron (ZOFRAN-ODT) 4 MG ODT tab  5 tablet 0 9/6/2020    [address]    Sig: Take 1 tablet (4 mg) by mouth every 6 hours as needed for nausea or vomiting    Class: E-Prescribe    Route: Oral    phosphorus tablet 250 mg (PHOSPHORUS TABLET 250 MG) 250 MG per tablet  30 tablet 0 9/11/2020    [id number] [place] [address]    Sig: Take 2 tablets (500 mg) by mouth 2 times daily    Class: E-Prescribe    Route: Oral    polyethylene glycol (MIRALAX) 17 GM/Dose powder  507 g 0 9/14/2020    [address] [address] [facility]    Sig: Take 17 g (1 capful) by mouth daily    Class: E-Prescribe    Route: Oral    polyethylene glycol (MIRALAX) 17 GM/Dose powder            Sig: Take 1 capful by mouth daily    Class: Historical    Route: Oral    predniSONE (DELTASONE) 10 MG tablet  49 tablet 0 9/6/2020    [address]    Sig: On 9/7 take 60mg (6 tabs)  9/8-9/9 take 40mg (4 tabs daily)  9/10-9/16 take 20mg (2 tabs daily)  9/17-9/23 take 15mg (1.5 tabs daily)  9/24-9/30 take 10mg (1 tab daily)  10/1-10/7 take 5mg (half tab daily)    Class: Local Print    Renewals     Renewal requests to authorizing provider (Alicia Garcia, CODY CNP) <b>prohibited</b>          senna-docusate (SENOKOT-S/PERICOLACE) 8.6-50 MG tablet  60 tablet 0 9/6/2020    [address]    Sig: Take 1 tablet by mouth 2 times daily Hold for loose stools    Class: E-Prescribe    Route: Oral    sulfamethoxazole-trimethoprim (BACTRIM) 400-80 MG tablet  30 tablet 11 9/7/2020    [place] [address] - ROBERTA UGALDE -  [id number] [place] [address]    Sig: Take 1 tablet by mouth daily    Class: E-Prescribe    Route: Oral    tacrolimus (GENERIC EQUIVALENT) 0.5 MG capsule  60 capsule 11 9/22/2020    [address]    Sig: Take 1 capsule (0.5 mg) by mouth 2 times daily Take total dose 4.5 mg twice per day    Class: E-Prescribe    Route: Oral    tacrolimus (GENERIC EQUIVALENT) 1 MG capsule  240 capsule 11 9/22/2020    [address]    Sig: Take 4 capsules (4 mg) by mouth 2 times daily Take tacrolimus  4.5 mg twice per day    Class: E-Prescribe    Route: Oral    traMADol (ULTRAM) 50 MG tablet  14 tablet 0 9/23/2020    [address]    Sig: Take 1 tablet (50 mg) by mouth every 8 hours as needed for severe pain    Class: Local Print    Route: Oral    valGANciclovir (VALCYTE) 450 MG tablet  60 tablet 2 9/18/2020    [address]    Sig: Take 2 tablets (900 mg) by mouth daily    Class: Historical    Route: Oral    vitamin B-12 (CYANOCOBALAMIN) 500 MCG tablet        [address]    Sig: Take 500 mcg by mouth daily    Class: Historical    Route: Oral    vitamin B6 (PYRIDOXINE) 100 MG tablet        [address]    Sig: Take 100 mg by mouth daily    Class: Historical    Route: Oral    Vitamin D, Cholecalciferol, 25 MCG (1000 UT) TABS            Sig: Take 2,000 Units by mouth daily    Class: Historical    Route: Oral          O:   Pulse:  [78] 78  BP: (167)/(75) 167/75  SpO2:  [98 %] 98 %  General Appearance: in no apparent distress.   Skin: Normal, no rashes or jaundice  Heart: regular rate and rhythm, normal S1 and S2  Lungs: easy respirations, no audible wheezing.  Abdomen: abdomen soft, minimal  halie-incisional tenderness. Skin healing well, no hernia. PD cath removal site also healing well.   Extremities: Tremor absent.   Edema: absent.     Transplant Immunosuppression Labs Latest Ref Rng & Units 9/21/2020 9/18/2020 9/14/2020 9/10/2020 9/9/2020   Tacro Level 5.0 - 15.0 ug/L 11.5 7.3 7.1 9.2 7.6   Tacro Level - 09/20/20 @ 2000 LAST DOSE 09/17/20 @ 2000 Not Provided Not Provided 80,820,201,900   Creat 0.52 - 1.04 mg/dL 0.98 0.96 0.92 0.92 1.03   BUN 7 - 30 mg/dL 29 33(H) 24 28 32(H)   WBC 4.0 - 11.0 10e9/L 5.8 7.0 8.7 7.8 6.6   Neutrophil % - - - - 78.9   ANEU 1.6 - 8.3 10e9/L - - - - 5.2       Chemistries:   Recent Labs   Lab Test 09/21/20  0805   BUN 29   CR 0.98   GFRESTIMATED 60*   *     Lab Results   Component Value Date    A1C 5.4 09/03/2020     Recent Labs   Lab Test 09/09/20  0637 09/05/20  0535   ALBUMIN 2.9* 2.8*   BILITOTAL  --  0.4   ALKPHOS  --  53   AST  --  50*   ALT  --  52*     Urine Studies:  Recent Labs   Lab Test 09/03/20  1905   COLOR Yellow   APPEARANCE Clear   URINEGLC Negative   URINEBILI Negative   URINEKETONE Negative   SG 1.008   UBLD Small*   URINEPH 6.5   PROTEIN 30*   NITRITE Negative   LEUKEST Negative   RBCU <1   WBCU <1     Recent Labs   Lab Test 09/05/20  0930 09/03/20  1905   UTPG 1.17* 1.92*     Hematology:   Recent Labs   Lab Test 09/21/20  0805 09/18/20  0805 09/14/20  0815   HGB 10.3* 10.1* 9.8*    259 237   WBC 5.8 7.0 8.7     Coags:   Recent Labs   Lab Test 09/04/20  1544 09/03/20  1756   INR 1.08 0.89     HLA antibodies:   SA1 Hi Risk Diya   Date Value Ref Range Status   09/08/2020 None  Final     SA1 Mod Risk Diya   Date Value Ref Range Status   09/08/2020 B:76  Final     SA2 Hi Risk Diya   Date Value Ref Range Status   09/08/2020 None  Final     SA2 Mod Risk Diya   Date Value Ref Range Status   09/08/2020 None  Final       Assessment: Ethel Thompson is doing well s/p DDKT:  Issues we addressed during her visit include:    -pain control: improving. Renew Rx for  tramadol for two weeks and reassess.    Plan:    1. Graft function: good, stable.   2. Immunosuppression Management: No change tacrolimus 4 BID, cellcept 1000 BID, steroid taper.  Complexity of management:Medium.  Contributing factors: anemia  3. Ultrasound findings: two perinephric collections noted no most recent US, as well as slightly elevated velocity of flow at arterial anastomosis. Will obtain f/u US in two weeks to monitor.  Followup: two weeks for repeat ultrasound. Other appointments as scheduled.    Total Time: 15 min,   Counselling Time: 10 min.      Manas Bansal MD    Again, thank you for allowing me to participate in the care of your patient.        Sincerely,        Manas Bansal MD

## 2020-09-23 NOTE — TELEPHONE ENCOUNTER
Post Kidney and Pancreas Transplant Team Conference  Date: 9/23/2020  Transplant Coordinator: Tiffany Paul     Attendees:  [x]  Dr. Sheridan  [x] Bridget Marquis LPN     [x]  Dr. Sifuentes [x] Tiffany Paul RN [] Alyssa Parisi LPN   [x]  Dr. Dewitt [] Sade Mckeon, SUN    [x]  Dr. Wilson [] Ade Marx RN [x] Patrick Mireles, PharmD   [] Dr. Awad [x] Hui Crowley, SUN    [] Dr. Ribeiro [] Cesar Max RN    [x] Dr. Alonzo [] Jennie Dela Cruz, SUN    [x] Dr. Phoenix [] Janett Smith RN    []  Dr. Bansal [] Aleja Francis, SUN    [] Surgery Fellow [x] Crissy Pulido RN    [x] Monika Tinoco, YAKELIN [] Laura Romero RN        Verbal Plan Read Back:   No changes at this time    Routed to RN Coordinator   Bridget Maruqis LPN

## 2020-09-23 NOTE — TELEPHONE ENCOUNTER
September 23, 2020 9:35 AM -  AIVERSE1: called pt to communicate som change ,wants cord to reach out ,sent cord message

## 2020-09-24 ENCOUNTER — MEDICAL CORRESPONDENCE (OUTPATIENT)
Dept: HEALTH INFORMATION MANAGEMENT | Facility: CLINIC | Age: 65
End: 2020-09-24

## 2020-09-24 ENCOUNTER — TELEPHONE (OUTPATIENT)
Dept: TRANSPLANT | Facility: CLINIC | Age: 65
End: 2020-09-24

## 2020-09-24 LAB
ANION GAP SERPL CALCULATED.3IONS-SCNC: 3 MMOL/L (ref 3–14)
BASOPHILS # BLD AUTO: 0 10E9/L (ref 0–0.2)
BASOPHILS NFR BLD AUTO: 0.2 %
BUN SERPL-MCNC: 28 MG/DL (ref 7–30)
CALCIUM SERPL-MCNC: 9.1 MG/DL (ref 8.5–10.1)
CHLORIDE SERPL-SCNC: 111 MMOL/L (ref 94–109)
CO2 SERPL-SCNC: 23 MMOL/L (ref 20–32)
CREAT SERPL-MCNC: 0.94 MG/DL (ref 0.52–1.04)
DIFFERENTIAL METHOD BLD: ABNORMAL
EOSINOPHIL # BLD AUTO: 0 10E9/L (ref 0–0.7)
EOSINOPHIL NFR BLD AUTO: 0.4 %
ERYTHROCYTE [DISTWIDTH] IN BLOOD BY AUTOMATED COUNT: 16.8 % (ref 10–15)
GFR SERPL CREATININE-BSD FRML MDRD: 63 ML/MIN/{1.73_M2}
GLUCOSE SERPL-MCNC: 105 MG/DL (ref 70–99)
HCT VFR BLD AUTO: 30.8 % (ref 35–47)
HGB BLD-MCNC: 9.9 G/DL (ref 11.7–15.7)
IMM GRANULOCYTES # BLD: 0.1 10E9/L (ref 0–0.4)
IMM GRANULOCYTES NFR BLD: 1.4 %
LYMPHOCYTES # BLD AUTO: 0.9 10E9/L (ref 0.8–5.3)
LYMPHOCYTES NFR BLD AUTO: 17.5 %
MCH RBC QN AUTO: 34 PG (ref 26.5–33)
MCHC RBC AUTO-ENTMCNC: 32.1 G/DL (ref 31.5–36.5)
MCV RBC AUTO: 106 FL (ref 78–100)
MONOCYTES # BLD AUTO: 0.2 10E9/L (ref 0–1.3)
MONOCYTES NFR BLD AUTO: 3.3 %
NEUTROPHILS # BLD AUTO: 4 10E9/L (ref 1.6–8.3)
NEUTROPHILS NFR BLD AUTO: 77.2 %
NRBC # BLD AUTO: 0 10*3/UL
NRBC BLD AUTO-RTO: 0 /100
PLATELET # BLD AUTO: 282 10E9/L (ref 150–450)
POTASSIUM SERPL-SCNC: 4.7 MMOL/L (ref 3.4–5.3)
RBC # BLD AUTO: 2.91 10E12/L (ref 3.8–5.2)
SODIUM SERPL-SCNC: 137 MMOL/L (ref 133–144)
TACROLIMUS BLD-MCNC: 9.6 UG/L (ref 5–15)
TME LAST DOSE: NORMAL H
WBC # BLD AUTO: 5.1 10E9/L (ref 4–11)

## 2020-09-24 PROCEDURE — 80197 ASSAY OF TACROLIMUS: CPT | Performed by: SURGERY

## 2020-09-24 PROCEDURE — 80048 BASIC METABOLIC PNL TOTAL CA: CPT | Performed by: SURGERY

## 2020-09-24 PROCEDURE — 85025 COMPLETE CBC W/AUTO DIFF WBC: CPT | Performed by: SURGERY

## 2020-09-24 NOTE — TELEPHONE ENCOUNTER
Provider Call: Transplant Provider  Received call from Rachel FV  They were transferred a call from Ethel Thompson  She is looking for a lost Hearing Aid she was at [facility] 09/23/2020   It is RED in color 'resound hearing aid'    Callback needed? Yes    Return Call Needed  Same as documented in contacts section  When to return call?: Same day: Route High Priority

## 2020-09-28 LAB
ANION GAP SERPL CALCULATED.3IONS-SCNC: 5 MMOL/L (ref 3–14)
BASOPHILS # BLD AUTO: 0 10E9/L (ref 0–0.2)
BASOPHILS NFR BLD AUTO: 0 %
BUN SERPL-MCNC: 29 MG/DL (ref 7–30)
CALCIUM SERPL-MCNC: 9.2 MG/DL (ref 8.5–10.1)
CHLORIDE SERPL-SCNC: 110 MMOL/L (ref 94–109)
CO2 SERPL-SCNC: 23 MMOL/L (ref 20–32)
CREAT SERPL-MCNC: 0.98 MG/DL (ref 0.52–1.04)
DIFFERENTIAL METHOD BLD: ABNORMAL
EOSINOPHIL # BLD AUTO: 0 10E9/L (ref 0–0.7)
EOSINOPHIL NFR BLD AUTO: 0.4 %
ERYTHROCYTE [DISTWIDTH] IN BLOOD BY AUTOMATED COUNT: 16.8 % (ref 10–15)
GFR SERPL CREATININE-BSD FRML MDRD: 60 ML/MIN/{1.73_M2}
GLUCOSE SERPL-MCNC: 109 MG/DL (ref 70–99)
HCT VFR BLD AUTO: 32.7 % (ref 35–47)
HGB BLD-MCNC: 10.3 G/DL (ref 11.7–15.7)
IMM GRANULOCYTES # BLD: 0.1 10E9/L (ref 0–0.4)
IMM GRANULOCYTES NFR BLD: 2 %
LYMPHOCYTES # BLD AUTO: 1 10E9/L (ref 0.8–5.3)
LYMPHOCYTES NFR BLD AUTO: 18.7 %
MAGNESIUM SERPL-MCNC: 1.9 MG/DL (ref 1.6–2.3)
MCH RBC QN AUTO: 33.4 PG (ref 26.5–33)
MCHC RBC AUTO-ENTMCNC: 31.5 G/DL (ref 31.5–36.5)
MCV RBC AUTO: 106 FL (ref 78–100)
MONOCYTES # BLD AUTO: 0.2 10E9/L (ref 0–1.3)
MONOCYTES NFR BLD AUTO: 2.7 %
NEUTROPHILS # BLD AUTO: 4.2 10E9/L (ref 1.6–8.3)
NEUTROPHILS NFR BLD AUTO: 76.2 %
NRBC # BLD AUTO: 0 10*3/UL
NRBC BLD AUTO-RTO: 0 /100
PHOSPHATE SERPL-MCNC: 2 MG/DL (ref 2.5–4.5)
PLATELET # BLD AUTO: 303 10E9/L (ref 150–450)
POTASSIUM SERPL-SCNC: 4.7 MMOL/L (ref 3.4–5.3)
RBC # BLD AUTO: 3.08 10E12/L (ref 3.8–5.2)
SODIUM SERPL-SCNC: 138 MMOL/L (ref 133–144)
TACROLIMUS BLD-MCNC: 11.1 UG/L (ref 5–15)
TME LAST DOSE: NORMAL H
WBC # BLD AUTO: 5.5 10E9/L (ref 4–11)

## 2020-09-28 PROCEDURE — 80197 ASSAY OF TACROLIMUS: CPT

## 2020-09-28 PROCEDURE — 83735 ASSAY OF MAGNESIUM: CPT

## 2020-09-28 PROCEDURE — 84100 ASSAY OF PHOSPHORUS: CPT

## 2020-09-28 PROCEDURE — 85025 COMPLETE CBC W/AUTO DIFF WBC: CPT

## 2020-09-28 PROCEDURE — 80180 DRUG SCRN QUAN MYCOPHENOLATE: CPT

## 2020-09-28 PROCEDURE — 80048 BASIC METABOLIC PNL TOTAL CA: CPT

## 2020-09-28 NOTE — PROGRESS NOTES
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 Made some urine prior to admission. PMH also significant for atrophic right kidney, MGUS, HTN, Hep B core antibody positive, and recent hypomanic episode. S/p  donor kidney transplant with ureteral stent and removal of PD catheter on 2020.    Denies any fevers, chills, nausea or vomiting      No hematuria, dysuria or frequency.     States she is having incisional pain. She states it feels like a sharp pain.      She also states she's constipated.        Transplant History:    Transplant Type:  DDKT  Donor Type: Donation after Brain Death   Transplant Date:  9/3/2020 (Kidney)   Ureteral Stent:  Yes   Crossmatch:  negative   DSA at Tx:  No  Baseline Cr: TBD   DeNovo DSA: No    Acute Rejection Hx:  No    Present Maintenance Immunosuppression:  Tacrolimus and Mycophenolate mofetil    CMV IgG Ab Discordance:  No  EBV IgG Ab Discordance:  No    BK Viremia:  No  EBV Viremia:  No    Transplant Coordinator: Tiffany Paul     Transplant Office Phone Number: [phone]     Immunosuppressant Medications     Immunosuppressive Agents Disp Start End     mycophenolate (GENERIC EQUIVALENT) 250 MG capsule    180 capsule 2020     Sig - Route: Take 4 capsules (1,000 mg) by mouth 2 times daily - Oral    Class: E-Prescribe     tacrolimus (GENERIC EQUIVALENT) 0.5 MG capsule    60 capsule 2020     Sig - Route: Take 1 capsule (0.5 mg) by mouth 2 times daily Take total dose 4.5 mg twice per day - Oral    Class: E-Prescribe     tacrolimus (GENERIC EQUIVALENT) 1 MG capsule    240 capsule 2020     Sig - Route: Take 4 capsules (4 mg) by mouth 2 times daily Take tacrolimus  4.5 mg twice per day - Oral    Class: E-Prescribe          Possible Immunosuppression-related side effects:   []             headache  []             vivid dreams  []             irritability  []              cognitive difficuties  []             fine tremor  []             nausea  []             diarrhea  []             neuropathy      []             edema  []             renal calcineurin toxicity  []             hyperkalemia  []             post-transplant diabetes  []             decreased appetite  []             increased appetite  []             other:  []             none    Prescription Medications as of 9/28/2020       Rx Number Disp Refills Start End Last Dispensed Date Next Fill Date Owning Pharmacy    acetaminophen (TYLENOL) 325 MG tablet  1 Bottle 0 9/7/2020    [place], [place] - [address]    Sig: Take 2 tablets (650 mg) by mouth every 4 hours as needed for pain    Class: E-Prescribe    Route: Oral    amLODIPine (NORVASC) 5 MG tablet  90 tablet 3 9/8/2020    [address] [phone]    Sig: Take 1 tablet (5 mg) by mouth daily    Class: E-Prescribe    Route: Oral    aspirin (ASA) 81 MG EC tablet  90 tablet 3 9/22/2020    [address] [address] [facility]    Sig: Take 1 tablet (81 mg) by mouth daily    Class: E-Prescribe    Route: Oral    aspirin 81 MG EC tablet        [place], [place] - [address] [place] [address] 1[address]725    Sig: Take 81 mg by mouth daily    Class: Historical    Route: Oral    atorvastatin (LIPITOR) 40 MG tablet    9/6/2020        Sig: Take 0.5 tablets (20 mg) by mouth daily    Class: No Print Out    Route: Oral    calcium carbonate 600 mg-vitamin D 400 units (CALTRATE) 600-400 MG-UNIT per tablet            Sig: Take 1 tablet by mouth 2 times daily    Class: Historical    Route: Oral    famotidine (PEPCID) 20 MG tablet        [address] [address] [facility]    Sig: Take 20 mg by mouth every evening     Class: Historical    Route: Oral    fish oil-omega-3 fatty acids 1000 MG capsule        [facility] [place], [place] - [address] [facility] [phone]    Sig: Take 1 g by mouth daily     Class: Historical    Route: Oral    fluticasone (FLONASE) 50 MCG/ACT nasal spray            Sig: Spray 1 spray into both nostrils 2 times daily as needed for rhinitis or allergies    Class: Historical    Route: Both Nostrils    furosemide (LASIX) 20 MG tablet  30 tablet 1 9/15/2020    [address] [address] [facility] [place]    Sig: Take 1 tablet (20 mg) by mouth daily    Class: E-Prescribe    Route: Oral    gabapentin (NEURONTIN) 100 MG capsule  30 capsule 0 2020    [address] [address] [employer]    Simg in AM, 200mg in PM    Class: E-Prescribe    levothyroxine (SYNTHROID/LEVOTHROID) 112 MCG tablet    2019        Sig: Take 112 mcg (1 tablet) by mouth every Monday, Tuesday, Wednesday, Thursday, Friday.    Class: Historical    Lidocaine (LIDOCARE) 4 % Patch  10 patch 3 2020    [address] [address] [facility] [place]    Sig: Place 1 patch onto the skin every 24 hours To prevent lidocaine toxicity, patient should be patch free for 12 hrs daily.    Class: E-Prescribe    Route: Transdermal    magnesium oxide (MAG-OX) 400 MG tablet  30 tablet 5 2020    [address] [address] [employer] [place]    Sig: Take 1 tablet (400 mg) by mouth daily (with lunch)    Class: E-Prescribe    Route: Oral    melatonin 3 MG tablet            Sig: Take 6 mg by mouth At Bedtime     Class: Historical    Route: Oral    mycophenolate (GENERIC EQUIVALENT) 250 MG capsule  180 capsule 11 2020    [address] [address] [employer]    Sig: Take 4 capsules (1,000 mg) by mouth 2 times daily    Class: E-Prescribe    Route: Oral    ondansetron (ZOFRAN-ODT) 4 MG ODT tab  5 tablet 0 2020    [place], [place] - [address] [facility]    Sig: Take 1 tablet (4  mg) by mouth every 6 hours as needed for nausea or vomiting    Class: E-Prescribe    Route: Oral    phosphorus tablet 250 mg (PHOSPHORUS TABLET 250 MG) 250 MG per tablet  30 tablet 0 9/11/2020    [id number] [place] [address]    Sig: Take 2 tablets (500 mg) by mouth 2 times daily    Class: E-Prescribe    Route: Oral    polyethylene glycol (MIRALAX) 17 GM/Dose powder  507 g 0 9/14/2020    [address] [address] [facility]    Sig: Take 17 g (1 capful) by mouth daily    Class: E-Prescribe    Route: Oral    polyethylene glycol (MIRALAX) 17 GM/Dose powder            Sig: Take 1 capful by mouth daily    Class: Historical    Route: Oral    predniSONE (DELTASONE) 10 MG tablet  49 tablet 0 9/6/2020    [address]    Sig: On 9/7 take 60mg (6 tabs)  9/8-9/9 take 40mg (4 tabs daily)  9/10-9/16 take 20mg (2 tabs daily)  9/17-9/23 take 15mg (1.5 tabs daily)  9/24-9/30 take 10mg (1 tab daily)  10/1-10/7 take 5mg (half tab daily)    Class: Local Print    Renewals     Renewal requests to authorizing provider (Alicia Garcia, CODY HERNÁNDEZ) <b>prohibited</b>          senna-docusate (SENOKOT-S/PERICOLACE) 8.6-50 MG tablet  60 tablet 0 9/6/2020    [address]    Sig: Take 1 tablet by mouth 2 times daily Hold for loose stools    Class: E-Prescribe    Route: Oral    sulfamethoxazole-trimethoprim (BACTRIM) 400-80 MG tablet  30 tablet 11 9/7/2020    [address] [address] [facility]    Sig: Take 1 tablet by mouth daily    Class: E-Prescribe    Route: Oral    tacrolimus (GENERIC EQUIVALENT) 0.5 MG capsule  60 capsule 11 9/22/2020    [address] [address] [facility]    Sig: Take 1 capsule (0.5 mg) by mouth 2 times daily Take total dose 4.5 mg twice per day    Class: E-Prescribe    Route: Oral     tacrolimus (GENERIC EQUIVALENT) 1 MG capsule  240 capsule 11 9/22/2020    [address] [address] [facility]    Sig: Take 4 capsules (4 mg) by mouth 2 times daily Take tacrolimus  4.5 mg twice per day    Class: E-Prescribe    Route: Oral    traMADol (ULTRAM) 50 MG tablet  14 tablet 0 9/23/2020    [address]    Sig: Take 1 tablet (50 mg) by mouth every 8 hours as needed for severe pain    Class: Local Print    Route: Oral    valGANciclovir (VALCYTE) 450 MG tablet  60 tablet 2 9/18/2020    [address] [address] [facility]    Sig: Take 2 tablets (900 mg) by mouth daily    Class: Historical    Route: Oral    vitamin B-12 (CYANOCOBALAMIN) 500 MCG tablet        [address]    Sig: Take 500 mcg by mouth daily    Class: Historical    Route: Oral    vitamin B6 (PYRIDOXINE) 100 MG tablet        [address] 1[address]926    Sig: Take 100 mg by mouth daily    Class: Historical    Route: Oral    Vitamin D, Cholecalciferol, 25 MCG (1000 UT) TABS            Sig: Take 2,000 Units by mouth daily    Class: Historical    Route: Oral          O:      General Appearance: in no apparent distress.   Skin: Normal, no rashes or jaundice  Heart: regular rate and rhythm, normal S1 and S2  Lungs: easy respirations, no audible wheezing.  Abdomen: rounded, The wound is dry and intact, without hernia. The abdomen is non-tender. The kidney graft is not tender.  There is no ascites.  Extremities: Tremor absent.   Edema: present bilaterally. Mild      Transplant Immunosuppression Labs Latest Ref Rng & Units 9/28/2020 9/24/2020 9/21/2020 9/18/2020 9/14/2020   Tacro Level 5.0 - 15.0 ug/L - 9.6 11.5 7.3 7.1   Tacro Level - - Not Provided 09/20/20 @ 2000 LAST DOSE 09/17/20 @ 2000 Not Provided    Creat 0.52 - 1.04 mg/dL 0.98 0.94 0.98 0.96 0.92   BUN 7 - 30 mg/dL 29 28 29 33(H) 24   WBC 4.0 - 11.0 10e9/L 5.5 5.1 5.8 7.0 8.7   Neutrophil % 76.2 77.2 - - -   ANEU 1.6 - 8.3 10e9/L 4.2 4.0 - - -       Chemistries:   Recent Labs   Lab Test 09/28/20  0804   BUN 29   CR 0.98   GFRESTIMATED 60*   *     Lab Results   Component Value Date    A1C 5.4 09/03/2020     Recent Labs   Lab Test 09/09/20  0637 09/05/20  0535   ALBUMIN 2.9* 2.8*   BILITOTAL  --  0.4   ALKPHOS  --  53   AST  --  50*   ALT  --  52*     Urine Studies:  Recent Labs   Lab Test 09/03/20  1905   COLOR Yellow   APPEARANCE Clear   URINEGLC Negative   URINEBILI Negative   URINEKETONE Negative   SG 1.008   UBLD Small*   URINEPH 6.5   PROTEIN 30*   NITRITE Negative   LEUKEST Negative   RBCU <1   WBCU <1     Recent Labs   Lab Test 09/05/20  0930 09/03/20  1905   UTPG 1.17* 1.92*     Hematology:   Recent Labs   Lab Test 09/28/20  0804 09/24/20  0803 09/21/20  0805   HGB 10.3* 9.9* 10.3*    282 253   WBC 5.5 5.1 5.8     Coags:   Recent Labs   Lab Test 09/04/20  1544 09/03/20  1756   INR 1.08 0.89     HLA antibodies:   SA1 Hi Risk Diya   Date Value Ref Range Status   09/08/2020 None  Final     SA1 Mod Risk Diya   Date Value Ref Range Status   09/08/2020 B:76  Final     SA2 Hi Risk Diya   Date Value Ref Range Status   09/08/2020 None  Final     SA2 Mod Risk Diya   Date Value Ref Range Status   09/08/2020 None  Final       Assessment: Ethel Thompson is doing fairly well s/p DDKT:  Issues we addressed during her visit include:    Plan:    1. Graft function: stable  2. Immunosuppression Management: No change continue MMF and prograf .  Complexity of management:Medium.  Contributing factors: recent txp  3. Nerve pain: gabapentin as directed. Lidocaine patches  4. Edema:  Duplex ultrasound ordered to rule out DVT.    Followup: as directed    Total Time: 20 min,   Counselling Time: 10 min.

## 2020-09-28 NOTE — PROGRESS NOTES
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 Made some urine prior to admission. PMH also significant for atrophic right kidney, MGUS, HTN, Hep B core antibody positive, and recent hypomanic episode. S/p  donor kidney transplant with ureteral stent and removal of PD catheter on 2020.    Denies any fevers, chills, nausea or vomiting      No hematuria, dysuria or frequency.     States she is having incisional pain. She states it is a dull ache.      Transplant History:    Transplant Type:  DDKT  Donor Type: Donation after Brain Death   Transplant Date:  9/3/2020 (Kidney)   Ureteral Stent:  Yes   Crossmatch:  negative   DSA at Tx:  No  Baseline Cr: TBD   DeNovo DSA: No    Acute Rejection Hx:  No    Present Maintenance Immunosuppression:  Tacrolimus and Mycophenolate mofetil    CMV IgG Ab Discordance:  No  EBV IgG Ab Discordance:  No    BK Viremia:  No  EBV Viremia:  No    Transplant Coordinator: Tiffany Paul     Transplant Office Phone Number: [phone]     Immunosuppressant Medications     Immunosuppressive Agents Disp Start End     mycophenolate (GENERIC EQUIVALENT) 250 MG capsule    180 capsule 2020     Sig - Route: Take 4 capsules (1,000 mg) by mouth 2 times daily - Oral    Class: E-Prescribe     tacrolimus (GENERIC EQUIVALENT) 0.5 MG capsule    60 capsule 2020     Sig - Route: Take 1 capsule (0.5 mg) by mouth 2 times daily Take total dose 4.5 mg twice per day - Oral    Class: E-Prescribe     tacrolimus (GENERIC EQUIVALENT) 1 MG capsule    240 capsule 2020     Sig - Route: Take 4 capsules (4 mg) by mouth 2 times daily Take tacrolimus  4.5 mg twice per day - Oral    Class: E-Prescribe          Possible Immunosuppression-related side effects:   []             headache  []             vivid dreams  []             irritability  []             cognitive difficuties  []             fine  tremor  []             nausea  []             diarrhea  []             neuropathy      []             edema  []             renal calcineurin toxicity  []             hyperkalemia  []             post-transplant diabetes  []             decreased appetite  []             increased appetite  []             other:  []             none    Prescription Medications as of 9/28/2020       Rx Number Disp Refills Start End Last Dispensed Date Next Fill Date Owning Pharmacy    acetaminophen (TYLENOL) 325 MG tablet  1 Bottle 0 9/7/2020    [place], [place] - [address] [place] [address]    Sig: Take 2 tablets (650 mg) by mouth every 4 hours as needed for pain    Class: E-Prescribe    Route: Oral    amLODIPine (NORVASC) 5 MG tablet  90 tablet 3 9/8/2020    [address] 1[address]096    Sig: Take 1 tablet (5 mg) by mouth daily    Class: E-Prescribe    Route: Oral    aspirin (ASA) 81 MG EC tablet  90 tablet 3 9/22/2020    [id number] [place] [address]    Sig: Take 1 tablet (81 mg) by mouth daily    Class: E-Prescribe    Route: Oral    aspirin 81 MG EC tablet        [address] 1[address]    Sig: Take 81 mg by mouth daily    Class: Historical    Route: Oral    atorvastatin (LIPITOR) 40 MG tablet    9/6/2020        Sig: Take 0.5 tablets (20 mg) by mouth daily    Class: No Print Out    Route: Oral    calcium carbonate 600 mg-vitamin D 400 units (CALTRATE) 600-400 MG-UNIT per tablet            Sig: Take 1 tablet by mouth 2 times daily    Class: Historical    Route: Oral    famotidine (PEPCID) 20 MG tablet        [address] [address] [facility]    Sig: Take 20 mg by mouth every evening     Class: Historical    Route: Oral    fish oil-omega-3 fatty acids 1000 MG capsule        [place] [address]  [facility] [address]    Sig: Take 1 g by mouth daily     Class: Historical    Route: Oral    fluticasone (FLONASE) 50 MCG/ACT nasal spray            Sig: Spray 1 spray into both nostrils 2 times daily as needed for rhinitis or allergies    Class: Historical    Route: Both Nostrils    furosemide (LASIX) 20 MG tablet  30 tablet 1 9/15/2020    [address], [place] [facility] [address] [facility]    Sig: Take 1 tablet (20 mg) by mouth daily    Class: E-Prescribe    Route: Oral    gabapentin (NEURONTIN) 100 MG capsule  30 capsule 0 2020    [address] [address] [facility]    Simg in AM, 200mg in PM    Class: E-Prescribe    levothyroxine (SYNTHROID/LEVOTHROID) 112 MCG tablet    2019        Sig: Take 112 mcg (1 tablet) by mouth every Monday, Tuesday, Wednesday, Thursday, Friday.    Class: Historical    Lidocaine (LIDOCARE) 4 % Patch  10 patch 3 2020    [address] [address] [facility]    Sig: Place 1 patch onto the skin every 24 hours To prevent lidocaine toxicity, patient should be patch free for 12 hrs daily.    Class: E-Prescribe    Route: Transdermal    magnesium oxide (MAG-OX) 400 MG tablet  30 tablet 5 2020    [address] [address] [employer] [facility]    Sig: Take 1 tablet (400 mg) by mouth daily (with lunch)    Class: E-Prescribe    Route: Oral    melatonin 3 MG tablet            Sig: Take 6 mg by mouth At Bedtime     Class: Historical    Route: Oral    mycophenolate (GENERIC EQUIVALENT) 250 MG capsule  180 capsule 11 2020    [address], [place] - [address] [employer]    Sig: Take 4 capsules (1,000 mg) by mouth 2 times daily    Class: E-Prescribe    Route: Oral    ondansetron (ZOFRAN-ODT) 4 MG ODT tab  5 tablet 0 2020    [place], [place] - [address] [place] [address]    Sig: Take 1 tablet (4 mg) by mouth every 6 hours as needed for nausea or  vomiting    Class: E-Prescribe    Route: Oral    phosphorus tablet 250 mg (PHOSPHORUS TABLET 250 MG) 250 MG per tablet  30 tablet 0 9/11/2020    [id number] [place] [address]    Sig: Take 2 tablets (500 mg) by mouth 2 times daily    Class: E-Prescribe    Route: Oral    polyethylene glycol (MIRALAX) 17 GM/Dose powder  507 g 0 9/14/2020    [address]    Sig: Take 17 g (1 capful) by mouth daily    Class: E-Prescribe    Route: Oral    polyethylene glycol (MIRALAX) 17 GM/Dose powder            Sig: Take 1 capful by mouth daily    Class: Historical    Route: Oral    predniSONE (DELTASONE) 10 MG tablet  49 tablet 0 9/6/2020    [address]    Sig: On 9/7 take 60mg (6 tabs)  9/8-9/9 take 40mg (4 tabs daily)  9/10-9/16 take 20mg (2 tabs daily)  9/17-9/23 take 15mg (1.5 tabs daily)  9/24-9/30 take 10mg (1 tab daily)  10/1-10/7 take 5mg (half tab daily)    Class: Local Print    Renewals     Renewal requests to authorizing provider (Alicia Garcia, CODY CNP) <b>prohibited</b>          senna-docusate (SENOKOT-S/PERICOLACE) 8.6-50 MG tablet  60 tablet 0 9/6/2020    [address]    Sig: Take 1 tablet by mouth 2 times daily Hold for loose stools    Class: E-Prescribe    Route: Oral    sulfamethoxazole-trimethoprim (BACTRIM) 400-80 MG tablet  30 tablet 11 9/7/2020    [id number] [place] [address]    Sig: Take 1 tablet by mouth daily    Class: E-Prescribe    Route: Oral    tacrolimus (GENERIC EQUIVALENT) 0.5 MG capsule  60 capsule 11 9/22/2020    [address] [address] [facility]    Sig: Take 1 capsule (0.5 mg) by mouth 2 times daily Take total dose 4.5 mg twice per day    Class: E-Prescribe    Route: Oral    tacrolimus (GENERIC EQUIVALENT) 1 MG capsule  240 capsule  11 9/22/2020    [address] [address] [facility]    Sig: Take 4 capsules (4 mg) by mouth 2 times daily Take tacrolimus  4.5 mg twice per day    Class: E-Prescribe    Route: Oral    traMADol (ULTRAM) 50 MG tablet  14 tablet 0 9/23/2020    [address]    Sig: Take 1 tablet (50 mg) by mouth every 8 hours as needed for severe pain    Class: Local Print    Route: Oral    valGANciclovir (VALCYTE) 450 MG tablet  60 tablet 2 9/18/2020    [address] [address] [facility]    Sig: Take 2 tablets (900 mg) by mouth daily    Class: Historical    Route: Oral    vitamin B-12 (CYANOCOBALAMIN) 500 MCG tablet        [address]    Sig: Take 500 mcg by mouth daily    Class: Historical    Route: Oral    vitamin B6 (PYRIDOXINE) 100 MG tablet        [address] 1[address]064    Sig: Take 100 mg by mouth daily    Class: Historical    Route: Oral    Vitamin D, Cholecalciferol, 25 MCG (1000 UT) TABS            Sig: Take 2,000 Units by mouth daily    Class: Historical    Route: Oral          O:      General Appearance: in no apparent distress.   Skin: Normal, no rashes or jaundice  Heart: regular rate and rhythm, normal S1 and S2  Lungs: easy respirations, no audible wheezing.  Abdomen: rounded, The wound is dry and intact, without hernia. The abdomen is non-tender. The kidney graft is not tender.  There is no ascites.  Extremities: Tremor absent.   Edema: present bilaterally. Mild      Transplant Immunosuppression Labs Latest Ref Rng & Units 9/28/2020 9/24/2020 9/21/2020 9/18/2020 9/14/2020   Tacro Level 5.0 - 15.0 ug/L - 9.6 11.5 7.3 7.1   Tacro Level - - Not Provided 09/20/20 @ 2000 LAST DOSE 09/17/20 @ 2000 Not Provided   Creat 0.52 - 1.04 mg/dL 0.98 0.94 0.98 0.96 0.92   BUN 7 - 30  mg/dL 29 28 29 33(H) 24   WBC 4.0 - 11.0 10e9/L 5.5 5.1 5.8 7.0 8.7   Neutrophil % 76.2 77.2 - - -   ANEU 1.6 - 8.3 10e9/L 4.2 4.0 - - -       Chemistries:   Recent Labs   Lab Test 09/28/20  0804   BUN 29   CR 0.98   GFRESTIMATED 60*   *     Lab Results   Component Value Date    A1C 5.4 09/03/2020     Recent Labs   Lab Test 09/09/20  0637 09/05/20  0535   ALBUMIN 2.9* 2.8*   BILITOTAL  --  0.4   ALKPHOS  --  53   AST  --  50*   ALT  --  52*     Urine Studies:  Recent Labs   Lab Test 09/03/20  1905   COLOR Yellow   APPEARANCE Clear   URINEGLC Negative   URINEBILI Negative   URINEKETONE Negative   SG 1.008   UBLD Small*   URINEPH 6.5   PROTEIN 30*   NITRITE Negative   LEUKEST Negative   RBCU <1   WBCU <1     Recent Labs   Lab Test 09/05/20  0930 09/03/20  1905   UTPG 1.17* 1.92*     Hematology:   Recent Labs   Lab Test 09/28/20  0804 09/24/20  0803 09/21/20  0805   HGB 10.3* 9.9* 10.3*    282 253   WBC 5.5 5.1 5.8     Coags:   Recent Labs   Lab Test 09/04/20  1544 09/03/20  1756   INR 1.08 0.89     HLA antibodies:   SA1 Hi Risk Diya   Date Value Ref Range Status   09/08/2020 None  Final     SA1 Mod Risk Diya   Date Value Ref Range Status   09/08/2020 B:76  Final     SA2 Hi Risk Diya   Date Value Ref Range Status   09/08/2020 None  Final     SA2 Mod Risk Diya   Date Value Ref Range Status   09/08/2020 None  Final       Assessment: Ethel Thompson is doing fairly well s/p DDKT:  Issues we addressed during her visit include:    Plan:    1. Graft function: stable  2. Immunosuppression Management: No change continue MMF and prograf .  Complexity of management:Medium.  Contributing factors: recent txp  3. Pain: has incisional pain.  Use pain medications as directed.  Lidocaine patches as directed.  Informed patient she would have some pain  Followup: as directed    Total Time: 20 min,   Counselling Time: 10 min.

## 2020-09-29 ENCOUNTER — TELEPHONE (OUTPATIENT)
Dept: TRANSPLANT | Facility: CLINIC | Age: 65
End: 2020-09-29

## 2020-09-29 LAB
MYCOPHENOLATE SERPL LC/MS/MS-MCNC: 0.91 MG/L (ref 1–3.5)
MYCOPHENOLATE-G SERPL LC/MS/MS-MCNC: 120 MG/L (ref 30–95)
TME LAST DOSE: ABNORMAL H

## 2020-09-30 ENCOUNTER — TELEPHONE (OUTPATIENT)
Dept: NEPHROLOGY | Facility: CLINIC | Age: 65
End: 2020-09-30

## 2020-09-30 NOTE — TELEPHONE ENCOUNTER
Post Kidney and Pancreas Transplant Team Conference  Date: 9/30/2020  Transplant Coordinator: Tiffany Paul     Attendees:  [x]  Dr. Sheridan  [x] Bridget Marquis LPN     [x]  Dr. Sifuentes [x] Tiffany Paul RN [] Alyssa Parisi LPN   [x]  Dr. Dewitt [] Sade Mckeon, SUN    [x]  Dr. Wilson [] Ade Marx RN [x] Patrick Mireles, PharmD   [x] Dr. Awad [x] Hui Crowley, SUN    [] Dr. Ribeiro [] Cesar Max RN    [x] Dr. Alonzo [] Jennie Dela Cruz, SUN    [x] Dr. Phoenix [] Janett Smith RN    []  Dr. Bansal [] Aleja Francis, SUN    [] Surgery Fellow [] Crissy Pulido RN    [x] Monika Tinoco, YAKELIN [] Laura Romero RN        Verbal Plan Read Back:   No changes at this time    Routed to RN Coordinator   Bridget Marquis LPN

## 2020-10-01 ENCOUNTER — TELEPHONE (OUTPATIENT)
Dept: TRANSPLANT | Facility: CLINIC | Age: 65
End: 2020-10-01

## 2020-10-01 ENCOUNTER — MEDICAL CORRESPONDENCE (OUTPATIENT)
Dept: HEALTH INFORMATION MANAGEMENT | Facility: CLINIC | Age: 65
End: 2020-10-01

## 2020-10-01 LAB
ANION GAP SERPL CALCULATED.3IONS-SCNC: 6 MMOL/L (ref 3–14)
BASOPHILS # BLD AUTO: 0 10E9/L (ref 0–0.2)
BASOPHILS NFR BLD AUTO: 0.4 %
BUN SERPL-MCNC: 29 MG/DL (ref 7–30)
CALCIUM SERPL-MCNC: 8.8 MG/DL (ref 8.5–10.1)
CHLORIDE SERPL-SCNC: 111 MMOL/L (ref 94–109)
CO2 SERPL-SCNC: 23 MMOL/L (ref 20–32)
CREAT SERPL-MCNC: 0.93 MG/DL (ref 0.52–1.04)
DIFFERENTIAL METHOD BLD: ABNORMAL
EOSINOPHIL # BLD AUTO: 0 10E9/L (ref 0–0.7)
EOSINOPHIL NFR BLD AUTO: 0.4 %
ERYTHROCYTE [DISTWIDTH] IN BLOOD BY AUTOMATED COUNT: 16.2 % (ref 10–15)
GFR SERPL CREATININE-BSD FRML MDRD: 64 ML/MIN/{1.73_M2}
GLUCOSE SERPL-MCNC: 122 MG/DL (ref 70–99)
HCT VFR BLD AUTO: 33.3 % (ref 35–47)
HGB BLD-MCNC: 10.5 G/DL (ref 11.7–15.7)
IMM GRANULOCYTES # BLD: 0.1 10E9/L (ref 0–0.4)
IMM GRANULOCYTES NFR BLD: 2.5 %
LYMPHOCYTES # BLD AUTO: 1.1 10E9/L (ref 0.8–5.3)
LYMPHOCYTES NFR BLD AUTO: 19.2 %
MCH RBC QN AUTO: 34.1 PG (ref 26.5–33)
MCHC RBC AUTO-ENTMCNC: 31.5 G/DL (ref 31.5–36.5)
MCV RBC AUTO: 108 FL (ref 78–100)
MONOCYTES # BLD AUTO: 0.2 10E9/L (ref 0–1.3)
MONOCYTES NFR BLD AUTO: 3.9 %
NEUTROPHILS # BLD AUTO: 4.1 10E9/L (ref 1.6–8.3)
NEUTROPHILS NFR BLD AUTO: 73.6 %
NRBC # BLD AUTO: 0 10*3/UL
NRBC BLD AUTO-RTO: 0 /100
PLATELET # BLD AUTO: 286 10E9/L (ref 150–450)
POTASSIUM SERPL-SCNC: 4.7 MMOL/L (ref 3.4–5.3)
RBC # BLD AUTO: 3.08 10E12/L (ref 3.8–5.2)
SODIUM SERPL-SCNC: 140 MMOL/L (ref 133–144)
TACROLIMUS BLD-MCNC: 11.8 UG/L (ref 5–15)
TME LAST DOSE: NORMAL H
WBC # BLD AUTO: 5.6 10E9/L (ref 4–11)

## 2020-10-01 PROCEDURE — 80048 BASIC METABOLIC PNL TOTAL CA: CPT | Performed by: SURGERY

## 2020-10-01 PROCEDURE — 80197 ASSAY OF TACROLIMUS: CPT | Performed by: SURGERY

## 2020-10-01 PROCEDURE — 85025 COMPLETE CBC W/AUTO DIFF WBC: CPT | Performed by: SURGERY

## 2020-10-01 NOTE — TELEPHONE ENCOUNTER
Provider Call: General  Route to LPN    Reason for call: HHN drawing labs this AM   said her Mycophenolate level was high  Wonders if it should be checked again?  Also when she comes to clinic next week are they drawing the BK PCR     Call back needed? Yes    Return Call Needed  Same as documented in contacts section  When to return call?: Greater than one day: Route standard priority

## 2020-10-04 NOTE — TELEPHONE ENCOUNTER
Issue tacrolimus  Level 11.8 slightly above goal level     Called confirmed taking tacrolimus  4.5 mg twice per day  (lowered earlier this week )  Lowered tacrolimus  4.5 mg twice per day to   Tacrolimus  4.5 in am and  4.0 mg in pm   They verbalized understanding of medication change      Issue 2 needs to repeat MPA level  In one week

## 2020-10-05 ENCOUNTER — OFFICE VISIT (OUTPATIENT)
Dept: NEPHROLOGY | Facility: CLINIC | Age: 65
End: 2020-10-05
Attending: INTERNAL MEDICINE
Payer: MEDICARE

## 2020-10-05 VITALS
SYSTOLIC BLOOD PRESSURE: 169 MMHG | HEART RATE: 78 BPM | OXYGEN SATURATION: 98 % | TEMPERATURE: 97.5 F | WEIGHT: 130.1 LBS | HEIGHT: 59 IN | DIASTOLIC BLOOD PRESSURE: 81 MMHG | BODY MASS INDEX: 26.23 KG/M2

## 2020-10-05 DIAGNOSIS — N25.81 SECONDARY HYPERPARATHYROIDISM (H): ICD-10-CM

## 2020-10-05 DIAGNOSIS — Z79.899 ENCOUNTER FOR LONG-TERM CURRENT USE OF MEDICATION: ICD-10-CM

## 2020-10-05 DIAGNOSIS — I15.1 HTN, KIDNEY TRANSPLANT RELATED: ICD-10-CM

## 2020-10-05 DIAGNOSIS — Z94.0 STATUS POST KIDNEY TRANSPLANT: ICD-10-CM

## 2020-10-05 DIAGNOSIS — N28.89 PERINEPHRIC FLUID COLLECTION: ICD-10-CM

## 2020-10-05 DIAGNOSIS — Z94.0 KIDNEY REPLACED BY TRANSPLANT: ICD-10-CM

## 2020-10-05 DIAGNOSIS — Z48.298 CARE AFTER ORGAN TRANSPLANT: Primary | ICD-10-CM

## 2020-10-05 DIAGNOSIS — Z94.0 HTN, KIDNEY TRANSPLANT RELATED: ICD-10-CM

## 2020-10-05 DIAGNOSIS — N05.1 FSGS (FOCAL SEGMENTAL GLOMERULOSCLEROSIS): ICD-10-CM

## 2020-10-05 DIAGNOSIS — Z48.298 AFTERCARE FOLLOWING ORGAN TRANSPLANT: ICD-10-CM

## 2020-10-05 LAB
ALBUMIN UR-MCNC: NEGATIVE MG/DL
ANION GAP SERPL CALCULATED.3IONS-SCNC: 8 MMOL/L (ref 3–14)
APPEARANCE UR: CLEAR
BILIRUB UR QL STRIP: NEGATIVE
BUN SERPL-MCNC: 24 MG/DL (ref 7–30)
CALCIUM SERPL-MCNC: 9.9 MG/DL (ref 8.5–10.1)
CHLORIDE SERPL-SCNC: 110 MMOL/L (ref 94–109)
CO2 SERPL-SCNC: 20 MMOL/L (ref 20–32)
COLOR UR AUTO: YELLOW
CREAT SERPL-MCNC: 1.01 MG/DL (ref 0.52–1.04)
CREAT UR-MCNC: 48 MG/DL
ERYTHROCYTE [DISTWIDTH] IN BLOOD BY AUTOMATED COUNT: 15.1 % (ref 10–15)
GFR SERPL CREATININE-BSD FRML MDRD: 58 ML/MIN/{1.73_M2}
GLUCOSE SERPL-MCNC: 115 MG/DL (ref 70–99)
GLUCOSE UR STRIP-MCNC: NEGATIVE MG/DL
HCT VFR BLD AUTO: 34 % (ref 35–47)
HGB BLD-MCNC: 10.9 G/DL (ref 11.7–15.7)
HGB UR QL STRIP: ABNORMAL
KETONES UR STRIP-MCNC: NEGATIVE MG/DL
LEUKOCYTE ESTERASE UR QL STRIP: ABNORMAL
MCH RBC QN AUTO: 33.6 PG (ref 26.5–33)
MCHC RBC AUTO-ENTMCNC: 32.1 G/DL (ref 31.5–36.5)
MCV RBC AUTO: 105 FL (ref 78–100)
MUCOUS THREADS #/AREA URNS LPF: PRESENT /LPF
NITRATE UR QL: NEGATIVE
PH UR STRIP: 5 PH (ref 5–7)
PLATELET # BLD AUTO: 272 10E9/L (ref 150–450)
POTASSIUM SERPL-SCNC: 4.8 MMOL/L (ref 3.4–5.3)
PROT UR-MCNC: 0.29 G/L
PROT/CREAT 24H UR: 0.6 G/G CR (ref 0–0.2)
RBC # BLD AUTO: 3.24 10E12/L (ref 3.8–5.2)
RBC #/AREA URNS AUTO: 8 /HPF (ref 0–2)
SODIUM SERPL-SCNC: 138 MMOL/L (ref 133–144)
SOURCE: ABNORMAL
SP GR UR STRIP: 1.01 (ref 1–1.03)
SQUAMOUS #/AREA URNS AUTO: 1 /HPF (ref 0–1)
UROBILINOGEN UR STRIP-MCNC: 0 MG/DL (ref 0–2)
WBC # BLD AUTO: 4.9 10E9/L (ref 4–11)
WBC #/AREA URNS AUTO: 12 /HPF (ref 0–5)

## 2020-10-05 PROCEDURE — 85027 COMPLETE CBC AUTOMATED: CPT | Performed by: PATHOLOGY

## 2020-10-05 PROCEDURE — 81001 URINALYSIS AUTO W/SCOPE: CPT | Performed by: PATHOLOGY

## 2020-10-05 PROCEDURE — 99214 OFFICE O/P EST MOD 30 MIN: CPT

## 2020-10-05 PROCEDURE — 36415 COLL VENOUS BLD VENIPUNCTURE: CPT | Performed by: PATHOLOGY

## 2020-10-05 PROCEDURE — 80180 DRUG SCRN QUAN MYCOPHENOLATE: CPT | Performed by: PATHOLOGY

## 2020-10-05 PROCEDURE — 87088 URINE BACTERIA CULTURE: CPT | Performed by: INTERNAL MEDICINE

## 2020-10-05 PROCEDURE — 87186 SC STD MICRODIL/AGAR DIL: CPT | Performed by: INTERNAL MEDICINE

## 2020-10-05 PROCEDURE — 80048 BASIC METABOLIC PNL TOTAL CA: CPT | Performed by: PATHOLOGY

## 2020-10-05 PROCEDURE — 87086 URINE CULTURE/COLONY COUNT: CPT | Performed by: INTERNAL MEDICINE

## 2020-10-05 PROCEDURE — 84156 ASSAY OF PROTEIN URINE: CPT | Performed by: PATHOLOGY

## 2020-10-05 RX ORDER — AMLODIPINE BESYLATE 5 MG/1
10 TABLET ORAL DAILY
Qty: 180 TABLET | Refills: 11 | Status: SHIPPED | OUTPATIENT
Start: 2020-10-05 | End: 2021-02-17

## 2020-10-05 ASSESSMENT — MIFFLIN-ST. JEOR: SCORE: 1035.76

## 2020-10-05 ASSESSMENT — PAIN SCALES - GENERAL: PAINLEVEL: MODERATE PAIN (5)

## 2020-10-05 NOTE — LETTER
10/5/2020       RE: Ethel Thompson  [address]  [place] [address]     Dear Colleague,    Thank you for referring your patient, Ethel Thompson, to the [facility] [place] [facility]. Please see a copy of my visit note below.      ACUTE TRANSPLANT NEPHROLOGY VISIT    Plan  Increase amlodipine to 10 mg & monitor BP over next 2 weeks  Monitor tremor when Tac gets to target  Took meds this am- needs to get updated (4.5 am/4 pm)  US kidney tx to f/up on perinephric fluid collections    RTC 1 month @ 2 months post Tx      Assessment & Plan    # DDKT: Stable              - Baseline Cr ~ 0.9-1.0mg/dL              - Proteinuria: 1.17g/g               - Date DSA Last Checked: Sep/2020      Latest DSA: No              - BK Viremia: Not checked post transplant                 #perinephric fluid collections  Asymptomatic at present, f/up US kidney tx    # Immunosuppression: Tacrolimus immediate release (goal 8-10), Mycophenolate mofetil (dose 1000mg every 12 hours) and Prednisone (dose taper)  Induction immunosuppression with thymoglobulin 125mg (2.2mg/kg), basiliximab (dose given POD #1 and POD #5 ), and steroid taper.             - Changes: awaiting repeat FK trough (inaccurate from today) as took prograf prior to labs  Current IS: MMF 1 bid FK 4.5/4 pred taper will complete in 2 days    # Infection Prophylaxis:   - PJP: Sulfa/TMP (Bactrim)  - CMV: Valcyte x 12 weeks     # Hypertension: Controlled;   Goal BP: < 150/90              - Volume status: Mildly hypervolemic             EDW ~ 54kg              - Changes: No    # Anemia in Chronic Renal Disease: Hgb: Stable      JHONATAN: No   - Iron studies: Low iron saturation, but high ferritin    # Mineral Bone Disorder:   - Secondary renal hyperparathyroidism; PTH level: Moderately elevated (301-600 pg/ml)        On treatment: None  - Vitamin D; level: Low normal        On supplement: Yes  - Calcium; level: Normal         On supplement: Yes  - Phosphorus; level: Low normal        On supplement: Yes    # Electrolytes:   - Potassium; level: Normal        On supplement: No  - Magnesium; level: Normal        On supplement: Yes  - Bicarbonate; level: Normal        On supplement: No  - Sodium; level: Normal    # Medical Compliance: Yes     # Transplant History:  Etiology of Kidney Failure: Focal segmental glomerulosclerosis (FSGS)  Tx: DDKT  Transplant: 9/3/2020 (Kidney)  Donor Type: Donation after Brain Death        Donor Class:   Crossmatch at time of Tx: negative  Significant changes in immunosuppression: None  Significant transplant-related complications: None     Recommendations were communicated to the primary team verbally.     Transplant Office Phone Number: [phone]     Assessment and plan was discussed with the patient and she voiced her understanding and agreement.     Return visit: at 1 month post-transplant    Jordy Dewitt MD    Chief Complaint   Ms. Thompson is a 65 year old here for routine follow up.     History of Present Illness    Ms. Thompson feels better overall. She reports her postop RLQ allograft pain has improved. She has been eating/drinking ok.  BP is running high on amlodipine 5 ranging between 150-160s. SBP.  She also notes mild hand tremors. She took prograf before going for labs this am.     Recent Hospitalizations:  [x] No [] Yes    New Medical Issues: [x] No [] Yes    Decreased energy: [x] No [] Yes    Chest pain or SOB with exertion:  [x] No [] Yes    Appetite change or weight change: [x] No [] Yes    Nausea, vomiting or diarrhea:  [x] No [] Yes    Fever, sweats or chills: [x] No [] Yes    Leg swelling: [x] No [] Yes      BP: ~150-160s/80-90    Review of Systems   A comprehensive review of systems was obtained and negative, except as noted in the HPI or PMH.    Problem List   Patient Active Problem List   Diagnosis     Elevated serum immunoglobulin free light chain level     Renovascular hypertension      FSGS (focal segmental glomerulosclerosis)     Hyperlipidemia     Hypothyroidism     SADIE on CPAP     Sensorineural hearing loss (SNHL) of both ears     Anemia in chronic kidney disease     GERD (gastroesophageal reflux disease)     Hypertension     Hepatitis B core antibody positive     End stage renal disease (H)     Secondary hyperparathyroidism (H)     Memory deficits     Kidney replaced by transplant     Immunosuppressed status (H)     Elevated lactic acid level     Acute anemia       Social History   Social History     Tobacco Use     Smoking status: Former Smoker     Packs/day: 1.00     Types: Cigarettes     Start date:      Quit date:      Years since quittin.7     Smokeless tobacco: Never Used   Substance Use Topics     Alcohol use: No     Frequency: Never     Drug use: No       Allergies   Allergies   Allergen Reactions     Amoxicillin-Pot Clavulanate Nausea and Vomiting       Medications   Current Outpatient Medications   Medication Sig     acetaminophen (TYLENOL) 325 MG tablet Take 2 tablets (650 mg) by mouth every 4 hours as needed for pain     amLODIPine (NORVASC) 5 MG tablet Take 2 tablets (10 mg) by mouth daily     aspirin (ASA) 81 MG EC tablet Take 1 tablet (81 mg) by mouth daily     aspirin 81 MG EC tablet Take 81 mg by mouth daily     atorvastatin (LIPITOR) 40 MG tablet Take 0.5 tablets (20 mg) by mouth daily     calcium carbonate 600 mg-vitamin D 400 units (CALTRATE) 600-400 MG-UNIT per tablet Take 1 tablet by mouth 2 times daily     famotidine (PEPCID) 20 MG tablet Take 20 mg by mouth every evening      fish oil-omega-3 fatty acids 1000 MG capsule Take 1 g by mouth daily      fluticasone (FLONASE) 50 MCG/ACT nasal spray Spray 1 spray into both nostrils 2 times daily as needed for rhinitis or allergies     gabapentin (NEURONTIN) 100 MG capsule 100mg in AM, 200mg in PM     levothyroxine (SYNTHROID/LEVOTHROID) 112 MCG tablet Take 112 mcg (1 tablet) by mouth every Monday, Tuesday,  Wednesday, Thursday, Friday.     Lidocaine (LIDOCARE) 4 % Patch Place 1 patch onto the skin every 24 hours To prevent lidocaine toxicity, patient should be patch free for 12 hrs daily.     magnesium oxide (MAG-OX) 400 MG tablet Take 1 tablet (400 mg) by mouth daily (with lunch)     melatonin 3 MG tablet Take 6 mg by mouth At Bedtime      mycophenolate (GENERIC EQUIVALENT) 250 MG capsule Take 4 capsules (1,000 mg) by mouth 2 times daily     ondansetron (ZOFRAN-ODT) 4 MG ODT tab Take 1 tablet (4 mg) by mouth every 6 hours as needed for nausea or vomiting     phosphorus tablet 250 mg (PHOSPHORUS TABLET 250 MG) 250 MG per tablet Take 2 tablets (500 mg) by mouth 2 times daily     polyethylene glycol (MIRALAX) 17 GM/Dose powder Take 17 g (1 capful) by mouth daily     polyethylene glycol (MIRALAX) 17 GM/Dose powder Take 1 capful by mouth daily     predniSONE (DELTASONE) 10 MG tablet On 9/7 take 60mg (6 tabs)  9/8-9/9 take 40mg (4 tabs daily)  9/10-9/16 take 20mg (2 tabs daily)  9/17-9/23 take 15mg (1.5 tabs daily)  9/24-9/30 take 10mg (1 tab daily)  10/1-10/7 take 5mg (half tab daily)     senna-docusate (SENOKOT-S/PERICOLACE) 8.6-50 MG tablet Take 1 tablet by mouth 2 times daily Hold for loose stools     sulfamethoxazole-trimethoprim (BACTRIM) 400-80 MG tablet Take 1 tablet by mouth daily     tacrolimus (GENERIC EQUIVALENT) 0.5 MG capsule Take 1 capsule (0.5 mg) by mouth 2 times daily Take total dose 4.5 mg twice per day     tacrolimus (GENERIC EQUIVALENT) 1 MG capsule Take 4 capsules (4 mg) by mouth 2 times daily Take tacrolimus  4.5 mg twice per day     traMADol (ULTRAM) 50 MG tablet Take 1 tablet (50 mg) by mouth every 8 hours as needed for severe pain     valGANciclovir (VALCYTE) 450 MG tablet Take 2 tablets (900 mg) by mouth daily     vitamin B-12 (CYANOCOBALAMIN) 500 MCG tablet Take 500 mcg by mouth daily     vitamin B6 (PYRIDOXINE) 100 MG tablet Take 100 mg by mouth daily     Vitamin D, Cholecalciferol, 25 MCG (1000  UT) TABS Take 2,000 Units by mouth daily     No current facility-administered medications for this visit.      Medications Discontinued During This Encounter   Medication Reason     amLODIPine (NORVASC) 5 MG tablet Reorder       Physical Exam      GENERAL APPEARANCE: alert and no distress  HENT: mouth without ulcers or lesions  LYMPHATICS: no cervical or supraclavicular nodes  RESP: lungs clear to auscultation - no rales, rhonchi or wheezes  CV: regular rhythm, normal rate, no rub, no murmur  EDEMA: no LE edema bilaterally  ABDOMEN: soft, nondistended, nontender, bowel sounds normal, non-tender RLQ allograft  MS: extremities normal - no gross deformities noted, no evidence of inflammation in joints, no muscle tenderness  SKIN: no rash         Data     Renal Latest Ref Rng & Units 10/5/2020 10/1/2020 9/28/2020   Na 133 - 144 mmol/L 138 140 138   K 3.4 - 5.3 mmol/L 4.8 4.7 4.7   Cl 94 - 109 mmol/L 110(H) 111(H) 110(H)   CO2 20 - 32 mmol/L 20 23 23   BUN 7 - 30 mg/dL 24 29 29   Cr 0.52 - 1.04 mg/dL 1.01 0.93 0.98   Glucose 70 - 99 mg/dL 115(H) 122(H) 109(H)   Ca  8.5 - 10.1 mg/dL 9.9 8.8 9.2   Mg 1.6 - 2.3 mg/dL - - 1.9     Bone Health Latest Ref Rng & Units 9/28/2020 9/21/2020 9/14/2020   Phos 2.5 - 4.5 mg/dL 2.0(L) 2.1(L) 2.4(L)   PTHi 18 - 80 pg/mL - - -   Vit D Def 20 - 75 ug/L - - -     Heme Latest Ref Rng & Units 10/5/2020 10/1/2020 9/28/2020   WBC 4.0 - 11.0 10e9/L 4.9 5.6 5.5   Hgb 11.7 - 15.7 g/dL 10.9(L) 10.5(L) 10.3(L)   Plt 150 - 450 10e9/L 272 286 303   ABSOLUTE NEUTROPHIL 1.6 - 8.3 10e9/L - 4.1 4.2   ABSOLUTE LYMPHOCYTES 0.8 - 5.3 10e9/L - 1.1 1.0   ABSOLUTE MONOCYTES 0.0 - 1.3 10e9/L - 0.2 0.2   ABSOLUTE EOSINOPHILS 0.0 - 0.7 10e9/L - 0.0 0.0   ABSOLUTE BASOPHILS 0.0 - 0.2 10e9/L - 0.0 0.0   ABS IMMATURE GRANULOCYTES 0 - 0.4 10e9/L - 0.1 0.1   ABSOLUTE NUCLEATED RBC - - 0.0 0.0     Liver Latest Ref Rng & Units 9/9/2020 9/5/2020 9/4/2020   AP 40 - 150 U/L - 53 60   TBili 0.2 - 1.3 mg/dL - 0.4 1.1   DBili  0.0 - 0.2 mg/dL - 0.2 0.1   ALT 0 - 50 U/L - 52(H) 41   AST 0 - 45 U/L - 50(H) 38   Tot Protein 6.8 - 8.8 g/dL - 5.4(L) 5.2(L)   Albumin 3.4 - 5.0 g/dL 2.9(L) 2.8(L) 2.3(L)     Pancreas Latest Ref Rng & Units 9/3/2020   A1C 0 - 5.6 % 5.4     Iron studies Latest Ref Rng & Units 9/9/2020   Iron 35 - 180 ug/dL 162   Iron sat 15 - 46 % 92(H)   Ferritin 8 - 252 ng/mL 1,757(H)     [facility] Txp Virology Latest Ref Rng & Units 9/3/2020 1/28/2019   EBV CAPSID ANTIBODY IGG 0.0 - 0.8 AI >8.0(H) >8.0(H)   Hep B Core NR:Nonreactive - Reactive(AA)        Recent Labs   Lab Test 09/24/20  0803 09/28/20  0804 10/01/20  0810   DOSTAC Not Provided Not Provided Not Provided   TACROL 9.6 11.1 11.8     Recent Labs   Lab Test 09/14/20  0815 09/28/20  0804   DOSMPA Not Provided Not Provided   MPACID 0.31* 0.91*   MPAG 64.0 120.0*     Jordy Dewitt MD      Sincerely,    Early Post Transplant

## 2020-10-05 NOTE — PROGRESS NOTES
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 Made some urine prior to admission. PMH also significant for atrophic right kidney, MGUS, HTN, Hep B core antibody positive, and recent hypomanic episode. S/p  donor kidney transplant with ureteral stent and removal of PD catheter on 2020.    Denies any fevers, chills, nausea or vomiting      No hematuria, dysuria or frequency.    Patient is having thigh pain.        Transplant History:    Transplant Type:  DDKT  Donor Type: Donation after Brain Death   Transplant Date:  9/3/2020 (Kidney)   Ureteral Stent:  Yes   Crossmatch:  negative   DSA at Tx:  No  Baseline Cr: TBD   DeNovo DSA: No    Acute Rejection Hx:  No    Present Maintenance Immunosuppression:  Tacrolimus and Mycophenolate mofetil    CMV IgG Ab Discordance:  No  EBV IgG Ab Discordance:  No    BK Viremia:  No  EBV Viremia:  No    Transplant Coordinator: Tiffany Paul     Transplant Office Phone Number: [phone]     Immunosuppressant Medications     Immunosuppressive Agents Disp Start End     mycophenolate (GENERIC EQUIVALENT) 250 MG capsule    180 capsule 2020     Sig - Route: Take 4 capsules (1,000 mg) by mouth 2 times daily - Oral    Class: E-Prescribe     tacrolimus (GENERIC EQUIVALENT) 0.5 MG capsule    60 capsule 2020     Sig - Route: Take 1 capsule (0.5 mg) by mouth 2 times daily Take total dose 4.5 mg twice per day - Oral    Class: E-Prescribe     tacrolimus (GENERIC EQUIVALENT) 1 MG capsule    240 capsule 2020     Sig - Route: Take 4 capsules (4 mg) by mouth 2 times daily Take tacrolimus  4.5 mg twice per day - Oral    Class: E-Prescribe          Possible Immunosuppression-related side effects:   []             headache  []             vivid dreams  []             irritability  []             cognitive difficuties  []             fine tremor  []             nausea  []              diarrhea  []             neuropathy      []             edema  []             renal calcineurin toxicity  []             hyperkalemia  []             post-transplant diabetes  []             decreased appetite  []             increased appetite  []             other:  []             none    Prescription Medications as of 10/5/2020       Rx Number Disp Refills Start End Last Dispensed Date Next Fill Date Owning Pharmacy    acetaminophen (TYLENOL) 325 MG tablet  1 Bottle 0 9/7/2020    [place], [place] - [address] [facility]    Sig: Take 2 tablets (650 mg) by mouth every 4 hours as needed for pain    Class: E-Prescribe    Route: Oral    amLODIPine (NORVASC) 5 MG tablet  180 tablet 11 10/5/2020    [address] [address] [facility]    Sig: Take 2 tablets (10 mg) by mouth daily    Class: E-Prescribe    Route: Oral    aspirin (ASA) 81 MG EC tablet  90 tablet 3 9/22/2020    [address] [address] [facility]    Sig: Take 1 tablet (81 mg) by mouth daily    Class: E-Prescribe    Route: Oral    aspirin 81 MG EC tablet        [address]    Sig: Take 81 mg by mouth daily    Class: Historical    Route: Oral    atorvastatin (LIPITOR) 40 MG tablet    9/6/2020        Sig: Take 0.5 tablets (20 mg) by mouth daily    Class: No Print Out    Route: Oral    calcium carbonate 600 mg-vitamin D 400 units (CALTRATE) 600-400 MG-UNIT per tablet            Sig: Take 1 tablet by mouth 2 times daily    Class: Historical    Route: Oral    famotidine (PEPCID) 20 MG tablet        [address] [address] [facility]    Sig: Take 20 mg by mouth every evening     Class: Historical    Route: Oral    fish oil-omega-3 fatty acids 1000 MG capsule        [address]    Sig: Take 1 g by mouth daily      Class: Historical    Route: Oral    fluticasone (FLONASE) 50 MCG/ACT nasal spray            Sig: Spray 1 spray into both nostrils 2 times daily as needed for rhinitis or allergies    Class: Historical    Route: Both Nostrils    gabapentin (NEURONTIN) 100 MG capsule  30 capsule 0 2020    [address] [address] [facility]    Simg in AM, 200mg in PM    Class: E-Prescribe    levothyroxine (SYNTHROID/LEVOTHROID) 112 MCG tablet    2019        Sig: Take 112 mcg (1 tablet) by mouth every Monday, Tuesday, Wednesday, Thursday, Friday.    Class: Historical    Lidocaine (LIDOCARE) 4 % Patch  10 patch 3 2020    [address] [email or web address]    Sig: Place 1 patch onto the skin every 24 hours To prevent lidocaine toxicity, patient should be patch free for 12 hrs daily.    Class: E-Prescribe    Route: Transdermal    magnesium oxide (MAG-OX) 400 MG tablet  30 tablet 5 2020    [address] [employer]    Sig: Take 1 tablet (400 mg) by mouth daily (with lunch)    Class: E-Prescribe    Route: Oral    melatonin 3 MG tablet            Sig: Take 6 mg by mouth At Bedtime     Class: Historical    Route: Oral    mycophenolate (GENERIC EQUIVALENT) 250 MG capsule  180 capsule 11 2020    [address] [address] [facility] [place]    Sig: Take 4 capsules (1,000 mg) by mouth 2 times daily    Class: E-Prescribe    Route: Oral    ondansetron (ZOFRAN-ODT) 4 MG ODT tab  5 tablet 0 2020    [place], [place] - [address] [place] [address]    Sig: Take 1 tablet (4 mg) by mouth every 6 hours as needed for nausea or vomiting    Class: E-Prescribe    Route: Oral    phosphorus tablet 250 mg (PHOSPHORUS TABLET 250 MG) 250 MG per tablet  30 tablet 0 2020    [address], [place] - [address]    Sig: Take 2 tablets (500 mg) by mouth 2 times daily     Class: E-Prescribe    Route: Oral    polyethylene glycol (MIRALAX) 17 GM/Dose powder  507 g 0 9/14/2020    [address] [address] [facility]    Sig: Take 17 g (1 capful) by mouth daily    Class: E-Prescribe    Route: Oral    polyethylene glycol (MIRALAX) 17 GM/Dose powder            Sig: Take 1 capful by mouth daily    Class: Historical    Route: Oral    predniSONE (DELTASONE) 10 MG tablet  49 tablet 0 9/6/2020    [address]    Sig: On 9/7 take 60mg (6 tabs)  9/8-9/9 take 40mg (4 tabs daily)  9/10-9/16 take 20mg (2 tabs daily)  9/17-9/23 take 15mg (1.5 tabs daily)  9/24-9/30 take 10mg (1 tab daily)  10/1-10/7 take 5mg (half tab daily)    Class: Local Print    Renewals     Renewal requests to authorizing provider (Alicia Garcia, CODY CNP) <b>prohibited</b>          senna-docusate (SENOKOT-S/PERICOLACE) 8.6-50 MG tablet  60 tablet 0 9/6/2020    [address]    Sig: Take 1 tablet by mouth 2 times daily Hold for loose stools    Class: E-Prescribe    Route: Oral    sulfamethoxazole-trimethoprim (BACTRIM) 400-80 MG tablet  30 tablet 11 9/7/2020    [address] [address] [facility]    Sig: Take 1 tablet by mouth daily    Class: E-Prescribe    Route: Oral    tacrolimus (GENERIC EQUIVALENT) 0.5 MG capsule  60 capsule 11 9/22/2020    [address] [address] [facility]    Sig: Take 1 capsule (0.5 mg) by mouth 2 times daily Take total dose 4.5 mg twice per day    Class: E-Prescribe    Route: Oral    tacrolimus (GENERIC EQUIVALENT) 1 MG capsule  240 capsule 11 9/22/2020    [address] [address] [facility]    Sig: Take 4 capsules (4 mg) by mouth 2 times daily Take tacrolimus  4.5 mg twice per day    Class: E-Prescribe    Route: Oral    traMADol (ULTRAM) 50 MG tablet  14 tablet 0 9/23/2020     [address]    Sig: Take 1 tablet (50 mg) by mouth every 8 hours as needed for severe pain    Class: Local Print    Route: Oral    valGANciclovir (VALCYTE) 450 MG tablet  60 tablet 2 9/18/2020    [address] [facility] [address] [employer]    Sig: Take 2 tablets (900 mg) by mouth daily    Class: Historical    Route: Oral    vitamin B-12 (CYANOCOBALAMIN) 500 MCG tablet        [address]    Sig: Take 500 mcg by mouth daily    Class: Historical    Route: Oral    vitamin B6 (PYRIDOXINE) 100 MG tablet        [address]    Sig: Take 100 mg by mouth daily    Class: Historical    Route: Oral    Vitamin D, Cholecalciferol, 25 MCG (1000 UT) TABS            Sig: Take 2,000 Units by mouth daily    Class: Historical    Route: Oral          O:   Temp:  [97.5  F (36.4  C)] 97.5  F (36.4  C)  Pulse:  [78] 78  BP: (169)/(81) 169/81  SpO2:  [98 %] 98 %  General Appearance: in no apparent distress.   Skin: Normal, no rashes or jaundice  Heart: regular rate and rhythm, normal S1 and S2  Lungs: easy respirations, no audible wheezing.  Abdomen: rounded, The wound is dry and intact, without hernia. The abdomen is non-tender. The kidney graft is not tender.  There is no ascites.  Extremities: Tremor absent.   Edema: present bilaterally. Mild      Transplant Immunosuppression Labs Latest Ref Rng & Units 10/5/2020 10/1/2020 9/28/2020 9/24/2020 9/21/2020   Tacro Level 5.0 - 15.0 ug/L - 11.8 11.1 9.6 11.5   Tacro Level - - Not Provided Not Provided Not Provided 09/20/20 @ 2000   Creat 0.52 - 1.04 mg/dL 1.01 0.93 0.98 0.94 0.98   BUN 7 - 30 mg/dL 24 29 29 28 29   WBC 4.0 - 11.0 10e9/L 4.9 5.6 5.5 5.1 5.8   Neutrophil % - 73.6 76.2 77.2 -   ANEU 1.6 - 8.3 10e9/L - 4.1 4.2 4.0 -       Chemistries:   Recent Labs    Lab Test 10/05/20  0910   BUN 24   CR 1.01   GFRESTIMATED 58*   *     Lab Results   Component Value Date    A1C 5.4 09/03/2020     Recent Labs   Lab Test 09/09/20  0637 09/05/20  0535   ALBUMIN 2.9* 2.8*   BILITOTAL  --  0.4   ALKPHOS  --  53   AST  --  50*   ALT  --  52*     Urine Studies:  Recent Labs   Lab Test 10/05/20  0912   COLOR Yellow   APPEARANCE Clear   URINEGLC Negative   URINEBILI Negative   URINEKETONE Negative   SG 1.015   UBLD Moderate*   URINEPH 5.0   PROTEIN Negative   NITRITE Negative   LEUKEST Small*   RBCU 8*   WBCU 12*     Recent Labs   Lab Test 10/05/20  0912 09/05/20  0930   UTPG 0.60* 1.17*     Hematology:   Recent Labs   Lab Test 10/05/20  0910 10/01/20  0810 09/28/20  0804   HGB 10.9* 10.5* 10.3*    286 303   WBC 4.9 5.6 5.5     Coags:   Recent Labs   Lab Test 09/04/20  1544 09/03/20  1756   INR 1.08 0.89     HLA antibodies:   SA1 Hi Risk Diya   Date Value Ref Range Status   09/08/2020 None  Final     SA1 Mod Risk Diya   Date Value Ref Range Status   09/08/2020 B:76  Final     SA2 Hi Risk Diya   Date Value Ref Range Status   09/08/2020 None  Final     SA2 Mod Risk Diya   Date Value Ref Range Status   09/08/2020 None  Final       Assessment: Ethel Thompson is doing fairly well s/p DDKT:  Issues we addressed during her visit include:    Plan:    1. Graft function: stable  2. Immunosuppression Management: No change continue MMF and prograf .  Complexity of management:Medium.  Contributing factors: recent txp  3. Nerve pain: gabapentin as directed. Lidocaine patches  Followup: as directed    Total Time: 20 min,   Counselling Time: 10 min.

## 2020-10-05 NOTE — PATIENT INSTRUCTIONS
Increase amlodipine to 10 mg po daily    Check your home BP over the next 2 weeks, call if you're feeling dizzy or lightheaded    You will be scheduled for repeat kidney ultrasound

## 2020-10-05 NOTE — PROGRESS NOTES
ACUTE TRANSPLANT NEPHROLOGY VISIT    Plan  Increase amlodipine to 10 mg & monitor BP over next 2 weeks  Monitor tremor when Tac gets to target  Took meds this am- needs to get updated (4.5 am/4 pm)  US kidney tx to f/up on perinephric fluid collections    RTC 1 month @ 2 months post Tx      Assessment & Plan    # DDKT: Stable              - Baseline Cr ~ 0.9-1.0mg/dL              - Proteinuria: 1.17g/g               - Date DSA Last Checked: Sep/2020      Latest DSA: No              - BK Viremia: Not checked post transplant                 #perinephric fluid collections  Asymptomatic at present, f/up US kidney tx    # Immunosuppression: Tacrolimus immediate release (goal 8-10), Mycophenolate mofetil (dose 1000mg every 12 hours) and Prednisone (dose taper)  Induction immunosuppression with thymoglobulin 125mg (2.2mg/kg), basiliximab (dose given POD #1 and POD #5 ), and steroid taper.             - Changes: awaiting repeat FK trough (inaccurate from today) as took prograf prior to labs  Current IS: MMF 1 bid FK 4.5/4 pred taper will complete in 2 days    # Infection Prophylaxis:   - PJP: Sulfa/TMP (Bactrim)  - CMV: Valcyte x 12 weeks     # Hypertension: Controlled;   Goal BP: < 150/90              - Volume status: Mildly hypervolemic             EDW ~ 54kg              - Changes: No    # Anemia in Chronic Renal Disease: Hgb: Stable      JHONATAN: No   - Iron studies: Low iron saturation, but high ferritin    # Mineral Bone Disorder:   - Secondary renal hyperparathyroidism; PTH level: Moderately elevated (301-600 pg/ml)        On treatment: None  - Vitamin D; level: Low normal        On supplement: Yes  - Calcium; level: Normal        On supplement: Yes  - Phosphorus; level: Low normal        On supplement: Yes    # Electrolytes:   - Potassium; level: Normal        On supplement: No  - Magnesium; level: Normal        On supplement: Yes  - Bicarbonate; level: Normal        On supplement: No  - Sodium; level: Normal    #  Medical Compliance: Yes     # Transplant History:  Etiology of Kidney Failure: Focal segmental glomerulosclerosis (FSGS)  Tx: DDKT  Transplant: 9/3/2020 (Kidney)  Donor Type: Donation after Brain Death        Donor Class:   Crossmatch at time of Tx: negative  Significant changes in immunosuppression: None  Significant transplant-related complications: None     Recommendations were communicated to the primary team verbally.     Transplant Office Phone Number: [phone]     Assessment and plan was discussed with the patient and she voiced her understanding and agreement.     Return visit: at 1 month post-transplant    Jordy Dewitt MD    Chief Complaint   Ms. Thompson is a 65 year old here for routine follow up.     History of Present Illness    Ms. Thompson feels better overall. She reports her postop RLQ allograft pain has improved. She has been eating/drinking ok.  BP is running high on amlodipine 5 ranging between 150-160s. SBP.  She also notes mild hand tremors. She took prograf before going for labs this am.     Recent Hospitalizations:  [x] No [] Yes    New Medical Issues: [x] No [] Yes    Decreased energy: [x] No [] Yes    Chest pain or SOB with exertion:  [x] No [] Yes    Appetite change or weight change: [x] No [] Yes    Nausea, vomiting or diarrhea:  [x] No [] Yes    Fever, sweats or chills: [x] No [] Yes    Leg swelling: [x] No [] Yes      BP: ~150-160s/80-90    Review of Systems   A comprehensive review of systems was obtained and negative, except as noted in the HPI or PMH.    Problem List   Patient Active Problem List   Diagnosis     Elevated serum immunoglobulin free light chain level     Renovascular hypertension     FSGS (focal segmental glomerulosclerosis)     Hyperlipidemia     Hypothyroidism     SADIE on CPAP     Sensorineural hearing loss (SNHL) of both ears     Anemia in chronic kidney disease     GERD (gastroesophageal reflux disease)     Hypertension     Hepatitis B core antibody positive     End  stage renal disease (H)     Secondary hyperparathyroidism (H)     Memory deficits     Kidney replaced by transplant     Immunosuppressed status (H)     Elevated lactic acid level     Acute anemia       Social History   Social History     Tobacco Use     Smoking status: Former Smoker     Packs/day: 1.00     Types: Cigarettes     Start date:      Quit date:      Years since quittin.7     Smokeless tobacco: Never Used   Substance Use Topics     Alcohol use: No     Frequency: Never     Drug use: No       Allergies   Allergies   Allergen Reactions     Amoxicillin-Pot Clavulanate Nausea and Vomiting       Medications   Current Outpatient Medications   Medication Sig     acetaminophen (TYLENOL) 325 MG tablet Take 2 tablets (650 mg) by mouth every 4 hours as needed for pain     amLODIPine (NORVASC) 5 MG tablet Take 2 tablets (10 mg) by mouth daily     aspirin (ASA) 81 MG EC tablet Take 1 tablet (81 mg) by mouth daily     aspirin 81 MG EC tablet Take 81 mg by mouth daily     atorvastatin (LIPITOR) 40 MG tablet Take 0.5 tablets (20 mg) by mouth daily     calcium carbonate 600 mg-vitamin D 400 units (CALTRATE) 600-400 MG-UNIT per tablet Take 1 tablet by mouth 2 times daily     famotidine (PEPCID) 20 MG tablet Take 20 mg by mouth every evening      fish oil-omega-3 fatty acids 1000 MG capsule Take 1 g by mouth daily      fluticasone (FLONASE) 50 MCG/ACT nasal spray Spray 1 spray into both nostrils 2 times daily as needed for rhinitis or allergies     gabapentin (NEURONTIN) 100 MG capsule 100mg in AM, 200mg in PM     levothyroxine (SYNTHROID/LEVOTHROID) 112 MCG tablet Take 112 mcg (1 tablet) by mouth every Monday, Tuesday, Wednesday, Thursday, Friday.     Lidocaine (LIDOCARE) 4 % Patch Place 1 patch onto the skin every 24 hours To prevent lidocaine toxicity, patient should be patch free for 12 hrs daily.     magnesium oxide (MAG-OX) 400 MG tablet Take 1 tablet (400 mg) by mouth daily (with lunch)     melatonin 3 MG  tablet Take 6 mg by mouth At Bedtime      mycophenolate (GENERIC EQUIVALENT) 250 MG capsule Take 4 capsules (1,000 mg) by mouth 2 times daily     ondansetron (ZOFRAN-ODT) 4 MG ODT tab Take 1 tablet (4 mg) by mouth every 6 hours as needed for nausea or vomiting     phosphorus tablet 250 mg (PHOSPHORUS TABLET 250 MG) 250 MG per tablet Take 2 tablets (500 mg) by mouth 2 times daily     polyethylene glycol (MIRALAX) 17 GM/Dose powder Take 17 g (1 capful) by mouth daily     polyethylene glycol (MIRALAX) 17 GM/Dose powder Take 1 capful by mouth daily     predniSONE (DELTASONE) 10 MG tablet On 9/7 take 60mg (6 tabs)  9/8-9/9 take 40mg (4 tabs daily)  9/10-9/16 take 20mg (2 tabs daily)  9/17-9/23 take 15mg (1.5 tabs daily)  9/24-9/30 take 10mg (1 tab daily)  10/1-10/7 take 5mg (half tab daily)     senna-docusate (SENOKOT-S/PERICOLACE) 8.6-50 MG tablet Take 1 tablet by mouth 2 times daily Hold for loose stools     sulfamethoxazole-trimethoprim (BACTRIM) 400-80 MG tablet Take 1 tablet by mouth daily     tacrolimus (GENERIC EQUIVALENT) 0.5 MG capsule Take 1 capsule (0.5 mg) by mouth 2 times daily Take total dose 4.5 mg twice per day     tacrolimus (GENERIC EQUIVALENT) 1 MG capsule Take 4 capsules (4 mg) by mouth 2 times daily Take tacrolimus  4.5 mg twice per day     traMADol (ULTRAM) 50 MG tablet Take 1 tablet (50 mg) by mouth every 8 hours as needed for severe pain     valGANciclovir (VALCYTE) 450 MG tablet Take 2 tablets (900 mg) by mouth daily     vitamin B-12 (CYANOCOBALAMIN) 500 MCG tablet Take 500 mcg by mouth daily     vitamin B6 (PYRIDOXINE) 100 MG tablet Take 100 mg by mouth daily     Vitamin D, Cholecalciferol, 25 MCG (1000 UT) TABS Take 2,000 Units by mouth daily     No current facility-administered medications for this visit.      Medications Discontinued During This Encounter   Medication Reason     amLODIPine (NORVASC) 5 MG tablet Reorder       Physical Exam      GENERAL APPEARANCE: alert and no distress  HENT:  mouth without ulcers or lesions  LYMPHATICS: no cervical or supraclavicular nodes  RESP: lungs clear to auscultation - no rales, rhonchi or wheezes  CV: regular rhythm, normal rate, no rub, no murmur  EDEMA: no LE edema bilaterally  ABDOMEN: soft, nondistended, nontender, bowel sounds normal, non-tender RLQ allograft  MS: extremities normal - no gross deformities noted, no evidence of inflammation in joints, no muscle tenderness  SKIN: no rash         Data     Renal Latest Ref Rng & Units 10/5/2020 10/1/2020 9/28/2020   Na 133 - 144 mmol/L 138 140 138   K 3.4 - 5.3 mmol/L 4.8 4.7 4.7   Cl 94 - 109 mmol/L 110(H) 111(H) 110(H)   CO2 20 - 32 mmol/L 20 23 23   BUN 7 - 30 mg/dL 24 29 29   Cr 0.52 - 1.04 mg/dL 1.01 0.93 0.98   Glucose 70 - 99 mg/dL 115(H) 122(H) 109(H)   Ca  8.5 - 10.1 mg/dL 9.9 8.8 9.2   Mg 1.6 - 2.3 mg/dL - - 1.9     Bone Health Latest Ref Rng & Units 9/28/2020 9/21/2020 9/14/2020   Phos 2.5 - 4.5 mg/dL 2.0(L) 2.1(L) 2.4(L)   PTHi 18 - 80 pg/mL - - -   Vit D Def 20 - 75 ug/L - - -     Heme Latest Ref Rng & Units 10/5/2020 10/1/2020 9/28/2020   WBC 4.0 - 11.0 10e9/L 4.9 5.6 5.5   Hgb 11.7 - 15.7 g/dL 10.9(L) 10.5(L) 10.3(L)   Plt 150 - 450 10e9/L 272 286 303   ABSOLUTE NEUTROPHIL 1.6 - 8.3 10e9/L - 4.1 4.2   ABSOLUTE LYMPHOCYTES 0.8 - 5.3 10e9/L - 1.1 1.0   ABSOLUTE MONOCYTES 0.0 - 1.3 10e9/L - 0.2 0.2   ABSOLUTE EOSINOPHILS 0.0 - 0.7 10e9/L - 0.0 0.0   ABSOLUTE BASOPHILS 0.0 - 0.2 10e9/L - 0.0 0.0   ABS IMMATURE GRANULOCYTES 0 - 0.4 10e9/L - 0.1 0.1   ABSOLUTE NUCLEATED RBC - - 0.0 0.0     Liver Latest Ref Rng & Units 9/9/2020 9/5/2020 9/4/2020   AP 40 - 150 U/L - 53 60   TBili 0.2 - 1.3 mg/dL - 0.4 1.1   DBili 0.0 - 0.2 mg/dL - 0.2 0.1   ALT 0 - 50 U/L - 52(H) 41   AST 0 - 45 U/L - 50(H) 38   Tot Protein 6.8 - 8.8 g/dL - 5.4(L) 5.2(L)   Albumin 3.4 - 5.0 g/dL 2.9(L) 2.8(L) 2.3(L)     Pancreas Latest Ref Rng & Units 9/3/2020   A1C 0 - 5.6 % 5.4     Iron studies Latest Ref Rng & Units 9/9/2020   Iron 35  - 180 ug/dL 162   Iron sat 15 - 46 % 92(H)   Ferritin 8 - 252 ng/mL 1,757(H)     [facility] Txp Virology Latest Ref Rng & Units 9/3/2020 1/28/2019   EBV CAPSID ANTIBODY IGG 0.0 - 0.8 AI >8.0(H) >8.0(H)   Hep B Core NR:Nonreactive - Reactive(AA)        Recent Labs   Lab Test 09/24/20  0803 09/28/20  0804 10/01/20  0810   DOSTAC Not Provided Not Provided Not Provided   TACROL 9.6 11.1 11.8     Recent Labs   Lab Test 09/14/20  0815 09/28/20  0804   DOSMPA Not Provided Not Provided   MPACID 0.31* 0.91*   MPAG 64.0 120.0*     Jordy Dewitt MD

## 2020-10-06 ENCOUNTER — TELEPHONE (OUTPATIENT)
Dept: TRANSPLANT | Facility: CLINIC | Age: 65
End: 2020-10-06

## 2020-10-06 DIAGNOSIS — Z94.0 KIDNEY REPLACED BY TRANSPLANT: Primary | ICD-10-CM

## 2020-10-06 DIAGNOSIS — G89.18 POST-OP PAIN: ICD-10-CM

## 2020-10-06 LAB
MYCOPHENOLATE SERPL LC/MS/MS-MCNC: 7.02 MG/L (ref 1–3.5)
MYCOPHENOLATE-G SERPL LC/MS/MS-MCNC: 194.3 MG/L (ref 30–95)
TME LAST DOSE: ABNORMAL H

## 2020-10-06 NOTE — TELEPHONE ENCOUNTER
Patient Call: Medication Refill  Route to LPN  Instruct the patient to first contact their pharmacy. If they have called their pharmacy and require further assistance, route to LPN.    Pharmacy Name: [facility]  Pharmacy Location: [place]  Name of Medication: Tramadol 50 mg  When will the patient be out of this medication?: Less than 3 days (Route high priority)

## 2020-10-07 LAB
BACTERIA SPEC CULT: ABNORMAL
BACTERIA SPEC CULT: ABNORMAL
Lab: ABNORMAL
SPECIMEN SOURCE: ABNORMAL

## 2020-10-07 RX ORDER — TRAMADOL HYDROCHLORIDE 50 MG/1
50 TABLET ORAL 2 TIMES DAILY PRN
Qty: 10 TABLET | Refills: 0 | Status: SHIPPED | OUTPATIENT
Start: 2020-10-07 | End: 2020-10-12

## 2020-10-07 NOTE — TELEPHONE ENCOUNTER
Patient Call: General  Route to LPN    Reason for call: Pt's  calling to see if you got the OK yet on the Tramadol  They are waiting to hear back re the US     Call back needed? Yes    Return Call Needed  Same as documented in contacts section  When to return call?: Greater than one day: Route standard priority

## 2020-10-08 ENCOUNTER — TELEPHONE (OUTPATIENT)
Dept: TRANSPLANT | Facility: CLINIC | Age: 65
End: 2020-10-08

## 2020-10-08 ENCOUNTER — MEDICAL CORRESPONDENCE (OUTPATIENT)
Dept: HEALTH INFORMATION MANAGEMENT | Facility: CLINIC | Age: 65
End: 2020-10-08

## 2020-10-08 DIAGNOSIS — Z94.0 KIDNEY REPLACED BY TRANSPLANT: Primary | ICD-10-CM

## 2020-10-08 LAB
ANION GAP SERPL CALCULATED.3IONS-SCNC: 4 MMOL/L (ref 3–14)
BASOPHILS # BLD AUTO: 0 10E9/L (ref 0–0.2)
BASOPHILS NFR BLD AUTO: 0.4 %
BUN SERPL-MCNC: 30 MG/DL (ref 7–30)
CALCIUM SERPL-MCNC: 9.7 MG/DL (ref 8.5–10.1)
CHLORIDE SERPL-SCNC: 109 MMOL/L (ref 94–109)
CO2 SERPL-SCNC: 25 MMOL/L (ref 20–32)
CREAT SERPL-MCNC: 1.2 MG/DL (ref 0.52–1.04)
DIFFERENTIAL METHOD BLD: ABNORMAL
EOSINOPHIL # BLD AUTO: 0 10E9/L (ref 0–0.7)
EOSINOPHIL NFR BLD AUTO: 0.2 %
ERYTHROCYTE [DISTWIDTH] IN BLOOD BY AUTOMATED COUNT: 14.7 % (ref 10–15)
GFR SERPL CREATININE-BSD FRML MDRD: 47 ML/MIN/{1.73_M2}
GLUCOSE SERPL-MCNC: 129 MG/DL (ref 70–99)
HCT VFR BLD AUTO: 33.8 % (ref 35–47)
HGB BLD-MCNC: 10.7 G/DL (ref 11.7–15.7)
IMM GRANULOCYTES # BLD: 0.1 10E9/L (ref 0–0.4)
IMM GRANULOCYTES NFR BLD: 1.5 %
LYMPHOCYTES # BLD AUTO: 0.8 10E9/L (ref 0.8–5.3)
LYMPHOCYTES NFR BLD AUTO: 15.9 %
MCH RBC QN AUTO: 33.5 PG (ref 26.5–33)
MCHC RBC AUTO-ENTMCNC: 31.7 G/DL (ref 31.5–36.5)
MCV RBC AUTO: 106 FL (ref 78–100)
MONOCYTES # BLD AUTO: 0.2 10E9/L (ref 0–1.3)
MONOCYTES NFR BLD AUTO: 4.9 %
NEUTROPHILS # BLD AUTO: 3.7 10E9/L (ref 1.6–8.3)
NEUTROPHILS NFR BLD AUTO: 77.1 %
NRBC # BLD AUTO: 0 10*3/UL
NRBC BLD AUTO-RTO: 0 /100
PLATELET # BLD AUTO: 260 10E9/L (ref 150–450)
POTASSIUM SERPL-SCNC: 4.9 MMOL/L (ref 3.4–5.3)
RBC # BLD AUTO: 3.19 10E12/L (ref 3.8–5.2)
SODIUM SERPL-SCNC: 138 MMOL/L (ref 133–144)
TACROLIMUS BLD-MCNC: 19.5 UG/L (ref 5–15)
TME LAST DOSE: ABNORMAL H
WBC # BLD AUTO: 4.7 10E9/L (ref 4–11)

## 2020-10-08 PROCEDURE — 85025 COMPLETE CBC W/AUTO DIFF WBC: CPT | Performed by: SURGERY

## 2020-10-08 PROCEDURE — 80048 BASIC METABOLIC PNL TOTAL CA: CPT | Performed by: SURGERY

## 2020-10-08 PROCEDURE — 80197 ASSAY OF TACROLIMUS: CPT | Performed by: SURGERY

## 2020-10-08 NOTE — TELEPHONE ENCOUNTER
"Call from  reports that his wife Ethel is requesting tramadol for pain \"can not sleep at night \"   Taking one tramadol per day    Ethel was in clinic on Monday of this week  With Dr Jordy Willis forgot to mention pain   Please review previous epic notes regarding pain post transplant        Paged Attending  Dr Manas Yin  -     Plan to return to clinic (Dr Yin) on Monday Oct 12 along with follow up kidney transplant  ultrasound   He indicated that he would send RX for small amount of tramadol to local pharmacy until follow up appointment      Returned phone call to Maxim  with follow up plan             "

## 2020-10-08 NOTE — TELEPHONE ENCOUNTER
[facility] utilizes an encounter to take the place of a direct phone call to your office. Please take a moment to review the below request. Please reply or route message to author of this encounter.  Message will act as a verbal OK of orders requested below. Thank you.    GAIL  Patient discharged from HC services today.  She and her spouse have demonstrated medication management, symptom mgmt, are able to report to SN s/sx of rejection, she is utilizing her transplant teaching book, she is writing down labs and VS independently as instructed.  She is able to get out in the community without ill effects.  She is set up for outpatient labs per tx letter she received in clinic on Monday.  Transplant labs drawn this morning with discharge visit.  Client and spouse have contact information for transplant coordinator.  Thank you for the opportunity to provide care to Ms. Thompson.  SUN Beckwith  [phone]  [email or web address]

## 2020-10-09 ENCOUNTER — TELEPHONE (OUTPATIENT)
Dept: TRANSPLANT | Facility: CLINIC | Age: 65
End: 2020-10-09

## 2020-10-09 ENCOUNTER — MEDICAL CORRESPONDENCE (OUTPATIENT)
Dept: HEALTH INFORMATION MANAGEMENT | Facility: CLINIC | Age: 65
End: 2020-10-09

## 2020-10-09 DIAGNOSIS — Z94.0 KIDNEY REPLACED BY TRANSPLANT: ICD-10-CM

## 2020-10-09 NOTE — TELEPHONE ENCOUNTER
Issue tacrolimus  Level 19.5 above goal level   Called reviewed current dose of tacrolimus  4.5 and 4.0   Lowered to 3.5 mg twice per day

## 2020-10-12 ENCOUNTER — OFFICE VISIT (OUTPATIENT)
Dept: TRANSPLANT | Facility: CLINIC | Age: 65
End: 2020-10-12
Attending: SURGERY
Payer: MEDICARE

## 2020-10-12 ENCOUNTER — ANCILLARY PROCEDURE (OUTPATIENT)
Dept: ULTRASOUND IMAGING | Facility: CLINIC | Age: 65
End: 2020-10-12
Attending: SURGERY
Payer: MEDICARE

## 2020-10-12 ENCOUNTER — RESULTS ONLY (OUTPATIENT)
Dept: OTHER | Facility: CLINIC | Age: 65
End: 2020-10-12

## 2020-10-12 VITALS
DIASTOLIC BLOOD PRESSURE: 62 MMHG | BODY MASS INDEX: 25.91 KG/M2 | OXYGEN SATURATION: 97 % | WEIGHT: 128.3 LBS | HEART RATE: 93 BPM | SYSTOLIC BLOOD PRESSURE: 150 MMHG

## 2020-10-12 DIAGNOSIS — Z94.0 KIDNEY REPLACED BY TRANSPLANT: ICD-10-CM

## 2020-10-12 DIAGNOSIS — Z79.899 ENCOUNTER FOR LONG-TERM CURRENT USE OF MEDICATION: ICD-10-CM

## 2020-10-12 DIAGNOSIS — Z94.0 KIDNEY REPLACED BY TRANSPLANT: Primary | ICD-10-CM

## 2020-10-12 DIAGNOSIS — G89.18 POST-OP PAIN: ICD-10-CM

## 2020-10-12 DIAGNOSIS — Z48.298 AFTERCARE FOLLOWING ORGAN TRANSPLANT: ICD-10-CM

## 2020-10-12 LAB
ANION GAP SERPL CALCULATED.3IONS-SCNC: 6 MMOL/L (ref 3–14)
BUN SERPL-MCNC: 28 MG/DL (ref 7–30)
CALCIUM SERPL-MCNC: 9.9 MG/DL (ref 8.5–10.1)
CHLORIDE SERPL-SCNC: 104 MMOL/L (ref 94–109)
CO2 SERPL-SCNC: 23 MMOL/L (ref 20–32)
CREAT SERPL-MCNC: 1.03 MG/DL (ref 0.52–1.04)
CREAT UR-MCNC: 120 MG/DL
ERYTHROCYTE [DISTWIDTH] IN BLOOD BY AUTOMATED COUNT: 13.7 % (ref 10–15)
GFR SERPL CREATININE-BSD FRML MDRD: 56 ML/MIN/{1.73_M2}
GLUCOSE SERPL-MCNC: 157 MG/DL (ref 70–99)
HCT VFR BLD AUTO: 34.6 % (ref 35–47)
HGB BLD-MCNC: 11.2 G/DL (ref 11.7–15.7)
MAGNESIUM SERPL-MCNC: 1.7 MG/DL (ref 1.6–2.3)
MCH RBC QN AUTO: 33.4 PG (ref 26.5–33)
MCHC RBC AUTO-ENTMCNC: 32.4 G/DL (ref 31.5–36.5)
MCV RBC AUTO: 103 FL (ref 78–100)
PHOSPHATE SERPL-MCNC: 2.7 MG/DL (ref 2.5–4.5)
PLATELET # BLD AUTO: 254 10E9/L (ref 150–450)
POTASSIUM SERPL-SCNC: 4.4 MMOL/L (ref 3.4–5.3)
PROT UR-MCNC: 0.35 G/L
PROT/CREAT 24H UR: 0.29 G/G CR (ref 0–0.2)
RBC # BLD AUTO: 3.35 10E12/L (ref 3.8–5.2)
SODIUM SERPL-SCNC: 134 MMOL/L (ref 133–144)
TACROLIMUS BLD-MCNC: 19.8 UG/L (ref 5–15)
TME LAST DOSE: ABNORMAL H
WBC # BLD AUTO: 4.6 10E9/L (ref 4–11)

## 2020-10-12 PROCEDURE — 36415 COLL VENOUS BLD VENIPUNCTURE: CPT | Performed by: PATHOLOGY

## 2020-10-12 PROCEDURE — 99N1151 PR STATISTIC ALLOSURE: Mod: 90 | Performed by: PATHOLOGY

## 2020-10-12 PROCEDURE — 84100 ASSAY OF PHOSPHORUS: CPT | Performed by: PATHOLOGY

## 2020-10-12 PROCEDURE — 86833 HLA CLASS II HIGH DEFIN QUAL: CPT | Performed by: PATHOLOGY

## 2020-10-12 PROCEDURE — 80048 BASIC METABOLIC PNL TOTAL CA: CPT | Performed by: PATHOLOGY

## 2020-10-12 PROCEDURE — 99214 OFFICE O/P EST MOD 30 MIN: CPT | Mod: 24 | Performed by: SURGERY

## 2020-10-12 PROCEDURE — 86832 HLA CLASS I HIGH DEFIN QUAL: CPT | Performed by: PATHOLOGY

## 2020-10-12 PROCEDURE — 86828 HLA CLASS I&II ANTIBODY QUAL: CPT | Performed by: PATHOLOGY

## 2020-10-12 PROCEDURE — 87799 DETECT AGENT NOS DNA QUANT: CPT | Performed by: PATHOLOGY

## 2020-10-12 PROCEDURE — 83735 ASSAY OF MAGNESIUM: CPT | Performed by: PATHOLOGY

## 2020-10-12 PROCEDURE — 76776 US EXAM K TRANSPL W/DOPPLER: CPT | Mod: GC | Performed by: RADIOLOGY

## 2020-10-12 PROCEDURE — 85027 COMPLETE CBC AUTOMATED: CPT | Performed by: PATHOLOGY

## 2020-10-12 PROCEDURE — 80197 ASSAY OF TACROLIMUS: CPT | Performed by: PATHOLOGY

## 2020-10-12 PROCEDURE — 80180 DRUG SCRN QUAN MYCOPHENOLATE: CPT | Performed by: PATHOLOGY

## 2020-10-12 RX ORDER — TRAMADOL HYDROCHLORIDE 50 MG/1
50 TABLET ORAL EVERY 6 HOURS PRN
Qty: 7 TABLET | Refills: 0 | Status: SHIPPED | OUTPATIENT
Start: 2020-10-12 | End: 2020-10-15

## 2020-10-12 NOTE — PROGRESS NOTES
Transplant Surgery Progress Note    Transplants:  9/3/2020 (Kidney); Postoperative day:  39  S: 39 days s/p DDKT for FSGS. Graft functioning well. Energy level slowly improving. More active, eating well. Weight 128, close to pre-transplant dry weight with significant improvement in LE swelling. Still with some incisional pain/soreness, managed with low-dose tramadol. No other complaints.    Transplant History:    Transplant Type:  DDKT  Donor Type: Donation after Brain Death          Transplant Date:  9/3/2020 (Kidney)              Ureteral Stent:  No              Crossmatch:  negative              DSA at Tx:  No  Baseline Cr: 0.96   DeNovo DSA: No     Acute Rejection Hx:  No     Present Maintenance Immunosuppression:  Tacrolimus and Mycophenolic acid and prednisone taper (intermediate induction protocol)     CMV IgG Ab Discordance:  No R+  EBV IgG Ab Discordance:  No  R+     BK Viremia:  No  EBV Viremia:  No     Transplant Coordinator: Tiffany Paul          Transplant Office Phone Number: [phone]     Immunosuppressant Medications     Immunosuppressive Agents Disp Start End     mycophenolate (GENERIC EQUIVALENT) 250 MG capsule    180 capsule 9/18/2020     Sig - Route: Take 4 capsules (1,000 mg) by mouth 2 times daily - Oral    Class: E-Prescribe     tacrolimus (GENERIC EQUIVALENT) 0.5 MG capsule    60 capsule 9/22/2020     Sig - Route: Take 1 capsule (0.5 mg) by mouth 2 times daily Take total dose 4.5 mg twice per day - Oral    Class: E-Prescribe     tacrolimus (GENERIC EQUIVALENT) 1 MG capsule    240 capsule 9/22/2020     Sig - Route: Take 4 capsules (4 mg) by mouth 2 times daily Take tacrolimus  4.5 mg twice per day - Oral    Class: E-Prescribe          Possible Immunosuppression-related side effects:   []             headache  []             vivid dreams  []             irritability  []             cognitive difficuties  []             fine tremor  []             nausea  []             diarrhea  []              neuropathy      []             edema  []             renal calcineurin toxicity  []             hyperkalemia  []             post-transplant diabetes  []             decreased appetite  []             increased appetite  []             other:  []             none    Prescription Medications as of 10/12/2020       Rx Number Disp Refills Start End Last Dispensed Date Next Fill Date Owning Pharmacy    acetaminophen (TYLENOL) 325 MG tablet  1 Bottle 0 9/7/2020    [place], [place] - [facility]    Sig: Take 2 tablets (650 mg) by mouth every 4 hours as needed for pain    Class: E-Prescribe    Route: Oral    amLODIPine (NORVASC) 5 MG tablet  180 tablet 11 10/5/2020    [id number] [place] [address]    Sig: Take 2 tablets (10 mg) by mouth daily    Class: E-Prescribe    Route: Oral    aspirin (ASA) 81 MG EC tablet  90 tablet 3 9/22/2020    [address] [address] [facility]    Sig: Take 1 tablet (81 mg) by mouth daily    Class: E-Prescribe    Route: Oral    aspirin 81 MG EC tablet        [address]    Sig: Take 81 mg by mouth daily    Class: Historical    Route: Oral    atorvastatin (LIPITOR) 40 MG tablet    9/6/2020        Sig: Take 0.5 tablets (20 mg) by mouth daily    Class: No Print Out    Route: Oral    calcium carbonate 600 mg-vitamin D 400 units (CALTRATE) 600-400 MG-UNIT per tablet            Sig: Take 1 tablet by mouth 2 times daily    Class: Historical    Route: Oral    famotidine (PEPCID) 20 MG tablet        [address] [address] [facility]    Sig: Take 20 mg by mouth every evening     Class: Historical    Route: Oral    fish oil-omega-3 fatty acids 1000 MG capsule        [address]    Sig: Take 1 g by mouth daily     Class: Historical     Route: Oral    fluticasone (FLONASE) 50 MCG/ACT nasal spray            Sig: Spray 1 spray into both nostrils 2 times daily as needed for rhinitis or allergies    Class: Historical    Route: Both Nostrils    gabapentin (NEURONTIN) 100 MG capsule  30 capsule 0 2020    [address] [address] [place]    Simg in AM, 200mg in PM    Class: E-Prescribe    levothyroxine (SYNTHROID/LEVOTHROID) 112 MCG tablet    2019        Sig: Take 112 mcg (1 tablet) by mouth every Monday, Tuesday, Wednesday, Thursday, Friday.    Class: Historical    Lidocaine (LIDOCARE) 4 % Patch  10 patch 3 2020    [address] [facility] [place]    Sig: Place 1 patch onto the skin every 24 hours To prevent lidocaine toxicity, patient should be patch free for 12 hrs daily.    Class: E-Prescribe    Route: Transdermal    magnesium oxide (MAG-OX) 400 MG tablet  30 tablet 5 2020    [address] [employer]    Sig: Take 1 tablet (400 mg) by mouth daily (with lunch)    Class: E-Prescribe    Route: Oral    melatonin 3 MG tablet            Sig: Take 6 mg by mouth At Bedtime     Class: Historical    Route: Oral    mycophenolate (GENERIC EQUIVALENT) 250 MG capsule  180 capsule 11 2020    [address] [place]    Sig: Take 4 capsules (1,000 mg) by mouth 2 times daily    Class: E-Prescribe    Route: Oral    ondansetron (ZOFRAN-ODT) 4 MG ODT tab  5 tablet 0 2020    [place], [place] - [address] [place] [address]    Sig: Take 1 tablet (4 mg) by mouth every 6 hours as needed for nausea or vomiting    Class: E-Prescribe    Route: Oral    phosphorus tablet 250 mg (PHOSPHORUS TABLET 250 MG) 250 MG per tablet  30 tablet 0 2020    [address], [place] - [id number]    Sig: Take 2 tablets (500 mg) by mouth 2 times daily    Class: E-Prescribe     Route: Oral    polyethylene glycol (MIRALAX) 17 GM/Dose powder  507 g 0 9/14/2020    [address] [address] [facility]    Sig: Take 17 g (1 capful) by mouth daily    Class: E-Prescribe    Route: Oral    polyethylene glycol (MIRALAX) 17 GM/Dose powder            Sig: Take 1 capful by mouth daily    Class: Historical    Route: Oral    predniSONE (DELTASONE) 10 MG tablet  49 tablet 0 9/6/2020    [address]    Sig: On 9/7 take 60mg (6 tabs)  9/8-9/9 take 40mg (4 tabs daily)  9/10-9/16 take 20mg (2 tabs daily)  9/17-9/23 take 15mg (1.5 tabs daily)  9/24-9/30 take 10mg (1 tab daily)  10/1-10/7 take 5mg (half tab daily)    Class: Local Print    Renewals     Renewal requests to authorizing provider (Alicia Garcia, CODY CNP) <b>prohibited</b>          senna-docusate (SENOKOT-S/PERICOLACE) 8.6-50 MG tablet  60 tablet 0 9/6/2020    [address]    Sig: Take 1 tablet by mouth 2 times daily Hold for loose stools    Class: E-Prescribe    Route: Oral    sulfamethoxazole-trimethoprim (BACTRIM) 400-80 MG tablet  30 tablet 11 9/7/2020    [address] [address] [facility]    Sig: Take 1 tablet by mouth daily    Class: E-Prescribe    Route: Oral    tacrolimus (GENERIC EQUIVALENT) 0.5 MG capsule  60 capsule 11 9/22/2020    [address] [address] [facility]    Sig: Take 1 capsule (0.5 mg) by mouth 2 times daily Take total dose 4.5 mg twice per day    Class: E-Prescribe    Route: Oral    tacrolimus (GENERIC EQUIVALENT) 1 MG capsule  240 capsule 11 9/22/2020    [address] [address] [facility]    Sig: Take 4 capsules (4 mg) by mouth 2 times daily Take tacrolimus  4.5 mg twice per day    Class: E-Prescribe    Route: Oral    traMADol (ULTRAM) 50 MG tablet  7 tablet 0 10/12/2020 10/15/2020   [place]  [address]    Sig: Take 1 tablet (50 mg) by mouth every 6 hours as needed for severe pain    Class: Local Print    Route: Oral    traMADol (ULTRAM) 50 MG tablet  10 tablet 0 10/7/2020 10/12/2020   [address]    Sig: Take 1 tablet (50 mg) by mouth 2 times daily as needed for severe pain    Class: Local Print    Route: Oral    traMADol (ULTRAM) 50 MG tablet  14 tablet 0 9/23/2020    [address]    Sig: Take 1 tablet (50 mg) by mouth every 8 hours as needed for severe pain    Class: Local Print    Route: Oral    valGANciclovir (VALCYTE) 450 MG tablet  60 tablet 2 9/18/2020    [address]    Sig: Take 2 tablets (900 mg) by mouth daily    Class: Historical    Route: Oral    vitamin B-12 (CYANOCOBALAMIN) 500 MCG tablet        [address]    Sig: Take 500 mcg by mouth daily    Class: Historical    Route: Oral    vitamin B6 (PYRIDOXINE) 100 MG tablet        [address]    Sig: Take 100 mg by mouth daily    Class: Historical    Route: Oral    Vitamin D, Cholecalciferol, 25 MCG (1000 UT) TABS            Sig: Take 2,000 Units by mouth daily    Class: Historical    Route: Oral          O:   Pulse:  [93] 93  BP: (150)/(62) 150/62  SpO2:  [97 %] 97 %  General Appearance: in no apparent distress.   Skin: Normal, no rashes or jaundice  Heart: regular rate and rhythm, normal S1 and S2  Lungs: easy respirations, no audible wheezing.  Abdomen: abd soft. Mild halie-incisional tenderness. No impulse, no hernia. Incision well-healed.   Extremities: Tremor absent.   Edema: absent.     Transplant Immunosuppression Labs Latest Ref Rng & Units 10/12/2020 10/8/2020  10/5/2020 10/1/2020 9/28/2020   Tacro Level 5.0 - 15.0 ug/L 19.8(H) 19.5(H) - 11.8 11.1   Tacro Level - 8P 10.11.20 Not Provided - Not Provided Not Provided   Creat 0.52 - 1.04 mg/dL 1.03 1.20(H) 1.01 0.93 0.98   BUN 7 - 30 mg/dL 28 30 24 29 29   WBC 4.0 - 11.0 10e9/L 4.6 4.7 4.9 5.6 5.5   Neutrophil % - 77.1 - 73.6 76.2   ANEU 1.6 - 8.3 10e9/L - 3.7 - 4.1 4.2       Chemistries:   Recent Labs   Lab Test 10/12/20  0804   BUN 28   CR 1.03   GFRESTIMATED 56*   *     Lab Results   Component Value Date    A1C 5.4 09/03/2020     Recent Labs   Lab Test 09/09/20  0637 09/05/20  0535   ALBUMIN 2.9* 2.8*   BILITOTAL  --  0.4   ALKPHOS  --  53   AST  --  50*   ALT  --  52*     Urine Studies:  Recent Labs   Lab Test 10/05/20  0912   COLOR Yellow   APPEARANCE Clear   URINEGLC Negative   URINEBILI Negative   URINEKETONE Negative   SG 1.015   UBLD Moderate*   URINEPH 5.0   PROTEIN Negative   NITRITE Negative   LEUKEST Small*   RBCU 8*   WBCU 12*     Recent Labs   Lab Test 10/12/20  0805 10/05/20  0912   UTPG 0.29* 0.60*     Hematology:   Recent Labs   Lab Test 10/12/20  0804 10/08/20  0810 10/05/20  0910   HGB 11.2* 10.7* 10.9*    260 272   WBC 4.6 4.7 4.9     Coags:   Recent Labs   Lab Test 09/04/20  1544 09/03/20  1756   INR 1.08 0.89     HLA antibodies:   SA1 Hi Risk Diya   Date Value Ref Range Status   09/08/2020 None  Final     SA1 Mod Risk Diya   Date Value Ref Range Status   09/08/2020 B:76  Final     SA2 Hi Risk Diya   Date Value Ref Range Status   09/08/2020 None  Final     SA2 Mod Risk Diya   Date Value Ref Range Status   09/08/2020 None  Final       Kidney transplant US reviewed. Interval improvement in perinephric fluid collection. Persistently elevated velocities at renal artery anastomoses but good RIs in arcuate arteries.     Assessment: Ethel Thompson is doing well s/p DDKT:  Issues we addressed during her visit include:    Plan:    1. Graft function: good, stable  2. Immunosuppression Management: No change  continue cellcept and tacrolimus.  Complexity of management:Medium.  Contributing factors: anemia  3. Postoperative pain control: renew tramadol. Scheduled tylenol, will try to control pain with decreased dose of tramadol (25mg daily).   Followup: PRN. If improving and able to wean from pain medication.    Total Time: 15 min,   Counselling Time: 10 min.      Manas Bansal MD

## 2020-10-12 NOTE — TELEPHONE ENCOUNTER
Called tacrolimus  Level 19.3   Confirmed taking 3,5 mg twice per day   Confirmed 12 hour tac level   Confirmed no new medications,no change in diet   confirmed no increase diarrhea     Lowered tacrolimus  2.5 mg twice per day

## 2020-10-12 NOTE — LETTER
10/12/2020         RE: Ethel Thompson  [address]   [place] [address]        Dear Colleague,    Thank you for referring your patient, Ethel Thompson, to the [facility]. Please see a copy of my visit note below.    Transplant Surgery Progress Note    Transplants:  9/3/2020 (Kidney); Postoperative day:  39  S: 39 days s/p DDKT for FSGS. Graft functioning well. Energy level slowly improving. More active, eating well. Weight 128, close to pre-transplant dry weight with significant improvement in LE swelling. Still with some incisional pain/soreness, managed with low-dose tramadol. No other complaints.    Transplant History:    Transplant Type:  DDKT  Donor Type: Donation after Brain Death          Transplant Date:  9/3/2020 (Kidney)              Ureteral Stent:  No              Crossmatch:  negative              DSA at Tx:  No  Baseline Cr: 0.96   DeNovo DSA: No     Acute Rejection Hx:  No     Present Maintenance Immunosuppression:  Tacrolimus and Mycophenolic acid and prednisone taper (intermediate induction protocol)     CMV IgG Ab Discordance:  No R+  EBV IgG Ab Discordance:  No  R+     BK Viremia:  No  EBV Viremia:  No     Transplant Coordinator: Tiffany Paul          Transplant Office Phone Number: [phone]     Immunosuppressant Medications     Immunosuppressive Agents Disp Start End     mycophenolate (GENERIC EQUIVALENT) 250 MG capsule    180 capsule 9/18/2020     Sig - Route: Take 4 capsules (1,000 mg) by mouth 2 times daily - Oral    Class: E-Prescribe     tacrolimus (GENERIC EQUIVALENT) 0.5 MG capsule    60 capsule 9/22/2020     Sig - Route: Take 1 capsule (0.5 mg) by mouth 2 times daily Take total dose 4.5 mg twice per day - Oral    Class: E-Prescribe     tacrolimus (GENERIC EQUIVALENT) 1 MG capsule    240 capsule 9/22/2020     Sig - Route: Take 4 capsules (4 mg) by mouth 2 times daily Take tacrolimus  4.5 mg twice per day - Oral    Class: E-Prescribe          Possible  Immunosuppression-related side effects:   []             headache  []             vivid dreams  []             irritability  []             cognitive difficuties  []             fine tremor  []             nausea  []             diarrhea  []             neuropathy      []             edema  []             renal calcineurin toxicity  []             hyperkalemia  []             post-transplant diabetes  []             decreased appetite  []             increased appetite  []             other:  []             none    Prescription Medications as of 10/12/2020       Rx Number Disp Refills Start End Last Dispensed Date Next Fill Date Owning Pharmacy    acetaminophen (TYLENOL) 325 MG tablet  1 Bottle 0 9/7/2020    [place], [place] - [address] [facility]    Sig: Take 2 tablets (650 mg) by mouth every 4 hours as needed for pain    Class: E-Prescribe    Route: Oral    amLODIPine (NORVASC) 5 MG tablet  180 tablet 11 10/5/2020    [address] [address] [facility]    Sig: Take 2 tablets (10 mg) by mouth daily    Class: E-Prescribe    Route: Oral    aspirin (ASA) 81 MG EC tablet  90 tablet 3 9/22/2020    [address] [address] [facility]    Sig: Take 1 tablet (81 mg) by mouth daily    Class: E-Prescribe    Route: Oral    aspirin 81 MG EC tablet        [place], [place] - [address] [facility] [address]    Sig: Take 81 mg by mouth daily    Class: Historical    Route: Oral    atorvastatin (LIPITOR) 40 MG tablet    9/6/2020        Sig: Take 0.5 tablets (20 mg) by mouth daily    Class: No Print Out    Route: Oral    calcium carbonate 600 mg-vitamin D 400 units (CALTRATE) 600-400 MG-UNIT per tablet            Sig: Take 1 tablet by mouth 2 times daily    Class: Historical    Route: Oral    famotidine (PEPCID) 20 MG tablet        [address] [address] [facility]    Sig: Take 20 mg by  mouth every evening     Class: Historical    Route: Oral    fish oil-omega-3 fatty acids 1000 MG capsule        [place], [place] - [address] [facility] [address]    Sig: Take 1 g by mouth daily     Class: Historical    Route: Oral    fluticasone (FLONASE) 50 MCG/ACT nasal spray            Sig: Spray 1 spray into both nostrils 2 times daily as needed for rhinitis or allergies    Class: Historical    Route: Both Nostrils    gabapentin (NEURONTIN) 100 MG capsule  30 capsule 0 2020    [address] [address] [facility]    Simg in AM, 200mg in PM    Class: E-Prescribe    levothyroxine (SYNTHROID/LEVOTHROID) 112 MCG tablet    2019        Sig: Take 112 mcg (1 tablet) by mouth every Monday, Tuesday, Wednesday, Thursday, Friday.    Class: Historical    Lidocaine (LIDOCARE) 4 % Patch  10 patch 3 2020    [address] [address] [facility]    Sig: Place 1 patch onto the skin every 24 hours To prevent lidocaine toxicity, patient should be patch free for 12 hrs daily.    Class: E-Prescribe    Route: Transdermal    magnesium oxide (MAG-OX) 400 MG tablet  30 tablet 5 2020    [address] [address] [facility]    Sig: Take 1 tablet (400 mg) by mouth daily (with lunch)    Class: E-Prescribe    Route: Oral    melatonin 3 MG tablet            Sig: Take 6 mg by mouth At Bedtime     Class: Historical    Route: Oral    mycophenolate (GENERIC EQUIVALENT) 250 MG capsule  180 capsule 11 2020    [address] [address] [facility]    Sig: Take 4 capsules (1,000 mg) by mouth 2 times daily    Class: E-Prescribe    Route: Oral    ondansetron (ZOFRAN-ODT) 4 MG ODT tab  5 tablet 0 2020    [place], [place] - [address] [facility]    Sig: Take 1 tablet (4 mg) by mouth every 6 hours as needed for nausea or vomiting    Class: E-Prescribe     Route: Oral    phosphorus tablet 250 mg (PHOSPHORUS TABLET 250 MG) 250 MG per tablet  30 tablet 0 9/11/2020    [id number] [place] [address]    Sig: Take 2 tablets (500 mg) by mouth 2 times daily    Class: E-Prescribe    Route: Oral    polyethylene glycol (MIRALAX) 17 GM/Dose powder  507 g 0 9/14/2020    [id number] [place] [address]    Sig: Take 17 g (1 capful) by mouth daily    Class: E-Prescribe    Route: Oral    polyethylene glycol (MIRALAX) 17 GM/Dose powder            Sig: Take 1 capful by mouth daily    Class: Historical    Route: Oral    predniSONE (DELTASONE) 10 MG tablet  49 tablet 0 9/6/2020    [address]    Sig: On 9/7 take 60mg (6 tabs)  9/8-9/9 take 40mg (4 tabs daily)  9/10-9/16 take 20mg (2 tabs daily)  9/17-9/23 take 15mg (1.5 tabs daily)  9/24-9/30 take 10mg (1 tab daily)  10/1-10/7 take 5mg (half tab daily)    Class: Local Print    Renewals     Renewal requests to authorizing provider (Alicia Garcia, CODY CNP) <b>prohibited</b>          senna-docusate (SENOKOT-S/PERICOLACE) 8.6-50 MG tablet  60 tablet 0 9/6/2020    [address]    Sig: Take 1 tablet by mouth 2 times daily Hold for loose stools    Class: E-Prescribe    Route: Oral    sulfamethoxazole-trimethoprim (BACTRIM) 400-80 MG tablet  30 tablet 11 9/7/2020    [id number] [place] [address]    Sig: Take 1 tablet by mouth daily    Class: E-Prescribe    Route: Oral    tacrolimus (GENERIC EQUIVALENT) 0.5 MG capsule  60 capsule 11 9/22/2020    [address] [address] [facility]    Sig: Take 1 capsule (0.5 mg) by mouth 2 times daily Take total dose 4.5 mg twice per day    Class: E-Prescribe    Route: Oral    tacrolimus (GENERIC EQUIVALENT) 1 MG capsule  240 capsule 11 9/22/2020    [facility]  [address] [place] [address] [facility]    Sig: Take 4 capsules (4 mg) by mouth 2 times daily Take tacrolimus  4.5 mg twice per day    Class: E-Prescribe    Route: Oral    traMADol (ULTRAM) 50 MG tablet  7 tablet 0 10/12/2020 10/15/2020   [address]    Sig: Take 1 tablet (50 mg) by mouth every 6 hours as needed for severe pain    Class: Local Print    Route: Oral    traMADol (ULTRAM) 50 MG tablet  10 tablet 0 10/7/2020 10/12/2020   [address] [address] [facility]    Sig: Take 1 tablet (50 mg) by mouth 2 times daily as needed for severe pain    Class: Local Print    Route: Oral    traMADol (ULTRAM) 50 MG tablet  14 tablet 0 9/23/2020    [address]    Sig: Take 1 tablet (50 mg) by mouth every 8 hours as needed for severe pain    Class: Local Print    Route: Oral    valGANciclovir (VALCYTE) 450 MG tablet  60 tablet 2 9/18/2020    [address] [address] [facility]    Sig: Take 2 tablets (900 mg) by mouth daily    Class: Historical    Route: Oral    vitamin B-12 (CYANOCOBALAMIN) 500 MCG tablet        [address]    Sig: Take 500 mcg by mouth daily    Class: Historical    Route: Oral    vitamin B6 (PYRIDOXINE) 100 MG tablet        [address]    Sig: Take 100 mg by mouth daily    Class: Historical    Route: Oral    Vitamin D, Cholecalciferol, 25 MCG (1000 UT) TABS            Sig: Take 2,000 Units by mouth daily    Class: Historical    Route: Oral          O:   Pulse:  [93] 93  BP: (150)/(62) 150/62  SpO2:  [97 %] 97 %  General Appearance: in no apparent distress.   Skin: Normal, no rashes or jaundice  Heart: regular rate and rhythm, normal S1 and S2  Lungs: easy  respirations, no audible wheezing.  Abdomen: abd soft. Mild halie-incisional tenderness. No impulse, no hernia. Incision well-healed.   Extremities: Tremor absent.   Edema: absent.     Transplant Immunosuppression Labs Latest Ref Rng & Units 10/12/2020 10/8/2020 10/5/2020 10/1/2020 9/28/2020   Tacro Level 5.0 - 15.0 ug/L 19.8(H) 19.5(H) - 11.8 11.1   Tacro Level - 8P 10.11.20 Not Provided - Not Provided Not Provided   Creat 0.52 - 1.04 mg/dL 1.03 1.20(H) 1.01 0.93 0.98   BUN 7 - 30 mg/dL 28 30 24 29 29   WBC 4.0 - 11.0 10e9/L 4.6 4.7 4.9 5.6 5.5   Neutrophil % - 77.1 - 73.6 76.2   ANEU 1.6 - 8.3 10e9/L - 3.7 - 4.1 4.2       Chemistries:   Recent Labs   Lab Test 10/12/20  0804   BUN 28   CR 1.03   GFRESTIMATED 56*   *     Lab Results   Component Value Date    A1C 5.4 09/03/2020     Recent Labs   Lab Test 09/09/20  0637 09/05/20  0535   ALBUMIN 2.9* 2.8*   BILITOTAL  --  0.4   ALKPHOS  --  53   AST  --  50*   ALT  --  52*     Urine Studies:  Recent Labs   Lab Test 10/05/20  0912   COLOR Yellow   APPEARANCE Clear   URINEGLC Negative   URINEBILI Negative   URINEKETONE Negative   SG 1.015   UBLD Moderate*   URINEPH 5.0   PROTEIN Negative   NITRITE Negative   LEUKEST Small*   RBCU 8*   WBCU 12*     Recent Labs   Lab Test 10/12/20  0805 10/05/20  0912   UTPG 0.29* 0.60*     Hematology:   Recent Labs   Lab Test 10/12/20  0804 10/08/20  0810 10/05/20  0910   HGB 11.2* 10.7* 10.9*    260 272   WBC 4.6 4.7 4.9     Coags:   Recent Labs   Lab Test 09/04/20  1544 09/03/20  1756   INR 1.08 0.89     HLA antibodies:   SA1 Hi Risk Diya   Date Value Ref Range Status   09/08/2020 None  Final     SA1 Mod Risk Diya   Date Value Ref Range Status   09/08/2020 B:76  Final     SA2 Hi Risk Diya   Date Value Ref Range Status   09/08/2020 None  Final     SA2 Mod Risk Diya   Date Value Ref Range Status   09/08/2020 None  Final       Kidney transplant US reviewed. Interval improvement in perinephric fluid collection. Persistently  elevated velocities at renal artery anastomoses but good RIs in arcuate arteries.     Assessment: Ethel Thompson is doing well s/p DDKT:  Issues we addressed during her visit include:    Plan:    1. Graft function: good, stable  2. Immunosuppression Management: No change continue cellcept and tacrolimus.  Complexity of management:Medium.  Contributing factors: anemia  3. Postoperative pain control: renew tramadol. Scheduled tylenol, will try to control pain with decreased dose of tramadol (25mg daily).   Followup: PRN. If improving and able to wean from pain medication.    Total Time: 15 min,   Counselling Time: 10 min.      Manas Bansal MD      Again, thank you for allowing me to participate in the care of your patient.        Sincerely,        Manas Bansal MD

## 2020-10-13 ENCOUNTER — TELEPHONE (OUTPATIENT)
Dept: TRANSPLANT | Facility: CLINIC | Age: 65
End: 2020-10-13

## 2020-10-13 ENCOUNTER — MYC MEDICAL ADVICE (OUTPATIENT)
Dept: TRANSPLANT | Facility: CLINIC | Age: 65
End: 2020-10-13

## 2020-10-13 LAB
MYCOPHENOLATE SERPL LC/MS/MS-MCNC: 1.24 MG/L (ref 1–3.5)
MYCOPHENOLATE-G SERPL LC/MS/MS-MCNC: 148.6 MG/L (ref 30–95)
TME LAST DOSE: ABNORMAL H

## 2020-10-13 RX ORDER — TACROLIMUS 1 MG/1
3 CAPSULE ORAL 2 TIMES DAILY
Qty: 180 CAPSULE | Refills: 11 | Status: SHIPPED | OUTPATIENT
Start: 2020-10-13 | End: 2020-10-23

## 2020-10-13 RX ORDER — TACROLIMUS 0.5 MG/1
0.5 CAPSULE ORAL 2 TIMES DAILY
Qty: 60 CAPSULE | Refills: 11 | Status: SHIPPED | OUTPATIENT
Start: 2020-10-13 | End: 2020-10-23

## 2020-10-13 NOTE — TELEPHONE ENCOUNTER
Patient Call: Voicemail  Date/Time: [id number] 08:38 am  Reason for call: Maxim calling on behalf of patient, needs to speak to Tiffany regarding her medication as soon as possible. He can be reached at [phone].

## 2020-10-13 NOTE — TELEPHONE ENCOUNTER
Called Maxim   Follow up on high tacrolimus  Levels   Maxim reports changed medication card tacrolimus  3.5 mg twice per day but did not change medication box so still taking   4.5 mg twice per day     Plan to change tacrolimus  3.5 mg twice per day    Repeat tacrolimus  Level on Thurs

## 2020-10-13 NOTE — TELEPHONE ENCOUNTER
Call back from [facility]  Regarding high tacrolimus  Level     Appears they have changed the medication card but not the medication box      Plan to remain  On tacrolimus   3.5 mg twice per day

## 2020-10-14 ENCOUNTER — TELEPHONE (OUTPATIENT)
Dept: TRANSPLANT | Facility: CLINIC | Age: 65
End: 2020-10-14

## 2020-10-14 LAB
BKV DNA # SPEC NAA+PROBE: NORMAL COPIES/ML
BKV DNA SPEC NAA+PROBE-LOG#: NORMAL LOG COPIES/ML
DONOR IDENTIFICATION: NORMAL
DSA COMMENTS: NORMAL
DSA PRESENT: NO
DSA TEST METHOD: NORMAL
INTERFERING SUBST TEST METHOD: NORMAL
INTERFERING SUBSTANCE COMMENT: NORMAL
INTERFERING SUBSTANCE RESULT: NORMAL
INTERFERING SUBSTANCE: NORMAL
ORGAN: NORMAL
SA1 CELL: NORMAL
SA1 COMMENTS: NORMAL
SA1 HI RISK ABY: NORMAL
SA1 MOD RISK ABY: NORMAL
SA1 TEST METHOD: NORMAL
SA2 CELL: NORMAL
SA2 COMMENTS: NORMAL
SA2 HI RISK ABY UA: NORMAL
SA2 MOD RISK ABY: NORMAL
SA2 TEST METHOD: NORMAL
SPECIMEN SOURCE: NORMAL
UNACCEPTABLE ANTIGEN: NORMAL
UNOS CPRA: 29

## 2020-10-14 NOTE — TELEPHONE ENCOUNTER
Post Kidney and Pancreas Transplant Team Conference  Date: 10/14/2020  Transplant Coordinator: Tiffany Paul     Attendees:  []  Dr. Sheridan  [x] Bridget Marquis LPN     [x]  Dr. Sifuentes [x] Tiffany Paul RN [] Alyssa Parisi LPN   [x]  Dr. Dewitt [] Sade Mckeon, SUN    [x]  Dr. Wilson [] Ade Marx RN [x] Patrick Mireles, NilaD   [x] Dr. Awad [x] Hui Crowley, SUN    [] Dr. Ribeiro [] Cesar Max RN    [x] Dr. Alonzo [] Jennie Dela Cruz, SUN    [x] Dr. Phoenix [] Janett Smith RN    []  Dr. Bansal [] Aleja Francis, SUN    [] Surgery Fellow [x] Crissy Pulido RN    [x] Monika Tinoco, YAKELIN [] Laura Romero RN        Verbal Plan Read Back:   No changes at this time    Routed to RN Coordinator   Bridget Marquis LPN

## 2020-10-15 LAB — ALLOSURE DD-CFDNA: 0.18 %

## 2020-10-19 ENCOUNTER — EXTERNAL ORDER RESULTS (OUTPATIENT)
Dept: TRANSPLANT | Facility: CLINIC | Age: 65
End: 2020-10-19

## 2020-10-19 DIAGNOSIS — Z48.298 AFTERCARE FOLLOWING ORGAN TRANSPLANT: ICD-10-CM

## 2020-10-19 DIAGNOSIS — Z79.899 ENCOUNTER FOR LONG-TERM CURRENT USE OF MEDICATION: ICD-10-CM

## 2020-10-19 DIAGNOSIS — Z94.0 KIDNEY REPLACED BY TRANSPLANT: ICD-10-CM

## 2020-10-19 LAB
ANION GAP SERPL CALC-SCNC: 9 MMOL/L (ref 5–18)
BUN SERPL-MCNC: 22 MG/DL (ref 8–25)
BUN/CREATININE RATIO (EXTERNAL): 16 (ref 10–20)
CALCIUM (EXTERNAL): 10.1 MG/DL (ref 8.5–10.5)
CHLORIDE (EXTERNAL): 107 MMOL/L (ref 98–110)
CO2 (EXTERNAL): 22 MMOL/L (ref 21–31)
CREATININE (EXTERNAL): 1.36 MG/DL (ref 0.57–1.11)
ERYTHROCYTE [DISTWIDTH] IN BLOOD BY AUTOMATED COUNT: 13.1 % (ref 11.5–15.5)
GFR ESTIMATED (EXTERNAL): 39 ML/MIN/1.73M2
GFR ESTIMATED (IF AFRICAN AMERICAN) (EXTERNAL): 47 ML/MIN/1.73M2
GLUCOSE (EXTERNAL): 143 MG/DL (ref 65–100)
HEMATOCRIT (EXTERNAL): 33.3 % (ref 33–51)
HEMOGLOBIN (EXTERNAL): 11.1 G/DL (ref 12–16)
MAGNESIUM (EXTERNAL): 1.5 MG/DL (ref 1.6–2.6)
MCH RBC QN AUTO: 33.4 PG (ref 26–34)
MCHC RBC AUTO-ENTMCNC: 33.3 G/DL (ref 32–36)
MCV RBC AUTO: 100 FL (ref 80–100)
PHOSPHATE SERPL-MCNC: 2.3 MG/DL (ref 2.3–4.7)
PLATELET COUNT (EXTERNAL): 290 THOU/CU MM (ref 140–440)
POTASSIUM (EXTERNAL): 4.5 MMOL/L (ref 3.5–5)
RBC # BLD AUTO: 3.32 MIL/CU MM (ref 4–5.2)
SODIUM (EXTERNAL): 138 MMOL/L (ref 135–145)
WBC COUNT (EXTERNAL): 5 THOU/CU MM (ref 4.5–11)

## 2020-10-20 LAB
TACROLIMUS LAST DOSE (EXTERNAL): NORMAL
TACROLIMUS(FK-506) (EXTERNAL): 21.3 NG/ML

## 2020-10-21 ENCOUNTER — TELEPHONE (OUTPATIENT)
Dept: TRANSPLANT | Facility: CLINIC | Age: 65
End: 2020-10-21

## 2020-10-21 NOTE — TELEPHONE ENCOUNTER
Post Kidney and Pancreas Transplant Team Conference  Date: 10/21/2020  Transplant Coordinator: Tiffany Paul     Attendees:  [x]  Dr. Sheridan  [x] Bridget Marquis LPN     [x]  Dr. Sifuentes [x] Tiffany Paul RN [] Alyssa Parisi LPN   []  Dr. Dewitt [] Sade Mckeon, SUN    [x]  Dr. Wilson [] Ade Marx RN [x] Patrick Mireles, PharmD   [] Dr. Awad [x] Hui Crowley, SUN    [] Dr. Ribeiro [] Cesar Max RN    [x] Dr. Alonzo [] Jennie Dela Cruz, SUN    [x] Dr. Phoenix [] Janett Smith RN    []  Dr. Bansal [] Aleja Francis, SUN    [] Surgery Fellow [x] Crissy Pulido RN    [] Monika Tinoco, NP [] Laura Romero RN        Verbal Plan Read Back:   X ray needed to check for stent placement/removal    Routed to RN Coordinator   Bridget Marquis LPN

## 2020-10-21 NOTE — TELEPHONE ENCOUNTER
Issue tacrolimus  Level 21.3  Called Ethel Thompson and  confirmed taking  3.5 mg twice per day   Lowered tacrolimus  2.5 mg twice per day   Waited on the phone  While changed medication card  /medication box both verbalized     Reviewed confirmed medications not taken prior to labs   Reviewed no new medications      Plan to follow up with  [facility] pharmacy for further assessment

## 2020-10-23 ENCOUNTER — TELEPHONE (OUTPATIENT)
Dept: TRANSPLANT | Facility: CLINIC | Age: 65
End: 2020-10-23

## 2020-10-23 DIAGNOSIS — Z94.0 KIDNEY REPLACED BY TRANSPLANT: ICD-10-CM

## 2020-10-23 RX ORDER — TACROLIMUS 1 MG/1
2 CAPSULE ORAL 2 TIMES DAILY
Qty: 120 CAPSULE | Refills: 11 | Status: SHIPPED | OUTPATIENT
Start: 2020-10-23 | End: 2020-11-04

## 2020-10-23 RX ORDER — TACROLIMUS 0.5 MG/1
0.5 CAPSULE ORAL 2 TIMES DAILY
Qty: 60 CAPSULE | Refills: 11 | Status: SHIPPED | OUTPATIENT
Start: 2020-10-23 | End: 2020-11-04

## 2020-10-23 NOTE — TELEPHONE ENCOUNTER
Call from Ethel Thompson and Maxim ( ) requesting RX refill of Tramadol   Reporting taking  Tramadol 1/2 tab once per day   Maxim stated followed up with PCP   denied RX wanted to follow up with transplant  Provider   Contacted  Dr Manas Yin  who requested to follow up in clinic on Monday      Reviewed that last dose of tramadol on Thur Oct 22     Spoke to Ethel and Maxim regarding plan    Following with MTM pharmacist in the morning    Confirmed taking tacrolimus  2.5 mg twice per day

## 2020-10-26 ENCOUNTER — OFFICE VISIT (OUTPATIENT)
Dept: PHARMACY | Facility: CLINIC | Age: 65
End: 2020-10-26
Payer: OTHER GOVERNMENT

## 2020-10-26 ENCOUNTER — OFFICE VISIT (OUTPATIENT)
Dept: TRANSPLANT | Facility: CLINIC | Age: 65
End: 2020-10-26
Attending: SURGERY
Payer: MEDICARE

## 2020-10-26 VITALS — HEART RATE: 77 BPM | DIASTOLIC BLOOD PRESSURE: 70 MMHG | SYSTOLIC BLOOD PRESSURE: 142 MMHG | OXYGEN SATURATION: 98 %

## 2020-10-26 DIAGNOSIS — I15.1 HTN, KIDNEY TRANSPLANT RELATED: ICD-10-CM

## 2020-10-26 DIAGNOSIS — Z94.0 KIDNEY REPLACED BY TRANSPLANT: Primary | ICD-10-CM

## 2020-10-26 DIAGNOSIS — E78.5 DYSLIPIDEMIA: ICD-10-CM

## 2020-10-26 DIAGNOSIS — G89.18 POST-OP PAIN: ICD-10-CM

## 2020-10-26 DIAGNOSIS — E03.9 HYPOTHYROIDISM, UNSPECIFIED TYPE: ICD-10-CM

## 2020-10-26 DIAGNOSIS — Z94.0 HTN, KIDNEY TRANSPLANT RELATED: ICD-10-CM

## 2020-10-26 DIAGNOSIS — L76.82 INCISIONAL PAIN: ICD-10-CM

## 2020-10-26 PROCEDURE — 99607 MTMS BY PHARM ADDL 15 MIN: CPT | Performed by: PHARMACIST

## 2020-10-26 PROCEDURE — 99214 OFFICE O/P EST MOD 30 MIN: CPT | Mod: 24 | Performed by: SURGERY

## 2020-10-26 PROCEDURE — 99606 MTMS BY PHARM EST 15 MIN: CPT | Performed by: PHARMACIST

## 2020-10-26 RX ORDER — TRAMADOL HYDROCHLORIDE 50 MG/1
50 TABLET ORAL 2 TIMES DAILY PRN
Qty: 14 TABLET | Refills: 0 | Status: SHIPPED | OUTPATIENT
Start: 2020-10-26 | End: 2021-01-06

## 2020-10-26 RX ORDER — TRAMADOL HYDROCHLORIDE 50 MG/1
50 TABLET ORAL 2 TIMES DAILY PRN
Qty: 14 TABLET | Refills: 0 | Status: SHIPPED | OUTPATIENT
Start: 2020-10-26 | End: 2020-10-26

## 2020-10-26 NOTE — LETTER
10/26/2020         RE: Ethel Thompson  [address]  [place] [address]        Dear Colleague,    Thank you for referring your patient, Ethel Thompson, to the [facility] TRANSPLANT [facility]. Please see a copy of my visit note below.    Transplant Surgery Progress Note    Transplants:  9/3/2020 (Kidney); Postoperative day:  53  S: 53 days s/p DDKT for FSGS. Graft functioning well. Slight increase in creatinine but has had persistently elevated tacrolimus levels. Drinking, eating, and urinating well. Still with some incisional pain though this is largely resolved with scheduled tylenol, intermittent low dose tramadol, and lidocaine patches. Using tramadol more for heavy activity days. No other complaints.     Transplant History:    Transplant Type:  DDKT  Donor Type: Donation after Brain Death          Transplant Date:  9/3/2020 (Kidney)              Ureteral Stent:  No              Crossmatch:  negative              DSA at Tx:  No  Baseline Cr: 0.96   DeNovo DSA: No     Acute Rejection Hx:  No     Present Maintenance Immunosuppression:  Tacrolimus and Mycophenolic acid and prednisone taper (intermediate induction protocol)     CMV IgG Ab Discordance:  No R+  EBV IgG Ab Discordance:  No  R+     BK Viremia:  No  EBV Viremia:  No     Transplant Coordinator: Tiffany Paul          Transplant Office Phone Number: [phone]        Immunosuppressant Medications     Immunosuppressive Agents Disp Start End     mycophenolate (GENERIC EQUIVALENT) 250 MG capsule    180 capsule 9/18/2020     Sig - Route: Take 4 capsules (1,000 mg) by mouth 2 times daily - Oral    Class: E-Prescribe     tacrolimus (GENERIC EQUIVALENT) 0.5 MG capsule    60 capsule 10/23/2020     Sig - Route: Take 1 capsule (0.5 mg) by mouth 2 times daily Take total dose 2.5 mg twice per day - Oral    Class: E-Prescribe    Notes to Pharmacy: TXP DT 9/3/2020 (Kidney) TXP Dischg DT 9/6/2020 DX Kidney replaced by transplant Z94.0 TX [facility]  [facility] [place]     tacrolimus (GENERIC EQUIVALENT) 1 MG capsule    120 capsule 10/23/2020     Sig - Route: Take 2 capsules (2 mg) by mouth 2 times daily Take tacrolimus  2.5 mg twice per day - Oral    Class: E-Prescribe    Notes to Pharmacy: TXP DT 9/3/2020 (Kidney) TXP Dischg DT 9/6/2020 DX Kidney replaced by transplant Z94.0 TX [facility] ([place], [place])          Possible Immunosuppression-related side effects:   []             headache  []             vivid dreams  []             irritability  []             cognitive difficuties  [x]             fine tremor  []             nausea  []             diarrhea  []             neuropathy      []             edema  [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  []             hyperkalemia  []             post-transplant diabetes  []             decreased appetite  []             increased appetite  []             other:  []             none    Prescription Medications as of 10/26/2020       Rx Number Disp Refills Start End Last Dispensed Date Next Fill Date Owning Pharmacy    acetaminophen (TYLENOL) 325 MG tablet  1 Bottle 0 9/7/2020    [facility] - [place], [place] - [address] [place] [address]    Sig: Take 2 tablets (650 mg) by mouth every 4 hours as needed for pain    Class: E-Prescribe    Route: Oral    amLODIPine (NORVASC) 5 MG tablet  180 tablet 11 10/5/2020    [facility] [address] [place] [address] [employer] [facility]    Sig: Take 2 tablets (10 mg) by mouth daily    Class: E-Prescribe    Route: Oral    aspirin (ASA) 81 MG EC tablet  90 tablet 3 9/22/2020    [address] [address] [facility]    Sig: Take 1 tablet (81 mg) by mouth daily    Class: E-Prescribe    Route: Oral    atorvastatin (LIPITOR) 40 MG tablet    9/6/2020        Sig: Take 0.5 tablets (20 mg) by mouth daily    Class: No Print Out    Route: Oral    calcium carbonate 600  mg-vitamin D 400 units (CALTRATE) 600-400 MG-UNIT per tablet            Sig: Take 1 tablet by mouth 2 times daily    Class: Historical    Route: Oral    famotidine (PEPCID) 20 MG tablet        [address], [place] - [id number]    Sig: Take 20 mg by mouth every evening     Class: Historical    Route: Oral    fluticasone (FLONASE) 50 MCG/ACT nasal spray            Sig: Spray 1 spray into both nostrils 2 times daily as needed for rhinitis or allergies    Class: Historical    Route: Both Nostrils    levothyroxine (SYNTHROID/LEVOTHROID) 112 MCG tablet    1/14/2019        Sig: Take 112 mcg (1 tablet) by mouth every Monday, Tuesday, Wednesday, Thursday, Friday.    Class: Historical    Lidocaine (LIDOCARE) 4 % Patch  10 patch 3 9/14/2020    [address], [place]    Sig: Place 1 patch onto the skin every 24 hours To prevent lidocaine toxicity, patient should be patch free for 12 hrs daily.    Class: E-Prescribe    Route: Transdermal    magnesium oxide (MAG-OX) 400 MG tablet  30 tablet 5 9/7/2020    [address], [place] [id number]    Sig: Take 1 tablet (400 mg) by mouth daily (with lunch)    Class: E-Prescribe    Route: Oral    melatonin 3 MG tablet            Sig: Take 6 mg by mouth At Bedtime     Class: Historical    Route: Oral    mycophenolate (GENERIC EQUIVALENT) 250 MG capsule  180 capsule 11 9/18/2020    [address], [place]    Sig: Take 4 capsules (1,000 mg) by mouth 2 times daily    Class: E-Prescribe    Route: Oral    psyllium (METAMUCIL/KONSYL) 58.6 % powder            Sig: Take by mouth daily    Class: Historical    Route: Oral    sulfamethoxazole-trimethoprim (BACTRIM) 400-80 MG tablet  30 tablet 11 9/7/2020    [address], [place] [id number]    Sig: Take 1 tablet by mouth daily    Class: E-Prescribe    Route: Oral    tacrolimus (GENERIC  EQUIVALENT) 0.5 MG capsule  60 capsule 11 10/23/2020    [facility] [address] [place] [id number] [employer]    Sig: Take 1 capsule (0.5 mg) by mouth 2 times daily Take total dose 2.5 mg twice per day    Class: E-Prescribe    Notes to Pharmacy: TXP DT 9/3/2020 (Kidney) TXP Dischg DT 2020 DX Kidney replaced by transplant Z94.0 TX [facility] ([place], [place])    Route: Oral    tacrolimus (GENERIC EQUIVALENT) 1 MG capsule  120 capsule 11 10/23/2020    [facility] [address] [place] [address]    Sig: Take 2 capsules (2 mg) by mouth 2 times daily Take tacrolimus  2.5 mg twice per day    Class: E-Prescribe    Notes to Pharmacy: TXP DT 9/3/2020 (Kidney) TXP Dischg DT 2020 DX Kidney replaced by transplant Z94.0 [facility] ([place], [place])    Route: Oral    valGANciclovir (VALCYTE) 450 MG tablet  60 tablet 2 2020    [facility] [address] [place] [id number]    Sig: Take 450 mg by mouth daily     Class: Historical    Route: Oral    Vitamin D, Cholecalciferol, 25 MCG (1000 UT) TABS            Sig: Take 2,000 Units by mouth daily    Class: Historical    Route: Oral    gabapentin (NEURONTIN) 100 MG capsule  30 capsule 0 2020    [address] [id number]    Simg in AM, 200mg in PM    Class: E-Prescribe    polyethylene glycol (MIRALAX) 17 GM/Dose powder  507 g 0 2020    [address] [address]    Sig: Take 17 g (1 capful) by mouth daily    Class: E-Prescribe    Route: Oral    traMADol (ULTRAM) 50 MG tablet  14 tablet 0 10/26/2020    [facility] [place], [place] - [phone]    Sig: Take 1 tablet (50 mg) by mouth 2 times daily as needed for severe pain    Class: Local Print    Route: Oral          O:   Pulse:  [77] 77  BP: (142)/(42)  142/70  SpO2:  [98 %] 98 %  General Appearance: in no apparent distress.   Skin: Normal, no rashes or jaundice  Heart: regular rate and rhythm, normal S1 and S2  Lungs: easy respirations, no audible wheezing.  Abdomen:abdomen soft, nontender. Incision well-healed. No hernia.   Extremities: Tremor present bilaterally.   Edema: absent.     Transplant Immunosuppression Labs Latest Ref Rng & Units 10/12/2020 10/8/2020 10/5/2020 10/1/2020 9/28/2020   Tacro Level 5.0 - 15.0 ug/L 19.8(H) 19.5(H) - 11.8 11.1   Tacro Level - 8P 10.11.20 Not Provided - Not Provided Not Provided   Creat 0.52 - 1.04 mg/dL 1.03 1.20(H) 1.01 0.93 0.98   BUN 7 - 30 mg/dL 28 30 24 29 29   WBC 4.0 - 11.0 10e9/L 4.6 4.7 4.9 5.6 5.5   Neutrophil % - 77.1 - 73.6 76.2   ANEU 1.6 - 8.3 10e9/L - 3.7 - 4.1 4.2       Chemistries:   Recent Labs   Lab Test 10/12/20  0804   BUN 28   CR 1.03   GFRESTIMATED 56*   *     Lab Results   Component Value Date    A1C 5.4 09/03/2020     Recent Labs   Lab Test 09/09/20  0637 09/05/20  0535   ALBUMIN 2.9* 2.8*   BILITOTAL  --  0.4   ALKPHOS  --  53   AST  --  50*   ALT  --  52*     Urine Studies:  Recent Labs   Lab Test 10/05/20  0912   COLOR Yellow   APPEARANCE Clear   URINEGLC Negative   URINEBILI Negative   URINEKETONE Negative   SG 1.015   UBLD Moderate*   URINEPH 5.0   PROTEIN Negative   NITRITE Negative   LEUKEST Small*   RBCU 8*   WBCU 12*     Recent Labs   Lab Test 10/12/20  0805 10/05/20  0912   UTPG 0.29* 0.60*     Hematology:   Recent Labs   Lab Test 10/12/20  0804 10/08/20  0810 10/05/20  0910   HGB 11.2* 10.7* 10.9*    260 272   WBC 4.6 4.7 4.9     Coags:   Recent Labs   Lab Test 09/04/20  1544 09/03/20  1756   INR 1.08 0.89     HLA antibodies:   SA1 Hi Risk Diya   Date Value Ref Range Status   10/12/2020 None  Final     SA1 Mod Risk Diya   Date Value Ref Range Status   10/12/2020 None  Final     SA2 Hi Risk Diya   Date Value Ref Range Status   10/12/2020 None  Final     SA2 Mod Risk Diya   Date  Value Ref Range Status   10/12/2020 None  Final       Assessment: Ethel Thompson is doing well s/p DDKT:  Issues we addressed during her visit include:    -tacrolimus toxicity  -analgesia plan    Plan:    1. Graft function: good. Cr slightly elevated but likely due to tacrolimus toxicity. Dosing previously adjusted by pharmacy, repeat labs pending for Wednesday.  2. Immunosuppression Management: Changed tacrolimus 1 BID continue mycophenolate.  Complexity of management:Medium.  Contributing factors: organ dysfunction and calcineurin toxicity  3. analgesia: will renew tramadol one more time. Advised to use after activity rather than pre-emptively at dose 25mg and wean off. Cautioned that it can be habit forming so best to use tylenol or lidocaine patches  Followup: as needed    Total Time: 15 min,   Counselling Time: 10 min.      MD dino Madden

## 2020-10-26 NOTE — PROGRESS NOTES
MTM ENCOUNTER  SUBJECTIVE/OBJECTIVE:                           Ethel Thompson is a 65 year old female coming in for a follow-up visit. She was referred to me from txp team. Patient was accompanied by her . Today's visit is a follow-up MTM visit from 9/16     Chief Complaint: 2 months post transplant. Referred back to MTM early due to high Tacrolimus levels for a pill box, med review check. .    Allergies/ADRs: Reviewed in chart  Tobacco: She reports that she quit smoking about 25 years ago. Her smoking use included cigarettes. She started smoking about 40 years ago. She smoked 1.00 pack per day. She has never used smokeless tobacco.  Alcohol: not currently using  Drinking 4x 16 ounce daily is goal does not get to this though, drinking some coffee, diet coke, gatorade.   Past Medical History: Reviewed in chart    Medication Adherence/Access: Patient uses pill box(es) and uses reminders/alarms.  Patient takes medications 3 time(s) per day. 8am, 10-2pm, 8pm  Per patient, misses medication 0 times per week. Missed 1 dose in the past month, it just slipped her mind. Maxim helps her remember now. Had the TAC wrong dose for a few days.       Renal Transplant:  Current immunosuppressants include TAC 2.5mg BID and MMF 1000mg BID.  Pt reports no side effects  Other meds: Pt is taking Aspirin 81mg daily. Bruises easily, no sx of bleeds  Transplant date: 9/3/20  CrCl: ~35ml/min  CMV prophylaxis: CrCl >/=60 mL/minute: Valcyte 900mg daily Treat 3 months post tx   PCP prophylaxis: Bactrim S S daily  Antifungal Prophylaxis: Completed  PPI use: Famotidine 20mg prn.  No issues.  Current supplements for replacement:  Magnesium 400mg once daily, Vitamin D 2000 units daily, Calcium/D BID.   Magnesium   Date Value Ref Range Status   10/12/2020 1.7 1.6 - 2.3 mg/dL Final   tx Coordinator: Sandro Paul MD: Dr. Sifuentes, Using Med Card: Yes  Immunizations: annual flu shot 2019; [id number]: unknown ; Prevnar 13: 2019; TDaP:  2019;  Shingrix: unknown    Hyperlipidemia: Current therapy includes Atorvastatin 20mg daily.  Patient reports no significant myalgias or other side effects.  The 10-year ASCVD risk score (Antonettehayden RAI Jr., et al., 2013) is: 12.2%    Values used to calculate the score:      Age: 65 years      Sex: Female      Is Non- : No      Diabetic: No      Tobacco smoker: No      Systolic Blood Pressure: 150 mmHg      Is BP treated: Yes      HDL Cholesterol: 43 mg/dL      Total Cholesterol: 196 mg/dL  Recent Labs   Lab Test 09/03/20  1756   CHOL 196   HDL 43*   LDL Cannot estimate LDL when triglyceride exceeds 400 mg/dL   TRIG 617*       Hypothyroidism: Patient is taking levothyroxine 112 mcg daily. Patient is having the following symptoms: none.   No results found for: TSH    Hypertension: Current medications include Amlodipine 10mg daily.  Patient does self-monitor blood pressure. 120-130/60-80s.  Patient reports no current medication side effects.  BP Readings from Last 3 Encounters:   10/12/20 (!) 150/62   10/05/20 (!) 169/81   09/23/20 (!) 167/75       Incisional Pain: Pt was taking APAP 750mg TID, Lidocaine patches at bedtime, was on Gabapentin but ran out. She is taking a little bit of her husbands. She is requesting tramadol today.     Today's Vitals: There were no vitals taken for this visit.      ASSESSMENT:                            Medication Adherence: Unfortunately patient did not bring in her pill box, but we were able to go through her card and all her bottles and she reported how she was taking each. She says she double checks her pills right before taking them as well. We will follow up and review her pill box as her TAC levels have been >20 despite frequent dose reductions.      Renal Transplant:  Per Tiffany Paul, she would like patient to reduce TAC to 1mg BID, levels still running over 20 despite frequent dose reductions. Additionally, creatinine is elevated likely from prolonged TAC level  elevations, but Valcyte should be decreased. Will decrease to 450mg once daily in expectation that CrCl will improve at lower TAC dose. Pt drinking at max ~60 ounces. Encouraged her to consistently get this amount in every day.     Hyperlipidemia: Triglycerides over 500, increasing risk for pancreatitis. Recommend rechecking as it has been ~ 2 months since last level. If still elevated recommend increasing Atorvastatin dose.   9/3:  TRIG 617*       Hypothyroidism: No changes by MTM today.    Hypertension: BP at goal at home <130/80 per pt report. Continue current therapy.    Incisional Pain: Dr. Bansal renewed tramadol one more time. I encouraged APAP and Lidocaine patch use to help with any residual pain.     PLAN:                            Pt to...  1. Decrease Tacrolimus to 1 mg BID as directed by Tiffany Paul RN.  2. Decrease Valcyte to 450mg once daily per CrCl  3. Continue APAP during the day and Lidocaine patches overnight. Discuss tramadol with txp team at appt today.     I spent 60 minutes with this patient today. I offer these suggestions for consideration by txp team. A copy of the visit note was provided to the patient's referring provider.    Will follow up in 2 weeks.    The patient was given a summary of these recommendations.     Patrick Mireles, PharmD  [facility] Pharmacist    Phone: [phone]

## 2020-10-26 NOTE — PATIENT INSTRUCTIONS
Recommendations from today's MTM visit:                                                      1. Reduce Tacrolimus to just 1mg twice daily. Change pill box when you guys get home. You will not be using the 0.5mg capsules for now.     2. Try to get to 60 ounces of fluid daily.     3. Reduce Valcyte (Valganciclovir) to just 1 tablet daily.     4. Keep using Lidocaine patches over night.     It was great to speak with you today.  I value your experience and would be very thankful for your time with providing feedback on our clinic survey. You may receive a survey via email or text message in the next few days.     Next MTM visit: 11/9 and 11:30AM    To schedule another MTM appointment, please call the clinic directly or you may call the MTM scheduling line at [phone] or toll-free at [phone].     My Clinical Pharmacist's contact information:                                                      It was a pleasure talking with you today!  Please feel free to contact me with any questions or concerns you have.      Patrick Mireles, PharmD  MTM Pharmacist    Phone: [phone]

## 2020-10-26 NOTE — PROGRESS NOTES
Transplant Surgery Progress Note    Transplants:  9/3/2020 (Kidney); Postoperative day:  53  S: 53 days s/p DDKT for FSGS. Graft functioning well. Slight increase in creatinine but has had persistently elevated tacrolimus levels. Drinking, eating, and urinating well. Still with some incisional pain though this is largely resolved with scheduled tylenol, intermittent low dose tramadol, and lidocaine patches. Using tramadol more for heavy activity days. No other complaints.     Transplant History:    Transplant Type:  DDKT  Donor Type: Donation after Brain Death          Transplant Date:  9/3/2020 (Kidney)              Ureteral Stent:  No              Crossmatch:  negative              DSA at Tx:  No  Baseline Cr: 0.96   DeNovo DSA: No     Acute Rejection Hx:  No     Present Maintenance Immunosuppression:  Tacrolimus and Mycophenolic acid and prednisone taper (intermediate induction protocol)     CMV IgG Ab Discordance:  No R+  EBV IgG Ab Discordance:  No  R+     BK Viremia:  No  EBV Viremia:  No     Transplant Coordinator: Tiffany Paul          Transplant Office Phone Number: [phone]        Immunosuppressant Medications     Immunosuppressive Agents Disp Start End     mycophenolate (GENERIC EQUIVALENT) 250 MG capsule    180 capsule 9/18/2020     Sig - Route: Take 4 capsules (1,000 mg) by mouth 2 times daily - Oral    Class: E-Prescribe     tacrolimus (GENERIC EQUIVALENT) 0.5 MG capsule    60 capsule 10/23/2020     Sig - Route: Take 1 capsule (0.5 mg) by mouth 2 times daily Take total dose 2.5 mg twice per day - Oral    Class: E-Prescribe    Notes to Pharmacy: TXP DT 9/3/2020 (Kidney) TXP Dischg DT 9/6/2020 DX Kidney replaced by transplant Z94.0 [facility] ([place], [place])     tacrolimus (GENERIC EQUIVALENT) 1 MG capsule    120 capsule 10/23/2020     Sig - Route: Take 2 capsules (2 mg) by mouth 2 times daily Take tacrolimus  2.5 mg twice per day - Oral    Class:  E-Prescribe    Notes to Pharmacy: TXP DT 9/3/2020 (Kidney) TXP Dischg DT 9/6/2020 DX Kidney replaced by transplant Z94.0 TX [facility] ([place], [place])          Possible Immunosuppression-related side effects:   []             headache  []             vivid dreams  []             irritability  []             cognitive difficuties  [x]             fine tremor  []             nausea  []             diarrhea  []             neuropathy      []             edema  [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  []             hyperkalemia  []             post-transplant diabetes  []             decreased appetite  []             increased appetite  []             other:  []             none    Prescription Medications as of 10/26/2020       Rx Number Disp Refills Start End Last Dispensed Date Next Fill Date Owning Pharmacy    acetaminophen (TYLENOL) 325 MG tablet  1 Bottle 0 9/7/2020    [facility] - [place], [place] - [address] [facility]    Sig: Take 2 tablets (650 mg) by mouth every 4 hours as needed for pain    Class: E-Prescribe    Route: Oral    amLODIPine (NORVASC) 5 MG tablet  180 tablet 11 10/5/2020    [address] [address] [facility]    Sig: Take 2 tablets (10 mg) by mouth daily    Class: E-Prescribe    Route: Oral    aspirin (ASA) 81 MG EC tablet  90 tablet 3 9/22/2020    [address] [address] [facility]    Sig: Take 1 tablet (81 mg) by mouth daily    Class: E-Prescribe    Route: Oral    atorvastatin (LIPITOR) 40 MG tablet    9/6/2020        Sig: Take 0.5 tablets (20 mg) by mouth daily    Class: No Print Out    Route: Oral    calcium carbonate 600 mg-vitamin D 400 units (CALTRATE) 600-400 MG-UNIT per tablet            Sig: Take 1 tablet by mouth 2 times daily    Class: Historical    Route: Oral    famotidine (PEPCID) 20 MG tablet        [address] [address]  [employer]    Sig: Take 20 mg by mouth every evening     Class: Historical    Route: Oral    fluticasone (FLONASE) 50 MCG/ACT nasal spray            Sig: Spray 1 spray into both nostrils 2 times daily as needed for rhinitis or allergies    Class: Historical    Route: Both Nostrils    levothyroxine (SYNTHROID/LEVOTHROID) 112 MCG tablet    1/14/2019        Sig: Take 112 mcg (1 tablet) by mouth every Monday, Tuesday, Wednesday, Thursday, Friday.    Class: Historical    Lidocaine (LIDOCARE) 4 % Patch  10 patch 3 9/14/2020    [address] [address] [facility]    Sig: Place 1 patch onto the skin every 24 hours To prevent lidocaine toxicity, patient should be patch free for 12 hrs daily.    Class: E-Prescribe    Route: Transdermal    magnesium oxide (MAG-OX) 400 MG tablet  30 tablet 5 9/7/2020    [address] [address] [facility]    Sig: Take 1 tablet (400 mg) by mouth daily (with lunch)    Class: E-Prescribe    Route: Oral    melatonin 3 MG tablet            Sig: Take 6 mg by mouth At Bedtime     Class: Historical    Route: Oral    mycophenolate (GENERIC EQUIVALENT) 250 MG capsule  180 capsule 11 9/18/2020    [address] [address] [place]    Sig: Take 4 capsules (1,000 mg) by mouth 2 times daily    Class: E-Prescribe    Route: Oral    psyllium (METAMUCIL/KONSYL) 58.6 % powder            Sig: Take by mouth daily    Class: Historical    Route: Oral    sulfamethoxazole-trimethoprim (BACTRIM) 400-80 MG tablet  30 tablet 11 9/7/2020    [address] [address] [employer]    Sig: Take 1 tablet by mouth daily    Class: E-Prescribe    Route: Oral    tacrolimus (GENERIC EQUIVALENT) 0.5 MG capsule  60 capsule 11 10/23/2020    [address] [address] [facility]    Sig: Take 1 capsule (0.5 mg) by mouth 2 times daily Take total dose 2.5 mg twice per day    Class: E-Prescribe     Notes to Pharmacy: TXP DT 9/3/2020 (Kidney) TXP Dischg DT 2020 DX Kidney replaced by transplant Z94.0 TX [facility] ([place], [place])    Route: Oral    tacrolimus (GENERIC EQUIVALENT) 1 MG capsule  120 capsule 11 10/23/2020    [facility] [address] [place] [id number] [address]    Sig: Take 2 capsules (2 mg) by mouth 2 times daily Take tacrolimus  2.5 mg twice per day    Class: E-Prescribe    Notes to Pharmacy: TXP DT 9/3/2020 (Kidney) TXP Dischg DT 2020 DX Kidney replaced by transplant Z94.0 TX [facility] ([place], [place])    Route: Oral    valGANciclovir (VALCYTE) 450 MG tablet  60 tablet 2 2020    [facility] [address] [place] [id number] [address]    Sig: Take 450 mg by mouth daily     Class: Historical    Route: Oral    Vitamin D, Cholecalciferol, 25 MCG (1000 UT) TABS            Sig: Take 2,000 Units by mouth daily    Class: Historical    Route: Oral    gabapentin (NEURONTIN) 100 MG capsule  30 capsule 0 2020    [address] [address] [address]    Simg in AM, 200mg in PM    Class: E-Prescribe    polyethylene glycol (MIRALAX) 17 GM/Dose powder  507 g 0 2020    [address] [address] [address]    Sig: Take 17 g (1 capful) by mouth daily    Class: E-Prescribe    Route: Oral    traMADol (ULTRAM) 50 MG tablet  14 tablet 0 10/26/2020    [place], [place] - [address] [facility] [phone]    Sig: Take 1 tablet (50 mg) by mouth 2 times daily as needed for severe pain    Class: Local Print    Route: Oral          O:   Pulse:  [77] 77  BP: (142)/(70) 142/70  SpO2:  [98 %] 98 %  General Appearance: in no apparent distress.   Skin: Normal, no rashes or jaundice  Heart: regular rate and rhythm, normal S1 and S2  Lungs: easy respirations, no audible wheezing.  Abdomen:abdomen soft, nontender.  Incision well-healed. No hernia.   Extremities: Tremor present bilaterally.   Edema: absent.     Transplant Immunosuppression Labs Latest Ref Rng & Units 10/12/2020 10/8/2020 10/5/2020 10/1/2020 9/28/2020   Tacro Level 5.0 - 15.0 ug/L 19.8(H) 19.5(H) - 11.8 11.1   Tacro Level - 8P 10.11.20 Not Provided - Not Provided Not Provided   Creat 0.52 - 1.04 mg/dL 1.03 1.20(H) 1.01 0.93 0.98   BUN 7 - 30 mg/dL 28 30 24 29 29   WBC 4.0 - 11.0 10e9/L 4.6 4.7 4.9 5.6 5.5   Neutrophil % - 77.1 - 73.6 76.2   ANEU 1.6 - 8.3 10e9/L - 3.7 - 4.1 4.2       Chemistries:   Recent Labs   Lab Test 10/12/20  0804   BUN 28   CR 1.03   GFRESTIMATED 56*   *     Lab Results   Component Value Date    A1C 5.4 09/03/2020     Recent Labs   Lab Test 09/09/20  0637 09/05/20  0535   ALBUMIN 2.9* 2.8*   BILITOTAL  --  0.4   ALKPHOS  --  53   AST  --  50*   ALT  --  52*     Urine Studies:  Recent Labs   Lab Test 10/05/20  0912   COLOR Yellow   APPEARANCE Clear   URINEGLC Negative   URINEBILI Negative   URINEKETONE Negative   SG 1.015   UBLD Moderate*   URINEPH 5.0   PROTEIN Negative   NITRITE Negative   LEUKEST Small*   RBCU 8*   WBCU 12*     Recent Labs   Lab Test 10/12/20  0805 10/05/20  0912   UTPG 0.29* 0.60*     Hematology:   Recent Labs   Lab Test 10/12/20  0804 10/08/20  0810 10/05/20  0910   HGB 11.2* 10.7* 10.9*    260 272   WBC 4.6 4.7 4.9     Coags:   Recent Labs   Lab Test 09/04/20  1544 09/03/20  1756   INR 1.08 0.89     HLA antibodies:   SA1 Hi Risk Diya   Date Value Ref Range Status   10/12/2020 None  Final     SA1 Mod Risk Diya   Date Value Ref Range Status   10/12/2020 None  Final     SA2 Hi Risk Diya   Date Value Ref Range Status   10/12/2020 None  Final     SA2 Mod Risk Diya   Date Value Ref Range Status   10/12/2020 None  Final       Assessment: Ethel Thompson is doing well s/p DDKT:  Issues we addressed during her visit include:    -tacrolimus toxicity  -analgesia plan    Plan:    1. Graft function: good. Cr slightly elevated  but likely due to tacrolimus toxicity. Dosing previously adjusted by pharmacy, repeat labs pending for Wednesday.  2. Immunosuppression Management: Changed tacrolimus 1 BID continue mycophenolate.  Complexity of management:Medium.  Contributing factors: organ dysfunction and calcineurin toxicity  3. analgesia: will renew tramadol one more time. Advised to use after activity rather than pre-emptively at dose 25mg and wean off. Cautioned that it can be habit forming so best to use tylenol or lidocaine patches  Followup: as needed    Total Time: 15 min,   Counselling Time: 10 min.      Manas Bansal MD

## 2020-10-27 ENCOUNTER — TELEPHONE (OUTPATIENT)
Dept: TRANSPLANT | Facility: CLINIC | Age: 65
End: 2020-10-27

## 2020-10-27 DIAGNOSIS — Z94.0 KIDNEY REPLACED BY TRANSPLANT: Primary | ICD-10-CM

## 2020-10-27 NOTE — TELEPHONE ENCOUNTER
Manas Bansal MD Huepfel, Mary K, RN             I checked my op notes- she doesn't have one. But if we want to confirm, Tiffany- can you order her a pelvis XR for Wednesday, when she comes back for labs? I'll look for it to review after.

## 2020-10-28 ENCOUNTER — ANCILLARY PROCEDURE (OUTPATIENT)
Dept: GENERAL RADIOLOGY | Facility: CLINIC | Age: 65
End: 2020-10-28
Attending: SURGERY
Payer: MEDICARE

## 2020-10-28 DIAGNOSIS — Z94.0 KIDNEY REPLACED BY TRANSPLANT: ICD-10-CM

## 2020-10-28 DIAGNOSIS — Z79.899 ENCOUNTER FOR LONG-TERM CURRENT USE OF MEDICATION: ICD-10-CM

## 2020-10-28 DIAGNOSIS — Z48.298 AFTERCARE FOLLOWING ORGAN TRANSPLANT: ICD-10-CM

## 2020-10-28 LAB
ANION GAP SERPL CALCULATED.3IONS-SCNC: 5 MMOL/L (ref 3–14)
BUN SERPL-MCNC: 21 MG/DL (ref 7–30)
CALCIUM SERPL-MCNC: 9.8 MG/DL (ref 8.5–10.1)
CHLORIDE SERPL-SCNC: 111 MMOL/L (ref 94–109)
CO2 SERPL-SCNC: 22 MMOL/L (ref 20–32)
CREAT SERPL-MCNC: 1.15 MG/DL (ref 0.52–1.04)
ERYTHROCYTE [DISTWIDTH] IN BLOOD BY AUTOMATED COUNT: 12.7 % (ref 10–15)
GFR SERPL CREATININE-BSD FRML MDRD: 49 ML/MIN/{1.73_M2}
GLUCOSE SERPL-MCNC: 130 MG/DL (ref 70–99)
HCT VFR BLD AUTO: 33.4 % (ref 35–47)
HGB BLD-MCNC: 11 G/DL (ref 11.7–15.7)
MAGNESIUM SERPL-MCNC: 1.6 MG/DL (ref 1.6–2.3)
MCH RBC QN AUTO: 33.4 PG (ref 26.5–33)
MCHC RBC AUTO-ENTMCNC: 32.9 G/DL (ref 31.5–36.5)
MCV RBC AUTO: 102 FL (ref 78–100)
PHOSPHATE SERPL-MCNC: 2.8 MG/DL (ref 2.5–4.5)
PLATELET # BLD AUTO: 220 10E9/L (ref 150–450)
POTASSIUM SERPL-SCNC: 4.3 MMOL/L (ref 3.4–5.3)
RBC # BLD AUTO: 3.29 10E12/L (ref 3.8–5.2)
SODIUM SERPL-SCNC: 138 MMOL/L (ref 133–144)
TACROLIMUS BLD-MCNC: 9.4 UG/L (ref 5–15)
TME LAST DOSE: NORMAL H
WBC # BLD AUTO: 4.5 10E9/L (ref 4–11)

## 2020-10-28 PROCEDURE — 85027 COMPLETE CBC AUTOMATED: CPT | Performed by: PATHOLOGY

## 2020-10-28 PROCEDURE — 80048 BASIC METABOLIC PNL TOTAL CA: CPT | Performed by: PATHOLOGY

## 2020-10-28 PROCEDURE — 80197 ASSAY OF TACROLIMUS: CPT | Performed by: PATHOLOGY

## 2020-10-28 PROCEDURE — 84100 ASSAY OF PHOSPHORUS: CPT | Performed by: PATHOLOGY

## 2020-10-28 PROCEDURE — 74018 RADEX ABDOMEN 1 VIEW: CPT | Performed by: RADIOLOGY

## 2020-10-28 PROCEDURE — 36415 COLL VENOUS BLD VENIPUNCTURE: CPT | Performed by: PATHOLOGY

## 2020-10-28 PROCEDURE — 83735 ASSAY OF MAGNESIUM: CPT | Performed by: PATHOLOGY

## 2020-10-29 ENCOUNTER — TELEPHONE (OUTPATIENT)
Dept: TRANSPLANT | Facility: CLINIC | Age: 65
End: 2020-10-29

## 2020-10-29 NOTE — TELEPHONE ENCOUNTER
Patrick Mireles, [facility]  Tiffany Paul, RN             Also reduced Valcyte to once daily considering current kidney function    Previous Messages    ----- Message -----   From: Tiffany Paul RN   Sent: 10/26/2020   9:29 AM CDT   [facility]   Subject: Tac level 22  -- on  2.5 mg twice per day - *     Calir Nguyen         Tacrolimus  levels remain on high on 2.5 mg twice per day    IF no error with medication box  Or set up   Recommend 1  mg twice per day repeat labs on Wed in [facility]   - drop to 1 mg twice per day (or your choice of tac dose  )   Next labs need to be in [facility]  to result tacrolimus in two days     Except admitted times one taken medications prior to labs and did  not adjust the  medication box

## 2020-10-29 NOTE — TELEPHONE ENCOUNTER
RE: TAC level now 9.0 after lowering from 3.5  to 1 mg  Received: Today  Message Contents   Manas Bansal MD Huepfel, Tiffany MOY RN             Not sure if y'all have already made a decision, but I'd favor leaving it at 1 BID and repeating level Monday.     There is no stent on the X ray- you can remove it from the snapshot.      LPN: Please call patient to have her repeat her labs with Tac level on Monday. Ask current dose of Tacrolimus. Place lab orders.

## 2020-10-30 ENCOUNTER — TELEPHONE (OUTPATIENT)
Dept: TRANSPLANT | Facility: CLINIC | Age: 65
End: 2020-10-30

## 2020-10-30 NOTE — TELEPHONE ENCOUNTER
Manas Bansal MD Huepfel, Tiffany MOY RN  Not sure if y'all have already made a decision, but I'd favor leaving it at 1 BID and repeating level Monday.     There is no stent on the X ray- you can remove it from the snapshot.     Thank you!         Reviewed with  Dr Jordy Willis  Who confirmed to continue tacrolimus  1 mg twice per day

## 2020-11-03 ENCOUNTER — RESULTS ONLY (OUTPATIENT)
Dept: OTHER | Facility: CLINIC | Age: 65
End: 2020-11-03

## 2020-11-03 DIAGNOSIS — Z48.298 AFTERCARE FOLLOWING ORGAN TRANSPLANT: ICD-10-CM

## 2020-11-03 DIAGNOSIS — Z94.0 KIDNEY REPLACED BY TRANSPLANT: ICD-10-CM

## 2020-11-03 DIAGNOSIS — Z76.82 ORGAN TRANSPLANT CANDIDATE: ICD-10-CM

## 2020-11-03 DIAGNOSIS — Z79.899 ENCOUNTER FOR LONG-TERM CURRENT USE OF MEDICATION: ICD-10-CM

## 2020-11-03 DIAGNOSIS — N18.6 ESRD (END STAGE RENAL DISEASE) (H): ICD-10-CM

## 2020-11-03 LAB
ANION GAP SERPL CALCULATED.3IONS-SCNC: 6 MMOL/L (ref 3–14)
BUN SERPL-MCNC: 20 MG/DL (ref 7–30)
CALCIUM SERPL-MCNC: 10.2 MG/DL (ref 8.5–10.1)
CHLORIDE SERPL-SCNC: 108 MMOL/L (ref 94–109)
CO2 SERPL-SCNC: 25 MMOL/L (ref 20–32)
CREAT SERPL-MCNC: 0.99 MG/DL (ref 0.52–1.04)
CREAT UR-MCNC: 158 MG/DL
ERYTHROCYTE [DISTWIDTH] IN BLOOD BY AUTOMATED COUNT: 12.7 % (ref 10–15)
GFR SERPL CREATININE-BSD FRML MDRD: 59 ML/MIN/{1.73_M2}
GLUCOSE SERPL-MCNC: 134 MG/DL (ref 70–99)
HCT VFR BLD AUTO: 36.6 % (ref 35–47)
HGB BLD-MCNC: 12 G/DL (ref 11.7–15.7)
MAGNESIUM SERPL-MCNC: 1.7 MG/DL (ref 1.6–2.3)
MCH RBC QN AUTO: 32.6 PG (ref 26.5–33)
MCHC RBC AUTO-ENTMCNC: 32.8 G/DL (ref 31.5–36.5)
MCV RBC AUTO: 100 FL (ref 78–100)
PHOSPHATE SERPL-MCNC: 2.6 MG/DL (ref 2.5–4.5)
PLATELET # BLD AUTO: 258 10E9/L (ref 150–450)
POTASSIUM SERPL-SCNC: 4.2 MMOL/L (ref 3.4–5.3)
PROT UR-MCNC: 0.39 G/L
PROT/CREAT 24H UR: 0.24 G/G CR (ref 0–0.2)
RBC # BLD AUTO: 3.68 10E12/L (ref 3.8–5.2)
SODIUM SERPL-SCNC: 139 MMOL/L (ref 133–144)
TACROLIMUS BLD-MCNC: 7.9 UG/L (ref 5–15)
TME LAST DOSE: NORMAL H
WBC # BLD AUTO: 5.3 10E9/L (ref 4–11)

## 2020-11-03 PROCEDURE — 87799 DETECT AGENT NOS DNA QUANT: CPT | Performed by: PATHOLOGY

## 2020-11-03 PROCEDURE — 85027 COMPLETE CBC AUTOMATED: CPT | Performed by: PATHOLOGY

## 2020-11-03 PROCEDURE — 84100 ASSAY OF PHOSPHORUS: CPT | Performed by: PATHOLOGY

## 2020-11-03 PROCEDURE — 80197 ASSAY OF TACROLIMUS: CPT | Performed by: PATHOLOGY

## 2020-11-03 PROCEDURE — 36415 COLL VENOUS BLD VENIPUNCTURE: CPT | Performed by: PATHOLOGY

## 2020-11-03 PROCEDURE — 86833 HLA CLASS II HIGH DEFIN QUAL: CPT | Performed by: PATHOLOGY

## 2020-11-03 PROCEDURE — 86832 HLA CLASS I HIGH DEFIN QUAL: CPT | Performed by: PATHOLOGY

## 2020-11-03 PROCEDURE — 86828 HLA CLASS I&II ANTIBODY QUAL: CPT | Performed by: PATHOLOGY

## 2020-11-03 PROCEDURE — 80048 BASIC METABOLIC PNL TOTAL CA: CPT | Performed by: PATHOLOGY

## 2020-11-03 PROCEDURE — 83735 ASSAY OF MAGNESIUM: CPT | Performed by: PATHOLOGY

## 2020-11-03 PROCEDURE — 99000 SPECIMEN HANDLING OFFICE-LAB: CPT | Performed by: PATHOLOGY

## 2020-11-03 PROCEDURE — 84156 ASSAY OF PROTEIN URINE: CPT | Performed by: PATHOLOGY

## 2020-11-04 ENCOUNTER — VIRTUAL VISIT (OUTPATIENT)
Dept: NEPHROLOGY | Facility: CLINIC | Age: 65
End: 2020-11-04
Attending: PHYSICIAN ASSISTANT
Payer: MEDICARE

## 2020-11-04 DIAGNOSIS — Z94.0 KIDNEY REPLACED BY TRANSPLANT: Primary | ICD-10-CM

## 2020-11-04 DIAGNOSIS — D84.9 IMMUNOSUPPRESSION (H): ICD-10-CM

## 2020-11-04 DIAGNOSIS — Z94.0 HTN, KIDNEY TRANSPLANT RELATED: ICD-10-CM

## 2020-11-04 DIAGNOSIS — I15.1 HTN, KIDNEY TRANSPLANT RELATED: ICD-10-CM

## 2020-11-04 DIAGNOSIS — Z48.298 CARE AFTER ORGAN TRANSPLANT: ICD-10-CM

## 2020-11-04 LAB
BKV DNA # SPEC NAA+PROBE: NORMAL COPIES/ML
BKV DNA SPEC NAA+PROBE-LOG#: NORMAL LOG COPIES/ML
SPECIMEN SOURCE: NORMAL

## 2020-11-04 PROCEDURE — 99214 OFFICE O/P EST MOD 30 MIN: CPT | Mod: 95

## 2020-11-04 RX ORDER — TACROLIMUS 0.5 MG/1
1 CAPSULE ORAL 2 TIMES DAILY
Qty: 60 CAPSULE | Refills: 11 | Status: SHIPPED | OUTPATIENT
Start: 2020-11-04 | End: 2020-11-11

## 2020-11-04 RX ORDER — TACROLIMUS 1 MG/1
1 CAPSULE ORAL 2 TIMES DAILY
Qty: 120 CAPSULE | Refills: 11 | Status: SHIPPED | OUTPATIENT
Start: 2020-11-04 | End: 2020-11-11

## 2020-11-04 NOTE — LETTER
"11/4/2020       RE: Ethel Thompson  [address] [place] [address]     Dear Colleague,    Thank you for referring your patient, Ethel Thompson, to the [facility] [place] at [facility]. Please see a copy of my visit note below.      The patient has been notified of following:     \"This video visit will be conducted via a call between you and your physician/provider. We have found that certain health care needs can be provided without the need for an in-person physical exam.  This service lets us provide the care you need with a video conversation.  If a prescription is necessary we can send it directly to your pharmacy.  If lab work is needed we can place an order for that and you can then stop by our lab to have the test done at a later time.    Video visits are billed at different rates depending on your insurance coverage.  Please reach out to your insurance provider with any questions.    If during the course of the call the physician/provider feels a video visit is not appropriate, you will not be charged for this service.\"    Patient has given verbal consent for Video visit? Yes  How would you like to obtain your AVS? MyChart    Will anyone else be joining your video visit? No        Video-Visit Details    Type of service:  Video Visit    Video Start Time: 10:18am  Video End Time: 10:34AM    Originating Location (pt. Location): Home    Distant Location (provider location):  [facility] [place]     Platform used for Video Visit: Sri Wilson MD            ACUTE TRANSPLANT NEPHROLOGY VISIT    Assessment & Plan    # DDKT: Stable              - Baseline Cr ~ 0.9-1.0mg/dL              - Proteinuria: mild, 0.24g/g              - Date DSA Last Checked: Oct/2020      Latest DSA: No              - BK Viremia: No   -Allosure 0.18 on 10/12/20                 #perinephric fluid collections  Asymptomatic at present. " Decreasing in size on 10/12/20    # Immunosuppression: Tacrolimus immediate release (goal 8-10), Mycophenolate mofetil (dose 1000mg every 12 hours)  Induction immunosuppression with thymoglobulin 125mg (2.2mg/kg), basiliximab (dose given POD #1 and POD #5 ), and steroid taper.             - Changes: No    # Infection Prophylaxis:   - PJP: Sulfa/TMP (Bactrim)  - CMV: Valcyte x 12 weeks     # Hypertension: Controlled;   Goal BP: < 140/90              - Volume status: euvolemic                        - Changes: No, on amlodipine 10mg PO daily    # Previous HSV:   -has not recurred currently. If necessary can start valtrex ppx after valcyte stopped.     # Anemia in Chronic Renal Disease: Hgb: Stable      JHONATAN: No   - Iron studies: Low iron saturation, but high ferritin    # Mineral Bone Disorder:   - Secondary renal hyperparathyroidism; PTH level: Moderately elevated (301-600 pg/ml)        On treatment: None. Recheck PTH at 4m maddi  - Vitamin D; level: Low normal        On supplement: Yes  - Calcium; level: Normal        On supplement: Yes  - Phosphorus; level: Normal        On supplement: No    # Electrolytes:   - Potassium; level: Normal        On supplement: No  - Magnesium; level: Normal        On supplement: Yes  - Bicarbonate; level: Normal        On supplement: No  - Sodium; level: Normal    # Medical Compliance: Yes     # Transplant History:  Etiology of Kidney Failure: Focal segmental glomerulosclerosis (FSGS)  Tx: DDKT  Transplant: 9/3/2020 (Kidney)  Donor Type: Donation after Brain Death        Donor Class:   Crossmatch at time of Tx: negative  Significant changes in immunosuppression: None  Significant transplant-related complications: None     Recommendations were communicated to the primary team verbally.     Transplant Office Phone Number: [phone]     Assessment and plan was discussed with the patient and she voiced her understanding and agreement.     Return visit: at 4 months post transplant, in 2  months    Carter Wilson MD    Chief Complaint   Ms. Thompson is a 65 year old here for routine follow up.     History of Present Illness    The patient overall feels well. She states that occasionally she will develop nausea but denies diarrhea or vomiting. Her swelling is improved. She has occasional pain in her transplant but feels that it is improving. She denies fever, chills, SOB, chest pain.     Recent Hospitalizations:  [x] No [] Yes    New Medical Issues: [x] No [] Yes    Decreased energy: [x] No [] Yes    Chest pain or SOB with exertion:  [x] No [] Yes    Appetite change or weight change: [x] No [] Yes    Nausea, vomiting or diarrhea:  [x] No [] Yes    Fever, sweats or chills: [x] No [] Yes    Leg swelling: [x] No [] Yes      BP: in AM 120s/60s, in PM 130s/60s    Review of Systems   A comprehensive review of systems was obtained and negative, except as noted in the HPI or PMH.    Problem List   Patient Active Problem List   Diagnosis     Elevated serum immunoglobulin free light chain level     Renovascular hypertension     FSGS (focal segmental glomerulosclerosis)     Hyperlipidemia     Hypothyroidism     SADIE on CPAP     Sensorineural hearing loss (SNHL) of both ears     Anemia in chronic kidney disease     GERD (gastroesophageal reflux disease)     Hypertension     Hepatitis B core antibody positive     End stage renal disease (H)     Secondary hyperparathyroidism (H)     Memory deficits     Kidney replaced by transplant     Immunosuppressed status (H)     Elevated lactic acid level     Acute anemia       Social History   Social History     Tobacco Use     Smoking status: Former Smoker     Packs/day: 1.00     Types: Cigarettes     Start date:      Quit date:      Years since quittin.8     Smokeless tobacco: Never Used   Substance Use Topics     Alcohol use: No     Frequency: Never     Drug use: No       Allergies   Allergies   Allergen Reactions     Amoxicillin-Pot Clavulanate Nausea and Vomiting        Medications   Current Outpatient Medications   Medication Sig     acetaminophen (TYLENOL) 325 MG tablet Take 2 tablets (650 mg) by mouth every 4 hours as needed for pain     amLODIPine (NORVASC) 5 MG tablet Take 2 tablets (10 mg) by mouth daily     aspirin (ASA) 81 MG EC tablet Take 1 tablet (81 mg) by mouth daily     atorvastatin (LIPITOR) 40 MG tablet Take 0.5 tablets (20 mg) by mouth daily     calcium carbonate 600 mg-vitamin D 400 units (CALTRATE) 600-400 MG-UNIT per tablet Take 1 tablet by mouth 2 times daily     fluticasone (FLONASE) 50 MCG/ACT nasal spray Spray 1 spray into both nostrils 2 times daily as needed for rhinitis or allergies     levothyroxine (SYNTHROID/LEVOTHROID) 112 MCG tablet Take 112 mcg (1 tablet) by mouth every Monday, Tuesday is 1/2 tab, Wednesday, Thursday, Friday     Lidocaine (LIDOCARE) 4 % Patch Place 1 patch onto the skin every 24 hours To prevent lidocaine toxicity, patient should be patch free for 12 hrs daily.     magnesium oxide (MAG-OX) 400 MG tablet Take 1 tablet (400 mg) by mouth daily (with lunch)     melatonin 3 MG tablet Take 6 mg by mouth At Bedtime      mycophenolate (GENERIC EQUIVALENT) 250 MG capsule Take 4 capsules (1,000 mg) by mouth 2 times daily     psyllium (METAMUCIL/KONSYL) 58.6 % powder Take by mouth daily     sulfamethoxazole-trimethoprim (BACTRIM) 400-80 MG tablet Take 1 tablet by mouth daily     tacrolimus (GENERIC EQUIVALENT) 0.5 MG capsule Take 1 capsule (0.5 mg) by mouth 2 times daily Take total dose 2.5 mg twice per day     tacrolimus (GENERIC EQUIVALENT) 1 MG capsule Take 2 capsules (2 mg) by mouth 2 times daily Take tacrolimus  2.5 mg twice per day     traMADol (ULTRAM) 50 MG tablet Take 1 tablet (50 mg) by mouth 2 times daily as needed for severe pain     valGANciclovir (VALCYTE) 450 MG tablet Take 450 mg by mouth daily      Vitamin D, Cholecalciferol, 25 MCG (1000 UT) TABS Take 2,000 Units by mouth daily     famotidine (PEPCID) 20 MG tablet Take  20 mg by mouth every evening      gabapentin (NEURONTIN) 100 MG capsule 100mg in AM, 200mg in PM (Patient not taking: Reported on 10/26/2020)     polyethylene glycol (MIRALAX) 17 GM/Dose powder Take 17 g (1 capful) by mouth daily (Patient not taking: Reported on 11/4/2020)     No current facility-administered medications for this visit.      There are no discontinued medications.    Physical Exam    There were no vitals taken for this visit.    GENERAL APPEARANCE: alert and no distress  HENT: no obvious abnormalities on appearance  RESP: breathing appears unremarkable with normal rate, no audible wheezing or cough and no apparent shortness of breath with conversation  MS: extremities normal - no gross deformities noted  SKIN: no apparent rash and normal skin tone  NEURO: speech is clear with no obvious neurological deficits  PSYCH: mentation appears normal and affect normal      Data     Renal Latest Ref Rng & Units 11/3/2020 10/28/2020 10/22/2020   Na 133 - 144 mmol/L 139 138 -   Na (external) 135 - 145 mmol/L - - 139   K 3.4 - 5.3 mmol/L 4.2 4.3 -   K (external) 3.5 - 5.0 mmol/L - - 4.9   Cl 94 - 109 mmol/L 108 111(H) -   Cl (external) 98 - 110 mmol/L - - 109   CO2 20 - 32 mmol/L 25 22 -   CO2 (external) 21 - 31 mmol/L - - 21   BUN 7 - 30 mg/dL 20 21 -   BUN (external) 8 - 25 mg/dL - - 23   Cr 0.52 - 1.04 mg/dL 0.99 1.15(H) -   Cr (external) 0.57 - 1.11 mg/dL - - 1.40(H)   Glucose 70 - 99 mg/dL 134(H) 130(H) -   Glucose (external) 65 - 100 mg/dL - - 131(H)   Ca  8.5 - 10.1 mg/dL 10.2(H) 9.8 -   Ca (external) 8.5 - 10.5 mg/dL - - 10.0   Mg 1.6 - 2.3 mg/dL 1.7 1.6 -   Mg (external) 1.6 - 2.6 mg/dL - - -     Bone Health Latest Ref Rng & Units 11/3/2020 10/28/2020 10/19/2020   Phos 2.5 - 4.5 mg/dL 2.6 2.8 -   Phos (external) 2.3 - 4.7 mg/dL - - 2.3   PTHi 18 - 80 pg/mL - - -   Vit D Def 20 - 75 ug/L - - -     Heme Latest Ref Rng & Units 11/3/2020 10/28/2020 10/22/2020   WBC 4.0 - 11.0 10e9/L 5.3 4.5 -   WBC (external)  4.5 - 11.0 thou/cu mm - - 4.6   Hgb 11.7 - 15.7 g/dL 12.0 11.0(L) -   Hgb (external) 12.0 - 16.0 g/dL - - 10.9(L)   Plt 150 - 450 10e9/L 258 220 -   Plt (external) 140 - 440 thou/cu mm - - 276   ABSOLUTE NEUTROPHIL 1.6 - 8.3 10e9/L - - -   ABSOLUTE LYMPHOCYTES 0.8 - 5.3 10e9/L - - -   ABSOLUTE MONOCYTES 0.0 - 1.3 10e9/L - - -   ABSOLUTE EOSINOPHILS 0.0 - 0.7 10e9/L - - -   ABSOLUTE BASOPHILS 0.0 - 0.2 10e9/L - - -   ABS IMMATURE GRANULOCYTES 0 - 0.4 10e9/L - - -   ABSOLUTE NUCLEATED RBC - - - -     Liver Latest Ref Rng & Units 9/9/2020 9/5/2020 9/4/2020   AP 40 - 150 U/L - 53 60   TBili 0.2 - 1.3 mg/dL - 0.4 1.1   DBili 0.0 - 0.2 mg/dL - 0.2 0.1   ALT 0 - 50 U/L - 52(H) 41   AST 0 - 45 U/L - 50(H) 38   Tot Protein 6.8 - 8.8 g/dL - 5.4(L) 5.2(L)   Albumin 3.4 - 5.0 g/dL 2.9(L) 2.8(L) 2.3(L)     Pancreas Latest Ref Rng & Units 9/3/2020   A1C 0 - 5.6 % 5.4     Iron studies Latest Ref Rng & Units 9/9/2020   Iron 35 - 180 ug/dL 162   Iron sat 15 - 46 % 92(H)   Ferritin 8 - 252 ng/mL 1,757(H)     [facility] Txp Virology Latest Ref Rng & Units 10/15/2020 10/12/2020 9/3/2020   BK Spec - - Plasma, EDTA anticoagulant -   BK Res BKNEG:BK Virus DNA Not Detected copies/mL - BK Virus DNA Not Detected -   BK Log <2.7 Log copies/mL - Not Calculated -   BK Quant Log Ext - Unable to calculate - -   BK Quant Result Ext Negative copies/mL Negative - -   BK Quant Spec Ext - Blood - -   EBV CAPSID ANTIBODY IGG 0.0 - 0.8 AI - - >8.0(H)   Hep B Core NR:Nonreactive - - -        Recent Labs   Lab Test 10/12/20  0804 10/28/20  0903 11/03/20  0827   DOSTAC 8P 10.11.20 0800PM 10/27/20 11/02/20 8PM   TACROL 19.8* 9.4 7.9     Recent Labs   Lab Test 09/28/20  0804 10/05/20  0910 10/12/20  0804   DOSMPA Not Provided Not Provided 8P 10.11.20   MPACID 0.91* 7.02* 1.24   MPAG 120.0* 194.3* 148.6*     Carter Wilson MD          Again, thank you for allowing me to participate in the care of your patient.      Sincerely,    Early Post Transplant

## 2020-11-04 NOTE — PROGRESS NOTES
"  The patient has been notified of following:     \"This video visit will be conducted via a call between you and your physician/provider. We have found that certain health care needs can be provided without the need for an in-person physical exam.  This service lets us provide the care you need with a video conversation.  If a prescription is necessary we can send it directly to your pharmacy.  If lab work is needed we can place an order for that and you can then stop by our lab to have the test done at a later time.    Video visits are billed at different rates depending on your insurance coverage.  Please reach out to your insurance provider with any questions.    If during the course of the call the physician/provider feels a video visit is not appropriate, you will not be charged for this service.\"    Patient has given verbal consent for Video visit? Yes  How would you like to obtain your AVS? MyChart    Will anyone else be joining your video visit? No        Video-Visit Details    Type of service:  Video Visit    Video Start Time: 10:18am  Video End Time: 10:34AM    Originating Location (pt. Location): Home    Distant Location (provider location):  [facility] [place]     Platform used for Video Visit: Sri Wilson MD            ACUTE TRANSPLANT NEPHROLOGY VISIT    Assessment & Plan    # DDKT: Stable              - Baseline Cr ~ 0.9-1.0mg/dL              - Proteinuria: mild, 0.24g/g              - Date DSA Last Checked: Oct/2020      Latest DSA: No              - BK Viremia: No   -Allosure 0.18 on 10/12/20                 #perinephric fluid collections  Asymptomatic at present. Decreasing in size on 10/12/20    # Immunosuppression: Tacrolimus immediate release (goal 8-10), Mycophenolate mofetil (dose 1000mg every 12 hours)  Induction immunosuppression with thymoglobulin 125mg (2.2mg/kg), basiliximab (dose given POD #1 and POD #5 ), and steroid taper.             - Changes: " No    # Infection Prophylaxis:   - PJP: Sulfa/TMP (Bactrim)  - CMV: Valcyte x 12 weeks     # Hypertension: Controlled;   Goal BP: < 140/90              - Volume status: euvolemic                        - Changes: No, on amlodipine 10mg PO daily    # Previous HSV:   -has not recurred currently. If necessary can start valtrex ppx after valcyte stopped.     # Anemia in Chronic Renal Disease: Hgb: Stable      JHONATAN: No   - Iron studies: Low iron saturation, but high ferritin    # Mineral Bone Disorder:   - Secondary renal hyperparathyroidism; PTH level: Moderately elevated (301-600 pg/ml)        On treatment: None. Recheck PTH at 4m maddi  - Vitamin D; level: Low normal        On supplement: Yes  - Calcium; level: Normal        On supplement: Yes  - Phosphorus; level: Normal        On supplement: No    # Electrolytes:   - Potassium; level: Normal        On supplement: No  - Magnesium; level: Normal        On supplement: Yes  - Bicarbonate; level: Normal        On supplement: No  - Sodium; level: Normal    # Medical Compliance: Yes     # Transplant History:  Etiology of Kidney Failure: Focal segmental glomerulosclerosis (FSGS)  Tx: DDKT  Transplant: 9/3/2020 (Kidney)  Donor Type: Donation after Brain Death        Donor Class:   Crossmatch at time of Tx: negative  Significant changes in immunosuppression: None  Significant transplant-related complications: None     Recommendations were communicated to the primary team verbally.     Transplant Office Phone Number: [phone]     Assessment and plan was discussed with the patient and she voiced her understanding and agreement.     Return visit: at 4 months post transplant, in 2 months    Carter Wilson MD    Chief Complaint   Ms. Thompson is a 65 year old here for routine follow up.     History of Present Illness    The patient overall feels well. She states that occasionally she will develop nausea but denies diarrhea or vomiting. Her swelling is improved. She has occasional  pain in her transplant but feels that it is improving. She denies fever, chills, SOB, chest pain.     Recent Hospitalizations:  [x] No [] Yes    New Medical Issues: [x] No [] Yes    Decreased energy: [x] No [] Yes    Chest pain or SOB with exertion:  [x] No [] Yes    Appetite change or weight change: [x] No [] Yes    Nausea, vomiting or diarrhea:  [x] No [] Yes    Fever, sweats or chills: [x] No [] Yes    Leg swelling: [x] No [] Yes      BP: in AM 120s/60s, in PM 130s/60s    Review of Systems   A comprehensive review of systems was obtained and negative, except as noted in the HPI or PMH.    Problem List   Patient Active Problem List   Diagnosis     Elevated serum immunoglobulin free light chain level     Renovascular hypertension     FSGS (focal segmental glomerulosclerosis)     Hyperlipidemia     Hypothyroidism     SADIE on CPAP     Sensorineural hearing loss (SNHL) of both ears     Anemia in chronic kidney disease     GERD (gastroesophageal reflux disease)     Hypertension     Hepatitis B core antibody positive     End stage renal disease (H)     Secondary hyperparathyroidism (H)     Memory deficits     Kidney replaced by transplant     Immunosuppressed status (H)     Elevated lactic acid level     Acute anemia       Social History   Social History     Tobacco Use     Smoking status: Former Smoker     Packs/day: 1.00     Types: Cigarettes     Start date:      Quit date:      Years since quittin.     Smokeless tobacco: Never Used   Substance Use Topics     Alcohol use: No     Frequency: Never     Drug use: No       Allergies   Allergies   Allergen Reactions     Amoxicillin-Pot Clavulanate Nausea and Vomiting       Medications   Current Outpatient Medications   Medication Sig     acetaminophen (TYLENOL) 325 MG tablet Take 2 tablets (650 mg) by mouth every 4 hours as needed for pain     amLODIPine (NORVASC) 5 MG tablet Take 2 tablets (10 mg) by mouth daily     aspirin (ASA) 81 MG EC tablet Take 1 tablet  (81 mg) by mouth daily     atorvastatin (LIPITOR) 40 MG tablet Take 0.5 tablets (20 mg) by mouth daily     calcium carbonate 600 mg-vitamin D 400 units (CALTRATE) 600-400 MG-UNIT per tablet Take 1 tablet by mouth 2 times daily     fluticasone (FLONASE) 50 MCG/ACT nasal spray Spray 1 spray into both nostrils 2 times daily as needed for rhinitis or allergies     levothyroxine (SYNTHROID/LEVOTHROID) 112 MCG tablet Take 112 mcg (1 tablet) by mouth every Monday, Tuesday is 1/2 tab, Wednesday, Thursday, Friday     Lidocaine (LIDOCARE) 4 % Patch Place 1 patch onto the skin every 24 hours To prevent lidocaine toxicity, patient should be patch free for 12 hrs daily.     magnesium oxide (MAG-OX) 400 MG tablet Take 1 tablet (400 mg) by mouth daily (with lunch)     melatonin 3 MG tablet Take 6 mg by mouth At Bedtime      mycophenolate (GENERIC EQUIVALENT) 250 MG capsule Take 4 capsules (1,000 mg) by mouth 2 times daily     psyllium (METAMUCIL/KONSYL) 58.6 % powder Take by mouth daily     sulfamethoxazole-trimethoprim (BACTRIM) 400-80 MG tablet Take 1 tablet by mouth daily     tacrolimus (GENERIC EQUIVALENT) 0.5 MG capsule Take 1 capsule (0.5 mg) by mouth 2 times daily Take total dose 2.5 mg twice per day     tacrolimus (GENERIC EQUIVALENT) 1 MG capsule Take 2 capsules (2 mg) by mouth 2 times daily Take tacrolimus  2.5 mg twice per day     traMADol (ULTRAM) 50 MG tablet Take 1 tablet (50 mg) by mouth 2 times daily as needed for severe pain     valGANciclovir (VALCYTE) 450 MG tablet Take 450 mg by mouth daily      Vitamin D, Cholecalciferol, 25 MCG (1000 UT) TABS Take 2,000 Units by mouth daily     famotidine (PEPCID) 20 MG tablet Take 20 mg by mouth every evening      gabapentin (NEURONTIN) 100 MG capsule 100mg in AM, 200mg in PM (Patient not taking: Reported on 10/26/2020)     polyethylene glycol (MIRALAX) 17 GM/Dose powder Take 17 g (1 capful) by mouth daily (Patient not taking: Reported on 11/4/2020)     No current  facility-administered medications for this visit.      There are no discontinued medications.    Physical Exam    There were no vitals taken for this visit.    GENERAL APPEARANCE: alert and no distress  HENT: no obvious abnormalities on appearance  RESP: breathing appears unremarkable with normal rate, no audible wheezing or cough and no apparent shortness of breath with conversation  MS: extremities normal - no gross deformities noted  SKIN: no apparent rash and normal skin tone  NEURO: speech is clear with no obvious neurological deficits  PSYCH: mentation appears normal and affect normal      Data     Renal Latest Ref Rng & Units 11/3/2020 10/28/2020 10/22/2020   Na 133 - 144 mmol/L 139 138 -   Na (external) 135 - 145 mmol/L - - 139   K 3.4 - 5.3 mmol/L 4.2 4.3 -   K (external) 3.5 - 5.0 mmol/L - - 4.9   Cl 94 - 109 mmol/L 108 111(H) -   Cl (external) 98 - 110 mmol/L - - 109   CO2 20 - 32 mmol/L 25 22 -   CO2 (external) 21 - 31 mmol/L - - 21   BUN 7 - 30 mg/dL 20 21 -   BUN (external) 8 - 25 mg/dL - - 23   Cr 0.52 - 1.04 mg/dL 0.99 1.15(H) -   Cr (external) 0.57 - 1.11 mg/dL - - 1.40(H)   Glucose 70 - 99 mg/dL 134(H) 130(H) -   Glucose (external) 65 - 100 mg/dL - - 131(H)   Ca  8.5 - 10.1 mg/dL 10.2(H) 9.8 -   Ca (external) 8.5 - 10.5 mg/dL - - 10.0   Mg 1.6 - 2.3 mg/dL 1.7 1.6 -   Mg (external) 1.6 - 2.6 mg/dL - - -     Bone Health Latest Ref Rng & Units 11/3/2020 10/28/2020 10/19/2020   Phos 2.5 - 4.5 mg/dL 2.6 2.8 -   Phos (external) 2.3 - 4.7 mg/dL - - 2.3   PTHi 18 - 80 pg/mL - - -   Vit D Def 20 - 75 ug/L - - -     Heme Latest Ref Rng & Units 11/3/2020 10/28/2020 10/22/2020   WBC 4.0 - 11.0 10e9/L 5.3 4.5 -   WBC (external) 4.5 - 11.0 thou/cu mm - - 4.6   Hgb 11.7 - 15.7 g/dL 12.0 11.0(L) -   Hgb (external) 12.0 - 16.0 g/dL - - 10.9(L)   Plt 150 - 450 10e9/L 258 220 -   Plt (external) 140 - 440 thou/cu mm - - 276   ABSOLUTE NEUTROPHIL 1.6 - 8.3 10e9/L - - -   ABSOLUTE LYMPHOCYTES 0.8 - 5.3 10e9/L - - -    ABSOLUTE MONOCYTES 0.0 - 1.3 10e9/L - - -   ABSOLUTE EOSINOPHILS 0.0 - 0.7 10e9/L - - -   ABSOLUTE BASOPHILS 0.0 - 0.2 10e9/L - - -   ABS IMMATURE GRANULOCYTES 0 - 0.4 10e9/L - - -   ABSOLUTE NUCLEATED RBC - - - -     Liver Latest Ref Rng & Units 9/9/2020 9/5/2020 9/4/2020   AP 40 - 150 U/L - 53 60   TBili 0.2 - 1.3 mg/dL - 0.4 1.1   DBili 0.0 - 0.2 mg/dL - 0.2 0.1   ALT 0 - 50 U/L - 52(H) 41   AST 0 - 45 U/L - 50(H) 38   Tot Protein 6.8 - 8.8 g/dL - 5.4(L) 5.2(L)   Albumin 3.4 - 5.0 g/dL 2.9(L) 2.8(L) 2.3(L)     Pancreas Latest Ref Rng & Units 9/3/2020   A1C 0 - 5.6 % 5.4     Iron studies Latest Ref Rng & Units 9/9/2020   Iron 35 - 180 ug/dL 162   Iron sat 15 - 46 % 92(H)   Ferritin 8 - 252 ng/mL 1,757(H)     [facility] Txp Virology Latest Ref Rng & Units 10/15/2020 10/12/2020 9/3/2020   BK Spec - - Plasma, EDTA anticoagulant -   BK Res BKNEG:BK Virus DNA Not Detected copies/mL - BK Virus DNA Not Detected -   BK Log <2.7 Log copies/mL - Not Calculated -   BK Quant Log Ext - Unable to calculate - -   BK Quant Result Ext Negative copies/mL Negative - -   BK Quant Spec Ext - Blood - -   EBV CAPSID ANTIBODY IGG 0.0 - 0.8 AI - - >8.0(H)   Hep B Core NR:Nonreactive - - -        Recent Labs   Lab Test 10/12/20  0804 10/28/20  0903 11/03/20  0827   DOSTAC 8P 10.11.20 0800PM 10/27/20 11/02/20 8PM   TACROL 19.8* 9.4 7.9     Recent Labs   Lab Test 09/28/20  0804 10/05/20  0910 10/12/20  0804   DOSMPA Not Provided Not Provided 8P 10.11.20   MPACID 0.91* 7.02* 1.24   MPAG 120.0* 194.3* 148.6*     Carter Wilson MD

## 2020-11-06 LAB — ALLOSURE DD-CFDNA: <0.12 %

## 2020-11-09 DIAGNOSIS — Z94.0 KIDNEY REPLACED BY TRANSPLANT: ICD-10-CM

## 2020-11-09 DIAGNOSIS — Z79.899 ENCOUNTER FOR LONG-TERM CURRENT USE OF MEDICATION: ICD-10-CM

## 2020-11-09 DIAGNOSIS — Z48.298 AFTERCARE FOLLOWING ORGAN TRANSPLANT: ICD-10-CM

## 2020-11-09 LAB
ANION GAP SERPL CALCULATED.3IONS-SCNC: 6 MMOL/L (ref 3–14)
BUN SERPL-MCNC: 22 MG/DL (ref 7–30)
CALCIUM SERPL-MCNC: 9.7 MG/DL (ref 8.5–10.1)
CHLORIDE SERPL-SCNC: 109 MMOL/L (ref 94–109)
CO2 SERPL-SCNC: 24 MMOL/L (ref 20–32)
CREAT SERPL-MCNC: 1.06 MG/DL (ref 0.52–1.04)
ERYTHROCYTE [DISTWIDTH] IN BLOOD BY AUTOMATED COUNT: 12.7 % (ref 10–15)
GFR SERPL CREATININE-BSD FRML MDRD: 54 ML/MIN/{1.73_M2}
GLUCOSE SERPL-MCNC: 134 MG/DL (ref 70–99)
HCT VFR BLD AUTO: 34.1 % (ref 35–47)
HGB BLD-MCNC: 11.2 G/DL (ref 11.7–15.7)
MAGNESIUM SERPL-MCNC: 1.6 MG/DL (ref 1.6–2.3)
MCH RBC QN AUTO: 32.8 PG (ref 26.5–33)
MCHC RBC AUTO-ENTMCNC: 32.8 G/DL (ref 31.5–36.5)
MCV RBC AUTO: 100 FL (ref 78–100)
PHOSPHATE SERPL-MCNC: 2.8 MG/DL (ref 2.5–4.5)
PLATELET # BLD AUTO: 241 10E9/L (ref 150–450)
POTASSIUM SERPL-SCNC: 4 MMOL/L (ref 3.4–5.3)
RBC # BLD AUTO: 3.41 10E12/L (ref 3.8–5.2)
SODIUM SERPL-SCNC: 139 MMOL/L (ref 133–144)
TACROLIMUS BLD-MCNC: 6.9 UG/L (ref 5–15)
TME LAST DOSE: NORMAL H
WBC # BLD AUTO: 6 10E9/L (ref 4–11)

## 2020-11-09 PROCEDURE — 84100 ASSAY OF PHOSPHORUS: CPT | Performed by: PATHOLOGY

## 2020-11-09 PROCEDURE — 83735 ASSAY OF MAGNESIUM: CPT | Performed by: PATHOLOGY

## 2020-11-09 PROCEDURE — 99N1151 PR STATISTIC ALLOSURE: Mod: 90 | Performed by: PATHOLOGY

## 2020-11-09 PROCEDURE — 99000 SPECIMEN HANDLING OFFICE-LAB: CPT | Performed by: PATHOLOGY

## 2020-11-09 PROCEDURE — 80197 ASSAY OF TACROLIMUS: CPT | Performed by: PATHOLOGY

## 2020-11-09 PROCEDURE — 36415 COLL VENOUS BLD VENIPUNCTURE: CPT | Performed by: PATHOLOGY

## 2020-11-09 PROCEDURE — 80048 BASIC METABOLIC PNL TOTAL CA: CPT | Performed by: PATHOLOGY

## 2020-11-09 PROCEDURE — 85027 COMPLETE CBC AUTOMATED: CPT | Performed by: PATHOLOGY

## 2020-11-10 ENCOUNTER — TELEPHONE (OUTPATIENT)
Dept: TRANSPLANT | Facility: CLINIC | Age: 65
End: 2020-11-10

## 2020-11-10 DIAGNOSIS — Z94.0 KIDNEY TRANSPLANTED: Primary | ICD-10-CM

## 2020-11-10 DIAGNOSIS — Z94.0 KIDNEY REPLACED BY TRANSPLANT: ICD-10-CM

## 2020-11-10 NOTE — TELEPHONE ENCOUNTER
Issue tacrolimus  Level 6.9 below goal level   Called Ethel Blow and   Confirmed current dose of tacrolimus  1.0 mg twice per day   Confirmed  12 hour trough level   Increase tacrolimus  To 1.5 mg twice per day  '  Racquel verbalized understanding       Task 2   Please Q tacrolimus  RX   Please update orders to include MPA levels with routine transplant labs times 8

## 2020-11-12 RX ORDER — TACROLIMUS 0.5 MG/1
0.5 CAPSULE ORAL 2 TIMES DAILY
Qty: 60 CAPSULE | Refills: 11 | Status: SHIPPED | OUTPATIENT
Start: 2020-11-12 | End: 2020-11-17

## 2020-11-12 RX ORDER — TACROLIMUS 1 MG/1
1 CAPSULE ORAL 2 TIMES DAILY
Qty: 60 CAPSULE | Refills: 11 | Status: SHIPPED | OUTPATIENT
Start: 2020-11-12 | End: 2020-11-17

## 2020-11-16 ENCOUNTER — OFFICE VISIT (OUTPATIENT)
Dept: PHARMACY | Facility: CLINIC | Age: 65
End: 2020-11-16
Payer: OTHER GOVERNMENT

## 2020-11-16 VITALS — DIASTOLIC BLOOD PRESSURE: 72 MMHG | SYSTOLIC BLOOD PRESSURE: 132 MMHG

## 2020-11-16 DIAGNOSIS — Z94.0 KIDNEY REPLACED BY TRANSPLANT: ICD-10-CM

## 2020-11-16 DIAGNOSIS — Z48.298 AFTERCARE FOLLOWING ORGAN TRANSPLANT: ICD-10-CM

## 2020-11-16 DIAGNOSIS — B00.1 COLD SORE: ICD-10-CM

## 2020-11-16 DIAGNOSIS — I10 HYPERTENSION, UNSPECIFIED TYPE: ICD-10-CM

## 2020-11-16 DIAGNOSIS — E78.2 MIXED HYPERLIPIDEMIA: ICD-10-CM

## 2020-11-16 DIAGNOSIS — R52 PAIN: ICD-10-CM

## 2020-11-16 DIAGNOSIS — E03.9 HYPOTHYROIDISM, UNSPECIFIED TYPE: ICD-10-CM

## 2020-11-16 DIAGNOSIS — Z94.0 KIDNEY TRANSPLANTED: ICD-10-CM

## 2020-11-16 DIAGNOSIS — Z94.0 KIDNEY REPLACED BY TRANSPLANT: Primary | ICD-10-CM

## 2020-11-16 DIAGNOSIS — Z79.899 ENCOUNTER FOR LONG-TERM CURRENT USE OF MEDICATION: ICD-10-CM

## 2020-11-16 LAB
ANION GAP SERPL CALCULATED.3IONS-SCNC: 5 MMOL/L (ref 3–14)
BUN SERPL-MCNC: 22 MG/DL (ref 7–30)
CALCIUM SERPL-MCNC: 10 MG/DL (ref 8.5–10.1)
CHLORIDE SERPL-SCNC: 109 MMOL/L (ref 94–109)
CO2 SERPL-SCNC: 23 MMOL/L (ref 20–32)
CREAT SERPL-MCNC: 1.03 MG/DL (ref 0.52–1.04)
ERYTHROCYTE [DISTWIDTH] IN BLOOD BY AUTOMATED COUNT: 12.6 % (ref 10–15)
GFR SERPL CREATININE-BSD FRML MDRD: 56 ML/MIN/{1.73_M2}
GLUCOSE SERPL-MCNC: 140 MG/DL (ref 70–99)
HCT VFR BLD AUTO: 37.1 % (ref 35–47)
HGB BLD-MCNC: 12.1 G/DL (ref 11.7–15.7)
MAGNESIUM SERPL-MCNC: 1.7 MG/DL (ref 1.6–2.3)
MCH RBC QN AUTO: 32.4 PG (ref 26.5–33)
MCHC RBC AUTO-ENTMCNC: 32.6 G/DL (ref 31.5–36.5)
MCV RBC AUTO: 100 FL (ref 78–100)
MYCOPHENOLATE SERPL LC/MS/MS-MCNC: 1.5 MG/L (ref 1–3.5)
MYCOPHENOLATE-G SERPL LC/MS/MS-MCNC: 107.9 MG/L (ref 30–95)
PHOSPHATE SERPL-MCNC: 2.9 MG/DL (ref 2.5–4.5)
PLATELET # BLD AUTO: 244 10E9/L (ref 150–450)
POTASSIUM SERPL-SCNC: 4.5 MMOL/L (ref 3.4–5.3)
RBC # BLD AUTO: 3.73 10E12/L (ref 3.8–5.2)
SODIUM SERPL-SCNC: 138 MMOL/L (ref 133–144)
TACROLIMUS BLD-MCNC: 10.3 UG/L (ref 5–15)
TME LAST DOSE: ABNORMAL H
TME LAST DOSE: NORMAL H
WBC # BLD AUTO: 5.5 10E9/L (ref 4–11)

## 2020-11-16 PROCEDURE — 85027 COMPLETE CBC AUTOMATED: CPT | Performed by: PATHOLOGY

## 2020-11-16 PROCEDURE — 99607 MTMS BY PHARM ADDL 15 MIN: CPT | Performed by: PHARMACIST

## 2020-11-16 PROCEDURE — 84100 ASSAY OF PHOSPHORUS: CPT | Performed by: PATHOLOGY

## 2020-11-16 PROCEDURE — 36415 COLL VENOUS BLD VENIPUNCTURE: CPT | Performed by: PATHOLOGY

## 2020-11-16 PROCEDURE — 80048 BASIC METABOLIC PNL TOTAL CA: CPT | Performed by: PATHOLOGY

## 2020-11-16 PROCEDURE — 80197 ASSAY OF TACROLIMUS: CPT | Performed by: PATHOLOGY

## 2020-11-16 PROCEDURE — 80180 DRUG SCRN QUAN MYCOPHENOLATE: CPT | Performed by: PATHOLOGY

## 2020-11-16 PROCEDURE — 83735 ASSAY OF MAGNESIUM: CPT | Performed by: PATHOLOGY

## 2020-11-16 PROCEDURE — 99606 MTMS BY PHARM EST 15 MIN: CPT | Performed by: PHARMACIST

## 2020-11-16 NOTE — PROGRESS NOTES
MTM ENCOUNTER  SUBJECTIVE/OBJECTIVE:                           Ethel Thmopson is a 65 year old female coming in for a follow-up visit. She was referred to me from txp team. Patient was accompanied by family member. Today's visit is a follow-up MTM visit from 10/26.    Reason for visit: Med box review, high and fluctuating TAC levels. Pt wondering if she can use Valacyclovir as she has a cold sore coming on.     Allergies/ADRs: Reviewed in chart  Tobacco: She reports that she quit smoking about 25 years ago. Her smoking use included cigarettes. She started smoking about 40 years ago. She smoked 1.00 pack per day. She has never used smokeless tobacco.  Alcohol: not currently using  Past Medical History: Reviewed in chart    Medication Adherence/Access: Patient uses pill box(es) and uses reminders/alarms.  Patient takes medications 3 time(s) per day. 8am, 10-2pm, 8pm  Per patient, misses medication 0 times per week. Missed 1 dose in the past month, it just slipped her mind. Maxim helps her remember now.   Reviewed pill box today, no major problems were found. There was a double dose of Amlodipine in a slot, but patient says she double checks all pills before taking them.      Renal Transplant:  Current immunosuppressants include TAC 1.5mg BID and MMF 1000mg BID.  Pt reports sometimes the tremors are bad. Rated at a 5/10, but can continue activities of daily living (cooking, dishes, etc).  Other meds: Pt is taking Aspirin 81mg daily. Bruises easily, no sx of bleeds  Transplant date: 9/3/20  CrCl: ~35ml/min  Estimated Creatinine Clearance: 49.4 mL/min (based on SCr of 1.03 mg/dL).  CMV prophylaxis: Valcyte 900mg daily (still taking this dose) Treat for 3 months post txp.  PCP prophylaxis: Bactrim S S daily  Antifungal Prophylaxis: Completed  PPI use: Famotidine 20mg prn.  No issues.  Current supplements for replacement:  Magnesium 400mg once daily, Vitamin D 2000 units daily, Calcium/D BID.         Magnesium   Date Value Ref  Range Status   10/12/2020 1.7 1.6 - 2.3 mg/dL Final   tx Coordinator: Sandro Paul MD: Dr. Sifuentes, Using Med Card: Yes  Immunizations: annual flu shot 2019; [id number]: unknown ; Prevnar 13: 2019; TDaP:  2019; Shingrix: unknown   Results for CARTER TIJERINA (MRN [id number]) as of 2020 09:01   Ref. Range 10/26/2020 08:25 10/28/2020 09:03 11/3/2020 08:27 2020 08:32   Tacrolimus Level Latest Ref Range: 5.0 - 15.0 ug/L  9.4 7.9 6.9   Tacrolimus(FK-506) (External) Latest Ref Range: see scan ng/mL 20.0        Hyperlipidemia: Current therapy includes Atorvastatin 20mg daily in the morning.  Patient reports no significant myalgias or other side effects.  The 10-year ASCVD risk score (Antonettehayden RAI Jr., et al., 2013) is: 12.2%    Values used to calculate the score:      Age: 65 years      Sex: Female      Is Non- : No      Diabetic: No      Tobacco smoker: No      Systolic Blood Pressure: 150 mmHg      Is BP treated: Yes      HDL Cholesterol: 43 mg/dL      Total Cholesterol: 196 mg/dL      Recent Labs   Lab Test 20  1756   CHOL 196   HDL 43*   LDL Cannot estimate LDL when triglyceride exceeds 400 mg/dL   TRIG 617*     Hypothyroidism: Patient is taking levothyroxine 112 mcg Monday, , Thurs, Friday, then half on Tuesday. Patient is having the following symptoms: patient says she is hot at night time and puts cold water on her face/ drinks cold water to try and cool down. Patient indicates the instructions are confusing with it changing. Total current weekly dose = 504mcg (which would equate to 72 mcg daily). TSH: 0.03ng/mL     Hypertension: Current medications include Amlodipine 10mg daily.  Patient does self-monitor blood pressure. 120-130/60-80s.  Patient reports no current medication side effects.  Today's BP readin/70 mm Hg  BP Readings from Last 3 Encounters:   20 132/72   10/26/20 (!) 142/70   10/12/20 (!) 150/62     Incisional Pain: Pt was taking APAP 750mg (Q6 hr) BID-TID,  Lidocaine 5% patches at bedtime, was on Gabapentin but ran out.  She is taking a little bit of her husbands. Occassionally uses diclofenac topically (does not usually use, but only uses once daily if she does). Reports pain near the hip, unsure if it is related to transplant. She thinks the acetaminophen helps, but she sometimes forgets to take it every 6 hours on a schedule. Pain rated 7-8/10. Pain occurs sporadically, either when she sits down or when moving around, or when cooking for prolonged periods of time.    Cold sore: Wondering if she can start Valtrex daily for cold sore. Prescribed by pcp.     Today's Vitals: There were no vitals taken for this visit.    ASSESSMENT:                            Medication Adherence: No issues identified    Renal Transplant: Most recent tacrolimus dose was slightly below goal at 6.9mcg/L on 11/8/20 (goal 8-10 mcg/L). The patient appears to be setting up the medications correctly in her pill boxes.  Calcium levels have been elevated over 10 over the past few weeks. Will discuss stopping calcium and vitamin.     Hyperlipidemia: The triglycerides were above goal on 10/26/20 at 288 mg/dL (goal <150mg/dL), but down from last visit (>500). Will continue current statin. Consider increasing dose in the future if needed.     Hypothyroidism: T4 level is within goal (1.13ng/dL) but TSH is low (0.03ng/mL). Due to the low TSH and symptoms described by the patient, she may be taking too high of a dose of levothyroxine. Dosing can was changed (~10/26) and levels should be re-measured after approximately 4 weeks, so she should follow up for levels around in a month with PCP.     Hypertension: Stable. Today's measurement was at goal.    Incisional Pain: Stable. The patient may want to see her PCP to identify other potential causes of pain in the hip area. She may also benefit from an extended-released Tylenol for better pain relief.    Cold Sore: Valtrex and Valcyte should not be used at  the same time due to their similar mechanism of action. The patient would benefit from holding the Valtrex until she stops the Valcyte in about a month (12/3/20).     PLAN:                            Patient to...   1. Start Tylenol Arthritis strength ER 650mg tablets. Take 1 tablet every 8 hours. You can take an 1 extra tablet daily if you have breakthrough pain.  See PCP for long term hip pain management.   2. Continue Valganciclovir 450mg once daily (Valcyte). You can use Valtrex (valacyclovir) after you complete your Valcyte therapy on 12/3/20.  3. Come to next lab fasting for a cholesterol panel.    Txp Team to consider...  1. Discontinuing Vitamin D and Calcium supplements.    I spent 40 minutes with this patient today. I offer these suggestions for consideration by txp team. A copy of the visit note was provided to the patient's referring provider.    Will follow up on January 18 at 12:30pm.    The patient was given a summary of these recommendations.     Patrick Mireles, PharmD  [facility] Pharmacist    Phone: [phone]

## 2020-11-16 NOTE — PATIENT INSTRUCTIONS
Recommendations from today's MTM visit:                                                      1. Start Tylenol Arthritis strength ER 650mg tablets. Take 1 tablet every 8 hours. You can take an 1 extra tablet daily if you have breakthrough pain.  See PCP for long term hip pain management.     2. Continue Valganciclovir 450mg once daily (Valcyte). You can use Valtrex (valacyclovir) after you complete your Valcyte therapy on 12/3/20.    3. Come to next lab fasting for a cholesterol panel.     It was great to speak with you today.  I value your experience and would be very thankful for your time with providing feedback on our clinic survey. You may receive a survey via email or text message in the next few days.     Next MTM visit: 1/18/20 12:30 PM    To schedule another MTM appointment, please call the clinic directly or you may call the MTM scheduling line at [phone] or toll-free at [phone].     My Clinical Pharmacist's contact information:                                                      It was a pleasure talking with you today!  Please feel free to contact me with any questions or concerns you have.      Patrick Mireles, PharmD  MTM Pharmacist    Phone: [phone]

## 2020-11-17 DIAGNOSIS — Z94.0 KIDNEY REPLACED BY TRANSPLANT: ICD-10-CM

## 2020-11-17 DIAGNOSIS — Z94.0 KIDNEY TRANSPLANTED: Primary | ICD-10-CM

## 2020-11-17 RX ORDER — TACROLIMUS 0.5 MG/1
0.5 CAPSULE ORAL EVERY MORNING
Qty: 30 CAPSULE | Refills: 11 | Status: SHIPPED | OUTPATIENT
Start: 2020-11-17 | End: 2020-11-25

## 2020-11-17 RX ORDER — TACROLIMUS 1 MG/1
1 CAPSULE ORAL 2 TIMES DAILY
Qty: 60 CAPSULE | Refills: 11 | Status: SHIPPED | OUTPATIENT
Start: 2020-11-17 | End: 2020-11-25

## 2020-11-17 NOTE — TELEPHONE ENCOUNTER
Spoke to patients  Maxim regarding:  Issue tacrolimus  Level  10.3 slightly above goal level   confirmed current dose tacrolimus  1.5 mg twice per day   Confirmed 12 hour trough level    Patient verbalizes understanding to lower tacrolimus dose to 1.5 mg in the am and 1.0 mg in pm    Reviewed instructions to stop Valcyte at 90 day post transplant

## 2020-11-17 NOTE — TELEPHONE ENCOUNTER
Issue tacrolimus  Level  10.3 slightly above goal level   Task   Please call confirm current dose tacrolimus  1.5 mg twice per day   Confirm 12 hour trough level    If the above is accurate   Lower tacrolimus  1.5 mg  In am and 1.0 mg in pm      Item 2 --- 75 days post kidney transplant - CMV IgG positive   Please review may stop Valcyte at 90 day post transplant    Item 3   3 month Kidney  Transplant Patient Phone Call    Congratulations on your 3 month post-transplant anniversary!    Please re-review with the patient how to contact the Transplant Center:  Best phone contact is [phone], as if the need is urgent, any care coordinator will be able to assist you.      Medications:  Now that you are 3 months post-transplant, please make the following medication changes:  Stop Valcyte    Please complete medication reconciliation (including confirmation of any magnesium or phosphorous supplements) and ensure the patient has an up to date medication card at home.     *ALWAYS BRING YOUR MED CARD TO APPOINTMENTS.*      Only transplant specific medications will now be filled by the transplant center, please ensure your PCP will be able to fill your other medications.   Contact your pharmacy first for refills.  CONTACT THE TRANSPLANT CENTER IF YOU RUN OUT OF YOUR IS MEDICATIONS.    Labs:  Labs will now be drawn 1 time weekly through 6 months post-transplant.    Please try to have these labs drawn early in the week (Monday or Tuesday).  Contact the Transplant Center if additional lab orders are needed.   It is recommended that you take a copy of your lab letter to each lab draw.   General Transplant Recommendations:    Quarterly nephrology visits with our MDs - ensure patient has had a 3 month follow up.    Follow up with your PCP now if you have not already done so.    Review your [facility] pamphlet; this will help you write your donor if you wish.    Frequent reviews of the transplant handbook and / or My Transplant  Place.    Lab review on [employer].

## 2020-11-18 NOTE — PROGRESS NOTES
Carter Wilson MD Griffin, Peter Alberto, [facility]; Tiffany Paul RN             Ok to hold calcium and vitamin d. Thanks    Previous Messages    ----- Message -----   From: Patrick Mireles [facility]   Sent: 11/18/2020   8:43 AM CST   To: Carter Wilson MD, Tiffany Paul RN     Calcium levels have been running >10, could we hold her calcium and possibly Vitamin D? Hx of mildly low vitamin D level in 9/2020 (20).     Thanks!     Patrick Mireles, PharmD   [facility] Pharmacist     Phone: [phone]             Spoke with Maxim, confirmed this change.

## 2020-11-23 ENCOUNTER — TELEPHONE (OUTPATIENT)
Dept: TRANSPLANT | Facility: CLINIC | Age: 65
End: 2020-11-23

## 2020-11-23 DIAGNOSIS — Z94.0 KIDNEY REPLACED BY TRANSPLANT: Primary | ICD-10-CM

## 2020-11-23 DIAGNOSIS — Z79.899 ENCOUNTER FOR LONG-TERM CURRENT USE OF MEDICATION: ICD-10-CM

## 2020-11-23 DIAGNOSIS — Z48.298 AFTERCARE FOLLOWING ORGAN TRANSPLANT: ICD-10-CM

## 2020-11-23 DIAGNOSIS — Z94.0 KIDNEY REPLACED BY TRANSPLANT: ICD-10-CM

## 2020-11-23 DIAGNOSIS — Z94.0 KIDNEY TRANSPLANTED: ICD-10-CM

## 2020-11-23 LAB
ALLOSURE DD-CFDNA: NORMAL
ANION GAP SERPL CALCULATED.3IONS-SCNC: 7 MMOL/L (ref 3–14)
BASOPHILS # BLD AUTO: 0.1 10E9/L (ref 0–0.2)
BASOPHILS NFR BLD AUTO: 1 %
BUN SERPL-MCNC: 18 MG/DL (ref 7–30)
CALCIUM SERPL-MCNC: 9.9 MG/DL (ref 8.5–10.1)
CHLORIDE SERPL-SCNC: 110 MMOL/L (ref 94–109)
CO2 SERPL-SCNC: 24 MMOL/L (ref 20–32)
CREAT SERPL-MCNC: 0.84 MG/DL (ref 0.52–1.04)
DIFFERENTIAL METHOD BLD: ABNORMAL
EOSINOPHIL # BLD AUTO: 0.2 10E9/L (ref 0–0.7)
EOSINOPHIL NFR BLD AUTO: 4 %
ERYTHROCYTE [DISTWIDTH] IN BLOOD BY AUTOMATED COUNT: 12.4 % (ref 10–15)
GFR SERPL CREATININE-BSD FRML MDRD: 72 ML/MIN/{1.73_M2}
GLUCOSE SERPL-MCNC: 139 MG/DL (ref 70–99)
HCT VFR BLD AUTO: 36.1 % (ref 35–47)
HGB BLD-MCNC: 11.9 G/DL (ref 11.7–15.7)
LYMPHOCYTES # BLD AUTO: 1.4 10E9/L (ref 0.8–5.3)
LYMPHOCYTES NFR BLD AUTO: 24 %
MAGNESIUM SERPL-MCNC: 1.6 MG/DL (ref 1.6–2.3)
MCH RBC QN AUTO: 33 PG (ref 26.5–33)
MCHC RBC AUTO-ENTMCNC: 33 G/DL (ref 31.5–36.5)
MCV RBC AUTO: 100 FL (ref 78–100)
MONOCYTES # BLD AUTO: 0.2 10E9/L (ref 0–1.3)
MONOCYTES NFR BLD AUTO: 4 %
NEUTROPHILS # BLD AUTO: 3.8 10E9/L (ref 1.6–8.3)
NEUTROPHILS NFR BLD AUTO: 67 %
PHOSPHATE SERPL-MCNC: 2.8 MG/DL (ref 2.5–4.5)
PLATELET # BLD AUTO: 240 10E9/L (ref 150–450)
PLATELET # BLD EST: ABNORMAL 10*3/UL
POTASSIUM SERPL-SCNC: 4.3 MMOL/L (ref 3.4–5.3)
RBC # BLD AUTO: 3.61 10E12/L (ref 3.8–5.2)
RBC MORPH BLD: NORMAL
SODIUM SERPL-SCNC: 141 MMOL/L (ref 133–144)
TACROLIMUS BLD-MCNC: 7.7 UG/L (ref 5–15)
TME LAST DOSE: NORMAL H
VARIANT LYMPHS BLD QL SMEAR: PRESENT
WBC # BLD AUTO: 5.7 10E9/L (ref 4–11)

## 2020-11-23 PROCEDURE — 80180 DRUG SCRN QUAN MYCOPHENOLATE: CPT | Performed by: INTERNAL MEDICINE

## 2020-11-23 PROCEDURE — 85025 COMPLETE CBC W/AUTO DIFF WBC: CPT | Performed by: INTERNAL MEDICINE

## 2020-11-23 PROCEDURE — 80048 BASIC METABOLIC PNL TOTAL CA: CPT | Performed by: INTERNAL MEDICINE

## 2020-11-23 PROCEDURE — 36415 COLL VENOUS BLD VENIPUNCTURE: CPT | Performed by: INTERNAL MEDICINE

## 2020-11-23 PROCEDURE — 83735 ASSAY OF MAGNESIUM: CPT | Performed by: INTERNAL MEDICINE

## 2020-11-23 PROCEDURE — 84100 ASSAY OF PHOSPHORUS: CPT | Performed by: INTERNAL MEDICINE

## 2020-11-23 PROCEDURE — 80197 ASSAY OF TACROLIMUS: CPT | Performed by: INTERNAL MEDICINE

## 2020-11-23 NOTE — TELEPHONE ENCOUNTER
Patient Call: Voicemail  Date/Time: 112320 08:56 am  Reason for call: [facility] calling about patient lab orders, physician ordered CBC, their machines automatically run diff, needs to know if diff needs to be ordered or not, please call her at [phone].

## 2020-11-23 NOTE — TELEPHONE ENCOUNTER
Patient Call: Voicemail  Date/Time: [id number]  Reason for call: Mary Jane from  [facility] calling about patient's CBC tests, see previous message left at 08:56 am today.  She can be reached at [phone].

## 2020-11-24 LAB
MYCOPHENOLATE SERPL LC/MS/MS-MCNC: 1.47 MG/L (ref 1–3.5)
MYCOPHENOLATE-G SERPL LC/MS/MS-MCNC: 105.4 MG/L (ref 30–95)
TME LAST DOSE: ABNORMAL H

## 2020-11-25 DIAGNOSIS — Z94.0 KIDNEY REPLACED BY TRANSPLANT: Primary | ICD-10-CM

## 2020-11-25 DIAGNOSIS — Z94.0 KIDNEY TRANSPLANTED: ICD-10-CM

## 2020-11-25 RX ORDER — TACROLIMUS 0.5 MG/1
0.5 CAPSULE ORAL 2 TIMES DAILY
Qty: 60 CAPSULE | Refills: 11 | Status: SHIPPED | OUTPATIENT
Start: 2020-11-25 | End: 2020-12-15

## 2020-11-25 RX ORDER — TACROLIMUS 1 MG/1
1 CAPSULE ORAL 2 TIMES DAILY
Qty: 60 CAPSULE | Refills: 11 | Status: SHIPPED | OUTPATIENT
Start: 2020-11-25 | End: 2020-12-15

## 2020-11-25 NOTE — TELEPHONE ENCOUNTER
ISSUE:   Tacrolimus IR level 7.7 on 11/23, goal 8-10, dose 1.5 mg /1 mg BID. Previous level supra-therapeutic on 1.5 mg bid.     PLAN:   Please call patient and confirm this was an accurate 12-hour trough. Verify Tacrolimus IR dose 1.5 mg / 1 mg BID. Confirm no new medications or illness. Confirm no missed doses. If accurate trough and accurate dose, increase Tacrolimus IR dose to 1.5 mg BID and repeat labs in 1 weeks (as scheduled)

## 2020-11-25 NOTE — TELEPHONE ENCOUNTER
Spoke to patient/ regarding:  Tacrolimus IR level 7.7 on 11/23, goal 8-10, dose 1.5 mg /1 mg BID. Previous level supra-therapeutic on 1.5 mg bid.      Confirmed this was an accurate 12-hour trough. Verified Tacrolimus IR dose 1.5 mg / 1 mg BID. Confirmed no new medications or illness. Confirmed no missed doses. Patient verbalizes understanding to increase Tacrolimus IR dose to 1.5 mg BID and repeat labs in 1 weeks (as scheduled)

## 2020-11-30 DIAGNOSIS — Z94.0 KIDNEY TRANSPLANTED: ICD-10-CM

## 2020-11-30 DIAGNOSIS — Z79.899 ENCOUNTER FOR LONG-TERM CURRENT USE OF MEDICATION: ICD-10-CM

## 2020-11-30 DIAGNOSIS — Z48.298 AFTERCARE FOLLOWING ORGAN TRANSPLANT: ICD-10-CM

## 2020-11-30 DIAGNOSIS — Z94.0 KIDNEY REPLACED BY TRANSPLANT: ICD-10-CM

## 2020-11-30 LAB
ANION GAP SERPL CALCULATED.3IONS-SCNC: 5 MMOL/L (ref 3–14)
BUN SERPL-MCNC: 24 MG/DL (ref 7–30)
CALCIUM SERPL-MCNC: 9.8 MG/DL (ref 8.5–10.1)
CHLORIDE SERPL-SCNC: 106 MMOL/L (ref 94–109)
CO2 SERPL-SCNC: 26 MMOL/L (ref 20–32)
CREAT SERPL-MCNC: 0.88 MG/DL (ref 0.52–1.04)
ERYTHROCYTE [DISTWIDTH] IN BLOOD BY AUTOMATED COUNT: 12.2 % (ref 10–15)
GFR SERPL CREATININE-BSD FRML MDRD: 68 ML/MIN/{1.73_M2}
GLUCOSE SERPL-MCNC: 142 MG/DL (ref 70–99)
HCT VFR BLD AUTO: 36.9 % (ref 35–47)
HGB BLD-MCNC: 12.3 G/DL (ref 11.7–15.7)
MAGNESIUM SERPL-MCNC: 1.7 MG/DL (ref 1.6–2.3)
MCH RBC QN AUTO: 32.9 PG (ref 26.5–33)
MCHC RBC AUTO-ENTMCNC: 33.3 G/DL (ref 31.5–36.5)
MCV RBC AUTO: 99 FL (ref 78–100)
MYCOPHENOLATE SERPL LC/MS/MS-MCNC: 1.18 MG/L (ref 1–3.5)
MYCOPHENOLATE-G SERPL LC/MS/MS-MCNC: 104.5 MG/L (ref 30–95)
PHOSPHATE SERPL-MCNC: 2.9 MG/DL (ref 2.5–4.5)
PLATELET # BLD AUTO: 241 10E9/L (ref 150–450)
POTASSIUM SERPL-SCNC: 4.1 MMOL/L (ref 3.4–5.3)
RBC # BLD AUTO: 3.74 10E12/L (ref 3.8–5.2)
SODIUM SERPL-SCNC: 137 MMOL/L (ref 133–144)
TACROLIMUS BLD-MCNC: 8.5 UG/L (ref 5–15)
TME LAST DOSE: ABNORMAL H
TME LAST DOSE: NORMAL H
WBC # BLD AUTO: 5.4 10E9/L (ref 4–11)

## 2020-11-30 PROCEDURE — 80048 BASIC METABOLIC PNL TOTAL CA: CPT | Performed by: INTERNAL MEDICINE

## 2020-11-30 PROCEDURE — 84100 ASSAY OF PHOSPHORUS: CPT | Performed by: INTERNAL MEDICINE

## 2020-11-30 PROCEDURE — 36415 COLL VENOUS BLD VENIPUNCTURE: CPT | Performed by: INTERNAL MEDICINE

## 2020-11-30 PROCEDURE — 80180 DRUG SCRN QUAN MYCOPHENOLATE: CPT | Performed by: INTERNAL MEDICINE

## 2020-11-30 PROCEDURE — 85027 COMPLETE CBC AUTOMATED: CPT | Performed by: INTERNAL MEDICINE

## 2020-11-30 PROCEDURE — 80197 ASSAY OF TACROLIMUS: CPT | Performed by: INTERNAL MEDICINE

## 2020-11-30 PROCEDURE — 83735 ASSAY OF MAGNESIUM: CPT | Performed by: INTERNAL MEDICINE

## 2020-12-06 ENCOUNTER — HEALTH MAINTENANCE LETTER (OUTPATIENT)
Age: 65
End: 2020-12-06

## 2020-12-07 DIAGNOSIS — Z94.0 KIDNEY REPLACED BY TRANSPLANT: ICD-10-CM

## 2020-12-07 DIAGNOSIS — Z94.0 KIDNEY TRANSPLANTED: ICD-10-CM

## 2020-12-07 DIAGNOSIS — Z48.298 AFTERCARE FOLLOWING ORGAN TRANSPLANT: ICD-10-CM

## 2020-12-07 DIAGNOSIS — Z79.899 ENCOUNTER FOR LONG-TERM CURRENT USE OF MEDICATION: ICD-10-CM

## 2020-12-07 LAB
ANION GAP SERPL CALCULATED.3IONS-SCNC: 6 MMOL/L (ref 3–14)
BUN SERPL-MCNC: 20 MG/DL (ref 7–30)
CALCIUM SERPL-MCNC: 9.8 MG/DL (ref 8.5–10.1)
CHLORIDE SERPL-SCNC: 107 MMOL/L (ref 94–109)
CO2 SERPL-SCNC: 27 MMOL/L (ref 20–32)
CREAT SERPL-MCNC: 0.89 MG/DL (ref 0.52–1.04)
ERYTHROCYTE [DISTWIDTH] IN BLOOD BY AUTOMATED COUNT: 12 % (ref 10–15)
GFR SERPL CREATININE-BSD FRML MDRD: 67 ML/MIN/{1.73_M2}
GLUCOSE SERPL-MCNC: 159 MG/DL (ref 70–99)
HCT VFR BLD AUTO: 36.5 % (ref 35–47)
HGB BLD-MCNC: 11.9 G/DL (ref 11.7–15.7)
MCH RBC QN AUTO: 32.3 PG (ref 26.5–33)
MCHC RBC AUTO-ENTMCNC: 32.6 G/DL (ref 31.5–36.5)
MCV RBC AUTO: 99 FL (ref 78–100)
PLATELET # BLD AUTO: 235 10E9/L (ref 150–450)
POTASSIUM SERPL-SCNC: 4 MMOL/L (ref 3.4–5.3)
RBC # BLD AUTO: 3.68 10E12/L (ref 3.8–5.2)
SODIUM SERPL-SCNC: 140 MMOL/L (ref 133–144)
TACROLIMUS BLD-MCNC: 9.1 UG/L (ref 5–15)
TME LAST DOSE: NORMAL H
WBC # BLD AUTO: 6.7 10E9/L (ref 4–11)

## 2020-12-07 PROCEDURE — 36415 COLL VENOUS BLD VENIPUNCTURE: CPT | Performed by: INTERNAL MEDICINE

## 2020-12-07 PROCEDURE — 80048 BASIC METABOLIC PNL TOTAL CA: CPT | Performed by: INTERNAL MEDICINE

## 2020-12-07 PROCEDURE — 80197 ASSAY OF TACROLIMUS: CPT | Performed by: INTERNAL MEDICINE

## 2020-12-07 PROCEDURE — 85027 COMPLETE CBC AUTOMATED: CPT | Performed by: INTERNAL MEDICINE

## 2020-12-07 PROCEDURE — 80180 DRUG SCRN QUAN MYCOPHENOLATE: CPT | Performed by: INTERNAL MEDICINE

## 2020-12-08 LAB
MYCOPHENOLATE SERPL LC/MS/MS-MCNC: 1.59 MG/L (ref 1–3.5)
MYCOPHENOLATE-G SERPL LC/MS/MS-MCNC: 96 MG/L (ref 30–95)
TME LAST DOSE: ABNORMAL H

## 2020-12-14 DIAGNOSIS — Z48.298 AFTERCARE FOLLOWING ORGAN TRANSPLANT: ICD-10-CM

## 2020-12-14 DIAGNOSIS — Z94.0 KIDNEY REPLACED BY TRANSPLANT: ICD-10-CM

## 2020-12-14 DIAGNOSIS — Z79.899 ENCOUNTER FOR LONG-TERM CURRENT USE OF MEDICATION: ICD-10-CM

## 2020-12-14 DIAGNOSIS — Z94.0 KIDNEY TRANSPLANTED: ICD-10-CM

## 2020-12-14 LAB
ANION GAP SERPL CALCULATED.3IONS-SCNC: 5 MMOL/L (ref 3–14)
BUN SERPL-MCNC: 18 MG/DL (ref 7–30)
CALCIUM SERPL-MCNC: 9.9 MG/DL (ref 8.5–10.1)
CHLORIDE SERPL-SCNC: 107 MMOL/L (ref 94–109)
CO2 SERPL-SCNC: 27 MMOL/L (ref 20–32)
CREAT SERPL-MCNC: 0.85 MG/DL (ref 0.52–1.04)
ERYTHROCYTE [DISTWIDTH] IN BLOOD BY AUTOMATED COUNT: 11.8 % (ref 10–15)
GFR SERPL CREATININE-BSD FRML MDRD: 71 ML/MIN/{1.73_M2}
GLUCOSE SERPL-MCNC: 168 MG/DL (ref 70–99)
HCT VFR BLD AUTO: 36.8 % (ref 35–47)
HGB BLD-MCNC: 11.9 G/DL (ref 11.7–15.7)
MAGNESIUM SERPL-MCNC: 1.6 MG/DL (ref 1.6–2.3)
MCH RBC QN AUTO: 32.1 PG (ref 26.5–33)
MCHC RBC AUTO-ENTMCNC: 32.3 G/DL (ref 31.5–36.5)
MCV RBC AUTO: 99 FL (ref 78–100)
PHOSPHATE SERPL-MCNC: 3.2 MG/DL (ref 2.5–4.5)
PLATELET # BLD AUTO: 227 10E9/L (ref 150–450)
POTASSIUM SERPL-SCNC: 4.2 MMOL/L (ref 3.4–5.3)
RBC # BLD AUTO: 3.71 10E12/L (ref 3.8–5.2)
SODIUM SERPL-SCNC: 139 MMOL/L (ref 133–144)
TACROLIMUS BLD-MCNC: 10.7 UG/L (ref 5–15)
TME LAST DOSE: NORMAL H
WBC # BLD AUTO: 7.6 10E9/L (ref 4–11)

## 2020-12-14 PROCEDURE — 87799 DETECT AGENT NOS DNA QUANT: CPT | Performed by: INTERNAL MEDICINE

## 2020-12-14 PROCEDURE — 80197 ASSAY OF TACROLIMUS: CPT | Performed by: INTERNAL MEDICINE

## 2020-12-14 PROCEDURE — 84100 ASSAY OF PHOSPHORUS: CPT | Performed by: INTERNAL MEDICINE

## 2020-12-14 PROCEDURE — 85027 COMPLETE CBC AUTOMATED: CPT | Performed by: INTERNAL MEDICINE

## 2020-12-14 PROCEDURE — 80180 DRUG SCRN QUAN MYCOPHENOLATE: CPT | Performed by: INTERNAL MEDICINE

## 2020-12-14 PROCEDURE — 83735 ASSAY OF MAGNESIUM: CPT | Performed by: INTERNAL MEDICINE

## 2020-12-14 PROCEDURE — 80048 BASIC METABOLIC PNL TOTAL CA: CPT | Performed by: INTERNAL MEDICINE

## 2020-12-14 PROCEDURE — 36415 COLL VENOUS BLD VENIPUNCTURE: CPT | Performed by: INTERNAL MEDICINE

## 2020-12-15 DIAGNOSIS — Z94.0 KIDNEY TRANSPLANTED: ICD-10-CM

## 2020-12-15 DIAGNOSIS — Z94.0 KIDNEY REPLACED BY TRANSPLANT: Primary | ICD-10-CM

## 2020-12-15 LAB
MYCOPHENOLATE SERPL LC/MS/MS-MCNC: 1.25 MG/L (ref 1–3.5)
MYCOPHENOLATE-G SERPL LC/MS/MS-MCNC: 118.6 MG/L (ref 30–95)
TME LAST DOSE: ABNORMAL H

## 2020-12-15 NOTE — TELEPHONE ENCOUNTER
Spoke to patient/ regarding:  Issue 10.7  Above goal level   Confirmed taking tacrolimus 1.5 mg twice per day   Confirmed 12 hour trough level    Patient verbalizes understanding to lower tacrolimus dose to 1.5 mg in am and 1.0 mg in pm and repeat transplant labs in one week      Confirmed that patient has stopped Valcyte    reviewed and update Epic MAR      reviewed most recent BP,Temp and Weight all WNL.  Patient denies any low grade fevers.  reviewed lab schedule   reviewed and confirmed follow up appointments   Patient denies any missed medications in the past week or past month

## 2020-12-15 NOTE — TELEPHONE ENCOUNTER
Issue 10.7  Above goal level   Plan  Please call    Confirm taking tacrolimus  1.5 mg twice per day   Confirm 12 hour trough level    If the above is accurate   Lower tacrolimus  1.5 mg in am and 1.0 mg in pm   Repeat transplant  Labs in one week     2nd Item   Review  May stop  Valcyte    Please review and update Epic MAR     3rd item   Please review most recent BP ,Temp and Weight    Any low grade fevers ,   Please review lab schedule   Please review and confirm follow up appointments   Review if any missed medications in the past week or past month

## 2020-12-16 RX ORDER — TACROLIMUS 1 MG/1
1 CAPSULE ORAL 2 TIMES DAILY
Qty: 60 CAPSULE | Refills: 11 | Status: SHIPPED | OUTPATIENT
Start: 2020-12-16 | End: 2021-01-05

## 2020-12-16 RX ORDER — TACROLIMUS 0.5 MG/1
0.5 CAPSULE ORAL EVERY MORNING
Qty: 30 CAPSULE | Refills: 11 | Status: SHIPPED | OUTPATIENT
Start: 2020-12-16 | End: 2021-01-05

## 2020-12-21 DIAGNOSIS — Z48.298 AFTERCARE FOLLOWING ORGAN TRANSPLANT: ICD-10-CM

## 2020-12-21 DIAGNOSIS — Z94.0 KIDNEY REPLACED BY TRANSPLANT: ICD-10-CM

## 2020-12-21 DIAGNOSIS — Z94.0 KIDNEY TRANSPLANTED: ICD-10-CM

## 2020-12-21 DIAGNOSIS — Z79.899 ENCOUNTER FOR LONG-TERM CURRENT USE OF MEDICATION: ICD-10-CM

## 2020-12-21 LAB
ANION GAP SERPL CALCULATED.3IONS-SCNC: 7 MMOL/L (ref 3–14)
BASOPHILS # BLD AUTO: 0.1 10E9/L (ref 0–0.2)
BASOPHILS NFR BLD AUTO: 2 %
BUN SERPL-MCNC: 20 MG/DL (ref 7–30)
CALCIUM SERPL-MCNC: 9.8 MG/DL (ref 8.5–10.1)
CHLORIDE SERPL-SCNC: 107 MMOL/L (ref 94–109)
CO2 SERPL-SCNC: 24 MMOL/L (ref 20–32)
CREAT SERPL-MCNC: 0.94 MG/DL (ref 0.52–1.04)
DIFFERENTIAL METHOD BLD: NORMAL
EOSINOPHIL # BLD AUTO: 0.1 10E9/L (ref 0–0.7)
EOSINOPHIL NFR BLD AUTO: 1 %
ERYTHROCYTE [DISTWIDTH] IN BLOOD BY AUTOMATED COUNT: 11.6 % (ref 10–15)
GFR SERPL CREATININE-BSD FRML MDRD: 63 ML/MIN/{1.73_M2}
GLUCOSE SERPL-MCNC: 160 MG/DL (ref 70–99)
HCT VFR BLD AUTO: 37.9 % (ref 35–47)
HGB BLD-MCNC: 12.3 G/DL (ref 11.7–15.7)
LYMPHOCYTES # BLD AUTO: 0.9 10E9/L (ref 0.8–5.3)
LYMPHOCYTES NFR BLD AUTO: 12 %
MCH RBC QN AUTO: 31.9 PG (ref 26.5–33)
MCHC RBC AUTO-ENTMCNC: 32.5 G/DL (ref 31.5–36.5)
MCV RBC AUTO: 98 FL (ref 78–100)
MONOCYTES # BLD AUTO: 0.4 10E9/L (ref 0–1.3)
MONOCYTES NFR BLD AUTO: 5 %
NEUTROPHILS # BLD AUTO: 5.9 10E9/L (ref 1.6–8.3)
NEUTROPHILS NFR BLD AUTO: 80 %
PLATELET # BLD AUTO: 226 10E9/L (ref 150–450)
PLATELET # BLD EST: NORMAL 10*3/UL
POTASSIUM SERPL-SCNC: 4.1 MMOL/L (ref 3.4–5.3)
RBC # BLD AUTO: 3.85 10E12/L (ref 3.8–5.2)
RBC MORPH BLD: NORMAL
SODIUM SERPL-SCNC: 138 MMOL/L (ref 133–144)
TACROLIMUS BLD-MCNC: 9.5 UG/L (ref 5–15)
TME LAST DOSE: 2000 H
WBC # BLD AUTO: 7.4 10E9/L (ref 4–11)

## 2020-12-21 PROCEDURE — 80180 DRUG SCRN QUAN MYCOPHENOLATE: CPT | Performed by: INTERNAL MEDICINE

## 2020-12-21 PROCEDURE — 80048 BASIC METABOLIC PNL TOTAL CA: CPT | Performed by: INTERNAL MEDICINE

## 2020-12-21 PROCEDURE — 36415 COLL VENOUS BLD VENIPUNCTURE: CPT | Performed by: INTERNAL MEDICINE

## 2020-12-21 PROCEDURE — 80197 ASSAY OF TACROLIMUS: CPT | Performed by: INTERNAL MEDICINE

## 2020-12-21 PROCEDURE — 85025 COMPLETE CBC W/AUTO DIFF WBC: CPT | Performed by: INTERNAL MEDICINE

## 2020-12-22 LAB
MYCOPHENOLATE SERPL LC/MS/MS-MCNC: 1.03 MG/L (ref 1–3.5)
MYCOPHENOLATE-G SERPL LC/MS/MS-MCNC: 94.4 MG/L (ref 30–95)
TME LAST DOSE: 2000 H

## 2020-12-28 DIAGNOSIS — Z94.0 KIDNEY REPLACED BY TRANSPLANT: ICD-10-CM

## 2020-12-28 LAB
ANION GAP SERPL CALCULATED.3IONS-SCNC: 5 MMOL/L (ref 3–14)
BASOPHILS # BLD AUTO: 0.1 10E9/L (ref 0–0.2)
BASOPHILS NFR BLD AUTO: 2 %
BUN SERPL-MCNC: 21 MG/DL (ref 7–30)
CALCIUM SERPL-MCNC: 9.9 MG/DL (ref 8.5–10.1)
CHLORIDE SERPL-SCNC: 106 MMOL/L (ref 94–109)
CO2 SERPL-SCNC: 26 MMOL/L (ref 20–32)
CREAT SERPL-MCNC: 0.86 MG/DL (ref 0.52–1.04)
DIFFERENTIAL METHOD BLD: NORMAL
EOSINOPHIL # BLD AUTO: 0.1 10E9/L (ref 0–0.7)
EOSINOPHIL NFR BLD AUTO: 2 %
ERYTHROCYTE [DISTWIDTH] IN BLOOD BY AUTOMATED COUNT: 12.2 % (ref 10–15)
GFR SERPL CREATININE-BSD FRML MDRD: 70 ML/MIN/{1.73_M2}
GLUCOSE SERPL-MCNC: 155 MG/DL (ref 70–99)
HCT VFR BLD AUTO: 38.2 % (ref 35–47)
HGB BLD-MCNC: 12.5 G/DL (ref 11.7–15.7)
LYMPHOCYTES # BLD AUTO: 0.9 10E9/L (ref 0.8–5.3)
LYMPHOCYTES NFR BLD AUTO: 15 %
MCH RBC QN AUTO: 32.1 PG (ref 26.5–33)
MCHC RBC AUTO-ENTMCNC: 32.7 G/DL (ref 31.5–36.5)
MCV RBC AUTO: 98 FL (ref 78–100)
MONOCYTES # BLD AUTO: 0.2 10E9/L (ref 0–1.3)
MONOCYTES NFR BLD AUTO: 3 %
MYCOPHENOLATE SERPL LC/MS/MS-MCNC: 1.3 MG/L (ref 1–3.5)
MYCOPHENOLATE-G SERPL LC/MS/MS-MCNC: 75.2 MG/L (ref 30–95)
NEUTROPHILS # BLD AUTO: 4.6 10E9/L (ref 1.6–8.3)
NEUTROPHILS NFR BLD AUTO: 78 %
PLATELET # BLD AUTO: 204 10E9/L (ref 150–450)
PLATELET # BLD EST: NORMAL 10*3/UL
POTASSIUM SERPL-SCNC: 4.5 MMOL/L (ref 3.4–5.3)
RBC # BLD AUTO: 3.89 10E12/L (ref 3.8–5.2)
RBC MORPH BLD: NORMAL
SODIUM SERPL-SCNC: 137 MMOL/L (ref 133–144)
TACROLIMUS BLD-MCNC: 9.2 UG/L (ref 5–15)
TME LAST DOSE: NORMAL H
TME LAST DOSE: NORMAL H
WBC # BLD AUTO: 5.9 10E9/L (ref 4–11)

## 2020-12-28 PROCEDURE — 85025 COMPLETE CBC W/AUTO DIFF WBC: CPT | Performed by: INTERNAL MEDICINE

## 2020-12-28 PROCEDURE — 80180 DRUG SCRN QUAN MYCOPHENOLATE: CPT | Performed by: INTERNAL MEDICINE

## 2020-12-28 PROCEDURE — 80048 BASIC METABOLIC PNL TOTAL CA: CPT | Performed by: INTERNAL MEDICINE

## 2020-12-28 PROCEDURE — 80197 ASSAY OF TACROLIMUS: CPT | Performed by: INTERNAL MEDICINE

## 2020-12-28 PROCEDURE — 36415 COLL VENOUS BLD VENIPUNCTURE: CPT | Performed by: INTERNAL MEDICINE

## 2020-12-31 ENCOUNTER — TELEPHONE (OUTPATIENT)
Dept: TRANSPLANT | Facility: CLINIC | Age: 65
End: 2020-12-31

## 2021-01-04 ENCOUNTER — ALLIED HEALTH/NURSE VISIT (OUTPATIENT)
Dept: TRANSPLANT | Facility: CLINIC | Age: 66
End: 2021-01-04
Payer: MEDICARE

## 2021-01-04 DIAGNOSIS — Z94.0 KIDNEY REPLACED BY TRANSPLANT: Primary | ICD-10-CM

## 2021-01-04 DIAGNOSIS — Z94.0 KIDNEY REPLACED BY TRANSPLANT: ICD-10-CM

## 2021-01-04 LAB
ANION GAP SERPL CALCULATED.3IONS-SCNC: 6 MMOL/L (ref 3–14)
BUN SERPL-MCNC: 26 MG/DL (ref 7–30)
CALCIUM SERPL-MCNC: 10.2 MG/DL (ref 8.5–10.1)
CHLORIDE SERPL-SCNC: 110 MMOL/L (ref 94–109)
CO2 SERPL-SCNC: 25 MMOL/L (ref 20–32)
CREAT SERPL-MCNC: 0.85 MG/DL (ref 0.52–1.04)
DIFFERENTIAL METHOD BLD: ABNORMAL
EOSINOPHIL # BLD AUTO: 0.1 10E9/L (ref 0–0.7)
EOSINOPHIL NFR BLD AUTO: 2 %
ERYTHROCYTE [DISTWIDTH] IN BLOOD BY AUTOMATED COUNT: 12.4 % (ref 10–15)
GFR SERPL CREATININE-BSD FRML MDRD: 71 ML/MIN/{1.73_M2}
GLUCOSE SERPL-MCNC: 148 MG/DL (ref 70–99)
HCT VFR BLD AUTO: 37.8 % (ref 35–47)
HGB BLD-MCNC: 12.2 G/DL (ref 11.7–15.7)
LYMPHOCYTES # BLD AUTO: 1.1 10E9/L (ref 0.8–5.3)
LYMPHOCYTES NFR BLD AUTO: 35 %
MCH RBC QN AUTO: 31.8 PG (ref 26.5–33)
MCHC RBC AUTO-ENTMCNC: 32.3 G/DL (ref 31.5–36.5)
MCV RBC AUTO: 98 FL (ref 78–100)
METAMYELOCYTES # BLD: 0.1 10E9/L
METAMYELOCYTES NFR BLD MANUAL: 3 %
MONOCYTES # BLD AUTO: 0.1 10E9/L (ref 0–1.3)
MONOCYTES NFR BLD AUTO: 3 %
NEUTROPHILS # BLD AUTO: 1.8 10E9/L (ref 1.6–8.3)
NEUTROPHILS NFR BLD AUTO: 57 %
PLATELET # BLD AUTO: 181 10E9/L (ref 150–450)
PLATELET # BLD EST: ABNORMAL 10*3/UL
POTASSIUM SERPL-SCNC: 4.6 MMOL/L (ref 3.4–5.3)
RBC # BLD AUTO: 3.84 10E12/L (ref 3.8–5.2)
SODIUM SERPL-SCNC: 141 MMOL/L (ref 133–144)
STOMATOCYTES BLD QL SMEAR: SLIGHT
TACROLIMUS BLD-MCNC: 7.3 UG/L (ref 5–15)
TME LAST DOSE: NORMAL H
WBC # BLD AUTO: 3.2 10E9/L (ref 4–11)

## 2021-01-04 PROCEDURE — 85025 COMPLETE CBC W/AUTO DIFF WBC: CPT | Performed by: INTERNAL MEDICINE

## 2021-01-04 PROCEDURE — 80180 DRUG SCRN QUAN MYCOPHENOLATE: CPT | Performed by: INTERNAL MEDICINE

## 2021-01-04 PROCEDURE — 36415 COLL VENOUS BLD VENIPUNCTURE: CPT | Performed by: INTERNAL MEDICINE

## 2021-01-04 PROCEDURE — 80197 ASSAY OF TACROLIMUS: CPT | Performed by: INTERNAL MEDICINE

## 2021-01-04 PROCEDURE — 80048 BASIC METABOLIC PNL TOTAL CA: CPT | Performed by: INTERNAL MEDICINE
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
## 2021-01-05 DIAGNOSIS — Z94.0 KIDNEY REPLACED BY TRANSPLANT: ICD-10-CM

## 2021-01-05 DIAGNOSIS — Z94.0 KIDNEY TRANSPLANTED: Primary | ICD-10-CM

## 2021-01-05 LAB
MYCOPHENOLATE SERPL LC/MS/MS-MCNC: 0.85 MG/L (ref 1–3.5)
MYCOPHENOLATE-G SERPL LC/MS/MS-MCNC: 86.4 MG/L (ref 30–95)
TME LAST DOSE: ABNORMAL H

## 2021-01-05 RX ORDER — TACROLIMUS 0.5 MG/1
0.5 CAPSULE ORAL 2 TIMES DAILY
Qty: 60 CAPSULE | Refills: 11 | Status: SHIPPED | OUTPATIENT
Start: 2021-01-05 | End: 2021-04-20

## 2021-01-05 RX ORDER — TACROLIMUS 1 MG/1
1 CAPSULE ORAL 2 TIMES DAILY
Qty: 60 CAPSULE | Refills: 11 | Status: SHIPPED | OUTPATIENT
Start: 2021-01-05 | End: 2021-04-20

## 2021-01-05 NOTE — TELEPHONE ENCOUNTER
Issue tacrolimus level  7.3 below goal level   Goal level 8  To 10     Plan   Please call confirm 12 hour trough level   Please confirm taking tacrolimus  1.5 mg in am and 1.0 mg in pm   IF the above is accurate increase tacrolimus  To  1.5 mg twice per day   Repeat transplant  Labs in 2 weeks     Please review most recent BP Weight if any fevers

## 2021-01-05 NOTE — TELEPHONE ENCOUNTER
Spoke to patient/ regarding:  Issue tacrolimus level  7.3 below goal level   Goal level 8  To 10   confirmed 12 hour trough level   confirmed taking tacrolimus  1.5 mg in am and 1.0 mg in pm   Patient verbalizes understanding to increase tacrolimus to 1.5 mg twice per day and repeat transplant labs in 2 weeks   Patient states that BP has been WNL and denies any fevers.

## 2021-01-06 ENCOUNTER — TELEPHONE (OUTPATIENT)
Dept: TRANSPLANT | Facility: CLINIC | Age: 66
End: 2021-01-06

## 2021-01-06 ENCOUNTER — VIRTUAL VISIT (OUTPATIENT)
Dept: NEPHROLOGY | Facility: CLINIC | Age: 66
End: 2021-01-06
Attending: INTERNAL MEDICINE
Payer: MEDICARE

## 2021-01-06 DIAGNOSIS — R79.9 ABNORMAL FINDING OF BLOOD CHEMISTRY, UNSPECIFIED: ICD-10-CM

## 2021-01-06 DIAGNOSIS — Z94.0 KIDNEY REPLACED BY TRANSPLANT: Primary | ICD-10-CM

## 2021-01-06 DIAGNOSIS — D84.9 IMMUNOSUPPRESSION (H): ICD-10-CM

## 2021-01-06 DIAGNOSIS — Z48.298 AFTERCARE FOLLOWING ORGAN TRANSPLANT: ICD-10-CM

## 2021-01-06 DIAGNOSIS — Z94.0 HTN, KIDNEY TRANSPLANT RELATED: ICD-10-CM

## 2021-01-06 DIAGNOSIS — R73.9 ELEVATED RANDOM BLOOD GLUCOSE LEVEL: ICD-10-CM

## 2021-01-06 DIAGNOSIS — E55.9 VITAMIN D DEFICIENCY: ICD-10-CM

## 2021-01-06 DIAGNOSIS — I15.1 HTN, KIDNEY TRANSPLANT RELATED: ICD-10-CM

## 2021-01-06 PROBLEM — D64.9 ACUTE ANEMIA: Status: RESOLVED | Noted: 2020-09-06 | Resolved: 2021-01-06

## 2021-01-06 PROBLEM — R79.89 ELEVATED LACTIC ACID LEVEL: Status: RESOLVED | Noted: 2020-09-06 | Resolved: 2021-01-06

## 2021-01-06 PROBLEM — N18.6 END STAGE RENAL DISEASE (H): Status: RESOLVED | Noted: 2020-06-18 | Resolved: 2021-01-06

## 2021-01-06 PROCEDURE — 99443 PR PHYSICIAN TELEPHONE EVALUATION 21-30 MIN: CPT | Mod: 95

## 2021-01-06 RX ORDER — VALACYCLOVIR HYDROCHLORIDE 500 MG/1
500 TABLET, FILM COATED ORAL DAILY
COMMUNITY
Start: 2021-01-06 | End: 2021-02-17

## 2021-01-06 ASSESSMENT — PAIN SCALES - GENERAL: PAINLEVEL: NO PAIN (0)

## 2021-01-06 NOTE — PROGRESS NOTES
Ethel Thompson is a 65 year old female who is being evaluated via a billable telephone visit.      What phone number would you like to be contacted at? [phone]  How would you like to obtain your AVS? Mail a copy  Phone call duration: 21 minutes      CHRONIC TRANSPLANT NEPHROLOGY VISIT    Assessment & Plan   # DDKT: Stable   - Baseline Creatinine:  ~ 0.8-1.0   - Proteinuria: Minimal (0.2-0.5 grams)   - Date DSA Last Checked: Nov/2020      Latest DSA: No   - BK Viremia: No   - Kidney Tx Biopsy: No    # Immunosuppression: Tacrolimus immediate release (goal 8-10) and Mycophenolate mofetil (dose 1000 mg every 12 hours)          - Continue with intensive monitoring of immunosuppression for efficacy and toxicity.          - Changes: No    # Infection Prophylaxis:   - PJP: Sulfa/TMP (Bactrim)   - HSV: Valtrex    # Hypertension: Borderline control;  Goal BP: < 130/80   - Changes: Not at this time, but with elevated blood glucose, would consider changing/adding ACEI/ARB.    # Elevated Blood Glucose: Glucose generally running ~ 130-160s Last HbA1c: 5.4 (9/2020) at time of transplant.   - Management as per primary care.   - Will recheck HbA1c.    # Mineral Bone Disorder:   - Secondary renal hyperparathyroidism; PTH level: Moderately elevated (301-600 pg/ml)        On treatment: None  - Vitamin D; level: Low        On supplement: No  - Calcium; level: High        On supplement: No    # Electrolytes:   - Potassium; level: Normal        On supplement: No  - Magnesium; level: Normal        On supplement: Yes  - Bicarbonate; level: Normal        On supplement: No    # GERD: Minimal symptoms on famotidine as needed.    # SADIE on CPAP: Patient is compliant.    # Medical Compliance: Yes    # Transplant History:  Etiology of Kidney Failure: Focal segmental glomerulosclerosis (FSGS)  Tx: DDKT  Transplant: 9/3/2020 (Kidney)  Donor Type: Donation after Brain Death Donor Class:   Significant changes in immunosuppression: None  Significant  "transplant-related complications: None    Transplant Office Phone Number: [phone]    Assessment and plan was discussed with the patient and she voiced her understanding and agreement.    Return visit: Return for 6 month post transplant visit.    Angel Luis Sheridan MD    Chief Complaint   Ms. Thompson is a 65 year old here for kidney transplant and immunosuppression management.    History of Present Illness    Ms. Thompson reports feeling good overall with some medical complaints.  Since last clinic visit, patient reports no hospitalizations or new medical complaints and has been doing well overall.  Her energy level has improved, but still just \"so-so\" and not normal.  She is active and gets a little exercise.  Denies any chest pain or shortness of breath with exertion.  Appetite is good and weight has been stable.  No nausea or vomiting.  Rare loose stools, which are not much of an issue.  She has minimal heartburn symptoms and just takes famotidine as needed.  No fever, sweats or chills.  Occasional mild night sweats since transplant, which are better lately.  No leg swelling.    Home BP: 120-130/60-70s    Problem List   Patient Active Problem List   Diagnosis     Elevated serum immunoglobulin free light chain level     FSGS (focal segmental glomerulosclerosis)     Dyslipidemia     Hypothyroidism     SADIE on CPAP     Sensorineural hearing loss (SNHL) of both ears     GERD (gastroesophageal reflux disease)     HTN, kidney transplant related     Hepatitis B core antibody positive     Secondary renal hyperparathyroidism (H)     Memory deficits     Kidney replaced by transplant     Immunosuppression (H)     Aftercare following organ transplant     Vitamin D deficiency     Elevated random blood glucose level       Allergies   Allergies   Allergen Reactions     Amoxicillin-Pot Clavulanate Nausea and Vomiting       Medications   Current Outpatient Medications   Medication Sig     acetaminophen (TYLENOL) 325 MG tablet Take " 2 tablets (650 mg) by mouth every 4 hours as needed for pain     amLODIPine (NORVASC) 5 MG tablet Take 2 tablets (10 mg) by mouth daily     aspirin (ASA) 81 MG EC tablet Take 1 tablet (81 mg) by mouth daily     atorvastatin (LIPITOR) 40 MG tablet Take 0.5 tablets (20 mg) by mouth daily     famotidine (PEPCID) 20 MG tablet Take 20 mg by mouth every evening      fluticasone (FLONASE) 50 MCG/ACT nasal spray Spray 1 spray into both nostrils 2 times daily as needed for rhinitis or allergies     levothyroxine (SYNTHROID/LEVOTHROID) 112 MCG tablet Take 112 mcg (1 tablet) by mouth every Monday, Tuesday is 1/2 tab, Wednesday, Thursday, Friday     magnesium oxide (MAG-OX) 400 MG tablet Take 1 tablet (400 mg) by mouth daily (with lunch)     melatonin 3 MG tablet Take 6 mg by mouth At Bedtime      mycophenolate (GENERIC EQUIVALENT) 250 MG capsule Take 4 capsules (1,000 mg) by mouth 2 times daily     psyllium (METAMUCIL/KONSYL) 58.6 % powder Take by mouth daily     sulfamethoxazole-trimethoprim (BACTRIM) 400-80 MG tablet Take 1 tablet by mouth daily     tacrolimus (GENERIC EQUIVALENT) 0.5 MG capsule Take 1 capsule (0.5 mg) by mouth 2 times daily Total dose = 1.5 mg BID     tacrolimus (GENERIC EQUIVALENT) 1 MG capsule Take 1 capsule (1 mg) by mouth 2 times daily Total dose = 1.5 mg BID     valACYclovir (VALTREX) 500 MG tablet Take 1 tablet (500 mg) by mouth daily     No current facility-administered medications for this visit.      Medications Discontinued During This Encounter   Medication Reason     traMADol (ULTRAM) 50 MG tablet      valGANciclovir (VALCYTE) 450 MG tablet      gabapentin (NEURONTIN) 100 MG capsule      polyethylene glycol (MIRALAX) 17 GM/Dose powder      Lidocaine (LIDOCARE) 4 % Patch        Physical Exam   Vital signs and physical exam were deferred for this telemedicine visit.    Data     Renal Latest Ref Rng & Units 1/4/2021 12/28/2020 12/21/2020   Na 133 - 144 mmol/L 141 137 138   Na (external) 135 - 145  mmol/L - - -   K 3.4 - 5.3 mmol/L 4.6 4.5 4.1   K (external) 3.5 - 5.0 mmol/L - - -   Cl 94 - 109 mmol/L 110(H) 106 107   Cl (external) 98 - 110 mmol/L - - -   CO2 20 - 32 mmol/L 25 26 24   CO2 (external) 21 - 31 mmol/L - - -   BUN 7 - 30 mg/dL 26 21 20   BUN (external) 8 - 25 mg/dL - - -   Cr 0.52 - 1.04 mg/dL 0.85 0.86 0.94   Cr (external) 0.57 - 1.11 mg/dL - - -   Glucose 70 - 99 mg/dL 148(H) 155(H) 160(H)   Glucose (external) 65 - 100 mg/dL - - -   Ca  8.5 - 10.1 mg/dL 10.2(H) 9.9 9.8   Ca (external) 8.5 - 10.5 mg/dL - - -   Mg 1.6 - 2.3 mg/dL - - -   Mg (external) 1.6 - 2.6 mg/dL - - -     Bone Health Latest Ref Rng & Units 12/14/2020 11/30/2020 11/23/2020   Phos 2.5 - 4.5 mg/dL 3.2 2.9 2.8   Phos (external) 2.3 - 4.7 mg/dL - - -   PTHi 18 - 80 pg/mL - - -   Vit D Def 20 - 75 ug/L - - -     Heme Latest Ref Rng & Units 1/4/2021 12/28/2020 12/21/2020   WBC 4.0 - 11.0 10e9/L 3.2(L) 5.9 7.4   WBC (external) 4.5 - 11.0 thou/cu mm - - -   Hgb 11.7 - 15.7 g/dL 12.2 12.5 12.3   Hgb (external) 12.0 - 16.0 g/dL - - -   Plt 150 - 450 10e9/L 181 204 226   Plt (external) 140 - 440 thou/cu mm - - -   ABSOLUTE NEUTROPHIL 1.6 - 8.3 10e9/L 1.8 4.6 5.9   ABSOLUTE LYMPHOCYTES 0.8 - 5.3 10e9/L 1.1 0.9 0.9   ABSOLUTE MONOCYTES 0.0 - 1.3 10e9/L 0.1 0.2 0.4   ABSOLUTE EOSINOPHILS 0.0 - 0.7 10e9/L 0.1 0.1 0.1   ABSOLUTE BASOPHILS 0.0 - 0.2 10e9/L - 0.1 0.1   ABS IMMATURE GRANULOCYTES 0 - 0.4 10e9/L - - -   ABSOLUTE NUCLEATED RBC - - - -     Liver Latest Ref Rng & Units 9/9/2020 9/5/2020 9/4/2020   AP 40 - 150 U/L - 53 60   TBili 0.2 - 1.3 mg/dL - 0.4 1.1   DBili 0.0 - 0.2 mg/dL - 0.2 0.1   ALT 0 - 50 U/L - 52(H) 41   AST 0 - 45 U/L - 50(H) 38   Tot Protein 6.8 - 8.8 g/dL - 5.4(L) 5.2(L)   Albumin 3.4 - 5.0 g/dL 2.9(L) 2.8(L) 2.3(L)     Pancreas Latest Ref Rng & Units 9/3/2020   A1C 0 - 5.6 % 5.4     Iron studies Latest Ref Rng & Units 9/9/2020   Iron 35 - 180 ug/dL 162   Iron sat 15 - 46 % 92(H)   Ferritin 8 - 252 ng/mL 1,757(H)      [facility] Txp Virology Latest Ref Rng & Units 12/14/2020 11/3/2020 10/15/2020   BK Spec - Plasma Plasma -   BK Res BKNEG:BK Virus DNA Not Detected copies/mL BK Virus DNA Not Detected BK Virus DNA Not Detected -   BK Log <2.7 Log copies/mL Not Calculated Not Calculated -   BK Quant Log Ext - - - Unable to calculate   BK Quant Result Ext Negative copies/mL - - Negative   BK Quant Spec Ext - - - Blood   EBV CAPSID ANTIBODY IGG 0.0 - 0.8 AI - - -   Hep B Core NR:Nonreactive - - -        Recent Labs   Lab Test 12/21/20 0751 12/28/20  0845 01/04/21  0820   DOSTAC 2,000 2000 12/27/2020 2000 2021   TACROL 9.5 9.2 7.3     Recent Labs   Lab Test 12/21/20 0751 12/28/20  0845 01/04/21  0820   DOSMPA 2,000 2000 12/27/2020 2000 2021   MPACID 1.03 1.30 0.85*   MPAG 94.4 75.2 86.4

## 2021-01-06 NOTE — LETTER
1/6/2021      RE: Ethel Thompson  [address]  [place] [address]       Ethel Thompson is a 65 year old female who is being evaluated via a billable telephone visit.      What phone number would you like to be contacted at? [phone]  How would you like to obtain your AVS? Mail a copy  Phone call duration: 21 minutes      CHRONIC TRANSPLANT NEPHROLOGY VISIT    Assessment & Plan   # DDKT: Stable   - Baseline Creatinine:  ~ 0.8-1.0   - Proteinuria: Minimal (0.2-0.5 grams)   - Date DSA Last Checked: Nov/2020      Latest DSA: No   - BK Viremia: No   - Kidney Tx Biopsy: No    # Immunosuppression: Tacrolimus immediate release (goal 8-10) and Mycophenolate mofetil (dose 1000 mg every 12 hours)          - Continue with intensive monitoring of immunosuppression for efficacy and toxicity.          - Changes: No    # Infection Prophylaxis:   - PJP: Sulfa/TMP (Bactrim)   - HSV: Valtrex    # Hypertension: Borderline control;  Goal BP: < 130/80   - Changes: Not at this time, but with elevated blood glucose, would consider changing/adding ACEI/ARB.    # Elevated Blood Glucose: Glucose generally running ~ 130-160s Last HbA1c: 5.4 (9/2020) at time of transplant.   - Management as per primary care.   - Will recheck HbA1c.    # Mineral Bone Disorder:   - Secondary renal hyperparathyroidism; PTH level: Moderately elevated (301-600 pg/ml)        On treatment: None  - Vitamin D; level: Low        On supplement: No  - Calcium; level: High        On supplement: No    # Electrolytes:   - Potassium; level: Normal        On supplement: No  - Magnesium; level: Normal        On supplement: Yes  - Bicarbonate; level: Normal        On supplement: No    # GERD: Minimal symptoms on famotidine as needed.    # SADIE on CPAP: Patient is compliant.    # Medical Compliance: Yes    # Transplant History:  Etiology of Kidney Failure: Focal segmental glomerulosclerosis (FSGS)  Tx: DDKT  Transplant: 9/3/2020 (Kidney)  Donor Type: Donation after Brain  "Death Donor Class:   Significant changes in immunosuppression: None  Significant transplant-related complications: None    Transplant Office Phone Number: [phone]    Assessment and plan was discussed with the patient and she voiced her understanding and agreement.    Return visit: Return for 6 month post transplant visit.    Angel Luis Sheridan MD    Chief Complaint   Ms. Thompson is a 65 year old here for kidney transplant and immunosuppression management.    History of Present Illness    Ms. Thompson reports feeling good overall with some medical complaints.  Since last clinic visit, patient reports no hospitalizations or new medical complaints and has been doing well overall.  Her energy level has improved, but still just \"so-so\" and not normal.  She is active and gets a little exercise.  Denies any chest pain or shortness of breath with exertion.  Appetite is good and weight has been stable.  No nausea or vomiting.  Rare loose stools, which are not much of an issue.  She has minimal heartburn symptoms and just takes famotidine as needed.  No fever, sweats or chills.  Occasional mild night sweats since transplant, which are better lately.  No leg swelling.    Home BP: 120-130/60-70s    Problem List   Patient Active Problem List   Diagnosis     Elevated serum immunoglobulin free light chain level     FSGS (focal segmental glomerulosclerosis)     Dyslipidemia     Hypothyroidism     SADIE on CPAP     Sensorineural hearing loss (SNHL) of both ears     GERD (gastroesophageal reflux disease)     HTN, kidney transplant related     Hepatitis B core antibody positive     Secondary renal hyperparathyroidism (H)     Memory deficits     Kidney replaced by transplant     Immunosuppression (H)     Aftercare following organ transplant     Vitamin D deficiency     Elevated random blood glucose level       Allergies   Allergies   Allergen Reactions     Amoxicillin-Pot Clavulanate Nausea and Vomiting       Medications   Current " Outpatient Medications   Medication Sig     acetaminophen (TYLENOL) 325 MG tablet Take 2 tablets (650 mg) by mouth every 4 hours as needed for pain     amLODIPine (NORVASC) 5 MG tablet Take 2 tablets (10 mg) by mouth daily     aspirin (ASA) 81 MG EC tablet Take 1 tablet (81 mg) by mouth daily     atorvastatin (LIPITOR) 40 MG tablet Take 0.5 tablets (20 mg) by mouth daily     famotidine (PEPCID) 20 MG tablet Take 20 mg by mouth every evening      fluticasone (FLONASE) 50 MCG/ACT nasal spray Spray 1 spray into both nostrils 2 times daily as needed for rhinitis or allergies     levothyroxine (SYNTHROID/LEVOTHROID) 112 MCG tablet Take 112 mcg (1 tablet) by mouth every Monday, Tuesday is 1/2 tab, Wednesday, Thursday, Friday     magnesium oxide (MAG-OX) 400 MG tablet Take 1 tablet (400 mg) by mouth daily (with lunch)     melatonin 3 MG tablet Take 6 mg by mouth At Bedtime      mycophenolate (GENERIC EQUIVALENT) 250 MG capsule Take 4 capsules (1,000 mg) by mouth 2 times daily     psyllium (METAMUCIL/KONSYL) 58.6 % powder Take by mouth daily     sulfamethoxazole-trimethoprim (BACTRIM) 400-80 MG tablet Take 1 tablet by mouth daily     tacrolimus (GENERIC EQUIVALENT) 0.5 MG capsule Take 1 capsule (0.5 mg) by mouth 2 times daily Total dose = 1.5 mg BID     tacrolimus (GENERIC EQUIVALENT) 1 MG capsule Take 1 capsule (1 mg) by mouth 2 times daily Total dose = 1.5 mg BID     valACYclovir (VALTREX) 500 MG tablet Take 1 tablet (500 mg) by mouth daily     No current facility-administered medications for this visit.      Medications Discontinued During This Encounter   Medication Reason     traMADol (ULTRAM) 50 MG tablet      valGANciclovir (VALCYTE) 450 MG tablet      gabapentin (NEURONTIN) 100 MG capsule      polyethylene glycol (MIRALAX) 17 GM/Dose powder      Lidocaine (LIDOCARE) 4 % Patch        Physical Exam   Vital signs and physical exam were deferred for this telemedicine visit.    Data     Renal Latest Ref Rng & Units  1/4/2021 12/28/2020 12/21/2020   Na 133 - 144 mmol/L 141 137 138   Na (external) 135 - 145 mmol/L - - -   K 3.4 - 5.3 mmol/L 4.6 4.5 4.1   K (external) 3.5 - 5.0 mmol/L - - -   Cl 94 - 109 mmol/L 110(H) 106 107   Cl (external) 98 - 110 mmol/L - - -   CO2 20 - 32 mmol/L 25 26 24   CO2 (external) 21 - 31 mmol/L - - -   BUN 7 - 30 mg/dL 26 21 20   BUN (external) 8 - 25 mg/dL - - -   Cr 0.52 - 1.04 mg/dL 0.85 0.86 0.94   Cr (external) 0.57 - 1.11 mg/dL - - -   Glucose 70 - 99 mg/dL 148(H) 155(H) 160(H)   Glucose (external) 65 - 100 mg/dL - - -   Ca  8.5 - 10.1 mg/dL 10.2(H) 9.9 9.8   Ca (external) 8.5 - 10.5 mg/dL - - -   Mg 1.6 - 2.3 mg/dL - - -   Mg (external) 1.6 - 2.6 mg/dL - - -     Bone Health Latest Ref Rng & Units 12/14/2020 11/30/2020 11/23/2020   Phos 2.5 - 4.5 mg/dL 3.2 2.9 2.8   Phos (external) 2.3 - 4.7 mg/dL - - -   PTHi 18 - 80 pg/mL - - -   Vit D Def 20 - 75 ug/L - - -     Heme Latest Ref Rng & Units 1/4/2021 12/28/2020 12/21/2020   WBC 4.0 - 11.0 10e9/L 3.2(L) 5.9 7.4   WBC (external) 4.5 - 11.0 thou/cu mm - - -   Hgb 11.7 - 15.7 g/dL 12.2 12.5 12.3   Hgb (external) 12.0 - 16.0 g/dL - - -   Plt 150 - 450 10e9/L 181 204 226   Plt (external) 140 - 440 thou/cu mm - - -   ABSOLUTE NEUTROPHIL 1.6 - 8.3 10e9/L 1.8 4.6 5.9   ABSOLUTE LYMPHOCYTES 0.8 - 5.3 10e9/L 1.1 0.9 0.9   ABSOLUTE MONOCYTES 0.0 - 1.3 10e9/L 0.1 0.2 0.4   ABSOLUTE EOSINOPHILS 0.0 - 0.7 10e9/L 0.1 0.1 0.1   ABSOLUTE BASOPHILS 0.0 - 0.2 10e9/L - 0.1 0.1   ABS IMMATURE GRANULOCYTES 0 - 0.4 10e9/L - - -   ABSOLUTE NUCLEATED RBC - - - -     Liver Latest Ref Rng & Units 9/9/2020 9/5/2020 9/4/2020   AP 40 - 150 U/L - 53 60   TBili 0.2 - 1.3 mg/dL - 0.4 1.1   DBili 0.0 - 0.2 mg/dL - 0.2 0.1   ALT 0 - 50 U/L - 52(H) 41   AST 0 - 45 U/L - 50(H) 38   Tot Protein 6.8 - 8.8 g/dL - 5.4(L) 5.2(L)   Albumin 3.4 - 5.0 g/dL 2.9(L) 2.8(L) 2.3(L)     Pancreas Latest Ref Rng & Units 9/3/2020   A1C 0 - 5.6 % 5.4     Iron studies Latest Ref Rng & Units 9/9/2020    Iron 35 - 180 ug/dL 162   Iron sat 15 - 46 % 92(H)   Ferritin 8 - 252 ng/mL 1,757(H)     [facility] Txp Virology Latest Ref Rng & Units 12/14/2020 11/3/2020 10/15/2020   BK Spec - Plasma Plasma -   BK Res BKNEG:BK Virus DNA Not Detected copies/mL BK Virus DNA Not Detected BK Virus DNA Not Detected -   BK Log <2.7 Log copies/mL Not Calculated Not Calculated -   BK Quant Log Ext - - - Unable to calculate   BK Quant Result Ext Negative copies/mL - - Negative   BK Quant Spec Ext - - - Blood   EBV CAPSID ANTIBODY IGG 0.0 - 0.8 AI - - -   Hep B Core NR:Nonreactive - - -        Recent Labs   Lab Test 12/21/20 0751 12/28/20  0845 01/04/21  0820   DOSTAC 2,000 2000 12/27/2020 2000 2021   TACROL 9.5 9.2 7.3     Recent Labs   Lab Test 12/21/20 0751 12/28/20  0845 01/04/21  0820   DOSMPA 2,000 2000 12/27/2020 2000 2021   MPACID 1.03 1.30 0.85*   MPAG 94.4 75.2 86.4       Angel Luis Sheridan MD

## 2021-01-06 NOTE — TELEPHONE ENCOUNTER
Angel Luis Sheridan MD Huepfel, Mary K, RN             Please check HbA1c and UPC needs updating.          The above orders placed

## 2021-01-06 NOTE — LETTER
1/6/2021       RE: Ethel Thompson  [address]  [place] [address]     Dear Colleague,    Thank you for referring your patient, Ethel Thompson, to the [facility] [place] at [facility]. Please see a copy of my visit note below.    Ethel Thompson is a 65 year old female who is being evaluated via a billable telephone visit.      What phone number would you like to be contacted at? [phone]  How would you like to obtain your AVS? Mail a copy  Phone call duration: 21 minutes      CHRONIC TRANSPLANT NEPHROLOGY VISIT    Assessment & Plan   # DDKT: Stable   - Baseline Creatinine:  ~ 0.8-1.0   - Proteinuria: Minimal (0.2-0.5 grams)   - Date DSA Last Checked: Nov/2020      Latest DSA: No   - BK Viremia: No   - Kidney Tx Biopsy: No    # Immunosuppression: Tacrolimus immediate release (goal 8-10) and Mycophenolate mofetil (dose 1000 mg every 12 hours)          - Continue with intensive monitoring of immunosuppression for efficacy and toxicity.          - Changes: No    # Infection Prophylaxis:   - PJP: Sulfa/TMP (Bactrim)   - HSV: Valtrex    # Hypertension: Borderline control;  Goal BP: < 130/80   - Changes: Not at this time, but with elevated blood glucose, would consider changing/adding ACEI/ARB.    # Elevated Blood Glucose: Glucose generally running ~ 130-160s Last HbA1c: 5.4 (9/2020) at time of transplant.   - Management as per primary care.   - Will recheck HbA1c.    # Mineral Bone Disorder:   - Secondary renal hyperparathyroidism; PTH level: Moderately elevated (301-600 pg/ml)        On treatment: None  - Vitamin D; level: Low        On supplement: No  - Calcium; level: High        On supplement: No    # Electrolytes:   - Potassium; level: Normal        On supplement: No  - Magnesium; level: Normal        On supplement: Yes  - Bicarbonate; level: Normal        On supplement: No    # GERD: Minimal symptoms on famotidine as needed.    # SADIE on CPAP: Patient  "is compliant.    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    Return visit: Return for 6 month post transplant visit.    Angel Luis Sheridan MD    Chief Complaint   Ms. Thompson is a 65 year old here for kidney transplant and immunosuppression management.    History of Present Illness    Ms. Thompson reports feeling good overall with some medical complaints.  Since last clinic visit, patient reports no hospitalizations or new medical complaints and has been doing well overall.  Her energy level has improved, but still just \"so-so\" and not normal.  She is active and gets a little exercise.  Denies any chest pain or shortness of breath with exertion.  Appetite is good and weight has been stable.  No nausea or vomiting.  Rare loose stools, which are not much of an issue.  She has minimal heartburn symptoms and just takes famotidine as needed.  No fever, sweats or chills.  Occasional mild night sweats since transplant, which are better lately.  No leg swelling.    Home BP: 120-130/60-70s    Problem List   Patient Active Problem List   Diagnosis     Elevated serum immunoglobulin free light chain level     FSGS (focal segmental glomerulosclerosis)     Dyslipidemia     Hypothyroidism     SADIE on CPAP     Sensorineural hearing loss (SNHL) of both ears     GERD (gastroesophageal reflux disease)     HTN, kidney transplant related     Hepatitis B core antibody positive     Secondary renal hyperparathyroidism (H)     Memory deficits     Kidney replaced by transplant     Immunosuppression (H)     Aftercare following " organ transplant     Vitamin D deficiency     Elevated random blood glucose level       Allergies   Allergies   Allergen Reactions     Amoxicillin-Pot Clavulanate Nausea and Vomiting       Medications   Current Outpatient Medications   Medication Sig     acetaminophen (TYLENOL) 325 MG tablet Take 2 tablets (650 mg) by mouth every 4 hours as needed for pain     amLODIPine (NORVASC) 5 MG tablet Take 2 tablets (10 mg) by mouth daily     aspirin (ASA) 81 MG EC tablet Take 1 tablet (81 mg) by mouth daily     atorvastatin (LIPITOR) 40 MG tablet Take 0.5 tablets (20 mg) by mouth daily     famotidine (PEPCID) 20 MG tablet Take 20 mg by mouth every evening      fluticasone (FLONASE) 50 MCG/ACT nasal spray Spray 1 spray into both nostrils 2 times daily as needed for rhinitis or allergies     levothyroxine (SYNTHROID/LEVOTHROID) 112 MCG tablet Take 112 mcg (1 tablet) by mouth every Monday, Tuesday is 1/2 tab, Wednesday, Thursday, Friday     magnesium oxide (MAG-OX) 400 MG tablet Take 1 tablet (400 mg) by mouth daily (with lunch)     melatonin 3 MG tablet Take 6 mg by mouth At Bedtime      mycophenolate (GENERIC EQUIVALENT) 250 MG capsule Take 4 capsules (1,000 mg) by mouth 2 times daily     psyllium (METAMUCIL/KONSYL) 58.6 % powder Take by mouth daily     sulfamethoxazole-trimethoprim (BACTRIM) 400-80 MG tablet Take 1 tablet by mouth daily     tacrolimus (GENERIC EQUIVALENT) 0.5 MG capsule Take 1 capsule (0.5 mg) by mouth 2 times daily Total dose = 1.5 mg BID     tacrolimus (GENERIC EQUIVALENT) 1 MG capsule Take 1 capsule (1 mg) by mouth 2 times daily Total dose = 1.5 mg BID     valACYclovir (VALTREX) 500 MG tablet Take 1 tablet (500 mg) by mouth daily     No current facility-administered medications for this visit.      Medications Discontinued During This Encounter   Medication Reason     traMADol (ULTRAM) 50 MG tablet      valGANciclovir (VALCYTE) 450 MG tablet      gabapentin (NEURONTIN) 100 MG capsule      polyethylene  glycol (MIRALAX) 17 GM/Dose powder      Lidocaine (LIDOCARE) 4 % Patch        Physical Exam   Vital signs and physical exam were deferred for this telemedicine visit.    Data     Renal Latest Ref Rng & Units 1/4/2021 12/28/2020 12/21/2020   Na 133 - 144 mmol/L 141 137 138   Na (external) 135 - 145 mmol/L - - -   K 3.4 - 5.3 mmol/L 4.6 4.5 4.1   K (external) 3.5 - 5.0 mmol/L - - -   Cl 94 - 109 mmol/L 110(H) 106 107   Cl (external) 98 - 110 mmol/L - - -   CO2 20 - 32 mmol/L 25 26 24   CO2 (external) 21 - 31 mmol/L - - -   BUN 7 - 30 mg/dL 26 21 20   BUN (external) 8 - 25 mg/dL - - -   Cr 0.52 - 1.04 mg/dL 0.85 0.86 0.94   Cr (external) 0.57 - 1.11 mg/dL - - -   Glucose 70 - 99 mg/dL 148(H) 155(H) 160(H)   Glucose (external) 65 - 100 mg/dL - - -   Ca  8.5 - 10.1 mg/dL 10.2(H) 9.9 9.8   Ca (external) 8.5 - 10.5 mg/dL - - -   Mg 1.6 - 2.3 mg/dL - - -   Mg (external) 1.6 - 2.6 mg/dL - - -     Bone Health Latest Ref Rng & Units 12/14/2020 11/30/2020 11/23/2020   Phos 2.5 - 4.5 mg/dL 3.2 2.9 2.8   Phos (external) 2.3 - 4.7 mg/dL - - -   PTHi 18 - 80 pg/mL - - -   Vit D Def 20 - 75 ug/L - - -     Heme Latest Ref Rng & Units 1/4/2021 12/28/2020 12/21/2020   WBC 4.0 - 11.0 10e9/L 3.2(L) 5.9 7.4   WBC (external) 4.5 - 11.0 thou/cu mm - - -   Hgb 11.7 - 15.7 g/dL 12.2 12.5 12.3   Hgb (external) 12.0 - 16.0 g/dL - - -   Plt 150 - 450 10e9/L 181 204 226   Plt (external) 140 - 440 thou/cu mm - - -   ABSOLUTE NEUTROPHIL 1.6 - 8.3 10e9/L 1.8 4.6 5.9   ABSOLUTE LYMPHOCYTES 0.8 - 5.3 10e9/L 1.1 0.9 0.9   ABSOLUTE MONOCYTES 0.0 - 1.3 10e9/L 0.1 0.2 0.4   ABSOLUTE EOSINOPHILS 0.0 - 0.7 10e9/L 0.1 0.1 0.1   ABSOLUTE BASOPHILS 0.0 - 0.2 10e9/L - 0.1 0.1   ABS IMMATURE GRANULOCYTES 0 - 0.4 10e9/L - - -   ABSOLUTE NUCLEATED RBC - - - -     Liver Latest Ref Rng & Units 9/9/2020 9/5/2020 9/4/2020   AP 40 - 150 U/L - 53 60   TBili 0.2 - 1.3 mg/dL - 0.4 1.1   DBili 0.0 - 0.2 mg/dL - 0.2 0.1   ALT 0 - 50 U/L - 52(H) 41   AST 0 - 45 U/L - 50(H)  38   Tot Protein 6.8 - 8.8 g/dL - 5.4(L) 5.2(L)   Albumin 3.4 - 5.0 g/dL 2.9(L) 2.8(L) 2.3(L)     Pancreas Latest Ref Rng & Units 9/3/2020   A1C 0 - 5.6 % 5.4     Iron studies Latest Ref Rng & Units 9/9/2020   Iron 35 - 180 ug/dL 162   Iron sat 15 - 46 % 92(H)   Ferritin 8 - 252 ng/mL 1,757(H)     [facility] Txp Virology Latest Ref Rng & Units 12/14/2020 11/3/2020 10/15/2020   BK Spec - Plasma Plasma -   BK Res BKNEG:BK Virus DNA Not Detected copies/mL BK Virus DNA Not Detected BK Virus DNA Not Detected -   BK Log <2.7 Log copies/mL Not Calculated Not Calculated -   BK Quant Log Ext - - - Unable to calculate   BK Quant Result Ext Negative copies/mL - - Negative   BK Quant Spec Ext - - - Blood   EBV CAPSID ANTIBODY IGG 0.0 - 0.8 AI - - -   Hep B Core NR:Nonreactive - - -        Recent Labs   Lab Test 12/21/20  0751 12/28/20  0845 01/04/21  0820   DOSTAC 2,000 2000 12/27/2020 2000 2021   TACROL 9.5 9.2 7.3     Recent Labs   Lab Test 12/21/20  0751 12/28/20  0845 01/04/21  0820   DOSMPA 2,000 2000 12/27/2020 2000 2021   MPACID 1.03 1.30 0.85*   MPAG 94.4 75.2 86.4       Again, thank you for allowing me to participate in the care of your patient.      Sincerely,    Early Post Transplant

## 2021-01-18 ENCOUNTER — TELEPHONE (OUTPATIENT)
Dept: TRANSPLANT | Facility: CLINIC | Age: 66
End: 2021-01-18

## 2021-01-18 ENCOUNTER — RESULTS ONLY (OUTPATIENT)
Dept: OTHER | Facility: CLINIC | Age: 66
End: 2021-01-18

## 2021-01-18 DIAGNOSIS — Z79.899 ENCOUNTER FOR LONG-TERM CURRENT USE OF MEDICATION: ICD-10-CM

## 2021-01-18 DIAGNOSIS — Z48.298 AFTERCARE FOLLOWING ORGAN TRANSPLANT: ICD-10-CM

## 2021-01-18 DIAGNOSIS — Z94.0 KIDNEY REPLACED BY TRANSPLANT: Primary | ICD-10-CM

## 2021-01-18 DIAGNOSIS — Z94.0 KIDNEY REPLACED BY TRANSPLANT: ICD-10-CM

## 2021-01-18 LAB
ANION GAP SERPL CALCULATED.3IONS-SCNC: 6 MMOL/L (ref 3–14)
BASOPHILS # BLD AUTO: 0.1 10E9/L (ref 0–0.2)
BASOPHILS NFR BLD AUTO: 3 %
BUN SERPL-MCNC: 29 MG/DL (ref 7–30)
CALCIUM SERPL-MCNC: 9.5 MG/DL (ref 8.5–10.1)
CHLORIDE SERPL-SCNC: 108 MMOL/L (ref 94–109)
CO2 SERPL-SCNC: 26 MMOL/L (ref 20–32)
CREAT SERPL-MCNC: 0.84 MG/DL (ref 0.52–1.04)
CREAT UR-MCNC: 73 MG/DL
DIFFERENTIAL METHOD BLD: ABNORMAL
EOSINOPHIL # BLD AUTO: 0.1 10E9/L (ref 0–0.7)
EOSINOPHIL NFR BLD AUTO: 5 %
ERYTHROCYTE [DISTWIDTH] IN BLOOD BY AUTOMATED COUNT: 12.7 % (ref 10–15)
GFR SERPL CREATININE-BSD FRML MDRD: 72 ML/MIN/{1.73_M2}
GLUCOSE SERPL-MCNC: 161 MG/DL (ref 70–99)
HCT VFR BLD AUTO: 34.9 % (ref 35–47)
HGB BLD-MCNC: 11.6 G/DL (ref 11.7–15.7)
LYMPHOCYTES # BLD AUTO: 0.7 10E9/L (ref 0.8–5.3)
LYMPHOCYTES NFR BLD AUTO: 43 %
MCH RBC QN AUTO: 32.1 PG (ref 26.5–33)
MCHC RBC AUTO-ENTMCNC: 33.2 G/DL (ref 31.5–36.5)
MCV RBC AUTO: 97 FL (ref 78–100)
MONOCYTES # BLD AUTO: 0.4 10E9/L (ref 0–1.3)
MONOCYTES NFR BLD AUTO: 24 %
NEUTROPHILS # BLD AUTO: 0.4 10E9/L (ref 1.6–8.3)
NEUTROPHILS NFR BLD AUTO: 25 %
PLATELET # BLD AUTO: 178 10E9/L (ref 150–450)
PLATELET # BLD EST: ABNORMAL 10*3/UL
POTASSIUM SERPL-SCNC: 4.2 MMOL/L (ref 3.4–5.3)
PROT UR-MCNC: 0.97 G/L
PROT/CREAT 24H UR: 1.32 G/G CR (ref 0–0.2)
PTH-INTACT SERPL-MCNC: 151 PG/ML (ref 18–80)
RBC # BLD AUTO: 3.61 10E12/L (ref 3.8–5.2)
RBC MORPH BLD: NORMAL
SODIUM SERPL-SCNC: 140 MMOL/L (ref 133–144)
TACROLIMUS BLD-MCNC: 10.3 UG/L (ref 5–15)
TME LAST DOSE: NORMAL H
WBC # BLD AUTO: 1.7 10E9/L (ref 4–11)

## 2021-01-18 PROCEDURE — 86832 HLA CLASS I HIGH DEFIN QUAL: CPT | Performed by: INTERNAL MEDICINE

## 2021-01-18 PROCEDURE — 83970 ASSAY OF PARATHORMONE: CPT | Performed by: INTERNAL MEDICINE

## 2021-01-18 PROCEDURE — 36415 COLL VENOUS BLD VENIPUNCTURE: CPT | Performed by: INTERNAL MEDICINE

## 2021-01-18 PROCEDURE — 86833 HLA CLASS II HIGH DEFIN QUAL: CPT | Performed by: INTERNAL MEDICINE

## 2021-01-18 PROCEDURE — 80180 DRUG SCRN QUAN MYCOPHENOLATE: CPT | Performed by: INTERNAL MEDICINE

## 2021-01-18 PROCEDURE — 84156 ASSAY OF PROTEIN URINE: CPT | Performed by: INTERNAL MEDICINE

## 2021-01-18 PROCEDURE — 80048 BASIC METABOLIC PNL TOTAL CA: CPT | Performed by: INTERNAL MEDICINE

## 2021-01-18 PROCEDURE — 80197 ASSAY OF TACROLIMUS: CPT | Performed by: INTERNAL MEDICINE

## 2021-01-18 PROCEDURE — 85025 COMPLETE CBC W/AUTO DIFF WBC: CPT | Performed by: INTERNAL MEDICINE

## 2021-01-18 NOTE — TELEPHONE ENCOUNTER
DATE:  1/18/2021   TIME OF RECEIPT FROM LAB:  1040  LAB TEST:  Wbc,ANC  LAB VALUE:  Wbc=1.7---ANC=0.35  RESULTS GIVEN WITH READ-BACK TO (PROVIDER): Tiffany Paul  TIME LAB VALUE REPORTED TO PROVIDER:  1046

## 2021-01-18 NOTE — TELEPHONE ENCOUNTER
Issue  WBC    And urine protein      Called  Mija / denies any recent illness -- Denies fevers , fatigue ,uti symptoms   Currently on Valtrex long term for [place]       Reviewed with Dr Maria león tmp sulfa single strength  /valtrex    No changes with immunosuppression medications  Hx FSGS     Orders entered       Reviewed plan Aubrey /Maxim  to repeat labs on  Jan 25   Follow up with transplant  Nephrology on Jan 27

## 2021-01-19 LAB
DONOR IDENTIFICATION: NORMAL
DSA COMMENTS: NORMAL
DSA PRESENT: NO
DSA TEST METHOD: NORMAL
MYCOPHENOLATE SERPL LC/MS/MS-MCNC: 1.29 MG/L (ref 1–3.5)
MYCOPHENOLATE-G SERPL LC/MS/MS-MCNC: 100.9 MG/L (ref 30–95)
ORGAN: NORMAL
SA1 CELL: NORMAL
SA1 COMMENTS: NORMAL
SA1 HI RISK ABY: NORMAL
SA1 MOD RISK ABY: NORMAL
SA1 TEST METHOD: NORMAL
SA2 CELL: NORMAL
SA2 COMMENTS: NORMAL
SA2 HI RISK ABY UA: NORMAL
SA2 MOD RISK ABY: NORMAL
SA2 TEST METHOD: NORMAL
TME LAST DOSE: ABNORMAL H
UNACCEPTABLE ANTIGEN: NORMAL
UNOS CPRA: 29

## 2021-01-25 DIAGNOSIS — R79.9 ABNORMAL FINDING OF BLOOD CHEMISTRY, UNSPECIFIED: ICD-10-CM

## 2021-01-25 DIAGNOSIS — Z94.0 KIDNEY REPLACED BY TRANSPLANT: ICD-10-CM

## 2021-01-25 LAB
ALBUMIN SERPL-MCNC: 3.8 G/DL (ref 3.4–5)
ALBUMIN UR-MCNC: 100 MG/DL
ALP SERPL-CCNC: 189 U/L (ref 40–150)
ALT SERPL W P-5'-P-CCNC: 37 U/L (ref 0–50)
ANION GAP SERPL CALCULATED.3IONS-SCNC: 7 MMOL/L (ref 3–14)
APPEARANCE UR: CLEAR
AST SERPL W P-5'-P-CCNC: 24 U/L (ref 0–45)
BASOPHILS # BLD AUTO: 0.1 10E9/L (ref 0–0.2)
BASOPHILS NFR BLD AUTO: 1 %
BILIRUB SERPL-MCNC: 0.5 MG/DL (ref 0.2–1.3)
BILIRUB UR QL STRIP: NEGATIVE
BUN SERPL-MCNC: 24 MG/DL (ref 7–30)
CALCIUM SERPL-MCNC: 9.8 MG/DL (ref 8.5–10.1)
CHLORIDE SERPL-SCNC: 107 MMOL/L (ref 94–109)
CO2 SERPL-SCNC: 27 MMOL/L (ref 20–32)
COLOR UR AUTO: YELLOW
CREAT SERPL-MCNC: 0.77 MG/DL (ref 0.52–1.04)
CREAT UR-MCNC: 71 MG/DL
DIFFERENTIAL METHOD BLD: ABNORMAL
EOSINOPHIL # BLD AUTO: 0.1 10E9/L (ref 0–0.7)
EOSINOPHIL NFR BLD AUTO: 2 %
ERYTHROCYTE [DISTWIDTH] IN BLOOD BY AUTOMATED COUNT: 12.8 % (ref 10–15)
GFR SERPL CREATININE-BSD FRML MDRD: 80 ML/MIN/{1.73_M2}
GLUCOSE SERPL-MCNC: 162 MG/DL (ref 70–99)
GLUCOSE UR STRIP-MCNC: NEGATIVE MG/DL
HBA1C MFR BLD: 7.3 % (ref 0–5.6)
HCT VFR BLD AUTO: 36.6 % (ref 35–47)
HGB BLD-MCNC: 12.1 G/DL (ref 11.7–15.7)
HGB UR QL STRIP: ABNORMAL
KETONES UR STRIP-MCNC: NEGATIVE MG/DL
LEUKOCYTE ESTERASE UR QL STRIP: NEGATIVE
LYMPHOCYTES # BLD AUTO: 1 10E9/L (ref 0.8–5.3)
LYMPHOCYTES NFR BLD AUTO: 14 %
MCH RBC QN AUTO: 32.3 PG (ref 26.5–33)
MCHC RBC AUTO-ENTMCNC: 33.1 G/DL (ref 31.5–36.5)
MCV RBC AUTO: 98 FL (ref 78–100)
MONOCYTES # BLD AUTO: 0.5 10E9/L (ref 0–1.3)
MONOCYTES NFR BLD AUTO: 7 %
MYCOPHENOLATE SERPL LC/MS/MS-MCNC: 1.02 MG/L (ref 1–3.5)
MYCOPHENOLATE-G SERPL LC/MS/MS-MCNC: 104 MG/L (ref 30–95)
NEUTROPHILS # BLD AUTO: 5.7 10E9/L (ref 1.6–8.3)
NEUTROPHILS NFR BLD AUTO: 76 %
NITRATE UR QL: NEGATIVE
NON-SQ EPI CELLS #/AREA URNS LPF: ABNORMAL /LPF
PH UR STRIP: 5 PH (ref 5–7)
PLATELET # BLD AUTO: 197 10E9/L (ref 150–450)
PLATELET # BLD EST: ABNORMAL 10*3/UL
POTASSIUM SERPL-SCNC: 4.3 MMOL/L (ref 3.4–5.3)
PROT SERPL-MCNC: 6.9 G/DL (ref 6.8–8.8)
PROT UR-MCNC: 1.07 G/L
PROT/CREAT 24H UR: 1.51 G/G CR (ref 0–0.2)
RBC # BLD AUTO: 3.75 10E12/L (ref 3.8–5.2)
RBC #/AREA URNS AUTO: ABNORMAL /HPF
RBC MORPH BLD: NORMAL
SODIUM SERPL-SCNC: 141 MMOL/L (ref 133–144)
SOURCE: ABNORMAL
SP GR UR STRIP: 1.02 (ref 1–1.03)
TACROLIMUS BLD-MCNC: 11.1 UG/L (ref 5–15)
TME LAST DOSE: ABNORMAL H
TME LAST DOSE: NORMAL H
UROBILINOGEN UR STRIP-ACNC: 0.2 EU/DL (ref 0.2–1)
VARIANT LYMPHS BLD QL SMEAR: PRESENT
WBC # BLD AUTO: 7.4 10E9/L (ref 4–11)
WBC #/AREA URNS AUTO: ABNORMAL /HPF

## 2021-01-25 PROCEDURE — 84156 ASSAY OF PROTEIN URINE: CPT | Performed by: INTERNAL MEDICINE

## 2021-01-25 PROCEDURE — 83036 HEMOGLOBIN GLYCOSYLATED A1C: CPT | Performed by: INTERNAL MEDICINE

## 2021-01-25 PROCEDURE — 36415 COLL VENOUS BLD VENIPUNCTURE: CPT | Performed by: INTERNAL MEDICINE

## 2021-01-25 PROCEDURE — 80180 DRUG SCRN QUAN MYCOPHENOLATE: CPT | Performed by: INTERNAL MEDICINE

## 2021-01-25 PROCEDURE — 80053 COMPREHEN METABOLIC PANEL: CPT | Performed by: INTERNAL MEDICINE

## 2021-01-25 PROCEDURE — 87799 DETECT AGENT NOS DNA QUANT: CPT | Mod: 59 | Performed by: INTERNAL MEDICINE

## 2021-01-25 PROCEDURE — 80197 ASSAY OF TACROLIMUS: CPT | Performed by: INTERNAL MEDICINE

## 2021-01-25 PROCEDURE — 87799 DETECT AGENT NOS DNA QUANT: CPT | Performed by: INTERNAL MEDICINE

## 2021-01-25 PROCEDURE — 85025 COMPLETE CBC W/AUTO DIFF WBC: CPT | Performed by: INTERNAL MEDICINE

## 2021-01-25 PROCEDURE — 81001 URINALYSIS AUTO W/SCOPE: CPT | Performed by: INTERNAL MEDICINE

## 2021-01-26 ENCOUNTER — TELEPHONE (OUTPATIENT)
Dept: TRANSPLANT | Facility: CLINIC | Age: 66
End: 2021-01-26

## 2021-01-26 LAB
CMV DNA SPEC NAA+PROBE-ACNC: NORMAL [IU]/ML
CMV DNA SPEC NAA+PROBE-LOG#: NORMAL {LOG_IU}/ML
SPECIMEN SOURCE: NORMAL

## 2021-01-27 ENCOUNTER — MYC MEDICAL ADVICE (OUTPATIENT)
Dept: NEPHROLOGY | Facility: OUTPATIENT CENTER | Age: 66
End: 2021-01-27

## 2021-01-27 ENCOUNTER — VIRTUAL VISIT (OUTPATIENT)
Dept: NEPHROLOGY | Facility: CLINIC | Age: 66
End: 2021-01-27
Attending: INTERNAL MEDICINE
Payer: MEDICARE

## 2021-01-27 ENCOUNTER — TELEPHONE (OUTPATIENT)
Dept: TRANSPLANT | Facility: CLINIC | Age: 66
End: 2021-01-27

## 2021-01-27 DIAGNOSIS — I15.1 HTN, KIDNEY TRANSPLANT RELATED: ICD-10-CM

## 2021-01-27 DIAGNOSIS — Z94.0 HTN, KIDNEY TRANSPLANT RELATED: ICD-10-CM

## 2021-01-27 DIAGNOSIS — R31.29 OTHER MICROSCOPIC HEMATURIA: ICD-10-CM

## 2021-01-27 DIAGNOSIS — R80.1 PERSISTENT PROTEINURIA: ICD-10-CM

## 2021-01-27 DIAGNOSIS — Z94.0 KIDNEY REPLACED BY TRANSPLANT: ICD-10-CM

## 2021-01-27 DIAGNOSIS — N05.1 FSGS (FOCAL SEGMENTAL GLOMERULOSCLEROSIS): ICD-10-CM

## 2021-01-27 DIAGNOSIS — T86.10 COMPLICATION OF KIDNEY TRANSPLANT: ICD-10-CM

## 2021-01-27 DIAGNOSIS — D84.9 IMMUNOSUPPRESSION (H): ICD-10-CM

## 2021-01-27 DIAGNOSIS — Z94.0 KIDNEY REPLACED BY TRANSPLANT: Primary | ICD-10-CM

## 2021-01-27 DIAGNOSIS — Z48.298 AFTERCARE FOLLOWING ORGAN TRANSPLANT: Primary | ICD-10-CM

## 2021-01-27 LAB
BKV DNA # SPEC NAA+PROBE: NORMAL COPIES/ML
BKV DNA SPEC NAA+PROBE-LOG#: NORMAL LOG COPIES/ML
EBV DNA # SPEC NAA+PROBE: NORMAL {COPIES}/ML
EBV DNA SPEC NAA+PROBE-LOG#: NORMAL {LOG_COPIES}/ML
SPECIMEN SOURCE: NORMAL

## 2021-01-27 PROCEDURE — 99215 OFFICE O/P EST HI 40 MIN: CPT | Mod: 95

## 2021-01-27 RX ORDER — SULFAMETHOXAZOLE AND TRIMETHOPRIM 400; 80 MG/1; MG/1
1 TABLET ORAL DAILY
Qty: 30 TABLET | Refills: 11 | COMMUNITY
Start: 2021-01-27 | End: 2021-09-16

## 2021-01-27 NOTE — PROGRESS NOTES
Patients  Maxim has concerns about most recent lab results and would like to talk about that.     Patient has been told to hold the bactim and valtrex.     She still has pain at surgical site and R leg is swollen.     Ethel is a 66 year old who is being evaluated via a billable video visit.       How would you like to obtain your AVS? MyChart     Will anyone else be joining your video visit? No       Video Start Time: 10:39 AM  Video-Visit Details     Type of service:  Video Visit     Video End Time:11:17 AM    Originating Location (pt. Location): Home     Distant Location (provider location):  [facility]OLOG[facility] [place]     Jordy Dewitt MD     ACUTE TRANSPLANT NEPHROLOGY VISIT    Assessment & Plan   # DDKT: Stable with worsening proteinuria, 1.5 g/g from 0.2 g/g in 11/2020 and hematuria 5- RBCs. Native kidney biopsy in 2017 reported as global glomerulosclerosis with severe arterial sclerosis, no no evidence of immune complex mediated or paraprotein associated kidney disease. Will get graft US, check RYAN, anti-dsDNA, PLA2R, hep C and B serologies, SPEP, serum free light chains ratio, ANCA abs, Anti-GBM, C3, C4 and will arrange for transplant kidney biopsy.    - Baseline Creatinine: ~ 0.7 - 0.9   - Proteinuria: Moderate (1-3 grams)   - Date DSA Last Checked: Jan/2018      Latest DSA: No   - BK Viremia: No   - Kidney Tx Biopsy: No    # Immunosuppression: Tacrolimus immediate release (goal 8-10) and Mycophenolate mofetil (dose 1000 mg every 12 hours)   - Induction with Recent Transplant:  thymoglobulin 125mg (2.2mg/kg), basiliximab (dose given POD #1 and POD #5 ), and steroid taper.   - Continue with intensive monitoring of immunosuppression for efficacy and toxicity   - Changes: No    # Infection Prophylaxis:   - PJP: Sulfa/TMP (Bactrim) was held due to leukopenia which resolved, will resume bactrim today.  - CMV: None, prophylaxis completed    # Hypertension: Controlled;  Goal BP: <  130/80   - Changes: No    # Diabetes: Borderline control (HbA1c 7-9%) new onset after transplant. HbA1c used to be 5, Last HbA1c: 7.2 %   - Management as per primary care.    # Mineral Bone Disorder:   - Secondary renal hyperparathyroidism; PTH level: Mildly elevated (151-300 pg/ml)        On treatment: None  - Vitamin D; level: Normal        On supplement: No vitamin D and calcium were held before 2/2 hypercalcemia  - Calcium; level: Normal        On supplement: No    # Electrolytes:   - Magnesium; level: Normal        On supplement: Yes    # Previous HSV:              - on valtrex for flares , stopped with leukopenia which resolved.     # Medical Compliance: Yes    # Transplant History:  Etiology of Kidney Failure: Focal segmental glomerulosclerosis (FSGS)  Tx: DDKT  Transplant: 9/3/2020 (Kidney)  Donor Type: Donation after Brain Death Donor Class:   Significant changes in immunosuppression: None  CMV IgG Ab High Risk Discordance (D+/R-): No  EBV IgG Ab High Risk Discordance (D+/R-): No  Significant transplant-related complications: None    Transplant Office Phone Number: [phone]    Assessment and plan was discussed with the patient and she voiced her understanding and agreement.    Return visit: 1 month    Lino Deng MD    Chief Complaint   Ms. Thompson is a 66 year old here for kidney transplant and immunosuppression management.     History of Present Illness   States she has bilateral leg swelling over last few months, facial swelling, and weight gain of about 4-5 lbs since 10/2020. She denies any N/V/D/F/C or dyspena. She denies taking NSAIDs.     Home BP: 120-130's SBP    Problem List   Patient Active Problem List   Diagnosis     Elevated serum immunoglobulin free light chain level     FSGS (focal segmental glomerulosclerosis)     Dyslipidemia     Hypothyroidism     SADIE on CPAP     Sensorineural hearing loss (SNHL) of both ears     GERD (gastroesophageal reflux disease)     HTN, kidney transplant  related     Hepatitis B core antibody positive     Secondary renal hyperparathyroidism (H)     Memory deficits     Kidney replaced by transplant     Immunosuppression (H)     Aftercare following organ transplant     Vitamin D deficiency     Elevated random blood glucose level       Allergies   Allergies   Allergen Reactions     Amoxicillin-Pot Clavulanate Nausea and Vomiting       Medications   Current Outpatient Medications   Medication Sig     acetaminophen (TYLENOL) 325 MG tablet Take 2 tablets (650 mg) by mouth every 4 hours as needed for pain     amLODIPine (NORVASC) 5 MG tablet Take 2 tablets (10 mg) by mouth daily     aspirin (ASA) 81 MG EC tablet Take 1 tablet (81 mg) by mouth daily     atorvastatin (LIPITOR) 40 MG tablet Take 0.5 tablets (20 mg) by mouth daily     famotidine (PEPCID) 20 MG tablet Take 20 mg by mouth every evening      fluticasone (FLONASE) 50 MCG/ACT nasal spray Spray 1 spray into both nostrils 2 times daily as needed for rhinitis or allergies     levothyroxine (SYNTHROID/LEVOTHROID) 112 MCG tablet Take 112 mcg (1 tablet) by mouth every Monday, Tuesday is 1/2 tab, Wednesday, Thursday, Friday     magnesium oxide (MAG-OX) 400 MG tablet Take 1 tablet (400 mg) by mouth daily (with lunch)     melatonin 3 MG tablet Take 6 mg by mouth At Bedtime      mycophenolate (GENERIC EQUIVALENT) 250 MG capsule Take 4 capsules (1,000 mg) by mouth 2 times daily     psyllium (METAMUCIL/KONSYL) 58.6 % powder Take by mouth daily     tacrolimus (GENERIC EQUIVALENT) 0.5 MG capsule Take 1 capsule (0.5 mg) by mouth 2 times daily Total dose = 1.5 mg BID     tacrolimus (GENERIC EQUIVALENT) 1 MG capsule Take 1 capsule (1 mg) by mouth 2 times daily Total dose = 1.5 mg BID (Patient taking differently: Take 1 mg by mouth 2 times daily Total dose = 1.5 in AM and 1 in PM)     sulfamethoxazole-trimethoprim (BACTRIM) 400-80 MG tablet Take 1 tablet by mouth daily (Patient not taking: Reported on 1/27/2021)     valACYclovir  (VALTREX) 500 MG tablet Take 1 tablet (500 mg) by mouth daily     No current facility-administered medications for this visit.      There are no discontinued medications.    Physical Exam   Vital Signs: There were no vitals taken for this visit.  GENERAL APPEARANCE: alert and no distress  HENT: no obvious abnormalities on appearance  RESP: breathing appears unremarkable with normal rate, no audible wheezing or cough and no apparent shortness of breath with conversation  MS: extremities normal - no gross deformities noted  SKIN: no apparent rash and normal skin tone  NEURO: speech is clear with no obvious neurological deficits  PSYCH: mentation appears normal and affect normal      Data     Renal Latest Ref Rng & Units 1/25/2021 1/18/2021 1/4/2021   Na 133 - 144 mmol/L 141 140 141   Na (external) 135 - 145 mmol/L - - -   K 3.4 - 5.3 mmol/L 4.3 4.2 4.6   K (external) 3.5 - 5.0 mmol/L - - -   Cl 94 - 109 mmol/L 107 108 110(H)   Cl (external) 98 - 110 mmol/L - - -   CO2 20 - 32 mmol/L 27 26 25   CO2 (external) 21 - 31 mmol/L - - -   BUN 7 - 30 mg/dL 24 29 26   BUN (external) 8 - 25 mg/dL - - -   Cr 0.52 - 1.04 mg/dL 0.77 0.84 0.85   Cr (external) 0.57 - 1.11 mg/dL - - -   Glucose 70 - 99 mg/dL 162(H) 161(H) 148(H)   Glucose (external) 65 - 100 mg/dL - - -   Ca  8.5 - 10.1 mg/dL 9.8 9.5 10.2(H)   Ca (external) 8.5 - 10.5 mg/dL - - -   Mg 1.6 - 2.3 mg/dL - - -   Mg (external) 1.6 - 2.6 mg/dL - - -     Bone Health Latest Ref Rng & Units 1/18/2021 12/14/2020 11/30/2020   Phos 2.5 - 4.5 mg/dL - 3.2 2.9   Phos (external) 2.3 - 4.7 mg/dL - - -   PTHi 18 - 80 pg/mL 151(H) - -   Vit D Def 20 - 75 ug/L - - -     Heme Latest Ref Rng & Units 1/25/2021 1/18/2021 1/4/2021   WBC 4.0 - 11.0 10e9/L 7.4 1.7(LL) 3.2(L)   WBC (external) 4.5 - 11.0 thou/cu mm - - -   Hgb 11.7 - 15.7 g/dL 12.1 11.6(L) 12.2   Hgb (external) 12.0 - 16.0 g/dL - - -   Plt 150 - 450 10e9/L 197 178 181   Plt (external) 140 - 440 thou/cu mm - - -   ABSOLUTE  NEUTROPHIL 1.6 - 8.3 10e9/L 5.7 0.4(LL) 1.8   ABSOLUTE LYMPHOCYTES 0.8 - 5.3 10e9/L 1.0 0.7(L) 1.1   ABSOLUTE MONOCYTES 0.0 - 1.3 10e9/L 0.5 0.4 0.1   ABSOLUTE EOSINOPHILS 0.0 - 0.7 10e9/L 0.1 0.1 0.1   ABSOLUTE BASOPHILS 0.0 - 0.2 10e9/L 0.1 0.1 -   ABS IMMATURE GRANULOCYTES 0 - 0.4 10e9/L - - -   ABSOLUTE NUCLEATED RBC - - - -     Liver Latest Ref Rng & Units 1/25/2021 9/9/2020 9/5/2020   AP 40 - 150 U/L 189(H) - 53   TBili 0.2 - 1.3 mg/dL 0.5 - 0.4   DBili 0.0 - 0.2 mg/dL - - 0.2   ALT 0 - 50 U/L 37 - 52(H)   AST 0 - 45 U/L 24 - 50(H)   Tot Protein 6.8 - 8.8 g/dL 6.9 - 5.4(L)   Albumin 3.4 - 5.0 g/dL 3.8 2.9(L) 2.8(L)     Pancreas Latest Ref Rng & Units 1/25/2021 9/3/2020   A1C 0 - 5.6 % 7.3(H) 5.4     Iron studies Latest Ref Rng & Units 9/9/2020   Iron 35 - 180 ug/dL 162   Iron sat 15 - 46 % 92(H)   Ferritin 8 - 252 ng/mL 1,757(H)     [facility] Txp Virology Latest Ref Rng & Units 1/25/2021 12/14/2020 11/3/2020   CVM DNA Quant - EDTA PLASMA - -   CMV QUANT IU/ML CMVND:CMV DNA Not Detected [IU]/mL CMV DNA Not Detected - -   LOG IU/ML OF CMVQNT <2.1 [Log:IU]/mL Not Calculated - -   BK Spec - - Plasma Plasma   BK Res BKNEG:BK Virus DNA Not Detected copies/mL - BK Virus DNA Not Detected BK Virus DNA Not Detected   BK Log <2.7 Log copies/mL - Not Calculated Not Calculated   BK Quant Log Ext - - - -   BK Quant Result Ext Negative copies/mL - - -   BK Quant Spec Ext - - - -   EBV CAPSID ANTIBODY IGG 0.0 - 0.8 AI - - -   Hep B Core NR:Nonreactive - - -        Recent Labs   Lab Test 01/04/21 0820 01/18/21  0807 01/25/21 0813   DOSTAC 2000 2021 1,172,021 2000 ON [id number] TM   TACROL 7.3 10.3 11.1     Recent Labs   Lab Test 01/04/21  0820 01/18/21 0807 01/25/21 0813   DOSMPA 2000 2021 1,172,021 2000 ON [id number]  TM   MPACID 0.85* 1.29 1.02   MPAG 86.4 100.9* 104.0*       Lino Deng MD on 1/27/2021 at 11:13 AM    Physician Attestation   I, Jordy Dewitt MD, saw this patient and agree with the findings and plan of care as  documented in the note.        Jordy Dewitt MD

## 2021-01-27 NOTE — TELEPHONE ENCOUNTER
Post Kidney and Pancreas Transplant Team Conference  Date: 1/27/2021  Transplant Coordinator: Tiffany Paul     Attendees:  [x]  Dr. Sheridan  [x] Bridget Marquis LPN     [x]  Dr. Sfiuentes [x] Tiffany Paul, SUN [] Alyssa Parisi LPN   [x]  Dr. Dewitt [] Sade Mckeon, SUN    [x]  Dr. Wilson [] Ade Marx RN [x] Patrick Mireles, PharmD   [] Dr. Awad [x] Hui Crowley, SUN    [] Dr. Ribeiro [] Cesar Max RN    [x] Dr. Alonzo [] Jennie Dela Cruz RN    [] Dr. Phoenix [] Janett Smith RN    []  Dr. Bansal [] Aleja Francis, SUN    [] Surgery Fellow [x] Crissy Pulido RN    [] Monika Tinoco, NP [] Laura Romero RN        Verbal Plan Read Back:   In clinic today Dr Jordy Willis   Restart Bactrim

## 2021-01-27 NOTE — LETTER
1/27/2021       RE: Ethel Thompson  [address]  [place] [address]     Dear Colleague,    Thank you for referring your patient, Ethel Thompson, to the [facility] [place] at [facility]. Please see a copy of my visit note below.         Patients  Maxim has concerns about most recent lab results and would like to talk about that.     Patient has been told to hold the bactim and valtrex.     She still has pain at surgical site and R leg is swollen.     Ethel is a 66 year old who is being evaluated via a billable video visit.       How would you like to obtain your AVS? MyChart     Will anyone else be joining your video visit? No       Video Start Time: 10:39 AM  Video-Visit Details     Type of service:  Video Visit     Video End Time:11:17 AM    Originating Location (pt. Location): Home     Distant Location (provider location):  [facility] [place]     Jordy Dewitt MD     ACUTE TRANSPLANT NEPHROLOGY VISIT    Assessment & Plan   # DDKT: Stable with worsening proteinuria, 1.5 g/g from 0.2 g/g in 11/2020 and hematuria 5- RBCs. Native kidney biopsy in 2017 reported as global glomerulosclerosis with severe arterial sclerosis, no no evidence of immune complex mediated or paraprotein associated kidney disease. Will get graft US, check RYAN, anti-dsDNA, PLA2R, hep C and B serologies, SPEP, serum free light chains ratio, ANCA abs, Anti-GBM, C3, C4 and will arrange for transplant kidney biopsy.    - Baseline Creatinine: ~ 0.7 - 0.9   - Proteinuria: Moderate (1-3 grams)   - Date DSA Last Checked: Jan/2018      Latest DSA: No   - BK Viremia: No   - Kidney Tx Biopsy: No    # Immunosuppression: Tacrolimus immediate release (goal 8-10) and Mycophenolate mofetil (dose 1000 mg every 12 hours)   - Induction with Recent Transplant:  thymoglobulin 125mg (2.2mg/kg), basiliximab (dose given POD #1 and POD #5 ), and steroid taper.   -  Continue with intensive monitoring of immunosuppression for efficacy and toxicity   - Changes: No    # Infection Prophylaxis:   - PJP: Sulfa/TMP (Bactrim) was held due to leukopenia which resolved, will resume bactrim today.  - CMV: None, prophylaxis completed    # Hypertension: Controlled;  Goal BP: < 130/80   - Changes: No    # Diabetes: Borderline control (HbA1c 7-9%) new onset after transplant. HbA1c used to be 5, Last HbA1c: 7.2 %   - Management as per primary care.    # Mineral Bone Disorder:   - Secondary renal hyperparathyroidism; PTH level: Mildly elevated (151-300 pg/ml)        On treatment: None  - Vitamin D; level: Normal        On supplement: No vitamin D and calcium were held before 2/2 hypercalcemia  - Calcium; level: Normal        On supplement: No    # Electrolytes:   - Magnesium; level: Normal        On supplement: Yes    # Previous HSV:              - on valtrex for flares , stopped with leukopenia which resolved.     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    Return visit: 1 month    Lino Deng MD    Chief Complaint   Ms. Thompson is a 66 year old here for kidney transplant and immunosuppression management.     History of Present Illness   States she has bilateral leg swelling over last few months, facial swelling, and weight gain of about 4-5 lbs since 10/2020. She denies any N/V/D/F/C or dyspena. She denies taking NSAIDs.     Home BP: 120-130's SBP    Problem List   Patient Active  Problem List   Diagnosis     Elevated serum immunoglobulin free light chain level     FSGS (focal segmental glomerulosclerosis)     Dyslipidemia     Hypothyroidism     SADIE on CPAP     Sensorineural hearing loss (SNHL) of both ears     GERD (gastroesophageal reflux disease)     HTN, kidney transplant related     Hepatitis B core antibody positive     Secondary renal hyperparathyroidism (H)     Memory deficits     Kidney replaced by transplant     Immunosuppression (H)     Aftercare following organ transplant     Vitamin D deficiency     Elevated random blood glucose level       Allergies   Allergies   Allergen Reactions     Amoxicillin-Pot Clavulanate Nausea and Vomiting       Medications   Current Outpatient Medications   Medication Sig     acetaminophen (TYLENOL) 325 MG tablet Take 2 tablets (650 mg) by mouth every 4 hours as needed for pain     amLODIPine (NORVASC) 5 MG tablet Take 2 tablets (10 mg) by mouth daily     aspirin (ASA) 81 MG EC tablet Take 1 tablet (81 mg) by mouth daily     atorvastatin (LIPITOR) 40 MG tablet Take 0.5 tablets (20 mg) by mouth daily     famotidine (PEPCID) 20 MG tablet Take 20 mg by mouth every evening      fluticasone (FLONASE) 50 MCG/ACT nasal spray Spray 1 spray into both nostrils 2 times daily as needed for rhinitis or allergies     levothyroxine (SYNTHROID/LEVOTHROID) 112 MCG tablet Take 112 mcg (1 tablet) by mouth every Monday, Tuesday is 1/2 tab, Wednesday, Thursday, Friday     magnesium oxide (MAG-OX) 400 MG tablet Take 1 tablet (400 mg) by mouth daily (with lunch)     melatonin 3 MG tablet Take 6 mg by mouth At Bedtime      mycophenolate (GENERIC EQUIVALENT) 250 MG capsule Take 4 capsules (1,000 mg) by mouth 2 times daily     psyllium (METAMUCIL/KONSYL) 58.6 % powder Take by mouth daily     tacrolimus (GENERIC EQUIVALENT) 0.5 MG capsule Take 1 capsule (0.5 mg) by mouth 2 times daily Total dose = 1.5 mg BID     tacrolimus (GENERIC EQUIVALENT) 1 MG capsule Take 1 capsule (1 mg)  by mouth 2 times daily Total dose = 1.5 mg BID (Patient taking differently: Take 1 mg by mouth 2 times daily Total dose = 1.5 in AM and 1 in PM)     sulfamethoxazole-trimethoprim (BACTRIM) 400-80 MG tablet Take 1 tablet by mouth daily (Patient not taking: Reported on 1/27/2021)     valACYclovir (VALTREX) 500 MG tablet Take 1 tablet (500 mg) by mouth daily     No current facility-administered medications for this visit.      There are no discontinued medications.    Physical Exam   Vital Signs: There were no vitals taken for this visit.  GENERAL APPEARANCE: alert and no distress  HENT: no obvious abnormalities on appearance  RESP: breathing appears unremarkable with normal rate, no audible wheezing or cough and no apparent shortness of breath with conversation  MS: extremities normal - no gross deformities noted  SKIN: no apparent rash and normal skin tone  NEURO: speech is clear with no obvious neurological deficits  PSYCH: mentation appears normal and affect normal      Data     Renal Latest Ref Rng & Units 1/25/2021 1/18/2021 1/4/2021   Na 133 - 144 mmol/L 141 140 141   Na (external) 135 - 145 mmol/L - - -   K 3.4 - 5.3 mmol/L 4.3 4.2 4.6   K (external) 3.5 - 5.0 mmol/L - - -   Cl 94 - 109 mmol/L 107 108 110(H)   Cl (external) 98 - 110 mmol/L - - -   CO2 20 - 32 mmol/L 27 26 25   CO2 (external) 21 - 31 mmol/L - - -   BUN 7 - 30 mg/dL 24 29 26   BUN (external) 8 - 25 mg/dL - - -   Cr 0.52 - 1.04 mg/dL 0.77 0.84 0.85   Cr (external) 0.57 - 1.11 mg/dL - - -   Glucose 70 - 99 mg/dL 162(H) 161(H) 148(H)   Glucose (external) 65 - 100 mg/dL - - -   Ca  8.5 - 10.1 mg/dL 9.8 9.5 10.2(H)   Ca (external) 8.5 - 10.5 mg/dL - - -   Mg 1.6 - 2.3 mg/dL - - -   Mg (external) 1.6 - 2.6 mg/dL - - -     Bone Health Latest Ref Rng & Units 1/18/2021 12/14/2020 11/30/2020   Phos 2.5 - 4.5 mg/dL - 3.2 2.9   Phos (external) 2.3 - 4.7 mg/dL - - -   PTHi 18 - 80 pg/mL 151(H) - -   Vit D Def 20 - 75 ug/L - - -     Heme Latest Ref Rng & Units  1/25/2021 1/18/2021 1/4/2021   WBC 4.0 - 11.0 10e9/L 7.4 1.7(LL) 3.2(L)   WBC (external) 4.5 - 11.0 thou/cu mm - - -   Hgb 11.7 - 15.7 g/dL 12.1 11.6(L) 12.2   Hgb (external) 12.0 - 16.0 g/dL - - -   Plt 150 - 450 10e9/L 197 178 181   Plt (external) 140 - 440 thou/cu mm - - -   ABSOLUTE NEUTROPHIL 1.6 - 8.3 10e9/L 5.7 0.4(LL) 1.8   ABSOLUTE LYMPHOCYTES 0.8 - 5.3 10e9/L 1.0 0.7(L) 1.1   ABSOLUTE MONOCYTES 0.0 - 1.3 10e9/L 0.5 0.4 0.1   ABSOLUTE EOSINOPHILS 0.0 - 0.7 10e9/L 0.1 0.1 0.1   ABSOLUTE BASOPHILS 0.0 - 0.2 10e9/L 0.1 0.1 -   ABS IMMATURE GRANULOCYTES 0 - 0.4 10e9/L - - -   ABSOLUTE NUCLEATED RBC - - - -     Liver Latest Ref Rng & Units 1/25/2021 9/9/2020 9/5/2020   AP 40 - 150 U/L 189(H) - 53   TBili 0.2 - 1.3 mg/dL 0.5 - 0.4   DBili 0.0 - 0.2 mg/dL - - 0.2   ALT 0 - 50 U/L 37 - 52(H)   AST 0 - 45 U/L 24 - 50(H)   Tot Protein 6.8 - 8.8 g/dL 6.9 - 5.4(L)   Albumin 3.4 - 5.0 g/dL 3.8 2.9(L) 2.8(L)     Pancreas Latest Ref Rng & Units 1/25/2021 9/3/2020   A1C 0 - 5.6 % 7.3(H) 5.4     Iron studies Latest Ref Rng & Units 9/9/2020   Iron 35 - 180 ug/dL 162   Iron sat 15 - 46 % 92(H)   Ferritin 8 - 252 ng/mL 1,757(H)     [facility] Txp Virology Latest Ref Rng & Units 1/25/2021 12/14/2020 11/3/2020   CVM DNA Quant - EDTA PLASMA - -   CMV QUANT IU/ML CMVND:CMV DNA Not Detected [IU]/mL CMV DNA Not Detected - -   LOG IU/ML OF CMVQNT <2.1 [Log:IU]/mL Not Calculated - -   BK Spec - - Plasma Plasma   BK Res BKNEG:BK Virus DNA Not Detected copies/mL - BK Virus DNA Not Detected BK Virus DNA Not Detected   BK Log <2.7 Log copies/mL - Not Calculated Not Calculated   BK Quant Log Ext - - - -   BK Quant Result Ext Negative copies/mL - - -   BK Quant Spec Ext - - - -   EBV CAPSID ANTIBODY IGG 0.0 - 0.8 AI - - -   Hep B Core NR:Nonreactive - - -        Recent Labs   Lab Test 01/04/21  0820 01/18/21  0807 01/25/21  0813   DOSTAC 2000 2021 1,172,021 2000 ON [id number] TM   TACROL 7.3 10.3 11.1     Recent Labs   Lab Test 01/04/21  0820  01/18/21  0807 01/25/21  0813   DOSMPA 2000 2021 1,172,021 2000 ON [id number]  TM   MPACID 0.85* 1.29 1.02   MPAG 86.4 100.9* 104.0*       Lino Deng MD on 1/27/2021 at 11:13 AM    Physician Attestation   I, Jordy Dewitt MD, saw this patient and agree with the findings and plan of care as documented in the note.        Jordy Dewitt MD        Again, thank you for allowing me to participate in the care of your patient.      Sincerely,    Early Post Transplant

## 2021-01-28 ENCOUNTER — MYC MEDICAL ADVICE (OUTPATIENT)
Dept: INTERVENTIONAL RADIOLOGY/VASCULAR | Facility: CLINIC | Age: 66
End: 2021-01-28

## 2021-01-28 DIAGNOSIS — T86.10 COMPLICATION OF KIDNEY TRANSPLANT: ICD-10-CM

## 2021-01-28 DIAGNOSIS — Z94.0 KIDNEY REPLACED BY TRANSPLANT: ICD-10-CM

## 2021-01-28 DIAGNOSIS — Z94.0 KIDNEY REPLACED BY TRANSPLANT: Primary | ICD-10-CM

## 2021-01-28 DIAGNOSIS — Z11.59 ENCOUNTER FOR SCREENING FOR OTHER VIRAL DISEASES: Primary | ICD-10-CM

## 2021-01-28 PROBLEM — E11.9 DIABETES MELLITUS, TYPE 2 (H): Status: ACTIVE | Noted: 2021-01-28

## 2021-01-28 NOTE — PATIENT INSTRUCTIONS
Restart bactrim    Will send blood work and order kidney ultrasound    You will also be scheduled for kidney bipsy

## 2021-01-29 ENCOUNTER — VIRTUAL VISIT (OUTPATIENT)
Dept: PHARMACY | Facility: CLINIC | Age: 66
End: 2021-01-29
Payer: OTHER GOVERNMENT

## 2021-01-29 VITALS — DIASTOLIC BLOOD PRESSURE: 70 MMHG | SYSTOLIC BLOOD PRESSURE: 130 MMHG | HEART RATE: 81 BPM

## 2021-01-29 DIAGNOSIS — D70.9 NEUTROPENIA, UNSPECIFIED TYPE (H): ICD-10-CM

## 2021-01-29 DIAGNOSIS — R19.5 ABNORMAL FECES: ICD-10-CM

## 2021-01-29 DIAGNOSIS — Z94.0 KIDNEY REPLACED BY TRANSPLANT: Primary | ICD-10-CM

## 2021-01-29 DIAGNOSIS — R52 PAIN: ICD-10-CM

## 2021-01-29 DIAGNOSIS — I10 HYPERTENSION, UNSPECIFIED TYPE: ICD-10-CM

## 2021-01-29 DIAGNOSIS — E78.2 MIXED HYPERLIPIDEMIA: ICD-10-CM

## 2021-01-29 DIAGNOSIS — B00.1 COLD SORE: ICD-10-CM

## 2021-01-29 PROCEDURE — 99607 MTMS BY PHARM ADDL 15 MIN: CPT | Mod: TEL | Performed by: PHARMACIST

## 2021-01-29 PROCEDURE — 99605 MTMS BY PHARM NP 15 MIN: CPT | Mod: TEL | Performed by: PHARMACIST

## 2021-01-29 RX ORDER — LIDOCAINE 4 G/G
1 PATCH TOPICAL EVERY 24 HOURS
COMMUNITY
End: 2023-06-22

## 2021-01-29 NOTE — TELEPHONE ENCOUNTER
January 29, 2021 9:19 AM -  AIVERSE1: called pt to [facility] tx ultrsound and lab appt 2/1 confirmed

## 2021-01-29 NOTE — PROGRESS NOTES
Medication Therapy Management (MTM) Encounter    ASSESSMENT:                            Medication Adherence/Access: No issues identified    Renal Transplant:  At 6 months post transplant will be due for Pneumovax 23 and Shingrix.      Hyperlipidemia/ CVD prevention: Stable      Hypertension:Move Amlodipine to qPM for better efficacy. Continue monitoring edema/ swelling. Let team know if it worsens.      Incisional Pain/ Back Pain: Controlled.     Cold sore: Restart Valtrex when instructed by txp team.     Neutropenia: Stable.     Stools: Stable.       PLAN:                            Move Amlodipine to the evening, it works better in the evening. Let us know if swelling worsens.   At 6 months post transplant will be due for Pneumovax and Shingrix vaccination.     Follow-up: as needed    SUBJECTIVE/OBJECTIVE:                          Ethel Thompson is a 66 year old female called for a follow-up visit. She was referred to me from txp team. Patient was accompanied by Maxim. Today's visit is a follow-up MTM visit from 11/16     Reason for visit: 4 months post.    Allergies/ADRs: Reviewed in chart  Tobacco: She reports that she quit smoking about 26 years ago. Her smoking use included cigarettes. She started smoking about 41 years ago. She smoked 1.00 pack per day. She has never used smokeless tobacco.  Alcohol: not currently using  Past Medical History: Reviewed in chart    Medication Adherence/Access: Patient uses pill box(es) and uses reminders/alarms.  Patient takes medications 3 time(s) per day. 8am, 10-2pm, 8pm  Per patient, misses medication 0 times per week. Took her morning pills 1-2 hours late     Renal Transplant:  Current immunosuppressants include TAC 1.5mg qAM and 1mg qPM and MMF 1000mg BID.  Pt reports no issues.  Transplant date: 9/3/20  PCP prophylaxis: Bactrim S S daily  PPI use: Famotidine 20mg prn.  No issues.  Current supplements for replacement:  Magnesium 400mg once daily.  Magnesium   Date Value Ref  Range Status   2020 1.6 1.6 - 2.3 mg/dL Final   tx Coordinator: Sandro Paul MD: Dr. Sifuentes, Using Med Card: Yes  Immunizations: annual flu shot 2019; [id number]: unknown ; Prevnar 13: 2019; TDaP:  2019; Shingrix: unknown, HBV: Immunity in 2019 per titers.     Hyperlipidemia/ CVD prevention: Current therapy includes Atorvastatin 20mg daily in the morning, Aspirin 81mg daily (has some bruising on legs, but no bleed sx).  Patient reports no significant myalgias or other side effects.  The 10-year ASCVD risk score (Antonettehayden RAI Jr., et al., 2013) is: 19.3%    Values used to calculate the score:      Age: 66 years      Sex: Female      Is Non- : No      Diabetic: Yes      Tobacco smoker: No      Systolic Blood Pressure: 132 mmHg      Is BP treated: Yes      HDL Cholesterol: 43 mg/dL      Total Cholesterol: 196 mg/dL  Recent Labs   Lab Test 20  1756   CHOL 196   HDL 43*   LDL Cannot estimate LDL when triglyceride exceeds 400 mg/dL   TRIG 617*         Hypertension: Current medications include Amlodipine 10mg qAM.  Patient does self-monitor blood pressure. under 140/90. Range 120-130/70s.   Patient reports mild edema in left leg, more severe in right leg.  Has improved over the last couple days. Swelling started a few weeks ago.   Today's BP readin/70  BP Readings from Last 3 Encounters:   21 130/70   20 132/72   10/26/20 (!) 142/70      Incisional Pain/ Back Pain: Pt was taking APAP 650mg BID prn. Pt states she is still having uncomfortable pain at the incision.  Pt using Lidocaine patches and occasional diclofenac gel for back pain. These have been controlling pain.     Cold sore: Holding Valtrex due to neutropenia    Neutropenia: Pt held Valtrex and Bactrim due to low WBC, these have rebounded and she has restarted Bactrim 2 days ago as WBC improved, planning Valtrex restart eventually.    Ref. Range 2020 08:42 2021 08:19 2021 08:07 2021 08:12   WBC  Latest Ref Range: 4.0 - 11.0 10e9/L 5.9 3.2 (L) 1.7 (LL) 7.4     Stools: Pt is taking metamucil daily. Having 3 BMs daily. Mostly solid, but has been loose the last few days.    Today's Vitals: There were no vitals taken for this visit.  ----------------    I spent 30 minutes with this patient today. I offer these suggestions for consideration by txp team. A copy of the visit note was provided to the patient's referring provider.    The patient was sent via [facility] a summary of these recommendations.     Patrick Mireles, PharmD  [facility] Pharmacist    Phone: [phone]     Telemedicine Visit Details  Type of service:  Telephone visit  Start Time: 12:30 PM  End Time: 1 PM  Originating Location (patient location): [place]  Distant Location (provider location):  [facility] MEDICATION THERAPY MANAGEMENT      Medication Therapy Recommendations  Hypertension, unspecified type    Current Medication: amLODIPine (NORVASC) 5 MG tablet   Rationale: Incorrect administration - Dosage too low - Effectiveness   Recommendation: Change Administration Time   Status: Accepted - no CPA Needed         Kidney replaced by transplant    Rationale: Preventive therapy - Needs additional medication therapy - Indication   Recommendation: Start Medication - Shingrix 50 MCG/0.5ML Susr - penumovax 23 as well   Status: Accepted - no CPA Needed

## 2021-01-29 NOTE — PATIENT INSTRUCTIONS
Recommendations from today's MTM visit:                                                       Move Amlodipine to the evening, it works better in the evening. Let us know if swelling worsens.   At 6 months post transplant will be due for Pneumovax and Shingrix vaccination    It was great to speak with you today.  I value your experience and would be very thankful for your time with providing feedback on our clinic survey. You may receive a survey via email or text message in the next few days.     Next MTM visit: as needed    To schedule another MTM appointment, please call the clinic directly or you may call the MTM scheduling line at [phone] or toll-free at [phone].     My Clinical Pharmacist's contact information:                                                      It was a pleasure talking with you today!  Please feel free to contact me with any questions or concerns you have.      Patrick Mireles, PharmD  MTM Pharmacist    Phone: [phone]

## 2021-01-30 DIAGNOSIS — Z11.59 ENCOUNTER FOR SCREENING FOR OTHER VIRAL DISEASES: ICD-10-CM

## 2021-01-30 LAB
SARS-COV-2 RNA RESP QL NAA+PROBE: NORMAL
SPECIMEN SOURCE: NORMAL

## 2021-01-30 PROCEDURE — U0005 INFEC AGEN DETEC AMPLI PROBE: HCPCS | Performed by: INTERNAL MEDICINE

## 2021-01-30 PROCEDURE — U0003 INFECTIOUS AGENT DETECTION BY NUCLEIC ACID (DNA OR RNA); SEVERE ACUTE RESPIRATORY SYNDROME CORONAVIRUS 2 (SARS-COV-2) (CORONAVIRUS DISEASE [COVID-19]), AMPLIFIED PROBE TECHNIQUE, MAKING USE OF HIGH THROUGHPUT TECHNOLOGIES AS DESCRIBED BY CMS-2020-01-R: HCPCS | Performed by: INTERNAL MEDICINE

## 2021-01-31 LAB
LABORATORY COMMENT REPORT: NORMAL
SARS-COV-2 RNA RESP QL NAA+PROBE: NEGATIVE
SPECIMEN SOURCE: NORMAL

## 2021-02-01 ENCOUNTER — ANCILLARY PROCEDURE (OUTPATIENT)
Dept: ULTRASOUND IMAGING | Facility: CLINIC | Age: 66
End: 2021-02-01
Attending: INTERNAL MEDICINE
Payer: MEDICARE

## 2021-02-01 DIAGNOSIS — Z94.0 KIDNEY REPLACED BY TRANSPLANT: ICD-10-CM

## 2021-02-01 DIAGNOSIS — Z48.298 AFTERCARE FOLLOWING ORGAN TRANSPLANT: ICD-10-CM

## 2021-02-01 DIAGNOSIS — T86.10 COMPLICATION OF KIDNEY TRANSPLANT: ICD-10-CM

## 2021-02-01 DIAGNOSIS — Z79.899 ENCOUNTER FOR LONG-TERM CURRENT USE OF MEDICATION: ICD-10-CM

## 2021-02-01 LAB
ANION GAP SERPL CALCULATED.3IONS-SCNC: 6 MMOL/L (ref 3–14)
BUN SERPL-MCNC: 18 MG/DL (ref 7–30)
C3 SERPL-MCNC: 143 MG/DL (ref 81–157)
C4 SERPL-MCNC: 35 MG/DL (ref 13–39)
CALCIUM SERPL-MCNC: 9.8 MG/DL (ref 8.5–10.1)
CHLORIDE SERPL-SCNC: 108 MMOL/L (ref 94–109)
CO2 SERPL-SCNC: 28 MMOL/L (ref 20–32)
CREAT SERPL-MCNC: 0.79 MG/DL (ref 0.52–1.04)
DSDNA AB SER-ACNC: <1 IU/ML
ERYTHROCYTE [DISTWIDTH] IN BLOOD BY AUTOMATED COUNT: 12.6 % (ref 10–15)
GFR SERPL CREATININE-BSD FRML MDRD: 77 ML/MIN/{1.73_M2}
GLUCOSE SERPL-MCNC: 155 MG/DL (ref 70–99)
HCT VFR BLD AUTO: 36.6 % (ref 35–47)
HGB BLD-MCNC: 12.1 G/DL (ref 11.7–15.7)
MAGNESIUM SERPL-MCNC: 1.7 MG/DL (ref 1.6–2.3)
MCH RBC QN AUTO: 31.7 PG (ref 26.5–33)
MCHC RBC AUTO-ENTMCNC: 33.1 G/DL (ref 31.5–36.5)
MCV RBC AUTO: 96 FL (ref 78–100)
PHOSPHATE SERPL-MCNC: 2.8 MG/DL (ref 2.5–4.5)
PLATELET # BLD AUTO: 187 10E9/L (ref 150–450)
POTASSIUM SERPL-SCNC: 4.5 MMOL/L (ref 3.4–5.3)
RBC # BLD AUTO: 3.82 10E12/L (ref 3.8–5.2)
SODIUM SERPL-SCNC: 141 MMOL/L (ref 133–144)
TACROLIMUS BLD-MCNC: 8.2 UG/L (ref 5–15)
TME LAST DOSE: NORMAL H
WBC # BLD AUTO: 10.2 10E9/L (ref 4–11)

## 2021-02-01 PROCEDURE — 80197 ASSAY OF TACROLIMUS: CPT | Performed by: PATHOLOGY

## 2021-02-01 PROCEDURE — 86160 COMPLEMENT ANTIGEN: CPT | Performed by: PATHOLOGY

## 2021-02-01 PROCEDURE — 76776 US EXAM K TRANSPL W/DOPPLER: CPT | Mod: GC | Performed by: RADIOLOGY

## 2021-02-01 PROCEDURE — 82784 ASSAY IGA/IGD/IGG/IGM EACH: CPT | Performed by: PATHOLOGY

## 2021-02-01 PROCEDURE — 86225 DNA ANTIBODY NATIVE: CPT | Performed by: PATHOLOGY

## 2021-02-01 PROCEDURE — 80048 BASIC METABOLIC PNL TOTAL CA: CPT | Performed by: PATHOLOGY

## 2021-02-01 PROCEDURE — 83883 ASSAY NEPHELOMETRY NOT SPEC: CPT | Performed by: PATHOLOGY

## 2021-02-01 PROCEDURE — 83735 ASSAY OF MAGNESIUM: CPT | Performed by: PATHOLOGY

## 2021-02-01 PROCEDURE — 85027 COMPLETE CBC AUTOMATED: CPT | Performed by: PATHOLOGY

## 2021-02-01 PROCEDURE — 36415 COLL VENOUS BLD VENIPUNCTURE: CPT | Performed by: PATHOLOGY

## 2021-02-01 PROCEDURE — 84166 PROTEIN E-PHORESIS/URINE/CSF: CPT | Mod: 26 | Performed by: PATHOLOGY

## 2021-02-01 PROCEDURE — 999N001036 HC STATISTIC TOTAL PROTEIN: Performed by: PATHOLOGY

## 2021-02-01 PROCEDURE — 86334 IMMUNOFIX E-PHORESIS SERUM: CPT | Mod: 26 | Performed by: PATHOLOGY

## 2021-02-01 PROCEDURE — 86038 ANTINUCLEAR ANTIBODIES: CPT | Performed by: PATHOLOGY

## 2021-02-01 PROCEDURE — 84100 ASSAY OF PHOSPHORUS: CPT | Performed by: PATHOLOGY

## 2021-02-01 RX ORDER — MYCOPHENOLATE MOFETIL 250 MG/1
1000 CAPSULE ORAL 2 TIMES DAILY
Qty: 240 CAPSULE | Refills: 11 | Status: SHIPPED | OUTPATIENT
Start: 2021-02-01 | End: 2021-04-06

## 2021-02-02 LAB
ALBUMIN SERPL ELPH-MCNC: 4.3 G/DL (ref 3.7–5.1)
ALPHA1 GLOB SERPL ELPH-MCNC: 0.3 G/DL (ref 0.2–0.4)
ALPHA2 GLOB SERPL ELPH-MCNC: 0.8 G/DL (ref 0.5–0.9)
ANA SER QL IF: NEGATIVE
B-GLOBULIN SERPL ELPH-MCNC: 0.7 G/DL (ref 0.6–1)
GAMMA GLOB SERPL ELPH-MCNC: 0.6 G/DL (ref 0.7–1.6)
IGA SERPL-MCNC: 157 MG/DL (ref 84–499)
IGG SERPL-MCNC: 652 MG/DL (ref 610–1616)
IGM SERPL-MCNC: 77 MG/DL (ref 35–242)
KAPPA LC UR-MCNC: 2.42 MG/DL (ref 0.33–1.94)
KAPPA LC/LAMBDA SER: 1.29 {RATIO} (ref 0.26–1.65)
LAMBDA LC SERPL-MCNC: 1.88 MG/DL (ref 0.57–2.63)
M PROTEIN SERPL ELPH-MCNC: 0 G/DL
PROT PATTERN SERPL ELPH-IMP: ABNORMAL
PROT PATTERN SERPL IFE-IMP: NORMAL
PROT PATTERN UR ELPH-IMP: NORMAL

## 2021-02-03 ENCOUNTER — APPOINTMENT (OUTPATIENT)
Dept: MEDSURG UNIT | Facility: CLINIC | Age: 66
End: 2021-02-03
Attending: INTERNAL MEDICINE
Payer: MEDICARE

## 2021-02-03 ENCOUNTER — APPOINTMENT (OUTPATIENT)
Dept: INTERVENTIONAL RADIOLOGY/VASCULAR | Facility: CLINIC | Age: 66
End: 2021-02-03
Attending: INTERNAL MEDICINE
Payer: MEDICARE

## 2021-02-03 ENCOUNTER — HOSPITAL ENCOUNTER (OUTPATIENT)
Facility: CLINIC | Age: 66
Discharge: HOME | End: 2021-02-03
Attending: INTERNAL MEDICINE | Admitting: PHYSICIAN ASSISTANT
Payer: MEDICARE

## 2021-02-03 VITALS
HEART RATE: 71 BPM | WEIGHT: 130 LBS | BODY MASS INDEX: 26.26 KG/M2 | RESPIRATION RATE: 16 BRPM | DIASTOLIC BLOOD PRESSURE: 65 MMHG | TEMPERATURE: 97.9 F | SYSTOLIC BLOOD PRESSURE: 134 MMHG | OXYGEN SATURATION: 97 %

## 2021-02-03 DIAGNOSIS — T86.10 COMPLICATION OF KIDNEY TRANSPLANT: ICD-10-CM

## 2021-02-03 DIAGNOSIS — Z94.0 KIDNEY REPLACED BY TRANSPLANT: ICD-10-CM

## 2021-02-03 LAB — INR PPP: 0.93 (ref 0.86–1.14)

## 2021-02-03 PROCEDURE — 99152 MOD SED SAME PHYS/QHP 5/>YRS: CPT

## 2021-02-03 PROCEDURE — 77002 NEEDLE LOCALIZATION BY XRAY: CPT | Mod: 26 | Performed by: PHYSICIAN ASSISTANT

## 2021-02-03 PROCEDURE — 99152 MOD SED SAME PHYS/QHP 5/>YRS: CPT | Performed by: PHYSICIAN ASSISTANT

## 2021-02-03 PROCEDURE — 88305 TISSUE EXAM BY PATHOLOGIST: CPT | Mod: TC | Performed by: INTERNAL MEDICINE

## 2021-02-03 PROCEDURE — 88350 IMFLUOR EA ADDL 1ANTB STN PX: CPT | Mod: 26 | Performed by: PATHOLOGY

## 2021-02-03 PROCEDURE — 88350 IMFLUOR EA ADDL 1ANTB STN PX: CPT | Mod: TC,GZ | Performed by: INTERNAL MEDICINE

## 2021-02-03 PROCEDURE — 85610 PROTHROMBIN TIME: CPT | Performed by: PHYSICIAN ASSISTANT

## 2021-02-03 PROCEDURE — 88348 ELECTRON MICROSCOPY DX: CPT | Mod: 26 | Performed by: PATHOLOGY

## 2021-02-03 PROCEDURE — 258N000003 HC RX IP 258 OP 636: Performed by: RADIOLOGY

## 2021-02-03 PROCEDURE — 88313 SPECIAL STAINS GROUP 2: CPT | Mod: 26 | Performed by: PATHOLOGY

## 2021-02-03 PROCEDURE — 88348 ELECTRON MICROSCOPY DX: CPT | Mod: TC | Performed by: INTERNAL MEDICINE

## 2021-02-03 PROCEDURE — 88313 SPECIAL STAINS GROUP 2: CPT | Mod: TC | Performed by: INTERNAL MEDICINE

## 2021-02-03 PROCEDURE — 88305 TISSUE EXAM BY PATHOLOGIST: CPT | Mod: 26 | Performed by: PATHOLOGY

## 2021-02-03 PROCEDURE — 250N000011 HC RX IP 250 OP 636: Performed by: PHYSICIAN ASSISTANT

## 2021-02-03 PROCEDURE — 50200 RENAL BIOPSY PERQ: CPT | Mod: RT | Performed by: PHYSICIAN ASSISTANT

## 2021-02-03 PROCEDURE — 272N000653 IR RENAL BIOPSY RIGHT

## 2021-02-03 PROCEDURE — 88346 IMFLUOR 1ST 1ANTB STAIN PX: CPT | Mod: TC | Performed by: INTERNAL MEDICINE

## 2021-02-03 PROCEDURE — 88346 IMFLUOR 1ST 1ANTB STAIN PX: CPT | Mod: 26 | Performed by: PATHOLOGY

## 2021-02-03 PROCEDURE — 250N000009 HC RX 250: Performed by: PHYSICIAN ASSISTANT

## 2021-02-03 PROCEDURE — 999N000134 HC STATISTIC PP CARE STAGE 3

## 2021-02-03 RX ORDER — SODIUM CHLORIDE 9 MG/ML
INJECTION, SOLUTION INTRAVENOUS CONTINUOUS
Status: DISCONTINUED | OUTPATIENT
Start: 2021-02-03 | End: 2021-02-03 | Stop reason: HOSPADM

## 2021-02-03 RX ORDER — LIDOCAINE 40 MG/G
CREAM TOPICAL
Status: DISCONTINUED | OUTPATIENT
Start: 2021-02-03 | End: 2021-02-03 | Stop reason: HOSPADM

## 2021-02-03 RX ORDER — NALOXONE HYDROCHLORIDE 0.4 MG/ML
0.2 INJECTION, SOLUTION INTRAMUSCULAR; INTRAVENOUS; SUBCUTANEOUS
Status: DISCONTINUED | OUTPATIENT
Start: 2021-02-03 | End: 2021-02-03 | Stop reason: HOSPADM

## 2021-02-03 RX ORDER — NALOXONE HYDROCHLORIDE 0.4 MG/ML
0.4 INJECTION, SOLUTION INTRAMUSCULAR; INTRAVENOUS; SUBCUTANEOUS
Status: DISCONTINUED | OUTPATIENT
Start: 2021-02-03 | End: 2021-02-03 | Stop reason: HOSPADM

## 2021-02-03 RX ORDER — FENTANYL CITRATE 50 UG/ML
25-50 INJECTION, SOLUTION INTRAMUSCULAR; INTRAVENOUS EVERY 5 MIN PRN
Status: DISCONTINUED | OUTPATIENT
Start: 2021-02-03 | End: 2021-02-03 | Stop reason: HOSPADM

## 2021-02-03 RX ORDER — FLUMAZENIL 0.1 MG/ML
0.2 INJECTION, SOLUTION INTRAVENOUS
Status: DISCONTINUED | OUTPATIENT
Start: 2021-02-03 | End: 2021-02-03 | Stop reason: HOSPADM

## 2021-02-03 RX ORDER — DEXTROSE MONOHYDRATE 25 G/50ML
25-50 INJECTION, SOLUTION INTRAVENOUS
Status: DISCONTINUED | OUTPATIENT
Start: 2021-02-03 | End: 2021-02-03 | Stop reason: HOSPADM

## 2021-02-03 RX ORDER — NICOTINE POLACRILEX 4 MG
15-30 LOZENGE BUCCAL
Status: CANCELLED | OUTPATIENT
Start: 2021-02-03

## 2021-02-03 RX ORDER — DEXTROSE MONOHYDRATE 25 G/50ML
25-50 INJECTION, SOLUTION INTRAVENOUS
Status: CANCELLED | OUTPATIENT
Start: 2021-02-03

## 2021-02-03 RX ORDER — NICOTINE POLACRILEX 4 MG
15-30 LOZENGE BUCCAL
Status: DISCONTINUED | OUTPATIENT
Start: 2021-02-03 | End: 2021-02-03 | Stop reason: HOSPADM

## 2021-02-03 RX ADMIN — MIDAZOLAM 2 MG: 1 INJECTION INTRAMUSCULAR; INTRAVENOUS at 11:43

## 2021-02-03 RX ADMIN — SODIUM CHLORIDE: 9 INJECTION, SOLUTION INTRAVENOUS at 10:22

## 2021-02-03 RX ADMIN — FENTANYL CITRATE 100 MCG: 50 INJECTION, SOLUTION INTRAMUSCULAR; INTRAVENOUS at 11:43

## 2021-02-03 RX ADMIN — LIDOCAINE HYDROCHLORIDE 7 ML: 10 INJECTION, SOLUTION EPIDURAL; INFILTRATION; INTRACAUDAL; PERINEURAL at 11:42

## 2021-02-03 NOTE — PROGRESS NOTES
Patient Name: Ethel Thompson  Medical Record Number: [id number]  Today's Date: 2/3/2021    Procedure: right side transplant kidney biopsy  Proceduralist: GENE Daniel PA-C    Procedure Start: 1130  Procedure end: 1144  Sedation medications administered: 2mg versed IV, 100mcg fentanyl IV     Report given to: Nette GARSIA  : n/a    Other Notes: Pt arrived to IR room 6 from . Consent reviewed. Pt denies any questions or concerns regarding procedure. Pt positioned supine and monitored per protocol. Pt tolerated procedure without any noted complications. Pt transferred back to .

## 2021-02-03 NOTE — DISCHARGE INSTRUCTIONS
[facility]    Interventional Radiology  Patient Instructions Following Transplanted Kidney Biopsy    AFTER YOU GO HOME  ? If you were given sedation DO NOT drive or operate machinery at home or at work for at least 24 hours  ? DO relax and take it easy for 48 hours, no strenuous activity for 24 hours  ? DO drink plenty of fluids  ? DO resume your regular diet, unless otherwise instructed by your Primary Physician  ? Keep the dressing dry and in place for 24 hours.  ? DO NOT SMOKE FOR AT LEAST 24 HOURS, if you have been given any medications that were to help you relax or sedate you during your procedure  ? DO NOT drink alcoholic beverages the day of your procedure  ? DO NOT do any strenuous exercise or lifting (> 10 lbs) for at least 3 days following your procedure  ? DO NOT take a bath or shower for at least 12 hours following your procedure  ? Remove dressing after shower the next day. Replace with Band aid for 2 days.  Never leave a wet dressing in place.  ? DO NOT make any important or legal decisions for 24 hours following your procedure  ? There should be minimum drainage from the biopsy site    CALL THE PHYSICIAN IF:  ? You start bleeding from the procedure site.  If you do start to bleed from that site, hold pressure on the site for a minimum of 10 minutes.  Your physician will tell you if you need to return to the hospital  ? You develop nausea or vomiting  ? You have excessive swelling, redness, or tenderness at the site  ? You have drainage that looks like it is infected.  ? You experience severe pain  ? You develop hives or a rash or unexplained itching  ? You develop a temperature of 101 degrees F or greater  ? You develop bloody clots or red urine after you are discharged      ADDITIONAL INSTRUCTIONS: None    [facility] INTERVENTIONAL RADIOLOGY DEPARTMENT  Procedure Physician:         Ren GAMBOA  Date of procedure: February 3, 2021  Telephone Numbers: [phone] Monday-Friday 8:00  am to 4:30 pm  [phone] After 4:30 pm Monday-Friday, Weekends & Holidays.   Ask for the Interventional Radiologist on call.  Someone is on call 24 hrs/day  [facility] toll free number: [phone] Monday-Friday 8:00 am to 4:30 pm  [facility] Emergency Dept: [phone]

## 2021-02-03 NOTE — PRE-PROCEDURE
GENERAL PRE-PROCEDURE:   Procedure:  Kidney biopsy  Date/Time:  2/3/2021 10:22 AM    Verbal consent obtained?: Yes    Written consent obtained?: Yes    Risks and benefits: Risks, benefits and alternatives were discussed    Consent given by:  Patient  Patient states understanding of procedure being performed: Yes    Patient's understanding of procedure matches consent: Yes    Procedure consent matches procedure scheduled: Yes    Expected level of sedation:  Moderate  Appropriately NPO:  Yes  ASA Class:  Class 2- mild systemic disease, no acute problems, no functional limitations  Mallampati  :  Grade 2- soft palate, base of uvula, tonsillar pillars, and portion of posterior pharyngeal wall visible  Lungs:  Lungs clear with good breath sounds bilaterally  Heart:  Normal heart sounds and rate  History & Physical reviewed:  History and physical reviewed and no updates needed  Statement of review:  I have reviewed the lab findings, diagnostic data, medications, and the plan for sedation

## 2021-02-03 NOTE — IP AVS SNAPSHOT
MRN:[id number]                      After Visit Summary   2/3/2021    Ethel Thompson    MRN: [id number]           Visit Information        Department      2/3/2021  9:30 AM [facility] Interventional Radiology          Review of your medicines      UNREVIEWED medicines. Ask your doctor about these medicines       Dose / Directions   acetaminophen 325 MG tablet  Commonly known as: TYLENOL  Used for: Kidney replaced by transplant      Dose: 650 mg  Take 2 tablets (650 mg) by mouth every 4 hours as needed for pain  Quantity: 1 Bottle  Refills: 0     amLODIPine 5 MG tablet  Commonly known as: NORVASC  Used for: Care after organ transplant      Dose: 10 mg  Take 2 tablets (10 mg) by mouth daily  Quantity: 180 tablet  Refills: 11     aspirin 81 MG EC tablet  Commonly known as: ASA  Used for: Kidney replaced by transplant      Dose: 81 mg  Take 1 tablet (81 mg) by mouth daily  Quantity: 90 tablet  Refills: 3     atorvastatin 40 MG tablet  Commonly known as: LIPITOR  Used for: Kidney replaced by transplant      Dose: 20 mg  Take 0.5 tablets (20 mg) by mouth daily  Refills: 0     diclofenac 1 % topical gel  Commonly known as: VOLTAREN      Dose: 2 g  Apply 2 g topically 4 times daily  Refills: 0     famotidine 20 MG tablet  Commonly known as: PEPCID      Dose: 20 mg  Take 20 mg by mouth every evening  Refills: 0     fluticasone 50 MCG/ACT nasal spray  Commonly known as: FLONASE      Dose: 1 spray  Spray 1 spray into both nostrils 2 times daily as needed for rhinitis or allergies  Refills: 0     levothyroxine 112 MCG tablet  Commonly known as: SYNTHROID/LEVOTHROID      Take 112 mcg (1 tablet) by mouth every Monday, Tuesday is 1/2 tab, Wednesday, Thursday, Friday  Refills: 0     Lidocaine 4 % Patch  Commonly known as: LIDOCARE      Dose: 1 patch  Place 1 patch onto the skin every 24 hours To prevent lidocaine toxicity, patient should be patch free for 12 hrs daily.  Using for back pain  Refills: 0      magnesium oxide 400 MG tablet  Commonly known as: MAG-OX  Used for: Kidney replaced by transplant      Dose: 400 mg  Take 1 tablet (400 mg) by mouth daily (with lunch)  Quantity: 30 tablet  Refills: 5     mycophenolate 250 MG capsule  Commonly known as: GENERIC EQUIVALENT  Used for: Kidney replaced by transplant      Dose: 1,000 mg  Take 4 capsules (1,000 mg) by mouth 2 times daily  Quantity: 240 capsule  Refills: 11     psyllium 58.6 % powder  Commonly known as: METAMUCIL/KONSYL      Take by mouth daily  Refills: 0     sulfamethoxazole-trimethoprim 400-80 MG tablet  Commonly known as: BACTRIM  Indication: PCP prophylaxis  Used for: Kidney replaced by transplant      Dose: 1 tablet  Take 1 tablet by mouth daily  Quantity: 30 tablet  Refills: 11     * tacrolimus 0.5 MG capsule  Commonly known as: GENERIC EQUIVALENT  Used for: Kidney replaced by transplant, Kidney transplanted      Dose: 0.5 mg  Take 1 capsule (0.5 mg) by mouth 2 times daily Total dose = 1.5 mg BID  Quantity: 60 capsule  Refills: 11     * tacrolimus 1 MG capsule  Commonly known as: GENERIC EQUIVALENT  Used for: Kidney replaced by transplant, Kidney transplanted      Dose: 1 mg  Take 1 capsule (1 mg) by mouth 2 times daily Total dose = 1.5 mg BID  Quantity: 60 capsule  Refills: 11     valACYclovir 500 MG tablet  Commonly known as: VALTREX      Dose: 500 mg  Take 1 tablet (500 mg) by mouth daily  Quantity:    Refills: 0         * This list has 2 medication(s) that are the same as other medications prescribed for you. Read the directions carefully, and ask your doctor or other care provider to review them with you.            CONTINUE these medicines which have NOT CHANGED       Dose / Directions   melatonin 3 MG tablet      Dose: 6 mg  Take 6 mg by mouth At Bedtime  Refills: 0              Protect others around you: Learn how to safely use, store and throw away your medicines at www.disposemymeds.org.       Follow-ups after your visit       Your next  10 appointments already scheduled    Feb 15, 2021  8:00 AM  LAB with AN LAB  [facility] ([facility]) [address] [facility] [address]  [phone]   Please do not eat 10-12 hours before your appointment if you are coming in fasting for labs on lipids, cholesterol, or glucose (sugar). Does not apply to pregnant women. Water, tea and black coffee (with nothing added) is okay. Do not drink other fluids, diet soda or gum. If you have concerns about taking your medications, please send a message by clicking on Secure Messaging, Message Your Care Team.     Mar 01, 2021  8:00 AM  LAB with AN LAB  [facility] ([facility]) [address] [facility] [address]  [phone]   Please do not eat 10-12 hours before your appointment if you are coming in fasting for labs on lipids, cholesterol, or glucose (sugar). Does not apply to pregnant women. Water, tea and black coffee (with nothing added) is okay. Do not drink other fluids, diet soda or gum. If you have concerns about taking your medications, please send a message by clicking on Secure Messaging, Message Your Care Team.     Mar 04, 2021  9:00 AM  LAB with  LAB  [facility] ([facility] ) [address] [address] [phone]   Please do not eat 10-12 hours before your appointment if you are coming in fasting for labs on lipids, cholesterol, or glucose (sugar). Does not apply to pregnant women. Water, tea and black coffee (with nothing added) is okay. Do not drink other fluids, diet soda or gum. If you have concerns about taking your medications, please send a message by clicking on Secure Messaging, Message Your Care Team.     Mar 04, 2021  9:30 AM  (Arrive by 9:15 AM)  Return Kidney Transplant with  Early Post Transplant  [facility] [place]  ([facility] ) [address] [facility] [address]  [phone]      Mar 15, 2021  8:00 AM  LAB with AN LAB  [facility] ([facility]) [address] [facility] [address]  [phone]   Please do not eat 10-12 hours before your appointment if you are coming in fasting for labs on lipids, cholesterol, or glucose (sugar). Does not apply to pregnant women. Water, tea and black coffee (with nothing added) is okay. Do not drink other fluids, diet soda or gum. If you have concerns about taking your medications, please send a message by clicking on Secure Messaging, Message Your Care Team.     Mar 29, 2021  8:00 AM  LAB with AN LAB  [facility] ([facility]) [address] [facility] [id number] [phone]   Please do not eat 10-12 hours before your appointment if you are coming in fasting for labs on lipids, cholesterol, or glucose (sugar). Does not apply to pregnant women. Water, tea and black coffee (with nothing added) is okay. Do not drink other fluids, diet soda or gum. If you have concerns about taking your medications, please send a message by clicking on Secure Messaging, Message Your Care Team.        Care Instructions       Further instructions from your care team       [facility]    Interventional Radiology  Patient Instructions Following Transplanted Kidney Biopsy    AFTER YOU GO HOME  ? If you were given sedation DO NOT drive or operate machinery at home or at work for at least 24 hours  ? DO relax and take it easy for 48 hours, no strenuous activity for 24 hours  ? DO drink plenty of fluids  ? DO resume your regular diet, unless otherwise instructed by your Primary Physician  ? Keep the dressing dry and in place for 24 hours.  ? DO NOT SMOKE FOR AT LEAST 24 HOURS, if you have been given any medications that were to help you relax or  sedate you during your procedure  ? DO NOT drink alcoholic beverages the day of your procedure  ? DO NOT do any strenuous exercise or lifting (> 10 lbs) for at least 3 days following your procedure  ? DO NOT take a bath or shower for at least 12 hours following your procedure  ? Remove dressing after shower the next day. Replace with Band aid for 2 days.  Never leave a wet dressing in place.  ? DO NOT make any important or legal decisions for 24 hours following your procedure  ? There should be minimum drainage from the biopsy site    CALL THE PHYSICIAN IF:  ? You start bleeding from the procedure site.  If you do start to bleed from that site, hold pressure on the site for a minimum of 10 minutes.  Your physician will tell you if you need to return to the hospital  ? You develop nausea or vomiting  ? You have excessive swelling, redness, or tenderness at the site  ? You have drainage that looks like it is infected.  ? You experience severe pain  ? You develop hives or a rash or unexplained itching  ? You develop a temperature of 101 degrees F or greater  ? You develop bloody clots or red urine after you are discharged      ADDITIONAL INSTRUCTIONS: None    [facility] INTERVENTIONAL RADIOLOGY DEPARTMENT  Procedure Physician:         Ren GAMBOA  Date of procedure: February 3, 2021  Telephone Numbers: [phone] Monday-Friday 8:00 am to 4:30 pm  [phone] After 4:30 pm Monday-Friday, Weekends & Holidays.   Ask for the Interventional Radiologist on call.  Someone is on call 24 hrs/day  [facility] toll free number: [phone] Monday-Friday 8:00 am to 4:30 pm  [facility] Emergency Dept: [phone]          Additional Information About Your Visit       [employer] Information    [employer] gives you secure access to your electronic health record. If you see a primary care provider, you can also send messages to your care team and make appointments. If you have questions, please call your primary care clinic.  If you do not have a  primary care provider, please call [phone] and they will assist you.       Care EveryWhere ID    This is your Care EveryWhere ID. This could be used by other organizations to access your [facility] medical records  [id number]       Your Vitals Were  Most recent update: 2/3/2021 12:15 PM    Blood Pressure   132/69          Pulse   69          Temperature   97.9  F (36.6  C) (Oral)          Respirations   14          Weight   59 kg (130 lb)             Pulse Oximetry   96%    BMI (Body Mass Index)   26.26 kg/m           Primary Care Provider Office Phone # Fax #    Joann Canales [phone]      Equal Access to Services    [facility]: Hadii aad roman hadasho Sopreethiali, waaxda luqadaha, qaybta kaalmada adeegyada, heriberto grigsby . So [facility] [phone].    ATENCIÓN: Si habla español, tiene a solorzano disposición servicios gratuitos de asistencia lingüística. Llame al [phone].    We comply with applicable federal and state civil rights laws, including the [place] Human Rights Act. We do not discriminate on the basis of race, color, creed, Synagogue, national origin, marital status, age, disability, sex, sexual orientation, or gender identity.    If you would like an itemization of your charges they will now be available in [employer] 30 days after discharge. To access the itemized statements in [employer] go to billing/billing summary. From there select view account. There will be multiple tabs showing an overview of your account, detail, payments, and communications. From the communications tab you can see your monthly statements, your itemized statements, and any billing letters generated for your account. If you do not have a [employer] account and need help getting access please contact [employer] support at [phone].  If you would prefer to have your itemized statements mailed please contact our automated itemized bill request line at [phone] option  2.       Thank you!     Thank you for choosing [facility] for your care. Our goal is always to provide you with excellent care. Hearing back from our patients is one way we can continue to improve our services. Please take a few minutes to complete the written survey that you may receive in the mail after you visit with us. Thank you!            Medication List      Medications          Morning Afternoon Evening Bedtime As Needed    melatonin 3 MG tablet  INSTRUCTIONS: Take 6 mg by mouth At Bedtime                       ASK your doctor about these medications          Morning Afternoon Evening Bedtime As Needed    mycophenolate 250 MG capsule  Also known as: GENERIC EQUIVALENT  INSTRUCTIONS: Take 4 capsules (1,000 mg) by mouth 2 times daily  Doctor's comments: TXP DT 9/3/2020 (Kidney) TXP Dischg DT 9/6/2020 DX Kidney replaced by transplant Z94.0 [facility] ([place], [place])  This medication is very important: It prevents organ rejection.                     * tacrolimus 0.5 MG capsule  Also known as: GENERIC EQUIVALENT  INSTRUCTIONS: Take 1 capsule (0.5 mg) by mouth 2 times daily Total dose = 1.5 mg BID  This medication is very important: It prevents organ rejection.                     * tacrolimus 1 MG capsule  Also known as: GENERIC EQUIVALENT  INSTRUCTIONS: Take 1 capsule (1 mg) by mouth 2 times daily Total dose = 1.5 mg BID  This medication is very important: It prevents organ rejection.                     sulfamethoxazole-trimethoprim 400-80 MG tablet  Also known as: BACTRIM  INSTRUCTIONS: Take 1 tablet by mouth daily  Reason for med: PCP prophylaxis  This medication is very important: It prevents dangerous infection.                     valACYclovir 500 MG tablet  Also known as: VALTREX  INSTRUCTIONS: Take 1 tablet (500 mg) by mouth daily  This medication is very important: It prevents dangerous infection.                     acetaminophen 325 MG tablet  Also known as:  TYLENOL  INSTRUCTIONS: Take 2 tablets (650 mg) by mouth every 4 hours as needed for pain                     amLODIPine 5 MG tablet  Also known as: NORVASC  INSTRUCTIONS: Take 2 tablets (10 mg) by mouth daily                     aspirin 81 MG EC tablet  Also known as: ASA  INSTRUCTIONS: Take 1 tablet (81 mg) by mouth daily                     atorvastatin 40 MG tablet  Also known as: LIPITOR  INSTRUCTIONS: Take 0.5 tablets (20 mg) by mouth daily                     diclofenac 1 % topical gel  Also known as: VOLTAREN  INSTRUCTIONS: Apply 2 g topically 4 times daily                     famotidine 20 MG tablet  Also known as: PEPCID  INSTRUCTIONS: Take 20 mg by mouth every evening                     fluticasone 50 MCG/ACT nasal spray  Also known as: FLONASE  INSTRUCTIONS: Spray 1 spray into both nostrils 2 times daily as needed for rhinitis or allergies                     levothyroxine 112 MCG tablet  Also known as: SYNTHROID/LEVOTHROID  INSTRUCTIONS: Take 112 mcg (1 tablet) by mouth every Monday, Tuesday is 1/2 tab, Wednesday, Thursday, Friday                     Lidocaine 4 % Patch  Also known as: LIDOCARE  INSTRUCTIONS: Place 1 patch onto the skin every 24 hours To prevent lidocaine toxicity, patient should be patch free for 12 hrs daily.  Using for back pain  LAST TAKEN: Ask your nurse or doctor                     magnesium oxide 400 MG tablet  Also known as: MAG-OX  INSTRUCTIONS: Take 1 tablet (400 mg) by mouth daily (with lunch)                     psyllium 58.6 % powder  Also known as: METAMUCIL/KONSYL  INSTRUCTIONS: Take by mouth daily                        * This list has 2 medication(s) that are the same as other medications prescribed for you. Read the directions carefully, and ask your doctor or other care provider to review them with you.

## 2021-02-03 NOTE — PROGRESS NOTES
Pt arrived on 2a post transplanted kidney biopsy. VSS RA. Dressing c/d/i. No pain. Family at bedside. Pt sipping on juice and water.

## 2021-02-03 NOTE — PROGRESS NOTES
Pt tolerated oral intake. Pt voided clear yellow urine. Discharge instructions reviewed, copy given to pt. Pt tolerated ambulation. PIV dc'd. Pt will discharge home accompanied by .

## 2021-02-03 NOTE — IP AVS SNAPSHOT
[facility] Interventional Radiology  [address] [facility] [address]  Phone: [phone]                                    After Visit Summary   2/3/2021    Ethel Thompson    MRN: [id number]           After Visit Summary Signature Page    I have received my discharge instructions, and my questions have been answered. I have discussed any challenges I see with this plan with the nurse or doctor.    ..........................................................................................................................................  Patient/Patient Representative Signature      ..........................................................................................................................................  Patient Representative Print Name and Relationship to Patient    ..................................................               ................................................  Date                                   Time    ..........................................................................................................................................  Reviewed by Signature/Title    ...................................................              ..............................................  Date                                               Time          22EPIC Rev 08/18

## 2021-02-03 NOTE — PROCEDURES
[facility]     Procedure: IR Procedure Note    Date/Time: 2/3/2021 11:55 AM  Performed by: Surya Daniel PA-C  Authorized by: Surya Daniel PA-C     UNIVERSAL PROTOCOL   Site Marked: NA  Prior Images Obtained and Reviewed:  Yes  Required items: Required blood products, implants, devices and special equipment available    Patient identity confirmed:  Verbally with patient, arm band, provided demographic data and hospital-assigned identification number  Patient was reevaluated immediately before administering moderate or deep sedation or anesthesia  Confirmation Checklist:  Patient's identity using two indicators, relevant allergies, procedure was appropriate and matched the consent or emergent situation and correct equipment/implants were available  Time out: Immediately prior to the procedure a time out was called    Universal Protocol: the Joint Commission Universal Protocol was followed    Preparation: Patient was prepped and draped in usual sterile fashion           ANESTHESIA    Anesthesia: Local infiltration  Local Anesthetic:  Lidocaine 1% without epinephrine      SEDATION    Patient Sedated: Yes    Vital signs: Vital signs monitored during sedation    See dictated procedure note for full details.  Findings: Sedation medications administered: 2mg versed IV, 100mcg fentanyl IV   Sedation time: 15 minutes    Specimens: core needle biopsy specimens sent for pathological analysis    Complications: None    Condition: Stable    PROCEDURE   Patient Tolerance:  Patient tolerated the procedure well with no immediate complications  Describe Procedure: Ethel Blow  [id number]    Completed ultrasound guided RLQ transplant kidney biopsy.  A total of three passes yielded kidney tissue cores collected for further pathological evaluation.  No immediate complications.  Dx: proteinuria.  María.    Sedation medications administered: 2mg versed IV, 100mcg  fentanyl IV   Sedation time: 15 minutes  Length of time physician/provider present for 1:1 monitoring during sedation: 15

## 2021-02-04 ENCOUNTER — DOCUMENTATION ONLY (OUTPATIENT)
Dept: NEPHROLOGY | Facility: CLINIC | Age: 66
End: 2021-02-04

## 2021-02-04 NOTE — PROGRESS NOTES
Prelim kidney biopsy :  Only LM adequate per  no rejection   Awaiting IF & EM to follow to determine etiology of proteinuria

## 2021-02-08 ENCOUNTER — TELEPHONE (OUTPATIENT)
Dept: TRANSPLANT | Facility: CLINIC | Age: 66
End: 2021-02-08

## 2021-02-08 NOTE — TELEPHONE ENCOUNTER
Jordy Dewitt MD Huepfel, Tiffany MOY RN             Prelim biopsy no rejection   Awaiting IF & EM to see if any GN      Called  With preliminary kidney biopsy  Results  Waiting for the final path report

## 2021-02-09 LAB — COPATH REPORT: NORMAL

## 2021-02-09 NOTE — TELEPHONE ENCOUNTER
Patient is waiting on the line result of her test and would like a call return with the information.

## 2021-02-12 ENCOUNTER — TELEPHONE (OUTPATIENT)
Dept: TRANSPLANT | Facility: CLINIC | Age: 66
End: 2021-02-12

## 2021-02-12 DIAGNOSIS — Z94.0 KIDNEY REPLACED BY TRANSPLANT: Primary | ICD-10-CM

## 2021-02-14 NOTE — TELEPHONE ENCOUNTER
Carter Wilson MD Huepfel, Mary K, RN; Jordy Dewitt MD             Happy to see her next week

## 2021-02-15 ENCOUNTER — RESULTS ONLY (OUTPATIENT)
Dept: OTHER | Facility: CLINIC | Age: 66
End: 2021-02-15

## 2021-02-15 DIAGNOSIS — Z76.82 ORGAN TRANSPLANT CANDIDATE: ICD-10-CM

## 2021-02-15 DIAGNOSIS — Z94.0 KIDNEY REPLACED BY TRANSPLANT: ICD-10-CM

## 2021-02-15 DIAGNOSIS — N18.6 ESRD (END STAGE RENAL DISEASE) (H): ICD-10-CM

## 2021-02-15 LAB
ANION GAP SERPL CALCULATED.3IONS-SCNC: 6 MMOL/L (ref 3–14)
BASOPHILS # BLD AUTO: 0.2 10E9/L (ref 0–0.2)
BASOPHILS NFR BLD AUTO: 2 %
BUN SERPL-MCNC: 27 MG/DL (ref 7–30)
CALCIUM SERPL-MCNC: 9.9 MG/DL (ref 8.5–10.1)
CHLORIDE SERPL-SCNC: 108 MMOL/L (ref 94–109)
CO2 SERPL-SCNC: 26 MMOL/L (ref 20–32)
CREAT SERPL-MCNC: 0.79 MG/DL (ref 0.52–1.04)
CREAT UR-MCNC: 56 MG/DL
DIFFERENTIAL METHOD BLD: NORMAL
EOSINOPHIL # BLD AUTO: 0.2 10E9/L (ref 0–0.7)
EOSINOPHIL NFR BLD AUTO: 2 %
ERYTHROCYTE [DISTWIDTH] IN BLOOD BY AUTOMATED COUNT: 12.4 % (ref 10–15)
GFR SERPL CREATININE-BSD FRML MDRD: 77 ML/MIN/{1.73_M2}
GLUCOSE SERPL-MCNC: 170 MG/DL (ref 70–99)
HCT VFR BLD AUTO: 38.5 % (ref 35–47)
HGB BLD-MCNC: 12.3 G/DL (ref 11.7–15.7)
LYMPHOCYTES # BLD AUTO: 0.9 10E9/L (ref 0.8–5.3)
LYMPHOCYTES NFR BLD AUTO: 10 %
MCH RBC QN AUTO: 31.1 PG (ref 26.5–33)
MCHC RBC AUTO-ENTMCNC: 31.9 G/DL (ref 31.5–36.5)
MCV RBC AUTO: 98 FL (ref 78–100)
MONOCYTES # BLD AUTO: 0.5 10E9/L (ref 0–1.3)
MONOCYTES NFR BLD AUTO: 5 %
NEUTROPHILS # BLD AUTO: 7.4 10E9/L (ref 1.6–8.3)
NEUTROPHILS NFR BLD AUTO: 81 %
PLATELET # BLD AUTO: 191 10E9/L (ref 150–450)
PLATELET # BLD EST: NORMAL 10*3/UL
POTASSIUM SERPL-SCNC: 4.4 MMOL/L (ref 3.4–5.3)
PROT UR-MCNC: 1.44 G/L
PROT/CREAT 24H UR: 2.58 G/G CR (ref 0–0.2)
RBC # BLD AUTO: 3.95 10E12/L (ref 3.8–5.2)
RBC MORPH BLD: NORMAL
SODIUM SERPL-SCNC: 140 MMOL/L (ref 133–144)
TACROLIMUS BLD-MCNC: 8 UG/L (ref 5–15)
TME LAST DOSE: NORMAL H
VARIANT LYMPHS BLD QL SMEAR: PRESENT
WBC # BLD AUTO: 9.2 10E9/L (ref 4–11)

## 2021-02-15 PROCEDURE — 80048 BASIC METABOLIC PNL TOTAL CA: CPT | Performed by: INTERNAL MEDICINE

## 2021-02-15 PROCEDURE — 80197 ASSAY OF TACROLIMUS: CPT | Performed by: INTERNAL MEDICINE

## 2021-02-15 PROCEDURE — 84156 ASSAY OF PROTEIN URINE: CPT | Performed by: INTERNAL MEDICINE

## 2021-02-15 PROCEDURE — 86832 HLA CLASS I HIGH DEFIN QUAL: CPT | Mod: 59 | Performed by: SURGERY

## 2021-02-15 PROCEDURE — 36415 COLL VENOUS BLD VENIPUNCTURE: CPT | Performed by: INTERNAL MEDICINE

## 2021-02-15 PROCEDURE — 80180 DRUG SCRN QUAN MYCOPHENOLATE: CPT | Performed by: INTERNAL MEDICINE

## 2021-02-15 PROCEDURE — 85025 COMPLETE CBC W/AUTO DIFF WBC: CPT | Performed by: INTERNAL MEDICINE

## 2021-02-15 NOTE — TELEPHONE ENCOUNTER
Follow up phone call   With Maxim and Ethel    Plan to obtain  Transplant  Labs  And follow up in telemedicine clinic  To discuss  Transplant   kidney biopsy

## 2021-02-16 LAB
MYCOPHENOLATE SERPL LC/MS/MS-MCNC: 2.15 MG/L (ref 1–3.5)
MYCOPHENOLATE-G SERPL LC/MS/MS-MCNC: 100.1 MG/L (ref 30–95)
TME LAST DOSE: ABNORMAL H

## 2021-02-17 ENCOUNTER — OFFICE VISIT (OUTPATIENT)
Dept: NEPHROLOGY | Facility: CLINIC | Age: 66
End: 2021-02-17
Attending: INTERNAL MEDICINE
Payer: MEDICARE

## 2021-02-17 ENCOUNTER — TELEPHONE (OUTPATIENT)
Dept: TRANSPLANT | Facility: CLINIC | Age: 66
End: 2021-02-17

## 2021-02-17 VITALS
OXYGEN SATURATION: 96 % | WEIGHT: 132 LBS | HEART RATE: 75 BPM | DIASTOLIC BLOOD PRESSURE: 74 MMHG | SYSTOLIC BLOOD PRESSURE: 144 MMHG | BODY MASS INDEX: 26.66 KG/M2 | TEMPERATURE: 98 F

## 2021-02-17 DIAGNOSIS — Z94.0 HTN, KIDNEY TRANSPLANT RELATED: ICD-10-CM

## 2021-02-17 DIAGNOSIS — B00.1 COLD SORE: ICD-10-CM

## 2021-02-17 DIAGNOSIS — D84.9 IMMUNOSUPPRESSION (H): ICD-10-CM

## 2021-02-17 DIAGNOSIS — Z94.0 KIDNEY REPLACED BY TRANSPLANT: Primary | ICD-10-CM

## 2021-02-17 DIAGNOSIS — R80.1 PERSISTENT PROTEINURIA: ICD-10-CM

## 2021-02-17 DIAGNOSIS — I15.1 HTN, KIDNEY TRANSPLANT RELATED: ICD-10-CM

## 2021-02-17 PROCEDURE — G0463 HOSPITAL OUTPT CLINIC VISIT: HCPCS | Mod: TEL

## 2021-02-17 PROCEDURE — 99215 OFFICE O/P EST HI 40 MIN: CPT | Mod: GC

## 2021-02-17 RX ORDER — LOSARTAN POTASSIUM 100 MG/1
50 TABLET ORAL DAILY
Qty: 90 TABLET | Refills: 3 | Status: SHIPPED | OUTPATIENT
Start: 2021-02-17 | End: 2021-03-09

## 2021-02-17 RX ORDER — VALACYCLOVIR HYDROCHLORIDE 500 MG/1
500 TABLET, FILM COATED ORAL DAILY
Qty: 90 TABLET | Refills: 3 | Status: SHIPPED | OUTPATIENT
Start: 2021-02-17 | End: 2021-12-07

## 2021-02-17 NOTE — LETTER
2/17/2021       RE: Ethel Thompson  [address]  [place] [address]     Dear Colleague,    Thank you for referring your patient, Ethel Thompson, to the [facility] [place] at [facility]. Please see a copy of my visit note below.    ACUTE TRANSPLANT NEPHROLOGY VISIT    Assessment & Plan     # DDKT: Stable with worsening proteinuria, 1.5 g/g from 0.2 g/g in 11/2020 and hematuria 5- RBCs. Native kidney biopsy in 2017 reported as global glomerulosclerosis with severe arterial sclerosis,  no evidence of immune complex mediated or paraprotein associated kidney disease.               - Baseline Creatinine: ~ 0.7 - 0.9             - Proteinuria: Moderate (1-3 grams). 2.58 g/gCr ( 2/15/21) , increasing              - Date DSA Last Checked: Jan/2021    Latest DSA: No             - BK Viremia: No, Jan 2021             - Kidney Tx Biopsy: Yes ( 2/3/21) :   Kidney, allograft; percutaneous needle biopsy:   -No diagnostic evidence of acute rejection seen .  -Segmental glomerular basement membrane irregularity. Ultrastructurally, several segments of glomerular basement membrane show irregularity with remodeling and lamellation, while type IV collagen alpha5 and alpha2 staining shows normal pattern.  It is felt the changes are donor derived.     Though we do not see FSGS pattern in the allograft , we may still be dealing with possible recurrence of FSGS .   We will need to continue to closely monitor with weekly UPCR  Start Losartan  50  mg daily   Hold ASA 1 week prior to biopsy  Repeat allograft biopsy in 2-3 weeks to look for FSGS. Of note, she has LUE AVF w/ good thrill and bruit, never used.        # Immunosuppression: Tacrolimus immediate release (goal 8-10) and Mycophenolate mofetil (dose 1000 mg every 12 hours)             - Induction with Recent Transplant:  thymoglobulin 125mg (2.2mg/kg), basiliximab (dose given POD #1 and POD #5 ), and steroid  taper.             - Continue with intensive monitoring of immunosuppression for efficacy and toxicity             - Changes: No      # Infection Prophylaxis:   - PJP: Sulfa/TMP (Bactrim)   - CMV: None, prophylaxis completed  -HSV: on valtrex 500mg daily     # Hypertension: Controlled;  Goal BP: < 130/80 , she is hypervolemic with some facial puffiness an b/l LE swelling r>l .             - Changes: YES   Stop Amlodipine  Start Losartan 50 mg daily. Labs early next week. Likely start lasix next week after labs. Due to RLE>LLE, perform venous doppler to r/o DVT        # Diabetes: Borderline control (HbA1c 7-9%) new onset after transplant. HbA1c used to be 5,          Last HbA1c: 7.2 %             - Management as per primary care. Repeat HbA1c     # Mineral Bone Disorder:   - Secondary renal hyperparathyroidism; PTH level: Mildly elevated (151-300 pg/ml)        On treatment: None  - Vitamin D; level: low        On supplement: No.  Vitamin D and calcium were held before 2/2 hypercalcemia  - Calcium; level: Normal now    On supplement: No     # Electrolytes:   - Magnesium; level: Normal        On supplement: Yes  - Potassium : Normal .                On supplement : No     # Recurrent cold sore  - stop using lipstick. Continue oral  valtrex 500 mg daily     # GERD : on PEPCID    # HLD: on Pravastatin    # Hypothyroidism: On Levothyroxine    # Allergic Rhinitis: Flonase prn       # Skin Cancer Risk:               -  Counseled patient about increased risk of skin cancers while on immunosuppressants. Discussed sun protection and recommend regular follow up with Dermatology    # COVID-19 Virus Review: Discussed COVID-19 virus and the potential medical risks.  Reviewed preventative health recommendations, which includes washing hands for at least  20 seconds, avoid touching your face, and social distancing.  Asked patient to inform the transplant center if they are exposed or diagnosed with this virus.       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    Return visit: Return in about 1 month (around 3/17/2021).       Patient staffed with Dr Steve Strange MD     Physician Attestation   I, Carter Wilson MD, personally examined and evaluated this patient.  I discussed the patient with the resident/fellow and care team, and agree with the assessment and plan of care as documented in the note on 02/17/21 .      I personally reviewed vital signs, medications, labs and imaging.  Carter Wilson MD  Date of Service (when I saw the patient): 02/17/21          Chief Complaint   Ms. Thompson is a 66 year old here for kidney transplant and immunosuppression management.     History of Present Illness      Complains of having facial swelling, B/L leg swelling , R>L   states she has some SOB on exertion  No dizziness /lightheadedness  No CP/Cough/fever/chills/leg swelling  No abdomen pain/Nausea/vomiting. No diarrhea or constipation.  No voiding difficulty/Hematuria  No skin rash   No focal weakness/acute change in vision    Home BP:  weight at home 132 lbs    Problem List   Patient Active Problem List   Diagnosis     Elevated serum immunoglobulin free light chain level     FSGS (focal segmental glomerulosclerosis)     Dyslipidemia     Hypothyroidism     SADIE on CPAP     Sensorineural hearing loss (SNHL) of both ears     GERD (gastroesophageal reflux disease)     HTN, kidney transplant related     Hepatitis B core  antibody positive     Secondary renal hyperparathyroidism (H)     Memory deficits     Kidney replaced by transplant     Immunosuppression (H)     Aftercare following organ transplant     Vitamin D deficiency     Elevated random blood glucose level     Diabetes mellitus, type 2 (H)       Allergies   Allergies   Allergen Reactions     Amoxicillin-Pot Clavulanate Nausea and Vomiting       Medications   Current Outpatient Medications   Medication Sig     acetaminophen (TYLENOL) 325 MG tablet Take 2 tablets (650 mg) by mouth every 4 hours as needed for pain     amLODIPine (NORVASC) 5 MG tablet Take 2 tablets (10 mg) by mouth daily     aspirin (ASA) 81 MG EC tablet Take 1 tablet (81 mg) by mouth daily     atorvastatin (LIPITOR) 40 MG tablet Take 0.5 tablets (20 mg) by mouth daily     diclofenac (VOLTAREN) 1 % topical gel Apply 2 g topically 4 times daily     famotidine (PEPCID) 20 MG tablet Take 20 mg by mouth every evening      fluticasone (FLONASE) 50 MCG/ACT nasal spray Spray 1 spray into both nostrils 2 times daily as needed for rhinitis or allergies     levothyroxine (SYNTHROID/LEVOTHROID) 112 MCG tablet Take 112 mcg (1 tablet) by mouth every Monday, Tuesday is 1/2 tab, Wednesday, Thursday, Friday     Lidocaine (LIDOCARE) 4 % Patch Place 1 patch onto the skin every 24 hours To prevent lidocaine toxicity, patient should be patch free for 12 hrs daily.  Using for back pain     magnesium oxide (MAG-OX) 400 MG tablet Take 1 tablet (400 mg) by mouth daily (with lunch)     melatonin 3 MG tablet Take 6 mg by mouth At Bedtime      mycophenolate (GENERIC EQUIVALENT) 250 MG capsule Take 4 capsules (1,000 mg) by mouth 2 times daily     psyllium (METAMUCIL/KONSYL) 58.6 % powder Take by mouth daily     sulfamethoxazole-trimethoprim (BACTRIM) 400-80 MG tablet Take 1 tablet by mouth daily     tacrolimus (GENERIC EQUIVALENT) 0.5 MG capsule Take 1 capsule (0.5 mg) by mouth 2 times daily Total dose = 1.5 mg BID     tacrolimus (GENERIC  EQUIVALENT) 1 MG capsule Take 1 capsule (1 mg) by mouth 2 times daily Total dose = 1.5 mg BID (Patient taking differently: Take 1 mg by mouth 2 times daily Total dose = 1.5 in AM and 1 in PM)     valACYclovir (VALTREX) 500 MG tablet Take 1 tablet (500 mg) by mouth daily     No current facility-administered medications for this visit.      There are no discontinued medications.    Physical Exam   Vital Signs: There were no vitals taken for this visit.    GENERAL APPEARANCE: alert and no distress  HENT: mouth without ulcers or lesions  LYMPHATICS: no cervical or supraclavicular nodes  RESP: lungs clear to auscultation - no rales, rhonchi or wheezes  CV: regular rhythm, normal rate, no rub, no murmur  EDEMA:  Bilateral LEG SWELLING  2+ , R> L. Facial swelling .  ABDOMEN: soft, nondistended, nontender, bowel sounds normal  MS: extremities normal - no gross deformities noted, no evidence of inflammation in joints, no muscle tenderness  SKIN: no rash  Access - LUE AVF - good thrill     Data     Renal Latest Ref Rng & Units 2/15/2021 2/1/2021 1/25/2021   Na 133 - 144 mmol/L 140 141 141   Na (external) 135 - 145 mmol/L - - -   K 3.4 - 5.3 mmol/L 4.4 4.5 4.3   K (external) 3.5 - 5.0 mmol/L - - -   Cl 94 - 109 mmol/L 108 108 107   Cl (external) 98 - 110 mmol/L - - -   CO2 20 - 32 mmol/L 26 28 27   CO2 (external) 21 - 31 mmol/L - - -   BUN 7 - 30 mg/dL 27 18 24   BUN (external) 8 - 25 mg/dL - - -   Cr 0.52 - 1.04 mg/dL 0.79 0.79 0.77   Cr (external) 0.57 - 1.11 mg/dL - - -   Glucose 70 - 99 mg/dL 170(H) 155(H) 162(H)   Glucose (external) 65 - 100 mg/dL - - -   Ca  8.5 - 10.1 mg/dL 9.9 9.8 9.8   Ca (external) 8.5 - 10.5 mg/dL - - -   Mg 1.6 - 2.3 mg/dL - 1.7 -   Mg (external) 1.6 - 2.6 mg/dL - - -     Bone Health Latest Ref Rng & Units 2/1/2021 1/18/2021 12/14/2020   Phos 2.5 - 4.5 mg/dL 2.8 - 3.2   Phos (external) 2.3 - 4.7 mg/dL - - -   PTHi 18 - 80 pg/mL - 151(H) -   Vit D Def 20 - 75 ug/L - - -     Heme Latest Ref Rng &  Units 2/15/2021 2/1/2021 1/25/2021   WBC 4.0 - 11.0 10e9/L 9.2 10.2 7.4   WBC (external) 4.5 - 11.0 thou/cu mm - - -   Hgb 11.7 - 15.7 g/dL 12.3 12.1 12.1   Hgb (external) 12.0 - 16.0 g/dL - - -   Plt 150 - 450 10e9/L 191 187 197   Plt (external) 140 - 440 thou/cu mm - - -   ABSOLUTE NEUTROPHIL 1.6 - 8.3 10e9/L 7.4 - 5.7   ABSOLUTE LYMPHOCYTES 0.8 - 5.3 10e9/L 0.9 - 1.0   ABSOLUTE MONOCYTES 0.0 - 1.3 10e9/L 0.5 - 0.5   ABSOLUTE EOSINOPHILS 0.0 - 0.7 10e9/L 0.2 - 0.1   ABSOLUTE BASOPHILS 0.0 - 0.2 10e9/L 0.2 - 0.1   ABS IMMATURE GRANULOCYTES 0 - 0.4 10e9/L - - -   ABSOLUTE NUCLEATED RBC - - - -     Liver Latest Ref Rng & Units 1/25/2021 9/9/2020 9/5/2020   AP 40 - 150 U/L 189(H) - 53   TBili 0.2 - 1.3 mg/dL 0.5 - 0.4   DBili 0.0 - 0.2 mg/dL - - 0.2   ALT 0 - 50 U/L 37 - 52(H)   AST 0 - 45 U/L 24 - 50(H)   Tot Protein 6.8 - 8.8 g/dL 6.9 - 5.4(L)   Albumin 3.4 - 5.0 g/dL 3.8 2.9(L) 2.8(L)     Pancreas Latest Ref Rng & Units 1/25/2021 9/3/2020   A1C 0 - 5.6 % 7.3(H) 5.4     Iron studies Latest Ref Rng & Units 9/9/2020   Iron 35 - 180 ug/dL 162   Iron sat 15 - 46 % 92(H)   Ferritin 8 - 252 ng/mL 1,757(H)     [facility] Txp Virology Latest Ref Rng & Units 1/25/2021 12/14/2020 11/3/2020   CVM DNA Quant - EDTA PLASMA - -   CMV QUANT IU/ML CMVND:CMV DNA Not Detected [IU]/mL CMV DNA Not Detected - -   LOG IU/ML OF CMVQNT <2.1 [Log:IU]/mL Not Calculated - -   BK Spec - Plasma Plasma Plasma   BK Res BKNEG:BK Virus DNA Not Detected copies/mL BK Virus DNA Not Detected BK Virus DNA Not Detected BK Virus DNA Not Detected   BK Log <2.7 Log copies/mL Not Calculated Not Calculated Not Calculated   BK Quant Log Ext - - - -   BK Quant Result Ext Negative copies/mL - - -   BK Quant Spec Ext - - - -   EBV CAPSID ANTIBODY IGG 0.0 - 0.8 AI - - -   EBV DNA COPIES/ML EBVNEG:EBV DNA Not Detected [Copies]/mL EBV DNA Not Detected - -   EBV DNA LOG OF COPIES <2.7 [Log:copies]/mL Not Calculated - -   Hep B Core NR:Nonreactive - - -        Recent Labs    Lab Test 01/25/21  0813 02/01/21  0718 02/15/21  0806   DOSTAC 2000 ON [id number] TM 2000 01/31/2021 2000 2/15/2021   TACROL 11.1 8.2 8.0     Recent Labs   Lab Test 01/18/21  0807 01/25/21  0813 02/15/21  0806   DOSMPA 1,172,021 2000 ON [id number]  TM 2000 2/14/2021   MPACID 1.29 1.02 2.15   MPAG 100.9* 104.0* 100.1*       Again, thank you for allowing me to participate in the care of your patient.      Sincerely,    [facility]

## 2021-02-17 NOTE — PROGRESS NOTES
ACUTE TRANSPLANT NEPHROLOGY VISIT    Assessment & Plan     # DDKT: Stable with worsening proteinuria, 1.5 g/g from 0.2 g/g in 11/2020 and hematuria 5- RBCs. Native kidney biopsy in 2017 reported as global glomerulosclerosis with severe arterial sclerosis,  no evidence of immune complex mediated or paraprotein associated kidney disease.               - Baseline Creatinine: ~ 0.7 - 0.9             - Proteinuria: Moderate (1-3 grams). 2.58 g/gCr ( 2/15/21) , increasing              - Date DSA Last Checked: Jan/2021    Latest DSA: No             - BK Viremia: No, Jan 2021             - Kidney Tx Biopsy: Yes ( 2/3/21) :   Kidney, allograft; percutaneous needle biopsy:   -No diagnostic evidence of acute rejection seen .  -Segmental glomerular basement membrane irregularity. Ultrastructurally, several segments of glomerular basement membrane show irregularity with remodeling and lamellation, while type IV collagen alpha5 and alpha2 staining shows normal pattern.  It is felt the changes are donor derived.     Though we do not see FSGS pattern in the allograft , we may still be dealing with possible recurrence of FSGS .   We will need to continue to closely monitor with weekly UPCR  Start Losartan  50  mg daily   Hold ASA 1 week prior to biopsy  Repeat allograft biopsy in 2-3 weeks to look for FSGS. Of note, she has LUE AVF w/ good thrill and bruit, never used.        # Immunosuppression: Tacrolimus immediate release (goal 8-10) and Mycophenolate mofetil (dose 1000 mg every 12 hours)             - Induction with Recent Transplant:  thymoglobulin 125mg (2.2mg/kg), basiliximab (dose given POD #1 and POD #5 ), and steroid taper.             - Continue with intensive monitoring of immunosuppression for efficacy and toxicity             - Changes: No      # Infection Prophylaxis:   - PJP: Sulfa/TMP (Bactrim)   - CMV: None, prophylaxis completed  -HSV: on valtrex 500mg daily     # Hypertension: Controlled;  Goal BP: < 130/80 , she  is hypervolemic with some facial puffiness an b/l LE swelling r>l .             - Changes: YES   Stop Amlodipine  Start Losartan 50 mg daily. Labs early next week. Likely start lasix next week after labs. Due to RLE>LLE, perform venous doppler to r/o DVT        # Diabetes: Borderline control (HbA1c 7-9%) new onset after transplant. HbA1c used to be 5,          Last HbA1c: 7.2 %             - Management as per primary care. Repeat HbA1c     # Mineral Bone Disorder:   - Secondary renal hyperparathyroidism; PTH level: Mildly elevated (151-300 pg/ml)        On treatment: None  - Vitamin D; level: low        On supplement: No.  Vitamin D and calcium were held before 2/2 hypercalcemia  - Calcium; level: Normal now    On supplement: No     # Electrolytes:   - Magnesium; level: Normal        On supplement: Yes  - Potassium : Normal .                On supplement : No     # Recurrent cold sore  - stop using lipstick. Continue oral  valtrex 500 mg daily     # GERD : on PEPCID    # HLD: on Pravastatin    # Hypothyroidism: On Levothyroxine    # Allergic Rhinitis: Flonase prn       # Skin Cancer Risk:               -  Counseled patient about increased risk of skin cancers while on immunosuppressants. Discussed sun protection and recommend regular follow up with Dermatology    # COVID-19 Virus Review: Discussed COVID-19 virus and the potential medical risks.  Reviewed preventative health recommendations, which includes washing hands for at least  20 seconds, avoid touching your face, and social distancing.  Asked patient to inform the transplant center if they are exposed or diagnosed with this virus.       # Medical Compliance: Yes     # Transplant History:  Etiology of Kidney Failure: Focal segmental glomerulosclerosis (FSGS)  Tx: DDKT  Transplant: 9/3/2020 (Kidney)  Donor Type: Donation after Brain Death      Donor Class:   Significant changes in immunosuppression: None  CMV IgG Ab High Risk Discordance (D+/R-): No  EBV IgG Ab  High Risk Discordance (D+/R-): No  Significant transplant-related complications: None    Transplant Office Phone Number: [phone]    Assessment and plan was discussed with the patient and she voiced her understanding and agreement.    Return visit: Return in about 1 month (around 3/17/2021).       Patient staffed with Dr Steve Strange MD     Physician Attestation   I, Carter Wilson MD, personally examined and evaluated this patient.  I discussed the patient with the resident/fellow and care team, and agree with the assessment and plan of care as documented in the note on 02/17/21 .      I personally reviewed vital signs, medications, labs and imaging.  Carter Wilson MD  Date of Service (when I saw the patient): 02/17/21          Chief Complaint   Ms. Thompson is a 66 year old here for kidney transplant and immunosuppression management.     History of Present Illness      Complains of having facial swelling, B/L leg swelling , R>L   states she has some SOB on exertion  No dizziness /lightheadedness  No CP/Cough/fever/chills/leg swelling  No abdomen pain/Nausea/vomiting. No diarrhea or constipation.  No voiding difficulty/Hematuria  No skin rash   No focal weakness/acute change in vision    Home BP:  weight at home 132 lbs    Problem List   Patient Active Problem List   Diagnosis     Elevated serum immunoglobulin free light chain level     FSGS (focal segmental glomerulosclerosis)     Dyslipidemia     Hypothyroidism     SADIE on CPAP     Sensorineural hearing loss (SNHL) of both ears     GERD (gastroesophageal reflux disease)     HTN, kidney transplant related     Hepatitis B core antibody positive     Secondary renal hyperparathyroidism (H)     Memory deficits     Kidney replaced by transplant     Immunosuppression (H)     Aftercare following organ transplant     Vitamin D deficiency     Elevated random blood glucose level     Diabetes mellitus, type 2 (H)       Allergies   Allergies   Allergen  Reactions     Amoxicillin-Pot Clavulanate Nausea and Vomiting       Medications   Current Outpatient Medications   Medication Sig     acetaminophen (TYLENOL) 325 MG tablet Take 2 tablets (650 mg) by mouth every 4 hours as needed for pain     amLODIPine (NORVASC) 5 MG tablet Take 2 tablets (10 mg) by mouth daily     aspirin (ASA) 81 MG EC tablet Take 1 tablet (81 mg) by mouth daily     atorvastatin (LIPITOR) 40 MG tablet Take 0.5 tablets (20 mg) by mouth daily     diclofenac (VOLTAREN) 1 % topical gel Apply 2 g topically 4 times daily     famotidine (PEPCID) 20 MG tablet Take 20 mg by mouth every evening      fluticasone (FLONASE) 50 MCG/ACT nasal spray Spray 1 spray into both nostrils 2 times daily as needed for rhinitis or allergies     levothyroxine (SYNTHROID/LEVOTHROID) 112 MCG tablet Take 112 mcg (1 tablet) by mouth every Monday, Tuesday is 1/2 tab, Wednesday, Thursday, Friday     Lidocaine (LIDOCARE) 4 % Patch Place 1 patch onto the skin every 24 hours To prevent lidocaine toxicity, patient should be patch free for 12 hrs daily.  Using for back pain     magnesium oxide (MAG-OX) 400 MG tablet Take 1 tablet (400 mg) by mouth daily (with lunch)     melatonin 3 MG tablet Take 6 mg by mouth At Bedtime      mycophenolate (GENERIC EQUIVALENT) 250 MG capsule Take 4 capsules (1,000 mg) by mouth 2 times daily     psyllium (METAMUCIL/KONSYL) 58.6 % powder Take by mouth daily     sulfamethoxazole-trimethoprim (BACTRIM) 400-80 MG tablet Take 1 tablet by mouth daily     tacrolimus (GENERIC EQUIVALENT) 0.5 MG capsule Take 1 capsule (0.5 mg) by mouth 2 times daily Total dose = 1.5 mg BID     tacrolimus (GENERIC EQUIVALENT) 1 MG capsule Take 1 capsule (1 mg) by mouth 2 times daily Total dose = 1.5 mg BID (Patient taking differently: Take 1 mg by mouth 2 times daily Total dose = 1.5 in AM and 1 in PM)     valACYclovir (VALTREX) 500 MG tablet Take 1 tablet (500 mg) by mouth daily     No current facility-administered medications  for this visit.      There are no discontinued medications.    Physical Exam   Vital Signs: There were no vitals taken for this visit.    GENERAL APPEARANCE: alert and no distress  HENT: mouth without ulcers or lesions  LYMPHATICS: no cervical or supraclavicular nodes  RESP: lungs clear to auscultation - no rales, rhonchi or wheezes  CV: regular rhythm, normal rate, no rub, no murmur  EDEMA:  Bilateral LEG SWELLING  2+ , R> L. Facial swelling .  ABDOMEN: soft, nondistended, nontender, bowel sounds normal  MS: extremities normal - no gross deformities noted, no evidence of inflammation in joints, no muscle tenderness  SKIN: no rash  Access - LUE AVF - good thrill     Data     Renal Latest Ref Rng & Units 2/15/2021 2/1/2021 1/25/2021   Na 133 - 144 mmol/L 140 141 141   Na (external) 135 - 145 mmol/L - - -   K 3.4 - 5.3 mmol/L 4.4 4.5 4.3   K (external) 3.5 - 5.0 mmol/L - - -   Cl 94 - 109 mmol/L 108 108 107   Cl (external) 98 - 110 mmol/L - - -   CO2 20 - 32 mmol/L 26 28 27   CO2 (external) 21 - 31 mmol/L - - -   BUN 7 - 30 mg/dL 27 18 24   BUN (external) 8 - 25 mg/dL - - -   Cr 0.52 - 1.04 mg/dL 0.79 0.79 0.77   Cr (external) 0.57 - 1.11 mg/dL - - -   Glucose 70 - 99 mg/dL 170(H) 155(H) 162(H)   Glucose (external) 65 - 100 mg/dL - - -   Ca  8.5 - 10.1 mg/dL 9.9 9.8 9.8   Ca (external) 8.5 - 10.5 mg/dL - - -   Mg 1.6 - 2.3 mg/dL - 1.7 -   Mg (external) 1.6 - 2.6 mg/dL - - -     Bone Health Latest Ref Rng & Units 2/1/2021 1/18/2021 12/14/2020   Phos 2.5 - 4.5 mg/dL 2.8 - 3.2   Phos (external) 2.3 - 4.7 mg/dL - - -   PTHi 18 - 80 pg/mL - 151(H) -   Vit D Def 20 - 75 ug/L - - -     Heme Latest Ref Rng & Units 2/15/2021 2/1/2021 1/25/2021   WBC 4.0 - 11.0 10e9/L 9.2 10.2 7.4   WBC (external) 4.5 - 11.0 thou/cu mm - - -   Hgb 11.7 - 15.7 g/dL 12.3 12.1 12.1   Hgb (external) 12.0 - 16.0 g/dL - - -   Plt 150 - 450 10e9/L 191 187 197   Plt (external) 140 - 440 thou/cu mm - - -   ABSOLUTE NEUTROPHIL 1.6 - 8.3 10e9/L 7.4 - 5.7    ABSOLUTE LYMPHOCYTES 0.8 - 5.3 10e9/L 0.9 - 1.0   ABSOLUTE MONOCYTES 0.0 - 1.3 10e9/L 0.5 - 0.5   ABSOLUTE EOSINOPHILS 0.0 - 0.7 10e9/L 0.2 - 0.1   ABSOLUTE BASOPHILS 0.0 - 0.2 10e9/L 0.2 - 0.1   ABS IMMATURE GRANULOCYTES 0 - 0.4 10e9/L - - -   ABSOLUTE NUCLEATED RBC - - - -     Liver Latest Ref Rng & Units 1/25/2021 9/9/2020 9/5/2020   AP 40 - 150 U/L 189(H) - 53   TBili 0.2 - 1.3 mg/dL 0.5 - 0.4   DBili 0.0 - 0.2 mg/dL - - 0.2   ALT 0 - 50 U/L 37 - 52(H)   AST 0 - 45 U/L 24 - 50(H)   Tot Protein 6.8 - 8.8 g/dL 6.9 - 5.4(L)   Albumin 3.4 - 5.0 g/dL 3.8 2.9(L) 2.8(L)     Pancreas Latest Ref Rng & Units 1/25/2021 9/3/2020   A1C 0 - 5.6 % 7.3(H) 5.4     Iron studies Latest Ref Rng & Units 9/9/2020   Iron 35 - 180 ug/dL 162   Iron sat 15 - 46 % 92(H)   Ferritin 8 - 252 ng/mL 1,757(H)     [facility] Txp Virology Latest Ref Rng & Units 1/25/2021 12/14/2020 11/3/2020   CVM DNA Quant - EDTA PLASMA - -   CMV QUANT IU/ML CMVND:CMV DNA Not Detected [IU]/mL CMV DNA Not Detected - -   LOG IU/ML OF CMVQNT <2.1 [Log:IU]/mL Not Calculated - -   BK Spec - Plasma Plasma Plasma   BK Res BKNEG:BK Virus DNA Not Detected copies/mL BK Virus DNA Not Detected BK Virus DNA Not Detected BK Virus DNA Not Detected   BK Log <2.7 Log copies/mL Not Calculated Not Calculated Not Calculated   BK Quant Log Ext - - - -   BK Quant Result Ext Negative copies/mL - - -   BK Quant Spec Ext - - - -   EBV CAPSID ANTIBODY IGG 0.0 - 0.8 AI - - -   EBV DNA COPIES/ML EBVNEG:EBV DNA Not Detected [Copies]/mL EBV DNA Not Detected - -   EBV DNA LOG OF COPIES <2.7 [Log:copies]/mL Not Calculated - -   Hep B Core NR:Nonreactive - - -        Recent Labs   Lab Test 01/25/21  0813 02/01/21  0718 02/15/21  0806   DOSTAC 2000 ON [id number] TM 2000 01/31/2021 2000 2/15/2021   TACROL 11.1 8.2 8.0     Recent Labs   Lab Test 01/18/21  0807 01/25/21  0813 02/15/21  0806   DOSMPA 1,172,021 2000 ON [id number]  TM 2000 2/14/2021   MPACID 1.29 1.02 2.15   MPAG 100.9* 104.0* 100.1*

## 2021-02-17 NOTE — TELEPHONE ENCOUNTER
Post Kidney and Pancreas Transplant Team Conference  Date: 2/17/2021  Transplant Coordinator: Tiffany Paul     Attendees:  [x]  Dr. Sheridan  [x] Bridget Marquis LPN     []  Dr. Sifuentes [x] Tiffany Paul RN [] Alyssa Parisi LPN   [x]  Dr. Dewitt [] Nette Valadez, SUN    [x]  Dr. Wilson [] Ade Marx RN [x] Patrick Mireles, PharmD   [x] Dr. Awad [x] Hui Crowley, SUN    [] Dr. Ribeiro [] Cesar Max RN    [x] Dr. Alonzo [] Jennie Dela Cruz RN    [] Dr. Phoenix [] Janett Smith RN    []  Dr. Bansal [] Aleja Francis, SUN    [] Surgery Fellow [x] Crissy Pulido RN    [] Monika Tinoco, NP [] Laura Romero RN        Verbal Plan Read Back:   Check to see if pt can come in today for office visit    Routed to RN Coordinator   Bridget Marquis LPN

## 2021-02-17 NOTE — PATIENT INSTRUCTIONS
Stop amlodipine  Start losartan 50 mg daily   Continue to monitor BP/HR/WEIGHT  Daily at home  Repeat Renal panel in 1 week. Also check serum Albumin  With next lab check  Weekly UPCR  Repeat Allograft  Biopsy in 2-3 weeks  -- will need to avoid antiplatelete/ ASA 1 week prior to biopsy  Bilateral LE VENOUS doppler to r/o DVT

## 2021-02-17 NOTE — NURSING NOTE
Chief Complaint   Patient presents with     RECHECK     follow up post biopsy         BP (!) 144/74 (BP Location: Right arm, Patient Position: Sitting, Cuff Size: Adult Regular)   Pulse 75   Temp 98  F (36.7  C)   Wt 59.9 kg (132 lb)   SpO2 96%   BMI 26.66 kg/m        Mikel Maravilla, EMT

## 2021-02-18 LAB
DONOR IDENTIFICATION: NORMAL
DSA COMMENTS: NORMAL
DSA PRESENT: NO
DSA TEST METHOD: NORMAL
ORGAN: NORMAL
SA1 CELL: NORMAL
SA1 COMMENTS: NORMAL
SA1 HI RISK ABY: NORMAL
SA1 MOD RISK ABY: NORMAL
SA1 TEST METHOD: NORMAL
SA2 CELL: NORMAL
SA2 COMMENTS: NORMAL
SA2 HI RISK ABY UA: NORMAL
SA2 MOD RISK ABY: NORMAL
SA2 TEST METHOD: NORMAL
UNACCEPTABLE ANTIGEN: NORMAL
UNOS CPRA: 29

## 2021-02-22 ENCOUNTER — TELEPHONE (OUTPATIENT)
Dept: TRANSPLANT | Facility: CLINIC | Age: 66
End: 2021-02-22

## 2021-02-22 DIAGNOSIS — Z94.0 KIDNEY REPLACED BY TRANSPLANT: Primary | ICD-10-CM

## 2021-02-22 DIAGNOSIS — T86.19 OTHER COMPLICATION OF KIDNEY TRANSPLANT: ICD-10-CM

## 2021-02-22 DIAGNOSIS — Z20.822 COVID-19 RULED OUT: Primary | ICD-10-CM

## 2021-02-22 NOTE — TELEPHONE ENCOUNTER
Dr Carter Wilson requesting  transplant   kidney biopsy    Scheduled w/ IR on 3/3 @9AM, patient arrival at 730AM.        Called Ethel  And Maxim  Regarding the above plan to repeat transplant  Kidney     Reviewed the most recent  Labs urine protein decreased   After starting   Losartan Cozaar    -   Edema has decreased

## 2021-02-23 DIAGNOSIS — Z94.0 KIDNEY REPLACED BY TRANSPLANT: ICD-10-CM

## 2021-02-23 LAB
ANION GAP SERPL CALCULATED.3IONS-SCNC: 5 MMOL/L (ref 3–14)
BASOPHILS # BLD AUTO: 0.2 10E9/L (ref 0–0.2)
BASOPHILS NFR BLD AUTO: 2 %
BUN SERPL-MCNC: 24 MG/DL (ref 7–30)
CALCIUM SERPL-MCNC: 9.8 MG/DL (ref 8.5–10.1)
CHLORIDE SERPL-SCNC: 110 MMOL/L (ref 94–109)
CO2 SERPL-SCNC: 24 MMOL/L (ref 20–32)
CREAT SERPL-MCNC: 0.75 MG/DL (ref 0.52–1.04)
CREAT UR-MCNC: 49 MG/DL
DIFFERENTIAL METHOD BLD: NORMAL
EOSINOPHIL # BLD AUTO: 0.2 10E9/L (ref 0–0.7)
EOSINOPHIL NFR BLD AUTO: 2 %
ERYTHROCYTE [DISTWIDTH] IN BLOOD BY AUTOMATED COUNT: 12.3 % (ref 10–15)
GFR SERPL CREATININE-BSD FRML MDRD: 82 ML/MIN/{1.73_M2}
GLUCOSE SERPL-MCNC: 176 MG/DL (ref 70–99)
HCT VFR BLD AUTO: 38.1 % (ref 35–47)
HGB BLD-MCNC: 12.2 G/DL (ref 11.7–15.7)
LYMPHOCYTES # BLD AUTO: 1 10E9/L (ref 0.8–5.3)
LYMPHOCYTES NFR BLD AUTO: 13 %
MCH RBC QN AUTO: 31.2 PG (ref 26.5–33)
MCHC RBC AUTO-ENTMCNC: 32 G/DL (ref 31.5–36.5)
MCV RBC AUTO: 97 FL (ref 78–100)
MONOCYTES # BLD AUTO: 0.3 10E9/L (ref 0–1.3)
MONOCYTES NFR BLD AUTO: 4 %
NEUTROPHILS # BLD AUTO: 6.1 10E9/L (ref 1.6–8.3)
NEUTROPHILS NFR BLD AUTO: 79 %
PLATELET # BLD AUTO: 190 10E9/L (ref 150–450)
PLATELET # BLD EST: NORMAL 10*3/UL
POTASSIUM SERPL-SCNC: 4.4 MMOL/L (ref 3.4–5.3)
PROT UR-MCNC: 0.49 G/L
PROT/CREAT 24H UR: 1 G/G CR (ref 0–0.2)
RBC # BLD AUTO: 3.91 10E12/L (ref 3.8–5.2)
RBC MORPH BLD: NORMAL
SODIUM SERPL-SCNC: 139 MMOL/L (ref 133–144)
TACROLIMUS BLD-MCNC: 6.6 UG/L (ref 5–15)
TME LAST DOSE: 2000 H
WBC # BLD AUTO: 7.8 10E9/L (ref 4–11)

## 2021-02-23 PROCEDURE — 36415 COLL VENOUS BLD VENIPUNCTURE: CPT | Performed by: INTERNAL MEDICINE

## 2021-02-23 PROCEDURE — 84156 ASSAY OF PROTEIN URINE: CPT | Performed by: INTERNAL MEDICINE

## 2021-02-23 PROCEDURE — 80048 BASIC METABOLIC PNL TOTAL CA: CPT | Performed by: INTERNAL MEDICINE

## 2021-02-23 PROCEDURE — 85025 COMPLETE CBC W/AUTO DIFF WBC: CPT | Performed by: INTERNAL MEDICINE

## 2021-02-23 PROCEDURE — 80197 ASSAY OF TACROLIMUS: CPT | Performed by: INTERNAL MEDICINE

## 2021-02-23 PROCEDURE — 80180 DRUG SCRN QUAN MYCOPHENOLATE: CPT | Performed by: INTERNAL MEDICINE

## 2021-02-23 RX ORDER — MAGNESIUM OXIDE 400 MG/1
400 TABLET ORAL
Qty: 30 TABLET | Refills: 5 | Status: SHIPPED | OUTPATIENT
Start: 2021-02-23 | End: 2021-09-16

## 2021-02-24 LAB
MYCOPHENOLATE SERPL LC/MS/MS-MCNC: 1.33 MG/L (ref 1–3.5)
MYCOPHENOLATE-G SERPL LC/MS/MS-MCNC: 73.6 MG/L (ref 30–95)
TME LAST DOSE: 2000 H

## 2021-02-26 ENCOUNTER — TELEPHONE (OUTPATIENT)
Dept: INTERVENTIONAL RADIOLOGY/VASCULAR | Facility: CLINIC | Age: 66
End: 2021-02-26

## 2021-02-26 ENCOUNTER — TELEPHONE (OUTPATIENT)
Dept: TRANSPLANT | Facility: CLINIC | Age: 66
End: 2021-02-26

## 2021-02-28 DIAGNOSIS — Z20.822 COVID-19 RULED OUT: ICD-10-CM

## 2021-02-28 LAB
SARS-COV-2 RNA RESP QL NAA+PROBE: NORMAL
SPECIMEN SOURCE: NORMAL

## 2021-02-28 PROCEDURE — U0003 INFECTIOUS AGENT DETECTION BY NUCLEIC ACID (DNA OR RNA); SEVERE ACUTE RESPIRATORY SYNDROME CORONAVIRUS 2 (SARS-COV-2) (CORONAVIRUS DISEASE [COVID-19]), AMPLIFIED PROBE TECHNIQUE, MAKING USE OF HIGH THROUGHPUT TECHNOLOGIES AS DESCRIBED BY CMS-2020-01-R: HCPCS | Performed by: INTERNAL MEDICINE

## 2021-02-28 PROCEDURE — U0005 INFEC AGEN DETEC AMPLI PROBE: HCPCS | Performed by: INTERNAL MEDICINE

## 2021-03-01 ENCOUNTER — TELEPHONE (OUTPATIENT)
Dept: TRANSPLANT | Facility: CLINIC | Age: 66
End: 2021-03-01

## 2021-03-01 DIAGNOSIS — Z94.0 KIDNEY REPLACED BY TRANSPLANT: Primary | ICD-10-CM

## 2021-03-01 DIAGNOSIS — Z48.298 AFTERCARE FOLLOWING ORGAN TRANSPLANT: ICD-10-CM

## 2021-03-01 DIAGNOSIS — Z79.899 ENCOUNTER FOR LONG-TERM CURRENT USE OF MEDICATION: ICD-10-CM

## 2021-03-01 DIAGNOSIS — Z94.0 KIDNEY REPLACED BY TRANSPLANT: ICD-10-CM

## 2021-03-01 LAB
ALBUMIN SERPL-MCNC: 3.7 G/DL (ref 3.4–5)
ALP SERPL-CCNC: 195 U/L (ref 40–150)
ALT SERPL W P-5'-P-CCNC: 42 U/L (ref 0–50)
ANION GAP SERPL CALCULATED.3IONS-SCNC: 4 MMOL/L (ref 3–14)
AST SERPL W P-5'-P-CCNC: 26 U/L (ref 0–45)
BILIRUB DIRECT SERPL-MCNC: 0.1 MG/DL (ref 0–0.2)
BILIRUB SERPL-MCNC: 0.6 MG/DL (ref 0.2–1.3)
BUN SERPL-MCNC: 22 MG/DL (ref 7–30)
CALCIUM SERPL-MCNC: 9.7 MG/DL (ref 8.5–10.1)
CHLORIDE SERPL-SCNC: 107 MMOL/L (ref 94–109)
CHOLEST SERPL-MCNC: 211 MG/DL
CO2 SERPL-SCNC: 25 MMOL/L (ref 20–32)
CREAT SERPL-MCNC: 0.78 MG/DL (ref 0.52–1.04)
CREAT UR-MCNC: 61 MG/DL
DIFFERENTIAL METHOD BLD: ABNORMAL
EOSINOPHIL # BLD AUTO: 0.3 10E9/L (ref 0–0.7)
EOSINOPHIL NFR BLD AUTO: 3 %
ERYTHROCYTE [DISTWIDTH] IN BLOOD BY AUTOMATED COUNT: 12.4 % (ref 10–15)
GFR SERPL CREATININE-BSD FRML MDRD: 79 ML/MIN/{1.73_M2}
GLUCOSE SERPL-MCNC: 175 MG/DL (ref 70–99)
HBA1C MFR BLD: 7.9 % (ref 0–5.6)
HCT VFR BLD AUTO: 37.4 % (ref 35–47)
HCV RNA SERPL NAA+PROBE-ACNC: NORMAL [IU]/ML
HCV RNA SERPL NAA+PROBE-LOG IU: NORMAL LOG IU/ML
HDLC SERPL-MCNC: 43 MG/DL
HGB BLD-MCNC: 12.1 G/DL (ref 11.7–15.7)
LABORATORY COMMENT REPORT: NORMAL
LDLC SERPL CALC-MCNC: ABNORMAL MG/DL
LDLC SERPL DIRECT ASSAY-MCNC: 78 MG/DL
LYMPHOCYTES # BLD AUTO: 1.4 10E9/L (ref 0.8–5.3)
LYMPHOCYTES NFR BLD AUTO: 16 %
MCH RBC QN AUTO: 31.4 PG (ref 26.5–33)
MCHC RBC AUTO-ENTMCNC: 32.4 G/DL (ref 31.5–36.5)
MCV RBC AUTO: 97 FL (ref 78–100)
METAMYELOCYTES # BLD: 0.7 10E9/L
METAMYELOCYTES NFR BLD MANUAL: 8 %
MONOCYTES # BLD AUTO: 0.6 10E9/L (ref 0–1.3)
MONOCYTES NFR BLD AUTO: 7 %
MYCOPHENOLATE SERPL LC/MS/MS-MCNC: 1.33 MG/L (ref 1–3.5)
MYCOPHENOLATE-G SERPL LC/MS/MS-MCNC: 126 MG/L (ref 30–95)
MYELOCYTES # BLD: 0.2 10E9/L
MYELOCYTES NFR BLD MANUAL: 2 %
NEUTROPHILS # BLD AUTO: 5.3 10E9/L (ref 1.6–8.3)
NEUTROPHILS NFR BLD AUTO: 64 %
NONHDLC SERPL-MCNC: 168 MG/DL
PLATELET # BLD AUTO: 230 10E9/L (ref 150–450)
PLATELET # BLD EST: ABNORMAL 10*3/UL
POTASSIUM SERPL-SCNC: 4.6 MMOL/L (ref 3.4–5.3)
PROT SERPL-MCNC: 7 G/DL (ref 6.8–8.8)
PROT UR-MCNC: 0.58 G/L
PROT/CREAT 24H UR: 0.95 G/G CR (ref 0–0.2)
RBC # BLD AUTO: 3.85 10E12/L (ref 3.8–5.2)
RBC MORPH BLD: ABNORMAL
SARS-COV-2 RNA RESP QL NAA+PROBE: NEGATIVE
SODIUM SERPL-SCNC: 136 MMOL/L (ref 133–144)
SPECIMEN SOURCE: NORMAL
TME LAST DOSE: ABNORMAL H
TRIGL SERPL-MCNC: 597 MG/DL
URATE SERPL-MCNC: 5.3 MG/DL (ref 2.6–6)
WBC # BLD AUTO: 8.5 10E9/L (ref 4–11)

## 2021-03-01 PROCEDURE — 83721 ASSAY OF BLOOD LIPOPROTEIN: CPT | Mod: 59 | Performed by: INTERNAL MEDICINE

## 2021-03-01 PROCEDURE — 83036 HEMOGLOBIN GLYCOSYLATED A1C: CPT | Performed by: INTERNAL MEDICINE

## 2021-03-01 PROCEDURE — 84550 ASSAY OF BLOOD/URIC ACID: CPT | Performed by: INTERNAL MEDICINE

## 2021-03-01 PROCEDURE — 80061 LIPID PANEL: CPT | Performed by: INTERNAL MEDICINE

## 2021-03-01 PROCEDURE — 87517 HEPATITIS B DNA QUANT: CPT | Performed by: INTERNAL MEDICINE

## 2021-03-01 PROCEDURE — 84156 ASSAY OF PROTEIN URINE: CPT | Performed by: INTERNAL MEDICINE

## 2021-03-01 PROCEDURE — 80197 ASSAY OF TACROLIMUS: CPT | Performed by: INTERNAL MEDICINE

## 2021-03-01 PROCEDURE — 36415 COLL VENOUS BLD VENIPUNCTURE: CPT | Performed by: INTERNAL MEDICINE

## 2021-03-01 PROCEDURE — 80048 BASIC METABOLIC PNL TOTAL CA: CPT | Performed by: INTERNAL MEDICINE

## 2021-03-01 PROCEDURE — 80180 DRUG SCRN QUAN MYCOPHENOLATE: CPT | Performed by: INTERNAL MEDICINE

## 2021-03-01 PROCEDURE — 85025 COMPLETE CBC W/AUTO DIFF WBC: CPT | Performed by: INTERNAL MEDICINE

## 2021-03-01 PROCEDURE — 80076 HEPATIC FUNCTION PANEL: CPT | Performed by: INTERNAL MEDICINE

## 2021-03-01 PROCEDURE — 87522 HEPATITIS C REVRS TRNSCRPJ: CPT | Performed by: INTERNAL MEDICINE

## 2021-03-01 NOTE — TELEPHONE ENCOUNTER
Patient Call: Voicemail  Date/Time: 030121 08:41 am  Reason for call: FRANCISCO Mirza [facility] calling in about patient's orders, needs clarification. There is a future order in Epic for a timed 24 hour urine protein sample.  Patient states should be random sample. Please confirm or place order for future random sample.  She can be reached at [phone].

## 2021-03-02 LAB
TACROLIMUS BLD-MCNC: 7 UG/L (ref 5–15)
TME LAST DOSE: NORMAL H

## 2021-03-03 ENCOUNTER — APPOINTMENT (OUTPATIENT)
Dept: MEDSURG UNIT | Facility: CLINIC | Age: 66
End: 2021-03-03
Attending: INTERNAL MEDICINE
Payer: MEDICARE

## 2021-03-03 ENCOUNTER — APPOINTMENT (OUTPATIENT)
Dept: INTERVENTIONAL RADIOLOGY/VASCULAR | Facility: CLINIC | Age: 66
End: 2021-03-03
Attending: INTERNAL MEDICINE
Payer: MEDICARE

## 2021-03-03 ENCOUNTER — HOSPITAL ENCOUNTER (OUTPATIENT)
Facility: CLINIC | Age: 66
Discharge: HOME | End: 2021-03-03
Attending: INTERNAL MEDICINE | Admitting: PHYSICIAN ASSISTANT
Payer: MEDICARE

## 2021-03-03 VITALS
WEIGHT: 131 LBS | DIASTOLIC BLOOD PRESSURE: 82 MMHG | TEMPERATURE: 98 F | BODY MASS INDEX: 26.41 KG/M2 | OXYGEN SATURATION: 97 % | HEIGHT: 59 IN | HEART RATE: 84 BPM | RESPIRATION RATE: 16 BRPM | SYSTOLIC BLOOD PRESSURE: 157 MMHG

## 2021-03-03 DIAGNOSIS — T86.19 OTHER COMPLICATION OF KIDNEY TRANSPLANT: ICD-10-CM

## 2021-03-03 DIAGNOSIS — Z94.0 KIDNEY REPLACED BY TRANSPLANT: ICD-10-CM

## 2021-03-03 LAB
GLUCOSE BLDC GLUCOMTR-MCNC: 175 MG/DL (ref 70–99)
HBV DNA SERPL NAA+PROBE-ACNC: NORMAL [IU]/ML
HBV DNA SERPL NAA+PROBE-LOG IU: NORMAL {LOG_IU}/ML

## 2021-03-03 PROCEDURE — 88346 IMFLUOR 1ST 1ANTB STAIN PX: CPT | Mod: 26 | Performed by: PATHOLOGY

## 2021-03-03 PROCEDURE — 272N000653 IR RENAL BIOPSY RIGHT

## 2021-03-03 PROCEDURE — 88350 IMFLUOR EA ADDL 1ANTB STN PX: CPT | Mod: 26 | Performed by: PATHOLOGY

## 2021-03-03 PROCEDURE — 88305 TISSUE EXAM BY PATHOLOGIST: CPT | Mod: TC | Performed by: INTERNAL MEDICINE

## 2021-03-03 PROCEDURE — 88348 ELECTRON MICROSCOPY DX: CPT | Mod: TC | Performed by: INTERNAL MEDICINE

## 2021-03-03 PROCEDURE — 82962 GLUCOSE BLOOD TEST: CPT

## 2021-03-03 PROCEDURE — 88305 TISSUE EXAM BY PATHOLOGIST: CPT | Mod: 26 | Performed by: PATHOLOGY

## 2021-03-03 PROCEDURE — 88350 IMFLUOR EA ADDL 1ANTB STN PX: CPT | Mod: TC | Performed by: INTERNAL MEDICINE

## 2021-03-03 PROCEDURE — 88313 SPECIAL STAINS GROUP 2: CPT | Mod: 26 | Performed by: PATHOLOGY

## 2021-03-03 PROCEDURE — 76942 ECHO GUIDE FOR BIOPSY: CPT

## 2021-03-03 PROCEDURE — 258N000003 HC RX IP 258 OP 636: Performed by: PHYSICIAN ASSISTANT

## 2021-03-03 PROCEDURE — 250N000011 HC RX IP 250 OP 636: Performed by: PHYSICIAN ASSISTANT

## 2021-03-03 PROCEDURE — 999N000134 HC STATISTIC PP CARE STAGE 3

## 2021-03-03 PROCEDURE — 88313 SPECIAL STAINS GROUP 2: CPT | Mod: TC | Performed by: INTERNAL MEDICINE

## 2021-03-03 PROCEDURE — 250N000009 HC RX 250: Performed by: PHYSICIAN ASSISTANT

## 2021-03-03 PROCEDURE — 999N001070 HC STATISTIC R-RUSH PROCESSING: Performed by: INTERNAL MEDICINE

## 2021-03-03 PROCEDURE — 50200 RENAL BIOPSY PERQ: CPT | Mod: RT | Performed by: PHYSICIAN ASSISTANT

## 2021-03-03 PROCEDURE — 88313 SPECIAL STAINS GROUP 2: CPT | Mod: TC,91 | Performed by: INTERNAL MEDICINE

## 2021-03-03 PROCEDURE — 88348 ELECTRON MICROSCOPY DX: CPT | Mod: 26 | Performed by: PATHOLOGY

## 2021-03-03 PROCEDURE — 88346 IMFLUOR 1ST 1ANTB STAIN PX: CPT | Mod: TC | Performed by: INTERNAL MEDICINE

## 2021-03-03 PROCEDURE — 77002 NEEDLE LOCALIZATION BY XRAY: CPT | Mod: 26 | Performed by: PHYSICIAN ASSISTANT

## 2021-03-03 PROCEDURE — 99152 MOD SED SAME PHYS/QHP 5/>YRS: CPT

## 2021-03-03 RX ORDER — NALOXONE HYDROCHLORIDE 0.4 MG/ML
0.4 INJECTION, SOLUTION INTRAMUSCULAR; INTRAVENOUS; SUBCUTANEOUS
Status: DISCONTINUED | OUTPATIENT
Start: 2021-03-03 | End: 2021-03-03 | Stop reason: HOSPADM

## 2021-03-03 RX ORDER — FENTANYL CITRATE 50 UG/ML
25-50 INJECTION, SOLUTION INTRAMUSCULAR; INTRAVENOUS EVERY 5 MIN PRN
Status: DISCONTINUED | OUTPATIENT
Start: 2021-03-03 | End: 2021-03-03 | Stop reason: HOSPADM

## 2021-03-03 RX ORDER — SODIUM CHLORIDE 9 MG/ML
INJECTION, SOLUTION INTRAVENOUS CONTINUOUS
Status: DISCONTINUED | OUTPATIENT
Start: 2021-03-03 | End: 2021-03-03 | Stop reason: HOSPADM

## 2021-03-03 RX ORDER — NALOXONE HYDROCHLORIDE 0.4 MG/ML
0.2 INJECTION, SOLUTION INTRAMUSCULAR; INTRAVENOUS; SUBCUTANEOUS
Status: DISCONTINUED | OUTPATIENT
Start: 2021-03-03 | End: 2021-03-03 | Stop reason: HOSPADM

## 2021-03-03 RX ORDER — LIDOCAINE 40 MG/G
CREAM TOPICAL
Status: DISCONTINUED | OUTPATIENT
Start: 2021-03-03 | End: 2021-03-03 | Stop reason: HOSPADM

## 2021-03-03 RX ORDER — CHLORAL HYDRATE 500 MG
2 CAPSULE ORAL DAILY
COMMUNITY
End: 2023-11-01

## 2021-03-03 RX ORDER — FLUMAZENIL 0.1 MG/ML
0.2 INJECTION, SOLUTION INTRAVENOUS
Status: DISCONTINUED | OUTPATIENT
Start: 2021-03-03 | End: 2021-03-03 | Stop reason: HOSPADM

## 2021-03-03 RX ADMIN — FENTANYL CITRATE 50 MCG: 50 INJECTION, SOLUTION INTRAMUSCULAR; INTRAVENOUS at 08:56

## 2021-03-03 RX ADMIN — SODIUM CHLORIDE: 9 INJECTION, SOLUTION INTRAVENOUS at 08:10

## 2021-03-03 RX ADMIN — MIDAZOLAM 1 MG: 1 INJECTION INTRAMUSCULAR; INTRAVENOUS at 08:56

## 2021-03-03 RX ADMIN — LIDOCAINE HYDROCHLORIDE 5 ML: 10 INJECTION, SOLUTION EPIDURAL; INFILTRATION; INTRACAUDAL; PERINEURAL at 08:56

## 2021-03-03 ASSESSMENT — MIFFLIN-ST. JEOR: SCORE: 1034.84

## 2021-03-03 NOTE — PROGRESS NOTES
Interventional Radiology Intra-procedural Nursing Note    Patient Name: Ethel Thompson  Medical Record Number: [id number]  Today's Date: March 3, 2021    Start Time: 0855  End of procedure time: 0910  Procedure: Right transplant kidney biopsy  Fire Safety Score: 1    Consent Review/Timeout Performed by: Dashawn Lopez PA-C  Procedure Performed By: Dashawn Lopez PA-C    Fentanyl administered: 50 mcg  Versed administered: 1 mg    Start of Sedation Time: 0855  End of Sedation Time: 0910  Total Sedation Time: 15 minutes    Procedure start time: 0855  Puncture time: 0857  Procedure end time: 0910    Report given to: Xochitl GARSIA  Time pt departs:  0920  : NICOLE    Other Notes:  Alert female transported via cart from  to IR Procedure Room 6 for planned intervention.  ID band confirmed and patient acknowledges understanding of planned procedure. Patient repositioned to procedure table via hover-mat and positioned supine.  Patient prepped and draped per policy see VS flowsheet, MAR for further information.       Right lower quadrant site identified.  A total of 3 x core specimen obtained under sterile procedure. Post procedure, site cleansed and dressed per protocol.    Patient returned to  for post-procedure monitoring.    Laura Small RN

## 2021-03-03 NOTE — PROGRESS NOTES
Pt returns to 2a s/p transplanted kidney biopsy. RLQ site CDI, soft, flat, non tender. Pt alert, denies pain. Pt tolerating PO.

## 2021-03-03 NOTE — PROCEDURES
[facility]    Procedure: IR Procedure Note    Date/Time: 3/3/2021 9:20 AM  Performed by: Michael Lopez PA-C  Authorized by: Michael Lopez PA-C     UNIVERSAL PROTOCOL   Site Marked: NA  Prior Images Obtained and Reviewed:  Yes  Required items: Required blood products, implants, devices and special equipment available    Patient identity confirmed:  Verbally with patient, arm band, provided demographic data and hospital-assigned identification number  Patient was reevaluated immediately before administering moderate or deep sedation or anesthesia  Confirmation Checklist:  Patient's identity using two indicators, relevant allergies, procedure was appropriate and matched the consent or emergent situation and correct equipment/implants were available  Time out: Immediately prior to the procedure a time out was called    Universal Protocol: the Joint Commission Universal Protocol was followed    Preparation: Patient was prepped and draped in usual sterile fashion    ESBL (mL):  3         ANESTHESIA    Anesthesia: Local infiltration  Local Anesthetic:  Lidocaine 1% without epinephrine  Anesthetic Total (mL):  6      SEDATION    Patient Sedated: Yes    Sedation Type:  Moderate (conscious) sedation  Sedation:  Fentanyl and midazolam  Vital signs: Vital signs monitored during sedation    See dictated procedure note for full details.  Findings: U/S guided RLQ transplant renal biopsy. Three 18 gauge cores taken.    Specimens: core needle biopsy specimens sent for pathological analysis    Complications: None    Condition: Stable    Plan: Bedrest for 4 hours, no strenuous activity for 3 days. Follow up per primary team.    PROCEDURE   Patient Tolerance:  Patient tolerated the procedure well with no immediate complications    Length of time physician/provider present for 1:1 monitoring during sedation: 15

## 2021-03-03 NOTE — PROGRESS NOTES
Patient prepped for (transplanted) renal biopsy.   Denies pain.   in room with him.  H & P current.  Consent signed.  Labs current.

## 2021-03-03 NOTE — PROGRESS NOTES
Patient tolerated recovery stage well. VSS, puncture site RLQ clean/dry/intact, no hematoma, and denies pain. Patient tolerated PO food and fluids. Teaching was done and discharge instructions were given. Patient ambulated, voided, and PIV was removed. Patient discharged from the hospital via wheel chair to home with her .

## 2021-03-03 NOTE — DISCHARGE INSTRUCTIONS
[facility]    Interventional Radiology  Patient Instructions Following Kidney Biopsy    AFTER YOU GO HOME  ? If you were given sedation DO NOT drive or operate machinery at home or at work for at least 24 hours  ? DO relax and take it easy for 48 hours, no strenuous activity for 24 hours  ? DO drink plenty of fluids  ? DO resume your regular diet, unless otherwise instructed by your Primary Physician  ? Keep the dressing dry and in place for 24 hours.  ? DO NOT SMOKE FOR AT LEAST 24 HOURS, if you have been given any medications that were to help you relax or sedate you during your procedure  ? DO NOT drink alcoholic beverages the day of your procedure  ? DO NOT do any strenuous exercise or lifting (> 10 lbs) for at least 7 days following your procedure  ? DO NOT take a bath or shower for at least 12 hours following your procedure  ? Remove dressing after shower the next day. Replace with Band aid for 2 days.  Never leave a wet dressing in place.  ? DO NOT make any important or legal decisions for 24 hours following your procedure  ? There should be minimum drainage from the biopsy site    CALL THE PHYSICIAN IF:  ? You start bleeding from the procedure site.  If you do start to bleed from that site, lie down flat and hold pressure on the site for a minimum of 10 minutes.  Your physician will tell you if you need to return to the hospital  ? You develop nausea or vomiting  ? You have excessive swelling, redness, or tenderness at the site  ? You have drainage that looks like it is infected.  ? You experience severe pain  ? You develop hives or a rash or unexplained itching  ? You develop shortness of breath  ? You develop a temperature of 101 degrees F or greater  ? You develop bloody clots or red urine after you are discharged      [facility] INTERVENTIONAL RADIOLOGY DEPARTMENT  Procedure Physician:          JAN Lopez PA-C                  Date of procedure: March 3, 2021  Telephone  Numbers: [phone] Monday-Friday 8:00 am to 4:30 pm  [phone] After 4:30 pm Monday-Friday, Weekends & Holidays.   Ask for the Interventional Radiologist on call.  Someone is on call 24 hrs/day  [facility] toll free number: [phone] Monday-Friday 8:00 am to 4:30 pm  [facility] Emergency Dept: [phone]

## 2021-03-03 NOTE — PRE-PROCEDURE
GENERAL PRE-PROCEDURE:     Verbal consent obtained?: Yes    Written consent obtained?: Yes    Risks and benefits: Risks, benefits and alternatives were discussed    Consent given by:  Patient  Patient states understanding of procedure being performed: Yes    Patient's understanding of procedure matches consent: Yes    Procedure consent matches procedure scheduled: Yes    Appropriately NPO:  Yes  ASA Class:  Class 3- Severe systemic disease, definite functional limitations  Mallampati  :  Grade 3- soft palate visible, posterior pharyngeal wall not visible  Lungs:  Lungs clear with good breath sounds bilaterally  Heart:  Normal heart sounds and rate  History & Physical reviewed:  History and physical reviewed and no updates needed  Statement of review:  I have reviewed the lab findings, diagnostic data, medications, and the plan for sedation

## 2021-03-03 NOTE — IP AVS SNAPSHOT
[facility] [address] [address]                                    After Visit Summary   3/3/2021    Ethel Thompson    MRN: [id number]           After Visit Summary Signature Page    I have received my discharge instructions, and my questions have been answered. I have discussed any challenges I see with this plan with the nurse or doctor.    ..........................................................................................................................................  Patient/Patient Representative Signature      ..........................................................................................................................................  Patient Representative Print Name and Relationship to Patient    ..................................................               ................................................  Date                                   Time    ..........................................................................................................................................  Reviewed by Signature/Title    ...................................................              ..............................................  Date                                               Time          22EPIC Rev 08/18

## 2021-03-05 ENCOUNTER — TELEPHONE (OUTPATIENT)
Dept: TRANSPLANT | Facility: CLINIC | Age: 66
End: 2021-03-05

## 2021-03-05 LAB — COPATH REPORT: NORMAL

## 2021-03-05 NOTE — TELEPHONE ENCOUNTER
"   Carter Wilson MD Huepfel, Tiffany MOY RN             You can call. We will likely have an update next week with EM        Called  Ethel and Maxim reviewed preliminary  kidney biopsy results   \"No rejection \"   Plan for Dr Carter Wilson will follow up  With final kidney biopsy          Ethel states that her BP has improved  /weight decreased  Has some SOB with activity  Walking stairs-- no chest pain - no sob while sitting    Plan to monitor    IF becomes worse chest pain follow up in ER  /Urgent care          "

## 2021-03-08 DIAGNOSIS — Z94.0 KIDNEY REPLACED BY TRANSPLANT: ICD-10-CM

## 2021-03-08 DIAGNOSIS — R80.1 PERSISTENT PROTEINURIA: Primary | ICD-10-CM

## 2021-03-08 DIAGNOSIS — Z48.298 AFTERCARE FOLLOWING ORGAN TRANSPLANT: ICD-10-CM

## 2021-03-08 DIAGNOSIS — Z79.899 ENCOUNTER FOR LONG-TERM CURRENT USE OF MEDICATION: ICD-10-CM

## 2021-03-08 LAB
ANION GAP SERPL CALCULATED.3IONS-SCNC: 6 MMOL/L (ref 3–14)
BASOPHILS # BLD AUTO: 0.1 10E9/L (ref 0–0.2)
BASOPHILS NFR BLD AUTO: 1.2 %
BUN SERPL-MCNC: 28 MG/DL (ref 7–30)
CALCIUM SERPL-MCNC: 9.9 MG/DL (ref 8.5–10.1)
CHLORIDE SERPL-SCNC: 104 MMOL/L (ref 94–109)
CO2 SERPL-SCNC: 25 MMOL/L (ref 20–32)
CREAT SERPL-MCNC: 0.82 MG/DL (ref 0.52–1.04)
CREAT UR-MCNC: 39 MG/DL
DIFFERENTIAL METHOD BLD: NORMAL
EOSINOPHIL # BLD AUTO: 0.2 10E9/L (ref 0–0.7)
EOSINOPHIL NFR BLD AUTO: 2 %
ERYTHROCYTE [DISTWIDTH] IN BLOOD BY AUTOMATED COUNT: 12.6 % (ref 10–15)
GFR SERPL CREATININE-BSD FRML MDRD: 74 ML/MIN/{1.73_M2}
GLUCOSE SERPL-MCNC: 176 MG/DL (ref 70–99)
HCT VFR BLD AUTO: 37.2 % (ref 35–47)
HGB BLD-MCNC: 11.9 G/DL (ref 11.7–15.7)
LYMPHOCYTES # BLD AUTO: 1.6 10E9/L (ref 0.8–5.3)
LYMPHOCYTES NFR BLD AUTO: 19.6 %
MAGNESIUM SERPL-MCNC: 1.9 MG/DL (ref 1.6–2.3)
MCH RBC QN AUTO: 30.9 PG (ref 26.5–33)
MCHC RBC AUTO-ENTMCNC: 32 G/DL (ref 31.5–36.5)
MCV RBC AUTO: 97 FL (ref 78–100)
MONOCYTES # BLD AUTO: 0.5 10E9/L (ref 0–1.3)
MONOCYTES NFR BLD AUTO: 5.8 %
MYCOPHENOLATE SERPL LC/MS/MS-MCNC: 0.92 MG/L (ref 1–3.5)
MYCOPHENOLATE-G SERPL LC/MS/MS-MCNC: 92.4 MG/L (ref 30–95)
NEUTROPHILS # BLD AUTO: 5.7 10E9/L (ref 1.6–8.3)
NEUTROPHILS NFR BLD AUTO: 71.4 %
PHOSPHATE SERPL-MCNC: 3.9 MG/DL (ref 2.5–4.5)
PLATELET # BLD AUTO: 196 10E9/L (ref 150–450)
POTASSIUM SERPL-SCNC: 4.8 MMOL/L (ref 3.4–5.3)
PROT UR-MCNC: 0.37 G/L
PROT/CREAT 24H UR: 0.96 G/G CR (ref 0–0.2)
RBC # BLD AUTO: 3.85 10E12/L (ref 3.8–5.2)
SODIUM SERPL-SCNC: 135 MMOL/L (ref 133–144)
TACROLIMUS BLD-MCNC: 8.5 UG/L (ref 5–15)
TME LAST DOSE: ABNORMAL H
TME LAST DOSE: NORMAL H
WBC # BLD AUTO: 8.1 10E9/L (ref 4–11)

## 2021-03-08 PROCEDURE — 83735 ASSAY OF MAGNESIUM: CPT | Performed by: INTERNAL MEDICINE

## 2021-03-08 PROCEDURE — 84156 ASSAY OF PROTEIN URINE: CPT | Performed by: INTERNAL MEDICINE

## 2021-03-08 PROCEDURE — 80048 BASIC METABOLIC PNL TOTAL CA: CPT | Performed by: INTERNAL MEDICINE

## 2021-03-08 PROCEDURE — 85025 COMPLETE CBC W/AUTO DIFF WBC: CPT | Performed by: INTERNAL MEDICINE

## 2021-03-08 PROCEDURE — 80180 DRUG SCRN QUAN MYCOPHENOLATE: CPT | Performed by: INTERNAL MEDICINE

## 2021-03-08 PROCEDURE — 36415 COLL VENOUS BLD VENIPUNCTURE: CPT | Performed by: INTERNAL MEDICINE

## 2021-03-08 PROCEDURE — 84100 ASSAY OF PHOSPHORUS: CPT | Performed by: INTERNAL MEDICINE

## 2021-03-08 PROCEDURE — 80197 ASSAY OF TACROLIMUS: CPT | Performed by: INTERNAL MEDICINE

## 2021-03-08 RX ORDER — FUROSEMIDE 20 MG
20 TABLET ORAL DAILY
Qty: 90 TABLET | Refills: 1 | Status: SHIPPED | OUTPATIENT
Start: 2021-03-08 | End: 2021-06-21

## 2021-03-08 NOTE — PROGRESS NOTES
After discussing biopsy results with Dr. 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 There is no proteinuria on sister kidney. 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 Continue to monitor UPCR weekly. Due to increasing SOB and facial edema, start lasix 20mg PO daily. BP a little elevated ~136 systolic, would consider in the next week if Scr stable increasing losartan as tolerated.

## 2021-03-09 ENCOUNTER — VIRTUAL VISIT (OUTPATIENT)
Dept: NEPHROLOGY | Facility: CLINIC | Age: 66
End: 2021-03-09
Attending: INTERNAL MEDICINE
Payer: MEDICARE

## 2021-03-09 DIAGNOSIS — E55.9 VITAMIN D DEFICIENCY: ICD-10-CM

## 2021-03-09 DIAGNOSIS — D84.9 IMMUNOSUPPRESSION (H): ICD-10-CM

## 2021-03-09 DIAGNOSIS — Z48.298 AFTERCARE FOLLOWING ORGAN TRANSPLANT: ICD-10-CM

## 2021-03-09 DIAGNOSIS — R80.1 PERSISTENT PROTEINURIA: ICD-10-CM

## 2021-03-09 DIAGNOSIS — I15.1 HTN, KIDNEY TRANSPLANT RELATED: ICD-10-CM

## 2021-03-09 DIAGNOSIS — Z94.0 HTN, KIDNEY TRANSPLANT RELATED: ICD-10-CM

## 2021-03-09 DIAGNOSIS — Z94.0 KIDNEY REPLACED BY TRANSPLANT: Primary | ICD-10-CM

## 2021-03-09 PROCEDURE — 99215 OFFICE O/P EST HI 40 MIN: CPT | Mod: 95

## 2021-03-09 RX ORDER — LOSARTAN POTASSIUM 100 MG/1
100 TABLET ORAL DAILY
Qty: 90 TABLET | Refills: 3 | COMMUNITY
Start: 2021-03-09 | End: 2021-04-08

## 2021-03-09 ASSESSMENT — PAIN SCALES - GENERAL: PAINLEVEL: NO PAIN (0)

## 2021-03-09 NOTE — PROGRESS NOTES
Ethel is a 66 year old who is being evaluated via a billable video visit.      How would you like to obtain your AVS? MyChart  If the video visit is dropped, the invitation should be resent by: Send to e-mail at: [email or web address]  Will anyone else be joining your video visit? No    Video Start Time: 0932  Video-Visit Details    Type of service:  Video Visit    Video End Time:1010    Originating Location (pt. Location): Home    Distant Location (provider location):  [facility] [place]     Platform used for Video Visit: Kwi[employer]       CHRONIC TRANSPLANT NEPHROLOGY VISIT    Assessment & Plan   # DDKT: Stable kidney function, but patient developed new proteinuria, up to ~ 2.5 grams 2/2020, but now down about ~ 1 gram.  After further discussion with the patient and review of native kidney biopsy, patient does NOT have a known diagnosis of FSGS.  Her biopsy showed 1/4 glomeruli with global glomerulosclerosis and no focal segmental glomerulosclerosis.  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  Will plan to treat proteinuria conservatively at this time with ARB.   - Baseline Creatinine:  ~ 0.8-1.0   - Proteinuria: Moderate (1-3 grams) About 1 gram.   - Date DSA Last Checked: Feb/2021      Latest DSA: No   - BK Viremia: No   - Kidney Tx Biopsy: Mar 03, 2021; Result:  No diagnostic evidence of acute rejection.  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  No interstitial fibrosis or tubular atrophy.             Feb 03, 2021; Result: No diagnostic evidence of acute rejection.  Segmental glomerular  basement membrane irregularity with remodeling and lamellation.  Type IV collagen alpha5 and alpha2 staining was normal.  This was felt to be donor derived.    # Immunosuppression: Tacrolimus immediate release (goal 6-8) and Mycophenolate mofetil (dose 1000 mg every 12 hours)          - Continue with intensive monitoring of immunosuppression for efficacy and toxicity.          - Changes: Yes - With new tacrolimus goal now at 6 months post transplant, will decrease tacrolimus dose to 1 mg every 12 hours.    # Infection Prophylaxis:   - PJP: Sulfa/TMP (Bactrim)    # Hypertension: Borderline control;  Goal BP: < 130/80   - Changes: Yes - With both higher BP, underlying diabetes, and proteinuria, will increase losartan to 100 mg daily.    # Diabetes: Borderline control (HbA1c 7-9%) Last HbA1c: 7.9%   - Management as per primary care.    # Mineral Bone Disorder:   - Secondary renal hyperparathyroidism; PTH level: Mildly elevated (151-300 pg/ml)        On treatment: None  - Vitamin D; level: Low        On supplement: No; Not on supplement due to high normal calcium level.  - Calcium; level: High normal        On supplement: No    # Electrolytes:   - Potassium; level: Normal        On supplement: No  - Magnesium; level: Normal        On supplement: Yes  - Bicarbonate; level: Normal        On supplement: No    # Chest Pain/SOB: Not typical for angina symptoms and appears to be more related to volume overload with start of improving symptoms after starting on diuretic yesterday.   - If symptoms persist, would recommend further evaluation, including cardiac stress test.   - Also, if no further improvement in dyspnea on diuretic and with improved BP control, patient would likely need to be seen in closer follow up.    # Night Sweats: Increased frequency lately due to unclear etiology.  Would assess for infection and cancer.   - Will check CMV and EBV PCR.  Will also check LDH as lymphoma screen.    # MGUS: No monoclonal  protein seen on latest SPEP and normal kappa/lambda ratio.   - No follow up testing is necessary unless proteinuria significantly worsens.    # GERD: Occasional symptoms, but mostly controlled on famotidine.    # SADIE on CPAP: Patient is compliant.    # Skin Cancer Risk:    - Discussed sun protection and recommend regular follow up with Dermatology.    # Medical Compliance: Yes     # COVID-19 Virus Review: Discussed COVID-19 virus and the potential medical risks.  Reviewed preventative health recommendations, which includes washing hands for 20 seconds, avoid touching your face, and social distancing.  Asked patient to inform the transplant center if they are exposed or diagnosed with this virus.    # COVID Vaccine Completed: No    # Transplant History:  Etiology of Kidney Failure: Unknown etiology (Native kidney biopsy showed global glomerulosclerosis)  Tx: DDKT  Transplant: 9/3/2020 (Kidney)  Significant changes in immunosuppression: None  Significant transplant-related complications: None    Transplant Office Phone Number: [phone]    Assessment and plan was discussed with the patient and she voiced her understanding and agreement.    Return visit: Return in about 3 months (around 6/9/2021).    Angel Luis Sheridan MD    Chief Complaint   Ms. Thompson is a 66 year old here for kidney transplant and immunosuppression management.    History of Present Illness    Ms. Thompson reports feeling okay overall, but with some medical complaints.  No recent hospitalizations, but she has had some new medical issues.  Her baseline creatinine remains very good at ~ 0.8-1.0, however, she has developed new proteinuria.  At the time of transplant, patient had ~ 2 grams of proteinuria with continued good urine output on PD.  Patient had been initiated on dialysis 6/2019.  Her native kidney disease is a bit unclear after discussing with the patient and in depth chair review.  She had a history of proteinuria since 1984 from the  records.  Patient then had significant preeclampsia during her pregnancy in 1986, although no kidney biopsy was done at that time.  Patient only first starting seeing a Nephrologist in 2017 in [place] and underwent a native kidney biopsy 8/11/17.  The biopsy showed global glomerulosclerosis in 1 out of only 4 glomeruli.  The biopsy otherwise showed severe arteriosclerosis.  There was no formal diagnosis of FSGS, which can be found mistakenly later in her medical records.  It was noted at the time of her native kidney biopsy, that the patient had an atrophic kidney on the right.  She says that her father also was found to have one small kidney, but never required intervention.  Patient then moved to [place] and no further native kidney biopsy was done.  She was initiated on dialysis 6/2019.  No other family history of kidney disease, including no issues in her 3 siblings.  Patient underwent DDKT 9/3/20 from a 2 year old female donor.  Her kidney function improved fairly quickly post transplant to her present baseline in less than a week.  As noted previously, she had ~ 2 grams of proteinuria at time of transplant and this decreased to ~ 0.3 grams on two separate occasions a month and two months after transplant.  However, her urine protein/cr ratio increased to just over 1 gram in January, then with repeat testing was at ~ 1.5 grams.  Patient underwent kidney transplant biopsy 2/3/21 that showed no acute rejection, but had segmental glomerular basement membrane irregularity with remodeling and lamellation.  Type IV collagen alpha5 and alpha2 staining was normal.  It was unclear of the significance of these findings.  Another repeat UPC increased further to as high as ~ 2.5 grams on February 15.  It was felt a second kidney transplant biopsy may further help, especially to determine if this was early FSGS.  Patient underwent a second kidney transplant biopsy 3/3/21.  This biopsy showed essentially the same  findings with segmental glomerular basement membrane irregularity with remodeling and lamellation.  It was felt that these findings could be consistent with X-linked Alport's syndrome, but would be donor derived.  There was no evidence of anti-GBM disease or FSGS, suggesting less likely a primary issue from the patient.    Her energy level has been okay, but not much improvement since transplant.  She is active, although really getting minimal exercise.  Over the last two weeks, patient reports some increasing shortness of breath and increased leg swelling, as well as worse fatigue.  She was started on losartan last week, which she thinks helped her leg swelling a bit, and she was also started on a diuretic yesterday.  Along with the shortness of breath, patient reports occasional chest pressure, although she associates this more with her shortness of breath.  As noted above, her leg swelling is improved, but still there.  No recent episodes of chest pressure since starting the losartan and improvement in leg swelling.  No cough or upper respirator issues.    Her appetite is good and no change in her weight at ~ 132 lbs.  Occasional nausea in the morning, but not much of an issue.  No vomiting or diarrhea.  Occasional heartburn symptoms, but mostly controlled on famotidine.  No fever, sweats or chills.  She does report when ever her tacrolimus dose is increased, she develops tremors and more mouth cold sores, for which she takes Valtrex.    Home BP: 140-150/70-80s    Problem List   Patient Active Problem List   Diagnosis     Elevated serum immunoglobulin free light chain level     Dyslipidemia     Hypothyroidism     SADIE on CPAP     Sensorineural hearing loss (SNHL) of both ears     GERD (gastroesophageal reflux disease)     HTN, kidney transplant related     Hepatitis B core antibody positive     Secondary renal hyperparathyroidism (H)     Memory deficits     Kidney replaced by transplant     Immunosuppression (H)      Aftercare following organ transplant     Vitamin D deficiency     Elevated random blood glucose level     Diabetes mellitus, type 2 (H)       Allergies   Allergies   Allergen Reactions     Amoxicillin-Pot Clavulanate Nausea and Vomiting       Medications   Current Outpatient Medications   Medication Sig     acetaminophen (TYLENOL) 325 MG tablet Take 2 tablets (650 mg) by mouth every 4 hours as needed for pain     aspirin (ASA) 81 MG EC tablet Take 1 tablet (81 mg) by mouth daily     atorvastatin (LIPITOR) 40 MG tablet Take 0.5 tablets (20 mg) by mouth daily     diclofenac (VOLTAREN) 1 % topical gel Apply 2 g topically 4 times daily     famotidine (PEPCID) 20 MG tablet Take 20 mg by mouth every evening      fish oil-omega-3 fatty acids 1000 MG capsule Take 2 g by mouth daily     fluticasone (FLONASE) 50 MCG/ACT nasal spray Spray 1 spray into both nostrils 2 times daily as needed for rhinitis or allergies     furosemide (LASIX) 20 MG tablet Take 1 tablet (20 mg) by mouth daily     levothyroxine (SYNTHROID/LEVOTHROID) 112 MCG tablet Take 112 mcg (1 tablet) by mouth every Monday, Tuesday is 1/2 tab, Wednesday, Thursday, Friday     Lidocaine (LIDOCARE) 4 % Patch Place 1 patch onto the skin every 24 hours To prevent lidocaine toxicity, patient should be patch free for 12 hrs daily.  Using for back pain     losartan (COZAAR) 100 MG tablet Take 1 tablet (100 mg) by mouth daily     magnesium oxide (MAG-OX) 400 MG tablet Take 1 tablet (400 mg) by mouth daily (with lunch)     melatonin 3 MG tablet Take 6 mg by mouth At Bedtime      mycophenolate (GENERIC EQUIVALENT) 250 MG capsule Take 4 capsules (1,000 mg) by mouth 2 times daily     psyllium (METAMUCIL/KONSYL) 58.6 % powder Take by mouth daily     sulfamethoxazole-trimethoprim (BACTRIM) 400-80 MG tablet Take 1 tablet by mouth daily     tacrolimus (GENERIC EQUIVALENT) 0.5 MG capsule Take 1 capsule (0.5 mg) by mouth 2 times daily Total dose = 1.5 mg BID     tacrolimus  (GENERIC EQUIVALENT) 1 MG capsule Take 1 capsule (1 mg) by mouth 2 times daily Total dose = 1.5 mg BID (Patient taking differently: Take 1 mg by mouth 2 times daily Total dose = 1.5 in AM and 1 in PM)     valACYclovir (VALTREX) 500 MG tablet Take 1 tablet (500 mg) by mouth daily     No current facility-administered medications for this visit.      Medications Discontinued During This Encounter   Medication Reason     losartan (COZAAR) 100 MG tablet        Physical Exam   Vital signs were deferred for this telemedicine visit.    GENERAL APPEARANCE: alert and no distress  HENT: no obvious abnormalities on appearance  RESP: breathing appears unremarkable with normal rate, no audible wheezing or cough and no apparent shortness of breath with conversation  MS: extremities normal - no gross deformities noted  SKIN: no apparent rash and normal skin tone  NEURO: speech is clear with no obvious neurological deficits  PSYCH: mentation appears normal and affect normal    Data     Renal Latest Ref Rng & Units 3/22/2021 3/15/2021 3/8/2021   Na 133 - 144 mmol/L 137 136 135   Na (external) 135 - 145 mmol/L - - -   K 3.4 - 5.3 mmol/L 4.6 4.6 4.8   K (external) 3.5 - 5.0 mmol/L - - -   Cl 94 - 109 mmol/L 102 106 104   Cl (external) 98 - 110 mmol/L - - -   CO2 20 - 32 mmol/L 32 25 25   CO2 (external) 21 - 31 mmol/L - - -   BUN 7 - 30 mg/dL 30 34(H) 28   BUN (external) 8 - 25 mg/dL - - -   Cr 0.52 - 1.04 mg/dL 0.94 0.83 0.82   Cr (external) 0.57 - 1.11 mg/dL - - -   Glucose 70 - 99 mg/dL 171(H) 190(H) 176(H)   Glucose (external) 65 - 100 mg/dL - - -   Ca  8.5 - 10.1 mg/dL 9.6 9.9 9.9   Ca (external) 8.5 - 10.5 mg/dL - - -   Mg 1.6 - 2.3 mg/dL - - 1.9   Mg (external) 1.6 - 2.6 mg/dL - - -     Bone Health Latest Ref Rng & Units 3/8/2021 2/1/2021 1/18/2021   Phos 2.5 - 4.5 mg/dL 3.9 2.8 -   Phos (external) 2.3 - 4.7 mg/dL - - -   PTHi 18 - 80 pg/mL - - 151(H)   Vit D Def 20 - 75 ug/L - - -     Heme Latest Ref Rng & Units 3/22/2021  3/15/2021 3/8/2021   WBC 4.0 - 11.0 10e9/L 4.1 5.9 8.1   WBC (external) 4.5 - 11.0 thou/cu mm - - -   Hgb 11.7 - 15.7 g/dL 11.9 12.0 11.9   Hgb (external) 12.0 - 16.0 g/dL - - -   Plt 150 - 450 10e9/L 194 176 196   Plt (external) 140 - 440 thou/cu mm - - -   ABSOLUTE NEUTROPHIL 1.6 - 8.3 10e9/L 1.8 3.7 5.7   ABSOLUTE LYMPHOCYTES 0.8 - 5.3 10e9/L 1.6 1.4 1.6   ABSOLUTE MONOCYTES 0.0 - 1.3 10e9/L 0.5 0.5 0.5   ABSOLUTE EOSINOPHILS 0.0 - 0.7 10e9/L 0.2 0.2 0.2   ABSOLUTE BASOPHILS 0.0 - 0.2 10e9/L 0.0 0.1 0.1   ABS IMMATURE GRANULOCYTES 0 - 0.4 10e9/L - - -   ABSOLUTE NUCLEATED RBC - - - -     Liver Latest Ref Rng & Units 3/1/2021 1/25/2021 9/9/2020   AP 40 - 150 U/L 195(H) 189(H) -   TBili 0.2 - 1.3 mg/dL 0.6 0.5 -   DBili 0.0 - 0.2 mg/dL 0.1 - -   ALT 0 - 50 U/L 42 37 -   AST 0 - 45 U/L 26 24 -   Tot Protein 6.8 - 8.8 g/dL 7.0 6.9 -   Albumin 3.4 - 5.0 g/dL 3.7 3.8 2.9(L)     Pancreas Latest Ref Rng & Units 3/1/2021 1/25/2021 9/3/2020   A1C 0 - 5.6 % 7.9(H) 7.3(H) 5.4     Iron studies Latest Ref Rng & Units 9/9/2020   Iron 35 - 180 ug/dL 162   Iron sat 15 - 46 % 92(H)   Ferritin 8 - 252 ng/mL 1,757(H)     [facility] Txp Virology Latest Ref Rng & Units 3/15/2021 1/25/2021 12/14/2020   CVM DNA Quant - - EDTA PLASMA -   CMV QUANT IU/ML CMVND:CMV DNA Not Detected [IU]/mL - CMV DNA Not Detected -   LOG IU/ML OF CMVQNT <2.1 [Log:IU]/mL - Not Calculated -   BK Spec - Plasma Plasma Plasma   BK Res BKNEG:BK Virus DNA Not Detected copies/mL BK Virus DNA Not Detected BK Virus DNA Not Detected BK Virus DNA Not Detected   BK Log <2.7 Log copies/mL Not Calculated Not Calculated Not Calculated   BK Quant Log Ext - - - -   BK Quant Result Ext Negative copies/mL - - -   BK Quant Spec Ext - - - -   EBV CAPSID ANTIBODY IGG 0.0 - 0.8 AI - - -   EBV DNA COPIES/ML EBVNEG:EBV DNA Not Detected [Copies]/mL - EBV DNA Not Detected -   EBV DNA LOG OF COPIES <2.7 [Log:copies]/mL - Not Calculated -   Hep B Core NR:Nonreactive - - -        Recent Labs    Lab Test 03/08/21  0839 03/15/21  0815 03/22/21  0819   DOSTAC 2000 3/7/21 3/14/2021 20:00. 3/21/21 2000   TACROL 8.5 5.9 5.5     Recent Labs   Lab Test 03/01/21  0806 03/08/21  0852 03/15/21  0814   DOSMPA Not Provided 2000 3/7/2021 Not Provided   MPACID 1.33 0.92* 0.68*   MPAG 126.0* 92.4 79.3

## 2021-03-09 NOTE — LETTER
3/9/2021       RE: Ethel Thompson  [address]  [place] [address]     Dear Colleague,    Thank you for referring your patient, Ethel Thompson, to the [facility] [place] at [facility]. Please see a copy of my visit note below.    Ethel is a 66 year old who is being evaluated via a billable video visit.      How would you like to obtain your AVS? MyChart  If the video visit is dropped, the invitation should be resent by: Send to e-mail at: [email or web address]  Will anyone else be joining your video visit? No    Video Start Time: 0932  Video-Visit Details    Type of service:  Video Visit    Video End Time:1010    Originating Location (pt. Location): Home    Distant Location (provider location):  [facility] [place]     Platform used for Video Visit: Simpler       CHRONIC TRANSPLANT NEPHROLOGY VISIT    Assessment & Plan   # DDKT: Stable kidney function, but patient developed new proteinuria, up to ~ 2.5 grams 2/2020, but now down about ~ 1 gram.  After further discussion with the patient and review of native kidney biopsy, patient does NOT have a known diagnosis of FSGS.  Her biopsy showed 1/4 glomeruli with global glomerulosclerosis and no focal segmental glomerulosclerosis.  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  Will plan to treat proteinuria conservatively at this time with ARB.   - Baseline Creatinine:  ~ 0.8-1.0   - Proteinuria: Moderate (1-3 grams) About 1 gram.   - Date DSA Last Checked: Feb/2021      Latest DSA: No   - BK Viremia: No   - Kidney Tx Biopsy: Mar 03, 2021; Result:  No diagnostic evidence of acute  rejection.  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  No interstitial fibrosis or tubular atrophy.             Feb 03, 2021; Result: No diagnostic evidence of acute rejection.  Segmental glomerular basement membrane irregularity with remodeling and lamellation.  Type IV collagen alpha5 and alpha2 staining was normal.  This was felt to be donor derived.    # Immunosuppression: Tacrolimus immediate release (goal 6-8) and Mycophenolate mofetil (dose 1000 mg every 12 hours)          - Continue with intensive monitoring of immunosuppression for efficacy and toxicity.          - Changes: Yes - With new tacrolimus goal now at 6 months post transplant, will decrease tacrolimus dose to 1 mg every 12 hours.    # Infection Prophylaxis:   - PJP: Sulfa/TMP (Bactrim)    # Hypertension: Borderline control;  Goal BP: < 130/80   - Changes: Yes - With both higher BP, underlying diabetes, and proteinuria, will increase losartan to 100 mg daily.    # Diabetes: Borderline control (HbA1c 7-9%) Last HbA1c: 7.9%   - Management as per primary care.    # Mineral Bone Disorder:   - Secondary renal hyperparathyroidism; PTH level: Mildly elevated (151-300 pg/ml)        On treatment: None  - Vitamin D; level: Low        On supplement: No; Not on supplement due to high normal calcium level.  - Calcium; level: High normal        On supplement: No    # Electrolytes:   - Potassium; level: Normal        On supplement: No  - Magnesium; level: Normal        On supplement: Yes  - Bicarbonate; level: Normal        On supplement: No    # Chest Pain/SOB: Not typical for angina symptoms and appears to be more related to volume overload with start of improving symptoms after starting on diuretic yesterday.   - If symptoms persist, would recommend further evaluation, including cardiac stress test.   - Also, if no further improvement in dyspnea on  diuretic and with improved BP control, patient would likely need to be seen in closer follow up.    # Night Sweats: Increased frequency lately due to unclear etiology.  Would assess for infection and cancer.   - Will check CMV and EBV PCR.  Will also check LDH as lymphoma screen.    # MGUS: No monoclonal protein seen on latest SPEP and normal kappa/lambda ratio.   - No follow up testing is necessary unless proteinuria significantly worsens.    # GERD: Occasional symptoms, but mostly controlled on famotidine.    # SADIE on CPAP: Patient is compliant.    # Skin Cancer Risk:    - Discussed sun protection and recommend regular follow up with Dermatology.    # Medical Compliance: Yes     # COVID-19 Virus Review: Discussed COVID-19 virus and the potential medical risks.  Reviewed preventative health recommendations, which includes washing hands for 20 seconds, avoid touching your face, and social distancing.  Asked patient to inform the transplant center if they are exposed or diagnosed with this virus.    # COVID Vaccine Completed: No    # Transplant History:  Etiology of Kidney Failure: Unknown etiology (Native kidney biopsy showed global glomerulosclerosis)  Tx: DDKT  Transplant: 9/3/2020 (Kidney)  Significant changes in immunosuppression: None  Significant transplant-related complications: None    Transplant Office Phone Number: [phone]    Assessment and plan was discussed with the patient and she voiced her understanding and agreement.    Return visit: Return in about 3 months (around 6/9/2021).    Angel Luis Sheridan MD    Chief Complaint   Ms. Thompson is a 66 year old here for kidney transplant and immunosuppression management.    History of Present Illness    Ms. Thompson reports feeling okay overall, but with some medical complaints.  No recent hospitalizations, but she has had some new medical issues.  Her baseline creatinine remains very good at ~ 0.8-1.0, however, she has developed new proteinuria.  At the time  of transplant, patient had ~ 2 grams of proteinuria with continued good urine output on PD.  Patient had been initiated on dialysis 6/2019.  Her native kidney disease is a bit unclear after discussing with the patient and in depth chair review.  She had a history of proteinuria since 1984 from the records.  Patient then had significant preeclampsia during her pregnancy in 1986, although no kidney biopsy was done at that time.  Patient only first starting seeing a Nephrologist in 2017 in [place] and underwent a native kidney biopsy 8/11/17.  The biopsy showed global glomerulosclerosis in 1 out of only 4 glomeruli.  The biopsy otherwise showed severe arteriosclerosis.  There was no formal diagnosis of FSGS, which can be found mistakenly later in her medical records.  It was noted at the time of her native kidney biopsy, that the patient had an atrophic kidney on the right.  She says that her father also was found to have one small kidney, but never required intervention.  Patient then moved to [place] and no further native kidney biopsy was done.  She was initiated on dialysis 6/2019.  No other family history of kidney disease, including no issues in her 3 siblings.  Patient underwent DDKT 9/3/20 from a 3 year old female donor.  Her kidney function improved fairly quickly post transplant to her present baseline in less than a week.  As noted previously, she had ~ 2 grams of proteinuria at time of transplant and this decreased to ~ 0.3 grams on two separate occasions a month and two months after transplant.  However, her urine protein/cr ratio increased to just over 1 gram in January, then with repeat testing was at ~ 1.5 grams.  Patient underwent kidney transplant biopsy 2/3/21 that showed no acute rejection, but had segmental glomerular basement membrane irregularity with remodeling and lamellation.  Type IV collagen alpha5 and alpha2 staining was normal.  It was unclear of the significance of these findings.   Another repeat UPC increased further to as high as ~ 2.5 grams on February 15.  It was felt a second kidney transplant biopsy may further help, especially to determine if this was early FSGS.  Patient underwent a second kidney transplant biopsy 3/3/21.  This biopsy showed essentially the same findings with segmental glomerular basement membrane irregularity with remodeling and lamellation.  It was felt that these findings could be consistent with X-linked Alport's syndrome, but would be donor derived.  There was no evidence of anti-GBM disease or FSGS, suggesting less likely a primary issue from the patient.    Her energy level has been okay, but not much improvement since transplant.  She is active, although really getting minimal exercise.  Over the last two weeks, patient reports some increasing shortness of breath and increased leg swelling, as well as worse fatigue.  She was started on losartan last week, which she thinks helped her leg swelling a bit, and she was also started on a diuretic yesterday.  Along with the shortness of breath, patient reports occasional chest pressure, although she associates this more with her shortness of breath.  As noted above, her leg swelling is improved, but still there.  No recent episodes of chest pressure since starting the losartan and improvement in leg swelling.  No cough or upper respirator issues.    Her appetite is good and no change in her weight at ~ 132 lbs.  Occasional nausea in the morning, but not much of an issue.  No vomiting or diarrhea.  Occasional heartburn symptoms, but mostly controlled on famotidine.  No fever, sweats or chills.  She does report when ever her tacrolimus dose is increased, she develops tremors and more mouth cold sores, for which she takes Valtrex.    Home BP: 140-150/70-80s    Problem List   Patient Active Problem List   Diagnosis     Elevated serum immunoglobulin free light chain level     Dyslipidemia     Hypothyroidism     SADIE on CPAP      Sensorineural hearing loss (SNHL) of both ears     GERD (gastroesophageal reflux disease)     HTN, kidney transplant related     Hepatitis B core antibody positive     Secondary renal hyperparathyroidism (H)     Memory deficits     Kidney replaced by transplant     Immunosuppression (H)     Aftercare following organ transplant     Vitamin D deficiency     Elevated random blood glucose level     Diabetes mellitus, type 2 (H)       Allergies   Allergies   Allergen Reactions     Amoxicillin-Pot Clavulanate Nausea and Vomiting       Medications   Current Outpatient Medications   Medication Sig     acetaminophen (TYLENOL) 325 MG tablet Take 2 tablets (650 mg) by mouth every 4 hours as needed for pain     aspirin (ASA) 81 MG EC tablet Take 1 tablet (81 mg) by mouth daily     atorvastatin (LIPITOR) 40 MG tablet Take 0.5 tablets (20 mg) by mouth daily     diclofenac (VOLTAREN) 1 % topical gel Apply 2 g topically 4 times daily     famotidine (PEPCID) 20 MG tablet Take 20 mg by mouth every evening      fish oil-omega-3 fatty acids 1000 MG capsule Take 2 g by mouth daily     fluticasone (FLONASE) 50 MCG/ACT nasal spray Spray 1 spray into both nostrils 2 times daily as needed for rhinitis or allergies     furosemide (LASIX) 20 MG tablet Take 1 tablet (20 mg) by mouth daily     levothyroxine (SYNTHROID/LEVOTHROID) 112 MCG tablet Take 112 mcg (1 tablet) by mouth every Monday, Tuesday is 1/2 tab, Wednesday, Thursday, Friday     Lidocaine (LIDOCARE) 4 % Patch Place 1 patch onto the skin every 24 hours To prevent lidocaine toxicity, patient should be patch free for 12 hrs daily.  Using for back pain     losartan (COZAAR) 100 MG tablet Take 1 tablet (100 mg) by mouth daily     magnesium oxide (MAG-OX) 400 MG tablet Take 1 tablet (400 mg) by mouth daily (with lunch)     melatonin 3 MG tablet Take 6 mg by mouth At Bedtime      mycophenolate (GENERIC EQUIVALENT) 250 MG capsule Take 4 capsules (1,000 mg) by mouth 2 times daily      psyllium (METAMUCIL/KONSYL) 58.6 % powder Take by mouth daily     sulfamethoxazole-trimethoprim (BACTRIM) 400-80 MG tablet Take 1 tablet by mouth daily     tacrolimus (GENERIC EQUIVALENT) 0.5 MG capsule Take 1 capsule (0.5 mg) by mouth 2 times daily Total dose = 1.5 mg BID     tacrolimus (GENERIC EQUIVALENT) 1 MG capsule Take 1 capsule (1 mg) by mouth 2 times daily Total dose = 1.5 mg BID (Patient taking differently: Take 1 mg by mouth 2 times daily Total dose = 1.5 in AM and 1 in PM)     valACYclovir (VALTREX) 500 MG tablet Take 1 tablet (500 mg) by mouth daily     No current facility-administered medications for this visit.      Medications Discontinued During This Encounter   Medication Reason     losartan (COZAAR) 100 MG tablet        Physical Exam   Vital signs were deferred for this telemedicine visit.    GENERAL APPEARANCE: alert and no distress  HENT: no obvious abnormalities on appearance  RESP: breathing appears unremarkable with normal rate, no audible wheezing or cough and no apparent shortness of breath with conversation  MS: extremities normal - no gross deformities noted  SKIN: no apparent rash and normal skin tone  NEURO: speech is clear with no obvious neurological deficits  PSYCH: mentation appears normal and affect normal    Data     Renal Latest Ref Rng & Units 3/22/2021 3/15/2021 3/8/2021   Na 133 - 144 mmol/L 137 136 135   Na (external) 135 - 145 mmol/L - - -   K 3.4 - 5.3 mmol/L 4.6 4.6 4.8   K (external) 3.5 - 5.0 mmol/L - - -   Cl 94 - 109 mmol/L 102 106 104   Cl (external) 98 - 110 mmol/L - - -   CO2 20 - 32 mmol/L 32 25 25   CO2 (external) 21 - 31 mmol/L - - -   BUN 7 - 30 mg/dL 30 34(H) 28   BUN (external) 8 - 25 mg/dL - - -   Cr 0.52 - 1.04 mg/dL 0.94 0.83 0.82   Cr (external) 0.57 - 1.11 mg/dL - - -   Glucose 70 - 99 mg/dL 171(H) 190(H) 176(H)   Glucose (external) 65 - 100 mg/dL - - -   Ca  8.5 - 10.1 mg/dL 9.6 9.9 9.9   Ca (external) 8.5 - 10.5 mg/dL - - -   Mg 1.6 - 2.3 mg/dL - - 1.9    Mg (external) 1.6 - 2.6 mg/dL - - -     Bone Health Latest Ref Rng & Units 3/8/2021 2/1/2021 1/18/2021   Phos 2.5 - 4.5 mg/dL 3.9 2.8 -   Phos (external) 2.3 - 4.7 mg/dL - - -   PTHi 18 - 80 pg/mL - - 151(H)   Vit D Def 20 - 75 ug/L - - -     Heme Latest Ref Rng & Units 3/22/2021 3/15/2021 3/8/2021   WBC 4.0 - 11.0 10e9/L 4.1 5.9 8.1   WBC (external) 4.5 - 11.0 thou/cu mm - - -   Hgb 11.7 - 15.7 g/dL 11.9 12.0 11.9   Hgb (external) 12.0 - 16.0 g/dL - - -   Plt 150 - 450 10e9/L 194 176 196   Plt (external) 140 - 440 thou/cu mm - - -   ABSOLUTE NEUTROPHIL 1.6 - 8.3 10e9/L 1.8 3.7 5.7   ABSOLUTE LYMPHOCYTES 0.8 - 5.3 10e9/L 1.6 1.4 1.6   ABSOLUTE MONOCYTES 0.0 - 1.3 10e9/L 0.5 0.5 0.5   ABSOLUTE EOSINOPHILS 0.0 - 0.7 10e9/L 0.2 0.2 0.2   ABSOLUTE BASOPHILS 0.0 - 0.2 10e9/L 0.0 0.1 0.1   ABS IMMATURE GRANULOCYTES 0 - 0.4 10e9/L - - -   ABSOLUTE NUCLEATED RBC - - - -     Liver Latest Ref Rng & Units 3/1/2021 1/25/2021 9/9/2020   AP 40 - 150 U/L 195(H) 189(H) -   TBili 0.2 - 1.3 mg/dL 0.6 0.5 -   DBili 0.0 - 0.2 mg/dL 0.1 - -   ALT 0 - 50 U/L 42 37 -   AST 0 - 45 U/L 26 24 -   Tot Protein 6.8 - 8.8 g/dL 7.0 6.9 -   Albumin 3.4 - 5.0 g/dL 3.7 3.8 2.9(L)     Pancreas Latest Ref Rng & Units 3/1/2021 1/25/2021 9/3/2020   A1C 0 - 5.6 % 7.9(H) 7.3(H) 5.4     Iron studies Latest Ref Rng & Units 9/9/2020   Iron 35 - 180 ug/dL 162   Iron sat 15 - 46 % 92(H)   Ferritin 8 - 252 ng/mL 1,757(H)     [facility] Txp Virology Latest Ref Rng & Units 3/15/2021 1/25/2021 12/14/2020   CVM DNA Quant - - EDTA PLASMA -   CMV QUANT IU/ML CMVND:CMV DNA Not Detected [IU]/mL - CMV DNA Not Detected -   LOG IU/ML OF CMVQNT <2.1 [Log:IU]/mL - Not Calculated -   BK Spec - Plasma Plasma Plasma   BK Res BKNEG:BK Virus DNA Not Detected copies/mL BK Virus DNA Not Detected BK Virus DNA Not Detected BK Virus DNA Not Detected   BK Log <2.7 Log copies/mL Not Calculated Not Calculated Not Calculated   BK Quant Log Ext - - - -   BK Quant Result Ext Negative copies/mL  - - -   BK Quant Spec Ext - - - -   EBV CAPSID ANTIBODY IGG 0.0 - 0.8 AI - - -   EBV DNA COPIES/ML EBVNEG:EBV DNA Not Detected [Copies]/mL - EBV DNA Not Detected -   EBV DNA LOG OF COPIES <2.7 [Log:copies]/mL - Not Calculated -   Hep B Core NR:Nonreactive - - -        Recent Labs   Lab Test 03/08/21  0839 03/15/21  0815 03/22/21  0819   DOSTAC 2000 3/7/21 3/14/2021 20:00. 3/21/21 2000   TACROL 8.5 5.9 5.5     Recent Labs   Lab Test 03/01/21  0806 03/08/21  0852 03/15/21  0814   DOSMPA Not Provided 2000 3/7/2021 Not Provided   MPACID 1.33 0.92* 0.68*   MPAG 126.0* 92.4 79.3       Again, thank you for allowing me to participate in the care of your patient.      Sincerely,    Early Post Transplant

## 2021-03-09 NOTE — LETTER
3/9/2021      RE: Ethel Blow  [address]  [place] [address]       Ethel is a 66 year old who is being evaluated via a billable video visit.      How would you like to obtain your AVS? MyChart  If the video visit is dropped, the invitation should be resent by: Send to e-mail at: [email or web address]  Will anyone else be joining your video visit? No    Video Start Time: 0932  Video-Visit Details    Type of service:  Video Visit    Video End Time:1010    Originating Location (pt. Location): Home    Distant Location (provider location):  [facility] [place]     Platform used for Video Visit: [employer]       CHRONIC TRANSPLANT NEPHROLOGY VISIT    Assessment & Plan   # DDKT: Stable kidney function, but patient developed new proteinuria, up to ~ 2.5 grams 2/2020, but now down about ~ 1 gram.  After further discussion with the patient and review of native kidney biopsy, patient does NOT have a known diagnosis of FSGS.  Her biopsy showed 1/4 glomeruli with global glomerulosclerosis and no focal segmental glomerulosclerosis.  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  Will plan to treat proteinuria conservatively at this time with ARB.   - Baseline Creatinine:  ~ 0.8-1.0   - Proteinuria: Moderate (1-3 grams) About 1 gram.   - Date DSA Last Checked: Feb/2021      Latest DSA: No   - BK Viremia: No   - Kidney Tx Biopsy: Mar 03, 2021; Result:  No diagnostic evidence of acute rejection.  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  No interstitial fibrosis or tubular atrophy.             Feb 03,  2021; Result: No diagnostic evidence of acute rejection.  Segmental glomerular basement membrane irregularity with remodeling and lamellation.  Type IV collagen alpha5 and alpha2 staining was normal.  This was felt to be donor derived.    # Immunosuppression: Tacrolimus immediate release (goal 6-8) and Mycophenolate mofetil (dose 1000 mg every 12 hours)          - Continue with intensive monitoring of immunosuppression for efficacy and toxicity.          - Changes: Yes - With new tacrolimus goal now at 6 months post transplant, will decrease tacrolimus dose to 1 mg every 12 hours.    # Infection Prophylaxis:   - PJP: Sulfa/TMP (Bactrim)    # Hypertension: Borderline control;  Goal BP: < 130/80   - Changes: Yes - With both higher BP, underlying diabetes, and proteinuria, will increase losartan to 100 mg daily.    # Diabetes: Borderline control (HbA1c 7-9%) Last HbA1c: 7.9%   - Management as per primary care.    # Mineral Bone Disorder:   - Secondary renal hyperparathyroidism; PTH level: Mildly elevated (151-300 pg/ml)        On treatment: None  - Vitamin D; level: Low        On supplement: No; Not on supplement due to high normal calcium level.  - Calcium; level: High normal        On supplement: No    # Electrolytes:   - Potassium; level: Normal        On supplement: No  - Magnesium; level: Normal        On supplement: Yes  - Bicarbonate; level: Normal        On supplement: No    # Chest Pain/SOB: Not typical for angina symptoms and appears to be more related to volume overload with start of improving symptoms after starting on diuretic yesterday.   - If symptoms persist, would recommend further evaluation, including cardiac stress test.   - Also, if no further improvement in dyspnea on diuretic and with improved BP control, patient would likely need to be seen in closer follow up.    # Night Sweats: Increased frequency lately due to unclear etiology.  Would assess for infection and cancer.   - Will check CMV and  EBV PCR.  Will also check LDH as lymphoma screen.    # MGUS: No monoclonal protein seen on latest SPEP and normal kappa/lambda ratio.   - No follow up testing is necessary unless proteinuria significantly worsens.    # GERD: Occasional symptoms, but mostly controlled on famotidine.    # SADIE on CPAP: Patient is compliant.    # Skin Cancer Risk:    - Discussed sun protection and recommend regular follow up with Dermatology.    # Medical Compliance: Yes     # COVID-19 Virus Review: Discussed COVID-19 virus and the potential medical risks.  Reviewed preventative health recommendations, which includes washing hands for 20 seconds, avoid touching your face, and social distancing.  Asked patient to inform the transplant center if they are exposed or diagnosed with this virus.    # COVID Vaccine Completed: No    # Transplant History:  Etiology of Kidney Failure: Unknown etiology (Native kidney biopsy showed global glomerulosclerosis)  Tx: DDKT  Transplant: 9/3/2020 (Kidney)  Significant changes in immunosuppression: None  Significant transplant-related complications: None    Transplant Office Phone Number: [phone]    Assessment and plan was discussed with the patient and she voiced her understanding and agreement.    Return visit: Return in about 3 months (around 6/9/2021).    Angel Luis Sheridan MD    Chief Complaint   Ms. Thompson is a 66 year old here for kidney transplant and immunosuppression management.    History of Present Illness    Ms. Thompson reports feeling okay overall, but with some medical complaints.  No recent hospitalizations, but she has had some new medical issues.  Her baseline creatinine remains very good at ~ 0.8-1.0, however, she has developed new proteinuria.  At the time of transplant, patient had ~ 2 grams of proteinuria with continued good urine output on PD.  Patient had been initiated on dialysis 6/2019.  Her native kidney disease is a bit unclear after discussing with the patient and in depth  chair review.  She had a history of proteinuria since 1984 from the records.  Patient then had significant preeclampsia during her pregnancy in 1986, although no kidney biopsy was done at that time.  Patient only first starting seeing a Nephrologist in 2017 in [place] and underwent a native kidney biopsy 8/11/17.  The biopsy showed global glomerulosclerosis in 1 out of only 4 glomeruli.  The biopsy otherwise showed severe arteriosclerosis.  There was no formal diagnosis of FSGS, which can be found mistakenly later in her medical records.  It was noted at the time of her native kidney biopsy, that the patient had an atrophic kidney on the right.  She says that her father also was found to have one small kidney, but never required intervention.  Patient then moved to [place] and no further native kidney biopsy was done.  She was initiated on dialysis 6/2019.  No other family history of kidney disease, including no issues in her 3 siblings.  Patient underwent DDKT 9/3/20 from a 3 year old female donor.  Her kidney function improved fairly quickly post transplant to her present baseline in less than a week.  As noted previously, she had ~ 2 grams of proteinuria at time of transplant and this decreased to ~ 0.3 grams on two separate occasions a month and two months after transplant.  However, her urine protein/cr ratio increased to just over 1 gram in January, then with repeat testing was at ~ 1.5 grams.  Patient underwent kidney transplant biopsy 2/3/21 that showed no acute rejection, but had segmental glomerular basement membrane irregularity with remodeling and lamellation.  Type IV collagen alpha5 and alpha2 staining was normal.  It was unclear of the significance of these findings.  Another repeat UPC increased further to as high as ~ 2.5 grams on February 15.  It was felt a second kidney transplant biopsy may further help, especially to determine if this was early FSGS.  Patient underwent a second kidney  transplant biopsy 3/3/21.  This biopsy showed essentially the same findings with segmental glomerular basement membrane irregularity with remodeling and lamellation.  It was felt that these findings could be consistent with X-linked Alport's syndrome, but would be donor derived.  There was no evidence of anti-GBM disease or FSGS, suggesting less likely a primary issue from the patient.    Her energy level has been okay, but not much improvement since transplant.  She is active, although really getting minimal exercise.  Over the last two weeks, patient reports some increasing shortness of breath and increased leg swelling, as well as worse fatigue.  She was started on losartan last week, which she thinks helped her leg swelling a bit, and she was also started on a diuretic yesterday.  Along with the shortness of breath, patient reports occasional chest pressure, although she associates this more with her shortness of breath.  As noted above, her leg swelling is improved, but still there.  No recent episodes of chest pressure since starting the losartan and improvement in leg swelling.  No cough or upper respirator issues.    Her appetite is good and no change in her weight at ~ 132 lbs.  Occasional nausea in the morning, but not much of an issue.  No vomiting or diarrhea.  Occasional heartburn symptoms, but mostly controlled on famotidine.  No fever, sweats or chills.  She does report when ever her tacrolimus dose is increased, she develops tremors and more mouth cold sores, for which she takes Valtrex.    Home BP: 140-150/70-80s    Problem List   Patient Active Problem List   Diagnosis     Elevated serum immunoglobulin free light chain level     Dyslipidemia     Hypothyroidism     SADIE on CPAP     Sensorineural hearing loss (SNHL) of both ears     GERD (gastroesophageal reflux disease)     HTN, kidney transplant related     Hepatitis B core antibody positive     Secondary renal hyperparathyroidism (H)     Memory  deficits     Kidney replaced by transplant     Immunosuppression (H)     Aftercare following organ transplant     Vitamin D deficiency     Elevated random blood glucose level     Diabetes mellitus, type 2 (H)       Allergies   Allergies   Allergen Reactions     Amoxicillin-Pot Clavulanate Nausea and Vomiting       Medications   Current Outpatient Medications   Medication Sig     acetaminophen (TYLENOL) 325 MG tablet Take 2 tablets (650 mg) by mouth every 4 hours as needed for pain     aspirin (ASA) 81 MG EC tablet Take 1 tablet (81 mg) by mouth daily     atorvastatin (LIPITOR) 40 MG tablet Take 0.5 tablets (20 mg) by mouth daily     diclofenac (VOLTAREN) 1 % topical gel Apply 2 g topically 4 times daily     famotidine (PEPCID) 20 MG tablet Take 20 mg by mouth every evening      fish oil-omega-3 fatty acids 1000 MG capsule Take 2 g by mouth daily     fluticasone (FLONASE) 50 MCG/ACT nasal spray Spray 1 spray into both nostrils 2 times daily as needed for rhinitis or allergies     furosemide (LASIX) 20 MG tablet Take 1 tablet (20 mg) by mouth daily     levothyroxine (SYNTHROID/LEVOTHROID) 112 MCG tablet Take 112 mcg (1 tablet) by mouth every Monday, Tuesday is 1/2 tab, Wednesday, Thursday, Friday     Lidocaine (LIDOCARE) 4 % Patch Place 1 patch onto the skin every 24 hours To prevent lidocaine toxicity, patient should be patch free for 12 hrs daily.  Using for back pain     losartan (COZAAR) 100 MG tablet Take 1 tablet (100 mg) by mouth daily     magnesium oxide (MAG-OX) 400 MG tablet Take 1 tablet (400 mg) by mouth daily (with lunch)     melatonin 3 MG tablet Take 6 mg by mouth At Bedtime      mycophenolate (GENERIC EQUIVALENT) 250 MG capsule Take 4 capsules (1,000 mg) by mouth 2 times daily     psyllium (METAMUCIL/KONSYL) 58.6 % powder Take by mouth daily     sulfamethoxazole-trimethoprim (BACTRIM) 400-80 MG tablet Take 1 tablet by mouth daily     tacrolimus (GENERIC EQUIVALENT) 0.5 MG capsule Take 1 capsule (0.5  mg) by mouth 2 times daily Total dose = 1.5 mg BID     tacrolimus (GENERIC EQUIVALENT) 1 MG capsule Take 1 capsule (1 mg) by mouth 2 times daily Total dose = 1.5 mg BID (Patient taking differently: Take 1 mg by mouth 2 times daily Total dose = 1.5 in AM and 1 in PM)     valACYclovir (VALTREX) 500 MG tablet Take 1 tablet (500 mg) by mouth daily     No current facility-administered medications for this visit.      Medications Discontinued During This Encounter   Medication Reason     losartan (COZAAR) 100 MG tablet        Physical Exam   Vital signs were deferred for this telemedicine visit.    GENERAL APPEARANCE: alert and no distress  HENT: no obvious abnormalities on appearance  RESP: breathing appears unremarkable with normal rate, no audible wheezing or cough and no apparent shortness of breath with conversation  MS: extremities normal - no gross deformities noted  SKIN: no apparent rash and normal skin tone  NEURO: speech is clear with no obvious neurological deficits  PSYCH: mentation appears normal and affect normal    Data     Renal Latest Ref Rng & Units 3/22/2021 3/15/2021 3/8/2021   Na 133 - 144 mmol/L 137 136 135   Na (external) 135 - 145 mmol/L - - -   K 3.4 - 5.3 mmol/L 4.6 4.6 4.8   K (external) 3.5 - 5.0 mmol/L - - -   Cl 94 - 109 mmol/L 102 106 104   Cl (external) 98 - 110 mmol/L - - -   CO2 20 - 32 mmol/L 32 25 25   CO2 (external) 21 - 31 mmol/L - - -   BUN 7 - 30 mg/dL 30 34(H) 28   BUN (external) 8 - 25 mg/dL - - -   Cr 0.52 - 1.04 mg/dL 0.94 0.83 0.82   Cr (external) 0.57 - 1.11 mg/dL - - -   Glucose 70 - 99 mg/dL 171(H) 190(H) 176(H)   Glucose (external) 65 - 100 mg/dL - - -   Ca  8.5 - 10.1 mg/dL 9.6 9.9 9.9   Ca (external) 8.5 - 10.5 mg/dL - - -   Mg 1.6 - 2.3 mg/dL - - 1.9   Mg (external) 1.6 - 2.6 mg/dL - - -     Bone Health Latest Ref Rng & Units 3/8/2021 2/1/2021 1/18/2021   Phos 2.5 - 4.5 mg/dL 3.9 2.8 -   Phos (external) 2.3 - 4.7 mg/dL - - -   PTHi 18 - 80 pg/mL - - 151(H)   Vit D Def  20 - 75 ug/L - - -     Heme Latest Ref Rng & Units 3/22/2021 3/15/2021 3/8/2021   WBC 4.0 - 11.0 10e9/L 4.1 5.9 8.1   WBC (external) 4.5 - 11.0 thou/cu mm - - -   Hgb 11.7 - 15.7 g/dL 11.9 12.0 11.9   Hgb (external) 12.0 - 16.0 g/dL - - -   Plt 150 - 450 10e9/L 194 176 196   Plt (external) 140 - 440 thou/cu mm - - -   ABSOLUTE NEUTROPHIL 1.6 - 8.3 10e9/L 1.8 3.7 5.7   ABSOLUTE LYMPHOCYTES 0.8 - 5.3 10e9/L 1.6 1.4 1.6   ABSOLUTE MONOCYTES 0.0 - 1.3 10e9/L 0.5 0.5 0.5   ABSOLUTE EOSINOPHILS 0.0 - 0.7 10e9/L 0.2 0.2 0.2   ABSOLUTE BASOPHILS 0.0 - 0.2 10e9/L 0.0 0.1 0.1   ABS IMMATURE GRANULOCYTES 0 - 0.4 10e9/L - - -   ABSOLUTE NUCLEATED RBC - - - -     Liver Latest Ref Rng & Units 3/1/2021 1/25/2021 9/9/2020   AP 40 - 150 U/L 195(H) 189(H) -   TBili 0.2 - 1.3 mg/dL 0.6 0.5 -   DBili 0.0 - 0.2 mg/dL 0.1 - -   ALT 0 - 50 U/L 42 37 -   AST 0 - 45 U/L 26 24 -   Tot Protein 6.8 - 8.8 g/dL 7.0 6.9 -   Albumin 3.4 - 5.0 g/dL 3.7 3.8 2.9(L)     Pancreas Latest Ref Rng & Units 3/1/2021 1/25/2021 9/3/2020   A1C 0 - 5.6 % 7.9(H) 7.3(H) 5.4     Iron studies Latest Ref Rng & Units 9/9/2020   Iron 35 - 180 ug/dL 162   Iron sat 15 - 46 % 92(H)   Ferritin 8 - 252 ng/mL 1,757(H)     [facility] Txp Virology Latest Ref Rng & Units 3/15/2021 1/25/2021 12/14/2020   CVM DNA Quant - - EDTA PLASMA -   CMV QUANT IU/ML CMVND:CMV DNA Not Detected [IU]/mL - CMV DNA Not Detected -   LOG IU/ML OF CMVQNT <2.1 [Log:IU]/mL - Not Calculated -   BK Spec - Plasma Plasma Plasma   BK Res BKNEG:BK Virus DNA Not Detected copies/mL BK Virus DNA Not Detected BK Virus DNA Not Detected BK Virus DNA Not Detected   BK Log <2.7 Log copies/mL Not Calculated Not Calculated Not Calculated   BK Quant Log Ext - - - -   BK Quant Result Ext Negative copies/mL - - -   BK Quant Spec Ext - - - -   EBV CAPSID ANTIBODY IGG 0.0 - 0.8 AI - - -   EBV DNA COPIES/ML EBVNEG:EBV DNA Not Detected [Copies]/mL - EBV DNA Not Detected -   EBV DNA LOG OF COPIES <2.7 [Log:copies]/mL - Not  Calculated -   Hep B Core NR:Nonreactive - - -        Recent Labs   Lab Test 03/08/21  0839 03/15/21  0815 03/22/21  0819   DOSTAC 2000 3/7/21 3/14/2021 20:00. 3/21/21 2000   TACROL 8.5 5.9 5.5     Recent Labs   Lab Test 03/01/21  0806 03/08/21  0852 03/15/21  0814   DOSMPA Not Provided 2000 3/7/2021 Not Provided   MPACID 1.33 0.92* 0.68*   MPAG 126.0* 92.4 79.3       Angel Luis Sheridan MD

## 2021-03-10 ENCOUNTER — TELEPHONE (OUTPATIENT)
Dept: TRANSPLANT | Facility: CLINIC | Age: 66
End: 2021-03-10

## 2021-03-10 NOTE — TELEPHONE ENCOUNTER
Post Kidney and Pancreas Transplant Team Conference  Date: 3/10/2021  Transplant Coordinator: Tiffany Paul     Attendees:  [x]  Dr. Sheridan  [x] Bridget Marquis LPN     [x]  Dr. Sifuentes [x] Tiffany Paul RN [] Alyssa Parisi LPN   [x]  Dr. Dewitt [x] Nette Valadez, SUN    [x]  Dr. Wilson [] Ade Marx RN [] Patrick Mireles, PharmD   [x] Dr. Awad [x] Hui Crowley, USN    [] Dr. Ribeiro [] Cesar Max RN    [x] Dr. Alonzo [] Jennie Dela Cruz RN    [] Dr. Phoenix [] Janett Smith RN    []  Dr. Bansal [] Aleja Francis, SUN    [] Surgery Fellow [x] Crissy Pulido RN    [] Monika Tinoco, NP [] Laura Romero RN        Verbal Plan Read Back:   No changes at this time    Routed to RN Coordinator   Bridget Marquis LPN

## 2021-03-12 ENCOUNTER — MYC MEDICAL ADVICE (OUTPATIENT)
Dept: NEPHROLOGY | Facility: CLINIC | Age: 66
End: 2021-03-12

## 2021-03-15 DIAGNOSIS — Z48.298 AFTERCARE FOLLOWING ORGAN TRANSPLANT: ICD-10-CM

## 2021-03-15 DIAGNOSIS — Z79.899 ENCOUNTER FOR LONG-TERM CURRENT USE OF MEDICATION: ICD-10-CM

## 2021-03-15 DIAGNOSIS — Z94.0 KIDNEY REPLACED BY TRANSPLANT: ICD-10-CM

## 2021-03-15 LAB
ANION GAP SERPL CALCULATED.3IONS-SCNC: 5 MMOL/L (ref 3–14)
BASOPHILS # BLD AUTO: 0.1 10E9/L (ref 0–0.2)
BASOPHILS NFR BLD AUTO: 1 %
BUN SERPL-MCNC: 34 MG/DL (ref 7–30)
CALCIUM SERPL-MCNC: 9.9 MG/DL (ref 8.5–10.1)
CHLORIDE SERPL-SCNC: 106 MMOL/L (ref 94–109)
CO2 SERPL-SCNC: 25 MMOL/L (ref 20–32)
CREAT SERPL-MCNC: 0.83 MG/DL (ref 0.52–1.04)
CREAT UR-MCNC: 70 MG/DL
DIFFERENTIAL METHOD BLD: NORMAL
EOSINOPHIL # BLD AUTO: 0.2 10E9/L (ref 0–0.7)
EOSINOPHIL NFR BLD AUTO: 3 %
ERYTHROCYTE [DISTWIDTH] IN BLOOD BY AUTOMATED COUNT: 13 % (ref 10–15)
GFR SERPL CREATININE-BSD FRML MDRD: 73 ML/MIN/{1.73_M2}
GLUCOSE SERPL-MCNC: 190 MG/DL (ref 70–99)
HCT VFR BLD AUTO: 37.4 % (ref 35–47)
HGB BLD-MCNC: 12 G/DL (ref 11.7–15.7)
LYMPHOCYTES # BLD AUTO: 1.4 10E9/L (ref 0.8–5.3)
LYMPHOCYTES NFR BLD AUTO: 24 %
MCH RBC QN AUTO: 31.5 PG (ref 26.5–33)
MCHC RBC AUTO-ENTMCNC: 32.1 G/DL (ref 31.5–36.5)
MCV RBC AUTO: 98 FL (ref 78–100)
MONOCYTES # BLD AUTO: 0.5 10E9/L (ref 0–1.3)
MONOCYTES NFR BLD AUTO: 8 %
MYCOPHENOLATE SERPL LC/MS/MS-MCNC: 0.68 MG/L (ref 1–3.5)
MYCOPHENOLATE-G SERPL LC/MS/MS-MCNC: 79.3 MG/L (ref 30–95)
NEUTROPHILS # BLD AUTO: 3.7 10E9/L (ref 1.6–8.3)
NEUTROPHILS NFR BLD AUTO: 64 %
PLATELET # BLD AUTO: 176 10E9/L (ref 150–450)
PLATELET # BLD EST: NORMAL 10*3/UL
POTASSIUM SERPL-SCNC: 4.6 MMOL/L (ref 3.4–5.3)
PROT UR-MCNC: 0.51 G/L
PROT/CREAT 24H UR: 0.72 G/G CR (ref 0–0.2)
RBC # BLD AUTO: 3.81 10E12/L (ref 3.8–5.2)
RBC MORPH BLD: NORMAL
SODIUM SERPL-SCNC: 136 MMOL/L (ref 133–144)
TACROLIMUS BLD-MCNC: 5.9 UG/L (ref 5–15)
TME LAST DOSE: ABNORMAL H
TME LAST DOSE: NORMAL H
VARIANT LYMPHS BLD QL SMEAR: PRESENT
WBC # BLD AUTO: 5.9 10E9/L (ref 4–11)

## 2021-03-15 PROCEDURE — 85025 COMPLETE CBC W/AUTO DIFF WBC: CPT | Performed by: INTERNAL MEDICINE

## 2021-03-15 PROCEDURE — 80197 ASSAY OF TACROLIMUS: CPT | Performed by: INTERNAL MEDICINE

## 2021-03-15 PROCEDURE — 80180 DRUG SCRN QUAN MYCOPHENOLATE: CPT | Performed by: INTERNAL MEDICINE

## 2021-03-15 PROCEDURE — 36415 COLL VENOUS BLD VENIPUNCTURE: CPT | Performed by: INTERNAL MEDICINE

## 2021-03-15 PROCEDURE — 87799 DETECT AGENT NOS DNA QUANT: CPT | Performed by: INTERNAL MEDICINE

## 2021-03-15 PROCEDURE — 84156 ASSAY OF PROTEIN URINE: CPT | Performed by: INTERNAL MEDICINE

## 2021-03-15 PROCEDURE — 80048 BASIC METABOLIC PNL TOTAL CA: CPT | Performed by: INTERNAL MEDICINE

## 2021-03-15 NOTE — TELEPHONE ENCOUNTER
Called reviewed may receive COVID-19 vaccination  191 days post kidney transplant

## 2021-03-16 ENCOUNTER — TELEPHONE (OUTPATIENT)
Dept: TRANSPLANT | Facility: CLINIC | Age: 66
End: 2021-03-16

## 2021-03-17 ENCOUNTER — IMMUNIZATION (OUTPATIENT)
Dept: PEDIATRICS | Facility: CLINIC | Age: 66
End: 2021-03-17
Payer: MEDICARE

## 2021-03-17 PROCEDURE — 0001A PR COVID VAC PFIZER DIL RECON 30 MCG/0.3 ML IM: CPT

## 2021-03-17 PROCEDURE — 91300 PR COVID VAC PFIZER DIL RECON 30 MCG/0.3 ML IM: CPT

## 2021-03-22 ENCOUNTER — TELEPHONE (OUTPATIENT)
Dept: TRANSPLANT | Facility: CLINIC | Age: 66
End: 2021-03-22

## 2021-03-22 DIAGNOSIS — Z94.0 KIDNEY TRANSPLANTED: Primary | ICD-10-CM

## 2021-03-22 DIAGNOSIS — Z94.0 KIDNEY REPLACED BY TRANSPLANT: ICD-10-CM

## 2021-03-22 DIAGNOSIS — Z94.0 KIDNEY TRANSPLANTED: ICD-10-CM

## 2021-03-22 LAB
ANION GAP SERPL CALCULATED.3IONS-SCNC: 3 MMOL/L (ref 3–14)
BASOPHILS # BLD AUTO: 0 10E9/L (ref 0–0.2)
BASOPHILS NFR BLD AUTO: 1 %
BUN SERPL-MCNC: 30 MG/DL (ref 7–30)
CALCIUM SERPL-MCNC: 9.6 MG/DL (ref 8.5–10.1)
CHLORIDE SERPL-SCNC: 102 MMOL/L (ref 94–109)
CO2 SERPL-SCNC: 32 MMOL/L (ref 20–32)
CREAT SERPL-MCNC: 0.94 MG/DL (ref 0.52–1.04)
CREAT UR-MCNC: 49 MG/DL
DIFFERENTIAL METHOD BLD: ABNORMAL
EOSINOPHIL # BLD AUTO: 0.2 10E9/L (ref 0–0.7)
EOSINOPHIL NFR BLD AUTO: 6 %
ERYTHROCYTE [DISTWIDTH] IN BLOOD BY AUTOMATED COUNT: 12.8 % (ref 10–15)
GFR SERPL CREATININE-BSD FRML MDRD: 63 ML/MIN/{1.73_M2}
GLUCOSE SERPL-MCNC: 171 MG/DL (ref 70–99)
HCT VFR BLD AUTO: 36.9 % (ref 35–47)
HGB BLD-MCNC: 11.9 G/DL (ref 11.7–15.7)
LYMPHOCYTES # BLD AUTO: 1.6 10E9/L (ref 0.8–5.3)
LYMPHOCYTES NFR BLD AUTO: 40 %
MCH RBC QN AUTO: 31.6 PG (ref 26.5–33)
MCHC RBC AUTO-ENTMCNC: 32.2 G/DL (ref 31.5–36.5)
MCV RBC AUTO: 98 FL (ref 78–100)
MONOCYTES # BLD AUTO: 0.5 10E9/L (ref 0–1.3)
MONOCYTES NFR BLD AUTO: 12 %
NEUTROPHILS # BLD AUTO: 1.8 10E9/L (ref 1.6–8.3)
NEUTROPHILS NFR BLD AUTO: 41 %
PLATELET # BLD AUTO: 194 10E9/L (ref 150–450)
PLATELET # BLD EST: ABNORMAL 10*3/UL
POTASSIUM SERPL-SCNC: 4.6 MMOL/L (ref 3.4–5.3)
PROT UR-MCNC: 0.62 G/L
PROT/CREAT 24H UR: 1.26 G/G CR (ref 0–0.2)
RBC # BLD AUTO: 3.77 10E12/L (ref 3.8–5.2)
RBC MORPH BLD: NORMAL
SODIUM SERPL-SCNC: 137 MMOL/L (ref 133–144)
TACROLIMUS BLD-MCNC: 5.5 UG/L (ref 5–15)
TME LAST DOSE: NORMAL H
VARIANT LYMPHS BLD QL SMEAR: PRESENT
WBC # BLD AUTO: 4.1 10E9/L (ref 4–11)

## 2021-03-22 PROCEDURE — 80048 BASIC METABOLIC PNL TOTAL CA: CPT | Performed by: INTERNAL MEDICINE

## 2021-03-22 PROCEDURE — 80197 ASSAY OF TACROLIMUS: CPT | Performed by: INTERNAL MEDICINE

## 2021-03-22 PROCEDURE — 36415 COLL VENOUS BLD VENIPUNCTURE: CPT | Performed by: INTERNAL MEDICINE

## 2021-03-22 PROCEDURE — 85025 COMPLETE CBC W/AUTO DIFF WBC: CPT | Performed by: INTERNAL MEDICINE

## 2021-03-22 PROCEDURE — 84156 ASSAY OF PROTEIN URINE: CPT | Performed by: INTERNAL MEDICINE

## 2021-03-22 PROCEDURE — 80180 DRUG SCRN QUAN MYCOPHENOLATE: CPT | Performed by: INTERNAL MEDICINE

## 2021-03-22 NOTE — TELEPHONE ENCOUNTER
Patient Call: Voicemail  Date/Time: [id number] 10:46 am  Reason for call: Reshma Felix calling from [facility], needs clarification on lab test re: myphenolic acid ordered, thinks it might need to be lab 3628.  Please call to confirm at [phone].

## 2021-03-23 ENCOUNTER — TELEPHONE (OUTPATIENT)
Dept: TRANSPLANT | Facility: CLINIC | Age: 66
End: 2021-03-23

## 2021-03-23 DIAGNOSIS — Z94.0 KIDNEY TRANSPLANTED: Primary | ICD-10-CM

## 2021-03-23 LAB
MYCOPHENOLATE SERPL LC/MS/MS-MCNC: 1.55 MG/L (ref 1–3.5)
MYCOPHENOLATE-G SERPL LC/MS/MS-MCNC: 101.9 MG/L (ref 30–95)
TME LAST DOSE: ABNORMAL H

## 2021-03-23 NOTE — TELEPHONE ENCOUNTER
Angel Luis Sheridan MD Huepfel, Mary K, RN             If patient is still having night sweats, please check CMV and EBV PCR.     She also complained about leg swelling and shortness of breath.  I would like to make sure that is improving and if not, she may need to be seen earlier.     See long clinic note.

## 2021-03-29 ENCOUNTER — TELEPHONE (OUTPATIENT)
Dept: TRANSPLANT | Facility: CLINIC | Age: 66
End: 2021-03-29

## 2021-03-29 DIAGNOSIS — Z94.0 KIDNEY REPLACED BY TRANSPLANT: ICD-10-CM

## 2021-03-29 DIAGNOSIS — Z94.0 KIDNEY TRANSPLANTED: ICD-10-CM

## 2021-03-29 DIAGNOSIS — Z94.0 KIDNEY TRANSPLANTED: Primary | ICD-10-CM

## 2021-03-29 LAB
ANION GAP SERPL CALCULATED.3IONS-SCNC: 5 MMOL/L (ref 3–14)
BUN SERPL-MCNC: 33 MG/DL (ref 7–30)
CALCIUM SERPL-MCNC: 10.1 MG/DL (ref 8.5–10.1)
CHLORIDE SERPL-SCNC: 103 MMOL/L (ref 94–109)
CO2 SERPL-SCNC: 27 MMOL/L (ref 20–32)
CREAT SERPL-MCNC: 0.82 MG/DL (ref 0.52–1.04)
CREAT UR-MCNC: 92 MG/DL
DIFFERENTIAL METHOD BLD: NORMAL
EOSINOPHIL # BLD AUTO: 0.2 10E9/L (ref 0–0.7)
EOSINOPHIL NFR BLD AUTO: 4 %
ERYTHROCYTE [DISTWIDTH] IN BLOOD BY AUTOMATED COUNT: 12.9 % (ref 10–15)
GFR SERPL CREATININE-BSD FRML MDRD: 74 ML/MIN/{1.73_M2}
GLUCOSE SERPL-MCNC: 206 MG/DL (ref 70–99)
HCT VFR BLD AUTO: 37.2 % (ref 35–47)
HGB BLD-MCNC: 12.1 G/DL (ref 11.7–15.7)
LYMPHOCYTES # BLD AUTO: 2 10E9/L (ref 0.8–5.3)
LYMPHOCYTES NFR BLD AUTO: 37 %
MCH RBC QN AUTO: 31.3 PG (ref 26.5–33)
MCHC RBC AUTO-ENTMCNC: 32.5 G/DL (ref 31.5–36.5)
MCV RBC AUTO: 96 FL (ref 78–100)
MONOCYTES # BLD AUTO: 0.4 10E9/L (ref 0–1.3)
MONOCYTES NFR BLD AUTO: 8 %
NEUTROPHILS # BLD AUTO: 2.9 10E9/L (ref 1.6–8.3)
NEUTROPHILS NFR BLD AUTO: 51 %
PLATELET # BLD AUTO: 204 10E9/L (ref 150–450)
PLATELET # BLD EST: NORMAL 10*3/UL
POTASSIUM SERPL-SCNC: 4.5 MMOL/L (ref 3.4–5.3)
PROT UR-MCNC: 0.41 G/L
PROT/CREAT 24H UR: 0.44 G/G CR (ref 0–0.2)
RBC # BLD AUTO: 3.86 10E12/L (ref 3.8–5.2)
RBC MORPH BLD: NORMAL
SODIUM SERPL-SCNC: 135 MMOL/L (ref 133–144)
TACROLIMUS BLD-MCNC: 7.1 UG/L (ref 5–15)
TME LAST DOSE: 2000 H
VARIANT LYMPHS BLD QL SMEAR: PRESENT
WBC # BLD AUTO: 5.5 10E9/L (ref 4–11)

## 2021-03-29 PROCEDURE — 85025 COMPLETE CBC W/AUTO DIFF WBC: CPT | Performed by: INTERNAL MEDICINE

## 2021-03-29 PROCEDURE — 87799 DETECT AGENT NOS DNA QUANT: CPT | Performed by: INTERNAL MEDICINE

## 2021-03-29 PROCEDURE — 36415 COLL VENOUS BLD VENIPUNCTURE: CPT | Performed by: INTERNAL MEDICINE

## 2021-03-29 PROCEDURE — 80180 DRUG SCRN QUAN MYCOPHENOLATE: CPT | Performed by: INTERNAL MEDICINE

## 2021-03-29 PROCEDURE — 84156 ASSAY OF PROTEIN URINE: CPT | Performed by: INTERNAL MEDICINE

## 2021-03-29 PROCEDURE — 80197 ASSAY OF TACROLIMUS: CPT | Performed by: INTERNAL MEDICINE

## 2021-03-29 PROCEDURE — 80048 BASIC METABOLIC PNL TOTAL CA: CPT | Performed by: INTERNAL MEDICINE

## 2021-03-29 NOTE — TELEPHONE ENCOUNTER
Called  Ethel Thompson /Maxim     Reports swelling in face ,legs   has decreased  Overall SOB  Has decreased - overall activities have increased     Still complains of night sweats ,denies fevers, no changes in appetite     Lab orders placed EBV and CMV  PCR QT

## 2021-03-29 NOTE — TELEPHONE ENCOUNTER
If patient is still having night sweats, please check CMV and EBV PCR.     She also complained about leg swelling and shortness of breath.  I would like to make sure that is improving and if not, she may need to be seen earlier.

## 2021-03-30 LAB
CMV DNA SPEC NAA+PROBE-ACNC: <137 [IU]/ML
CMV DNA SPEC NAA+PROBE-LOG#: <2.1 {LOG_IU}/ML
EBV DNA # SPEC NAA+PROBE: NORMAL {COPIES}/ML
EBV DNA SPEC NAA+PROBE-LOG#: NORMAL {LOG_COPIES}/ML
MYCOPHENOLATE SERPL LC/MS/MS-MCNC: 0.85 MG/L (ref 1–3.5)
MYCOPHENOLATE-G SERPL LC/MS/MS-MCNC: 44.4 MG/L (ref 30–95)
SPECIMEN SOURCE: ABNORMAL
TME LAST DOSE: 2000 H

## 2021-04-05 DIAGNOSIS — Z94.0 KIDNEY TRANSPLANTED: ICD-10-CM

## 2021-04-05 DIAGNOSIS — Z94.0 KIDNEY REPLACED BY TRANSPLANT: ICD-10-CM

## 2021-04-05 LAB
ANION GAP SERPL CALCULATED.3IONS-SCNC: 4 MMOL/L (ref 3–14)
BASOPHILS # BLD AUTO: 0 10E9/L (ref 0–0.2)
BASOPHILS NFR BLD AUTO: 0.4 %
BUN SERPL-MCNC: 36 MG/DL (ref 7–30)
CALCIUM SERPL-MCNC: 9.9 MG/DL (ref 8.5–10.1)
CHLORIDE SERPL-SCNC: 108 MMOL/L (ref 94–109)
CMV DNA SPEC NAA+PROBE-ACNC: NORMAL [IU]/ML
CMV DNA SPEC NAA+PROBE-LOG#: NORMAL {LOG_IU}/ML
CO2 SERPL-SCNC: 26 MMOL/L (ref 20–32)
CREAT SERPL-MCNC: 0.87 MG/DL (ref 0.52–1.04)
CREAT UR-MCNC: 106 MG/DL
DIFFERENTIAL METHOD BLD: ABNORMAL
EOSINOPHIL # BLD AUTO: 0.2 10E9/L (ref 0–0.7)
EOSINOPHIL NFR BLD AUTO: 3.5 %
ERYTHROCYTE [DISTWIDTH] IN BLOOD BY AUTOMATED COUNT: 13.3 % (ref 10–15)
GFR SERPL CREATININE-BSD FRML MDRD: 69 ML/MIN/{1.73_M2}
GLUCOSE SERPL-MCNC: 194 MG/DL (ref 70–99)
HCT VFR BLD AUTO: 36.2 % (ref 35–47)
HGB BLD-MCNC: 11.6 G/DL (ref 11.7–15.7)
LYMPHOCYTES # BLD AUTO: 2 10E9/L (ref 0.8–5.3)
LYMPHOCYTES NFR BLD AUTO: 37.3 %
MCH RBC QN AUTO: 31.5 PG (ref 26.5–33)
MCHC RBC AUTO-ENTMCNC: 32 G/DL (ref 31.5–36.5)
MCV RBC AUTO: 98 FL (ref 78–100)
MONOCYTES # BLD AUTO: 0.5 10E9/L (ref 0–1.3)
MONOCYTES NFR BLD AUTO: 8.5 %
MYCOPHENOLATE SERPL LC/MS/MS-MCNC: 0.6 MG/L (ref 1–3.5)
MYCOPHENOLATE-G SERPL LC/MS/MS-MCNC: 78.5 MG/L (ref 30–95)
NEUTROPHILS # BLD AUTO: 2.7 10E9/L (ref 1.6–8.3)
NEUTROPHILS NFR BLD AUTO: 50.3 %
PLATELET # BLD AUTO: 199 10E9/L (ref 150–450)
POTASSIUM SERPL-SCNC: 4.7 MMOL/L (ref 3.4–5.3)
PROT UR-MCNC: 0.44 G/L
PROT/CREAT 24H UR: 0.42 G/G CR (ref 0–0.2)
RBC # BLD AUTO: 3.68 10E12/L (ref 3.8–5.2)
SODIUM SERPL-SCNC: 138 MMOL/L (ref 133–144)
SPECIMEN SOURCE: NORMAL
TACROLIMUS BLD-MCNC: 7.6 UG/L (ref 5–15)
TME LAST DOSE: ABNORMAL H
TME LAST DOSE: NORMAL H
WBC # BLD AUTO: 5.4 10E9/L (ref 4–11)

## 2021-04-05 PROCEDURE — 84156 ASSAY OF PROTEIN URINE: CPT | Performed by: INTERNAL MEDICINE

## 2021-04-05 PROCEDURE — 85025 COMPLETE CBC W/AUTO DIFF WBC: CPT | Performed by: INTERNAL MEDICINE

## 2021-04-05 PROCEDURE — 80180 DRUG SCRN QUAN MYCOPHENOLATE: CPT | Performed by: INTERNAL MEDICINE

## 2021-04-05 PROCEDURE — 80048 BASIC METABOLIC PNL TOTAL CA: CPT | Performed by: INTERNAL MEDICINE

## 2021-04-05 PROCEDURE — 36415 COLL VENOUS BLD VENIPUNCTURE: CPT | Performed by: INTERNAL MEDICINE

## 2021-04-05 PROCEDURE — 80197 ASSAY OF TACROLIMUS: CPT | Performed by: INTERNAL MEDICINE

## 2021-04-06 ENCOUNTER — MYC MEDICAL ADVICE (OUTPATIENT)
Dept: TRANSPLANT | Facility: CLINIC | Age: 66
End: 2021-04-06

## 2021-04-06 DIAGNOSIS — Z94.0 KIDNEY REPLACED BY TRANSPLANT: Primary | ICD-10-CM

## 2021-04-06 RX ORDER — MYCOPHENOLATE MOFETIL 250 MG/1
1250 CAPSULE ORAL 2 TIMES DAILY
Qty: 300 CAPSULE | Refills: 11 | Status: SHIPPED | OUTPATIENT
Start: 2021-04-06 | End: 2022-01-04

## 2021-04-06 NOTE — TELEPHONE ENCOUNTER
Carter Wilson MD Huepfel, Tiffany MOY RN             As CMV now undetected, please increase MMF to 1250mg bid and recheck MPA level, thanks      Task      Please call  Follow up with Maxim and  Ethel regarding Dr Carter Wilson  Medication changes

## 2021-04-06 NOTE — TELEPHONE ENCOUNTER
Avantha message sent to patient regarding:  As CMV now undetected, please increase MMF to 1250mg bid and recheck MPA level,

## 2021-04-07 ENCOUNTER — IMMUNIZATION (OUTPATIENT)
Dept: PEDIATRICS | Facility: CLINIC | Age: 66
End: 2021-04-07
Attending: INTERNAL MEDICINE
Payer: MEDICARE

## 2021-04-07 PROCEDURE — 91300 PR COVID VAC PFIZER DIL RECON 30 MCG/0.3 ML IM: CPT

## 2021-04-07 PROCEDURE — 0002A PR COVID VAC PFIZER DIL RECON 30 MCG/0.3 ML IM: CPT

## 2021-04-11 ENCOUNTER — HEALTH MAINTENANCE LETTER (OUTPATIENT)
Age: 66
End: 2021-04-11

## 2021-04-12 ENCOUNTER — RESULTS ONLY (OUTPATIENT)
Dept: OTHER | Facility: CLINIC | Age: 66
End: 2021-04-12

## 2021-04-12 DIAGNOSIS — Z94.0 KIDNEY TRANSPLANTED: ICD-10-CM

## 2021-04-12 DIAGNOSIS — Z48.298 AFTERCARE FOLLOWING ORGAN TRANSPLANT: ICD-10-CM

## 2021-04-12 DIAGNOSIS — Z79.899 ENCOUNTER FOR LONG-TERM CURRENT USE OF MEDICATION: ICD-10-CM

## 2021-04-12 DIAGNOSIS — Z94.0 KIDNEY REPLACED BY TRANSPLANT: ICD-10-CM

## 2021-04-12 LAB
ANION GAP SERPL CALCULATED.3IONS-SCNC: 5 MMOL/L (ref 3–14)
BASOPHILS # BLD AUTO: 0 10E9/L (ref 0–0.2)
BASOPHILS NFR BLD AUTO: 0.4 %
BUN SERPL-MCNC: 32 MG/DL (ref 7–30)
CALCIUM SERPL-MCNC: 9.8 MG/DL (ref 8.5–10.1)
CHLORIDE SERPL-SCNC: 107 MMOL/L (ref 94–109)
CMV DNA SPEC NAA+PROBE-ACNC: NORMAL [IU]/ML
CMV DNA SPEC NAA+PROBE-LOG#: NORMAL {LOG_IU}/ML
CO2 SERPL-SCNC: 24 MMOL/L (ref 20–32)
CREAT SERPL-MCNC: 0.85 MG/DL (ref 0.52–1.04)
CREAT UR-MCNC: 97 MG/DL
DIFFERENTIAL METHOD BLD: ABNORMAL
EOSINOPHIL # BLD AUTO: 0.3 10E9/L (ref 0–0.7)
EOSINOPHIL NFR BLD AUTO: 4.7 %
ERYTHROCYTE [DISTWIDTH] IN BLOOD BY AUTOMATED COUNT: 13.2 % (ref 10–15)
GFR SERPL CREATININE-BSD FRML MDRD: 71 ML/MIN/{1.73_M2}
GLUCOSE SERPL-MCNC: 169 MG/DL (ref 70–99)
HCT VFR BLD AUTO: 36.1 % (ref 35–47)
HGB BLD-MCNC: 11.6 G/DL (ref 11.7–15.7)
LYMPHOCYTES # BLD AUTO: 2 10E9/L (ref 0.8–5.3)
LYMPHOCYTES NFR BLD AUTO: 36.8 %
MCH RBC QN AUTO: 31.2 PG (ref 26.5–33)
MCHC RBC AUTO-ENTMCNC: 32.1 G/DL (ref 31.5–36.5)
MCV RBC AUTO: 97 FL (ref 78–100)
MONOCYTES # BLD AUTO: 0.4 10E9/L (ref 0–1.3)
MONOCYTES NFR BLD AUTO: 7.9 %
MYCOPHENOLATE SERPL LC/MS/MS-MCNC: 1.19 MG/L (ref 1–3.5)
MYCOPHENOLATE-G SERPL LC/MS/MS-MCNC: 91 MG/L (ref 30–95)
NEUTROPHILS # BLD AUTO: 2.7 10E9/L (ref 1.6–8.3)
NEUTROPHILS NFR BLD AUTO: 50.2 %
PLATELET # BLD AUTO: 203 10E9/L (ref 150–450)
POTASSIUM SERPL-SCNC: 4.5 MMOL/L (ref 3.4–5.3)
PROT UR-MCNC: 0.35 G/L
PROT/CREAT 24H UR: 0.36 G/G CR (ref 0–0.2)
RBC # BLD AUTO: 3.72 10E12/L (ref 3.8–5.2)
SODIUM SERPL-SCNC: 136 MMOL/L (ref 133–144)
SPECIMEN SOURCE: NORMAL
TACROLIMUS BLD-MCNC: 6.1 UG/L (ref 5–15)
TME LAST DOSE: NORMAL H
TME LAST DOSE: NORMAL H
WBC # BLD AUTO: 5.3 10E9/L (ref 4–11)

## 2021-04-12 PROCEDURE — 36415 COLL VENOUS BLD VENIPUNCTURE: CPT | Performed by: INTERNAL MEDICINE

## 2021-04-12 PROCEDURE — 85025 COMPLETE CBC W/AUTO DIFF WBC: CPT | Performed by: INTERNAL MEDICINE

## 2021-04-12 PROCEDURE — 84156 ASSAY OF PROTEIN URINE: CPT | Performed by: INTERNAL MEDICINE

## 2021-04-12 PROCEDURE — 80180 DRUG SCRN QUAN MYCOPHENOLATE: CPT | Performed by: INTERNAL MEDICINE

## 2021-04-12 PROCEDURE — 80197 ASSAY OF TACROLIMUS: CPT | Performed by: INTERNAL MEDICINE

## 2021-04-12 PROCEDURE — 86832 HLA CLASS I HIGH DEFIN QUAL: CPT | Performed by: INTERNAL MEDICINE

## 2021-04-12 PROCEDURE — 80048 BASIC METABOLIC PNL TOTAL CA: CPT | Performed by: INTERNAL MEDICINE

## 2021-04-12 PROCEDURE — 86833 HLA CLASS II HIGH DEFIN QUAL: CPT | Mod: 59 | Performed by: INTERNAL MEDICINE

## 2021-04-19 DIAGNOSIS — Z94.0 KIDNEY REPLACED BY TRANSPLANT: ICD-10-CM

## 2021-04-19 DIAGNOSIS — Z48.298 AFTERCARE FOLLOWING ORGAN TRANSPLANT: ICD-10-CM

## 2021-04-19 DIAGNOSIS — Z94.0 KIDNEY TRANSPLANTED: ICD-10-CM

## 2021-04-19 DIAGNOSIS — Z79.899 ENCOUNTER FOR LONG-TERM CURRENT USE OF MEDICATION: ICD-10-CM

## 2021-04-19 LAB
ANION GAP SERPL CALCULATED.3IONS-SCNC: 3 MMOL/L (ref 3–14)
BASOPHILS # BLD AUTO: 0 10E9/L (ref 0–0.2)
BASOPHILS NFR BLD AUTO: 0.4 %
BUN SERPL-MCNC: 33 MG/DL (ref 7–30)
CALCIUM SERPL-MCNC: 9.8 MG/DL (ref 8.5–10.1)
CHLORIDE SERPL-SCNC: 109 MMOL/L (ref 94–109)
CO2 SERPL-SCNC: 27 MMOL/L (ref 20–32)
CREAT SERPL-MCNC: 0.86 MG/DL (ref 0.52–1.04)
CREAT UR-MCNC: 84 MG/DL
DIFFERENTIAL METHOD BLD: ABNORMAL
EOSINOPHIL # BLD AUTO: 0.2 10E9/L (ref 0–0.7)
EOSINOPHIL NFR BLD AUTO: 3.9 %
ERYTHROCYTE [DISTWIDTH] IN BLOOD BY AUTOMATED COUNT: 13.3 % (ref 10–15)
GFR SERPL CREATININE-BSD FRML MDRD: 70 ML/MIN/{1.73_M2}
GLUCOSE SERPL-MCNC: 159 MG/DL (ref 70–99)
HCT VFR BLD AUTO: 35.6 % (ref 35–47)
HGB BLD-MCNC: 11.4 G/DL (ref 11.7–15.7)
LYMPHOCYTES # BLD AUTO: 1.7 10E9/L (ref 0.8–5.3)
LYMPHOCYTES NFR BLD AUTO: 37 %
MAGNESIUM SERPL-MCNC: 2 MG/DL (ref 1.6–2.3)
MCH RBC QN AUTO: 31.4 PG (ref 26.5–33)
MCHC RBC AUTO-ENTMCNC: 32 G/DL (ref 31.5–36.5)
MCV RBC AUTO: 98 FL (ref 78–100)
MONOCYTES # BLD AUTO: 0.4 10E9/L (ref 0–1.3)
MONOCYTES NFR BLD AUTO: 7.7 %
MYCOPHENOLATE SERPL LC/MS/MS-MCNC: 1.57 MG/L (ref 1–3.5)
MYCOPHENOLATE-G SERPL LC/MS/MS-MCNC: 94.7 MG/L (ref 30–95)
NEUTROPHILS # BLD AUTO: 2.4 10E9/L (ref 1.6–8.3)
NEUTROPHILS NFR BLD AUTO: 51 %
PHOSPHATE SERPL-MCNC: 3.5 MG/DL (ref 2.5–4.5)
PLATELET # BLD AUTO: 202 10E9/L (ref 150–450)
POTASSIUM SERPL-SCNC: 4.6 MMOL/L (ref 3.4–5.3)
PROT UR-MCNC: 0.43 G/L
PROT/CREAT 24H UR: 0.51 G/G CR (ref 0–0.2)
RBC # BLD AUTO: 3.63 10E12/L (ref 3.8–5.2)
SODIUM SERPL-SCNC: 139 MMOL/L (ref 133–144)
TACROLIMUS BLD-MCNC: 5.4 UG/L (ref 5–15)
TME LAST DOSE: NORMAL H
TME LAST DOSE: NORMAL H
WBC # BLD AUTO: 4.7 10E9/L (ref 4–11)

## 2021-04-19 PROCEDURE — 80048 BASIC METABOLIC PNL TOTAL CA: CPT | Performed by: INTERNAL MEDICINE

## 2021-04-19 PROCEDURE — 85025 COMPLETE CBC W/AUTO DIFF WBC: CPT | Performed by: INTERNAL MEDICINE

## 2021-04-19 PROCEDURE — 83735 ASSAY OF MAGNESIUM: CPT | Performed by: INTERNAL MEDICINE

## 2021-04-19 PROCEDURE — 84156 ASSAY OF PROTEIN URINE: CPT | Performed by: INTERNAL MEDICINE

## 2021-04-19 PROCEDURE — 80180 DRUG SCRN QUAN MYCOPHENOLATE: CPT | Performed by: INTERNAL MEDICINE

## 2021-04-19 PROCEDURE — 84100 ASSAY OF PHOSPHORUS: CPT | Performed by: INTERNAL MEDICINE

## 2021-04-19 PROCEDURE — 36415 COLL VENOUS BLD VENIPUNCTURE: CPT | Performed by: INTERNAL MEDICINE

## 2021-04-19 PROCEDURE — 80197 ASSAY OF TACROLIMUS: CPT | Performed by: INTERNAL MEDICINE

## 2021-04-19 PROCEDURE — 87799 DETECT AGENT NOS DNA QUANT: CPT | Performed by: INTERNAL MEDICINE

## 2021-04-20 DIAGNOSIS — Z94.0 KIDNEY TRANSPLANTED: Primary | ICD-10-CM

## 2021-04-20 DIAGNOSIS — Z94.0 KIDNEY REPLACED BY TRANSPLANT: ICD-10-CM

## 2021-04-20 RX ORDER — TACROLIMUS 0.5 MG/1
0.5 CAPSULE ORAL EVERY MORNING
Qty: 30 CAPSULE | Refills: 11 | Status: SHIPPED | OUTPATIENT
Start: 2021-04-20 | End: 2021-10-28

## 2021-04-20 RX ORDER — TACROLIMUS 1 MG/1
1 CAPSULE ORAL 2 TIMES DAILY
Qty: 60 CAPSULE | Refills: 11 | Status: SHIPPED | OUTPATIENT
Start: 2021-04-20 | End: 2021-10-28

## 2021-04-20 NOTE — TELEPHONE ENCOUNTER
ISSUE:   Tacrolimus IR level 5.4 on 4/19, goal 6-8, dose 1 mg BID.    PLAN:   Please call patient and confirm this was an accurate 12-hour trough. Verify Tacrolimus IR dose 1 mg BID. Confirm no new medications or illness. Confirm no missed doses. If accurate trough and accurate dose, increase Tacrolimus IR dose to 1.5 mg every morning and 1 mg every evening and repeat labs in 1-2 weeks    Janett Smith RN    OUTCOME:   Spoke with patient, they confirm accurate trough level and current dose 1 mg BID. Patient confirmed dose change to 1.5/1 mg BID and to repeat labs in 1 weeks. Orders sent to preferred pharmacy for dose change and lab for repeat labs. Patient voiced understanding of plan.

## 2021-04-26 DIAGNOSIS — Z94.0 KIDNEY REPLACED BY TRANSPLANT: ICD-10-CM

## 2021-04-26 LAB
ANION GAP SERPL CALCULATED.3IONS-SCNC: 7 MMOL/L (ref 3–14)
BASOPHILS # BLD AUTO: 0 10E9/L (ref 0–0.2)
BASOPHILS NFR BLD AUTO: 0.5 %
BUN SERPL-MCNC: 35 MG/DL (ref 7–30)
CALCIUM SERPL-MCNC: 10.1 MG/DL (ref 8.5–10.1)
CHLORIDE SERPL-SCNC: 108 MMOL/L (ref 94–109)
CO2 SERPL-SCNC: 23 MMOL/L (ref 20–32)
CREAT SERPL-MCNC: 0.82 MG/DL (ref 0.52–1.04)
CREAT UR-MCNC: 85 MG/DL
DIFFERENTIAL METHOD BLD: ABNORMAL
EOSINOPHIL # BLD AUTO: 0.2 10E9/L (ref 0–0.7)
EOSINOPHIL NFR BLD AUTO: 3.8 %
ERYTHROCYTE [DISTWIDTH] IN BLOOD BY AUTOMATED COUNT: 13 % (ref 10–15)
GFR SERPL CREATININE-BSD FRML MDRD: 74 ML/MIN/{1.73_M2}
GLUCOSE SERPL-MCNC: 170 MG/DL (ref 70–99)
HCT VFR BLD AUTO: 36.8 % (ref 35–47)
HGB BLD-MCNC: 11.8 G/DL (ref 11.7–15.7)
LYMPHOCYTES # BLD AUTO: 2.3 10E9/L (ref 0.8–5.3)
LYMPHOCYTES NFR BLD AUTO: 40.4 %
MCH RBC QN AUTO: 31.2 PG (ref 26.5–33)
MCHC RBC AUTO-ENTMCNC: 32.1 G/DL (ref 31.5–36.5)
MCV RBC AUTO: 97 FL (ref 78–100)
MONOCYTES # BLD AUTO: 0.5 10E9/L (ref 0–1.3)
MONOCYTES NFR BLD AUTO: 8.1 %
NEUTROPHILS # BLD AUTO: 2.7 10E9/L (ref 1.6–8.3)
NEUTROPHILS NFR BLD AUTO: 47.2 %
PLATELET # BLD AUTO: 211 10E9/L (ref 150–450)
POTASSIUM SERPL-SCNC: 4.8 MMOL/L (ref 3.4–5.3)
PROT UR-MCNC: 0.33 G/L
PROT/CREAT 24H UR: 0.39 G/G CR (ref 0–0.2)
RBC # BLD AUTO: 3.78 10E12/L (ref 3.8–5.2)
SODIUM SERPL-SCNC: 138 MMOL/L (ref 133–144)
TACROLIMUS BLD-MCNC: 7.8 UG/L (ref 5–15)
TME LAST DOSE: NORMAL H
WBC # BLD AUTO: 5.8 10E9/L (ref 4–11)

## 2021-04-26 PROCEDURE — 36415 COLL VENOUS BLD VENIPUNCTURE: CPT | Performed by: INTERNAL MEDICINE

## 2021-04-26 PROCEDURE — 84156 ASSAY OF PROTEIN URINE: CPT | Performed by: INTERNAL MEDICINE

## 2021-04-26 PROCEDURE — 80197 ASSAY OF TACROLIMUS: CPT | Performed by: INTERNAL MEDICINE

## 2021-04-26 PROCEDURE — 85025 COMPLETE CBC W/AUTO DIFF WBC: CPT | Performed by: INTERNAL MEDICINE

## 2021-04-26 PROCEDURE — 80048 BASIC METABOLIC PNL TOTAL CA: CPT | Performed by: INTERNAL MEDICINE

## 2021-05-03 DIAGNOSIS — Z94.0 KIDNEY TRANSPLANTED: ICD-10-CM

## 2021-05-03 DIAGNOSIS — Z94.0 KIDNEY REPLACED BY TRANSPLANT: ICD-10-CM

## 2021-05-03 LAB
ANION GAP SERPL CALCULATED.3IONS-SCNC: 4 MMOL/L (ref 3–14)
BASOPHILS # BLD AUTO: 0 10E9/L (ref 0–0.2)
BASOPHILS NFR BLD AUTO: 0.4 %
BUN SERPL-MCNC: 45 MG/DL (ref 7–30)
CALCIUM SERPL-MCNC: 10.2 MG/DL (ref 8.5–10.1)
CHLORIDE SERPL-SCNC: 104 MMOL/L (ref 94–109)
CMV DNA SPEC NAA+PROBE-ACNC: NORMAL [IU]/ML
CMV DNA SPEC NAA+PROBE-LOG#: NORMAL {LOG_IU}/ML
CO2 SERPL-SCNC: 28 MMOL/L (ref 20–32)
CREAT SERPL-MCNC: 1.1 MG/DL (ref 0.52–1.04)
CREAT UR-MCNC: 14 MG/DL
DIFFERENTIAL METHOD BLD: ABNORMAL
EOSINOPHIL # BLD AUTO: 0.2 10E9/L (ref 0–0.7)
EOSINOPHIL NFR BLD AUTO: 3.2 %
ERYTHROCYTE [DISTWIDTH] IN BLOOD BY AUTOMATED COUNT: 13.1 % (ref 10–15)
GFR SERPL CREATININE-BSD FRML MDRD: 52 ML/MIN/{1.73_M2}
GLUCOSE SERPL-MCNC: 171 MG/DL (ref 70–99)
HCT VFR BLD AUTO: 36.8 % (ref 35–47)
HGB BLD-MCNC: 11.7 G/DL (ref 11.7–15.7)
LYMPHOCYTES # BLD AUTO: 2 10E9/L (ref 0.8–5.3)
LYMPHOCYTES NFR BLD AUTO: 41.8 %
MCH RBC QN AUTO: 31 PG (ref 26.5–33)
MCHC RBC AUTO-ENTMCNC: 31.8 G/DL (ref 31.5–36.5)
MCV RBC AUTO: 98 FL (ref 78–100)
MONOCYTES # BLD AUTO: 0.4 10E9/L (ref 0–1.3)
MONOCYTES NFR BLD AUTO: 8.5 %
MYCOPHENOLATE SERPL LC/MS/MS-MCNC: 1.82 MG/L (ref 1–3.5)
MYCOPHENOLATE-G SERPL LC/MS/MS-MCNC: 139.3 MG/L (ref 30–95)
NEUTROPHILS # BLD AUTO: 2.2 10E9/L (ref 1.6–8.3)
NEUTROPHILS NFR BLD AUTO: 46.1 %
PLATELET # BLD AUTO: 216 10E9/L (ref 150–450)
POTASSIUM SERPL-SCNC: 4.9 MMOL/L (ref 3.4–5.3)
PROT UR-MCNC: <0.05 G/L
PROT/CREAT 24H UR: NORMAL G/G CR (ref 0–0.2)
RBC # BLD AUTO: 3.77 10E12/L (ref 3.8–5.2)
SODIUM SERPL-SCNC: 136 MMOL/L (ref 133–144)
SPECIMEN SOURCE: NORMAL
TACROLIMUS BLD-MCNC: 7 UG/L (ref 5–15)
TME LAST DOSE: ABNORMAL H
TME LAST DOSE: NORMAL H
WBC # BLD AUTO: 4.7 10E9/L (ref 4–11)

## 2021-05-03 PROCEDURE — 85025 COMPLETE CBC W/AUTO DIFF WBC: CPT | Performed by: INTERNAL MEDICINE

## 2021-05-03 PROCEDURE — 36415 COLL VENOUS BLD VENIPUNCTURE: CPT | Performed by: INTERNAL MEDICINE

## 2021-05-03 PROCEDURE — 80197 ASSAY OF TACROLIMUS: CPT | Performed by: INTERNAL MEDICINE

## 2021-05-03 PROCEDURE — 99000 SPECIMEN HANDLING OFFICE-LAB: CPT

## 2021-05-03 PROCEDURE — 84156 ASSAY OF PROTEIN URINE: CPT | Performed by: INTERNAL MEDICINE

## 2021-05-03 PROCEDURE — 80048 BASIC METABOLIC PNL TOTAL CA: CPT | Performed by: INTERNAL MEDICINE

## 2021-05-03 PROCEDURE — 87799 DETECT AGENT NOS DNA QUANT: CPT | Performed by: INTERNAL MEDICINE

## 2021-05-03 PROCEDURE — 80180 DRUG SCRN QUAN MYCOPHENOLATE: CPT | Performed by: INTERNAL MEDICINE

## 2021-05-04 LAB
EBV DNA # SPEC NAA+PROBE: NORMAL {COPIES}/ML
EBV DNA SPEC NAA+PROBE-LOG#: NORMAL {LOG_COPIES}/ML

## 2021-05-10 DIAGNOSIS — Z94.0 KIDNEY TRANSPLANTED: ICD-10-CM

## 2021-05-10 DIAGNOSIS — Z94.0 KIDNEY REPLACED BY TRANSPLANT: ICD-10-CM

## 2021-05-10 LAB
ANION GAP SERPL CALCULATED.3IONS-SCNC: 4 MMOL/L (ref 3–14)
BASOPHILS # BLD AUTO: 0 10E9/L (ref 0–0.2)
BASOPHILS NFR BLD AUTO: 0.6 %
BUN SERPL-MCNC: 37 MG/DL (ref 7–30)
CALCIUM SERPL-MCNC: 9.6 MG/DL (ref 8.5–10.1)
CHLORIDE SERPL-SCNC: 108 MMOL/L (ref 94–109)
CO2 SERPL-SCNC: 26 MMOL/L (ref 20–32)
CREAT SERPL-MCNC: 0.83 MG/DL (ref 0.52–1.04)
CREAT UR-MCNC: 82 MG/DL
DIFFERENTIAL METHOD BLD: ABNORMAL
EOSINOPHIL # BLD AUTO: 0.2 10E9/L (ref 0–0.7)
EOSINOPHIL NFR BLD AUTO: 3.4 %
ERYTHROCYTE [DISTWIDTH] IN BLOOD BY AUTOMATED COUNT: 13.4 % (ref 10–15)
GFR SERPL CREATININE-BSD FRML MDRD: 73 ML/MIN/{1.73_M2}
GLUCOSE SERPL-MCNC: 170 MG/DL (ref 70–99)
HCT VFR BLD AUTO: 35.9 % (ref 35–47)
HGB BLD-MCNC: 11.4 G/DL (ref 11.7–15.7)
LYMPHOCYTES # BLD AUTO: 1.6 10E9/L (ref 0.8–5.3)
LYMPHOCYTES NFR BLD AUTO: 32.7 %
MCH RBC QN AUTO: 31.5 PG (ref 26.5–33)
MCHC RBC AUTO-ENTMCNC: 31.8 G/DL (ref 31.5–36.5)
MCV RBC AUTO: 99 FL (ref 78–100)
MONOCYTES # BLD AUTO: 0.4 10E9/L (ref 0–1.3)
MONOCYTES NFR BLD AUTO: 8.6 %
NEUTROPHILS # BLD AUTO: 2.6 10E9/L (ref 1.6–8.3)
NEUTROPHILS NFR BLD AUTO: 54.7 %
PLATELET # BLD AUTO: 214 10E9/L (ref 150–450)
POTASSIUM SERPL-SCNC: 4.6 MMOL/L (ref 3.4–5.3)
PROT UR-MCNC: 0.24 G/L
PROT/CREAT 24H UR: 0.29 G/G CR (ref 0–0.2)
RBC # BLD AUTO: 3.62 10E12/L (ref 3.8–5.2)
SODIUM SERPL-SCNC: 138 MMOL/L (ref 133–144)
TACROLIMUS BLD-MCNC: 6.1 UG/L (ref 5–15)
TME LAST DOSE: NORMAL H
WBC # BLD AUTO: 4.8 10E9/L (ref 4–11)

## 2021-05-10 PROCEDURE — 85025 COMPLETE CBC W/AUTO DIFF WBC: CPT | Performed by: INTERNAL MEDICINE

## 2021-05-10 PROCEDURE — 80197 ASSAY OF TACROLIMUS: CPT | Performed by: INTERNAL MEDICINE

## 2021-05-10 PROCEDURE — 84156 ASSAY OF PROTEIN URINE: CPT | Performed by: INTERNAL MEDICINE

## 2021-05-10 PROCEDURE — 36415 COLL VENOUS BLD VENIPUNCTURE: CPT | Performed by: INTERNAL MEDICINE

## 2021-05-10 PROCEDURE — 80180 DRUG SCRN QUAN MYCOPHENOLATE: CPT | Performed by: INTERNAL MEDICINE

## 2021-05-10 PROCEDURE — 80048 BASIC METABOLIC PNL TOTAL CA: CPT | Performed by: INTERNAL MEDICINE

## 2021-05-11 LAB
MYCOPHENOLATE SERPL LC/MS/MS-MCNC: 1.54 MG/L (ref 1–3.5)
MYCOPHENOLATE-G SERPL LC/MS/MS-MCNC: 119 MG/L (ref 30–95)
TME LAST DOSE: ABNORMAL H

## 2021-05-17 DIAGNOSIS — Z48.298 AFTERCARE FOLLOWING ORGAN TRANSPLANT: ICD-10-CM

## 2021-05-17 DIAGNOSIS — Z94.0 KIDNEY TRANSPLANTED: ICD-10-CM

## 2021-05-17 DIAGNOSIS — Z79.899 ENCOUNTER FOR LONG-TERM CURRENT USE OF MEDICATION: ICD-10-CM

## 2021-05-17 DIAGNOSIS — Z94.0 KIDNEY REPLACED BY TRANSPLANT: ICD-10-CM

## 2021-05-17 LAB
ANION GAP SERPL CALCULATED.3IONS-SCNC: 2 MMOL/L (ref 3–14)
BUN SERPL-MCNC: 35 MG/DL (ref 7–30)
CALCIUM SERPL-MCNC: 9.8 MG/DL (ref 8.5–10.1)
CHLORIDE SERPL-SCNC: 105 MMOL/L (ref 94–109)
CO2 SERPL-SCNC: 29 MMOL/L (ref 20–32)
CREAT SERPL-MCNC: 1.01 MG/DL (ref 0.52–1.04)
CREAT UR-MCNC: 20 MG/DL
ERYTHROCYTE [DISTWIDTH] IN BLOOD BY AUTOMATED COUNT: 13.1 % (ref 10–15)
GFR SERPL CREATININE-BSD FRML MDRD: 57 ML/MIN/{1.73_M2}
GLUCOSE SERPL-MCNC: 164 MG/DL (ref 70–99)
HCT VFR BLD AUTO: 36.3 % (ref 35–47)
HGB BLD-MCNC: 11.5 G/DL (ref 11.7–15.7)
MCH RBC QN AUTO: 31.3 PG (ref 26.5–33)
MCHC RBC AUTO-ENTMCNC: 31.7 G/DL (ref 31.5–36.5)
MCV RBC AUTO: 99 FL (ref 78–100)
PLATELET # BLD AUTO: 206 10E9/L (ref 150–450)
POTASSIUM SERPL-SCNC: 4.8 MMOL/L (ref 3.4–5.3)
PROT UR-MCNC: <0.05 G/L
PROT/CREAT 24H UR: NORMAL G/G CR (ref 0–0.2)
RBC # BLD AUTO: 3.67 10E12/L (ref 3.8–5.2)
SODIUM SERPL-SCNC: 136 MMOL/L (ref 133–144)
TACROLIMUS BLD-MCNC: 6.4 UG/L (ref 5–15)
TME LAST DOSE: NORMAL H
WBC # BLD AUTO: 5.1 10E9/L (ref 4–11)

## 2021-05-17 PROCEDURE — 80048 BASIC METABOLIC PNL TOTAL CA: CPT | Performed by: INTERNAL MEDICINE

## 2021-05-17 PROCEDURE — 80180 DRUG SCRN QUAN MYCOPHENOLATE: CPT | Performed by: INTERNAL MEDICINE

## 2021-05-17 PROCEDURE — 36415 COLL VENOUS BLD VENIPUNCTURE: CPT | Performed by: INTERNAL MEDICINE

## 2021-05-17 PROCEDURE — 80197 ASSAY OF TACROLIMUS: CPT | Performed by: INTERNAL MEDICINE

## 2021-05-17 PROCEDURE — 84156 ASSAY OF PROTEIN URINE: CPT | Performed by: INTERNAL MEDICINE

## 2021-05-17 PROCEDURE — 85027 COMPLETE CBC AUTOMATED: CPT | Performed by: INTERNAL MEDICINE

## 2021-05-17 PROCEDURE — 87799 DETECT AGENT NOS DNA QUANT: CPT | Performed by: INTERNAL MEDICINE

## 2021-05-18 LAB
BKV DNA # SPEC NAA+PROBE: NORMAL COPIES/ML
BKV DNA SPEC NAA+PROBE-LOG#: NORMAL LOG COPIES/ML
MYCOPHENOLATE SERPL LC/MS/MS-MCNC: 1.24 MG/L (ref 1–3.5)
MYCOPHENOLATE-G SERPL LC/MS/MS-MCNC: 113.7 MG/L (ref 30–95)
SPECIMEN SOURCE: NORMAL
TME LAST DOSE: ABNORMAL H

## 2021-05-21 ENCOUNTER — TELEPHONE (OUTPATIENT)
Dept: TRANSPLANT | Facility: CLINIC | Age: 66
End: 2021-05-21

## 2021-05-21 NOTE — TELEPHONE ENCOUNTER
Brian Thompson and Maxim     Reviewed transplant labs   Remain stable at this time - urine protein decreased        Updated lab orders frequency  Every 2 weeks    Confirmed follow up appointment  With Dr Jordy Willis

## 2021-06-01 DIAGNOSIS — Z94.0 KIDNEY REPLACED BY TRANSPLANT: ICD-10-CM

## 2021-06-01 LAB
ANION GAP SERPL CALCULATED.3IONS-SCNC: 5 MMOL/L (ref 3–14)
BUN SERPL-MCNC: 28 MG/DL (ref 7–30)
CALCIUM SERPL-MCNC: 10 MG/DL (ref 8.5–10.1)
CHLORIDE SERPL-SCNC: 105 MMOL/L (ref 94–109)
CO2 SERPL-SCNC: 25 MMOL/L (ref 20–32)
CREAT SERPL-MCNC: 0.85 MG/DL (ref 0.52–1.04)
CREAT UR-MCNC: 113 MG/DL
ERYTHROCYTE [DISTWIDTH] IN BLOOD BY AUTOMATED COUNT: 13.2 % (ref 10–15)
GFR SERPL CREATININE-BSD FRML MDRD: 71 ML/MIN/{1.73_M2}
GLUCOSE SERPL-MCNC: 160 MG/DL (ref 70–99)
HCT VFR BLD AUTO: 37 % (ref 35–47)
HGB BLD-MCNC: 11.8 G/DL (ref 11.7–15.7)
MCH RBC QN AUTO: 32 PG (ref 26.5–33)
MCHC RBC AUTO-ENTMCNC: 31.9 G/DL (ref 31.5–36.5)
MCV RBC AUTO: 100 FL (ref 78–100)
PLATELET # BLD AUTO: 225 10E9/L (ref 150–450)
POTASSIUM SERPL-SCNC: 4.4 MMOL/L (ref 3.4–5.3)
PROT UR-MCNC: 0.33 G/L
PROT/CREAT 24H UR: 0.29 G/G CR (ref 0–0.2)
RBC # BLD AUTO: 3.69 10E12/L (ref 3.8–5.2)
SODIUM SERPL-SCNC: 135 MMOL/L (ref 133–144)
TACROLIMUS BLD-MCNC: 6.3 UG/L (ref 5–15)
TME LAST DOSE: NORMAL H
WBC # BLD AUTO: 6.4 10E9/L (ref 4–11)

## 2021-06-01 PROCEDURE — 84156 ASSAY OF PROTEIN URINE: CPT | Performed by: INTERNAL MEDICINE

## 2021-06-01 PROCEDURE — 80197 ASSAY OF TACROLIMUS: CPT | Performed by: INTERNAL MEDICINE

## 2021-06-01 PROCEDURE — 36415 COLL VENOUS BLD VENIPUNCTURE: CPT | Performed by: INTERNAL MEDICINE

## 2021-06-01 PROCEDURE — 85027 COMPLETE CBC AUTOMATED: CPT | Performed by: INTERNAL MEDICINE

## 2021-06-01 PROCEDURE — 80048 BASIC METABOLIC PNL TOTAL CA: CPT | Performed by: INTERNAL MEDICINE

## 2021-06-06 ENCOUNTER — HEALTH MAINTENANCE LETTER (OUTPATIENT)
Age: 66
End: 2021-06-06

## 2021-06-21 ENCOUNTER — VIRTUAL VISIT (OUTPATIENT)
Dept: NEPHROLOGY | Facility: CLINIC | Age: 66
End: 2021-06-21
Attending: INTERNAL MEDICINE
Payer: MEDICARE

## 2021-06-21 VITALS — SYSTOLIC BLOOD PRESSURE: 123 MMHG | DIASTOLIC BLOOD PRESSURE: 64 MMHG

## 2021-06-21 DIAGNOSIS — I15.1 HTN, KIDNEY TRANSPLANT RELATED: ICD-10-CM

## 2021-06-21 DIAGNOSIS — Z94.0 HTN, KIDNEY TRANSPLANT RELATED: ICD-10-CM

## 2021-06-21 DIAGNOSIS — D84.9 IMMUNOSUPPRESSION (H): ICD-10-CM

## 2021-06-21 DIAGNOSIS — Z48.298 AFTERCARE FOLLOWING ORGAN TRANSPLANT: Primary | ICD-10-CM

## 2021-06-21 DIAGNOSIS — R80.1 PERSISTENT PROTEINURIA: ICD-10-CM

## 2021-06-21 DIAGNOSIS — N25.81 SECONDARY HYPERPARATHYROIDISM (H): ICD-10-CM

## 2021-06-21 PROCEDURE — 99215 OFFICE O/P EST HI 40 MIN: CPT | Mod: 95 | Performed by: INTERNAL MEDICINE

## 2021-06-21 RX ORDER — FUROSEMIDE 20 MG
20 TABLET ORAL DAILY
Qty: 90 TABLET | Refills: 1 | Status: SHIPPED | OUTPATIENT
Start: 2021-06-21 | End: 2021-09-10

## 2021-06-21 ASSESSMENT — PAIN SCALES - GENERAL: PAINLEVEL: NO PAIN (0)

## 2021-06-21 NOTE — LETTER
6/21/2021      RE: Ethel Blow  [address]  [place] [address]       Ethel is a 66 year old who is being evaluated via a billable video visit.      How would you like to obtain your AVS? Mail a copy  If the video visit is dropped, the invitation should be resent by: Send to e-mail at: [email or web address]  Will anyone else be joining your video visit? No      Video Start Time: 2:05  Video-Visit Details    Type of service:  Video Visit    Video End Time:2:23    Originating Location (pt. Location): Home    Distant Location (provider location):  [facility]     Platform used for Video Visit: SonicPollen      9 month visit    CHRONIC TRANSPLANT NEPHROLOGY VISIT    Assessment & Plan   # DDKT: Stable kidney function, but patient developed new proteinuria, up to ~ 2.5 grams 2/2020, but now down about ~ 0.26 mg/g with up-titration of losartan    Of note, patient does NOT have a known diagnosis of FSGS.  Her biopsy showed 1/4 glomeruli with global glomerulosclerosis and no focal segmental glomerulosclerosis.  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  Will plan to treat proteinuria conservatively at this time with ARB.   - Baseline Creatinine:  ~ 0.8-1.0   - Proteinuria: Moderate (1-3 grams) About 1 gram.   - Date DSA Last Checked: Feb/2021      Latest DSA: No   - BK Viremia: No   - Kidney Tx Biopsy: Mar 03, 2021; Result:  No diagnostic evidence of acute rejection.  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  No interstitial fibrosis or tubular atrophy.             Feb 03, 2021; Result:  No diagnostic evidence of acute rejection.  Segmental glomerular basement membrane irregularity with remodeling and lamellation.  Type IV collagen alpha5 and alpha2 staining was normal.  This was felt to be donor derived.    # Immunosuppression: tacrolimus IR (goal trough 6-8), MMF 1 g po bid          - Continue with intensive monitoring of immunosuppression for efficacy and toxicity.          - Changes: no    # Infection Prophylaxis:   - PJP: Sulfa/TMP (Bactrim)    # Hypertension: controlled;  Goal BP: < 130/80   - Changes:no continue losartan 100 mgpo every day to control BP & proteinuria    # Diabetes: Borderline control (HbA1c 7-9%) Last HbA1c:no checked recently   - Management as per primary care.    # Mineral Bone Disorder:   - Secondary renal hyperparathyroidism; PTH level: Mildly elevated (151-300 pg/ml)        On treatment: None  - Vitamin D; level: Low        On supplement: No; Not on supplement due to high normal calcium level.  - Calcium; level: High normal        On supplement: No    # Electrolytes:   - Potassium; level: Normal        On supplement: No  - Magnesium; level: Normal        On supplement: Yes  - Bicarbonate; level: Normal        On supplement: No    # MGUS: No monoclonal protein seen on latest SPEP and normal kappa/lambda ratio.   - No follow up testing is necessary unless proteinuria significantly worsens.    # GERD: Occasional symptoms, but mostly controlled on famotidine.    # Shortness of breath: on exertion, evaluated by cards, echo & stress test unremarkable    # SADIE on CPAP: Patient is compliant.    # Skin Cancer Risk:    - Discussed sun protection and recommend regular follow up with Dermatology.    # Medical Compliance: Yes     # COVID-19 Virus Review: Discussed COVID-19 virus and the potential medical risks.  Reviewed preventative health recommendations, which includes washing hands for 20 seconds, avoid touching your face, and social distancing.  Asked patient to inform the  transplant center if they are exposed or diagnosed with this virus.    # COVID Vaccine Completed: No    # Transplant History:  Etiology of Kidney Failure: Unknown etiology (Native kidney biopsy showed global glomerulosclerosis)  Tx: DDKT  Transplant: 9/3/2020 (Kidney)  Significant changes in immunosuppression: None  Significant transplant-related complications: None    Transplant Office Phone Number: [phone]    Assessment and plan was discussed with the patient and she voiced her understanding and agreement.    Return visit: 3 months for 1 year visit    Jordy Dewitt MD    Chief Complaint   Ms. Thompson is a 66 year old here for kidney transplant and immunosuppression management.    History of Present Illness   She feels better overall. Leg & arm swelling have resolved, she does note some facial swelling. She continued to have SOB on exertion and was evaluated by cards: echo & stress test were unremarkable. She has been taking famotidine more regularly which has helped resolve some epigastric discomfort she had before. She denies any nausea or vomiting. BP is better controlled with up-titration of losartan. Home readings ~120s/60s today.  She received COVID vaccine    Problem List   Patient Active Problem List   Diagnosis     Elevated serum immunoglobulin free light chain level     Dyslipidemia     Hypothyroidism     SADIE on CPAP     Sensorineural hearing loss (SNHL) of both ears     GERD (gastroesophageal reflux disease)     HTN, kidney transplant related     Hepatitis B core antibody positive     Secondary renal hyperparathyroidism (H)     Memory deficits     Kidney replaced by transplant     Immunosuppression (H)     Aftercare following organ transplant     Vitamin D deficiency     Elevated random blood glucose level     Diabetes mellitus, type 2 (H)       Allergies   Allergies   Allergen Reactions     Amoxicillin-Pot Clavulanate Nausea and Vomiting       Medications   Current Outpatient Medications    Medication Sig     acetaminophen (TYLENOL) 325 MG tablet Take 2 tablets (650 mg) by mouth every 4 hours as needed for pain     aspirin (ASA) 81 MG EC tablet Take 1 tablet (81 mg) by mouth daily     atorvastatin (LIPITOR) 40 MG tablet Take 0.5 tablets (20 mg) by mouth daily     diclofenac (VOLTAREN) 1 % topical gel Apply 2 g topically 4 times daily     famotidine (PEPCID) 20 MG tablet Take 20 mg by mouth every evening      fish oil-omega-3 fatty acids 1000 MG capsule Take 2 g by mouth daily     fluticasone (FLONASE) 50 MCG/ACT nasal spray Spray 1 spray into both nostrils 2 times daily as needed for rhinitis or allergies     furosemide (LASIX) 20 MG tablet Take 1 tablet (20 mg) by mouth daily     levothyroxine (SYNTHROID/LEVOTHROID) 112 MCG tablet Take 112 mcg (1 tablet) by mouth every Monday, Tuesday is 1/2 tab, Wednesday, Thursday, Friday     Lidocaine (LIDOCARE) 4 % Patch Place 1 patch onto the skin every 24 hours To prevent lidocaine toxicity, patient should be patch free for 12 hrs daily.  Using for back pain     losartan (COZAAR) 100 MG tablet Take 1 tablet (100 mg) by mouth daily     magnesium oxide (MAG-OX) 400 MG tablet Take 1 tablet (400 mg) by mouth daily (with lunch)     melatonin 3 MG tablet Take 6 mg by mouth At Bedtime      mycophenolate (GENERIC EQUIVALENT) 250 MG capsule Take 5 capsules (1,250 mg) by mouth 2 times daily     psyllium (METAMUCIL/KONSYL) 58.6 % powder Take by mouth daily     sulfamethoxazole-trimethoprim (BACTRIM) 400-80 MG tablet Take 1 tablet by mouth daily     tacrolimus (GENERIC EQUIVALENT) 0.5 MG capsule Take 1 capsule (0.5 mg) by mouth every morning Total dose = 1.5 mg in the AM and 1 mg in the PM     tacrolimus (GENERIC EQUIVALENT) 1 MG capsule Take 1 capsule (1 mg) by mouth 2 times daily Total dose = 1.5 in AM and 1 in PM     valACYclovir (VALTREX) 500 MG tablet Take 1 tablet (500 mg) by mouth daily     No current facility-administered medications for this visit.      There are  no discontinued medications.    Physical Exam   Vital signs were deferred for this telemedicine visit.    GENERAL APPEARANCE: alert and no distress  HENT: no obvious abnormalities on appearance  RESP: breathing appears unremarkable with normal rate, no audible wheezing or cough and no apparent shortness of breath with conversation  MS: extremities normal - no gross deformities noted  SKIN: no apparent rash and normal skin tone  NEURO: speech is clear with no obvious neurological deficits  PSYCH: mentation appears normal and affect normal    Data     Renal Latest Ref Rng & Units 6/1/2021 5/17/2021 5/10/2021   Na 133 - 144 mmol/L 135 136 138   Na (external) 135 - 145 mmol/L - - -   K 3.4 - 5.3 mmol/L 4.4 4.8 4.6   K (external) 3.5 - 5.0 mmol/L - - -   Cl 94 - 109 mmol/L 105 105 108   Cl (external) 98 - 110 mmol/L - - -   CO2 20 - 32 mmol/L 25 29 26   CO2 (external) 21 - 31 mmol/L - - -   BUN 7 - 30 mg/dL 28 35(H) 37(H)   BUN (external) 8 - 25 mg/dL - - -   Cr 0.52 - 1.04 mg/dL 0.85 1.01 0.83   Cr (external) 0.57 - 1.11 mg/dL - - -   Glucose 70 - 99 mg/dL 160(H) 164(H) 170(H)   Glucose (external) 65 - 100 mg/dL - - -   Ca  8.5 - 10.1 mg/dL 10.0 9.8 9.6   Ca (external) 8.5 - 10.5 mg/dL - - -   Mg 1.6 - 2.3 mg/dL - - -   Mg (external) 1.6 - 2.6 mg/dL - - -     Bone Health Latest Ref Rng & Units 4/19/2021 3/8/2021 2/1/2021   Phos 2.5 - 4.5 mg/dL 3.5 3.9 2.8   Phos (external) 2.3 - 4.7 mg/dL - - -   PTHi 18 - 80 pg/mL - - -   Vit D Def 20 - 75 ug/L - - -     Heme Latest Ref Rng & Units 6/1/2021 5/17/2021 5/10/2021   WBC 4.0 - 11.0 10e9/L 6.4 5.1 4.8   WBC (external) 4.5 - 11.0 thou/cu mm - - -   Hgb 11.7 - 15.7 g/dL 11.8 11.5(L) 11.4(L)   Hgb (external) 12.0 - 16.0 g/dL - - -   Plt 150 - 450 10e9/L 225 206 214   Plt (external) 140 - 440 thou/cu mm - - -   ABSOLUTE NEUTROPHIL 1.6 - 8.3 10e9/L - - 2.6   ABSOLUTE LYMPHOCYTES 0.8 - 5.3 10e9/L - - 1.6   ABSOLUTE MONOCYTES 0.0 - 1.3 10e9/L - - 0.4   ABSOLUTE EOSINOPHILS 0.0 -  0.7 10e9/L - - 0.2   ABSOLUTE BASOPHILS 0.0 - 0.2 10e9/L - - 0.0   ABS IMMATURE GRANULOCYTES 0 - 0.4 10e9/L - - -   ABSOLUTE NUCLEATED RBC - - - -     Liver Latest Ref Rng & Units 3/1/2021 1/25/2021 9/9/2020   AP 40 - 150 U/L 195(H) 189(H) -   TBili 0.2 - 1.3 mg/dL 0.6 0.5 -   DBili 0.0 - 0.2 mg/dL 0.1 - -   ALT 0 - 50 U/L 42 37 -   AST 0 - 45 U/L 26 24 -   Tot Protein 6.8 - 8.8 g/dL 7.0 6.9 -   Albumin 3.4 - 5.0 g/dL 3.7 3.8 2.9(L)     Pancreas Latest Ref Rng & Units 3/1/2021 1/25/2021 9/3/2020   A1C 0 - 5.6 % 7.9(H) 7.3(H) 5.4     Iron studies Latest Ref Rng & Units 9/9/2020   Iron 35 - 180 ug/dL 162   Iron sat 15 - 46 % 92(H)   Ferritin 8 - 252 ng/mL 1,757(H)     [facility] Txp Virology Latest Ref Rng & Units 5/17/2021 5/3/2021 4/19/2021   CVM DNA Quant - - EDTAP -   CMV QUANT IU/ML CMVND:CMV DNA Not Detected [IU]/mL - CMV DNA Not Detected -   LOG IU/ML OF CMVQNT <2.1 [Log:IU]/mL - Not Calculated -   BK Spec - Plasma - Plasma   BK Res BKNEG:BK Virus DNA Not Detected copies/mL BK Virus DNA Not Detected - BK Virus DNA Not Detected   BK Log <2.7 Log copies/mL Not Calculated - Not Calculated   BK Quant Log Ext - - - -   BK Quant Result Ext Negative copies/mL - - -   BK Quant Spec Ext - - - -   EBV CAPSID ANTIBODY IGG 0.0 - 0.8 AI - - -   EBV DNA COPIES/ML EBVNEG:EBV DNA Not Detected [Copies]/mL - EBV DNA Not Detected -   EBV DNA LOG OF COPIES <2.7 [Log:copies]/mL - Not Calculated -   Hep B Core NR:Nonreactive - - -        Recent Labs   Lab Test 05/10/21  0825 05/17/21  0821 06/01/21  0816   DOSTAC 05/09/2021 2000 2000 on [id number] tm 2000 5/31/21   TACROL 6.1 6.4 6.3     Recent Labs   Lab Test 05/03/21  0839 05/10/21  0825 05/17/21  0821   DOSMPA 2000 5/2/2021 05/09/2021 2000 2000 on [id number] tm   MPACID 1.82 1.54 1.24   MPAG 139.3* 119.0* 113.7*       Echo  5/2021  Visually Estimated EF: 60-65%   Normal LV size and systolic function.   No significant valvular heart disease noted.   Estimated pulmonary artery pressure of 21.5  mmHg + RA pressure.    NM stress test 4/2021: negative for ischemia   Normal IVC size, >50% collapse with respiration.      Jordy Dewitt MD

## 2021-06-21 NOTE — LETTER
6/21/2021       RE: Ethel Thompson  [address]  [place] [address]     Dear Colleague,    Thank you for referring your patient, Ethel Thompson, to the [facility] [place] at [facility]. Please see a copy of my visit note below.    Ethel is a 66 year old who is being evaluated via a billable video visit.      How would you like to obtain your AVS? Mail a copy  If the video visit is dropped, the invitation should be resent by: Send to e-mail at: [email or web address]  Will anyone else be joining your video visit? No      Video Start Time: 2:05  Video-Visit Details    Type of service:  Video Visit    Video End Time:2:23    Originating Location (pt. Location): Home    Distant Location (provider location):  [facility] [place]     Platform used for Video Visit: Bioclones      9 month visit    CHRONIC TRANSPLANT NEPHROLOGY VISIT    Assessment & Plan   # DDKT: Stable kidney function, but patient developed new proteinuria, up to ~ 2.5 grams 2/2020, but now down about ~ 0.26 mg/g with up-titration of losartan    Of note, patient does NOT have a known diagnosis of FSGS.  Her biopsy showed 1/4 glomeruli with global glomerulosclerosis and no focal segmental glomerulosclerosis.  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  Will plan to treat proteinuria conservatively at this time with ARB.   - Baseline Creatinine:  ~ 0.8-1.0   - Proteinuria: Moderate (1-3 grams) About 1 gram.   - Date DSA Last Checked: Feb/2021      Latest DSA: No   - BK Viremia: No   - Kidney Tx Biopsy: Mar 03, 2021; Result:  No diagnostic evidence of acute rejection.   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  No interstitial fibrosis or tubular atrophy.             Feb 03, 2021; Result: No diagnostic evidence of acute rejection.  Segmental glomerular basement membrane irregularity with remodeling and lamellation.  Type IV collagen alpha5 and alpha2 staining was normal.  This was felt to be donor derived.    # Immunosuppression: tacrolimus IR (goal trough 6-8), MMF 1 g po bid          - Continue with intensive monitoring of immunosuppression for efficacy and toxicity.          - Changes: no    # Infection Prophylaxis:   - PJP: Sulfa/TMP (Bactrim)    # Hypertension: controlled;  Goal BP: < 130/80   - Changes:no continue losartan 100 mgpo every day to control BP & proteinuria    # Diabetes: Borderline control (HbA1c 7-9%) Last HbA1c:no checked recently   - Management as per primary care.    # Mineral Bone Disorder:   - Secondary renal hyperparathyroidism; PTH level: Mildly elevated (151-300 pg/ml)        On treatment: None  - Vitamin D; level: Low        On supplement: No; Not on supplement due to high normal calcium level.  - Calcium; level: High normal        On supplement: No    # Electrolytes:   - Potassium; level: Normal        On supplement: No  - Magnesium; level: Normal        On supplement: Yes  - Bicarbonate; level: Normal        On supplement: No    # MGUS: No monoclonal protein seen on latest SPEP and normal kappa/lambda ratio.   - No follow up testing is necessary unless proteinuria significantly worsens.    # GERD: Occasional symptoms, but mostly controlled on famotidine.    # Shortness of breath: on exertion, evaluated by cards, echo & stress test unremarkable    # SADIE on CPAP: Patient is compliant.    # Skin Cancer Risk:    - Discussed sun protection and recommend regular follow up with Dermatology.    # Medical Compliance: Yes     # COVID-19 Virus Review:  Discussed COVID-19 virus and the potential medical risks.  Reviewed preventative health recommendations, which includes washing hands for 20 seconds, avoid touching your face, and social distancing.  Asked patient to inform the transplant center if they are exposed or diagnosed with this virus.    # COVID Vaccine Completed: No    # Transplant History:  Etiology of Kidney Failure: Unknown etiology (Native kidney biopsy showed global glomerulosclerosis)  Tx: DDKT  Transplant: 9/3/2020 (Kidney)  Significant changes in immunosuppression: None  Significant transplant-related complications: None    Transplant Office Phone Number: [phone]    Assessment and plan was discussed with the patient and she voiced her understanding and agreement.    Return visit: 3 months for 1 year visit    Jrody Dewitt MD    Chief Complaint   Ms. Thompson is a 66 year old here for kidney transplant and immunosuppression management.    History of Present Illness   She feels better overall. Leg & arm swelling have resolved, she does note some facial swelling. She continued to have SOB on exertion and was evaluated by cards: echo & stress test were unremarkable. She has been taking famotidine more regularly which has helped resolve some epigastric discomfort she had before. She denies any nausea or vomiting. BP is better controlled with up-titration of losartan. Home readings ~120s/60s today.  She received COVID vaccine    Problem List   Patient Active Problem List   Diagnosis     Elevated serum immunoglobulin free light chain level     Dyslipidemia     Hypothyroidism     SADIE on CPAP     Sensorineural hearing loss (SNHL) of both ears     GERD (gastroesophageal reflux disease)     HTN, kidney transplant related     Hepatitis B core antibody positive     Secondary renal hyperparathyroidism (H)     Memory deficits     Kidney replaced by transplant     Immunosuppression (H)     Aftercare following organ transplant     Vitamin D deficiency      Elevated random blood glucose level     Diabetes mellitus, type 2 (H)       Allergies   Allergies   Allergen Reactions     Amoxicillin-Pot Clavulanate Nausea and Vomiting       Medications   Current Outpatient Medications   Medication Sig     acetaminophen (TYLENOL) 325 MG tablet Take 2 tablets (650 mg) by mouth every 4 hours as needed for pain     aspirin (ASA) 81 MG EC tablet Take 1 tablet (81 mg) by mouth daily     atorvastatin (LIPITOR) 40 MG tablet Take 0.5 tablets (20 mg) by mouth daily     diclofenac (VOLTAREN) 1 % topical gel Apply 2 g topically 4 times daily     famotidine (PEPCID) 20 MG tablet Take 20 mg by mouth every evening      fish oil-omega-3 fatty acids 1000 MG capsule Take 2 g by mouth daily     fluticasone (FLONASE) 50 MCG/ACT nasal spray Spray 1 spray into both nostrils 2 times daily as needed for rhinitis or allergies     furosemide (LASIX) 20 MG tablet Take 1 tablet (20 mg) by mouth daily     levothyroxine (SYNTHROID/LEVOTHROID) 112 MCG tablet Take 112 mcg (1 tablet) by mouth every Monday, Tuesday is 1/2 tab, Wednesday, Thursday, Friday     Lidocaine (LIDOCARE) 4 % Patch Place 1 patch onto the skin every 24 hours To prevent lidocaine toxicity, patient should be patch free for 12 hrs daily.  Using for back pain     losartan (COZAAR) 100 MG tablet Take 1 tablet (100 mg) by mouth daily     magnesium oxide (MAG-OX) 400 MG tablet Take 1 tablet (400 mg) by mouth daily (with lunch)     melatonin 3 MG tablet Take 6 mg by mouth At Bedtime      mycophenolate (GENERIC EQUIVALENT) 250 MG capsule Take 5 capsules (1,250 mg) by mouth 2 times daily     psyllium (METAMUCIL/KONSYL) 58.6 % powder Take by mouth daily     sulfamethoxazole-trimethoprim (BACTRIM) 400-80 MG tablet Take 1 tablet by mouth daily     tacrolimus (GENERIC EQUIVALENT) 0.5 MG capsule Take 1 capsule (0.5 mg) by mouth every morning Total dose = 1.5 mg in the AM and 1 mg in the PM     tacrolimus (GENERIC EQUIVALENT) 1 MG capsule Take 1 capsule  (1 mg) by mouth 2 times daily Total dose = 1.5 in AM and 1 in PM     valACYclovir (VALTREX) 500 MG tablet Take 1 tablet (500 mg) by mouth daily     No current facility-administered medications for this visit.      There are no discontinued medications.    Physical Exam   Vital signs were deferred for this telemedicine visit.    GENERAL APPEARANCE: alert and no distress  HENT: no obvious abnormalities on appearance  RESP: breathing appears unremarkable with normal rate, no audible wheezing or cough and no apparent shortness of breath with conversation  MS: extremities normal - no gross deformities noted  SKIN: no apparent rash and normal skin tone  NEURO: speech is clear with no obvious neurological deficits  PSYCH: mentation appears normal and affect normal    Data     Renal Latest Ref Rng & Units 6/1/2021 5/17/2021 5/10/2021   Na 133 - 144 mmol/L 135 136 138   Na (external) 135 - 145 mmol/L - - -   K 3.4 - 5.3 mmol/L 4.4 4.8 4.6   K (external) 3.5 - 5.0 mmol/L - - -   Cl 94 - 109 mmol/L 105 105 108   Cl (external) 98 - 110 mmol/L - - -   CO2 20 - 32 mmol/L 25 29 26   CO2 (external) 21 - 31 mmol/L - - -   BUN 7 - 30 mg/dL 28 35(H) 37(H)   BUN (external) 8 - 25 mg/dL - - -   Cr 0.52 - 1.04 mg/dL 0.85 1.01 0.83   Cr (external) 0.57 - 1.11 mg/dL - - -   Glucose 70 - 99 mg/dL 160(H) 164(H) 170(H)   Glucose (external) 65 - 100 mg/dL - - -   Ca  8.5 - 10.1 mg/dL 10.0 9.8 9.6   Ca (external) 8.5 - 10.5 mg/dL - - -   Mg 1.6 - 2.3 mg/dL - - -   Mg (external) 1.6 - 2.6 mg/dL - - -     Bone Health Latest Ref Rng & Units 4/19/2021 3/8/2021 2/1/2021   Phos 2.5 - 4.5 mg/dL 3.5 3.9 2.8   Phos (external) 2.3 - 4.7 mg/dL - - -   PTHi 18 - 80 pg/mL - - -   Vit D Def 20 - 75 ug/L - - -     Heme Latest Ref Rng & Units 6/1/2021 5/17/2021 5/10/2021   WBC 4.0 - 11.0 10e9/L 6.4 5.1 4.8   WBC (external) 4.5 - 11.0 thou/cu mm - - -   Hgb 11.7 - 15.7 g/dL 11.8 11.5(L) 11.4(L)   Hgb (external) 12.0 - 16.0 g/dL - - -   Plt 150 - 450 10e9/L 225  206 214   Plt (external) 140 - 440 thou/cu mm - - -   ABSOLUTE NEUTROPHIL 1.6 - 8.3 10e9/L - - 2.6   ABSOLUTE LYMPHOCYTES 0.8 - 5.3 10e9/L - - 1.6   ABSOLUTE MONOCYTES 0.0 - 1.3 10e9/L - - 0.4   ABSOLUTE EOSINOPHILS 0.0 - 0.7 10e9/L - - 0.2   ABSOLUTE BASOPHILS 0.0 - 0.2 10e9/L - - 0.0   ABS IMMATURE GRANULOCYTES 0 - 0.4 10e9/L - - -   ABSOLUTE NUCLEATED RBC - - - -     Liver Latest Ref Rng & Units 3/1/2021 1/25/2021 9/9/2020   AP 40 - 150 U/L 195(H) 189(H) -   TBili 0.2 - 1.3 mg/dL 0.6 0.5 -   DBili 0.0 - 0.2 mg/dL 0.1 - -   ALT 0 - 50 U/L 42 37 -   AST 0 - 45 U/L 26 24 -   Tot Protein 6.8 - 8.8 g/dL 7.0 6.9 -   Albumin 3.4 - 5.0 g/dL 3.7 3.8 2.9(L)     Pancreas Latest Ref Rng & Units 3/1/2021 1/25/2021 9/3/2020   A1C 0 - 5.6 % 7.9(H) 7.3(H) 5.4     Iron studies Latest Ref Rng & Units 9/9/2020   Iron 35 - 180 ug/dL 162   Iron sat 15 - 46 % 92(H)   Ferritin 8 - 252 ng/mL 1,757(H)     [facility] Txp Virology Latest Ref Rng & Units 5/17/2021 5/3/2021 4/19/2021   CVM DNA Quant - - EDTAP -   CMV QUANT IU/ML CMVND:CMV DNA Not Detected [IU]/mL - CMV DNA Not Detected -   LOG IU/ML OF CMVQNT <2.1 [Log:IU]/mL - Not Calculated -   BK Spec - Plasma - Plasma   BK Res BKNEG:BK Virus DNA Not Detected copies/mL BK Virus DNA Not Detected - BK Virus DNA Not Detected   BK Log <2.7 Log copies/mL Not Calculated - Not Calculated   BK Quant Log Ext - - - -   BK Quant Result Ext Negative copies/mL - - -   BK Quant Spec Ext - - - -   EBV CAPSID ANTIBODY IGG 0.0 - 0.8 AI - - -   EBV DNA COPIES/ML EBVNEG:EBV DNA Not Detected [Copies]/mL - EBV DNA Not Detected -   EBV DNA LOG OF COPIES <2.7 [Log:copies]/mL - Not Calculated -   Hep B Core NR:Nonreactive - - -        Recent Labs   Lab Test 05/10/21  0825 05/17/21  0821 06/01/21  0816   DOSTAC 05/09/2021 2000 2000 on [id number] tm 2000 5/31/21   TACROL 6.1 6.4 6.3     Recent Labs   Lab Test 05/03/21  0839 05/10/21  0825 05/17/21  0821   DOSMPA 2000 5/2/2021 05/09/2021 2000 2000 on [id number] tm   MPACID 1.82  1.54 1.24   MPAG 139.3* 119.0* 113.7*       Echo  5/2021  Visually Estimated EF: 60-65%   Normal LV size and systolic function.   No significant valvular heart disease noted.   Estimated pulmonary artery pressure of 21.5 mmHg + RA pressure.    NM stress test 4/2021: negative for ischemia   Normal IVC size, >50% collapse with respiration.      Again, thank you for allowing me to participate in the care of your patient.      Sincerely,    Jordy Dewitt MD

## 2021-06-21 NOTE — PATIENT INSTRUCTIONS
You can take lasix 20 mg 1 tab po every other day, monitor weight daily, if weight is up 2 lbs or more swelling, continue to take daily

## 2021-06-21 NOTE — PROGRESS NOTES
Ethel is a 66 year old who is being evaluated via a billable video visit.      How would you like to obtain your AVS? Mail a copy  If the video visit is dropped, the invitation should be resent by: Send to e-mail at: [email or web address]: [place]  Will anyone else be joining your video visit? No      Video Start Time: 2:05  Video-Visit Details    Type of service:  Video Visit    Video End Time:2:23    Originating Location (pt. Location): Home    Distant Location (provider location):  [facility] [place]     Platform used for Video Visit: [employer]      9 month visit    CHRONIC TRANSPLANT NEPHROLOGY VISIT    Assessment & Plan   # DDKT: Stable kidney function, but patient developed new proteinuria, up to ~ 2.5 grams 2/2020, but now down about ~ 0.26 mg/g with up-titration of losartan    Of note, patient does NOT have a known diagnosis of FSGS.  Her biopsy showed 1/4 glomeruli with global glomerulosclerosis and no focal segmental glomerulosclerosis.  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  Will plan to treat proteinuria conservatively at this time with ARB.   - Baseline Creatinine:  ~ 0.8-1.0   - Proteinuria: Moderate (1-3 grams) About 1 gram.   - Date DSA Last Checked: Feb/2021      Latest DSA: No   - BK Viremia: No   - Kidney Tx Biopsy: Mar 03, 2021; Result:  No diagnostic evidence of acute rejection.  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  No interstitial fibrosis or tubular atrophy.             Feb 03, 2021; Result: No diagnostic evidence of acute rejection.  Segmental glomerular basement  membrane irregularity with remodeling and lamellation.  Type IV collagen alpha5 and alpha2 staining was normal.  This was felt to be donor derived.    # Immunosuppression: tacrolimus IR (goal trough 6-8), MMF 1 g po bid          - Continue with intensive monitoring of immunosuppression for efficacy and toxicity.          - Changes: no    # Infection Prophylaxis:   - PJP: Sulfa/TMP (Bactrim)    # Hypertension: controlled;  Goal BP: < 130/80   - Changes:no continue losartan 100 mgpo every day to control BP & proteinuria    # Diabetes: Borderline control (HbA1c 7-9%) Last HbA1c:no checked recently   - Management as per primary care.    # Mineral Bone Disorder:   - Secondary renal hyperparathyroidism; PTH level: Mildly elevated (151-300 pg/ml)        On treatment: None  - Vitamin D; level: Low        On supplement: No; Not on supplement due to high normal calcium level.  - Calcium; level: High normal        On supplement: No    # Electrolytes:   - Potassium; level: Normal        On supplement: No  - Magnesium; level: Normal        On supplement: Yes  - Bicarbonate; level: Normal        On supplement: No    # MGUS: No monoclonal protein seen on latest SPEP and normal kappa/lambda ratio.   - No follow up testing is necessary unless proteinuria significantly worsens.    # GERD: Occasional symptoms, but mostly controlled on famotidine.    # Shortness of breath: on exertion, evaluated by cards, echo & stress test unremarkable    # SADIE on CPAP: Patient is compliant.    # Skin Cancer Risk:    - Discussed sun protection and recommend regular follow up with Dermatology.    # Medical Compliance: Yes     # COVID-19 Virus Review: Discussed COVID-19 virus and the potential medical risks.  Reviewed preventative health recommendations, which includes washing hands for 20 seconds, avoid touching your face, and social distancing.  Asked patient to inform the transplant center if they are exposed or diagnosed with this virus.    # COVID  Vaccine Completed: No    # Transplant History:  Etiology of Kidney Failure: Unknown etiology (Native kidney biopsy showed global glomerulosclerosis)  Tx: DDKT  Transplant: 9/3/2020 (Kidney)  Significant changes in immunosuppression: None  Significant transplant-related complications: None    Transplant Office Phone Number: [phone]    Assessment and plan was discussed with the patient and she voiced her understanding and agreement.    Return visit: 3 months for 1 year visit    Jordy Dewitt MD    Chief Complaint   Ms. Thompson is a 66 year old here for kidney transplant and immunosuppression management.    History of Present Illness   She feels better overall. Leg & arm swelling have resolved, she does note some facial swelling. She continued to have SOB on exertion and was evaluated by cards: echo & stress test were unremarkable. She has been taking famotidine more regularly which has helped resolve some epigastric discomfort she had before. She denies any nausea or vomiting. BP is better controlled with up-titration of losartan. Home readings ~120s/60s today.  She received COVID vaccine    Problem List   Patient Active Problem List   Diagnosis     Elevated serum immunoglobulin free light chain level     Dyslipidemia     Hypothyroidism     SADIE on CPAP     Sensorineural hearing loss (SNHL) of both ears     GERD (gastroesophageal reflux disease)     HTN, kidney transplant related     Hepatitis B core antibody positive     Secondary renal hyperparathyroidism (H)     Memory deficits     Kidney replaced by transplant     Immunosuppression (H)     Aftercare following organ transplant     Vitamin D deficiency     Elevated random blood glucose level     Diabetes mellitus, type 2 (H)       Allergies   Allergies   Allergen Reactions     Amoxicillin-Pot Clavulanate Nausea and Vomiting       Medications   Current Outpatient Medications   Medication Sig     acetaminophen (TYLENOL) 325 MG tablet Take 2 tablets (650 mg) by  mouth every 4 hours as needed for pain     aspirin (ASA) 81 MG EC tablet Take 1 tablet (81 mg) by mouth daily     atorvastatin (LIPITOR) 40 MG tablet Take 0.5 tablets (20 mg) by mouth daily     diclofenac (VOLTAREN) 1 % topical gel Apply 2 g topically 4 times daily     famotidine (PEPCID) 20 MG tablet Take 20 mg by mouth every evening      fish oil-omega-3 fatty acids 1000 MG capsule Take 2 g by mouth daily     fluticasone (FLONASE) 50 MCG/ACT nasal spray Spray 1 spray into both nostrils 2 times daily as needed for rhinitis or allergies     furosemide (LASIX) 20 MG tablet Take 1 tablet (20 mg) by mouth daily     levothyroxine (SYNTHROID/LEVOTHROID) 112 MCG tablet Take 112 mcg (1 tablet) by mouth every Monday, Tuesday is 1/2 tab, Wednesday, Thursday, Friday     Lidocaine (LIDOCARE) 4 % Patch Place 1 patch onto the skin every 24 hours To prevent lidocaine toxicity, patient should be patch free for 12 hrs daily.  Using for back pain     losartan (COZAAR) 100 MG tablet Take 1 tablet (100 mg) by mouth daily     magnesium oxide (MAG-OX) 400 MG tablet Take 1 tablet (400 mg) by mouth daily (with lunch)     melatonin 3 MG tablet Take 6 mg by mouth At Bedtime      mycophenolate (GENERIC EQUIVALENT) 250 MG capsule Take 5 capsules (1,250 mg) by mouth 2 times daily     psyllium (METAMUCIL/KONSYL) 58.6 % powder Take by mouth daily     sulfamethoxazole-trimethoprim (BACTRIM) 400-80 MG tablet Take 1 tablet by mouth daily     tacrolimus (GENERIC EQUIVALENT) 0.5 MG capsule Take 1 capsule (0.5 mg) by mouth every morning Total dose = 1.5 mg in the AM and 1 mg in the PM     tacrolimus (GENERIC EQUIVALENT) 1 MG capsule Take 1 capsule (1 mg) by mouth 2 times daily Total dose = 1.5 in AM and 1 in PM     valACYclovir (VALTREX) 500 MG tablet Take 1 tablet (500 mg) by mouth daily     No current facility-administered medications for this visit.      There are no discontinued medications.    Physical Exam   Vital signs were deferred for this  telemedicine visit.    GENERAL APPEARANCE: alert and no distress  HENT: no obvious abnormalities on appearance  RESP: breathing appears unremarkable with normal rate, no audible wheezing or cough and no apparent shortness of breath with conversation  MS: extremities normal - no gross deformities noted  SKIN: no apparent rash and normal skin tone  NEURO: speech is clear with no obvious neurological deficits  PSYCH: mentation appears normal and affect normal    Data     Renal Latest Ref Rng & Units 6/1/2021 5/17/2021 5/10/2021   Na 133 - 144 mmol/L 135 136 138   Na (external) 135 - 145 mmol/L - - -   K 3.4 - 5.3 mmol/L 4.4 4.8 4.6   K (external) 3.5 - 5.0 mmol/L - - -   Cl 94 - 109 mmol/L 105 105 108   Cl (external) 98 - 110 mmol/L - - -   CO2 20 - 32 mmol/L 25 29 26   CO2 (external) 21 - 31 mmol/L - - -   BUN 7 - 30 mg/dL 28 35(H) 37(H)   BUN (external) 8 - 25 mg/dL - - -   Cr 0.52 - 1.04 mg/dL 0.85 1.01 0.83   Cr (external) 0.57 - 1.11 mg/dL - - -   Glucose 70 - 99 mg/dL 160(H) 164(H) 170(H)   Glucose (external) 65 - 100 mg/dL - - -   Ca  8.5 - 10.1 mg/dL 10.0 9.8 9.6   Ca (external) 8.5 - 10.5 mg/dL - - -   Mg 1.6 - 2.3 mg/dL - - -   Mg (external) 1.6 - 2.6 mg/dL - - -     Bone Health Latest Ref Rng & Units 4/19/2021 3/8/2021 2/1/2021   Phos 2.5 - 4.5 mg/dL 3.5 3.9 2.8   Phos (external) 2.3 - 4.7 mg/dL - - -   PTHi 18 - 80 pg/mL - - -   Vit D Def 20 - 75 ug/L - - -     Heme Latest Ref Rng & Units 6/1/2021 5/17/2021 5/10/2021   WBC 4.0 - 11.0 10e9/L 6.4 5.1 4.8   WBC (external) 4.5 - 11.0 thou/cu mm - - -   Hgb 11.7 - 15.7 g/dL 11.8 11.5(L) 11.4(L)   Hgb (external) 12.0 - 16.0 g/dL - - -   Plt 150 - 450 10e9/L 225 206 214   Plt (external) 140 - 440 thou/cu mm - - -   ABSOLUTE NEUTROPHIL 1.6 - 8.3 10e9/L - - 2.6   ABSOLUTE LYMPHOCYTES 0.8 - 5.3 10e9/L - - 1.6   ABSOLUTE MONOCYTES 0.0 - 1.3 10e9/L - - 0.4   ABSOLUTE EOSINOPHILS 0.0 - 0.7 10e9/L - - 0.2   ABSOLUTE BASOPHILS 0.0 - 0.2 10e9/L - - 0.0   ABS IMMATURE  GRANULOCYTES 0 - 0.4 10e9/L - - -   ABSOLUTE NUCLEATED RBC - - - -     Liver Latest Ref Rng & Units 3/1/2021 1/25/2021 9/9/2020   AP 40 - 150 U/L 195(H) 189(H) -   TBili 0.2 - 1.3 mg/dL 0.6 0.5 -   DBili 0.0 - 0.2 mg/dL 0.1 - -   ALT 0 - 50 U/L 42 37 -   AST 0 - 45 U/L 26 24 -   Tot Protein 6.8 - 8.8 g/dL 7.0 6.9 -   Albumin 3.4 - 5.0 g/dL 3.7 3.8 2.9(L)     Pancreas Latest Ref Rng & Units 3/1/2021 1/25/2021 9/3/2020   A1C 0 - 5.6 % 7.9(H) 7.3(H) 5.4     Iron studies Latest Ref Rng & Units 9/9/2020   Iron 35 - 180 ug/dL 162   Iron sat 15 - 46 % 92(H)   Ferritin 8 - 252 ng/mL 1,757(H)     [facility] Txp Virology Latest Ref Rng & Units 5/17/2021 5/3/2021 4/19/2021   CVM DNA Quant - - EDTAP -   CMV QUANT IU/ML CMVND:CMV DNA Not Detected [IU]/mL - CMV DNA Not Detected -   LOG IU/ML OF CMVQNT <2.1 [Log:IU]/mL - Not Calculated -   BK Spec - Plasma - Plasma   BK Res BKNEG:BK Virus DNA Not Detected copies/mL BK Virus DNA Not Detected - BK Virus DNA Not Detected   BK Log <2.7 Log copies/mL Not Calculated - Not Calculated   BK Quant Log Ext - - - -   BK Quant Result Ext Negative copies/mL - - -   BK Quant Spec Ext - - - -   EBV CAPSID ANTIBODY IGG 0.0 - 0.8 AI - - -   EBV DNA COPIES/ML EBVNEG:EBV DNA Not Detected [Copies]/mL - EBV DNA Not Detected -   EBV DNA LOG OF COPIES <2.7 [Log:copies]/mL - Not Calculated -   Hep B Core NR:Nonreactive - - -        Recent Labs   Lab Test 05/10/21  0825 05/17/21  0821 06/01/21  0816   DOSTAC 05/09/2021 2000 2000 on [id number] tm 2000 5/31/21   TACROL 6.1 6.4 6.3     Recent Labs   Lab Test 05/03/21  0839 05/10/21  0825 05/17/21  0821   DOSMPA 2000 5/2/2021 05/09/2021 2000 2000 on [id number] tm   MPACID 1.82 1.54 1.24   MPAG 139.3* 119.0* 113.7*       Echo  5/2021  Visually Estimated EF: 60-65%   Normal LV size and systolic function.   No significant valvular heart disease noted.   Estimated pulmonary artery pressure of 21.5 mmHg + RA pressure.    NM stress test 4/2021: negative for ischemia   Normal IVC  size, >50% collapse with respiration.

## 2021-06-22 ENCOUNTER — TELEPHONE (OUTPATIENT)
Dept: TRANSPLANT | Facility: CLINIC | Age: 66
End: 2021-06-22

## 2021-06-22 DIAGNOSIS — Z94.0 KIDNEY REPLACED BY TRANSPLANT: Primary | ICD-10-CM

## 2021-06-28 DIAGNOSIS — Z79.899 ENCOUNTER FOR LONG-TERM CURRENT USE OF MEDICATION: ICD-10-CM

## 2021-06-28 DIAGNOSIS — Z94.0 KIDNEY REPLACED BY TRANSPLANT: ICD-10-CM

## 2021-06-28 DIAGNOSIS — Z48.298 AFTERCARE FOLLOWING ORGAN TRANSPLANT: ICD-10-CM

## 2021-06-28 LAB
ANION GAP SERPL CALCULATED.3IONS-SCNC: 5 MMOL/L (ref 3–14)
BUN SERPL-MCNC: 39 MG/DL (ref 7–30)
CALCIUM SERPL-MCNC: 9.9 MG/DL (ref 8.5–10.1)
CHLORIDE SERPL-SCNC: 107 MMOL/L (ref 94–109)
CO2 SERPL-SCNC: 25 MMOL/L (ref 20–32)
CREAT SERPL-MCNC: 0.92 MG/DL (ref 0.52–1.04)
CREAT UR-MCNC: 118 MG/DL
ERYTHROCYTE [DISTWIDTH] IN BLOOD BY AUTOMATED COUNT: 12.8 % (ref 10–15)
GFR SERPL CREATININE-BSD FRML MDRD: 64 ML/MIN/{1.73_M2}
GLUCOSE SERPL-MCNC: 168 MG/DL (ref 70–99)
HCT VFR BLD AUTO: 35.8 % (ref 35–47)
HGB BLD-MCNC: 11.3 G/DL (ref 11.7–15.7)
MAGNESIUM SERPL-MCNC: 2.3 MG/DL (ref 1.6–2.3)
MCH RBC QN AUTO: 32 PG (ref 26.5–33)
MCHC RBC AUTO-ENTMCNC: 31.6 G/DL (ref 31.5–36.5)
MCV RBC AUTO: 101 FL (ref 78–100)
PLATELET # BLD AUTO: 222 10E9/L (ref 150–450)
POTASSIUM SERPL-SCNC: 4.5 MMOL/L (ref 3.4–5.3)
PROT UR-MCNC: 0.24 G/L
PROT/CREAT 24H UR: 0.2 G/G CR (ref 0–0.2)
RBC # BLD AUTO: 3.53 10E12/L (ref 3.8–5.2)
SODIUM SERPL-SCNC: 137 MMOL/L (ref 133–144)
TACROLIMUS BLD-MCNC: 5.9 UG/L (ref 5–15)
TME LAST DOSE: NORMAL H
WBC # BLD AUTO: 4.2 10E9/L (ref 4–11)

## 2021-06-28 PROCEDURE — 84156 ASSAY OF PROTEIN URINE: CPT | Performed by: INTERNAL MEDICINE

## 2021-06-28 PROCEDURE — 36415 COLL VENOUS BLD VENIPUNCTURE: CPT | Performed by: INTERNAL MEDICINE

## 2021-06-28 PROCEDURE — 83735 ASSAY OF MAGNESIUM: CPT | Performed by: INTERNAL MEDICINE

## 2021-06-28 PROCEDURE — 80048 BASIC METABOLIC PNL TOTAL CA: CPT | Performed by: INTERNAL MEDICINE

## 2021-06-28 PROCEDURE — 87799 DETECT AGENT NOS DNA QUANT: CPT | Performed by: INTERNAL MEDICINE

## 2021-06-28 PROCEDURE — 85027 COMPLETE CBC AUTOMATED: CPT | Performed by: INTERNAL MEDICINE

## 2021-06-28 PROCEDURE — 80197 ASSAY OF TACROLIMUS: CPT | Performed by: INTERNAL MEDICINE

## 2021-07-26 ENCOUNTER — LAB (OUTPATIENT)
Dept: LAB | Facility: CLINIC | Age: 66
End: 2021-07-26
Payer: MEDICARE

## 2021-07-26 DIAGNOSIS — Z94.0 KIDNEY REPLACED BY TRANSPLANT: ICD-10-CM

## 2021-07-26 DIAGNOSIS — Z79.899 ENCOUNTER FOR LONG-TERM CURRENT USE OF MEDICATION: ICD-10-CM

## 2021-07-26 DIAGNOSIS — Z48.298 AFTERCARE FOLLOWING ORGAN TRANSPLANT: ICD-10-CM

## 2021-07-26 LAB
ANION GAP SERPL CALCULATED.3IONS-SCNC: 6 MMOL/L (ref 3–14)
BUN SERPL-MCNC: 37 MG/DL (ref 7–30)
CALCIUM SERPL-MCNC: 9.9 MG/DL (ref 8.5–10.1)
CHLORIDE BLD-SCNC: 108 MMOL/L (ref 94–109)
CO2 SERPL-SCNC: 24 MMOL/L (ref 20–32)
CREAT SERPL-MCNC: 1.06 MG/DL (ref 0.52–1.04)
CREAT UR-MCNC: 114 MG/DL
ERYTHROCYTE [DISTWIDTH] IN BLOOD BY AUTOMATED COUNT: 12.3 % (ref 10–15)
GFR SERPL CREATININE-BSD FRML MDRD: 54 ML/MIN/1.73M2
GLUCOSE BLD-MCNC: 148 MG/DL (ref 70–99)
HCT VFR BLD AUTO: 35.8 % (ref 35–47)
HGB BLD-MCNC: 11.6 G/DL (ref 11.7–15.7)
MCH RBC QN AUTO: 32.7 PG (ref 26.5–33)
MCHC RBC AUTO-ENTMCNC: 32.4 G/DL (ref 31.5–36.5)
MCV RBC AUTO: 101 FL (ref 78–100)
PLATELET # BLD AUTO: 231 10E3/UL (ref 150–450)
POTASSIUM BLD-SCNC: 4.5 MMOL/L (ref 3.4–5.3)
PROT UR-MCNC: 0.19 G/L
PROT/CREAT 24H UR: 0.17 G/G CR (ref 0–0.2)
RBC # BLD AUTO: 3.55 10E6/UL (ref 3.8–5.2)
SODIUM SERPL-SCNC: 138 MMOL/L (ref 133–144)
WBC # BLD AUTO: 5.2 10E3/UL (ref 4–11)

## 2021-07-26 PROCEDURE — 80048 BASIC METABOLIC PNL TOTAL CA: CPT

## 2021-07-26 PROCEDURE — 80197 ASSAY OF TACROLIMUS: CPT

## 2021-07-26 PROCEDURE — 36415 COLL VENOUS BLD VENIPUNCTURE: CPT

## 2021-07-26 PROCEDURE — 84156 ASSAY OF PROTEIN URINE: CPT

## 2021-07-26 PROCEDURE — 87799 DETECT AGENT NOS DNA QUANT: CPT

## 2021-07-26 PROCEDURE — 85027 COMPLETE CBC AUTOMATED: CPT

## 2021-07-27 LAB
BKV DNA # SPEC NAA+PROBE: NOT DETECTED COPIES/ML
TACROLIMUS BLD-MCNC: 6.1 UG/L (ref 5–15)
TME LAST DOSE: NORMAL H
TME LAST DOSE: NORMAL H

## 2021-08-27 ENCOUNTER — LAB (OUTPATIENT)
Dept: LAB | Facility: CLINIC | Age: 66
End: 2021-08-27
Payer: MEDICARE

## 2021-08-27 DIAGNOSIS — Z48.298 AFTERCARE FOLLOWING ORGAN TRANSPLANT: ICD-10-CM

## 2021-08-27 DIAGNOSIS — Z79.899 ENCOUNTER FOR LONG-TERM CURRENT USE OF MEDICATION: ICD-10-CM

## 2021-08-27 DIAGNOSIS — Z94.0 KIDNEY REPLACED BY TRANSPLANT: ICD-10-CM

## 2021-08-27 LAB
ANION GAP SERPL CALCULATED.3IONS-SCNC: 4 MMOL/L (ref 3–14)
BUN SERPL-MCNC: 41 MG/DL (ref 7–30)
CALCIUM SERPL-MCNC: 9.9 MG/DL (ref 8.5–10.1)
CHLORIDE BLD-SCNC: 111 MMOL/L (ref 94–109)
CO2 SERPL-SCNC: 22 MMOL/L (ref 20–32)
CREAT SERPL-MCNC: 0.95 MG/DL (ref 0.52–1.04)
CREAT UR-MCNC: 100 MG/DL
ERYTHROCYTE [DISTWIDTH] IN BLOOD BY AUTOMATED COUNT: 12.6 % (ref 10–15)
GFR SERPL CREATININE-BSD FRML MDRD: 62 ML/MIN/1.73M2
GLUCOSE BLD-MCNC: 145 MG/DL (ref 70–99)
HCT VFR BLD AUTO: 36 % (ref 35–47)
HGB BLD-MCNC: 11.6 G/DL (ref 11.7–15.7)
MAGNESIUM SERPL-MCNC: 2.2 MG/DL (ref 1.6–2.3)
MCH RBC QN AUTO: 32.7 PG (ref 26.5–33)
MCHC RBC AUTO-ENTMCNC: 32.2 G/DL (ref 31.5–36.5)
MCV RBC AUTO: 101 FL (ref 78–100)
PLATELET # BLD AUTO: 228 10E3/UL (ref 150–450)
POTASSIUM BLD-SCNC: 5 MMOL/L (ref 3.4–5.3)
PROT UR-MCNC: 0.14 G/L
PROT/CREAT 24H UR: 0.14 G/G CR (ref 0–0.2)
RBC # BLD AUTO: 3.55 10E6/UL (ref 3.8–5.2)
SODIUM SERPL-SCNC: 137 MMOL/L (ref 133–144)
TACROLIMUS BLD-MCNC: 6.7 UG/L (ref 5–15)
TME LAST DOSE: NORMAL H
TME LAST DOSE: NORMAL H
WBC # BLD AUTO: 5.2 10E3/UL (ref 4–11)

## 2021-08-27 PROCEDURE — 80197 ASSAY OF TACROLIMUS: CPT

## 2021-08-27 PROCEDURE — 36415 COLL VENOUS BLD VENIPUNCTURE: CPT

## 2021-08-27 PROCEDURE — 83735 ASSAY OF MAGNESIUM: CPT

## 2021-08-27 PROCEDURE — 87799 DETECT AGENT NOS DNA QUANT: CPT

## 2021-08-27 PROCEDURE — 80048 BASIC METABOLIC PNL TOTAL CA: CPT

## 2021-08-27 PROCEDURE — 84156 ASSAY OF PROTEIN URINE: CPT

## 2021-08-27 PROCEDURE — 85027 COMPLETE CBC AUTOMATED: CPT

## 2021-08-30 LAB — BKV DNA # SPEC NAA+PROBE: NOT DETECTED COPIES/ML

## 2021-09-08 ENCOUNTER — TELEPHONE (OUTPATIENT)
Dept: TRANSPLANT | Facility: CLINIC | Age: 66
End: 2021-09-08

## 2021-09-08 NOTE — TELEPHONE ENCOUNTER
Post Kidney and Pancreas Transplant Team Conference  Date: 9/8/2021  Transplant Coordinator: Tiffany Paul     Attendees:  [x]  Dr. Sheridan  [x] Bridget Marquis LPN     [x]  Dr. Sifuentes [x] Tiffany Paul RN [] Alyssa Parisi LPN   [x]  Dr. Dewitt [x] Nette Valadez, SUN    [x]  Dr. Wilson [] Ade Marx, SUN [x] Patrick Mireles, PharmD   [] Dr. Awad [] Hui Crowley, RN    [] Dr. Ribeiro [] Cesar Max RN    [] Dr. Alonzo [] Jennie Dela Cruz RN    [] Dr. Phoenix [] Janett Smith RN    []  Dr. Bansal [] Aleja Francis, SUN    [] Surgery Fellow [x] Crissy Pulido RN    [x] Monika Tinoco, YAKELIN [] Laura Romero RN        Verbal Plan Read Back:   No changes at this time    Routed to RN Coordinator   Bridget Marquis LPN

## 2021-09-10 DIAGNOSIS — R80.1 PERSISTENT PROTEINURIA: ICD-10-CM

## 2021-09-10 RX ORDER — FUROSEMIDE 20 MG
20 TABLET ORAL DAILY
Qty: 90 TABLET | Refills: 1 | Status: SHIPPED | OUTPATIENT
Start: 2021-09-10 | End: 2021-09-16

## 2021-09-13 ENCOUNTER — TELEPHONE (OUTPATIENT)
Dept: TRANSPLANT | Facility: CLINIC | Age: 66
End: 2021-09-13

## 2021-09-13 NOTE — TELEPHONE ENCOUNTER
One year post kidney transplant  9/3/2021    Task   Confirm follow up appointment with  Transplant  Nephrologist   Due for one year  Donor Specific Antibodies    Please update  Epic orders  And lab letter  Continues on monthly urine protein      Please send updated lab letter and one year post patient education letter to patient

## 2021-09-16 ENCOUNTER — OFFICE VISIT (OUTPATIENT)
Dept: NEPHROLOGY | Facility: CLINIC | Age: 66
End: 2021-09-16
Attending: INTERNAL MEDICINE
Payer: MEDICARE

## 2021-09-16 VITALS
WEIGHT: 132.7 LBS | OXYGEN SATURATION: 97 % | DIASTOLIC BLOOD PRESSURE: 64 MMHG | BODY MASS INDEX: 26.75 KG/M2 | TEMPERATURE: 98.1 F | HEIGHT: 59 IN | HEART RATE: 85 BPM | RESPIRATION RATE: 18 BRPM | SYSTOLIC BLOOD PRESSURE: 148 MMHG

## 2021-09-16 DIAGNOSIS — Z94.0 KIDNEY REPLACED BY TRANSPLANT: ICD-10-CM

## 2021-09-16 DIAGNOSIS — R80.1 PERSISTENT PROTEINURIA: ICD-10-CM

## 2021-09-16 DIAGNOSIS — E11.9 NEW ONSET TYPE 2 DIABETES MELLITUS (H): Primary | ICD-10-CM

## 2021-09-16 PROCEDURE — G0463 HOSPITAL OUTPT CLINIC VISIT: HCPCS

## 2021-09-16 PROCEDURE — 99214 OFFICE O/P EST MOD 30 MIN: CPT | Performed by: INTERNAL MEDICINE

## 2021-09-16 RX ORDER — FUROSEMIDE 20 MG
20 TABLET ORAL DAILY
Qty: 90 TABLET | Refills: 3 | Status: SHIPPED | OUTPATIENT
Start: 2021-09-16 | End: 2022-07-11

## 2021-09-16 RX ORDER — SULFAMETHOXAZOLE AND TRIMETHOPRIM 400; 80 MG/1; MG/1
1 TABLET ORAL DAILY
Qty: 90 TABLET | Refills: 3 | Status: SHIPPED | OUTPATIENT
Start: 2021-09-16 | End: 2022-01-26

## 2021-09-16 ASSESSMENT — PAIN SCALES - GENERAL: PAINLEVEL: NO PAIN (0)

## 2021-09-16 ASSESSMENT — MIFFLIN-ST. JEOR: SCORE: 1042.8

## 2021-09-16 NOTE — NURSING NOTE
"Chief Complaint   Patient presents with     RECHECK     S/P Kidney TX 9/3/2020       Will need refills on Magnesium and Bactrim     Vital signs:  Temp: 98.1  F (36.7  C) Temp src: Oral BP: (!) 148/64 Pulse: 85   Resp: 18 SpO2: 97 %     Height: 149.9 cm (4' 11.02\") Weight: 60.2 kg (132 lb 11.2 oz)  Estimated body mass index is 26.79 kg/m  as calculated from the following:    Height as of this encounter: 1.499 m (4' 11.02\").    Weight as of this encounter: 60.2 kg (132 lb 11.2 oz).      Sada Barr, [facility]  9/16/2021 12:38 PM      "

## 2021-09-16 NOTE — LETTER
9/16/2021      RE: Ethel Blow  [address] [place]   [place] [address]       CHRONIC TRANSPLANT NEPHROLOGY VISIT    Recommendations   1. Endocrinology referral for new onset T2 DM   2. Stop Magnesium supplement   3.  Vaccines needs top be updated   --  Patient notified that patient is eligible now for booster shot : Pfizer  --  Patient advised to take flu shot .  -- Patient advised to take Pneumovac 23   Patient took shingles vaccine in [facility]  - advised her to sent the records to her coordinator . Advised her that she can only take shingrix vaccine .Patient advised she cannot take any live vaccine   4.  Patient advised to take vaccines which are due one at a time with 1-2 weeks in between them       Assessment & Plan   # DDKT: Stable    Patient had developed new proteinuria, up to ~ 2.5 grams 2/2020, but that improved with up-titration of losartan. Currently 0.14 g.gCr ( Aug/2021) .Patient does NOT have a known diagnosis of FSGS.  Her biopsy showed 1/4 glomeruli with global glomerulosclerosis and no focal segmental glomerulosclerosis.  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  Will plan to treat proteinuria conservatively at this time with ARB.   - Baseline Creatinine:  ~ 0.8-1.0   - Proteinuria: Moderate (1-3 grams) About 1 gram.   - Date DSA Last Checked: Apr/2021      Latest DSA: No   - BK Viremia: No   - Kidney Tx Biopsy: Mar 03, 2021; Result:  No diagnostic evidence of acute rejection.  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  No interstitial fibrosis or tubular atrophy.             Feb  03, 2021; Result: No diagnostic evidence of acute rejection.  Segmental glomerular basement membrane irregularity with remodeling and lamellation.  Type IV collagen alpha5 and alpha2 staining was normal.  This was felt to be donor derived.  Allosure  0.17 ( 6/28/21)     Patient does use topical NSAID gel( diclofena). She was counselled , this does have systemmic absorption and can adversely effect the allograft       # Immunosuppression: tacrolimus IR (goal trough 6-8), MMF 1250 mg BID           - Continue with intensive monitoring of immunosuppression for efficacy and toxicity.          - Changes: could redce to 4-6 now that she is 1 yr from Tx  Tac trough level 6.7  ( 8/27/21)     # Infection Prophylaxis:   - PJP: Sulfa/TMP (Bactrim)     # Recurrent cold sores : Controlled now. on  Valtrex 500 mg daily for cold sore prophylaxis     # Hypertension: controlled;  Goal BP: < 130/80   - Changes:No ,continue losartan 100 mg po every day to control BP & proteinuria. Also on Lasix 20 mg daily     # Diabetes: Borderline control (HbA1c 7-9%) Last HbA1c: 8.5% ( March/2021)   - Management as per primary care.              - will refer you to endocrinology     # Mineral Bone Disorder:   - Secondary renal hyperparathyroidism; PTH level: Mildly elevated (151-300 pg/ml)        On treatment: None  - Vitamin D; level: Low        On supplement: No; Not on supplement due to high normal calcium level.Calcium has normalized now   - Calcium; level: Normal        On supplement: No    # Electrolytes:   - Potassium; level: Normal        On supplement: No  - Magnesium; level: Normal        On supplement: Yes - stop mag ox  - Bicarbonate; level: Normal        On supplement: No    # MGUS: No monoclonal protein seen on latest SPEP and normal kappa/lambda ratio.   - No follow up testing is necessary unless proteinuria significantly worsens.    # GERD: Occasional symptoms, but mostly controlled on famotidine.    # Shortness of breath: on  exertion, evaluated by cards, echo & stress test unremarkable    # SADIE on CPAP: Patient is compliant.    # Skin Cancer Risk:    - Discussed sun protection and recommend regular follow up with Dermatology.    # Medical Compliance: Yes       Vaccines :    # COVID-19 Virus Review: Discussed COVID-19 virus and the potential medical risks.  Reviewed preventative health recommendations, which includes washing hands for at least  20 seconds, avoid touching your face, and social distancing.  Asked patient to inform the transplant center if they are exposed or diagnosed with this virus.  --  COVID19 Vaccine status : taken 2 doses ( pfizer ) April 2021   --  Patient notified that patient is eligible now for booster shot : Pfizer    # Patient advised to take flu shot .  # Patient advised to take Pneumovac 23   # Patient took shingles vaccine in [facility]  - advised her to sent the records to her coordinator . Advised her that she can only take shingrix vaccine .  Patient advised she cannot take any live vaccine   # Patient advised to take vaccines which are due one at a time with 1-2 weeks in between them         # Transplant History:  Etiology of Kidney Failure: Unknown etiology (Native kidney biopsy showed global glomerulosclerosis)  Tx: DDKT  Transplant: 9/3/2020 (Kidney)  Significant changes in immunosuppression: None  Significant transplant-related complications: None    Transplant Office Phone Number: [phone]    Assessment and plan was discussed with the patient and she voiced her understanding and agreement.    Return visit: 6 months   Patient staffed with Dr Ronal Strange MD    Chief Complaint   Ms. Thompson is a 66 year old here for kidney transplant and immunosuppression management.    History of Present Illness      No SOB/CP  No dizziness/lightheadedness  No Leg swelling   No cough/fever/chills  Appetite is good. No unintentional weight loss   No Abd pain/N/V/D/Constipation.   No voiding  difficulty/hematuria /flank pain  No focal weakness/altered sensation.  No rash     Home vitals :     Problem List   Patient Active Problem List   Diagnosis     Elevated serum immunoglobulin free light chain level     Dyslipidemia     Hypothyroidism     SAIDE on CPAP     Sensorineural hearing loss (SNHL) of both ears     GERD (gastroesophageal reflux disease)     HTN, kidney transplant related     Hepatitis B core antibody positive     Secondary renal hyperparathyroidism (H)     Memory deficits     Kidney replaced by transplant     Immunosuppression (H)     Aftercare following organ transplant     Vitamin D deficiency     Elevated random blood glucose level     Diabetes mellitus, type 2 (H)       Allergies   Allergies   Allergen Reactions     Amoxicillin-Pot Clavulanate Nausea and Vomiting       Medications   Current Outpatient Medications   Medication Sig     acetaminophen (TYLENOL) 325 MG tablet Take 2 tablets (650 mg) by mouth every 4 hours as needed for pain     aspirin (ASA) 81 MG EC tablet Take 1 tablet (81 mg) by mouth daily     atorvastatin (LIPITOR) 40 MG tablet Take 0.5 tablets (20 mg) by mouth daily     diclofenac (VOLTAREN) 1 % topical gel Apply 2 g topically 4 times daily as needed      famotidine (PEPCID) 20 MG tablet Take 20 mg by mouth every evening      fluticasone (FLONASE) 50 MCG/ACT nasal spray Spray 1 spray into both nostrils 2 times daily as needed for rhinitis or allergies     furosemide (LASIX) 20 MG tablet Take 1 tablet (20 mg) by mouth daily     levothyroxine (SYNTHROID/LEVOTHROID) 112 MCG tablet Take 112 mcg (1 tablet) by mouth every Monday, Tuesday is 1/2 tab, 1 tab Wednesday, 1 tab Thursday, tab 1 Friday     Lidocaine (LIDOCARE) 4 % Patch Place 1 patch onto the skin every 24 hours To prevent lidocaine toxicity, patient should be patch free for 12 hrs daily.  Using for back pain     losartan (COZAAR) 100 MG tablet Take 1 tablet (100 mg) by mouth daily     magnesium oxide (MAG-OX) 400 MG  tablet Take 1 tablet (400 mg) by mouth daily (with lunch)     melatonin 3 MG tablet Take 6 mg by mouth At Bedtime      mycophenolate (GENERIC EQUIVALENT) 250 MG capsule Take 5 capsules (1,250 mg) by mouth 2 times daily     psyllium (METAMUCIL/KONSYL) 58.6 % powder Take by mouth daily     sulfamethoxazole-trimethoprim (BACTRIM) 400-80 MG tablet Take 1 tablet by mouth daily     tacrolimus (GENERIC EQUIVALENT) 0.5 MG capsule Take 1 capsule (0.5 mg) by mouth every morning Total dose = 1.5 mg in the AM and 1 mg in the PM     tacrolimus (GENERIC EQUIVALENT) 1 MG capsule Take 1 capsule (1 mg) by mouth 2 times daily Total dose = 1.5 in AM and 1 in PM     valACYclovir (VALTREX) 500 MG tablet Take 1 tablet (500 mg) by mouth daily     fish oil-omega-3 fatty acids 1000 MG capsule Take 2 g by mouth daily (Patient not taking: Reported on 9/16/2021)     No current facility-administered medications for this visit.     There are no discontinued medications.    Physical Exam   Vital signs were deferred for this telemedicine visit.    GENERAL APPEARANCE: alert and no distress  HENT: no obvious abnormalities on appearance  RESP: breathing appears unremarkable with normal rate, no audible wheezing or cough and no apparent shortness of breath with conversation  MS: extremities normal - no gross deformities noted  SKIN: no apparent rash and normal skin tone  NEURO: speech is clear with no obvious neurological deficits  PSYCH: mentation appears normal and affect normal  Allograft - non tender .     Data     Renal Latest Ref Rng & Units 8/27/2021 7/26/2021 6/28/2021   Na 133 - 144 mmol/L 137 138 137   Na (external) 135 - 145 mmol/L - - -   K 3.4 - 5.3 mmol/L 5.0 4.5 4.5   K (external) 3.5 - 5.0 mmol/L - - -   Cl 94 - 109 mmol/L 111(H) 108 107   Cl (external) 98 - 110 mmol/L - - -   CO2 20 - 32 mmol/L 22 24 25   CO2 (external) 21 - 31 mmol/L - - -   BUN 7 - 30 mg/dL 41(H) 37(H) 39(H)   BUN (external) 8 - 25 mg/dL - - -   Cr 0.52 - 1.04 mg/dL  0.95 1.06(H) 0.92   Cr (external) 0.57 - 1.11 mg/dL - - -   Glucose 70 - 99 mg/dL 145(H) 148(H) 168(H)   Glucose (external) 65 - 100 mg/dL - - -   Ca  8.5 - 10.1 mg/dL 9.9 9.9 9.9   Ca (external) 8.5 - 10.5 mg/dL - - -   Mg 1.6 - 2.3 mg/dL 2.2 - 2.3   Mg (external) 1.6 - 2.6 mg/dL - - -     Bone Health Latest Ref Rng & Units 4/19/2021 3/8/2021 2/1/2021   Phos 2.5 - 4.5 mg/dL 3.5 3.9 2.8   Phos (external) 2.3 - 4.7 mg/dL - - -   PTHi 18 - 80 pg/mL - - -   Vit D Def 20 - 75 ug/L - - -     Heme Latest Ref Rng & Units 8/27/2021 7/26/2021 6/28/2021   WBC 4.0 - 11.0 10e3/uL 5.2 5.2 4.2   WBC (external) 4.5 - 11.0 thou/cu mm - - -   Hgb 11.7 - 15.7 g/dL 11.6(L) 11.6(L) 11.3(L)   Hgb (external) 12.0 - 16.0 g/dL - - -   Plt 150 - 450 10e3/uL 228 231 222   Plt (external) 140 - 440 thou/cu mm - - -   ABSOLUTE NEUTROPHIL 1.6 - 8.3 10e9/L - - -   ABSOLUTE LYMPHOCYTES 0.8 - 5.3 10e9/L - - -   ABSOLUTE MONOCYTES 0.0 - 1.3 10e9/L - - -   ABSOLUTE EOSINOPHILS 0.0 - 0.7 10e9/L - - -   ABSOLUTE BASOPHILS 0.0 - 0.2 10e9/L - - -   ABS IMMATURE GRANULOCYTES 0 - 0.4 10e9/L - - -   ABSOLUTE NUCLEATED RBC - - - -     Liver Latest Ref Rng & Units 3/1/2021 1/25/2021 9/9/2020   AP 40 - 150 U/L 195(H) 189(H) -   TBili 0.2 - 1.3 mg/dL 0.6 0.5 -   DBili 0.0 - 0.2 mg/dL 0.1 - -   ALT 0 - 50 U/L 42 37 -   AST 0 - 45 U/L 26 24 -   Tot Protein 6.8 - 8.8 g/dL 7.0 6.9 -   Albumin 3.4 - 5.0 g/dL 3.7 3.8 2.9(L)     Pancreas Latest Ref Rng & Units 3/1/2021 1/25/2021 9/3/2020   A1C 0 - 5.6 % 7.9(H) 7.3(H) 5.4     Iron studies Latest Ref Rng & Units 9/9/2020   Iron 35 - 180 ug/dL 162   Iron sat 15 - 46 % 92(H)   Ferritin 8 - 252 ng/mL 1,757(H)     [facility] Txp Virology Latest Ref Rng & Units 6/28/2021 5/17/2021 5/3/2021   CVM DNA Quant - - - EDTAP   CMV QUANT IU/ML CMVND:CMV DNA Not Detected [IU]/mL - - CMV DNA Not Detected   LOG IU/ML OF CMVQNT <2.1 [Log:IU]/mL - - Not Calculated   BK Spec - Plasma Plasma -   BK Res BKNEG:BK Virus DNA Not Detected copies/mL BK  Virus DNA Not Detected BK Virus DNA Not Detected -   BK Log <2.7 Log copies/mL Not Calculated Not Calculated -   BK Quant Log Ext - - - -   BK Quant Result Ext Negative copies/mL - - -   BK Quant Spec Ext - - - -   EBV CAPSID ANTIBODY IGG 0.0 - 0.8 AI - - -   EBV DNA COPIES/ML EBVNEG:EBV DNA Not Detected [Copies]/mL - - EBV DNA Not Detected   EBV DNA LOG OF COPIES <2.7 [Log:copies]/mL - - Not Calculated   Hep B Core NR:Nonreactive - - -        Recent Labs   Lab Test 06/28/21  0809 07/26/21  0839 08/27/21  0812   DOSTAC 2000 on [id number] tm 7/25/2021 8/26/2021   TACROL 5.9 6.1 6.7     Recent Labs   Lab Test 05/03/21  0839 05/10/21  0825 05/17/21  0821   DOSMPA 2000 5/2/2021 05/09/2021 2000 2000 on [id number] tm   MPACID 1.82 1.54 1.24   MPAG 139.3* 119.0* 113.7*       Echo  5/2021  Visually Estimated EF: 60-65%   Normal LV size and systolic function.   No significant valvular heart disease noted.   Estimated pulmonary artery pressure of 21.5 mmHg + RA pressure.    NM stress test 4/2021: negative for ischemia   Normal IVC size, >50% collapse with respiration.    This patient has been seen and evaluated by me, Jordy Dewitt MD.  I have reviewed the note and agree with plan of care as documented by the fellow.  Jordy Dewitt MD on 9/16/2021 at 2:12 PM        Jordy Dewitt MD

## 2021-09-16 NOTE — PATIENT INSTRUCTIONS
Recommendations   1. Endocrinology referal for new onset Type 2 diabetes mellitus  2. Stop Magnesium supplement   3. You are due for  covid 19 vaccine booster shot : Pfizer  4. You are due for flu shot ,Pneumovac 23 .  5. Please ensure you are not taking any live vaccine . For shingles - you can only take Shingrix vaccine  6. Please send you vaccine  records to  your coordinator   7. Take vaccines which are due one at a time with 1-2 weeks in between them

## 2021-09-16 NOTE — PROGRESS NOTES
CHRONIC TRANSPLANT NEPHROLOGY VISIT    Recommendations   1. Endocrinology referral for new onset T2 DM   2. Stop Magnesium supplement   3.  Vaccines needs top be updated   --  Patient notified that patient is eligible now for booster shot : Pfizer  --  Patient advised to take flu shot .  -- Patient advised to take Pneumovac 23   Patient took shingles vaccine in [facility]  - advised her to sent the records to her coordinator . Advised her that she can only take shingrix vaccine .Patient advised she cannot take any live vaccine   4.  Patient advised to take vaccines which are due one at a time with 1-2 weeks in between them       Assessment & Plan   # DDKT: Stable    Patient had developed new proteinuria, up to ~ 2.5 grams 2/2020, but that improved with up-titration of losartan. Currently 0.14 g.gCr ( Aug/2021) .Patient does NOT have a known diagnosis of FSGS.  Her biopsy showed 1/4 glomeruli with global glomerulosclerosis and no focal segmental glomerulosclerosis.  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  Will plan to treat proteinuria conservatively at this time with ARB.   - Baseline Creatinine:  ~ 0.8-1.0   - Proteinuria: Moderate (1-3 grams) About 1 gram.   - Date DSA Last Checked: Apr/2021      Latest DSA: No   - BK Viremia: No   - Kidney Tx Biopsy: Mar 03, 2021; Result:  No diagnostic evidence of acute rejection.  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  No interstitial fibrosis or tubular atrophy.             Feb 03, 2021; Result: No diagnostic evidence of acute rejection.  Segmental  glomerular basement membrane irregularity with remodeling and lamellation.  Type IV collagen alpha5 and alpha2 staining was normal.  This was felt to be donor derived.  Allosure  0.17 ( 6/28/21)     Patient does use topical NSAID gel( diclofena). She was counselled , this does have systemmic absorption and can adversely effect the allograft       # Immunosuppression: tacrolimus IR (goal trough 6-8), MMF 1250 mg BID           - Continue with intensive monitoring of immunosuppression for efficacy and toxicity.          - Changes: could redce to 4-6 now that she is 1 yr from Tx  Tac trough level 6.7  ( 8/27/21)     # Infection Prophylaxis:   - PJP: Sulfa/TMP (Bactrim)     # Recurrent cold sores : Controlled now. on  Valtrex 500 mg daily for cold sore prophylaxis     # Hypertension: controlled;  Goal BP: < 130/80   - Changes:No ,continue losartan 100 mg po every day to control BP & proteinuria. Also on Lasix 20 mg daily     # Diabetes: Borderline control (HbA1c 7-9%) Last HbA1c: 8.5% ( March/2021)   - Management as per primary care.              - will refer you to endocrinology     # Mineral Bone Disorder:   - Secondary renal hyperparathyroidism; PTH level: Mildly elevated (151-300 pg/ml)        On treatment: None  - Vitamin D; level: Low        On supplement: No; Not on supplement due to high normal calcium level.Calcium has normalized now   - Calcium; level: Normal        On supplement: No    # Electrolytes:   - Potassium; level: Normal        On supplement: No  - Magnesium; level: Normal        On supplement: Yes - stop mag ox  - Bicarbonate; level: Normal        On supplement: No    # MGUS: No monoclonal protein seen on latest SPEP and normal kappa/lambda ratio.   - No follow up testing is necessary unless proteinuria significantly worsens.    # GERD: Occasional symptoms, but mostly controlled on famotidine.    # Shortness of breath: on exertion, evaluated by cards, echo & stress test unremarkable    # SADIE on CPAP:  Patient is compliant.    # Skin Cancer Risk:    - Discussed sun protection and recommend regular follow up with Dermatology.    # Medical Compliance: Yes       Vaccines :    # COVID-19 Virus Review: Discussed COVID-19 virus and the potential medical risks.  Reviewed preventative health recommendations, which includes washing hands for at least  20 seconds, avoid touching your face, and social distancing.  Asked patient to inform the transplant center if they are exposed or diagnosed with this virus.  --  COVID19 Vaccine status : taken 2 doses ( pfizer ) April 2021   --  Patient notified that patient is eligible now for booster shot : Pfizer    # Patient advised to take flu shot .  # Patient advised to take Pneumovac 23   # Patient took shingles vaccine in [facility]  - advised her to sent the records to her coordinator . Advised her that she can only take shingrix vaccine .  Patient advised she cannot take any live vaccine   # Patient advised to take vaccines which are due one at a time with 1-2 weeks in between them         # Transplant History:  Etiology of Kidney Failure: Unknown etiology (Native kidney biopsy showed global glomerulosclerosis)  Tx: DDKT  Transplant: 9/3/2020 (Kidney)  Significant changes in immunosuppression: None  Significant transplant-related complications: None    Transplant Office Phone Number: [phone]    Assessment and plan was discussed with the patient and she voiced her understanding and agreement.    Return visit: 6 months   Patient staffed with Dr Ronal Strange MD    Chief Complaint   Ms. Thompson is a 66 year old here for kidney transplant and immunosuppression management.    History of Present Illness      No SOB/CP  No dizziness/lightheadedness  No Leg swelling   No cough/fever/chills  Appetite is good. No unintentional weight loss   No Abd pain/N/V/D/Constipation.   No voiding difficulty/hematuria /flank pain  No focal weakness/altered sensation.  No rash     Home  vitals :     Problem List   Patient Active Problem List   Diagnosis     Elevated serum immunoglobulin free light chain level     Dyslipidemia     Hypothyroidism     SADIE on CPAP     Sensorineural hearing loss (SNHL) of both ears     GERD (gastroesophageal reflux disease)     HTN, kidney transplant related     Hepatitis B core antibody positive     Secondary renal hyperparathyroidism (H)     Memory deficits     Kidney replaced by transplant     Immunosuppression (H)     Aftercare following organ transplant     Vitamin D deficiency     Elevated random blood glucose level     Diabetes mellitus, type 2 (H)       Allergies   Allergies   Allergen Reactions     Amoxicillin-Pot Clavulanate Nausea and Vomiting       Medications   Current Outpatient Medications   Medication Sig     acetaminophen (TYLENOL) 325 MG tablet Take 2 tablets (650 mg) by mouth every 4 hours as needed for pain     aspirin (ASA) 81 MG EC tablet Take 1 tablet (81 mg) by mouth daily     atorvastatin (LIPITOR) 40 MG tablet Take 0.5 tablets (20 mg) by mouth daily     diclofenac (VOLTAREN) 1 % topical gel Apply 2 g topically 4 times daily as needed      famotidine (PEPCID) 20 MG tablet Take 20 mg by mouth every evening      fluticasone (FLONASE) 50 MCG/ACT nasal spray Spray 1 spray into both nostrils 2 times daily as needed for rhinitis or allergies     furosemide (LASIX) 20 MG tablet Take 1 tablet (20 mg) by mouth daily     levothyroxine (SYNTHROID/LEVOTHROID) 112 MCG tablet Take 112 mcg (1 tablet) by mouth every Monday, Tuesday is 1/2 tab, 1 tab Wednesday, 1 tab Thursday, tab 1 Friday     Lidocaine (LIDOCARE) 4 % Patch Place 1 patch onto the skin every 24 hours To prevent lidocaine toxicity, patient should be patch free for 12 hrs daily.  Using for back pain     losartan (COZAAR) 100 MG tablet Take 1 tablet (100 mg) by mouth daily     magnesium oxide (MAG-OX) 400 MG tablet Take 1 tablet (400 mg) by mouth daily (with lunch)     melatonin 3 MG tablet Take 6  mg by mouth At Bedtime      mycophenolate (GENERIC EQUIVALENT) 250 MG capsule Take 5 capsules (1,250 mg) by mouth 2 times daily     psyllium (METAMUCIL/KONSYL) 58.6 % powder Take by mouth daily     sulfamethoxazole-trimethoprim (BACTRIM) 400-80 MG tablet Take 1 tablet by mouth daily     tacrolimus (GENERIC EQUIVALENT) 0.5 MG capsule Take 1 capsule (0.5 mg) by mouth every morning Total dose = 1.5 mg in the AM and 1 mg in the PM     tacrolimus (GENERIC EQUIVALENT) 1 MG capsule Take 1 capsule (1 mg) by mouth 2 times daily Total dose = 1.5 in AM and 1 in PM     valACYclovir (VALTREX) 500 MG tablet Take 1 tablet (500 mg) by mouth daily     fish oil-omega-3 fatty acids 1000 MG capsule Take 2 g by mouth daily (Patient not taking: Reported on 9/16/2021)     No current facility-administered medications for this visit.     There are no discontinued medications.    Physical Exam   Vital signs were deferred for this telemedicine visit.    GENERAL APPEARANCE: alert and no distress  HENT: no obvious abnormalities on appearance  RESP: breathing appears unremarkable with normal rate, no audible wheezing or cough and no apparent shortness of breath with conversation  MS: extremities normal - no gross deformities noted  SKIN: no apparent rash and normal skin tone  NEURO: speech is clear with no obvious neurological deficits  PSYCH: mentation appears normal and affect normal  Allograft - non tender .     Data     Renal Latest Ref Rng & Units 8/27/2021 7/26/2021 6/28/2021   Na 133 - 144 mmol/L 137 138 137   Na (external) 135 - 145 mmol/L - - -   K 3.4 - 5.3 mmol/L 5.0 4.5 4.5   K (external) 3.5 - 5.0 mmol/L - - -   Cl 94 - 109 mmol/L 111(H) 108 107   Cl (external) 98 - 110 mmol/L - - -   CO2 20 - 32 mmol/L 22 24 25   CO2 (external) 21 - 31 mmol/L - - -   BUN 7 - 30 mg/dL 41(H) 37(H) 39(H)   BUN (external) 8 - 25 mg/dL - - -   Cr 0.52 - 1.04 mg/dL 0.95 1.06(H) 0.92   Cr (external) 0.57 - 1.11 mg/dL - - -   Glucose 70 - 99 mg/dL 145(H)  148(H) 168(H)   Glucose (external) 65 - 100 mg/dL - - -   Ca  8.5 - 10.1 mg/dL 9.9 9.9 9.9   Ca (external) 8.5 - 10.5 mg/dL - - -   Mg 1.6 - 2.3 mg/dL 2.2 - 2.3   Mg (external) 1.6 - 2.6 mg/dL - - -     Bone Health Latest Ref Rng & Units 4/19/2021 3/8/2021 2/1/2021   Phos 2.5 - 4.5 mg/dL 3.5 3.9 2.8   Phos (external) 2.3 - 4.7 mg/dL - - -   PTHi 18 - 80 pg/mL - - -   Vit D Def 20 - 75 ug/L - - -     Heme Latest Ref Rng & Units 8/27/2021 7/26/2021 6/28/2021   WBC 4.0 - 11.0 10e3/uL 5.2 5.2 4.2   WBC (external) 4.5 - 11.0 thou/cu mm - - -   Hgb 11.7 - 15.7 g/dL 11.6(L) 11.6(L) 11.3(L)   Hgb (external) 12.0 - 16.0 g/dL - - -   Plt 150 - 450 10e3/uL 228 231 222   Plt (external) 140 - 440 thou/cu mm - - -   ABSOLUTE NEUTROPHIL 1.6 - 8.3 10e9/L - - -   ABSOLUTE LYMPHOCYTES 0.8 - 5.3 10e9/L - - -   ABSOLUTE MONOCYTES 0.0 - 1.3 10e9/L - - -   ABSOLUTE EOSINOPHILS 0.0 - 0.7 10e9/L - - -   ABSOLUTE BASOPHILS 0.0 - 0.2 10e9/L - - -   ABS IMMATURE GRANULOCYTES 0 - 0.4 10e9/L - - -   ABSOLUTE NUCLEATED RBC - - - -     Liver Latest Ref Rng & Units 3/1/2021 1/25/2021 9/9/2020   AP 40 - 150 U/L 195(H) 189(H) -   TBili 0.2 - 1.3 mg/dL 0.6 0.5 -   DBili 0.0 - 0.2 mg/dL 0.1 - -   ALT 0 - 50 U/L 42 37 -   AST 0 - 45 U/L 26 24 -   Tot Protein 6.8 - 8.8 g/dL 7.0 6.9 -   Albumin 3.4 - 5.0 g/dL 3.7 3.8 2.9(L)     Pancreas Latest Ref Rng & Units 3/1/2021 1/25/2021 9/3/2020   A1C 0 - 5.6 % 7.9(H) 7.3(H) 5.4     Iron studies Latest Ref Rng & Units 9/9/2020   Iron 35 - 180 ug/dL 162   Iron sat 15 - 46 % 92(H)   Ferritin 8 - 252 ng/mL 1,757(H)     [facility] Txp Virology Latest Ref Rng & Units 6/28/2021 5/17/2021 5/3/2021   CVM DNA Quant - - - EDTAP   CMV QUANT IU/ML CMVND:CMV DNA Not Detected [IU]/mL - - CMV DNA Not Detected   LOG IU/ML OF CMVQNT <2.1 [Log:IU]/mL - - Not Calculated   BK Spec - Plasma Plasma -   BK Res BKNEG:BK Virus DNA Not Detected copies/mL BK Virus DNA Not Detected BK Virus DNA Not Detected -   BK Log <2.7 Log copies/mL Not  Calculated Not Calculated -   BK Quant Log Ext - - - -   BK Quant Result Ext Negative copies/mL - - -   BK Quant Spec Ext - - - -   EBV CAPSID ANTIBODY IGG 0.0 - 0.8 AI - - -   EBV DNA COPIES/ML EBVNEG:EBV DNA Not Detected [Copies]/mL - - EBV DNA Not Detected   EBV DNA LOG OF COPIES <2.7 [Log:copies]/mL - - Not Calculated   Hep B Core NR:Nonreactive - - -        Recent Labs   Lab Test 06/28/21  0809 07/26/21  0839 08/27/21  0812   DOSTAC 2000 on [id number] tm 7/25/2021 8/26/2021   TACROL 5.9 6.1 6.7     Recent Labs   Lab Test 05/03/21  0839 05/10/21  0825 05/17/21 0821   DOSMPA 2000 5/2/2021 05/09/2021 2000 2000 on [id number] tm   MPACID 1.82 1.54 1.24   MPAG 139.3* 119.0* 113.7*       Echo  5/2021  Visually Estimated EF: 60-65%   Normal LV size and systolic function.   No significant valvular heart disease noted.   Estimated pulmonary artery pressure of 21.5 mmHg + RA pressure.    NM stress test 4/2021: negative for ischemia   Normal IVC size, >50% collapse with respiration.    This patient has been seen and evaluated by me, Jordy Dewitt MD.  I have reviewed the note and agree with plan of care as documented by the fellow.  Jordy Dewitt MD on 9/16/2021 at 2:12 PM

## 2021-09-16 NOTE — LETTER
9/16/2021       RE: Ethel Thompson  [address]  [place] [address]     Dear Colleague,    Thank you for referring your patient, Ethel Thompson, to the [facility] [place] at [facility]. Please see a copy of my visit note below.    CHRONIC TRANSPLANT NEPHROLOGY VISIT    Recommendations   1. Endocrinology referral for new onset T2 DM   2. Stop Magnesium supplement   3.  Vaccines needs top be updated   --  Patient notified that patient is eligible now for booster shot : Pfizer  --  Patient advised to take flu shot .  -- Patient advised to take Pneumovac 23   Patient took shingles vaccine in [facility]  - advised her to sent the records to her coordinator . Advised her that she can only take shingrix vaccine .Patient advised she cannot take any live vaccine   4.  Patient advised to take vaccines which are due one at a time with 1-2 weeks in between them       Assessment & Plan   # DDKT: Stable    Patient had developed new proteinuria, up to ~ 2.5 grams 2/2020, but that improved with up-titration of losartan. Currently 0.14 g.gCr ( Aug/2021) .Patient does NOT have a known diagnosis of FSGS.  Her biopsy showed 1/4 glomeruli with global glomerulosclerosis and no focal segmental glomerulosclerosis.  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  Will plan to treat proteinuria conservatively at this time with ARB.   - Baseline Creatinine:  ~ 0.8-1.0   - Proteinuria: Moderate (1-3 grams) About 1 gram.   - Date DSA Last Checked: Apr/2021      Latest DSA: No   - BK Viremia: No   - Kidney Tx Biopsy: Mar 03, 2021; Result:  No diagnostic evidence of  acute rejection.  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  No interstitial fibrosis or tubular atrophy.             Feb 03, 2021; Result: No diagnostic evidence of acute rejection.  Segmental glomerular basement membrane irregularity with remodeling and lamellation.  Type IV collagen alpha5 and alpha2 staining was normal.  This was felt to be donor derived.  Allosure  0.17 ( 6/28/21)     Patient does use topical NSAID gel( diclofena). She was counselled , this does have systemmic absorption and can adversely effect the allograft       # Immunosuppression: tacrolimus IR (goal trough 6-8), MMF 1250 mg BID           - Continue with intensive monitoring of immunosuppression for efficacy and toxicity.          - Changes: could redce to 4-6 now that she is 1 yr from Tx  Tac trough level 6.7  ( 8/27/21)     # Infection Prophylaxis:   - PJP: Sulfa/TMP (Bactrim)     # Recurrent cold sores : Controlled now. on  Valtrex 500 mg daily for cold sore prophylaxis     # Hypertension: controlled;  Goal BP: < 130/80   - Changes:No ,continue losartan 100 mg po every day to control BP & proteinuria. Also on Lasix 20 mg daily     # Diabetes: Borderline control (HbA1c 7-9%) Last HbA1c: 8.5% ( March/2021)   - Management as per primary care.              - will refer you to endocrinology     # Mineral Bone Disorder:   - Secondary renal hyperparathyroidism; PTH level: Mildly elevated (151-300 pg/ml)        On treatment: None  - Vitamin D; level: Low        On supplement: No; Not on supplement due to high normal calcium level.Calcium has normalized now   - Calcium; level: Normal        On supplement: No    # Electrolytes:   - Potassium; level: Normal        On supplement: No  - Magnesium; level: Normal        On supplement: Yes - stop mag ox  - Bicarbonate; level: Normal        On supplement: No    # MGUS: No monoclonal protein seen  on latest SPEP and normal kappa/lambda ratio.   - No follow up testing is necessary unless proteinuria significantly worsens.    # GERD: Occasional symptoms, but mostly controlled on famotidine.    # Shortness of breath: on exertion, evaluated by cards, echo & stress test unremarkable    # SADIE on CPAP: Patient is compliant.    # Skin Cancer Risk:    - Discussed sun protection and recommend regular follow up with Dermatology.    # Medical Compliance: Yes       Vaccines :    # COVID-19 Virus Review: Discussed COVID-19 virus and the potential medical risks.  Reviewed preventative health recommendations, which includes washing hands for at least  20 seconds, avoid touching your face, and social distancing.  Asked patient to inform the transplant center if they are exposed or diagnosed with this virus.  --  COVID19 Vaccine status : taken 2 doses ( pfizer ) April 2021   --  Patient notified that patient is eligible now for booster shot : Pfizer    # Patient advised to take flu shot .  # Patient advised to take Pneumovac 23   # Patient took shingles vaccine in [facility]  - advised her to sent the records to her coordinator . Advised her that she can only take shingrix vaccine .  Patient advised she cannot take any live vaccine   # Patient advised to take vaccines which are due one at a time with 1-2 weeks in between them         # Transplant History:  Etiology of Kidney Failure: Unknown etiology (Native kidney biopsy showed global glomerulosclerosis)  Tx: DDKT  Transplant: 9/3/2020 (Kidney)  Significant changes in immunosuppression: None  Significant transplant-related complications: None    Transplant Office Phone Number: [phone]    Assessment and plan was discussed with the patient and she voiced her understanding and agreement.    Return visit: 6 months   Patient staffed with Dr Ronal Strange MD    Chief Complaint   Ms. Thompson is a 66 year old here for kidney transplant and immunosuppression  management.    History of Present Illness      No SOB/CP  No dizziness/lightheadedness  No Leg swelling   No cough/fever/chills  Appetite is good. No unintentional weight loss   No Abd pain/N/V/D/Constipation.   No voiding difficulty/hematuria /flank pain  No focal weakness/altered sensation.  No rash     Home vitals :     Problem List   Patient Active Problem List   Diagnosis     Elevated serum immunoglobulin free light chain level     Dyslipidemia     Hypothyroidism     SADIE on CPAP     Sensorineural hearing loss (SNHL) of both ears     GERD (gastroesophageal reflux disease)     HTN, kidney transplant related     Hepatitis B core antibody positive     Secondary renal hyperparathyroidism (H)     Memory deficits     Kidney replaced by transplant     Immunosuppression (H)     Aftercare following organ transplant     Vitamin D deficiency     Elevated random blood glucose level     Diabetes mellitus, type 2 (H)       Allergies   Allergies   Allergen Reactions     Amoxicillin-Pot Clavulanate Nausea and Vomiting       Medications   Current Outpatient Medications   Medication Sig     acetaminophen (TYLENOL) 325 MG tablet Take 2 tablets (650 mg) by mouth every 4 hours as needed for pain     aspirin (ASA) 81 MG EC tablet Take 1 tablet (81 mg) by mouth daily     atorvastatin (LIPITOR) 40 MG tablet Take 0.5 tablets (20 mg) by mouth daily     diclofenac (VOLTAREN) 1 % topical gel Apply 2 g topically 4 times daily as needed      famotidine (PEPCID) 20 MG tablet Take 20 mg by mouth every evening      fluticasone (FLONASE) 50 MCG/ACT nasal spray Spray 1 spray into both nostrils 2 times daily as needed for rhinitis or allergies     furosemide (LASIX) 20 MG tablet Take 1 tablet (20 mg) by mouth daily     levothyroxine (SYNTHROID/LEVOTHROID) 112 MCG tablet Take 112 mcg (1 tablet) by mouth every Monday, Tuesday is 1/2 tab, 1 tab Wednesday, 1 tab Thursday, tab 1 Friday     Lidocaine (LIDOCARE) 4 % Patch Place 1 patch onto the skin  every 24 hours To prevent lidocaine toxicity, patient should be patch free for 12 hrs daily.  Using for back pain     losartan (COZAAR) 100 MG tablet Take 1 tablet (100 mg) by mouth daily     magnesium oxide (MAG-OX) 400 MG tablet Take 1 tablet (400 mg) by mouth daily (with lunch)     melatonin 3 MG tablet Take 6 mg by mouth At Bedtime      mycophenolate (GENERIC EQUIVALENT) 250 MG capsule Take 5 capsules (1,250 mg) by mouth 2 times daily     psyllium (METAMUCIL/KONSYL) 58.6 % powder Take by mouth daily     sulfamethoxazole-trimethoprim (BACTRIM) 400-80 MG tablet Take 1 tablet by mouth daily     tacrolimus (GENERIC EQUIVALENT) 0.5 MG capsule Take 1 capsule (0.5 mg) by mouth every morning Total dose = 1.5 mg in the AM and 1 mg in the PM     tacrolimus (GENERIC EQUIVALENT) 1 MG capsule Take 1 capsule (1 mg) by mouth 2 times daily Total dose = 1.5 in AM and 1 in PM     valACYclovir (VALTREX) 500 MG tablet Take 1 tablet (500 mg) by mouth daily     fish oil-omega-3 fatty acids 1000 MG capsule Take 2 g by mouth daily (Patient not taking: Reported on 9/16/2021)     No current facility-administered medications for this visit.     There are no discontinued medications.    Physical Exam   Vital signs were deferred for this telemedicine visit.    GENERAL APPEARANCE: alert and no distress  HENT: no obvious abnormalities on appearance  RESP: breathing appears unremarkable with normal rate, no audible wheezing or cough and no apparent shortness of breath with conversation  MS: extremities normal - no gross deformities noted  SKIN: no apparent rash and normal skin tone  NEURO: speech is clear with no obvious neurological deficits  PSYCH: mentation appears normal and affect normal  Allograft - non tender .     Data     Renal Latest Ref Rng & Units 8/27/2021 7/26/2021 6/28/2021   Na 133 - 144 mmol/L 137 138 137   Na (external) 135 - 145 mmol/L - - -   K 3.4 - 5.3 mmol/L 5.0 4.5 4.5   K (external) 3.5 - 5.0 mmol/L - - -   Cl 94 - 109  mmol/L 111(H) 108 107   Cl (external) 98 - 110 mmol/L - - -   CO2 20 - 32 mmol/L 22 24 25   CO2 (external) 21 - 31 mmol/L - - -   BUN 7 - 30 mg/dL 41(H) 37(H) 39(H)   BUN (external) 8 - 25 mg/dL - - -   Cr 0.52 - 1.04 mg/dL 0.95 1.06(H) 0.92   Cr (external) 0.57 - 1.11 mg/dL - - -   Glucose 70 - 99 mg/dL 145(H) 148(H) 168(H)   Glucose (external) 65 - 100 mg/dL - - -   Ca  8.5 - 10.1 mg/dL 9.9 9.9 9.9   Ca (external) 8.5 - 10.5 mg/dL - - -   Mg 1.6 - 2.3 mg/dL 2.2 - 2.3   Mg (external) 1.6 - 2.6 mg/dL - - -     Bone Health Latest Ref Rng & Units 4/19/2021 3/8/2021 2/1/2021   Phos 2.5 - 4.5 mg/dL 3.5 3.9 2.8   Phos (external) 2.3 - 4.7 mg/dL - - -   PTHi 18 - 80 pg/mL - - -   Vit D Def 20 - 75 ug/L - - -     Heme Latest Ref Rng & Units 8/27/2021 7/26/2021 6/28/2021   WBC 4.0 - 11.0 10e3/uL 5.2 5.2 4.2   WBC (external) 4.5 - 11.0 thou/cu mm - - -   Hgb 11.7 - 15.7 g/dL 11.6(L) 11.6(L) 11.3(L)   Hgb (external) 12.0 - 16.0 g/dL - - -   Plt 150 - 450 10e3/uL 228 231 222   Plt (external) 140 - 440 thou/cu mm - - -   ABSOLUTE NEUTROPHIL 1.6 - 8.3 10e9/L - - -   ABSOLUTE LYMPHOCYTES 0.8 - 5.3 10e9/L - - -   ABSOLUTE MONOCYTES 0.0 - 1.3 10e9/L - - -   ABSOLUTE EOSINOPHILS 0.0 - 0.7 10e9/L - - -   ABSOLUTE BASOPHILS 0.0 - 0.2 10e9/L - - -   ABS IMMATURE GRANULOCYTES 0 - 0.4 10e9/L - - -   ABSOLUTE NUCLEATED RBC - - - -     Liver Latest Ref Rng & Units 3/1/2021 1/25/2021 9/9/2020   AP 40 - 150 U/L 195(H) 189(H) -   TBili 0.2 - 1.3 mg/dL 0.6 0.5 -   DBili 0.0 - 0.2 mg/dL 0.1 - -   ALT 0 - 50 U/L 42 37 -   AST 0 - 45 U/L 26 24 -   Tot Protein 6.8 - 8.8 g/dL 7.0 6.9 -   Albumin 3.4 - 5.0 g/dL 3.7 3.8 2.9(L)     Pancreas Latest Ref Rng & Units 3/1/2021 1/25/2021 9/3/2020   A1C 0 - 5.6 % 7.9(H) 7.3(H) 5.4     Iron studies Latest Ref Rng & Units 9/9/2020   Iron 35 - 180 ug/dL 162   Iron sat 15 - 46 % 92(H)   Ferritin 8 - 252 ng/mL 1,757(H)     [facility] Txp Virology Latest Ref Rng & Units 6/28/2021 5/17/2021 5/3/2021   CVM DNA Quant - - -  EDTAP   CMV QUANT IU/ML CMVND:CMV DNA Not Detected [IU]/mL - - CMV DNA Not Detected   LOG IU/ML OF CMVQNT <2.1 [Log:IU]/mL - - Not Calculated   BK Spec - Plasma Plasma -   BK Res BKNEG:BK Virus DNA Not Detected copies/mL BK Virus DNA Not Detected BK Virus DNA Not Detected -   BK Log <2.7 Log copies/mL Not Calculated Not Calculated -   BK Quant Log Ext - - - -   BK Quant Result Ext Negative copies/mL - - -   BK Quant Spec Ext - - - -   EBV CAPSID ANTIBODY IGG 0.0 - 0.8 AI - - -   EBV DNA COPIES/ML EBVNEG:EBV DNA Not Detected [Copies]/mL - - EBV DNA Not Detected   EBV DNA LOG OF COPIES <2.7 [Log:copies]/mL - - Not Calculated   Hep B Core NR:Nonreactive - - -        Recent Labs   Lab Test 06/28/21  0809 07/26/21  0839 08/27/21  0812   DOSTAC 2000 on [id number] tm 7/25/2021 8/26/2021   TACROL 5.9 6.1 6.7     Recent Labs   Lab Test 05/03/21  0839 05/10/21  0825 05/17/21  0821   DOSMPA 2000 5/2/2021 05/09/2021 2000 2000 on [id number] tm   MPACID 1.82 1.54 1.24   MPAG 139.3* 119.0* 113.7*       Echo  5/2021  Visually Estimated EF: 60-65%   Normal LV size and systolic function.   No significant valvular heart disease noted.   Estimated pulmonary artery pressure of 21.5 mmHg + RA pressure.    NM stress test 4/2021: negative for ischemia   Normal IVC size, >50% collapse with respiration.    This patient has been seen and evaluated by me, Jordy Dewitt MD.  I have reviewed the note and agree with plan of care as documented by the fellow.  Jordy Dewitt MD on 9/16/2021 at 2:12 PM        Again, thank you for allowing me to participate in the care of your patient.      Sincerely,    Jordy Dewitt MD

## 2021-09-26 ENCOUNTER — HEALTH MAINTENANCE LETTER (OUTPATIENT)
Age: 66
End: 2021-09-26

## 2021-09-27 ENCOUNTER — LAB (OUTPATIENT)
Dept: LAB | Facility: CLINIC | Age: 66
End: 2021-09-27
Payer: MEDICARE

## 2021-09-27 DIAGNOSIS — Z48.298 AFTERCARE FOLLOWING ORGAN TRANSPLANT: ICD-10-CM

## 2021-09-27 DIAGNOSIS — Z79.899 ENCOUNTER FOR LONG-TERM CURRENT USE OF MEDICATION: ICD-10-CM

## 2021-09-27 DIAGNOSIS — Z94.0 KIDNEY REPLACED BY TRANSPLANT: ICD-10-CM

## 2021-09-27 LAB
ALBUMIN SERPL-MCNC: 4.2 G/DL (ref 3.4–5)
ALP SERPL-CCNC: 123 U/L (ref 40–150)
ALT SERPL W P-5'-P-CCNC: 35 U/L (ref 0–50)
ANION GAP SERPL CALCULATED.3IONS-SCNC: 5 MMOL/L (ref 3–14)
AST SERPL W P-5'-P-CCNC: 25 U/L (ref 0–45)
BILIRUB DIRECT SERPL-MCNC: 0.1 MG/DL (ref 0–0.2)
BILIRUB SERPL-MCNC: 0.4 MG/DL (ref 0.2–1.3)
BUN SERPL-MCNC: 29 MG/DL (ref 7–30)
CALCIUM SERPL-MCNC: 10.4 MG/DL (ref 8.5–10.1)
CHLORIDE BLD-SCNC: 109 MMOL/L (ref 94–109)
CHOLEST SERPL-MCNC: 231 MG/DL
CO2 SERPL-SCNC: 24 MMOL/L (ref 20–32)
CREAT SERPL-MCNC: 0.77 MG/DL (ref 0.52–1.04)
CREAT UR-MCNC: 97 MG/DL
DEPRECATED CALCIDIOL+CALCIFEROL SERPL-MC: 18 UG/L (ref 20–75)
ERYTHROCYTE [DISTWIDTH] IN BLOOD BY AUTOMATED COUNT: 12.8 % (ref 10–15)
FASTING STATUS PATIENT QL REPORTED: YES
GFR SERPL CREATININE-BSD FRML MDRD: 80 ML/MIN/1.73M2
GLUCOSE BLD-MCNC: 152 MG/DL (ref 70–99)
HBA1C MFR BLD: 7.6 % (ref 0–5.6)
HCT VFR BLD AUTO: 35.8 % (ref 35–47)
HDLC SERPL-MCNC: 54 MG/DL
HGB BLD-MCNC: 11.7 G/DL (ref 11.7–15.7)
LDLC SERPL CALC-MCNC: 100 MG/DL
LDLC SERPL CALC-MCNC: ABNORMAL MG/DL
MAGNESIUM SERPL-MCNC: 2.1 MG/DL (ref 1.6–2.3)
MCH RBC QN AUTO: 33.2 PG (ref 26.5–33)
MCHC RBC AUTO-ENTMCNC: 32.7 G/DL (ref 31.5–36.5)
MCV RBC AUTO: 102 FL (ref 78–100)
NONHDLC SERPL-MCNC: 177 MG/DL
PLATELET # BLD AUTO: 203 10E3/UL (ref 150–450)
POTASSIUM BLD-SCNC: 5 MMOL/L (ref 3.4–5.3)
PROT SERPL-MCNC: 7.2 G/DL (ref 6.8–8.8)
PROT UR-MCNC: 0.23 G/L
PROT/CREAT 24H UR: 0.24 G/G CR (ref 0–0.2)
PTH-INTACT SERPL-MCNC: 106 PG/ML (ref 18–80)
RBC # BLD AUTO: 3.52 10E6/UL (ref 3.8–5.2)
SODIUM SERPL-SCNC: 138 MMOL/L (ref 133–144)
TACROLIMUS BLD-MCNC: 4.9 UG/L (ref 5–15)
TME LAST DOSE: ABNORMAL H
TME LAST DOSE: ABNORMAL H
TRIGL SERPL-MCNC: 479 MG/DL
URATE SERPL-MCNC: 5.6 MG/DL (ref 2.6–6)
WBC # BLD AUTO: 4.7 10E3/UL (ref 4–11)

## 2021-09-27 PROCEDURE — 80061 LIPID PANEL: CPT

## 2021-09-27 PROCEDURE — 83735 ASSAY OF MAGNESIUM: CPT

## 2021-09-27 PROCEDURE — 86832 HLA CLASS I HIGH DEFIN QUAL: CPT

## 2021-09-27 PROCEDURE — 85027 COMPLETE CBC AUTOMATED: CPT

## 2021-09-27 PROCEDURE — 80180 DRUG SCRN QUAN MYCOPHENOLATE: CPT

## 2021-09-27 PROCEDURE — 82306 VITAMIN D 25 HYDROXY: CPT

## 2021-09-27 PROCEDURE — 80053 COMPREHEN METABOLIC PANEL: CPT

## 2021-09-27 PROCEDURE — 80197 ASSAY OF TACROLIMUS: CPT

## 2021-09-27 PROCEDURE — 84550 ASSAY OF BLOOD/URIC ACID: CPT

## 2021-09-27 PROCEDURE — 83036 HEMOGLOBIN GLYCOSYLATED A1C: CPT

## 2021-09-27 PROCEDURE — 36415 COLL VENOUS BLD VENIPUNCTURE: CPT

## 2021-09-27 PROCEDURE — 82248 BILIRUBIN DIRECT: CPT

## 2021-09-27 PROCEDURE — 83970 ASSAY OF PARATHORMONE: CPT

## 2021-09-27 PROCEDURE — 87799 DETECT AGENT NOS DNA QUANT: CPT

## 2021-09-27 PROCEDURE — 84156 ASSAY OF PROTEIN URINE: CPT

## 2021-09-27 PROCEDURE — 86833 HLA CLASS II HIGH DEFIN QUAL: CPT

## 2021-09-27 PROCEDURE — 83721 ASSAY OF BLOOD LIPOPROTEIN: CPT | Mod: 59

## 2021-09-28 ENCOUNTER — TELEPHONE (OUTPATIENT)
Dept: TRANSPLANT | Facility: CLINIC | Age: 66
End: 2021-09-28

## 2021-09-28 DIAGNOSIS — Z94.0 KIDNEY REPLACED BY TRANSPLANT: Primary | ICD-10-CM

## 2021-09-28 DIAGNOSIS — Z48.298 AFTERCARE FOLLOWING ORGAN TRANSPLANT: ICD-10-CM

## 2021-09-28 DIAGNOSIS — Z79.899 ENCOUNTER FOR LONG-TERM CURRENT USE OF MEDICATION: ICD-10-CM

## 2021-09-28 LAB
BKV DNA # SPEC NAA+PROBE: NOT DETECTED COPIES/ML
MYCOPHENOLATE SERPL LC/MS/MS-MCNC: 1.41 MG/L (ref 1–3.5)
MYCOPHENOLATE-G SERPL LC/MS/MS-MCNC: 93.3 MG/L (ref 30–95)
TME LAST DOSE: NORMAL H
TME LAST DOSE: NORMAL H

## 2021-09-28 NOTE — TELEPHONE ENCOUNTER
ISSUE:  1 year post transplant labs drawn, show dyslipidemia. Ethel continues on atorvastain 20mg daily.    PLAN:  Follow up with PCP for ongoing management of dyslipidemia, important for heart health.

## 2021-09-28 NOTE — LETTER
Dear Ethel,      Congratulations on your 1 year post-transplant anniversary!    *The best phone contact for the Transplant Office is [phone] option 5*  When you should contact the Transplant Center:    If you run out of your immunosuppression medications.     Prolonged illness that is not resolved after recommendations of the PCP, such as frequent UTI.    Viral infections such as : Shingles (herpes-zoster), CMV, EBV, and Influenza    Cancer    Increased creatinine or pain over your graft site.     Hospitalizations at facilities other than [facility].    When you should visit their local ED or Urgent Care:    Severe dehydration (uncontrolled nausea, vomiting, diarrhea).    Unable to urinate.    Fevers >101    Other medical emergencies.    Medications:    ALWAYS BRING YOUR MED CARD TO APPOINTMENTS.    Please keep your medical insurance up to date.    Do NOT miss your immunosuppression medications.    Labs:    Labs will now be drawn monthly up to 3 years post-transplant.     Contact the Transplant Center if additional lab orders are needed.  *Enclosed is a copy of your updated lab letter    General Transplant Recommendations:    Yearly nephrology visits with our MDs.    Yearly follow up with your primary care provider (PCP)     Follow up with specialty providers as directed.    Frequent reviews of the transplant handbook and / or [facility].    Lab review on MyChart.     Thank you!  Your transplant team

## 2021-09-28 NOTE — LETTER
OUTPATIENT LABORATORY TEST ORDER    Patient Name: Ethel Thompson  Transplant Date: 9/3/2020   YOB: 1955  Issue Date & Time: 9/28/2021  9:34 AM  [facility] MR: [id number] Exp. Date (1 year after date issued)      Diagnoses: Kidney Transplant (ICD-10  Z94.0)   Long term use of medications (ICD-10  Z79.899)     Lab results to be available on the same day drawn.   Patient should release information to the [facility].  Please fax to the Transplant Center at [phone].    Monthly   ?Hemogram and Platelet  ?Basic Metabolic Panel (Sodium, Potassium, Chloride, CO2, Creatinine, Urea Nitrogen,     Glucose,   Calcium)         ?/Tacrolimus/Prograf drug level          ?BK (Polyoma Virus) PCR Quantitative - Plasma                  Every 6 Months                  ?Urine for protein/creatinine    Yearly:   ?PRA/DSA level (mailers provided by the patient)     If you have any questions, please call The Transplant Center at [phone] or [phone].    Please fax labs to [phone]    Jordy Dewitt MD

## 2021-09-29 DIAGNOSIS — E55.9 VITAMIN D DEFICIENCY: Primary | ICD-10-CM

## 2021-09-29 LAB
DONOR IDENTIFICATION: NORMAL
DSA COMMENTS: NORMAL
DSA PRESENT: NO
DSA TEST METHOD: NORMAL
ORGAN: NORMAL
SA 1 CELL: NORMAL
SA 1 TEST METHOD: NORMAL
SA 2 CELL: NORMAL
SA 2 TEST METHOD: NORMAL
SA1 HI RISK ABY: NORMAL
SA1 MOD RISK ABY: NORMAL
SA2 HI RISK ABY: NORMAL
SA2 MOD RISK ABY: NORMAL
UNACCEPTABLE ANTIGENS: NORMAL
UNOS CPRA: 29
ZZZSA 1  COMMENTS: NORMAL
ZZZSA 2 COMMENTS: NORMAL

## 2021-09-29 NOTE — TELEPHONE ENCOUNTER
ISSUE:  Low vitamin D level.    PLAN:  Jordy Dewitt MD Schindelholz, Alanna, RN  Start cholecalciferol 2000 international unit(s) qd     LPN TASK:  Call Ethel to notify her of new medication - vitaminD 2,000 units daily.  Send Rx to patients preferred pharmacy.

## 2021-09-29 NOTE — TELEPHONE ENCOUNTER
Spoke to patients  who confirms the following:  Low vitamin D level.     Start cholecalciferol 2000 international unit(s) qd

## 2021-10-25 ENCOUNTER — LAB (OUTPATIENT)
Dept: LAB | Facility: CLINIC | Age: 66
End: 2021-10-25
Payer: MEDICARE

## 2021-10-25 DIAGNOSIS — Z79.899 ENCOUNTER FOR LONG-TERM CURRENT USE OF MEDICATION: ICD-10-CM

## 2021-10-25 DIAGNOSIS — Z94.0 KIDNEY REPLACED BY TRANSPLANT: ICD-10-CM

## 2021-10-25 DIAGNOSIS — Z48.298 AFTERCARE FOLLOWING ORGAN TRANSPLANT: ICD-10-CM

## 2021-10-25 LAB
ANION GAP SERPL CALCULATED.3IONS-SCNC: 2 MMOL/L (ref 3–14)
BUN SERPL-MCNC: 31 MG/DL (ref 7–30)
CALCIUM SERPL-MCNC: 10.1 MG/DL (ref 8.5–10.1)
CHLORIDE BLD-SCNC: 111 MMOL/L (ref 94–109)
CO2 SERPL-SCNC: 23 MMOL/L (ref 20–32)
CREAT SERPL-MCNC: 0.9 MG/DL (ref 0.52–1.04)
CREAT UR-MCNC: 103 MG/DL
ERYTHROCYTE [DISTWIDTH] IN BLOOD BY AUTOMATED COUNT: 12.5 % (ref 10–15)
GFR SERPL CREATININE-BSD FRML MDRD: 66 ML/MIN/1.73M2
GLUCOSE BLD-MCNC: 125 MG/DL (ref 70–99)
HCT VFR BLD AUTO: 34.1 % (ref 35–47)
HGB BLD-MCNC: 11 G/DL (ref 11.7–15.7)
MCH RBC QN AUTO: 32.7 PG (ref 26.5–33)
MCHC RBC AUTO-ENTMCNC: 32.3 G/DL (ref 31.5–36.5)
MCV RBC AUTO: 102 FL (ref 78–100)
PLATELET # BLD AUTO: 210 10E3/UL (ref 150–450)
POTASSIUM BLD-SCNC: 5.5 MMOL/L (ref 3.4–5.3)
PROT UR-MCNC: 0.15 G/L
PROT/CREAT 24H UR: 0.15 G/G CR (ref 0–0.2)
RBC # BLD AUTO: 3.36 10E6/UL (ref 3.8–5.2)
SODIUM SERPL-SCNC: 136 MMOL/L (ref 133–144)
TACROLIMUS BLD-MCNC: 8.3 UG/L (ref 5–15)
TME LAST DOSE: NORMAL H
TME LAST DOSE: NORMAL H
WBC # BLD AUTO: 2.6 10E3/UL (ref 4–11)

## 2021-10-25 PROCEDURE — 80048 BASIC METABOLIC PNL TOTAL CA: CPT

## 2021-10-25 PROCEDURE — 36415 COLL VENOUS BLD VENIPUNCTURE: CPT

## 2021-10-25 PROCEDURE — 85027 COMPLETE CBC AUTOMATED: CPT

## 2021-10-25 PROCEDURE — 80197 ASSAY OF TACROLIMUS: CPT

## 2021-10-25 PROCEDURE — 87799 DETECT AGENT NOS DNA QUANT: CPT

## 2021-10-25 PROCEDURE — 84156 ASSAY OF PROTEIN URINE: CPT

## 2021-10-26 LAB — BKV DNA # SPEC NAA+PROBE: NOT DETECTED COPIES/ML

## 2021-10-27 DIAGNOSIS — Z94.0 KIDNEY TRANSPLANTED: ICD-10-CM

## 2021-10-27 DIAGNOSIS — Z94.0 KIDNEY REPLACED BY TRANSPLANT: Primary | ICD-10-CM

## 2021-10-27 NOTE — TELEPHONE ENCOUNTER
ISSUE: tacrolimus  Level 8.3 above goal level  -  (1 year post transplant  Goal level 4 to 6 )      PLAN:   Please call patient and confirm this was an accurate 12-hour trough. Verify Tacrolimus IR dose 1.5 mg in am  And 1.0   Mg in pm  BID. Confirm no new medications or illness. Confirm no missed doses. If accurate trough and accurate dose, decrease Tacrolimus IR dose to 1 mg BID and repeat labs in 2 weeks    Then monthly      Issue 2 - K 5,5  Review with Ethel Stollw to limit k foods   Repeating transplant  Labs in 2 weeks   OUTCOME:           Updated lab orders to monitor urine protein monthly -

## 2021-10-28 RX ORDER — TACROLIMUS 0.5 MG/1
CAPSULE ORAL
Qty: 30 CAPSULE | Refills: 11
Start: 2021-10-28 | End: 2022-07-05

## 2021-10-28 RX ORDER — TACROLIMUS 1 MG/1
1 CAPSULE ORAL 2 TIMES DAILY
Qty: 60 CAPSULE | Refills: 11 | Status: SHIPPED | OUTPATIENT
Start: 2021-10-28 | End: 2022-01-26

## 2021-10-28 NOTE — TELEPHONE ENCOUNTER
Spoke to patient and  regarding:  ISSUE: tacrolimus  Level 8.3 above goal level  -  (1 year post transplant  Goal level 4 to 6 )       Confirmed this was an accurate 12-hour trough. Verified Tacrolimus IR dose 1.5 mg in am  And 1.0   Mg in pm  BID. Confirmed no new medications or illness. Confirmed no missed doses. Patient verbalizes understanding to decrease Tacrolimus IR dose to 1 mg BID and repeat labs in 2 weeks    Then monthly         Updated lab orders to monitor urine protein monthly -

## 2021-11-12 ENCOUNTER — OFFICE VISIT (OUTPATIENT)
Dept: ENDOCRINOLOGY | Facility: CLINIC | Age: 66
End: 2021-11-12
Attending: INTERNAL MEDICINE
Payer: MEDICARE

## 2021-11-12 VITALS
WEIGHT: 134.8 LBS | DIASTOLIC BLOOD PRESSURE: 76 MMHG | BODY MASS INDEX: 27.21 KG/M2 | OXYGEN SATURATION: 100 % | SYSTOLIC BLOOD PRESSURE: 174 MMHG | HEART RATE: 85 BPM

## 2021-11-12 DIAGNOSIS — E78.5 DYSLIPIDEMIA: Primary | ICD-10-CM

## 2021-11-12 DIAGNOSIS — E11.9 NEW ONSET TYPE 2 DIABETES MELLITUS (H): ICD-10-CM

## 2021-11-12 PROCEDURE — 99204 OFFICE O/P NEW MOD 45 MIN: CPT | Performed by: INTERNAL MEDICINE

## 2021-11-12 RX ORDER — CALCIUM CARBONATE/VITAMIN D3 500-10/5ML
LIQUID (ML) ORAL
COMMUNITY

## 2021-11-12 RX ORDER — ORAL SEMAGLUTIDE 3 MG/1
3 TABLET ORAL DAILY
Qty: 30 TABLET | Refills: 11 | Status: SHIPPED | OUTPATIENT
Start: 2021-11-12 | End: 2022-01-20

## 2021-11-12 ASSESSMENT — PAIN SCALES - GENERAL: PAINLEVEL: MILD PAIN (3)

## 2021-11-12 NOTE — PATIENT INSTRUCTIONS
-Check blood pressure 3/week. If consistently >140 for the top number or >90 for the bottom, contact me.     -Continue metformin.   -Start Rybelsus 3 mg every day. After one month, if you are tolerating this, dwain me and we will increase the dose.     -Continue to increase exercise as you are able.   -Continue to reduce carbohydrate intake.     -Please bring your glucose meter or logs to all visits.     -Recommend you schedule an eye exam.     -Labs in 3 months and see me after.

## 2021-11-12 NOTE — LETTER
11/12/2021         RE: Ethel Thompson  [address] [facility] [place] [facility] [place] [address]        Dear Colleague,    Thank you for referring your patient, Ethel Thompson, to the [facility]. Please see a copy of my visit note below.    CC: DM.     HPI:   Patient presents for management of DM.   Diagnosis made in 1/2021.   She had a kidney transplant on 9/3/2020.     She is taking metformin 500 mg BID.     She has started to use the treadmill, brisk walking, for 30-60 minutes a day.   She has cut down on her rice and noodle intake.     Checking glucose in the AM.   Then checking some 2 hour PP and HS readings (began 2 days ago).   AM usually 120-150.     ROS: 10 point ROS neg other than the symptoms noted above in the HPI.    PMH:   Patient Active Problem List   Diagnosis     Elevated serum immunoglobulin free light chain level     Dyslipidemia     Hypothyroidism     SADIE on CPAP     Sensorineural hearing loss (SNHL) of both ears     GERD (gastroesophageal reflux disease)     HTN, kidney transplant related     Hepatitis B core antibody positive     Secondary renal hyperparathyroidism (H)     Memory deficits     Kidney replaced by transplant     Immunosuppression (H)     Aftercare following organ transplant     Vitamin D deficiency     Elevated random blood glucose level     Diabetes mellitus, type 2 (H)      Meds:  Current Outpatient Medications   Medication     acetaminophen (TYLENOL) 325 MG tablet     aspirin (ASA) 81 MG EC tablet     atorvastatin (LIPITOR) 40 MG tablet     cholecalciferol (VITAMIN D3) 25 mcg (1000 units) capsule     diclofenac (VOLTAREN) 1 % topical gel     famotidine (PEPCID) 20 MG tablet     fish oil-omega-3 fatty acids 1000 MG capsule     fluticasone (FLONASE) 50 MCG/ACT nasal spray     furosemide (LASIX) 20 MG tablet     levothyroxine (SYNTHROID/LEVOTHROID) 112 MCG tablet     Lidocaine (LIDOCARE) 4 % Patch     losartan (COZAAR) 100 MG tablet     melatonin 3 MG tablet     mycophenolate  "(GENERIC EQUIVALENT) 250 MG capsule     psyllium (METAMUCIL/KONSYL) 58.6 % powder     sulfamethoxazole-trimethoprim (BACTRIM) 400-80 MG tablet     tacrolimus (GENERIC EQUIVALENT) 0.5 MG capsule     tacrolimus (GENERIC EQUIVALENT) 1 MG capsule     valACYclovir (VALTREX) 500 MG tablet     No current facility-administered medications for this visit.      FHX:   Sister had type 2 DM    SHX:  No tobacco in 30 years.   Born in [place]    Exam:   Vital signs:      BP: (!) 174/76 Pulse: 85     SpO2: 100 %       Weight: 61.1 kg (134 lb 12.8 oz)  Estimated body mass index is 27.21 kg/m  as calculated from the following:    Height as of 9/16/21: 1.499 m (4' 11.02\").    Weight as of this encounter: 61.1 kg (134 lb 12.8 oz).  Gen: In NAD.   HEENT: no proptosis or lid lag, EOMI, MMM.   Card: S1 S2 RRR no m/r/g. no LE edema.   Pulm: CTA b/l.   GI: NT ND +BS.   MSK: no gross deformities.   Derm: no rashes or lesions.   Neuro: no tremor, +2 DTR's. Normal monofilament.     A/P:   Type 2 DM - New onset in 1/2021. Currently well controlled. Discussed GLP-1 RA's to help with weight loss.   -Continue metformin.   -Start Rybelsus 3 mg every day. After one month, if you are tolerating this, dwain me and we will increase the dose.   -Continue to increase exercise as you are able.   -Continue to reduce carbohydrate intake.   -Please bring your glucose meter or logs to all visits.   -Recommend you schedule an eye exam.   -ASA taking.  -BP: Elevated. Took her pills last night. Reports better home readings.    -Check blood pressure 3/week.    -If consistently >140 for the top number or >90 for the bottom, contact me.   -NAFL/GALLOWAY: normal ALT and AST in 1/2021.   -Lipids: High triglycerides and low HDL in 9/2021.    On atorvastatin.   -Microalbumin: S/P kidney transplant.    Followed by Renal.    On losartan.   -Eyes: due.   -Smoking: none.        Chintan Whitfield M.D          Again, thank you for allowing me to participate in the care of " your patient.        Sincerely,        Chintan Whitfield MD

## 2021-11-12 NOTE — PROGRESS NOTES
CC: DM.     HPI:   Patient presents for management of DM.   Diagnosis made in 1/2021.   She had a kidney transplant on 9/3/2020.     She is taking metformin 500 mg BID.     She has started to use the treadmill, brisk walking, for 30-60 minutes a day.   She has cut down on her rice and noodle intake.     Checking glucose in the AM.   Then checking some 2 hour PP and HS readings (began 2 days ago).   AM usually 120-150.     ROS: 10 point ROS neg other than the symptoms noted above in the HPI.    PMH:   Patient Active Problem List   Diagnosis     Elevated serum immunoglobulin free light chain level     Dyslipidemia     Hypothyroidism     SADIE on CPAP     Sensorineural hearing loss (SNHL) of both ears     GERD (gastroesophageal reflux disease)     HTN, kidney transplant related     Hepatitis B core antibody positive     Secondary renal hyperparathyroidism (H)     Memory deficits     Kidney replaced by transplant     Immunosuppression (H)     Aftercare following organ transplant     Vitamin D deficiency     Elevated random blood glucose level     Diabetes mellitus, type 2 (H)      Meds:  Current Outpatient Medications   Medication     acetaminophen (TYLENOL) 325 MG tablet     aspirin (ASA) 81 MG EC tablet     atorvastatin (LIPITOR) 40 MG tablet     cholecalciferol (VITAMIN D3) 25 mcg (1000 units) capsule     diclofenac (VOLTAREN) 1 % topical gel     famotidine (PEPCID) 20 MG tablet     fish oil-omega-3 fatty acids 1000 MG capsule     fluticasone (FLONASE) 50 MCG/ACT nasal spray     furosemide (LASIX) 20 MG tablet     levothyroxine (SYNTHROID/LEVOTHROID) 112 MCG tablet     Lidocaine (LIDOCARE) 4 % Patch     losartan (COZAAR) 100 MG tablet     melatonin 3 MG tablet     mycophenolate (GENERIC EQUIVALENT) 250 MG capsule     psyllium (METAMUCIL/KONSYL) 58.6 % powder     sulfamethoxazole-trimethoprim (BACTRIM) 400-80 MG tablet     tacrolimus (GENERIC EQUIVALENT) 0.5 MG capsule     tacrolimus (GENERIC EQUIVALENT) 1 MG capsule  "    valACYclovir (VALTREX) 500 MG tablet     No current facility-administered medications for this visit.      FHX:   Sister had type 2 DM    SHX:  No tobacco in 30 years.   Born in [place]    Exam:   Vital signs:      BP: (!) 174/76 Pulse: 85     SpO2: 100 %       Weight: 61.1 kg (134 lb 12.8 oz)  Estimated body mass index is 27.21 kg/m  as calculated from the following:    Height as of 9/16/21: 1.499 m (4' 11.02\").    Weight as of this encounter: 61.1 kg (134 lb 12.8 oz).  Gen: In NAD.   HEENT: no proptosis or lid lag, EOMI, MMM.   Card: S1 S2 RRR no m/r/g. no LE edema.   Pulm: CTA b/l.   GI: NT ND +BS.   MSK: no gross deformities.   Derm: no rashes or lesions.   Neuro: no tremor, +2 DTR's. Normal monofilament.     A/P:   Type 2 DM - New onset in 1/2021. Currently well controlled. Discussed GLP-1 RA's to help with weight loss.   -Continue metformin.   -Start Rybelsus 3 mg every day. After one month, if you are tolerating this, dwain me and we will increase the dose.   -Continue to increase exercise as you are able.   -Continue to reduce carbohydrate intake.   -Please bring your glucose meter or logs to all visits.   -Recommend you schedule an eye exam.   -ASA taking.  -BP: Elevated. Took her pills last night. Reports better home readings.    -Check blood pressure 3/week.    -If consistently >140 for the top number or >90 for the bottom, contact me.   -NAFL/GALLOWAY: normal ALT and AST in 1/2021.   -Lipids: High triglycerides and low HDL in 9/2021.    On atorvastatin.   -Microalbumin: S/P kidney transplant.    Followed by Renal.    On losartan.   -Eyes: due.   -Smoking: none.        Chintan Whitfield M.D"

## 2021-11-29 ENCOUNTER — LAB (OUTPATIENT)
Dept: LAB | Facility: CLINIC | Age: 66
End: 2021-11-29
Payer: MEDICARE

## 2021-11-29 DIAGNOSIS — Z48.298 AFTERCARE FOLLOWING ORGAN TRANSPLANT: ICD-10-CM

## 2021-11-29 DIAGNOSIS — Z79.899 ENCOUNTER FOR LONG-TERM CURRENT USE OF MEDICATION: ICD-10-CM

## 2021-11-29 DIAGNOSIS — Z94.0 KIDNEY REPLACED BY TRANSPLANT: ICD-10-CM

## 2021-11-29 LAB
ANION GAP SERPL CALCULATED.3IONS-SCNC: 4 MMOL/L (ref 3–14)
BUN SERPL-MCNC: 18 MG/DL (ref 7–30)
CALCIUM SERPL-MCNC: 9.7 MG/DL (ref 8.5–10.1)
CHLORIDE BLD-SCNC: 108 MMOL/L (ref 94–109)
CO2 SERPL-SCNC: 24 MMOL/L (ref 20–32)
CREAT SERPL-MCNC: 0.8 MG/DL (ref 0.52–1.04)
CREAT UR-MCNC: 88 MG/DL
ERYTHROCYTE [DISTWIDTH] IN BLOOD BY AUTOMATED COUNT: 12.3 % (ref 10–15)
GFR SERPL CREATININE-BSD FRML MDRD: 77 ML/MIN/1.73M2
GLUCOSE BLD-MCNC: 118 MG/DL (ref 70–99)
HCT VFR BLD AUTO: 36.9 % (ref 35–47)
HGB BLD-MCNC: 11.9 G/DL (ref 11.7–15.7)
MCH RBC QN AUTO: 32.6 PG (ref 26.5–33)
MCHC RBC AUTO-ENTMCNC: 32.2 G/DL (ref 31.5–36.5)
MCV RBC AUTO: 101 FL (ref 78–100)
PLATELET # BLD AUTO: 234 10E3/UL (ref 150–450)
POTASSIUM BLD-SCNC: 5 MMOL/L (ref 3.4–5.3)
PROT UR-MCNC: 0.15 G/L
PROT/CREAT 24H UR: 0.17 G/G CR (ref 0–0.2)
RBC # BLD AUTO: 3.65 10E6/UL (ref 3.8–5.2)
SODIUM SERPL-SCNC: 136 MMOL/L (ref 133–144)
TACROLIMUS BLD-MCNC: 6.3 UG/L (ref 5–15)
TME LAST DOSE: NORMAL H
TME LAST DOSE: NORMAL H
WBC # BLD AUTO: 4.4 10E3/UL (ref 4–11)

## 2021-11-29 PROCEDURE — 84156 ASSAY OF PROTEIN URINE: CPT

## 2021-11-29 PROCEDURE — 87799 DETECT AGENT NOS DNA QUANT: CPT

## 2021-11-29 PROCEDURE — 36415 COLL VENOUS BLD VENIPUNCTURE: CPT

## 2021-11-29 PROCEDURE — 80048 BASIC METABOLIC PNL TOTAL CA: CPT

## 2021-11-29 PROCEDURE — 80197 ASSAY OF TACROLIMUS: CPT

## 2021-11-29 PROCEDURE — 85027 COMPLETE CBC AUTOMATED: CPT

## 2021-11-30 LAB — BKV DNA # SPEC NAA+PROBE: NOT DETECTED COPIES/ML

## 2021-12-07 DIAGNOSIS — B00.1 COLD SORE: Primary | ICD-10-CM

## 2021-12-08 RX ORDER — VALACYCLOVIR HYDROCHLORIDE 500 MG/1
500 TABLET, FILM COATED ORAL DAILY
Qty: 90 TABLET | Refills: 1 | Status: SHIPPED | OUTPATIENT
Start: 2021-12-08 | End: 2022-01-20

## 2021-12-10 ENCOUNTER — TELEPHONE (OUTPATIENT)
Dept: TRANSPLANT | Facility: CLINIC | Age: 66
End: 2021-12-10
Payer: MEDICARE

## 2021-12-10 NOTE — TELEPHONE ENCOUNTER
"    valACYclovir (VALTREX) 500 MG tablet     Jordy Dewitt MD Huepfel, Tiffany MOY RN  Yes ok to refill     Spoke to Ethel Thompson confirmed taking  daily  valtrex for ongoing \"cold sores\"    Sent Rx for further refills     "

## 2021-12-27 ENCOUNTER — LAB (OUTPATIENT)
Dept: LAB | Facility: CLINIC | Age: 66
End: 2021-12-27
Payer: MEDICARE

## 2021-12-27 DIAGNOSIS — Z79.899 ENCOUNTER FOR LONG-TERM CURRENT USE OF MEDICATION: ICD-10-CM

## 2021-12-27 DIAGNOSIS — Z48.298 AFTERCARE FOLLOWING ORGAN TRANSPLANT: ICD-10-CM

## 2021-12-27 DIAGNOSIS — Z94.0 KIDNEY REPLACED BY TRANSPLANT: ICD-10-CM

## 2021-12-27 LAB
ANION GAP SERPL CALCULATED.3IONS-SCNC: 5 MMOL/L (ref 3–14)
BUN SERPL-MCNC: 20 MG/DL (ref 7–30)
CALCIUM SERPL-MCNC: 10 MG/DL (ref 8.5–10.1)
CHLORIDE BLD-SCNC: 109 MMOL/L (ref 94–109)
CO2 SERPL-SCNC: 24 MMOL/L (ref 20–32)
CREAT SERPL-MCNC: 0.81 MG/DL (ref 0.52–1.04)
CREAT UR-MCNC: 187 MG/DL
ERYTHROCYTE [DISTWIDTH] IN BLOOD BY AUTOMATED COUNT: 12.2 % (ref 10–15)
GFR SERPL CREATININE-BSD FRML MDRD: 79 ML/MIN/1.73M2
GLUCOSE BLD-MCNC: 106 MG/DL (ref 70–99)
HCT VFR BLD AUTO: 36 % (ref 35–47)
HGB BLD-MCNC: 11.7 G/DL (ref 11.7–15.7)
MCH RBC QN AUTO: 31.9 PG (ref 26.5–33)
MCHC RBC AUTO-ENTMCNC: 32.5 G/DL (ref 31.5–36.5)
MCV RBC AUTO: 98 FL (ref 78–100)
PLATELET # BLD AUTO: 226 10E3/UL (ref 150–450)
POTASSIUM BLD-SCNC: 4.3 MMOL/L (ref 3.4–5.3)
PROT UR-MCNC: 0.28 G/L
PROT/CREAT 24H UR: 0.15 G/G CR (ref 0–0.2)
RBC # BLD AUTO: 3.67 10E6/UL (ref 3.8–5.2)
SODIUM SERPL-SCNC: 138 MMOL/L (ref 133–144)
TACROLIMUS BLD-MCNC: 5.6 UG/L (ref 5–15)
TME LAST DOSE: NORMAL H
TME LAST DOSE: NORMAL H
WBC # BLD AUTO: 5.2 10E3/UL (ref 4–11)

## 2021-12-27 PROCEDURE — 80048 BASIC METABOLIC PNL TOTAL CA: CPT

## 2021-12-27 PROCEDURE — 36415 COLL VENOUS BLD VENIPUNCTURE: CPT

## 2021-12-27 PROCEDURE — 80197 ASSAY OF TACROLIMUS: CPT

## 2021-12-27 PROCEDURE — 87799 DETECT AGENT NOS DNA QUANT: CPT

## 2021-12-27 PROCEDURE — 84156 ASSAY OF PROTEIN URINE: CPT

## 2021-12-27 PROCEDURE — 85027 COMPLETE CBC AUTOMATED: CPT

## 2021-12-28 LAB — BKV DNA # SPEC NAA+PROBE: NOT DETECTED COPIES/ML

## 2022-01-04 DIAGNOSIS — Z94.0 KIDNEY REPLACED BY TRANSPLANT: Primary | ICD-10-CM

## 2022-01-05 ENCOUNTER — DOCUMENTATION ONLY (OUTPATIENT)
Dept: TRANSPLANT | Facility: CLINIC | Age: 67
End: 2022-01-05
Payer: MEDICARE

## 2022-01-05 DIAGNOSIS — Z94.0 KIDNEY REPLACED BY TRANSPLANT: Primary | ICD-10-CM

## 2022-01-05 RX ORDER — MYCOPHENOLATE MOFETIL 250 MG/1
1250 CAPSULE ORAL 2 TIMES DAILY
Qty: 900 CAPSULE | Refills: 3 | Status: SHIPPED | OUTPATIENT
Start: 2022-01-05 | End: 2022-01-20

## 2022-01-05 NOTE — PROGRESS NOTES
Jordy Dewitt MD Huepfel, Tiffany MOY RN  Ok to refill level has been good on this dose and no current infectious concerns or GI intolerance, wbc ok, could recheck with next labs               Question regarding Cellcept Mycophenolate mofetil 1250 mg twice per day

## 2022-01-20 ENCOUNTER — TELEPHONE (OUTPATIENT)
Dept: ENDOCRINOLOGY | Facility: CLINIC | Age: 67
End: 2022-01-20
Payer: MEDICARE

## 2022-01-20 DIAGNOSIS — Z94.0 KIDNEY REPLACED BY TRANSPLANT: Primary | ICD-10-CM

## 2022-01-20 DIAGNOSIS — E11.9 NEW ONSET TYPE 2 DIABETES MELLITUS (H): ICD-10-CM

## 2022-01-20 DIAGNOSIS — B00.1 COLD SORE: ICD-10-CM

## 2022-01-20 RX ORDER — MYCOPHENOLATE MOFETIL 250 MG/1
1250 CAPSULE ORAL 2 TIMES DAILY
Qty: 900 CAPSULE | Refills: 3 | Status: SHIPPED | OUTPATIENT
Start: 2022-01-20 | End: 2022-01-26

## 2022-01-20 RX ORDER — ORAL SEMAGLUTIDE 3 MG/1
3 TABLET ORAL DAILY
Qty: 30 TABLET | Refills: 11 | OUTPATIENT
Start: 2022-01-20 | End: 2022-03-11

## 2022-01-20 NOTE — TELEPHONE ENCOUNTER
[facility] Call Center    Phone Message    May a detailed message be left on voicemail: yes     Reason for Call: Medication Question or concern regarding medication   Prescription Clarification    Name of Medication:   * Semaglutide (RYBELSUS) 3 MG TABS    Prescribing Provider: Chintan Whitfield      Pharmacy: [facility] [phone]     What on the order needs clarification? Per Patients , Maxim states patient is out of medication a week tomorrow 1/21/2022. Maxim states patient is needing the medication to go to the pharmacy listed above otherwise the medication will be $800 dollars at a different pharmacy. Please advise.       Action Taken: Message routed to:  [facility] ([facility]): Endo    Travel Screening: Not Applicable

## 2022-01-20 NOTE — TELEPHONE ENCOUNTER
Sent Rybelus rx to [facility]. Called and informed patient and her spouse that [facility]    Jose BOUDREAUX RN....1/20/2022 10:14 AM

## 2022-01-25 ENCOUNTER — TELEPHONE (OUTPATIENT)
Dept: ENDOCRINOLOGY | Facility: CLINIC | Age: 67
End: 2022-01-25
Payer: MEDICARE

## 2022-01-25 DIAGNOSIS — Z94.0 KIDNEY TRANSPLANTED: ICD-10-CM

## 2022-01-25 DIAGNOSIS — B00.1 COLD SORE: ICD-10-CM

## 2022-01-25 DIAGNOSIS — Z94.0 KIDNEY REPLACED BY TRANSPLANT: ICD-10-CM

## 2022-01-25 NOTE — TELEPHONE ENCOUNTER
[facility] and provided authorization for med to be mailed. Called pt willi and updated that this has been done.    .Tiffany DICKEY RN Specialty Triage 1/25/2022 11:22 AM

## 2022-01-25 NOTE — TELEPHONE ENCOUNTER
..Reason for Call:  Prescription - Semaglutide (RYBELSUS) 3 MG TABS    Detailed comments: spouse calling re:   [facility] has requested 90 day quantity,  3 milligram; Patient has been without for three weeks;     Phone Number Patient can be reached at: Cell number on file:    Telephone Information:   Mobile [phone] or [phone]       Best Time: any    Can we leave a detailed message on this number? YES    Call taken on 1/25/2022 at 10:49 AM by Codie Zee

## 2022-01-25 NOTE — TELEPHONE ENCOUNTER
Patient Call: Medication Refill  Route to LPN  Instruct the patient to first contact their pharmacy. If they have called their pharmacy and require further assistance, route to LPN.    Pharmacy Name: [facility]- Mail order  Pharmacy Location: [facility]  Name of Medication: Tacro 1 mg and 0.5 mg  Mycophenolate  250 mg  90 day supply with refills  When will the patient be out of this medication?: Less than 3 days (Route high priority)     Bactrim  Called into [facility]   90 day

## 2022-01-26 RX ORDER — MYCOPHENOLATE MOFETIL 250 MG/1
1250 CAPSULE ORAL 2 TIMES DAILY
Qty: 900 CAPSULE | Refills: 3 | Status: SHIPPED | OUTPATIENT
Start: 2022-01-26 | End: 2022-12-07

## 2022-01-26 RX ORDER — VALACYCLOVIR HYDROCHLORIDE 500 MG/1
500 TABLET, FILM COATED ORAL DAILY
Qty: 90 TABLET | Refills: 1 | Status: SHIPPED | OUTPATIENT
Start: 2022-01-26 | End: 2022-07-11

## 2022-01-26 RX ORDER — SULFAMETHOXAZOLE AND TRIMETHOPRIM 400; 80 MG/1; MG/1
1 TABLET ORAL DAILY
Qty: 90 TABLET | Refills: 3 | Status: SHIPPED | OUTPATIENT
Start: 2022-01-26 | End: 2023-04-04

## 2022-01-26 RX ORDER — TACROLIMUS 1 MG/1
1 CAPSULE ORAL 2 TIMES DAILY
Qty: 60 CAPSULE | Refills: 11 | Status: SHIPPED | OUTPATIENT
Start: 2022-01-26 | End: 2022-06-01

## 2022-01-31 ENCOUNTER — LAB (OUTPATIENT)
Dept: LAB | Facility: CLINIC | Age: 67
End: 2022-01-31
Payer: MEDICARE

## 2022-01-31 DIAGNOSIS — Z79.899 ENCOUNTER FOR LONG-TERM CURRENT USE OF MEDICATION: ICD-10-CM

## 2022-01-31 DIAGNOSIS — Z48.298 AFTERCARE FOLLOWING ORGAN TRANSPLANT: ICD-10-CM

## 2022-01-31 DIAGNOSIS — Z94.0 KIDNEY REPLACED BY TRANSPLANT: ICD-10-CM

## 2022-01-31 DIAGNOSIS — E11.9 NEW ONSET TYPE 2 DIABETES MELLITUS (H): ICD-10-CM

## 2022-01-31 LAB
ALBUMIN SERPL-MCNC: 4.1 G/DL (ref 3.4–5)
ALP SERPL-CCNC: 93 U/L (ref 40–150)
ALT SERPL W P-5'-P-CCNC: 32 U/L (ref 0–50)
ANION GAP SERPL CALCULATED.3IONS-SCNC: 6 MMOL/L (ref 3–14)
AST SERPL W P-5'-P-CCNC: 16 U/L (ref 0–45)
BILIRUB DIRECT SERPL-MCNC: 0.2 MG/DL (ref 0–0.2)
BILIRUB SERPL-MCNC: 0.6 MG/DL (ref 0.2–1.3)
BUN SERPL-MCNC: 25 MG/DL (ref 7–30)
CALCIUM SERPL-MCNC: 10 MG/DL (ref 8.5–10.1)
CHLORIDE BLD-SCNC: 110 MMOL/L (ref 94–109)
CO2 SERPL-SCNC: 21 MMOL/L (ref 20–32)
CREAT SERPL-MCNC: 0.77 MG/DL (ref 0.52–1.04)
CREAT UR-MCNC: 154 MG/DL
ERYTHROCYTE [DISTWIDTH] IN BLOOD BY AUTOMATED COUNT: 13 % (ref 10–15)
GFR SERPL CREATININE-BSD FRML MDRD: 84 ML/MIN/1.73M2
GLUCOSE BLD-MCNC: 116 MG/DL (ref 70–99)
HBA1C MFR BLD: 6.3 % (ref 0–5.6)
HCT VFR BLD AUTO: 35 % (ref 35–47)
HGB BLD-MCNC: 11.4 G/DL (ref 11.7–15.7)
MCH RBC QN AUTO: 32.4 PG (ref 26.5–33)
MCHC RBC AUTO-ENTMCNC: 32.6 G/DL (ref 31.5–36.5)
MCV RBC AUTO: 99 FL (ref 78–100)
PLATELET # BLD AUTO: 224 10E3/UL (ref 150–450)
POTASSIUM BLD-SCNC: 4.2 MMOL/L (ref 3.4–5.3)
PROT SERPL-MCNC: 7.2 G/DL (ref 6.8–8.8)
PROT UR-MCNC: 0.28 G/L
PROT/CREAT 24H UR: 0.18 G/G CR (ref 0–0.2)
RBC # BLD AUTO: 3.52 10E6/UL (ref 3.8–5.2)
SODIUM SERPL-SCNC: 137 MMOL/L (ref 133–144)
TACROLIMUS BLD-MCNC: 5.6 UG/L (ref 5–15)
TME LAST DOSE: NORMAL H
TME LAST DOSE: NORMAL H
WBC # BLD AUTO: 5.5 10E3/UL (ref 4–11)

## 2022-01-31 PROCEDURE — 36415 COLL VENOUS BLD VENIPUNCTURE: CPT

## 2022-01-31 PROCEDURE — 84156 ASSAY OF PROTEIN URINE: CPT

## 2022-01-31 PROCEDURE — 85027 COMPLETE CBC AUTOMATED: CPT

## 2022-01-31 PROCEDURE — 83036 HEMOGLOBIN GLYCOSYLATED A1C: CPT

## 2022-01-31 PROCEDURE — 80053 COMPREHEN METABOLIC PANEL: CPT

## 2022-01-31 PROCEDURE — 80197 ASSAY OF TACROLIMUS: CPT

## 2022-01-31 PROCEDURE — 80180 DRUG SCRN QUAN MYCOPHENOLATE: CPT

## 2022-01-31 PROCEDURE — 82248 BILIRUBIN DIRECT: CPT

## 2022-01-31 PROCEDURE — 87799 DETECT AGENT NOS DNA QUANT: CPT

## 2022-01-31 NOTE — TELEPHONE ENCOUNTER
January 31, 2022 11:30 AM - Segundo Ortega CNA: [facility] called to get refill for lasartan please call [phone] / fax [phone]

## 2022-02-01 DIAGNOSIS — Z94.0 HTN, KIDNEY TRANSPLANT RELATED: ICD-10-CM

## 2022-02-01 DIAGNOSIS — I15.1 HTN, KIDNEY TRANSPLANT RELATED: ICD-10-CM

## 2022-02-01 DIAGNOSIS — R80.1 PERSISTENT PROTEINURIA: ICD-10-CM

## 2022-02-01 LAB
BKV DNA # SPEC NAA+PROBE: NOT DETECTED COPIES/ML
MYCOPHENOLATE SERPL LC/MS/MS-MCNC: 1.6 MG/L (ref 1–3.5)
MYCOPHENOLATE-G SERPL LC/MS/MS-MCNC: 115 MG/L (ref 30–95)
TME LAST DOSE: ABNORMAL H
TME LAST DOSE: ABNORMAL H

## 2022-02-01 RX ORDER — LOSARTAN POTASSIUM 100 MG/1
100 TABLET ORAL DAILY
Qty: 90 TABLET | Refills: 3 | Status: SHIPPED | OUTPATIENT
Start: 2022-02-01 | End: 2022-12-07

## 2022-02-01 NOTE — TELEPHONE ENCOUNTER
[Yesterday 11:26 AM] Mervin Ortega  u avail for a phone call [facility] for pt Mija Blow    [Yesterday 11:29 AM] Mervin Ortega  please call aniyah -[phone] for pt Mija blow refill losartan        Task   Please follow up with [facility]

## 2022-02-28 ENCOUNTER — LAB (OUTPATIENT)
Dept: LAB | Facility: CLINIC | Age: 67
End: 2022-02-28
Payer: MEDICARE

## 2022-02-28 DIAGNOSIS — Z94.0 KIDNEY REPLACED BY TRANSPLANT: ICD-10-CM

## 2022-02-28 LAB
ANION GAP SERPL CALCULATED.3IONS-SCNC: 7 MMOL/L (ref 3–14)
BUN SERPL-MCNC: 22 MG/DL (ref 7–30)
CALCIUM SERPL-MCNC: 10 MG/DL (ref 8.5–10.1)
CHLORIDE BLD-SCNC: 106 MMOL/L (ref 94–109)
CO2 SERPL-SCNC: 23 MMOL/L (ref 20–32)
CREAT SERPL-MCNC: 0.7 MG/DL (ref 0.52–1.04)
CREAT UR-MCNC: 55 MG/DL
ERYTHROCYTE [DISTWIDTH] IN BLOOD BY AUTOMATED COUNT: 12.7 % (ref 10–15)
GFR SERPL CREATININE-BSD FRML MDRD: >90 ML/MIN/1.73M2
GLUCOSE BLD-MCNC: 101 MG/DL (ref 70–99)
HCT VFR BLD AUTO: 35.5 % (ref 35–47)
HGB BLD-MCNC: 11.4 G/DL (ref 11.7–15.7)
MCH RBC QN AUTO: 32.6 PG (ref 26.5–33)
MCHC RBC AUTO-ENTMCNC: 32.1 G/DL (ref 31.5–36.5)
MCV RBC AUTO: 101 FL (ref 78–100)
PLATELET # BLD AUTO: 227 10E3/UL (ref 150–450)
POTASSIUM BLD-SCNC: 4.6 MMOL/L (ref 3.4–5.3)
PROT UR-MCNC: 0.11 G/L
PROT/CREAT 24H UR: 0.2 G/G CR (ref 0–0.2)
RBC # BLD AUTO: 3.5 10E6/UL (ref 3.8–5.2)
SODIUM SERPL-SCNC: 136 MMOL/L (ref 133–144)
TACROLIMUS BLD-MCNC: 5.2 UG/L (ref 5–15)
TME LAST DOSE: NORMAL H
TME LAST DOSE: NORMAL H
WBC # BLD AUTO: 4.5 10E3/UL (ref 4–11)

## 2022-02-28 PROCEDURE — 80197 ASSAY OF TACROLIMUS: CPT

## 2022-02-28 PROCEDURE — 85027 COMPLETE CBC AUTOMATED: CPT

## 2022-02-28 PROCEDURE — 36415 COLL VENOUS BLD VENIPUNCTURE: CPT

## 2022-02-28 PROCEDURE — 84156 ASSAY OF PROTEIN URINE: CPT

## 2022-02-28 PROCEDURE — 80048 BASIC METABOLIC PNL TOTAL CA: CPT

## 2022-03-11 ENCOUNTER — OFFICE VISIT (OUTPATIENT)
Dept: ENDOCRINOLOGY | Facility: CLINIC | Age: 67
End: 2022-03-11
Payer: MEDICARE

## 2022-03-11 VITALS
BODY MASS INDEX: 25.92 KG/M2 | HEART RATE: 93 BPM | OXYGEN SATURATION: 97 % | SYSTOLIC BLOOD PRESSURE: 177 MMHG | WEIGHT: 128.4 LBS | DIASTOLIC BLOOD PRESSURE: 74 MMHG

## 2022-03-11 DIAGNOSIS — E11.29 TYPE 2 DIABETES MELLITUS WITH OTHER DIABETIC KIDNEY COMPLICATION, WITHOUT LONG-TERM CURRENT USE OF INSULIN (H): ICD-10-CM

## 2022-03-11 DIAGNOSIS — E78.5 DYSLIPIDEMIA: Primary | ICD-10-CM

## 2022-03-11 PROCEDURE — 99214 OFFICE O/P EST MOD 30 MIN: CPT | Performed by: INTERNAL MEDICINE

## 2022-03-11 RX ORDER — ORAL SEMAGLUTIDE 7 MG/1
7 TABLET ORAL DAILY
Qty: 90 TABLET | Refills: 3 | Status: SHIPPED | OUTPATIENT
Start: 2022-03-11 | End: 2023-03-03

## 2022-03-11 RX ORDER — ORAL SEMAGLUTIDE 7 MG/1
7 TABLET ORAL DAILY
Qty: 90 TABLET | Refills: 3 | OUTPATIENT
Start: 2022-03-11 | End: 2022-03-11

## 2022-03-11 NOTE — PROGRESS NOTES
"S:   Patient presents for management of DM.   Diagnosis made in 1/2021.   She had a kidney transplant on 9/3/2020.     She is taking metformin 500 mg BID.     She has started to use the treadmill, brisk walking, for 30-60 minutes a day.   She has cut down on her rice and noodle intake.     Checking glucose in the AM.   Then checking some 2 hour PP and HS readings (began 2 days ago).   AM usually 120-150.     In 3/2022, she has changed her insurance and missed Rybelsus for 2 weeks.   Rybelsus 3 mg every day  Metformin 500 mg BID    She has noticed a decline in appetite since starting Rybelsus.   Treadmill time reduced to 15-20 minutes a day.   Stops due to chest pain. Improved with taking ranitidine or pepto-bismol.   She has lost 6 pounds.   Reduced rice intake and having more protein.   1-2 meals a day and then snacks.     Logs:  AM and HS checks.   120-130's.    ROS: 10 point ROS neg other than the symptoms noted above in the HPI.    Exam:   Vital signs:      BP: (!) 177/74 Pulse: 93     SpO2: 97 %       Weight: 58.2 kg (128 lb 6.4 oz)  Estimated body mass index is 25.92 kg/m  as calculated from the following:    Height as of 9/16/21: 1.499 m (4' 11.02\").    Weight as of this encounter: 58.2 kg (128 lb 6.4 oz).  Gen: In NAD.   HEENT: no proptosis or lid lag, EOMI, MMM.       A/P:   Type 2 DM - New onset in 1/2021. Currently well controlled. Discussed GLP-1 RA's to help with weight loss.   In 3/2022, improved to 6.3%. Losing weight with Rybelsus.   -Continue metformin.   -Increase Rybelsus to 7 mg every day.  -Please discuss your chest pain with exercise with your Cardiologist next week. See if the think you need a stress test.   -Continue to increase exercise as you are able.   -Continue to reduce carbohydrate intake.   -Try to have three meals a day so you do not snack as much.   -Please bring your glucose meter or logs to all visits.   -Due for an eye exam.   -Recommend future care with Dr Wallace or Dr" Carrie in [place].   -ASA taking.  -BP: elevated. Home readings mostly at goal.   -NAFL/GALLOWAY: normal ALT and AST in 1/2021.    Normal in 1/2022.   -Lipids: High triglycerides and low HDL in 9/2021.    On atorvastatin.   -Microalbumin: S/P kidney transplant.    Followed by Renal.    On losartan.   -Eyes: due.   -Smoking: none.      Dyslipidemia - Management as above.       Chintan Whitfield M.D

## 2022-03-11 NOTE — PATIENT INSTRUCTIONS
-Continue metformin.   -Increase Rybelsus to 7 mg every day.    -Please discuss your chest pain with exercise with your Cardiologist next week. See if the think you need a stress test.     -Continue to increase exercise as you are able.   -Continue to reduce carbohydrate intake.   -Try to have three meals a day so you do not snack as much.     -Please bring your glucose meter or logs to all visits.     -Due for an eye exam.     -Recommend future care with Dr Wallace or Dr Butler in [place].

## 2022-03-11 NOTE — LETTER
"    3/11/2022         RE: Ethel Thompson  [address] [facility] [address]        Dear Colleague,    Thank you for referring your patient, Ethel Thompson, to the [facility]. Please see a copy of my visit note below.    S:   Patient presents for management of DM.   Diagnosis made in 1/2021.   She had a kidney transplant on 9/3/2020.     She is taking metformin 500 mg BID.     She has started to use the treadmill, brisk walking, for 30-60 minutes a day.   She has cut down on her rice and noodle intake.     Checking glucose in the AM.   Then checking some 2 hour PP and HS readings (began 2 days ago).   AM usually 120-150.     In 3/2022, she has changed her insurance and missed Rybelsus for 2 weeks.   Rybelsus 3 mg every day  Metformin 500 mg BID    She has noticed a decline in appetite since starting Rybelsus.   Treadmill time reduced to 15-20 minutes a day.   Stops due to chest pain. Improved with taking ranitidine or pepto-bismol.   She has lost 6 pounds.   Reduced rice intake and having more protein.   1-2 meals a day and then snacks.     Logs:  AM and HS checks.   120-130's.    ROS: 10 point ROS neg other than the symptoms noted above in the HPI.    Exam:   Vital signs:      BP: (!) 177/74 Pulse: 93     SpO2: 97 %       Weight: 58.2 kg (128 lb 6.4 oz)  Estimated body mass index is 25.92 kg/m  as calculated from the following:    Height as of 9/16/21: 1.499 m (4' 11.02\").    Weight as of this encounter: 58.2 kg (128 lb 6.4 oz).  Gen: In NAD.   HEENT: no proptosis or lid lag, EOMI, MMM.       A/P:   Type 2 DM - New onset in 1/2021. Currently well controlled. Discussed GLP-1 RA's to help with weight loss.   In 3/2022, improved to 6.3%. Losing weight with Rybelsus.   -Continue metformin.   -Increase Rybelsus to 7 mg every day.  -Please discuss your chest pain with exercise with your Cardiologist next week. See if the think you need a stress test.   -Continue to increase exercise as you are able. "   -Continue to reduce carbohydrate intake.   -Try to have three meals a day so you do not snack as much.   -Please bring your glucose meter or logs to all visits.   -Due for an eye exam.   -Recommend future care with Dr Wallace or Dr Butler in [place].   -ASA taking.  -BP: elevated. Home readings mostly at goal.   -NAFL/GALLOWAY: normal ALT and AST in 1/2021.    Normal in 1/2022.   -Lipids: High triglycerides and low HDL in 9/2021.    On atorvastatin.   -Microalbumin: S/P kidney transplant.    Followed by Renal.    On losartan.   -Eyes: due.   -Smoking: none.      Dyslipidemia - Management as above.       Chintan Whitfield M.D          Again, thank you for allowing me to participate in the care of your patient.        Sincerely,        Chintan Whitfield MD

## 2022-03-28 ENCOUNTER — LAB (OUTPATIENT)
Dept: LAB | Facility: CLINIC | Age: 67
End: 2022-03-28
Payer: MEDICARE

## 2022-03-28 DIAGNOSIS — Z79.899 ENCOUNTER FOR LONG-TERM CURRENT USE OF MEDICATION: ICD-10-CM

## 2022-03-28 DIAGNOSIS — Z48.298 AFTERCARE FOLLOWING ORGAN TRANSPLANT: ICD-10-CM

## 2022-03-28 DIAGNOSIS — Z94.0 KIDNEY REPLACED BY TRANSPLANT: ICD-10-CM

## 2022-03-28 LAB
ANION GAP SERPL CALCULATED.3IONS-SCNC: 5 MMOL/L (ref 3–14)
BUN SERPL-MCNC: 31 MG/DL (ref 7–30)
CALCIUM SERPL-MCNC: 10.3 MG/DL (ref 8.5–10.1)
CHLORIDE BLD-SCNC: 108 MMOL/L (ref 94–109)
CO2 SERPL-SCNC: 24 MMOL/L (ref 20–32)
CREAT SERPL-MCNC: 0.94 MG/DL (ref 0.52–1.04)
CREAT UR-MCNC: 195 MG/DL
ERYTHROCYTE [DISTWIDTH] IN BLOOD BY AUTOMATED COUNT: 12.8 % (ref 10–15)
GFR SERPL CREATININE-BSD FRML MDRD: 66 ML/MIN/1.73M2
GLUCOSE BLD-MCNC: 101 MG/DL (ref 70–99)
HCT VFR BLD AUTO: 32.4 % (ref 35–47)
HGB BLD-MCNC: 10.5 G/DL (ref 11.7–15.7)
MCH RBC QN AUTO: 32.7 PG (ref 26.5–33)
MCHC RBC AUTO-ENTMCNC: 32.4 G/DL (ref 31.5–36.5)
MCV RBC AUTO: 101 FL (ref 78–100)
PLATELET # BLD AUTO: 189 10E3/UL (ref 150–450)
POTASSIUM BLD-SCNC: 4.9 MMOL/L (ref 3.4–5.3)
PROT UR-MCNC: 0.26 G/L
PROT/CREAT 24H UR: 0.13 G/G CR (ref 0–0.2)
RBC # BLD AUTO: 3.21 10E6/UL (ref 3.8–5.2)
SODIUM SERPL-SCNC: 137 MMOL/L (ref 133–144)
TACROLIMUS BLD-MCNC: 4.1 UG/L (ref 5–15)
TME LAST DOSE: ABNORMAL H
TME LAST DOSE: ABNORMAL H
WBC # BLD AUTO: 3.6 10E3/UL (ref 4–11)

## 2022-03-28 PROCEDURE — 80048 BASIC METABOLIC PNL TOTAL CA: CPT

## 2022-03-28 PROCEDURE — 84156 ASSAY OF PROTEIN URINE: CPT

## 2022-03-28 PROCEDURE — 87799 DETECT AGENT NOS DNA QUANT: CPT

## 2022-03-28 PROCEDURE — 36415 COLL VENOUS BLD VENIPUNCTURE: CPT

## 2022-03-28 PROCEDURE — 80197 ASSAY OF TACROLIMUS: CPT

## 2022-03-28 PROCEDURE — 85027 COMPLETE CBC AUTOMATED: CPT

## 2022-03-29 LAB — BKV DNA # SPEC NAA+PROBE: NOT DETECTED COPIES/ML

## 2022-04-11 ENCOUNTER — OFFICE VISIT (OUTPATIENT)
Dept: NEPHROLOGY | Facility: CLINIC | Age: 67
End: 2022-04-11
Attending: INTERNAL MEDICINE
Payer: MEDICARE

## 2022-04-11 ENCOUNTER — LAB (OUTPATIENT)
Dept: LAB | Facility: CLINIC | Age: 67
End: 2022-04-11
Payer: MEDICARE

## 2022-04-11 VITALS
TEMPERATURE: 97.9 F | HEART RATE: 85 BPM | RESPIRATION RATE: 16 BRPM | BODY MASS INDEX: 25.03 KG/M2 | OXYGEN SATURATION: 99 % | WEIGHT: 124 LBS | DIASTOLIC BLOOD PRESSURE: 78 MMHG | SYSTOLIC BLOOD PRESSURE: 137 MMHG

## 2022-04-11 DIAGNOSIS — Z48.298 AFTERCARE FOLLOWING ORGAN TRANSPLANT: ICD-10-CM

## 2022-04-11 DIAGNOSIS — Z94.0 KIDNEY REPLACED BY TRANSPLANT: ICD-10-CM

## 2022-04-11 DIAGNOSIS — Z79.899 ENCOUNTER FOR LONG-TERM CURRENT USE OF MEDICATION: ICD-10-CM

## 2022-04-11 DIAGNOSIS — Z94.0 HTN, KIDNEY TRANSPLANT RELATED: ICD-10-CM

## 2022-04-11 DIAGNOSIS — D84.9 IMMUNOSUPPRESSION (H): ICD-10-CM

## 2022-04-11 DIAGNOSIS — N25.81 SECONDARY HYPERPARATHYROIDISM (H): ICD-10-CM

## 2022-04-11 DIAGNOSIS — E78.2 MIXED HYPERLIPIDEMIA: ICD-10-CM

## 2022-04-11 DIAGNOSIS — R76.8 HEPATITIS B CORE ANTIBODY POSITIVE: ICD-10-CM

## 2022-04-11 DIAGNOSIS — N05.1 FSGS (FOCAL SEGMENTAL GLOMERULOSCLEROSIS): ICD-10-CM

## 2022-04-11 DIAGNOSIS — I15.1 HTN, KIDNEY TRANSPLANT RELATED: ICD-10-CM

## 2022-04-11 DIAGNOSIS — E11.9 NEW ONSET TYPE 2 DIABETES MELLITUS (H): ICD-10-CM

## 2022-04-11 DIAGNOSIS — Z48.298 AFTERCARE FOLLOWING ORGAN TRANSPLANT: Primary | ICD-10-CM

## 2022-04-11 LAB
ANION GAP SERPL CALCULATED.3IONS-SCNC: 8 MMOL/L (ref 3–14)
BUN SERPL-MCNC: 24 MG/DL (ref 7–30)
CALCIUM SERPL-MCNC: 10.2 MG/DL (ref 8.5–10.1)
CHLORIDE BLD-SCNC: 106 MMOL/L (ref 94–109)
CO2 SERPL-SCNC: 24 MMOL/L (ref 20–32)
CREAT SERPL-MCNC: 0.9 MG/DL (ref 0.52–1.04)
CREAT UR-MCNC: 137 MG/DL
ERYTHROCYTE [DISTWIDTH] IN BLOOD BY AUTOMATED COUNT: 12.5 % (ref 10–15)
GFR SERPL CREATININE-BSD FRML MDRD: 69 ML/MIN/1.73M2
GLUCOSE BLD-MCNC: 114 MG/DL (ref 70–99)
HCT VFR BLD AUTO: 34.3 % (ref 35–47)
HGB BLD-MCNC: 11.2 G/DL (ref 11.7–15.7)
MCH RBC QN AUTO: 33.2 PG (ref 26.5–33)
MCHC RBC AUTO-ENTMCNC: 32.7 G/DL (ref 31.5–36.5)
MCV RBC AUTO: 102 FL (ref 78–100)
PLATELET # BLD AUTO: 202 10E3/UL (ref 150–450)
POTASSIUM BLD-SCNC: 4.6 MMOL/L (ref 3.4–5.3)
PROT UR-MCNC: 0.17 G/L
PROT/CREAT 24H UR: 0.12 G/G CR (ref 0–0.2)
RBC # BLD AUTO: 3.37 10E6/UL (ref 3.8–5.2)
SODIUM SERPL-SCNC: 138 MMOL/L (ref 133–144)
WBC # BLD AUTO: 5.9 10E3/UL (ref 4–11)

## 2022-04-11 PROCEDURE — 86832 HLA CLASS I HIGH DEFIN QUAL: CPT | Performed by: INTERNAL MEDICINE

## 2022-04-11 PROCEDURE — 80197 ASSAY OF TACROLIMUS: CPT | Performed by: INTERNAL MEDICINE

## 2022-04-11 PROCEDURE — 80048 BASIC METABOLIC PNL TOTAL CA: CPT | Performed by: PATHOLOGY

## 2022-04-11 PROCEDURE — 85027 COMPLETE CBC AUTOMATED: CPT | Performed by: PATHOLOGY

## 2022-04-11 PROCEDURE — 84156 ASSAY OF PROTEIN URINE: CPT | Performed by: PATHOLOGY

## 2022-04-11 PROCEDURE — 99214 OFFICE O/P EST MOD 30 MIN: CPT | Performed by: INTERNAL MEDICINE

## 2022-04-11 PROCEDURE — 80180 DRUG SCRN QUAN MYCOPHENOLATE: CPT | Performed by: INTERNAL MEDICINE

## 2022-04-11 PROCEDURE — 86833 HLA CLASS II HIGH DEFIN QUAL: CPT | Performed by: INTERNAL MEDICINE

## 2022-04-11 PROCEDURE — G0463 HOSPITAL OUTPT CLINIC VISIT: HCPCS

## 2022-04-11 PROCEDURE — 87799 DETECT AGENT NOS DNA QUANT: CPT | Performed by: INTERNAL MEDICINE

## 2022-04-11 PROCEDURE — 36415 COLL VENOUS BLD VENIPUNCTURE: CPT | Performed by: PATHOLOGY

## 2022-04-11 RX ORDER — ORAL SEMAGLUTIDE 7 MG/1
7 TABLET ORAL DAILY
COMMUNITY
Start: 2022-03-11 | End: 2023-03-30

## 2022-04-11 ASSESSMENT — PAIN SCALES - GENERAL: PAINLEVEL: EXTREME PAIN (8)

## 2022-04-11 NOTE — PROGRESS NOTES
CHRONIC TRANSPLANT NEPHROLOGY VISIT    19 month post transplant    Assessment & Plan   # DDKT: Stable    Patient had developed new proteinuria, up to ~ 2.5 grams 2/2020, but that improved with up-titration of losartan. Currently 0.14 g.gCr ( Aug/2021) .Patient does NOT have a known diagnosis of FSGS.  Her biopsy showed 1/4 glomeruli with global glomerulosclerosis and no focal segmental glomerulosclerosis.  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  Will plan to treat proteinuria conservatively at this time with ARB.   - Baseline Creatinine:  ~ 0.8-1.0   - Proteinuria: Moderate (1-3 grams) About 1 gram.   - Date DSA Last Checked: Apr/2021      Latest DSA: No   - BK Viremia: No   - Kidney Tx Biopsy: Mar 03, 2021; Result:  No diagnostic evidence of acute rejection.  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  No interstitial fibrosis or tubular atrophy.             Feb 03, 2021; Result: No diagnostic evidence of acute rejection.  Segmental glomerular basement membrane irregularity with remodeling and lamellation.  Type IV collagen alpha5 and alpha2 staining was normal.  This was felt to be donor derived.  Allosure  0.17 ( 6/28/21)     Patient does use topical NSAID gel( diclofena). She was counselled , this does have systemmic absorption and can adversely effect the allograft     # Immunosuppression: Tacrolimus immediate release (goal 4-6) and Mycophenolate mofetil (dose 1250 mg every 12 hours)   - Continue with intensive monitoring of immunosuppression for efficacy and toxicity.   - Changes: Not at  this time    - Continue with intensive monitoring of immunosuppression for efficacy and toxicity.      # Infection Prophylaxis:   - PJP: Sulfa/TMP (Bactrim)     # Recurrent cold sores : Controlled now. on  Valtrex 500 mg daily for cold sore prophylaxis     # Hypertension: controlled;  Goal BP: < 130/80   - Changes:No ,continue losartan 100 mg po every day to control BP & proteinuria. Also on Lasix 20 mg daily     # Diabetes:well controlled Last HbA1c: 6.3% ( March/2021)   - Management as per primary care.              - f/up endocrinology     # Mineral Bone Disorder:   - Secondary renal hyperparathyroidism; PTH level: Mildly elevated (151-300 pg/ml)        On treatment: None  - Vitamin D; level: Low        On supplement: No; Not on supplement due to high normal calcium level.Calcium has normalized now   - Calcium; level: Normal        On supplement: No    # Electrolytes:   - Potassium; level: Normal        On supplement: No  - Magnesium; level: Normal        On supplement: Yes - stop mag ox  - Bicarbonate; level: Normal        On supplement: No    # MGUS: No monoclonal protein seen on latest SPEP and normal kappa/lambda ratio.   - No follow up testing is necessary unless proteinuria significantly worsens.    # GERD: Occasional symptoms, but mostly controlled on famotidine.    # Shortness of breath: on exertion, evaluated by cards, echo & stress test unremarkable    # SADEI on CPAP: Patient is compliant.    # Skin Cancer Risk:    - Discussed sun protection and recommend regular follow up with Dermatology.    # Medical Compliance: Yes       # COVID-19 Virus Review: Discussed COVID-19 virus and the potential medical risks.  Reviewed preventative health recommendations, including wearing a mask where appropriate.  Recommended COVID vaccination should be up to date with either an initial vaccination or booster shot when appropriate.  Asked the patient to inform the transplant center if they are exposed or diagnosed with  this virus.    # COVID Vaccination Up To Date: Yes due for 4th dose      # Transplant History:  Etiology of Kidney Failure: Unknown etiology (Native kidney biopsy showed global glomerulosclerosis)  Tx: DDKT  Transplant: 9/3/2020 (Kidney)  Significant changes in immunosuppression: None  Significant transplant-related complications: None    Transplant Office Phone Number: [phone]    Assessment and plan was discussed with the patient and she voiced her understanding and agreement.    Return visit: 6 months       Jordy Dewitt MD    Chief Complaint   Ms. Thompson is a 67 year old here for kidney transplant and immunosuppression management.    History of Present Illness      Mrs. Thompson was recently evaluated for ongoing chest pain over the past couple months mostly on exertion and occasionally at rest. Angiogram showed mild non-obstructive CAD mLAD lesion 50%,, mild disease in Lcx & RCA,; optimization of medical therapy was recommended. She notes good BP control and improved DM control over the past few months.     She recently took wrong dose of tacrolimus for few days (lower dose for 4-5 d) by mistake, FK trough ranged 4-6 , no subtherapeutic level noted. She reports mild nausea in am, resolves with crackers, no vomiting or diarrhea, she takes lasix 20 mg prn for swelling. No recent weight gain, leg swelling, or shortness of breath. No recent illness or hospitalization.    Home BP~130s/80s  Current IS FK 1/1 MMF 1250/1250  COVID: 3 doses, needs a 4th booster    Problem List   Patient Active Problem List   Diagnosis     Elevated serum immunoglobulin free light chain level     Dyslipidemia     Hypothyroidism     SADIE on CPAP     Sensorineural hearing loss (SNHL) of both ears     GERD (gastroesophageal reflux disease)     HTN, kidney transplant related     Hepatitis B core antibody positive     Secondary renal hyperparathyroidism (H)     Memory deficits     Kidney replaced by transplant     Immunosuppression (H)      Aftercare following organ transplant     Vitamin D deficiency     Elevated random blood glucose level     Diabetes mellitus, type 2 (H)       Allergies   Allergies   Allergen Reactions     Amoxicillin-Pot Clavulanate Nausea and Vomiting       Medications   Current Outpatient Medications   Medication Sig     acetaminophen (TYLENOL) 325 MG tablet Take 2 tablets (650 mg) by mouth every 4 hours as needed for pain     aspirin (ASA) 81 MG EC tablet Take 1 tablet (81 mg) by mouth daily     atorvastatin (LIPITOR) 40 MG tablet Take 0.5 tablets (20 mg) by mouth daily     cholecalciferol (VITAMIN D3) 25 mcg (1000 units) capsule Take 2 capsules (50 mcg) by mouth daily     diclofenac (VOLTAREN) 1 % topical gel Apply 2 g topically 4 times daily as needed      famotidine (PEPCID) 20 MG tablet Take 20 mg by mouth every evening      fish oil-omega-3 fatty acids 1000 MG capsule Take 2 g by mouth daily      fluticasone (FLONASE) 50 MCG/ACT nasal spray Spray 1 spray into both nostrils 2 times daily as needed for rhinitis or allergies     furosemide (LASIX) 20 MG tablet Take 1 tablet (20 mg) by mouth daily     levothyroxine (SYNTHROID/LEVOTHROID) 112 MCG tablet Take 112 mcg (1 tablet) by mouth every Monday, Tuesday is 1/2 tab, 1 tab Wednesday, 1 tab Thursday, tab 1 Friday     Lidocaine (LIDOCARE) 4 % Patch Place 1 patch onto the skin every 24 hours To prevent lidocaine toxicity, patient should be patch free for 12 hrs daily.  Using for back pain     losartan (COZAAR) 100 MG tablet Take 1 tablet (100 mg) by mouth daily     magnesium oxide 400 MG CAPS lunch     melatonin 3 MG tablet Take 6 mg by mouth At Bedtime      metFORMIN (GLUCOPHAGE) 500 MG tablet Take 500 mg by mouth 2 times daily (with meals)     mycophenolate (GENERIC EQUIVALENT) 250 MG capsule Take 5 capsules (1,250 mg) by mouth 2 times daily     psyllium (METAMUCIL/KONSYL) 58.6 % powder Take by mouth daily     Semaglutide (RYBELSUS) 7 MG TABS Take 7 mg by mouth daily      Semaglutide (RYBELSUS) 7 MG TABS Take 7 mg by mouth daily     sulfamethoxazole-trimethoprim (BACTRIM) 400-80 MG tablet Take 1 tablet by mouth daily     tacrolimus (GENERIC EQUIVALENT) 0.5 MG capsule HOLD     tacrolimus (GENERIC EQUIVALENT) 1 MG capsule Take 1 capsule (1 mg) by mouth 2 times daily     valACYclovir (VALTREX) 500 MG tablet Take 1 tablet (500 mg) by mouth daily     No current facility-administered medications for this visit.     There are no discontinued medications.    Physical Exam   Vital signs were deferred for this telemedicine visit.    GENERAL APPEARANCE: alert and no distress  HENT: no obvious abnormalities on appearance  RESP: breathing appears unremarkable with normal rate, no audible wheezing or cough and no apparent shortness of breath with conversation  MS: extremities normal - no gross deformities noted  SKIN: no apparent rash and normal skin tone  NEURO: speech is clear with no obvious neurological deficits  PSYCH: mentation appears normal and affect normal  Allograft - non tender .     Data     Renal Latest Ref Rng & Units 4/11/2022 3/28/2022 2/28/2022   Na 133 - 144 mmol/L 138 137 136   Na (external) 135 - 145 mmol/L - - -   K 3.4 - 5.3 mmol/L 4.6 4.9 4.6   K (external) 3.5 - 5.0 mmol/L - - -   Cl 94 - 109 mmol/L 106 108 106   CO2 20 - 32 mmol/L 24 24 23   CO2 (external) 21 - 31 mmol/L - - -   BUN 7 - 30 mg/dL 24 31(H) 22   BUN (external) 8 - 25 mg/dL - - -   Cr 0.52 - 1.04 mg/dL 0.90 0.94 0.70   Cr (external) 0.57 - 1.11 mg/dL - - -   Glucose 70 - 99 mg/dL 114(H) 101(H) 101(H)   Glucose (external) 65 - 100 mg/dL - - -   Ca  8.5 - 10.1 mg/dL 10.2(H) 10.3(H) 10.0   Ca (external) 8.5 - 10.5 mg/dL - - -   Mg 1.6 - 2.3 mg/dL - - -   Mg (external) 1.6 - 2.6 mg/dL - - -     Bone Health Latest Ref Rng & Units 9/27/2021 4/19/2021 3/8/2021   Phos 2.5 - 4.5 mg/dL - 3.5 3.9   Phos (external) 2.3 - 4.7 mg/dL - - -   PTHi 18 - 80 pg/mL 106(H) - -   Vit D Def 20 - 75 ug/L 18(L) - -     Heme Latest  Ref Rng & Units 4/11/2022 3/28/2022 2/28/2022   WBC 4.0 - 11.0 10e3/uL 5.9 3.6(L) 4.5   WBC (external) 4.5 - 11.0 thou/cu mm - - -   Hgb 11.7 - 15.7 g/dL 11.2(L) 10.5(L) 11.4(L)   Hgb (external) 12.0 - 16.0 g/dL - - -   Plt 150 - 450 10e3/uL 202 189 227   Plt (external) 140 - 440 thou/cu mm - - -   ABSOLUTE NEUTROPHIL 1.6 - 8.3 10e9/L - - -   ABSOLUTE LYMPHOCYTES 0.8 - 5.3 10e9/L - - -   ABSOLUTE MONOCYTES 0.0 - 1.3 10e9/L - - -   ABSOLUTE EOSINOPHILS 0.0 - 0.7 10e9/L - - -   ABSOLUTE BASOPHILS 0.0 - 0.2 10e9/L - - -   ABS IMMATURE GRANULOCYTES 0 - 0.4 10e9/L - - -   ABSOLUTE NUCLEATED RBC - - - -     Liver Latest Ref Rng & Units 1/31/2022 9/27/2021 3/1/2021   AP 40 - 150 U/L 93 123 195(H)   TBili 0.2 - 1.3 mg/dL 0.6 0.4 0.6   DBili 0.0 - 0.2 mg/dL 0.2 0.1 0.1   ALT 0 - 50 U/L 32 35 42   AST 0 - 45 U/L 16 25 26   Tot Protein 6.8 - 8.8 g/dL 7.2 7.2 7.0   Albumin 3.4 - 5.0 g/dL 4.1 4.2 3.7     Pancreas Latest Ref Rng & Units 1/31/2022 9/27/2021 3/1/2021   A1C 0.0 - 5.6 % 6.3(H) 7.6(H) 7.9(H)     Iron studies Latest Ref Rng & Units 9/9/2020   Iron 35 - 180 ug/dL 162   Iron sat 15 - 46 % 92(H)   Ferritin 8 - 252 ng/mL 1,757(H)     [facility] Txp Virology Latest Ref Rng & Units 6/28/2021 5/17/2021 5/3/2021   CVM DNA Quant - - - EDTAP   CMV QUANT IU/ML CMVND:CMV DNA Not Detected [IU]/mL - - CMV DNA Not Detected   LOG IU/ML OF CMVQNT <2.1 [Log:IU]/mL - - Not Calculated   BK Spec - Plasma Plasma -   BK Res BKNEG:BK Virus DNA Not Detected copies/mL BK Virus DNA Not Detected BK Virus DNA Not Detected -   BK Log <2.7 Log copies/mL Not Calculated Not Calculated -   BK Quant Log Ext - - - -   BK Quant Result Ext Negative copies/mL - - -   BK Quant Spec Ext - - - -   EBV CAPSID ANTIBODY IGG 0.0 - 0.8 AI - - -   EBV DNA COPIES/ML EBVNEG:EBV DNA Not Detected [Copies]/mL - - EBV DNA Not Detected   EBV DNA LOG OF COPIES <2.7 [Log:copies]/mL - - Not Calculated   Hep B Core NR:Nonreactive - - -        Recent Labs   Lab Test 02/28/22  0836  03/28/22  0821 04/11/22  1356   DOSTAC 2/27/2022 3/27/2022 4/10/2022   TACROL 5.2 4.1* 8.7     Recent Labs   Lab Test 05/17/21  0821 09/27/21  0939 01/31/22  0816 04/11/22  1356   DOSMPA 2000 on [id number] tm 9/26/2021   8:00 PM  --   --    MPACID 1.24 1.41 1.60 0.84*   MPAG 113.7* 93.3 115.0* >200.0*       Echo  5/2021  Visually Estimated EF: 60-65%   Normal LV size and systolic function.   No significant valvular heart disease noted.   Estimated pulmonary artery pressure of 21.5 mmHg + RA pressure.    NM stress test 4/2021: negative for ischemia   Normal IVC size, >50% collapse with respiration.

## 2022-04-11 NOTE — NURSING NOTE
Chief Complaint   Patient presents with     Consult     Follow Up - Kidney Transplant     /78   Pulse 85   Temp 97.9  F (36.6  C)   Resp 16   Wt 56.2 kg (124 lb)   SpO2 99%   BMI 25.03 kg/m    Jose Garcia on 4/11/2022 at 1:11 PM

## 2022-04-11 NOTE — LETTER
4/11/2022      RE: Ethel Thompson  [address]   [place] [address]       CHRONIC TRANSPLANT NEPHROLOGY VISIT    19 month post transplant    Assessment & Plan   # DDKT: Stable    Patient had developed new proteinuria, up to ~ 2.5 grams 2/2020, but that improved with up-titration of losartan. Currently 0.14 g.gCr ( Aug/2021) .Patient does NOT have a known diagnosis of FSGS.  Her biopsy showed 1/4 glomeruli with global glomerulosclerosis and no focal segmental glomerulosclerosis.  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  Will plan to treat proteinuria conservatively at this time with ARB.   - Baseline Creatinine:  ~ 0.8-1.0   - Proteinuria: Moderate (1-3 grams) About 1 gram.   - Date DSA Last Checked: Apr/2021      Latest DSA: No   - BK Viremia: No   - Kidney Tx Biopsy: Mar 03, 2021; Result:  No diagnostic evidence of acute rejection.  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  No interstitial fibrosis or tubular atrophy.             Feb 03, 2021; Result: No diagnostic evidence of acute rejection.  Segmental glomerular basement membrane irregularity with remodeling and lamellation.  Type IV collagen alpha5 and alpha2 staining was normal.  This was felt to be donor derived.  Allosure  0.17 ( 6/28/21)     Patient does use topical NSAID gel( diclofena). She was counselled , this does have systemmic absorption and can adversely effect the allograft     # Immunosuppression: Tacrolimus immediate release (goal 4-6) and Mycophenolate mofetil (dose 500 mg every 12 hours)   - Continue with intensive  monitoring of immunosuppression for efficacy and toxicity.   - Changes: Not at this time      # Infection Prophylaxis:   - PJP: Sulfa/TMP (Bactrim)     # Recurrent cold sores : Controlled now. on  Valtrex 500 mg daily for cold sore prophylaxis     # Hypertension: controlled;  Goal BP: < 130/80   - Changes:No ,continue losartan 100 mg po every day to control BP & proteinuria. Also on Lasix 20 mg daily     # Diabetes:well controlled Last HbA1c: 6.3% ( March/2021)   - Management as per primary care.              - f/up endocrinology     # Mineral Bone Disorder:   - Secondary renal hyperparathyroidism; PTH level: Mildly elevated (151-300 pg/ml)        On treatment: None  - Vitamin D; level: Low        On supplement: No; Not on supplement due to high normal calcium level.Calcium has normalized now   - Calcium; level: Normal        On supplement: No    # Electrolytes:   - Potassium; level: Normal        On supplement: No  - Magnesium; level: Normal        On supplement: Yes - stop mag ox  - Bicarbonate; level: Normal        On supplement: No    # MGUS: No monoclonal protein seen on latest SPEP and normal kappa/lambda ratio.   - No follow up testing is necessary unless proteinuria significantly worsens.    # GERD: Occasional symptoms, but mostly controlled on famotidine.    # Shortness of breath: on exertion, evaluated by cards, echo & stress test unremarkable    # SADIE on CPAP: Patient is compliant.    # Skin Cancer Risk:    - Discussed sun protection and recommend regular follow up with Dermatology.    # Medical Compliance: Yes       # COVID-19 Virus Review: Discussed COVID-19 virus and the potential medical risks.  Reviewed preventative health recommendations, including wearing a mask where appropriate.  Recommended COVID vaccination should be up to date with either an initial vaccination or booster shot when appropriate.  Asked the patient to inform the transplant center if they are exposed or diagnosed with this  virus.    # COVID Vaccination Up To Date: Yes due for 4th dose      # Transplant History:  Etiology of Kidney Failure: Unknown etiology (Native kidney biopsy showed global glomerulosclerosis)  Tx: DDKT  Transplant: 9/3/2020 (Kidney)  Significant changes in immunosuppression: None  Significant transplant-related complications: None    Transplant Office Phone Number: [phone]    Assessment and plan was discussed with the patient and she voiced her understanding and agreement.    Return visit: 6 months       Jordy Dewitt MD    Chief Complaint   Ms. Thompson is a 67 year old here for kidney transplant and immunosuppression management.    History of Present Illness      Mrs. Thompson was recently evaluated for ongoing chest pain over the past couple months mostly on exertion and occasionally at rest. Angiogram showed mild non-obstructive CAD mLAD lesion 50%,, mild disease in Lcx & RCA,; optimization of medical therapy was recommended. She notes good BP control and improved DM control over the past few months.     She recently took wrong dose of tacrolimus for few days (lower dose for 4-5 d) by mistake, FK trough ranged 4-6 , no subtherapeutic level noted. She reports mild nausea in am, resolves with crackers, no vomiting or diarrhea, she takes lasix 20 mg prn for swelling. No recent weight gain, leg swelling, or shortness of breath. No recent illness or hospitalization.    Home BP~130s/80s  Current IS FK 1/1 MMF 1250/1250  COVID: 3 doses, needs a 4th booster    Problem List   Patient Active Problem List   Diagnosis     Elevated serum immunoglobulin free light chain level     Dyslipidemia     Hypothyroidism     SADIE on CPAP     Sensorineural hearing loss (SNHL) of both ears     GERD (gastroesophageal reflux disease)     HTN, kidney transplant related     Hepatitis B core antibody positive     Secondary renal hyperparathyroidism (H)     Memory deficits     Kidney replaced by transplant     Immunosuppression (H)     Aftercare  following organ transplant     Vitamin D deficiency     Elevated random blood glucose level     Diabetes mellitus, type 2 (H)       Allergies   Allergies   Allergen Reactions     Amoxicillin-Pot Clavulanate Nausea and Vomiting       Medications   Current Outpatient Medications   Medication Sig     acetaminophen (TYLENOL) 325 MG tablet Take 2 tablets (650 mg) by mouth every 4 hours as needed for pain     aspirin (ASA) 81 MG EC tablet Take 1 tablet (81 mg) by mouth daily     atorvastatin (LIPITOR) 40 MG tablet Take 0.5 tablets (20 mg) by mouth daily     cholecalciferol (VITAMIN D3) 25 mcg (1000 units) capsule Take 2 capsules (50 mcg) by mouth daily     diclofenac (VOLTAREN) 1 % topical gel Apply 2 g topically 4 times daily as needed      famotidine (PEPCID) 20 MG tablet Take 20 mg by mouth every evening      fish oil-omega-3 fatty acids 1000 MG capsule Take 2 g by mouth daily      fluticasone (FLONASE) 50 MCG/ACT nasal spray Spray 1 spray into both nostrils 2 times daily as needed for rhinitis or allergies     furosemide (LASIX) 20 MG tablet Take 1 tablet (20 mg) by mouth daily     levothyroxine (SYNTHROID/LEVOTHROID) 112 MCG tablet Take 112 mcg (1 tablet) by mouth every Monday, Tuesday is 1/2 tab, 1 tab Wednesday, 1 tab Thursday, tab 1 Friday     Lidocaine (LIDOCARE) 4 % Patch Place 1 patch onto the skin every 24 hours To prevent lidocaine toxicity, patient should be patch free for 12 hrs daily.  Using for back pain     losartan (COZAAR) 100 MG tablet Take 1 tablet (100 mg) by mouth daily     magnesium oxide 400 MG CAPS lunch     melatonin 3 MG tablet Take 6 mg by mouth At Bedtime      metFORMIN (GLUCOPHAGE) 500 MG tablet Take 500 mg by mouth 2 times daily (with meals)     mycophenolate (GENERIC EQUIVALENT) 250 MG capsule Take 5 capsules (1,250 mg) by mouth 2 times daily     psyllium (METAMUCIL/KONSYL) 58.6 % powder Take by mouth daily     Semaglutide (RYBELSUS) 7 MG TABS Take 7 mg by mouth daily     Semaglutide  (RYBELSUS) 7 MG TABS Take 7 mg by mouth daily     sulfamethoxazole-trimethoprim (BACTRIM) 400-80 MG tablet Take 1 tablet by mouth daily     tacrolimus (GENERIC EQUIVALENT) 0.5 MG capsule HOLD     tacrolimus (GENERIC EQUIVALENT) 1 MG capsule Take 1 capsule (1 mg) by mouth 2 times daily     valACYclovir (VALTREX) 500 MG tablet Take 1 tablet (500 mg) by mouth daily     No current facility-administered medications for this visit.     There are no discontinued medications.    Physical Exam   Vital signs were deferred for this telemedicine visit.    GENERAL APPEARANCE: alert and no distress  HENT: no obvious abnormalities on appearance  RESP: breathing appears unremarkable with normal rate, no audible wheezing or cough and no apparent shortness of breath with conversation  MS: extremities normal - no gross deformities noted  SKIN: no apparent rash and normal skin tone  NEURO: speech is clear with no obvious neurological deficits  PSYCH: mentation appears normal and affect normal  Allograft - non tender .     Data     Renal Latest Ref Rng & Units 4/11/2022 3/28/2022 2/28/2022   Na 133 - 144 mmol/L 138 137 136   Na (external) 135 - 145 mmol/L - - -   K 3.4 - 5.3 mmol/L 4.6 4.9 4.6   K (external) 3.5 - 5.0 mmol/L - - -   Cl 94 - 109 mmol/L 106 108 106   CO2 20 - 32 mmol/L 24 24 23   CO2 (external) 21 - 31 mmol/L - - -   BUN 7 - 30 mg/dL 24 31(H) 22   BUN (external) 8 - 25 mg/dL - - -   Cr 0.52 - 1.04 mg/dL 0.90 0.94 0.70   Cr (external) 0.57 - 1.11 mg/dL - - -   Glucose 70 - 99 mg/dL 114(H) 101(H) 101(H)   Glucose (external) 65 - 100 mg/dL - - -   Ca  8.5 - 10.1 mg/dL 10.2(H) 10.3(H) 10.0   Ca (external) 8.5 - 10.5 mg/dL - - -   Mg 1.6 - 2.3 mg/dL - - -   Mg (external) 1.6 - 2.6 mg/dL - - -     Bone Health Latest Ref Rng & Units 9/27/2021 4/19/2021 3/8/2021   Phos 2.5 - 4.5 mg/dL - 3.5 3.9   Phos (external) 2.3 - 4.7 mg/dL - - -   PTHi 18 - 80 pg/mL 106(H) - -   Vit D Def 20 - 75 ug/L 18(L) - -     Heme Latest Ref Rng &  Units 4/11/2022 3/28/2022 2/28/2022   WBC 4.0 - 11.0 10e3/uL 5.9 3.6(L) 4.5   WBC (external) 4.5 - 11.0 thou/cu mm - - -   Hgb 11.7 - 15.7 g/dL 11.2(L) 10.5(L) 11.4(L)   Hgb (external) 12.0 - 16.0 g/dL - - -   Plt 150 - 450 10e3/uL 202 189 227   Plt (external) 140 - 440 thou/cu mm - - -   ABSOLUTE NEUTROPHIL 1.6 - 8.3 10e9/L - - -   ABSOLUTE LYMPHOCYTES 0.8 - 5.3 10e9/L - - -   ABSOLUTE MONOCYTES 0.0 - 1.3 10e9/L - - -   ABSOLUTE EOSINOPHILS 0.0 - 0.7 10e9/L - - -   ABSOLUTE BASOPHILS 0.0 - 0.2 10e9/L - - -   ABS IMMATURE GRANULOCYTES 0 - 0.4 10e9/L - - -   ABSOLUTE NUCLEATED RBC - - - -     Liver Latest Ref Rng & Units 1/31/2022 9/27/2021 3/1/2021   AP 40 - 150 U/L 93 123 195(H)   TBili 0.2 - 1.3 mg/dL 0.6 0.4 0.6   DBili 0.0 - 0.2 mg/dL 0.2 0.1 0.1   ALT 0 - 50 U/L 32 35 42   AST 0 - 45 U/L 16 25 26   Tot Protein 6.8 - 8.8 g/dL 7.2 7.2 7.0   Albumin 3.4 - 5.0 g/dL 4.1 4.2 3.7     Pancreas Latest Ref Rng & Units 1/31/2022 9/27/2021 3/1/2021   A1C 0.0 - 5.6 % 6.3(H) 7.6(H) 7.9(H)     Iron studies Latest Ref Rng & Units 9/9/2020   Iron 35 - 180 ug/dL 162   Iron sat 15 - 46 % 92(H)   Ferritin 8 - 252 ng/mL 1,757(H)     [facility] Txp Virology Latest Ref Rng & Units 6/28/2021 5/17/2021 5/3/2021   CVM DNA Quant - - - EDTAP   CMV QUANT IU/ML CMVND:CMV DNA Not Detected [IU]/mL - - CMV DNA Not Detected   LOG IU/ML OF CMVQNT <2.1 [Log:IU]/mL - - Not Calculated   BK Spec - Plasma Plasma -   BK Res BKNEG:BK Virus DNA Not Detected copies/mL BK Virus DNA Not Detected BK Virus DNA Not Detected -   BK Log <2.7 Log copies/mL Not Calculated Not Calculated -   BK Quant Log Ext - - - -   BK Quant Result Ext Negative copies/mL - - -   BK Quant Spec Ext - - - -   EBV CAPSID ANTIBODY IGG 0.0 - 0.8 AI - - -   EBV DNA COPIES/ML EBVNEG:EBV DNA Not Detected [Copies]/mL - - EBV DNA Not Detected   EBV DNA LOG OF COPIES <2.7 [Log:copies]/mL - - Not Calculated   Hep B Core NR:Nonreactive - - -        Recent Labs   Lab Test 02/28/22  0804  03/28/22  0821 04/11/22  1356   DOSTAC 2/27/2022 3/27/2022 4/10/2022   TACROL 5.2 4.1* 8.7     Recent Labs   Lab Test 05/17/21  0821 09/27/21  0939 01/31/22  0816 04/11/22  1356   DOSMPA 2000 on 284362 tm 9/26/2021   8:00 PM  --   --    MPACID 1.24 1.41 1.60 0.84*   MPAG 113.7* 93.3 115.0* >200.0*       Echo  5/2021  Visually Estimated EF: 60-65%   Normal LV size and systolic function.   No significant valvular heart disease noted.   Estimated pulmonary artery pressure of 21.5 mmHg + RA pressure.    NM stress test 4/2021: negative for ischemia   Normal IVC size, >50% collapse with respiration.          Jordy Dewitt MD

## 2022-04-12 LAB
BKV DNA # SPEC NAA+PROBE: NOT DETECTED COPIES/ML
DONOR IDENTIFICATION: NORMAL
DSA COMMENTS: NORMAL
DSA PRESENT: NO
DSA TEST METHOD: NORMAL
MYCOPHENOLATE SERPL LC/MS/MS-MCNC: 0.84 MG/L (ref 1–3.5)
MYCOPHENOLATE-G SERPL LC/MS/MS-MCNC: >200 MG/L (ref 30–95)
ORGAN: NORMAL
SA 1 CELL: NORMAL
SA 1 TEST METHOD: NORMAL
SA 2 CELL: NORMAL
SA 2 TEST METHOD: NORMAL
SA1 HI RISK ABY: NORMAL
SA1 MOD RISK ABY: NORMAL
SA2 HI RISK ABY: NORMAL
SA2 MOD RISK ABY: NORMAL
TACROLIMUS BLD-MCNC: 8.7 UG/L (ref 5–15)
TME LAST DOSE: ABNORMAL H
TME LAST DOSE: ABNORMAL H
TME LAST DOSE: NORMAL H
TME LAST DOSE: NORMAL H
UNACCEPTABLE ANTIGENS: NORMAL
UNOS CPRA: 29
ZZZSA 1  COMMENTS: NORMAL
ZZZSA 2 COMMENTS: NORMAL

## 2022-04-13 ENCOUNTER — IMMUNIZATION (OUTPATIENT)
Dept: NURSING | Facility: CLINIC | Age: 67
End: 2022-04-13
Payer: MEDICARE

## 2022-04-13 PROCEDURE — 0054A COVID-19,PF,PFIZER (12+ YRS): CPT

## 2022-04-13 PROCEDURE — 91305 COVID-19,PF,PFIZER (12+ YRS): CPT

## 2022-04-15 ENCOUNTER — TELEPHONE (OUTPATIENT)
Dept: TRANSPLANT | Facility: CLINIC | Age: 67
End: 2022-04-15
Payer: MEDICARE

## 2022-04-15 DIAGNOSIS — Z94.0 KIDNEY TRANSPLANTED: Primary | ICD-10-CM

## 2022-04-15 NOTE — LETTER
PHYSICIAN ORDERS      DATE & TIME ISSUED: April 15, 2022 7:32 AM  PATIENT NAME: Ethel Thompson   : 1955     [facility] MR# [if applicable]: [id number]     DIAGNOSIS:  Kidney Transplant  ICD-10 CODE: Z94.0     Please repeat the following labs next week:  Tacrolimus drug level  CBC  BMP  Mycophenolic Acid Level    Any questions please call: [phone]      Jordy Dewitt MD

## 2022-04-15 NOTE — LETTER
PHYSICIAN ORDERS      DATE & TIME ISSUED: April 15, 2022 7:32 AM  PATIENT NAME: Ethel Thompson   : 1955     [facility] MR# [if applicable]: [id number]     DIAGNOSIS:  Kidney Transplant  ICD-10 CODE: Z94.0     Please repeat the following labs next week:  Tacrolimus drug level  CBC  BMP  Mycophenolic Acid Level    Any questions please call: [phone]      Jordy Dewitt MD

## 2022-04-15 NOTE — TELEPHONE ENCOUNTER
Left message for patient to repeat labs, updated labs in epic.  Mailed and mychart messaged patient copy of lab letter.

## 2022-04-15 NOTE — TELEPHONE ENCOUNTER
She recently took wrong dose of tacrolimus for few days (lower dose for 4-5 d) by mistake, FK trough ranged 4-6 , no subtherapeutic level noted Dr Jordy Willis       Task       Repeat transplant labs with tacrolimus and mpa level   Please contact Ethel Thompson with the above instructions and enter orders in epc

## 2022-04-25 ENCOUNTER — LAB (OUTPATIENT)
Dept: LAB | Facility: CLINIC | Age: 67
End: 2022-04-25
Payer: MEDICARE

## 2022-04-25 DIAGNOSIS — Z94.0 KIDNEY TRANSPLANTED: ICD-10-CM

## 2022-04-25 LAB
ANION GAP SERPL CALCULATED.3IONS-SCNC: 5 MMOL/L (ref 3–14)
BUN SERPL-MCNC: 35 MG/DL (ref 7–30)
CALCIUM SERPL-MCNC: 10 MG/DL (ref 8.5–10.1)
CHLORIDE BLD-SCNC: 110 MMOL/L (ref 94–109)
CO2 SERPL-SCNC: 22 MMOL/L (ref 20–32)
CREAT SERPL-MCNC: 0.87 MG/DL (ref 0.52–1.04)
ERYTHROCYTE [DISTWIDTH] IN BLOOD BY AUTOMATED COUNT: 11.9 % (ref 10–15)
GFR SERPL CREATININE-BSD FRML MDRD: 72 ML/MIN/1.73M2
GLUCOSE BLD-MCNC: 109 MG/DL (ref 70–99)
HCT VFR BLD AUTO: 32 % (ref 35–47)
HGB BLD-MCNC: 10.3 G/DL (ref 11.7–15.7)
MCH RBC QN AUTO: 32.9 PG (ref 26.5–33)
MCHC RBC AUTO-ENTMCNC: 32.2 G/DL (ref 31.5–36.5)
MCV RBC AUTO: 102 FL (ref 78–100)
MYCOPHENOLATE SERPL LC/MS/MS-MCNC: 0.6 MG/L (ref 1–3.5)
MYCOPHENOLATE-G SERPL LC/MS/MS-MCNC: 67.6 MG/L (ref 30–95)
PLATELET # BLD AUTO: 231 10E3/UL (ref 150–450)
POTASSIUM BLD-SCNC: 4.4 MMOL/L (ref 3.4–5.3)
RBC # BLD AUTO: 3.13 10E6/UL (ref 3.8–5.2)
SODIUM SERPL-SCNC: 137 MMOL/L (ref 133–144)
TACROLIMUS BLD-MCNC: 3.9 UG/L (ref 5–15)
TME LAST DOSE: ABNORMAL H
WBC # BLD AUTO: 5.3 10E3/UL (ref 4–11)

## 2022-04-25 PROCEDURE — 36415 COLL VENOUS BLD VENIPUNCTURE: CPT

## 2022-04-25 PROCEDURE — 80180 DRUG SCRN QUAN MYCOPHENOLATE: CPT

## 2022-04-25 PROCEDURE — 85027 COMPLETE CBC AUTOMATED: CPT

## 2022-04-25 PROCEDURE — 80197 ASSAY OF TACROLIMUS: CPT

## 2022-04-25 PROCEDURE — 80048 BASIC METABOLIC PNL TOTAL CA: CPT

## 2022-04-26 ENCOUNTER — TELEPHONE (OUTPATIENT)
Dept: TRANSPLANT | Facility: CLINIC | Age: 67
End: 2022-04-26
Payer: MEDICARE

## 2022-04-26 ENCOUNTER — TELEPHONE (OUTPATIENT)
Dept: TRANSPLANT | Facility: CLINIC | Age: 67
End: 2022-04-26

## 2022-04-26 NOTE — TELEPHONE ENCOUNTER
Issue MPA level    Issue tacrolimus  Level 3.9   Called Ethel Thompson adjusted tacrolimus    Plan to repeat transplant  Labs in 2 weeks

## 2022-04-26 NOTE — TELEPHONE ENCOUNTER
General  Route to LPN    Reason for call: Maxim states he requested for an appointment 2 weeks ago online and hasn't heard anything yet. Writer provided transplant number to ensure reaches correct place in the future.     Maxim would like to schedule the 6 month follow-up for his wife.     Call back needed? Yes  Return Call Needed  Same as documented in contacts section  When to return call?: Same day: Route High Priority

## 2022-04-27 NOTE — TELEPHONE ENCOUNTER
Jordy Mohr MD Huepfel, Tiffany MOY, RN  Increase MMF to 1500 mg po bid and repeat in 2 weeks- ok to do 1 g po tid if GI intolerance   (If Confirmed current dosing 1250 mg po bid and no missed doses)   Uptitrate FK to goal 4-6

## 2022-05-07 ENCOUNTER — HEALTH MAINTENANCE LETTER (OUTPATIENT)
Age: 67
End: 2022-05-07

## 2022-05-09 ENCOUNTER — LAB (OUTPATIENT)
Dept: LAB | Facility: CLINIC | Age: 67
End: 2022-05-09
Payer: MEDICARE

## 2022-05-09 DIAGNOSIS — Z94.0 KIDNEY REPLACED BY TRANSPLANT: ICD-10-CM

## 2022-05-09 DIAGNOSIS — Z79.899 ENCOUNTER FOR LONG-TERM CURRENT USE OF MEDICATION: ICD-10-CM

## 2022-05-09 DIAGNOSIS — Z48.298 AFTERCARE FOLLOWING ORGAN TRANSPLANT: ICD-10-CM

## 2022-05-09 LAB
ANION GAP SERPL CALCULATED.3IONS-SCNC: 9 MMOL/L (ref 3–14)
BUN SERPL-MCNC: 31 MG/DL (ref 7–30)
CALCIUM SERPL-MCNC: 10 MG/DL (ref 8.5–10.1)
CHLORIDE BLD-SCNC: 112 MMOL/L (ref 94–109)
CO2 SERPL-SCNC: 18 MMOL/L (ref 20–32)
CREAT SERPL-MCNC: 0.88 MG/DL (ref 0.52–1.04)
CREAT UR-MCNC: 86 MG/DL
ERYTHROCYTE [DISTWIDTH] IN BLOOD BY AUTOMATED COUNT: 12 % (ref 10–15)
GFR SERPL CREATININE-BSD FRML MDRD: 71 ML/MIN/1.73M2
GLUCOSE BLD-MCNC: 114 MG/DL (ref 70–99)
HCT VFR BLD AUTO: 32.7 % (ref 35–47)
HGB BLD-MCNC: 10.6 G/DL (ref 11.7–15.7)
MCH RBC QN AUTO: 32.8 PG (ref 26.5–33)
MCHC RBC AUTO-ENTMCNC: 32.4 G/DL (ref 31.5–36.5)
MCV RBC AUTO: 101 FL (ref 78–100)
PLATELET # BLD AUTO: 204 10E3/UL (ref 150–450)
POTASSIUM BLD-SCNC: 5 MMOL/L (ref 3.4–5.3)
PROT UR-MCNC: 0.14 G/L
PROT/CREAT 24H UR: 0.16 G/G CR (ref 0–0.2)
RBC # BLD AUTO: 3.23 10E6/UL (ref 3.8–5.2)
SODIUM SERPL-SCNC: 139 MMOL/L (ref 133–144)
TACROLIMUS BLD-MCNC: 7.3 UG/L (ref 5–15)
TME LAST DOSE: NORMAL H
TME LAST DOSE: NORMAL H
WBC # BLD AUTO: 5.2 10E3/UL (ref 4–11)

## 2022-05-09 PROCEDURE — 84156 ASSAY OF PROTEIN URINE: CPT

## 2022-05-09 PROCEDURE — 85027 COMPLETE CBC AUTOMATED: CPT

## 2022-05-09 PROCEDURE — 36415 COLL VENOUS BLD VENIPUNCTURE: CPT

## 2022-05-09 PROCEDURE — 87799 DETECT AGENT NOS DNA QUANT: CPT

## 2022-05-09 PROCEDURE — 80048 BASIC METABOLIC PNL TOTAL CA: CPT

## 2022-05-09 PROCEDURE — 80197 ASSAY OF TACROLIMUS: CPT

## 2022-05-10 LAB — BKV DNA # SPEC NAA+PROBE: NOT DETECTED COPIES/ML

## 2022-05-29 ENCOUNTER — LAB (OUTPATIENT)
Dept: LAB | Facility: CLINIC | Age: 67
End: 2022-05-29
Payer: MEDICARE

## 2022-05-29 DIAGNOSIS — Z79.899 ENCOUNTER FOR LONG-TERM CURRENT USE OF MEDICATION: ICD-10-CM

## 2022-05-29 DIAGNOSIS — Z48.298 AFTERCARE FOLLOWING ORGAN TRANSPLANT: ICD-10-CM

## 2022-05-29 DIAGNOSIS — Z94.0 KIDNEY REPLACED BY TRANSPLANT: ICD-10-CM

## 2022-05-29 DIAGNOSIS — Z94.0 KIDNEY TRANSPLANTED: ICD-10-CM

## 2022-05-29 LAB
CREAT UR-MCNC: 117 MG/DL
ERYTHROCYTE [DISTWIDTH] IN BLOOD BY AUTOMATED COUNT: 12.8 % (ref 10–15)
HCT VFR BLD AUTO: 33.7 % (ref 35–47)
HGB BLD-MCNC: 10.8 G/DL (ref 11.7–15.7)
MCH RBC QN AUTO: 33.1 PG (ref 26.5–33)
MCHC RBC AUTO-ENTMCNC: 32 G/DL (ref 31.5–36.5)
MCV RBC AUTO: 103 FL (ref 78–100)
PLATELET # BLD AUTO: 215 10E3/UL (ref 150–450)
PROT UR-MCNC: 0.14 G/L
PROT/CREAT 24H UR: 0.12 G/G CR (ref 0–0.2)
RBC # BLD AUTO: 3.26 10E6/UL (ref 3.8–5.2)
WBC # BLD AUTO: 5.7 10E3/UL (ref 4–11)

## 2022-05-29 PROCEDURE — 80197 ASSAY OF TACROLIMUS: CPT

## 2022-05-29 PROCEDURE — 85027 COMPLETE CBC AUTOMATED: CPT

## 2022-05-29 PROCEDURE — 36415 COLL VENOUS BLD VENIPUNCTURE: CPT

## 2022-05-29 PROCEDURE — 80048 BASIC METABOLIC PNL TOTAL CA: CPT

## 2022-05-29 PROCEDURE — 80180 DRUG SCRN QUAN MYCOPHENOLATE: CPT

## 2022-05-29 PROCEDURE — 87799 DETECT AGENT NOS DNA QUANT: CPT

## 2022-05-29 PROCEDURE — 84156 ASSAY OF PROTEIN URINE: CPT

## 2022-05-30 LAB
TACROLIMUS BLD-MCNC: 8 UG/L (ref 5–15)
TME LAST DOSE: NORMAL H
TME LAST DOSE: NORMAL H

## 2022-05-31 LAB
ANION GAP SERPL CALCULATED.3IONS-SCNC: 8 MMOL/L (ref 3–14)
BKV DNA # SPEC NAA+PROBE: NOT DETECTED COPIES/ML
BUN SERPL-MCNC: 29 MG/DL (ref 7–30)
CALCIUM SERPL-MCNC: 10.3 MG/DL (ref 8.5–10.1)
CHLORIDE BLD-SCNC: 107 MMOL/L (ref 94–109)
CO2 SERPL-SCNC: 21 MMOL/L (ref 20–32)
CREAT SERPL-MCNC: 0.9 MG/DL (ref 0.52–1.04)
GFR SERPL CREATININE-BSD FRML MDRD: 69 ML/MIN/1.73M2
GLUCOSE BLD-MCNC: 103 MG/DL (ref 70–99)
MYCOPHENOLATE SERPL LC/MS/MS-MCNC: 2.17 MG/L (ref 1–3.5)
MYCOPHENOLATE-G SERPL LC/MS/MS-MCNC: >200 MG/L (ref 30–95)
POTASSIUM BLD-SCNC: 4.8 MMOL/L (ref 3.4–5.3)
SODIUM SERPL-SCNC: 136 MMOL/L (ref 133–144)
TME LAST DOSE: ABNORMAL H
TME LAST DOSE: ABNORMAL H

## 2022-06-01 ENCOUNTER — TELEPHONE (OUTPATIENT)
Dept: TRANSPLANT | Facility: CLINIC | Age: 67
End: 2022-06-01
Payer: MEDICARE

## 2022-06-01 DIAGNOSIS — Z94.0 KIDNEY REPLACED BY TRANSPLANT: ICD-10-CM

## 2022-06-01 DIAGNOSIS — Z94.0 KIDNEY TRANSPLANTED: Primary | ICD-10-CM

## 2022-06-01 RX ORDER — TACROLIMUS 1 MG/1
CAPSULE ORAL
Qty: 60 CAPSULE | Refills: 11
Start: 2022-06-01 | End: 2022-06-29

## 2022-06-01 RX ORDER — TACROLIMUS 0.5 MG/1
CAPSULE ORAL
Qty: 30 CAPSULE | Refills: 3 | Status: SHIPPED | OUTPATIENT
Start: 2022-06-01 | End: 2022-07-05

## 2022-06-01 NOTE — TELEPHONE ENCOUNTER
ISSUE:  tacrolimus  8.0 above goal level    1 year  9 months     Lowered tacrolimus  1.0 and  0.5           PLAN:     OUTCOME:   Spoke with patient  Maxim , they confirm accurate tacrolimus  trough level and current dose 1.0  mg BID. Patient confirmed tacrolimus   dose change to  1  Mg in am and 0.5 mg in pm   and to repeat labs in 14  days. Orders sent to preferred pharmacy for dose change and lab for repeat labs. Patient voiced understanding of plan.     Confirm no new medications or illness. Confirm no missed doses.

## 2022-06-13 ENCOUNTER — LAB (OUTPATIENT)
Dept: LAB | Facility: CLINIC | Age: 67
End: 2022-06-13
Payer: MEDICARE

## 2022-06-13 DIAGNOSIS — Z94.0 KIDNEY TRANSPLANTED: ICD-10-CM

## 2022-06-13 LAB
ANION GAP SERPL CALCULATED.3IONS-SCNC: 5 MMOL/L (ref 3–14)
BUN SERPL-MCNC: 19 MG/DL (ref 7–30)
CALCIUM SERPL-MCNC: 9.9 MG/DL (ref 8.5–10.1)
CHLORIDE BLD-SCNC: 111 MMOL/L (ref 94–109)
CO2 SERPL-SCNC: 26 MMOL/L (ref 20–32)
CREAT SERPL-MCNC: 0.73 MG/DL (ref 0.52–1.04)
ERYTHROCYTE [DISTWIDTH] IN BLOOD BY AUTOMATED COUNT: 12.4 % (ref 10–15)
GFR SERPL CREATININE-BSD FRML MDRD: 89 ML/MIN/1.73M2
GLUCOSE BLD-MCNC: 101 MG/DL (ref 70–99)
HCT VFR BLD AUTO: 32.9 % (ref 35–47)
HGB BLD-MCNC: 10.7 G/DL (ref 11.7–15.7)
MCH RBC QN AUTO: 33.2 PG (ref 26.5–33)
MCHC RBC AUTO-ENTMCNC: 32.5 G/DL (ref 31.5–36.5)
MCV RBC AUTO: 102 FL (ref 78–100)
PLATELET # BLD AUTO: 220 10E3/UL (ref 150–450)
POTASSIUM BLD-SCNC: 4.5 MMOL/L (ref 3.4–5.3)
RBC # BLD AUTO: 3.22 10E6/UL (ref 3.8–5.2)
SODIUM SERPL-SCNC: 142 MMOL/L (ref 133–144)
TACROLIMUS BLD-MCNC: 4.4 UG/L (ref 5–15)
TME LAST DOSE: ABNORMAL H
TME LAST DOSE: ABNORMAL H
WBC # BLD AUTO: 5 10E3/UL (ref 4–11)

## 2022-06-13 PROCEDURE — 36415 COLL VENOUS BLD VENIPUNCTURE: CPT

## 2022-06-13 PROCEDURE — 80197 ASSAY OF TACROLIMUS: CPT

## 2022-06-13 PROCEDURE — 85027 COMPLETE CBC AUTOMATED: CPT

## 2022-06-13 PROCEDURE — 80048 BASIC METABOLIC PNL TOTAL CA: CPT

## 2022-06-27 ENCOUNTER — LAB (OUTPATIENT)
Dept: LAB | Facility: CLINIC | Age: 67
End: 2022-06-27
Payer: MEDICARE

## 2022-06-27 DIAGNOSIS — Z48.298 AFTERCARE FOLLOWING ORGAN TRANSPLANT: ICD-10-CM

## 2022-06-27 DIAGNOSIS — Z79.899 ENCOUNTER FOR LONG-TERM CURRENT USE OF MEDICATION: ICD-10-CM

## 2022-06-27 DIAGNOSIS — Z94.0 KIDNEY REPLACED BY TRANSPLANT: ICD-10-CM

## 2022-06-27 DIAGNOSIS — Z94.0 KIDNEY TRANSPLANTED: ICD-10-CM

## 2022-06-27 LAB
ALBUMIN MFR UR ELPH: 11.3 MG/DL
ANION GAP SERPL CALCULATED.3IONS-SCNC: 5 MMOL/L (ref 3–14)
BUN SERPL-MCNC: 24 MG/DL (ref 7–30)
CALCIUM SERPL-MCNC: 9.4 MG/DL (ref 8.5–10.1)
CHLORIDE BLD-SCNC: 111 MMOL/L (ref 94–109)
CO2 SERPL-SCNC: 23 MMOL/L (ref 20–32)
CREAT SERPL-MCNC: 0.74 MG/DL (ref 0.52–1.04)
CREAT UR-MCNC: 128 MG/DL
ERYTHROCYTE [DISTWIDTH] IN BLOOD BY AUTOMATED COUNT: 12.4 % (ref 10–15)
GFR SERPL CREATININE-BSD FRML MDRD: 88 ML/MIN/1.73M2
GLUCOSE BLD-MCNC: 97 MG/DL (ref 70–99)
HCT VFR BLD AUTO: 31 % (ref 35–47)
HGB BLD-MCNC: 10.1 G/DL (ref 11.7–15.7)
MCH RBC QN AUTO: 33.2 PG (ref 26.5–33)
MCHC RBC AUTO-ENTMCNC: 32.6 G/DL (ref 31.5–36.5)
MCV RBC AUTO: 102 FL (ref 78–100)
PLATELET # BLD AUTO: 219 10E3/UL (ref 150–450)
POTASSIUM BLD-SCNC: 4.3 MMOL/L (ref 3.4–5.3)
PROT/CREAT 24H UR: 0.09 MG/MG CR (ref 0–0.2)
RBC # BLD AUTO: 3.04 10E6/UL (ref 3.8–5.2)
SODIUM SERPL-SCNC: 139 MMOL/L (ref 133–144)
TACROLIMUS BLD-MCNC: 4.1 UG/L (ref 5–15)
TME LAST DOSE: ABNORMAL H
TME LAST DOSE: ABNORMAL H
WBC # BLD AUTO: 4.8 10E3/UL (ref 4–11)

## 2022-06-27 PROCEDURE — 80197 ASSAY OF TACROLIMUS: CPT

## 2022-06-27 PROCEDURE — 80180 DRUG SCRN QUAN MYCOPHENOLATE: CPT

## 2022-06-27 PROCEDURE — 80048 BASIC METABOLIC PNL TOTAL CA: CPT

## 2022-06-27 PROCEDURE — 36415 COLL VENOUS BLD VENIPUNCTURE: CPT

## 2022-06-27 PROCEDURE — 84156 ASSAY OF PROTEIN URINE: CPT

## 2022-06-27 PROCEDURE — 87799 DETECT AGENT NOS DNA QUANT: CPT

## 2022-06-27 PROCEDURE — 85027 COMPLETE CBC AUTOMATED: CPT

## 2022-06-28 LAB
BKV DNA # SPEC NAA+PROBE: NOT DETECTED COPIES/ML
MYCOPHENOLATE SERPL LC/MS/MS-MCNC: 0.75 MG/L (ref 1–3.5)
MYCOPHENOLATE-G SERPL LC/MS/MS-MCNC: 81.2 MG/L (ref 30–95)
TME LAST DOSE: ABNORMAL H
TME LAST DOSE: ABNORMAL H

## 2022-06-29 ENCOUNTER — MYC MEDICAL ADVICE (OUTPATIENT)
Dept: TRANSPLANT | Facility: CLINIC | Age: 67
End: 2022-06-29

## 2022-06-29 DIAGNOSIS — Z94.0 KIDNEY TRANSPLANTED: Primary | ICD-10-CM

## 2022-06-29 DIAGNOSIS — Z94.0 KIDNEY REPLACED BY TRANSPLANT: ICD-10-CM

## 2022-06-29 RX ORDER — TACROLIMUS 1 MG/1
1 CAPSULE ORAL EVERY MORNING
Qty: 30 CAPSULE | Refills: 11 | Status: SHIPPED | OUTPATIENT
Start: 2022-06-29 | End: 2022-07-05

## 2022-07-05 DIAGNOSIS — Z94.0 KIDNEY REPLACED BY TRANSPLANT: ICD-10-CM

## 2022-07-05 DIAGNOSIS — Z94.0 KIDNEY TRANSPLANTED: Primary | ICD-10-CM

## 2022-07-05 NOTE — TELEPHONE ENCOUNTER
General  Route to LPN    Reason for call: Maxim requested to speak to Tiffany about a refill request. They're communicating through [employer] that the cancelled tacro was being resent but [facility] still reports they have nothing on their end.     Call back needed? Yes  Return Call Needed  Same as documented in contacts section  When to return call?: Now: Lync RN first, if no answer Page

## 2022-07-06 ENCOUNTER — TELEPHONE (OUTPATIENT)
Dept: TRANSPLANT | Facility: CLINIC | Age: 67
End: 2022-07-06

## 2022-07-06 RX ORDER — TACROLIMUS 0.5 MG/1
0.5 CAPSULE ORAL EVERY EVENING
Qty: 30 CAPSULE | Refills: 11 | Status: SHIPPED | OUTPATIENT
Start: 2022-07-06 | End: 2022-07-11

## 2022-07-06 RX ORDER — TACROLIMUS 1 MG/1
1 CAPSULE ORAL EVERY MORNING
Qty: 30 CAPSULE | Refills: 11 | Status: SHIPPED | OUTPATIENT
Start: 2022-07-06 | End: 2022-07-11

## 2022-07-06 NOTE — TELEPHONE ENCOUNTER
PCS spoke with Maxim, who expressed concern as Ethel's 1.0mg tacrolimus tablet script had lapsed at their [facility] pharmacy and they had not been able to fill.   Ethel is not without medication, as she has the 0.5 tabs, and is utilizing it to fulfill her current dose.    Maxim spoke to the pharmacy today, who notified him that they had the script, but needed additional information from the prescriber.     Maxim also expressed concern as to why the script was cancelled initially.   PCS let him know that I will need to identify why and when this was cancelled, and I will need to follow up with him (likely early next week).    PCS then explained most recent tacrolimus level at goal, and current goal of 4-6.  Maxim voiced understanding and appreciation for clarification.     Follow up via MyC message completed to follow up with set expectations.    Hui Crowley, RN, BSN, [facility]  Patient Care Supervisor, Post Abdominal Transplant

## 2022-07-06 NOTE — TELEPHONE ENCOUNTER
Please call  Maxim and please call [facility] they need information regarding Tacrolimus refill/ may need New script she is out of tacrolimus  Would like to talk with supervisor   would like to talk with supervisor today

## 2022-07-11 ENCOUNTER — TELEPHONE (OUTPATIENT)
Dept: TRANSPLANT | Facility: CLINIC | Age: 67
End: 2022-07-11

## 2022-07-11 DIAGNOSIS — Z94.0 KIDNEY TRANSPLANTED: ICD-10-CM

## 2022-07-11 DIAGNOSIS — Z94.0 KIDNEY REPLACED BY TRANSPLANT: Primary | ICD-10-CM

## 2022-07-11 DIAGNOSIS — B00.1 COLD SORE: ICD-10-CM

## 2022-07-11 DIAGNOSIS — R80.1 PERSISTENT PROTEINURIA: ICD-10-CM

## 2022-07-11 RX ORDER — VALACYCLOVIR HYDROCHLORIDE 500 MG/1
500 TABLET, FILM COATED ORAL DAILY
Qty: 90 TABLET | Refills: 1 | Status: SHIPPED | OUTPATIENT
Start: 2022-07-11 | End: 2023-03-10

## 2022-07-11 RX ORDER — TACROLIMUS 1 MG/1
1 CAPSULE ORAL EVERY MORNING
Qty: 90 CAPSULE | Refills: 3 | Status: SHIPPED | OUTPATIENT
Start: 2022-07-11 | End: 2022-07-12

## 2022-07-11 RX ORDER — FUROSEMIDE 20 MG
20 TABLET ORAL DAILY
Qty: 90 TABLET | Refills: 0 | Status: SHIPPED | OUTPATIENT
Start: 2022-07-11 | End: 2023-09-28

## 2022-07-11 RX ORDER — TACROLIMUS 0.5 MG/1
0.5 CAPSULE ORAL EVERY EVENING
Qty: 90 CAPSULE | Refills: 3 | Status: SHIPPED | OUTPATIENT
Start: 2022-07-11 | End: 2022-12-07

## 2022-07-11 NOTE — TELEPHONE ENCOUNTER
General  Route to LPN    Reason for call: [employer] was checking on script for meds below and also confirmed they only processed a refill request for 30 days supply of Tacro.    Pharmacy Name: [facility] - [address]    Name of Medication: furosemide (LASIX) 20 MG tablet   Dose: Take 1 tablet (20 mg) by mouth daily, Disp-90 tablet, R-3, E-Prescribe  Name of Medication: valACYclovir (VALTREX) 500 MG tablet   Dose: Take 1 tablet (500 mg) by mouth daily, Disp-90 tablet, R-1, E-Prescribe    When will the patient be out of this medication?: Less than 3 days (Route high priority)      Call back needed? No

## 2022-07-11 NOTE — TELEPHONE ENCOUNTER
Maxim has questions to review with Tiffany  Wants to clarify tacro dose on what she should be on  Usually her refills are 90 day not monthly

## 2022-07-12 DIAGNOSIS — B00.1 COLD SORE: ICD-10-CM

## 2022-07-12 DIAGNOSIS — R80.1 PERSISTENT PROTEINURIA: ICD-10-CM

## 2022-07-12 DIAGNOSIS — Z94.0 KIDNEY TRANSPLANTED: ICD-10-CM

## 2022-07-12 DIAGNOSIS — Z94.0 KIDNEY REPLACED BY TRANSPLANT: ICD-10-CM

## 2022-07-12 RX ORDER — TACROLIMUS 1 MG/1
1 CAPSULE ORAL 2 TIMES DAILY
Qty: 180 CAPSULE | Refills: 3 | Status: SHIPPED | OUTPATIENT
Start: 2022-07-12 | End: 2022-12-07

## 2022-07-12 NOTE — TELEPHONE ENCOUNTER
Devi Crowley RN Huepfel, Mary K, RN    I spoke with him last week an was on hold with [facility] for a looooong time.     I see the scripts from tacro 0.5 and 1.0s sent today say 'receipt confirmed by pharmacy,' which I also confirmed last week, so their pharmacy has the fills, they just need to do their part...             Previous Messages       ----- Message -----   From: Tiffany Paul, RN   Sent: 7/11/2022   1:14 PM CDT   To: Devi Crowley RN   Subject: Sorry -  no need to call THUY  - but can you *        Follow up with supervisor to confirm RX sent

## 2022-07-13 ENCOUNTER — TELEPHONE (OUTPATIENT)
Dept: TRANSPLANT | Facility: CLINIC | Age: 67
End: 2022-07-13

## 2022-07-13 NOTE — TELEPHONE ENCOUNTER
Hi,  It's a long story that should be documented through the Transplant Coordinator office, but the bottom line is due to a couple miscommunications (?) we need to verify my Tac 1mg prescription.  I am taking 1mg each in the morning and evening.  The latest prescription (re-established because somehow the scrip had been terminated)  calls for 1mg in the AM on .5mg in the evening.  This does not seem right, based on the fluctuations in tested levels, and was not discussed before it was prescribed.  Now I am about to run out of the 1mg AGAIN(!).  Perhaps due to Tiffany (our primary TC) being out for a bit (She is back now, though.), communications have suffered a great deal.  Please help!         Brian Wyatt     Continues to  Have issues with tacrolimus RX refills    Resent RX for  Tacrolimus  1.0  Mg twice per day  --  180 days   3 refills

## 2022-07-25 ENCOUNTER — LAB (OUTPATIENT)
Dept: LAB | Facility: CLINIC | Age: 67
End: 2022-07-25
Payer: MEDICARE

## 2022-07-25 DIAGNOSIS — Z48.298 AFTERCARE FOLLOWING ORGAN TRANSPLANT: ICD-10-CM

## 2022-07-25 DIAGNOSIS — Z79.899 ENCOUNTER FOR LONG-TERM CURRENT USE OF MEDICATION: ICD-10-CM

## 2022-07-25 DIAGNOSIS — Z94.0 KIDNEY REPLACED BY TRANSPLANT: ICD-10-CM

## 2022-07-25 DIAGNOSIS — Z94.0 KIDNEY TRANSPLANTED: ICD-10-CM

## 2022-07-25 LAB
ALBUMIN MFR UR ELPH: 23.2 MG/DL
ANION GAP SERPL CALCULATED.3IONS-SCNC: 4 MMOL/L (ref 3–14)
BUN SERPL-MCNC: 22 MG/DL (ref 7–30)
CALCIUM SERPL-MCNC: 9.5 MG/DL (ref 8.5–10.1)
CHLORIDE BLD-SCNC: 111 MMOL/L (ref 94–109)
CO2 SERPL-SCNC: 24 MMOL/L (ref 20–32)
CREAT SERPL-MCNC: 0.67 MG/DL (ref 0.52–1.04)
CREAT UR-MCNC: 190 MG/DL
ERYTHROCYTE [DISTWIDTH] IN BLOOD BY AUTOMATED COUNT: 12.2 % (ref 10–15)
GFR SERPL CREATININE-BSD FRML MDRD: >90 ML/MIN/1.73M2
GLUCOSE BLD-MCNC: 109 MG/DL (ref 70–99)
HCT VFR BLD AUTO: 32.3 % (ref 35–47)
HGB BLD-MCNC: 10.4 G/DL (ref 11.7–15.7)
MCH RBC QN AUTO: 33.3 PG (ref 26.5–33)
MCHC RBC AUTO-ENTMCNC: 32.2 G/DL (ref 31.5–36.5)
MCV RBC AUTO: 104 FL (ref 78–100)
MYCOPHENOLATE SERPL LC/MS/MS-MCNC: 2.04 MG/L (ref 1–3.5)
MYCOPHENOLATE-G SERPL LC/MS/MS-MCNC: 153 MG/L (ref 30–95)
PLATELET # BLD AUTO: 221 10E3/UL (ref 150–450)
POTASSIUM BLD-SCNC: 4.7 MMOL/L (ref 3.4–5.3)
PROT/CREAT 24H UR: 0.12 MG/MG CR (ref 0–0.2)
RBC # BLD AUTO: 3.12 10E6/UL (ref 3.8–5.2)
SODIUM SERPL-SCNC: 139 MMOL/L (ref 133–144)
TACROLIMUS BLD-MCNC: 5.6 UG/L (ref 5–15)
TME LAST DOSE: ABNORMAL H
TME LAST DOSE: ABNORMAL H
TME LAST DOSE: NORMAL H
TME LAST DOSE: NORMAL H
WBC # BLD AUTO: 5 10E3/UL (ref 4–11)

## 2022-07-25 PROCEDURE — 87799 DETECT AGENT NOS DNA QUANT: CPT

## 2022-07-25 PROCEDURE — 36415 COLL VENOUS BLD VENIPUNCTURE: CPT

## 2022-07-25 PROCEDURE — 80048 BASIC METABOLIC PNL TOTAL CA: CPT

## 2022-07-25 PROCEDURE — 80180 DRUG SCRN QUAN MYCOPHENOLATE: CPT

## 2022-07-25 PROCEDURE — 84156 ASSAY OF PROTEIN URINE: CPT

## 2022-07-25 PROCEDURE — 85027 COMPLETE CBC AUTOMATED: CPT

## 2022-07-25 PROCEDURE — 80197 ASSAY OF TACROLIMUS: CPT

## 2022-07-26 LAB — BKV DNA # SPEC NAA+PROBE: NOT DETECTED COPIES/ML

## 2022-08-27 ENCOUNTER — HEALTH MAINTENANCE LETTER (OUTPATIENT)
Age: 67
End: 2022-08-27

## 2022-08-29 ENCOUNTER — LAB (OUTPATIENT)
Dept: LAB | Facility: CLINIC | Age: 67
End: 2022-08-29
Payer: MEDICARE

## 2022-08-29 ENCOUNTER — TELEPHONE (OUTPATIENT)
Dept: TRANSPLANT | Facility: CLINIC | Age: 67
End: 2022-08-29

## 2022-08-29 DIAGNOSIS — Z94.0 KIDNEY REPLACED BY TRANSPLANT: ICD-10-CM

## 2022-08-29 DIAGNOSIS — Z94.0 KIDNEY TRANSPLANTED: ICD-10-CM

## 2022-08-29 DIAGNOSIS — Z94.0 KIDNEY TRANSPLANTED: Primary | ICD-10-CM

## 2022-08-29 DIAGNOSIS — Z79.899 ENCOUNTER FOR LONG-TERM CURRENT USE OF MEDICATION: ICD-10-CM

## 2022-08-29 DIAGNOSIS — Z48.298 AFTERCARE FOLLOWING ORGAN TRANSPLANT: ICD-10-CM

## 2022-08-29 LAB
ALBUMIN MFR UR ELPH: 13.1 MG/DL
ANION GAP SERPL CALCULATED.3IONS-SCNC: 6 MMOL/L (ref 3–14)
BUN SERPL-MCNC: 28 MG/DL (ref 7–30)
CALCIUM SERPL-MCNC: 9.5 MG/DL (ref 8.5–10.1)
CHLORIDE BLD-SCNC: 113 MMOL/L (ref 94–109)
CO2 SERPL-SCNC: 22 MMOL/L (ref 20–32)
CREAT SERPL-MCNC: 1.05 MG/DL (ref 0.52–1.04)
CREAT UR-MCNC: 181 MG/DL
ERYTHROCYTE [DISTWIDTH] IN BLOOD BY AUTOMATED COUNT: 11.9 % (ref 10–15)
GFR SERPL CREATININE-BSD FRML MDRD: 58 ML/MIN/1.73M2
GLUCOSE BLD-MCNC: 93 MG/DL (ref 70–99)
HCT VFR BLD AUTO: 30.9 % (ref 35–47)
HGB BLD-MCNC: 9.9 G/DL (ref 11.7–15.7)
MCH RBC QN AUTO: 33.1 PG (ref 26.5–33)
MCHC RBC AUTO-ENTMCNC: 32 G/DL (ref 31.5–36.5)
MCV RBC AUTO: 103 FL (ref 78–100)
PLATELET # BLD AUTO: 209 10E3/UL (ref 150–450)
POTASSIUM BLD-SCNC: 4.6 MMOL/L (ref 3.4–5.3)
PROT/CREAT 24H UR: 0.07 MG/MG CR (ref 0–0.2)
RBC # BLD AUTO: 2.99 10E6/UL (ref 3.8–5.2)
SODIUM SERPL-SCNC: 141 MMOL/L (ref 133–144)
TACROLIMUS BLD-MCNC: 6.5 UG/L (ref 5–15)
TME LAST DOSE: NORMAL H
TME LAST DOSE: NORMAL H
WBC # BLD AUTO: 5.3 10E3/UL (ref 4–11)

## 2022-08-29 PROCEDURE — 84156 ASSAY OF PROTEIN URINE: CPT

## 2022-08-29 PROCEDURE — 87799 DETECT AGENT NOS DNA QUANT: CPT

## 2022-08-29 PROCEDURE — 80197 ASSAY OF TACROLIMUS: CPT

## 2022-08-29 PROCEDURE — 85027 COMPLETE CBC AUTOMATED: CPT

## 2022-08-29 PROCEDURE — 80048 BASIC METABOLIC PNL TOTAL CA: CPT

## 2022-08-29 PROCEDURE — 36415 COLL VENOUS BLD VENIPUNCTURE: CPT

## 2022-08-29 PROCEDURE — 80180 DRUG SCRN QUAN MYCOPHENOLATE: CPT

## 2022-08-29 NOTE — TELEPHONE ENCOUNTER
ISSUE: cr elevated 1.05  Baseline 0.8-1.0, but has been running ~0.6-0.7    PLAN: Please call pt to ensure pt is drinking 2-3L/day, no new illness/fever/malaise/abdominal pain/edema, medication changes, or normal UOP. If the above is normal, encourage continued hydration and repeat labs next week. Please place order for BMP.    OUTCOME: pt and , Maxim, v/u of plan for hydration and repeat BMP next week. Orders placed.

## 2022-08-30 LAB
BKV DNA # SPEC NAA+PROBE: NOT DETECTED COPIES/ML
MYCOPHENOLATE SERPL LC/MS/MS-MCNC: 1.59 MG/L (ref 1–3.5)
MYCOPHENOLATE-G SERPL LC/MS/MS-MCNC: 148.6 MG/L (ref 30–95)
TME LAST DOSE: ABNORMAL H
TME LAST DOSE: ABNORMAL H

## 2022-09-06 ENCOUNTER — LAB (OUTPATIENT)
Dept: LAB | Facility: CLINIC | Age: 67
End: 2022-09-06
Payer: MEDICARE

## 2022-09-06 DIAGNOSIS — Z94.0 KIDNEY TRANSPLANTED: ICD-10-CM

## 2022-09-06 DIAGNOSIS — Z94.0 KIDNEY REPLACED BY TRANSPLANT: ICD-10-CM

## 2022-09-06 LAB
ANION GAP SERPL CALCULATED.3IONS-SCNC: 5 MMOL/L (ref 3–14)
BUN SERPL-MCNC: 33 MG/DL (ref 7–30)
CALCIUM SERPL-MCNC: 10 MG/DL (ref 8.5–10.1)
CHLORIDE BLD-SCNC: 111 MMOL/L (ref 94–109)
CO2 SERPL-SCNC: 23 MMOL/L (ref 20–32)
CREAT SERPL-MCNC: 1 MG/DL (ref 0.52–1.04)
GFR SERPL CREATININE-BSD FRML MDRD: 61 ML/MIN/1.73M2
GLUCOSE BLD-MCNC: 108 MG/DL (ref 70–99)
POTASSIUM BLD-SCNC: 4.8 MMOL/L (ref 3.4–5.3)
SODIUM SERPL-SCNC: 139 MMOL/L (ref 133–144)
TACROLIMUS BLD-MCNC: 6.5 UG/L (ref 5–15)
TME LAST DOSE: NORMAL H
TME LAST DOSE: NORMAL H

## 2022-09-06 PROCEDURE — 80197 ASSAY OF TACROLIMUS: CPT

## 2022-09-06 PROCEDURE — 36415 COLL VENOUS BLD VENIPUNCTURE: CPT

## 2022-09-06 PROCEDURE — 80048 BASIC METABOLIC PNL TOTAL CA: CPT

## 2022-09-22 ENCOUNTER — OFFICE VISIT (OUTPATIENT)
Dept: NEPHROLOGY | Facility: CLINIC | Age: 67
End: 2022-09-22
Attending: INTERNAL MEDICINE
Payer: MEDICARE

## 2022-09-22 ENCOUNTER — LAB (OUTPATIENT)
Dept: LAB | Facility: CLINIC | Age: 67
End: 2022-09-22
Payer: MEDICARE

## 2022-09-22 VITALS
DIASTOLIC BLOOD PRESSURE: 73 MMHG | HEART RATE: 82 BPM | TEMPERATURE: 97.8 F | SYSTOLIC BLOOD PRESSURE: 113 MMHG | BODY MASS INDEX: 25.02 KG/M2 | OXYGEN SATURATION: 98 % | HEIGHT: 59 IN | WEIGHT: 124.1 LBS | RESPIRATION RATE: 18 BRPM

## 2022-09-22 DIAGNOSIS — D84.9 IMMUNOSUPPRESSION (H): ICD-10-CM

## 2022-09-22 DIAGNOSIS — D47.2 MGUS (MONOCLONAL GAMMOPATHY OF UNKNOWN SIGNIFICANCE): Primary | ICD-10-CM

## 2022-09-22 DIAGNOSIS — Z48.298 AFTERCARE FOLLOWING ORGAN TRANSPLANT: ICD-10-CM

## 2022-09-22 DIAGNOSIS — E83.42 HYPOMAGNESEMIA: ICD-10-CM

## 2022-09-22 DIAGNOSIS — E55.9 VITAMIN D DEFICIENCY: ICD-10-CM

## 2022-09-22 DIAGNOSIS — Z94.0 KIDNEY REPLACED BY TRANSPLANT: ICD-10-CM

## 2022-09-22 DIAGNOSIS — Z94.0 HTN, KIDNEY TRANSPLANT RELATED: ICD-10-CM

## 2022-09-22 DIAGNOSIS — R53.83 OTHER FATIGUE: ICD-10-CM

## 2022-09-22 DIAGNOSIS — I15.1 HTN, KIDNEY TRANSPLANT RELATED: ICD-10-CM

## 2022-09-22 DIAGNOSIS — D47.2 MGUS (MONOCLONAL GAMMOPATHY OF UNKNOWN SIGNIFICANCE): ICD-10-CM

## 2022-09-22 LAB
MAGNESIUM SERPL-MCNC: 1.9 MG/DL (ref 1.7–2.3)
TOTAL PROTEIN SERUM FOR ELP: 6.5 G/DL (ref 6.4–8.3)

## 2022-09-22 PROCEDURE — 83521 IG LIGHT CHAINS FREE EACH: CPT | Performed by: INTERNAL MEDICINE

## 2022-09-22 PROCEDURE — G0463 HOSPITAL OUTPT CLINIC VISIT: HCPCS

## 2022-09-22 PROCEDURE — 84165 PROTEIN E-PHORESIS SERUM: CPT | Mod: TC | Performed by: PATHOLOGY

## 2022-09-22 PROCEDURE — 83735 ASSAY OF MAGNESIUM: CPT | Performed by: PATHOLOGY

## 2022-09-22 PROCEDURE — 84165 PROTEIN E-PHORESIS SERUM: CPT | Mod: 26

## 2022-09-22 PROCEDURE — 99214 OFFICE O/P EST MOD 30 MIN: CPT | Performed by: INTERNAL MEDICINE

## 2022-09-22 PROCEDURE — 87799 DETECT AGENT NOS DNA QUANT: CPT | Performed by: INTERNAL MEDICINE

## 2022-09-22 PROCEDURE — 36415 COLL VENOUS BLD VENIPUNCTURE: CPT | Performed by: PATHOLOGY

## 2022-09-22 PROCEDURE — 84155 ASSAY OF PROTEIN SERUM: CPT | Performed by: INTERNAL MEDICINE

## 2022-09-22 ASSESSMENT — PAIN SCALES - GENERAL: PAINLEVEL: MODERATE PAIN (5)

## 2022-09-22 NOTE — PATIENT INSTRUCTIONS
Patient Recommendations:  - No new recommendations at this time.    Transplant Patient Information  Your Post Transplant Coordinator is: Judith Shelley  You and your care team can contact your transplant coordinator Monday - Friday, 8am - 5pm at [phone] (Option 2 to reach the coordinator or Option 4 to schedule an appointment).  You can also reach your care team online via [employer].  After hours for urgent matters, please call [facility] at [phone].

## 2022-09-22 NOTE — LETTER
9/22/2022       RE: Ethel Thompson  [address]  [place] [address]     Dear Colleague,    Thank you for referring your patient, tEhel Thompson, to the [facility] [place] at [facility]. Please see a copy of my visit note below.    TRANSPLANT NEPHROLOGY CHRONIC POST TRANSPLANT VISIT    Assessment & Plan   # DDKT: Stable   - Baseline Creatinine:  ~ 0.8-1.0   - Proteinuria: Normal (<0.2 grams)   - Date DSA Last Checked: Apr/2022      Latest DSA: No   - BK Viremia: No   - Kidney Tx Biopsy: Mar 03, 2021; Result: No diagnostic evidence of acute rejection.  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  No interstitial fibrosis or tubular atrophy.             Feb 03, 2021; Result: No diagnostic evidence of acute rejection.  Segmental glomerular basement membrane irregularity with remodeling and lamellation.  Type IV collagen alpha5 and alpha2 staining was normal.  This was felt to be donor derived.    # Immunosuppression: Tacrolimus immediate release (goal 4-6) and Mycophenolate mofetil (dose 1250 mg every 12 hours)   - Continue with intensive monitoring of immunosuppression for efficacy and toxicity.   - Changes: No; Patient is on high dose mycophenolate, but only low normal MPA level and no obvious side effects, including normal WBC.    # Infection Prophylaxis:   - PJP: Sulfa/TMP (Bactrim)  - HSV: Valacyclovir (Valtrex)    # Hypertension: Controlled;  Goal BP: < 130/80   - Changes: No    # Diabetes: Controlled (HbA1c <7%) Last HbA1c: 6.3%   - Management as per primary care.    # Anemia in Chronic Renal Disease: Hgb: Stable      JHONATAN: No   - Iron studies: High iron saturation with last check 9/2020; Will recheck iron panel.    # Mineral Bone Disorder:   - Vitamin D; level: Low        On supplement: Yes  - Calcium; level: High normal        On  supplement: No    # Electrolytes:   - Potassium; level: Normal        On supplement: No  - Magnesium; level: Not checked recently        On supplement: Yes  - Bicarbonate; level: Normal        On supplement: No    # CAD: Asymptomatic.  Coronary angiogram 4/2022 with mild non flow-limiting disease.    # MGUS: Last check 2/2021 showed normal kappa/lambda ratio with no monoclonal peak.   - Will recheck serum kappa/lambda light chains and SPEP.  If remains normal, then can repeat only as clinically indicated.    # GERD: Occasional symptoms on H2 blocker.    # SADIE on CPAP: Patient is compliant.    # Fatigue: No significant change, but has not really improved since transplant.  TSH was normal.  Cannot completely rule out viral infectious causes.   - Will check CMV and EBV PCR.    # Skin Cancer Risk:    - Discussed sun protection and recommend regular follow up with Dermatology.    # Medical Compliance: Yes    # COVID-19 Virus Review: Discussed COVID-19 virus and the potential medical risks.  Reviewed preventative health recommendations, including wearing a mask where appropriate.  Recommended COVID vaccination should be up to date with either an initial vaccination or booster shot when appropriate.  Asked the patient to inform the transplant center if they are exposed or diagnosed with this virus.    # COVID Vaccination Up To Date: No, due for next dose    # Transplant History:  Etiology of Kidney Failure: Unknown etiology (Bx with global glomerulosclerosis)  Tx: DDKT  Transplant: 9/3/2020 (Kidney)  Significant changes in immunosuppression: None  Significant transplant-related complications: None    Transplant Office Phone Number: [phone]    Assessment and plan was discussed with the patient and she voiced her understanding and agreement.    Return visit: Return in about 6 months (around 3/22/2023).    Angel Luis Sheridan MD    Chief Complaint   Ms. Thompson is a 67 year old here for kidney transplant and  "immunosuppression management.    History of Present Illness    Ms. Thompson reports feeling okay overall with some medical complaints.  She had an episode of chest pain last April and was seen in ER for evaluation.  Symptoms were somewhat concerning for angina and patient underwent coronary angiogram, which showed just mild disease and no flow-limiting lesions.    Her energy level is okay, but only ~ 6-7/10 and no real improvement since transplant.  Patient says she sleeps okay when she uses melatonin.  She is active and does get some exercise.  Denies any chest pain or shortness of breath with exertion.  No leg swelling.  She has a slight tremor.    Appetite is \"so-so,\" but weight is stable.  Occasional nausea if she takes her medications without food, but otherwise no nausea or vomiting.  No diarrhea.  Occasional heartburn symptoms on famotidine.  No fever, sweats or chills, although she feels she is sensitive to temperature.  No night sweats.    Home BP: 120/80s    Problem List   Patient Active Problem List   Diagnosis     Elevated serum immunoglobulin free light chain level     Dyslipidemia     Hypothyroidism     SADIE on CPAP     Sensorineural hearing loss (SNHL) of both ears     GERD (gastroesophageal reflux disease)     HTN, kidney transplant related     Hepatitis B core antibody positive     Secondary renal hyperparathyroidism (H)     Memory deficits     Kidney replaced by transplant     Immunosuppression (H)     Aftercare following organ transplant     Vitamin D deficiency     Diabetes mellitus, type 2 (H)       Allergies   Allergies   Allergen Reactions     Amoxicillin-Pot Clavulanate Nausea and Vomiting       Medications   Current Outpatient Medications   Medication Sig     acetaminophen (TYLENOL) 325 MG tablet Take 2 tablets (650 mg) by mouth every 4 hours as needed for pain     aspirin (ASA) 81 MG EC tablet Take 1 tablet (81 mg) by mouth daily     atorvastatin (LIPITOR) 40 MG tablet Take 0.5 tablets (20 mg) " by mouth daily     cholecalciferol (VITAMIN D3) 25 mcg (1000 units) capsule Take 2 capsules (50 mcg) by mouth daily     diclofenac (VOLTAREN) 1 % topical gel Apply 2 g topically 4 times daily as needed      famotidine (PEPCID) 20 MG tablet Take 20 mg by mouth every evening      fish oil-omega-3 fatty acids 1000 MG capsule Take 2 g by mouth daily      fluticasone (FLONASE) 50 MCG/ACT nasal spray Spray 1 spray into both nostrils 2 times daily as needed for rhinitis or allergies     furosemide (LASIX) 20 MG tablet Take 1 tablet (20 mg) by mouth daily     levothyroxine (SYNTHROID/LEVOTHROID) 112 MCG tablet Take 112 mcg (1 tablet) by mouth every Monday, Tuesday is 1/2 tab, 1 tab Wednesday, 1 tab Thursday, tab 1 Friday     Lidocaine (LIDOCARE) 4 % Patch Place 1 patch onto the skin every 24 hours To prevent lidocaine toxicity, patient should be patch free for 12 hrs daily.  Using for back pain     losartan (COZAAR) 100 MG tablet Take 1 tablet (100 mg) by mouth daily     magnesium oxide 400 MG CAPS lunch     melatonin 3 MG tablet Take 8 mg by mouth At Bedtime     metFORMIN (GLUCOPHAGE) 500 MG tablet Take 500 mg by mouth 2 times daily (with meals)     mycophenolate (GENERIC EQUIVALENT) 250 MG capsule Take 5 capsules (1,250 mg) by mouth 2 times daily     psyllium (METAMUCIL/KONSYL) 58.6 % powder Take by mouth daily     Semaglutide (RYBELSUS) 7 MG TABS Take 7 mg by mouth daily     sulfamethoxazole-trimethoprim (BACTRIM) 400-80 MG tablet Take 1 tablet by mouth daily     tacrolimus (GENERIC EQUIVALENT) 1 MG capsule Take 1 capsule (1 mg) by mouth 2 times daily     valACYclovir (VALTREX) 500 MG tablet Take 1 tablet (500 mg) by mouth daily (Patient taking differently: Take 500 mg by mouth every other day)     Semaglutide (RYBELSUS) 7 MG TABS Take 7 mg by mouth daily (Patient not taking: Reported on 9/22/2022)     tacrolimus (GENERIC EQUIVALENT) 0.5 MG capsule Take 1 capsule (0.5 mg) by mouth every evening Total dose 1 mg every  "morning and 0.5 mg every evening (Patient not taking: Reported on 9/22/2022)     No current facility-administered medications for this visit.     There are no discontinued medications.    Physical Exam   Vital Signs: /73 (BP Location: Right arm, Patient Position: Sitting, Cuff Size: Adult Regular)   Pulse 82   Temp 97.8  F (36.6  C) (Oral)   Resp 18   Ht 1.499 m (4' 11.02\")   Wt 56.3 kg (124 lb 1.6 oz)   SpO2 98%   BMI 25.05 kg/m      GENERAL APPEARANCE: alert and no distress  HENT: mouth without ulcers or lesions  LYMPHATICS: no cervical or supraclavicular nodes  RESP: lungs clear to auscultation - no rales, rhonchi or wheezes  CV: regular rhythm, normal rate, no rub, no murmur  EDEMA: no LE edema bilaterally  ABDOMEN: soft, nondistended, nontender, bowel sounds normal  MS: extremities normal - no gross deformities noted, no evidence of inflammation in joints, no muscle tenderness  SKIN: no rash  TX KIDNEY: normal  DIALYSIS ACCESS:  LUE AV fistula with good thrill    Data     Renal Latest Ref Rng & Units 9/26/2022 9/22/2022 9/6/2022   Na 133 - 144 mmol/L 138 - 139   Na (external) 135 - 145 mmol/L - - -   K 3.4 - 5.3 mmol/L 4.5 - 4.8   K (external) 3.5 - 5.0 mmol/L - - -   Cl 94 - 109 mmol/L 112(H) - 111(H)   CO2 20 - 32 mmol/L 22 - 23   CO2 (external) 21 - 31 mmol/L - - -   BUN 7 - 30 mg/dL 29 - 33(H)   BUN (external) 8 - 25 mg/dL - - -   Cr 0.52 - 1.04 mg/dL 0.77 - 1.00   Cr (external) 0.57 - 1.11 mg/dL - - -   Glucose 70 - 99 mg/dL 106(H) - 108(H)   Glucose (external) 65 - 100 mg/dL - - -   Ca  8.5 - 10.1 mg/dL 9.7 - 10.0   Ca (external) 8.5 - 10.5 mg/dL - - -   Mg 1.7 - 2.3 mg/dL - 1.9 -   Mg (external) 1.6 - 2.6 mg/dL - - -     Bone Health Latest Ref Rng & Units 9/27/2021 4/19/2021 3/8/2021   Phos 2.5 - 4.5 mg/dL - 3.5 3.9   Phos (external) 2.3 - 4.7 mg/dL - - -   PTHi 18 - 80 pg/mL 106(H) - -   Vit D Def 20 - 75 ug/L 18(L) - -     Heme Latest Ref Rng & Units 9/26/2022 8/29/2022 7/25/2022   WBC 4.0 " - 11.0 10e3/uL 4.4 5.3 5.0   WBC (external) 4.5 - 11.0 thou/cu mm - - -   Hgb 11.7 - 15.7 g/dL 10.1(L) 9.9(L) 10.4(L)   Hgb (external) 12.0 - 16.0 g/dL - - -   Plt 150 - 450 10e3/uL 216 209 221   Plt (external) 140 - 440 thou/cu mm - - -   ABSOLUTE NEUTROPHIL 1.6 - 8.3 10e9/L - - -   ABSOLUTE LYMPHOCYTES 0.8 - 5.3 10e9/L - - -   ABSOLUTE MONOCYTES 0.0 - 1.3 10e9/L - - -   ABSOLUTE EOSINOPHILS 0.0 - 0.7 10e9/L - - -   ABSOLUTE BASOPHILS 0.0 - 0.2 10e9/L - - -   ABS IMMATURE GRANULOCYTES 0 - 0.4 10e9/L - - -   ABSOLUTE NUCLEATED RBC - - - -     Liver Latest Ref Rng & Units 1/31/2022 9/27/2021 3/1/2021   AP 40 - 150 U/L 93 123 195(H)   TBili 0.2 - 1.3 mg/dL 0.6 0.4 0.6   DBili 0.0 - 0.2 mg/dL 0.2 0.1 0.1   ALT 0 - 50 U/L 32 35 42   AST 0 - 45 U/L 16 25 26   Tot Protein 6.8 - 8.8 g/dL 7.2 7.2 7.0   Albumin 3.4 - 5.0 g/dL 4.1 4.2 3.7     Pancreas Latest Ref Rng & Units 1/31/2022 9/27/2021 3/1/2021   A1C 0.0 - 5.6 % 6.3(H) 7.6(H) 7.9(H)     Iron studies Latest Ref Rng & Units 9/9/2020   Iron 35 - 180 ug/dL 162   Iron sat 15 - 46 % 92(H)   Ferritin 8 - 252 ng/mL 1,757(H)     [facility] Txp Virology Latest Ref Rng & Units 9/22/2022 6/28/2021 5/17/2021   CVM DNA Quant - - - -   CMV QUANT IU/ML Not Detected IU/mL Not Detected - -   LOG IU/ML OF CMVQNT <2.1 [Log:IU]/mL - - -   BK Spec - - Plasma Plasma   BK Res BKNEG:BK Virus DNA Not Detected copies/mL - BK Virus DNA Not Detected BK Virus DNA Not Detected   BK Log <2.7 Log copies/mL - Not Calculated Not Calculated   BK Quant Log Ext - - - -   BK Quant Result Ext Negative copies/mL - - -   BK Quant Spec Ext - - - -   EBV CAPSID ANTIBODY IGG 0.0 - 0.8 AI - - -   EBV DNA COPIES/ML Not Detected copies/mL Not Detected - -   EBV DNA LOG OF COPIES <2.7 [Log:copies]/mL - - -   Hep B Core NR:Nonreactive - - -        Recent Labs   Lab Test 08/29/22  0812 09/06/22  0825 09/26/22  0824   DOSTA 8/28/2022 9/5/2022 9/25/2022   TACROL 6.5 6.5 6.6     Recent Labs   Lab Test 07/25/22  0816  08/29/22  0812 09/26/22  0824   DOSMPA 7/24/2022   8:00 PM 8/28/2022   8:00 PM 9/25/2022   8:00 PM   MPACID 2.04 1.59 1.09   MPAG 153.0* 148.6* 129.8*       Again, thank you for allowing me to participate in the care of your patient.      Sincerely,    Angel Luis Sheridan MD

## 2022-09-22 NOTE — NURSING NOTE
"Chief Complaint   Patient presents with     RECHECK     S/P Kidney TX 9/3/2020     Vital signs:  Temp: 97.8  F (36.6  C) Temp src: Oral BP: 113/73 Pulse: 82   Resp: 18 SpO2: 98 %     Height: 149.9 cm (4' 11.02\") Weight: 56.3 kg (124 lb 1.6 oz)  Estimated body mass index is 25.05 kg/m  as calculated from the following:    Height as of this encounter: 1.499 m (4' 11.02\").    Weight as of this encounter: 56.3 kg (124 lb 1.6 oz).      .  Sada Barr, [facility]  9/22/2022 12:30 PM      "

## 2022-09-22 NOTE — LETTER
9/22/2022      RE: Ethel Thompson  [address]  [place] [address]       TRANSPLANT NEPHROLOGY CHRONIC POST TRANSPLANT VISIT    Assessment & Plan   # DDKT: Stable   - Baseline Creatinine:  ~ 0.8-1.0   - Proteinuria: Normal (<0.2 grams)   - Date DSA Last Checked: Apr/2022      Latest DSA: No   - BK Viremia: No   - Kidney Tx Biopsy: Mar 03, 2021; Result: No diagnostic evidence of acute rejection.  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  No interstitial fibrosis or tubular atrophy.             Feb 03, 2021; Result: No diagnostic evidence of acute rejection.  Segmental glomerular basement membrane irregularity with remodeling and lamellation.  Type IV collagen alpha5 and alpha2 staining was normal.  This was felt to be donor derived.    # Immunosuppression: Tacrolimus immediate release (goal 4-6) and Mycophenolate mofetil (dose 1250 mg every 12 hours)   - Continue with intensive monitoring of immunosuppression for efficacy and toxicity.   - Changes: No; Patient is on high dose mycophenolate, but only low normal MPA level and no obvious side effects, including normal WBC.    # Infection Prophylaxis:   - PJP: Sulfa/TMP (Bactrim)  - HSV: Valacyclovir (Valtrex)    # Hypertension: Controlled;  Goal BP: < 130/80   - Changes: No    # Diabetes: Controlled (HbA1c <7%) Last HbA1c: 6.3%   - Management as per primary care.    # Anemia in Chronic Renal Disease: Hgb: Stable      JHONATAN: No   - Iron studies: High iron saturation with last check 9/2020; Will recheck iron panel.    # Mineral Bone Disorder:   - Vitamin D; level: Low        On supplement: Yes  - Calcium; level: High normal        On supplement: No    # Electrolytes:   - Potassium; level: Normal        On supplement: No  - Magnesium; level: Not checked recently        On supplement: Yes  - Bicarbonate; level: Normal        On supplement: No    # CAD:  Asymptomatic.  Coronary angiogram 4/2022 with mild non flow-limiting disease.    # MGUS: Last check 2/2021 showed normal kappa/lambda ratio with no monoclonal peak.   - Will recheck serum kappa/lambda light chains and SPEP.  If remains normal, then can repeat only as clinically indicated.    # GERD: Occasional symptoms on H2 blocker.    # SADIE on CPAP: Patient is compliant.    # Fatigue: No significant change, but has not really improved since transplant.  TSH was normal.  Cannot completely rule out viral infectious causes.   - Will check CMV and EBV PCR.    # Skin Cancer Risk:    - Discussed sun protection and recommend regular follow up with Dermatology.    # Medical Compliance: Yes    # COVID-19 Virus Review: Discussed COVID-19 virus and the potential medical risks.  Reviewed preventative health recommendations, including wearing a mask where appropriate.  Recommended COVID vaccination should be up to date with either an initial vaccination or booster shot when appropriate.  Asked the patient to inform the transplant center if they are exposed or diagnosed with this virus.    # COVID Vaccination Up To Date: No, due for next dose    # Transplant History:  Etiology of Kidney Failure: Unknown etiology (Bx with global glomerulosclerosis)  Tx: DDKT  Transplant: 9/3/2020 (Kidney)  Significant changes in immunosuppression: None  Significant transplant-related complications: None    Transplant Office Phone Number: [phone]    Assessment and plan was discussed with the patient and she voiced her understanding and agreement.    Return visit: Return in about 6 months (around 3/22/2023).    Angel Luis Sheridan MD    Chief Complaint   Ms. Thompson is a 67 year old here for kidney transplant and immunosuppression management.    History of Present Illness    Ms. Thompson reports feeling okay overall with some medical complaints.  She had an episode of chest pain last April and was seen in ER for evaluation.  Symptoms were somewhat  "concerning for angina and patient underwent coronary angiogram, which showed just mild disease and no flow-limiting lesions.    Her energy level is okay, but only ~ 6-7/10 and no real improvement since transplant.  Patient says she sleeps okay when she uses melatonin.  She is active and does get some exercise.  Denies any chest pain or shortness of breath with exertion.  No leg swelling.  She has a slight tremor.    Appetite is \"so-so,\" but weight is stable.  Occasional nausea if she takes her medications without food, but otherwise no nausea or vomiting.  No diarrhea.  Occasional heartburn symptoms on famotidine.  No fever, sweats or chills, although she feels she is sensitive to temperature.  No night sweats.    Home BP: 120/80s    Problem List   Patient Active Problem List   Diagnosis     Elevated serum immunoglobulin free light chain level     Dyslipidemia     Hypothyroidism     SADIE on CPAP     Sensorineural hearing loss (SNHL) of both ears     GERD (gastroesophageal reflux disease)     HTN, kidney transplant related     Hepatitis B core antibody positive     Secondary renal hyperparathyroidism (H)     Memory deficits     Kidney replaced by transplant     Immunosuppression (H)     Aftercare following organ transplant     Vitamin D deficiency     Diabetes mellitus, type 2 (H)       Allergies   Allergies   Allergen Reactions     Amoxicillin-Pot Clavulanate Nausea and Vomiting       Medications   Current Outpatient Medications   Medication Sig     acetaminophen (TYLENOL) 325 MG tablet Take 2 tablets (650 mg) by mouth every 4 hours as needed for pain     aspirin (ASA) 81 MG EC tablet Take 1 tablet (81 mg) by mouth daily     atorvastatin (LIPITOR) 40 MG tablet Take 0.5 tablets (20 mg) by mouth daily     cholecalciferol (VITAMIN D3) 25 mcg (1000 units) capsule Take 2 capsules (50 mcg) by mouth daily     diclofenac (VOLTAREN) 1 % topical gel Apply 2 g topically 4 times daily as needed      famotidine (PEPCID) 20 MG " tablet Take 20 mg by mouth every evening      fish oil-omega-3 fatty acids 1000 MG capsule Take 2 g by mouth daily      fluticasone (FLONASE) 50 MCG/ACT nasal spray Spray 1 spray into both nostrils 2 times daily as needed for rhinitis or allergies     furosemide (LASIX) 20 MG tablet Take 1 tablet (20 mg) by mouth daily     levothyroxine (SYNTHROID/LEVOTHROID) 112 MCG tablet Take 112 mcg (1 tablet) by mouth every Monday, Tuesday is 1/2 tab, 1 tab Wednesday, 1 tab Thursday, tab 1 Friday     Lidocaine (LIDOCARE) 4 % Patch Place 1 patch onto the skin every 24 hours To prevent lidocaine toxicity, patient should be patch free for 12 hrs daily.  Using for back pain     losartan (COZAAR) 100 MG tablet Take 1 tablet (100 mg) by mouth daily     magnesium oxide 400 MG CAPS lunch     melatonin 3 MG tablet Take 8 mg by mouth At Bedtime     metFORMIN (GLUCOPHAGE) 500 MG tablet Take 500 mg by mouth 2 times daily (with meals)     mycophenolate (GENERIC EQUIVALENT) 250 MG capsule Take 5 capsules (1,250 mg) by mouth 2 times daily     psyllium (METAMUCIL/KONSYL) 58.6 % powder Take by mouth daily     Semaglutide (RYBELSUS) 7 MG TABS Take 7 mg by mouth daily     sulfamethoxazole-trimethoprim (BACTRIM) 400-80 MG tablet Take 1 tablet by mouth daily     tacrolimus (GENERIC EQUIVALENT) 1 MG capsule Take 1 capsule (1 mg) by mouth 2 times daily     valACYclovir (VALTREX) 500 MG tablet Take 1 tablet (500 mg) by mouth daily (Patient taking differently: Take 500 mg by mouth every other day)     Semaglutide (RYBELSUS) 7 MG TABS Take 7 mg by mouth daily (Patient not taking: Reported on 9/22/2022)     tacrolimus (GENERIC EQUIVALENT) 0.5 MG capsule Take 1 capsule (0.5 mg) by mouth every evening Total dose 1 mg every morning and 0.5 mg every evening (Patient not taking: Reported on 9/22/2022)     No current facility-administered medications for this visit.     There are no discontinued medications.    Physical Exam   Vital Signs: /73 (BP  "Location: Right arm, Patient Position: Sitting, Cuff Size: Adult Regular)   Pulse 82   Temp 97.8  F (36.6  C) (Oral)   Resp 18   Ht 1.499 m (4' 11.02\")   Wt 56.3 kg (124 lb 1.6 oz)   SpO2 98%   BMI 25.05 kg/m      GENERAL APPEARANCE: alert and no distress  HENT: mouth without ulcers or lesions  LYMPHATICS: no cervical or supraclavicular nodes  RESP: lungs clear to auscultation - no rales, rhonchi or wheezes  CV: regular rhythm, normal rate, no rub, no murmur  EDEMA: no LE edema bilaterally  ABDOMEN: soft, nondistended, nontender, bowel sounds normal  MS: extremities normal - no gross deformities noted, no evidence of inflammation in joints, no muscle tenderness  SKIN: no rash  TX KIDNEY: normal  DIALYSIS ACCESS:  LUE AV fistula with good thrill    Data     Renal Latest Ref Rng & Units 9/26/2022 9/22/2022 9/6/2022   Na 133 - 144 mmol/L 138 - 139   Na (external) 135 - 145 mmol/L - - -   K 3.4 - 5.3 mmol/L 4.5 - 4.8   K (external) 3.5 - 5.0 mmol/L - - -   Cl 94 - 109 mmol/L 112(H) - 111(H)   CO2 20 - 32 mmol/L 22 - 23   CO2 (external) 21 - 31 mmol/L - - -   BUN 7 - 30 mg/dL 29 - 33(H)   BUN (external) 8 - 25 mg/dL - - -   Cr 0.52 - 1.04 mg/dL 0.77 - 1.00   Cr (external) 0.57 - 1.11 mg/dL - - -   Glucose 70 - 99 mg/dL 106(H) - 108(H)   Glucose (external) 65 - 100 mg/dL - - -   Ca  8.5 - 10.1 mg/dL 9.7 - 10.0   Ca (external) 8.5 - 10.5 mg/dL - - -   Mg 1.7 - 2.3 mg/dL - 1.9 -   Mg (external) 1.6 - 2.6 mg/dL - - -     Bone Health Latest Ref Rng & Units 9/27/2021 4/19/2021 3/8/2021   Phos 2.5 - 4.5 mg/dL - 3.5 3.9   Phos (external) 2.3 - 4.7 mg/dL - - -   PTHi 18 - 80 pg/mL 106(H) - -   Vit D Def 20 - 75 ug/L 18(L) - -     Heme Latest Ref Rng & Units 9/26/2022 8/29/2022 7/25/2022   WBC 4.0 - 11.0 10e3/uL 4.4 5.3 5.0   WBC (external) 4.5 - 11.0 thou/cu mm - - -   Hgb 11.7 - 15.7 g/dL 10.1(L) 9.9(L) 10.4(L)   Hgb (external) 12.0 - 16.0 g/dL - - -   Plt 150 - 450 10e3/uL 216 209 221   Plt (external) 140 - 440 thou/cu " mm - - -   ABSOLUTE NEUTROPHIL 1.6 - 8.3 10e9/L - - -   ABSOLUTE LYMPHOCYTES 0.8 - 5.3 10e9/L - - -   ABSOLUTE MONOCYTES 0.0 - 1.3 10e9/L - - -   ABSOLUTE EOSINOPHILS 0.0 - 0.7 10e9/L - - -   ABSOLUTE BASOPHILS 0.0 - 0.2 10e9/L - - -   ABS IMMATURE GRANULOCYTES 0 - 0.4 10e9/L - - -   ABSOLUTE NUCLEATED RBC - - - -     Liver Latest Ref Rng & Units 1/31/2022 9/27/2021 3/1/2021   AP 40 - 150 U/L 93 123 195(H)   TBili 0.2 - 1.3 mg/dL 0.6 0.4 0.6   DBili 0.0 - 0.2 mg/dL 0.2 0.1 0.1   ALT 0 - 50 U/L 32 35 42   AST 0 - 45 U/L 16 25 26   Tot Protein 6.8 - 8.8 g/dL 7.2 7.2 7.0   Albumin 3.4 - 5.0 g/dL 4.1 4.2 3.7     Pancreas Latest Ref Rng & Units 1/31/2022 9/27/2021 3/1/2021   A1C 0.0 - 5.6 % 6.3(H) 7.6(H) 7.9(H)     Iron studies Latest Ref Rng & Units 9/9/2020   Iron 35 - 180 ug/dL 162   Iron sat 15 - 46 % 92(H)   Ferritin 8 - 252 ng/mL 1,757(H)     [facility] Virology Latest Ref Rng & Units 9/22/2022 6/28/2021 5/17/2021   CVM DNA Quant - - - -   CMV QUANT IU/ML Not Detected IU/mL Not Detected - -   LOG IU/ML OF CMVQNT <2.1 [Log:IU]/mL - - -   BK Spec - - Plasma Plasma   BK Res BKNEG:BK Virus DNA Not Detected copies/mL - BK Virus DNA Not Detected BK Virus DNA Not Detected   BK Log <2.7 Log copies/mL - Not Calculated Not Calculated   BK Quant Log Ext - - - -   BK Quant Result Ext Negative copies/mL - - -   BK Quant Spec Ext - - - -   EBV CAPSID ANTIBODY IGG 0.0 - 0.8 AI - - -   EBV DNA COPIES/ML Not Detected copies/mL Not Detected - -   EBV DNA LOG OF COPIES <2.7 [Log:copies]/mL - - -   Hep B Core NR:Nonreactive - - -        Recent Labs   Lab Test 08/29/22  0812 09/06/22  0825 09/26/22 0824   DOSTAC 8/28/2022 9/5/2022 9/25/2022   TACROL 6.5 6.5 6.6     Recent Labs   Lab Test 07/25/22  0816 08/29/22  0812 09/26/22 0824   DOSMPA 7/24/2022   8:00 PM 8/28/2022   8:00 PM 9/25/2022   8:00 PM   MPACID 2.04 1.59 1.09   MPAG 153.0* 148.6* 129.8*       Angel Luis Sheridan MD

## 2022-09-23 LAB
ALBUMIN SERPL ELPH-MCNC: 4.3 G/DL (ref 3.7–5.1)
ALPHA1 GLOB SERPL ELPH-MCNC: 0.2 G/DL (ref 0.2–0.4)
ALPHA2 GLOB SERPL ELPH-MCNC: 0.7 G/DL (ref 0.5–0.9)
B-GLOBULIN SERPL ELPH-MCNC: 0.7 G/DL (ref 0.6–1)
EBV DNA # SPEC NAA+PROBE: NOT DETECTED COPIES/ML
GAMMA GLOB SERPL ELPH-MCNC: 0.7 G/DL (ref 0.7–1.6)
KAPPA LC FREE SER-MCNC: 2.41 MG/DL (ref 0.33–1.94)
KAPPA LC FREE/LAMBDA FREE SER NEPH: 1.23 {RATIO} (ref 0.26–1.65)
LAMBDA LC FREE SERPL-MCNC: 1.96 MG/DL (ref 0.57–2.63)
M PROTEIN SERPL ELPH-MCNC: 0 G/DL
PROT PATTERN SERPL ELPH-IMP: NORMAL

## 2022-09-26 ENCOUNTER — LAB (OUTPATIENT)
Dept: LAB | Facility: CLINIC | Age: 67
End: 2022-09-26
Payer: MEDICARE

## 2022-09-26 DIAGNOSIS — Z79.899 ENCOUNTER FOR LONG-TERM CURRENT USE OF MEDICATION: ICD-10-CM

## 2022-09-26 DIAGNOSIS — Z94.0 KIDNEY TRANSPLANTED: ICD-10-CM

## 2022-09-26 DIAGNOSIS — Z48.298 AFTERCARE FOLLOWING ORGAN TRANSPLANT: ICD-10-CM

## 2022-09-26 DIAGNOSIS — Z94.0 KIDNEY REPLACED BY TRANSPLANT: ICD-10-CM

## 2022-09-26 LAB
ALBUMIN MFR UR ELPH: 10.8 MG/DL
ANION GAP SERPL CALCULATED.3IONS-SCNC: 4 MMOL/L (ref 3–14)
BUN SERPL-MCNC: 29 MG/DL (ref 7–30)
CALCIUM SERPL-MCNC: 9.7 MG/DL (ref 8.5–10.1)
CHLORIDE BLD-SCNC: 112 MMOL/L (ref 94–109)
CMV DNA SPEC NAA+PROBE-ACNC: NOT DETECTED IU/ML
CO2 SERPL-SCNC: 22 MMOL/L (ref 20–32)
CREAT SERPL-MCNC: 0.77 MG/DL (ref 0.52–1.04)
CREAT UR-MCNC: 140 MG/DL
ERYTHROCYTE [DISTWIDTH] IN BLOOD BY AUTOMATED COUNT: 11.7 % (ref 10–15)
GFR SERPL CREATININE-BSD FRML MDRD: 84 ML/MIN/1.73M2
GLUCOSE BLD-MCNC: 106 MG/DL (ref 70–99)
HCT VFR BLD AUTO: 31.1 % (ref 35–47)
HGB BLD-MCNC: 10.1 G/DL (ref 11.7–15.7)
MCH RBC QN AUTO: 33.2 PG (ref 26.5–33)
MCHC RBC AUTO-ENTMCNC: 32.5 G/DL (ref 31.5–36.5)
MCV RBC AUTO: 102 FL (ref 78–100)
PLATELET # BLD AUTO: 216 10E3/UL (ref 150–450)
POTASSIUM BLD-SCNC: 4.5 MMOL/L (ref 3.4–5.3)
PROT/CREAT 24H UR: 0.08 MG/MG CR (ref 0–0.2)
RBC # BLD AUTO: 3.04 10E6/UL (ref 3.8–5.2)
SODIUM SERPL-SCNC: 138 MMOL/L (ref 133–144)
TACROLIMUS BLD-MCNC: 6.6 UG/L (ref 5–15)
TME LAST DOSE: NORMAL H
TME LAST DOSE: NORMAL H
WBC # BLD AUTO: 4.4 10E3/UL (ref 4–11)

## 2022-09-26 PROCEDURE — 80048 BASIC METABOLIC PNL TOTAL CA: CPT

## 2022-09-26 PROCEDURE — 85027 COMPLETE CBC AUTOMATED: CPT

## 2022-09-26 PROCEDURE — 87799 DETECT AGENT NOS DNA QUANT: CPT

## 2022-09-26 PROCEDURE — 80180 DRUG SCRN QUAN MYCOPHENOLATE: CPT

## 2022-09-26 PROCEDURE — 36415 COLL VENOUS BLD VENIPUNCTURE: CPT

## 2022-09-26 PROCEDURE — 80197 ASSAY OF TACROLIMUS: CPT

## 2022-09-26 PROCEDURE — 84156 ASSAY OF PROTEIN URINE: CPT

## 2022-09-27 LAB
BKV DNA # SPEC NAA+PROBE: NOT DETECTED COPIES/ML
MYCOPHENOLATE SERPL LC/MS/MS-MCNC: 1.09 MG/L (ref 1–3.5)
MYCOPHENOLATE-G SERPL LC/MS/MS-MCNC: 129.8 MG/L (ref 30–95)
TME LAST DOSE: ABNORMAL H
TME LAST DOSE: ABNORMAL H

## 2022-09-29 PROBLEM — R73.9 ELEVATED RANDOM BLOOD GLUCOSE LEVEL: Status: RESOLVED | Noted: 2021-01-06 | Resolved: 2022-09-29

## 2022-09-30 NOTE — PROGRESS NOTES
TRANSPLANT NEPHROLOGY CHRONIC POST TRANSPLANT VISIT    Assessment & Plan   # DDKT: Stable   - Baseline Creatinine:  ~ 0.8-1.0   - Proteinuria: Normal (<0.2 grams)   - Date DSA Last Checked: Apr/2022      Latest DSA: No   - BK Viremia: No   - Kidney Tx Biopsy: Mar 03, 2021; Result: No diagnostic evidence of acute rejection.  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  No interstitial fibrosis or tubular atrophy.             Feb 03, 2021; Result: No diagnostic evidence of acute rejection.  Segmental glomerular basement membrane irregularity with remodeling and lamellation.  Type IV collagen alpha5 and alpha2 staining was normal.  This was felt to be donor derived.    # Immunosuppression: Tacrolimus immediate release (goal 4-6) and Mycophenolate mofetil (dose 1250 mg every 12 hours)   - Continue with intensive monitoring of immunosuppression for efficacy and toxicity.   - Changes: No; Patient is on high dose mycophenolate, but only low normal MPA level and no obvious side effects, including normal WBC.    # Infection Prophylaxis:   - PJP: Sulfa/TMP (Bactrim)  - HSV: Valacyclovir (Valtrex)    # Hypertension: Controlled;  Goal BP: < 130/80   - Changes: No    # Diabetes: Controlled (HbA1c <7%) Last HbA1c: 6.3%   - Management as per primary care.    # Anemia in Chronic Renal Disease: Hgb: Stable      JHONATAN: No   - Iron studies: High iron saturation with last check 9/2020; Will recheck iron panel.    # Mineral Bone Disorder:   - Vitamin D; level: Low        On supplement: Yes  - Calcium; level: High normal        On supplement: No    # Electrolytes:   - Potassium; level: Normal        On supplement: No  - Magnesium; level: Not checked recently        On supplement: Yes  - Bicarbonate; level: Normal        On supplement: No    # CAD: Asymptomatic.  Coronary angiogram 4/2022 with mild non flow-limiting disease.    # MGUS:  Last check 2/2021 showed normal kappa/lambda ratio with no monoclonal peak.   - Will recheck serum kappa/lambda light chains and SPEP.  If remains normal, then can repeat only as clinically indicated.    # GERD: Occasional symptoms on H2 blocker.    # SADIE on CPAP: Patient is compliant.    # Fatigue: No significant change, but has not really improved since transplant.  TSH was normal.  Cannot completely rule out viral infectious causes.   - Will check CMV and EBV PCR.    # Skin Cancer Risk:    - Discussed sun protection and recommend regular follow up with Dermatology.    # Medical Compliance: Yes    # COVID-19 Virus Review: Discussed COVID-19 virus and the potential medical risks.  Reviewed preventative health recommendations, including wearing a mask where appropriate.  Recommended COVID vaccination should be up to date with either an initial vaccination or booster shot when appropriate.  Asked the patient to inform the transplant center if they are exposed or diagnosed with this virus.    # COVID Vaccination Up To Date: No, due for next dose    # Transplant History:  Etiology of Kidney Failure: Unknown etiology (Bx with global glomerulosclerosis)  Tx: DDKT  Transplant: 9/3/2020 (Kidney)  Significant changes in immunosuppression: None  Significant transplant-related complications: None    Transplant Office Phone Number: [phone]    Assessment and plan was discussed with the patient and she voiced her understanding and agreement.    Return visit: Return in about 6 months (around 3/22/2023).    Angel Luis Sheridan MD    Chief Complaint   Ms. Thompson is a 67 year old here for kidney transplant and immunosuppression management.    History of Present Illness    Ms. Thompson reports feeling okay overall with some medical complaints.  She had an episode of chest pain last April and was seen in ER for evaluation.  Symptoms were somewhat concerning for angina and patient underwent coronary angiogram, which showed just mild  "disease and no flow-limiting lesions.    Her energy level is okay, but only ~ 6-7/10 and no real improvement since transplant.  Patient says she sleeps okay when she uses melatonin.  She is active and does get some exercise.  Denies any chest pain or shortness of breath with exertion.  No leg swelling.  She has a slight tremor.    Appetite is \"so-so,\" but weight is stable.  Occasional nausea if she takes her medications without food, but otherwise no nausea or vomiting.  No diarrhea.  Occasional heartburn symptoms on famotidine.  No fever, sweats or chills, although she feels she is sensitive to temperature.  No night sweats.    Home BP: 120/80s    Problem List   Patient Active Problem List   Diagnosis     Elevated serum immunoglobulin free light chain level     Dyslipidemia     Hypothyroidism     SADIE on CPAP     Sensorineural hearing loss (SNHL) of both ears     GERD (gastroesophageal reflux disease)     HTN, kidney transplant related     Hepatitis B core antibody positive     Secondary renal hyperparathyroidism (H)     Memory deficits     Kidney replaced by transplant     Immunosuppression (H)     Aftercare following organ transplant     Vitamin D deficiency     Diabetes mellitus, type 2 (H)       Allergies   Allergies   Allergen Reactions     Amoxicillin-Pot Clavulanate Nausea and Vomiting       Medications   Current Outpatient Medications   Medication Sig     acetaminophen (TYLENOL) 325 MG tablet Take 2 tablets (650 mg) by mouth every 4 hours as needed for pain     aspirin (ASA) 81 MG EC tablet Take 1 tablet (81 mg) by mouth daily     atorvastatin (LIPITOR) 40 MG tablet Take 0.5 tablets (20 mg) by mouth daily     cholecalciferol (VITAMIN D3) 25 mcg (1000 units) capsule Take 2 capsules (50 mcg) by mouth daily     diclofenac (VOLTAREN) 1 % topical gel Apply 2 g topically 4 times daily as needed      famotidine (PEPCID) 20 MG tablet Take 20 mg by mouth every evening      fish oil-omega-3 fatty acids 1000 MG capsule " Take 2 g by mouth daily      fluticasone (FLONASE) 50 MCG/ACT nasal spray Spray 1 spray into both nostrils 2 times daily as needed for rhinitis or allergies     furosemide (LASIX) 20 MG tablet Take 1 tablet (20 mg) by mouth daily     levothyroxine (SYNTHROID/LEVOTHROID) 112 MCG tablet Take 112 mcg (1 tablet) by mouth every Monday, Tuesday is 1/2 tab, 1 tab Wednesday, 1 tab Thursday, tab 1 Friday     Lidocaine (LIDOCARE) 4 % Patch Place 1 patch onto the skin every 24 hours To prevent lidocaine toxicity, patient should be patch free for 12 hrs daily.  Using for back pain     losartan (COZAAR) 100 MG tablet Take 1 tablet (100 mg) by mouth daily     magnesium oxide 400 MG CAPS lunch     melatonin 3 MG tablet Take 8 mg by mouth At Bedtime     metFORMIN (GLUCOPHAGE) 500 MG tablet Take 500 mg by mouth 2 times daily (with meals)     mycophenolate (GENERIC EQUIVALENT) 250 MG capsule Take 5 capsules (1,250 mg) by mouth 2 times daily     psyllium (METAMUCIL/KONSYL) 58.6 % powder Take by mouth daily     Semaglutide (RYBELSUS) 7 MG TABS Take 7 mg by mouth daily     sulfamethoxazole-trimethoprim (BACTRIM) 400-80 MG tablet Take 1 tablet by mouth daily     tacrolimus (GENERIC EQUIVALENT) 1 MG capsule Take 1 capsule (1 mg) by mouth 2 times daily     valACYclovir (VALTREX) 500 MG tablet Take 1 tablet (500 mg) by mouth daily (Patient taking differently: Take 500 mg by mouth every other day)     Semaglutide (RYBELSUS) 7 MG TABS Take 7 mg by mouth daily (Patient not taking: Reported on 9/22/2022)     tacrolimus (GENERIC EQUIVALENT) 0.5 MG capsule Take 1 capsule (0.5 mg) by mouth every evening Total dose 1 mg every morning and 0.5 mg every evening (Patient not taking: Reported on 9/22/2022)     No current facility-administered medications for this visit.     There are no discontinued medications.    Physical Exam   Vital Signs: /73 (BP Location: Right arm, Patient Position: Sitting, Cuff Size: Adult Regular)   Pulse 82   Temp  "97.8  F (36.6  C) (Oral)   Resp 18   Ht 1.499 m (4' 11.02\")   Wt 56.3 kg (124 lb 1.6 oz)   SpO2 98%   BMI 25.05 kg/m      GENERAL APPEARANCE: alert and no distress  HENT: mouth without ulcers or lesions  LYMPHATICS: no cervical or supraclavicular nodes  RESP: lungs clear to auscultation - no rales, rhonchi or wheezes  CV: regular rhythm, normal rate, no rub, no murmur  EDEMA: no LE edema bilaterally  ABDOMEN: soft, nondistended, nontender, bowel sounds normal  MS: extremities normal - no gross deformities noted, no evidence of inflammation in joints, no muscle tenderness  SKIN: no rash  TX KIDNEY: normal  DIALYSIS ACCESS:  LUE AV fistula with good thrill    Data     Renal Latest Ref Rng & Units 9/26/2022 9/22/2022 9/6/2022   Na 133 - 144 mmol/L 138 - 139   Na (external) 135 - 145 mmol/L - - -   K 3.4 - 5.3 mmol/L 4.5 - 4.8   K (external) 3.5 - 5.0 mmol/L - - -   Cl 94 - 109 mmol/L 112(H) - 111(H)   CO2 20 - 32 mmol/L 22 - 23   CO2 (external) 21 - 31 mmol/L - - -   BUN 7 - 30 mg/dL 29 - 33(H)   BUN (external) 8 - 25 mg/dL - - -   Cr 0.52 - 1.04 mg/dL 0.77 - 1.00   Cr (external) 0.57 - 1.11 mg/dL - - -   Glucose 70 - 99 mg/dL 106(H) - 108(H)   Glucose (external) 65 - 100 mg/dL - - -   Ca  8.5 - 10.1 mg/dL 9.7 - 10.0   Ca (external) 8.5 - 10.5 mg/dL - - -   Mg 1.7 - 2.3 mg/dL - 1.9 -   Mg (external) 1.6 - 2.6 mg/dL - - -     Bone Health Latest Ref Rng & Units 9/27/2021 4/19/2021 3/8/2021   Phos 2.5 - 4.5 mg/dL - 3.5 3.9   Phos (external) 2.3 - 4.7 mg/dL - - -   PTHi 18 - 80 pg/mL 106(H) - -   Vit D Def 20 - 75 ug/L 18(L) - -     Heme Latest Ref Rng & Units 9/26/2022 8/29/2022 7/25/2022   WBC 4.0 - 11.0 10e3/uL 4.4 5.3 5.0   WBC (external) 4.5 - 11.0 thou/cu mm - - -   Hgb 11.7 - 15.7 g/dL 10.1(L) 9.9(L) 10.4(L)   Hgb (external) 12.0 - 16.0 g/dL - - -   Plt 150 - 450 10e3/uL 216 209 221   Plt (external) 140 - 440 thou/cu mm - - -   ABSOLUTE NEUTROPHIL 1.6 - 8.3 10e9/L - - -   ABSOLUTE LYMPHOCYTES 0.8 - 5.3 10e9/L - " - -   ABSOLUTE MONOCYTES 0.0 - 1.3 10e9/L - - -   ABSOLUTE EOSINOPHILS 0.0 - 0.7 10e9/L - - -   ABSOLUTE BASOPHILS 0.0 - 0.2 10e9/L - - -   ABS IMMATURE GRANULOCYTES 0 - 0.4 10e9/L - - -   ABSOLUTE NUCLEATED RBC - - - -     Liver Latest Ref Rng & Units 1/31/2022 9/27/2021 3/1/2021   AP 40 - 150 U/L 93 123 195(H)   TBili 0.2 - 1.3 mg/dL 0.6 0.4 0.6   DBili 0.0 - 0.2 mg/dL 0.2 0.1 0.1   ALT 0 - 50 U/L 32 35 42   AST 0 - 45 U/L 16 25 26   Tot Protein 6.8 - 8.8 g/dL 7.2 7.2 7.0   Albumin 3.4 - 5.0 g/dL 4.1 4.2 3.7     Pancreas Latest Ref Rng & Units 1/31/2022 9/27/2021 3/1/2021   A1C 0.0 - 5.6 % 6.3(H) 7.6(H) 7.9(H)     Iron studies Latest Ref Rng & Units 9/9/2020   Iron 35 - 180 ug/dL 162   Iron sat 15 - 46 % 92(H)   Ferritin 8 - 252 ng/mL 1,757(H)     [facility] Virology Latest Ref Rng & Units 9/22/2022 6/28/2021 5/17/2021   CVM DNA Quant - - - -   CMV QUANT IU/ML Not Detected IU/mL Not Detected - -   LOG IU/ML OF CMVQNT <2.1 [Log:IU]/mL - - -   BK Spec - - Plasma Plasma   BK Res BKNEG:BK Virus DNA Not Detected copies/mL - BK Virus DNA Not Detected BK Virus DNA Not Detected   BK Log <2.7 Log copies/mL - Not Calculated Not Calculated   BK Quant Log Ext - - - -   BK Quant Result Ext Negative copies/mL - - -   BK Quant Spec Ext - - - -   EBV CAPSID ANTIBODY IGG 0.0 - 0.8 AI - - -   EBV DNA COPIES/ML Not Detected copies/mL Not Detected - -   EBV DNA LOG OF COPIES <2.7 [Log:copies]/mL - - -   Hep B Core NR:Nonreactive - - -        Recent Labs   Lab Test 08/29/22  0812 09/06/22  0825 09/26/22  0824   DOSTAC 8/28/2022 9/5/2022 9/25/2022   TACROL 6.5 6.5 6.6     Recent Labs   Lab Test 07/25/22  0816 08/29/22  0812 09/26/22  0824   DOSMPA 7/24/2022   8:00 PM 8/28/2022   8:00 PM 9/25/2022   8:00 PM   MPACID 2.04 1.59 1.09   MPAG 153.0* 148.6* 129.8*

## 2022-11-15 ENCOUNTER — TELEPHONE (OUTPATIENT)
Dept: TRANSPLANT | Facility: CLINIC | Age: 67
End: 2022-11-15

## 2022-11-15 DIAGNOSIS — Z79.899 ENCOUNTER FOR LONG-TERM CURRENT USE OF MEDICATION: ICD-10-CM

## 2022-11-15 DIAGNOSIS — Z48.298 AFTERCARE FOLLOWING ORGAN TRANSPLANT: ICD-10-CM

## 2022-11-15 DIAGNOSIS — Z94.0 KIDNEY REPLACED BY TRANSPLANT: Primary | ICD-10-CM

## 2022-11-15 NOTE — LETTER
OUTPATIENT LABORATORY TEST ORDER    Patient Name: Ethel Thompson  Transplant Date: 9/3/2020   YOB: 1955  Issue Date & Time: 11/15/2022  2:47 PM  [facility] MR: [id number] Exp. Date (1 year after date issued)      Diagnoses: Kidney Transplant (ICD-10  Z94.0)   Long term use of medications (ICD-10  Z79.899)     Lab results to be available on the same day drawn.   Patient should release information to the [facility].  Please fax to the Transplant Center at [phone].    Every other month   ?Hemogram and Platelet  ?Basic Metabolic Panel (Sodium, Potassium, Chloride, CO2, Creatinine, Urea Nitrogen,     Glucose,   Calcium)         ?/Tacrolimus/Prograf drug level                          Every 6 Months                  ?Urine for protein/creatinine    Yearly:   ?PRA/DSA level (mailers provided by the patient)     If you have any questions, please call [facility] at [phone] or [phone].    Please fax labs to [phone]      Jordy Dewitt MD

## 2022-11-15 NOTE — TELEPHONE ENCOUNTER
No recent transplant labs -  2 years post kidney transplant      Task     Please update lab orders in Epic Dr Jordy Willis   Please send copy to patient

## 2022-11-16 DIAGNOSIS — E55.9 VITAMIN D DEFICIENCY: Primary | ICD-10-CM

## 2022-11-28 ENCOUNTER — LAB (OUTPATIENT)
Dept: LAB | Facility: CLINIC | Age: 67
End: 2022-11-28
Payer: MEDICARE

## 2022-11-28 DIAGNOSIS — Z94.0 KIDNEY REPLACED BY TRANSPLANT: ICD-10-CM

## 2022-11-28 DIAGNOSIS — Z79.899 ENCOUNTER FOR LONG-TERM CURRENT USE OF MEDICATION: ICD-10-CM

## 2022-11-28 DIAGNOSIS — Z48.298 AFTERCARE FOLLOWING ORGAN TRANSPLANT: ICD-10-CM

## 2022-11-28 LAB
ANION GAP SERPL CALCULATED.3IONS-SCNC: 5 MMOL/L (ref 3–14)
BUN SERPL-MCNC: 20 MG/DL (ref 7–30)
CALCIUM SERPL-MCNC: 10.1 MG/DL (ref 8.5–10.1)
CHLORIDE BLD-SCNC: 111 MMOL/L (ref 94–109)
CO2 SERPL-SCNC: 21 MMOL/L (ref 20–32)
CREAT SERPL-MCNC: 0.73 MG/DL (ref 0.52–1.04)
ERYTHROCYTE [DISTWIDTH] IN BLOOD BY AUTOMATED COUNT: 13 % (ref 10–15)
GFR SERPL CREATININE-BSD FRML MDRD: 89 ML/MIN/1.73M2
GLUCOSE BLD-MCNC: 116 MG/DL (ref 70–99)
HCT VFR BLD AUTO: 32.2 % (ref 35–47)
HGB BLD-MCNC: 10.2 G/DL (ref 11.7–15.7)
MCH RBC QN AUTO: 32.6 PG (ref 26.5–33)
MCHC RBC AUTO-ENTMCNC: 31.7 G/DL (ref 31.5–36.5)
MCV RBC AUTO: 103 FL (ref 78–100)
PLATELET # BLD AUTO: 275 10E3/UL (ref 150–450)
POTASSIUM BLD-SCNC: 4.8 MMOL/L (ref 3.4–5.3)
RBC # BLD AUTO: 3.13 10E6/UL (ref 3.8–5.2)
SODIUM SERPL-SCNC: 137 MMOL/L (ref 133–144)
TACROLIMUS BLD-MCNC: 6.6 UG/L (ref 5–15)
TME LAST DOSE: NORMAL H
TME LAST DOSE: NORMAL H
WBC # BLD AUTO: 5.5 10E3/UL (ref 4–11)

## 2022-11-28 PROCEDURE — 80048 BASIC METABOLIC PNL TOTAL CA: CPT

## 2022-11-28 PROCEDURE — 36415 COLL VENOUS BLD VENIPUNCTURE: CPT

## 2022-11-28 PROCEDURE — 80197 ASSAY OF TACROLIMUS: CPT

## 2022-11-28 PROCEDURE — 85027 COMPLETE CBC AUTOMATED: CPT

## 2022-12-06 DIAGNOSIS — Z94.0 KIDNEY REPLACED BY TRANSPLANT: ICD-10-CM

## 2022-12-06 DIAGNOSIS — B00.1 COLD SORE: ICD-10-CM

## 2022-12-06 DIAGNOSIS — R80.1 PERSISTENT PROTEINURIA: ICD-10-CM

## 2022-12-06 DIAGNOSIS — Z94.0 KIDNEY TRANSPLANTED: Primary | ICD-10-CM

## 2022-12-07 DIAGNOSIS — Z94.0 KIDNEY REPLACED BY TRANSPLANT: Primary | ICD-10-CM

## 2022-12-07 DIAGNOSIS — I15.1 HTN, KIDNEY TRANSPLANT RELATED: ICD-10-CM

## 2022-12-07 DIAGNOSIS — R80.1 PERSISTENT PROTEINURIA: ICD-10-CM

## 2022-12-07 DIAGNOSIS — Z94.0 HTN, KIDNEY TRANSPLANT RELATED: ICD-10-CM

## 2022-12-07 DIAGNOSIS — B00.1 COLD SORE: ICD-10-CM

## 2022-12-07 RX ORDER — TACROLIMUS 1 MG/1
1 CAPSULE ORAL EVERY MORNING
Qty: 90 CAPSULE | Refills: 3 | Status: SHIPPED | OUTPATIENT
Start: 2022-12-07 | End: 2023-08-02

## 2022-12-07 RX ORDER — MYCOPHENOLATE MOFETIL 250 MG/1
1250 CAPSULE ORAL 2 TIMES DAILY
Qty: 900 CAPSULE | Refills: 3 | Status: SHIPPED | OUTPATIENT
Start: 2022-12-07 | End: 2023-09-28

## 2022-12-07 RX ORDER — LOSARTAN POTASSIUM 100 MG/1
100 TABLET ORAL DAILY
Qty: 90 TABLET | Refills: 0 | Status: SHIPPED | OUTPATIENT
Start: 2022-12-07 | End: 2023-03-30

## 2022-12-07 RX ORDER — TACROLIMUS 0.5 MG/1
0.5 CAPSULE ORAL EVERY EVENING
Qty: 90 CAPSULE | Refills: 3 | Status: SHIPPED | OUTPATIENT
Start: 2022-12-07 | End: 2023-08-02

## 2022-12-07 NOTE — TELEPHONE ENCOUNTER
Spoke to patient/ who confirm this was an accurate 12-hour trough. Verified Tacrolimus IR dose 1.0 mg BID. Confirmed no new medications or illness. Confirmed no missed doses. Patient confirms decrease Tacrolimus IR dose to 1.0 mg and 0.5 mg in pm and repeat labs in 14 days

## 2022-12-07 NOTE — TELEPHONE ENCOUNTER
Tacrolimus  Level 6.6 above goal level (4 to 6)  2 years post transplant          PLAN:   Please call patient and confirm this was an accurate 12-hour trough. Verify Tacrolimus IR dose 1.0 mg BID. Confirm no new medications or illness. Confirm no missed doses. If accurate trough and accurate dose, decrease Tacrolimus IR dose to 1.0 mg and 0.5 mg in pm and repeat labs in 14 days

## 2022-12-21 ENCOUNTER — LAB (OUTPATIENT)
Dept: LAB | Facility: CLINIC | Age: 67
End: 2022-12-21
Payer: MEDICARE

## 2022-12-21 DIAGNOSIS — Z94.0 KIDNEY TRANSPLANTED: ICD-10-CM

## 2022-12-21 LAB
ANION GAP SERPL CALCULATED.3IONS-SCNC: 7 MMOL/L (ref 3–14)
BUN SERPL-MCNC: 31 MG/DL (ref 7–30)
CALCIUM SERPL-MCNC: 9.8 MG/DL (ref 8.5–10.1)
CHLORIDE BLD-SCNC: 110 MMOL/L (ref 94–109)
CO2 SERPL-SCNC: 23 MMOL/L (ref 20–32)
CREAT SERPL-MCNC: 0.73 MG/DL (ref 0.52–1.04)
ERYTHROCYTE [DISTWIDTH] IN BLOOD BY AUTOMATED COUNT: 12.6 % (ref 10–15)
GFR SERPL CREATININE-BSD FRML MDRD: 89 ML/MIN/1.73M2
GLUCOSE BLD-MCNC: 116 MG/DL (ref 70–99)
HCT VFR BLD AUTO: 34 % (ref 35–47)
HGB BLD-MCNC: 10.8 G/DL (ref 11.7–15.7)
MCH RBC QN AUTO: 32.7 PG (ref 26.5–33)
MCHC RBC AUTO-ENTMCNC: 31.8 G/DL (ref 31.5–36.5)
MCV RBC AUTO: 103 FL (ref 78–100)
PLATELET # BLD AUTO: 231 10E3/UL (ref 150–450)
POTASSIUM BLD-SCNC: 4.5 MMOL/L (ref 3.4–5.3)
RBC # BLD AUTO: 3.3 10E6/UL (ref 3.8–5.2)
SODIUM SERPL-SCNC: 140 MMOL/L (ref 133–144)
TACROLIMUS BLD-MCNC: 4 UG/L (ref 5–15)
TME LAST DOSE: ABNORMAL H
TME LAST DOSE: ABNORMAL H
WBC # BLD AUTO: 4.9 10E3/UL (ref 4–11)

## 2022-12-21 PROCEDURE — 36415 COLL VENOUS BLD VENIPUNCTURE: CPT

## 2022-12-21 PROCEDURE — 85027 COMPLETE CBC AUTOMATED: CPT

## 2022-12-21 PROCEDURE — 80048 BASIC METABOLIC PNL TOTAL CA: CPT

## 2022-12-21 PROCEDURE — 80197 ASSAY OF TACROLIMUS: CPT

## 2023-01-26 ENCOUNTER — LAB (OUTPATIENT)
Dept: LAB | Facility: CLINIC | Age: 68
End: 2023-01-26
Payer: MEDICARE

## 2023-01-26 DIAGNOSIS — Z79.899 ENCOUNTER FOR LONG-TERM CURRENT USE OF MEDICATION: ICD-10-CM

## 2023-01-26 DIAGNOSIS — Z94.0 KIDNEY REPLACED BY TRANSPLANT: ICD-10-CM

## 2023-01-26 DIAGNOSIS — Z48.298 AFTERCARE FOLLOWING ORGAN TRANSPLANT: ICD-10-CM

## 2023-01-26 LAB
ANION GAP SERPL CALCULATED.3IONS-SCNC: 12 MMOL/L (ref 3–14)
BUN SERPL-MCNC: 29 MG/DL (ref 7–30)
CALCIUM SERPL-MCNC: 10.5 MG/DL (ref 8.5–10.1)
CHLORIDE BLD-SCNC: 109 MMOL/L (ref 94–109)
CO2 SERPL-SCNC: 19 MMOL/L (ref 20–32)
CREAT SERPL-MCNC: 0.9 MG/DL (ref 0.52–1.04)
ERYTHROCYTE [DISTWIDTH] IN BLOOD BY AUTOMATED COUNT: 12.4 % (ref 10–15)
GFR SERPL CREATININE-BSD FRML MDRD: 69 ML/MIN/1.73M2
GLUCOSE BLD-MCNC: 106 MG/DL (ref 70–99)
HCT VFR BLD AUTO: 33.3 % (ref 35–47)
HGB BLD-MCNC: 10.8 G/DL (ref 11.7–15.7)
MCH RBC QN AUTO: 33.4 PG (ref 26.5–33)
MCHC RBC AUTO-ENTMCNC: 32.4 G/DL (ref 31.5–36.5)
MCV RBC AUTO: 103 FL (ref 78–100)
PLATELET # BLD AUTO: 231 10E3/UL (ref 150–450)
POTASSIUM BLD-SCNC: 4.4 MMOL/L (ref 3.4–5.3)
RBC # BLD AUTO: 3.23 10E6/UL (ref 3.8–5.2)
SODIUM SERPL-SCNC: 140 MMOL/L (ref 133–144)
TACROLIMUS BLD-MCNC: 6.7 UG/L (ref 5–15)
TME LAST DOSE: NORMAL H
TME LAST DOSE: NORMAL H
WBC # BLD AUTO: 5.3 10E3/UL (ref 4–11)

## 2023-01-26 PROCEDURE — 80048 BASIC METABOLIC PNL TOTAL CA: CPT

## 2023-01-26 PROCEDURE — 36415 COLL VENOUS BLD VENIPUNCTURE: CPT

## 2023-01-26 PROCEDURE — 85027 COMPLETE CBC AUTOMATED: CPT

## 2023-01-26 PROCEDURE — 86832 HLA CLASS I HIGH DEFIN QUAL: CPT

## 2023-01-26 PROCEDURE — 86833 HLA CLASS II HIGH DEFIN QUAL: CPT

## 2023-01-26 PROCEDURE — 80197 ASSAY OF TACROLIMUS: CPT

## 2023-03-01 NOTE — PROGRESS NOTES
Outcome for 03/01/23 8:40 AM: prefers to bring meter to the clinic   Vianca Cobos CMA  Adult Endocrinology  [facility] [place]

## 2023-03-03 ENCOUNTER — OFFICE VISIT (OUTPATIENT)
Dept: ENDOCRINOLOGY | Facility: CLINIC | Age: 68
End: 2023-03-03
Payer: MEDICARE

## 2023-03-03 VITALS
OXYGEN SATURATION: 99 % | HEIGHT: 59 IN | BODY MASS INDEX: 25.87 KG/M2 | DIASTOLIC BLOOD PRESSURE: 80 MMHG | WEIGHT: 128.3 LBS | SYSTOLIC BLOOD PRESSURE: 166 MMHG | HEART RATE: 75 BPM

## 2023-03-03 DIAGNOSIS — E11.29 TYPE 2 DIABETES MELLITUS WITH OTHER DIABETIC KIDNEY COMPLICATION, WITHOUT LONG-TERM CURRENT USE OF INSULIN (H): Primary | ICD-10-CM

## 2023-03-03 DIAGNOSIS — E03.9 HYPOTHYROIDISM, UNSPECIFIED TYPE: ICD-10-CM

## 2023-03-03 DIAGNOSIS — D84.9 IMMUNOSUPPRESSION (H): ICD-10-CM

## 2023-03-03 LAB
HBA1C MFR BLD: 6.4 % (ref 4.3–?)
TSH SERPL DL<=0.005 MIU/L-ACNC: 0.42 MU/L (ref 0.4–4)

## 2023-03-03 PROCEDURE — 36415 COLL VENOUS BLD VENIPUNCTURE: CPT | Performed by: PHYSICIAN ASSISTANT

## 2023-03-03 PROCEDURE — 99214 OFFICE O/P EST MOD 30 MIN: CPT | Performed by: PHYSICIAN ASSISTANT

## 2023-03-03 PROCEDURE — 84443 ASSAY THYROID STIM HORMONE: CPT | Performed by: PHYSICIAN ASSISTANT

## 2023-03-03 PROCEDURE — 83036 HEMOGLOBIN GLYCOSYLATED A1C: CPT | Performed by: PHYSICIAN ASSISTANT

## 2023-03-03 RX ORDER — ORAL SEMAGLUTIDE 7 MG/1
7 TABLET ORAL DAILY
Qty: 90 TABLET | Refills: 3 | Status: SHIPPED | OUTPATIENT
Start: 2023-03-03 | End: 2024-02-12

## 2023-03-03 RX ORDER — ORAL SEMAGLUTIDE 7 MG/1
7 TABLET ORAL DAILY
Qty: 90 TABLET | Status: CANCELLED | OUTPATIENT
Start: 2023-03-03

## 2023-03-03 NOTE — PROGRESS NOTES
Assessment/Plan :   1. Type 2 DM, controlled. We reviewed Ethel's recent laboratory results and her current blood sugar logs. She is doing great. She has noticed some occasional abdominal discomfort after taking Rybelsus but the pain is manageable. At present time she is happy with her blood sugar readings and with her weight. I do not see any reason to change her medications, at this time. She would like us to send in a new prescription for test strips. She likes to have the option of checking twice a day and the current prescription is for once a day only.    2. Hypothyroidism. We reviewed the signs and symptoms of low and excess thyroid hormone. Ethel feels good. She has no complaints of worsening fatigue or anxiousness. It has been almost a year since her last TSH was checked. We will repeat a TSH with reflex to T4 today. I would like to get her on a standard once daily dose. We will adjust her medication, based on the result. I would like to repeat a lab test in a couple months.    We will plan on a follow-up visit in about 6 mos.      I have independently reviewed and interpreted labs, imaging as indicated.      Chief complaint:  Ethel is a 68 year old female who comes to our office to establish care for the treatment of type 2 diabetes and hypothyroidism.    I have reviewed Care Everywhere including [facility], [facility], [facility] , [facility] lab reports, imaging reports and provider notes as indicated.      HISTORY OF PRESENT ILLNESS  Ethel was diagnosed with diabetes about 2 1/2 yrs ago. She underwent a kidney transplant in 2020 and she was diagnosed a few months after the procedure. At the time of diagnosis she was started on metformin 500 mg two tablets daily. She remained stable on metformin alone for about a year and a half. Rybelsus 3 mg tablets were added to the metformin about a year ago. That dose was then increased to 7 mg after a couple  months.     At present time she feels like the combination of metformin and Rybelsus is working well. She monitors her blood sugars twice daily with fingerstick testing. Her morning blood sugars are typically around  mg/dl. Towards the end of the day, she will get a few higher readings but nothing over 150 mg/dl.     She has not had any problems with worsening vision and she is up to date on her eye exams. She denies any problems with peripheral neuropathy. She is followed closely by nephrology regarding her kidney function.    Ethel also has hypothyroidism. She has been on levothyroxine for many years. She currently has a complicated dosing scheduled which consists of 1 tablet Monday, Wednesday, Thursday, and Friday, a half tablet on Tuesday and no medication on Saturday or Sunday. She has not had any problems with worsening fatigue, anxiousness, or irritability. She would like to switch to one tablet daily, if possible.     Endocrine relevant labs are as follows:   Latest Reference Range & Units 03/03/23 09:24   Hemoglobin A1C POCT 4.3 - <5.7 % 6.4      Ref Range & Units 4 mo ago Resulting Agency   CHOLESTEROL,TOTAL 100 - 199 mg/dL 139  [facility]   TRIGLYCERIDES <150 mg/dL 228 High   [facility]   HDL CHOLESTEROL >40 mg/dL 47  [facility]   NON-HDL CHOLESTEROL <145 mg/dl 92  [facility]   CHOL/HDL RATIO            <4.50                 2.96  [facility]   LDL CHOLESTEROL <=130 mg/dL 46  [facility]   VLDL CHOLESTEROL <=30 mg/dL 46 [facility]       Component 04/04/22  07/16/21  10/26/20  03/03/20  02/20/20  11/29/19    TSH 0.58 4.62 0.03 Low  0.81 1.76 1.44       REVIEW OF SYSTEMS    Endocrine: positive for thyroid disorder and diabetes  Skin: negative  Eyes: negative for, visual  blurring  Ears/Nose/Throat: negative  Respiratory: No shortness of breath, dyspnea on exertion, cough, or hemoptysis  Cardiovascular: negative for, irregular heart beat, chest pain and lower extremity edema  Gastrointestinal: positive for dyspepsia and abdominal pain, negative for, nausea, vomiting, constipation and diarrhea  Genitourinary: negative for, nocturia, dysuria, frequency and urgency  Musculoskeletal: negative for, muscular weakness and nocturnal cramping  Neurologic: negative for and numbness or tingling of feet  Psychiatric: negative for, anxiety and depression  Hematologic/Lymphatic/Immunologic: negative    Past Medical History  Past Medical History:   Diagnosis Date     Anemia in chronic kidney disease      Chronic renal failure      Diabetes mellitus, type 2 (H) 1/28/2021     GERD (gastroesophageal reflux disease)      Glomerulonephritis, focal sclerosing      Hepatitis B core antibody positive      Herpes simplex      Hyperlipidemia      Hypertension      Hypothyroidism      Memory deficits      SADIE on CPAP      Secondary hyperparathyroidism (H)        Medications  Current Outpatient Medications   Medication Sig Dispense Refill     acetaminophen (TYLENOL) 325 MG tablet Take 2 tablets (650 mg) by mouth every 4 hours as needed for pain 1 Bottle 0     aspirin (ASA) 81 MG EC tablet Take 1 tablet (81 mg) by mouth daily 90 tablet 3     atorvastatin (LIPITOR) 40 MG tablet Take 0.5 tablets (20 mg) by mouth daily       cholecalciferol (VITAMIN D3) 25 mcg (1000 units) capsule Take 2 capsules (50 mcg) by mouth daily 60 capsule 3     diclofenac (VOLTAREN) 1 % topical gel Apply 2 g topically 4 times daily as needed        famotidine (PEPCID) 20 MG tablet Take 20 mg by mouth every evening        fluticasone (FLONASE) 50 MCG/ACT nasal spray Spray 1 spray into both nostrils 2 times daily as needed for rhinitis or allergies       furosemide (LASIX) 20 MG tablet Take 1 tablet (20 mg) by mouth daily 90 tablet 0      levothyroxine (SYNTHROID/LEVOTHROID) 112 MCG tablet Take 112 mcg (1 tablet) by mouth every Monday, Tuesday is 1/2 tab, 1 tab Wednesday, 1 tab Thursday, tab 1 Friday       Lidocaine (LIDOCARE) 4 % Patch Place 1 patch onto the skin every 24 hours To prevent lidocaine toxicity, patient should be patch free for 12 hrs daily.  Using for back pain       losartan (COZAAR) 100 MG tablet Take 1 tablet (100 mg) by mouth daily 90 tablet 0     magnesium oxide 400 MG CAPS lunch       melatonin 3 MG tablet Take 8 mg by mouth At Bedtime       metFORMIN (GLUCOPHAGE) 500 MG tablet Take 500 mg by mouth 2 times daily (with meals)       mycophenolate (GENERIC EQUIVALENT) 250 MG capsule Take 5 capsules (1,250 mg) by mouth 2 times daily 900 capsule 3     psyllium (METAMUCIL/KONSYL) 58.6 % powder Take by mouth daily       Semaglutide (RYBELSUS) 7 MG TABS Take 7 mg by mouth daily 90 tablet 3     sulfamethoxazole-trimethoprim (BACTRIM) 400-80 MG tablet Take 1 tablet by mouth daily 90 tablet 3     tacrolimus (GENERIC EQUIVALENT) 0.5 MG capsule Take 1 capsule (0.5 mg) by mouth every evening Total dose = 1 mg in the AM and 0.5 mg in the PM 90 capsule 3     tacrolimus (GENERIC EQUIVALENT) 1 MG capsule Take 1 capsule (1 mg) by mouth every morning Total dose = 1 mg in the AM and 0.5 mg in the PM 90 capsule 3     fish oil-omega-3 fatty acids 1000 MG capsule Take 2 g by mouth daily        Semaglutide (RYBELSUS) 7 MG TABS Take 7 mg by mouth daily (Patient not taking: Reported on 9/22/2022)       valACYclovir (VALTREX) 500 MG tablet Take 1 tablet (500 mg) by mouth daily (Patient taking differently: Take 500 mg by mouth every other day) 90 tablet 1       Allergies  Allergies   Allergen Reactions     Amoxicillin-Pot Clavulanate Nausea and Vomiting         Family History  family history includes Anuerysm in her mother; Hypertension in her mother.    Social History  Social History     Tobacco Use     Smoking status: Former     Packs/day: 1.00     Types:  "Cigarettes     Start date:      Quit date:      Years since quittin.1     Smokeless tobacco: Never   Substance Use Topics     Alcohol use: No     Drug use: No       Physical Exam  BP (!) 166/80 (BP Location: Left arm, Patient Position: Chair, Cuff Size: Adult Regular)   Pulse 75   Ht 1.499 m (4' 11\")   Wt 58.2 kg (128 lb 4.8 oz)   SpO2 99%   BMI 25.91 kg/m    Body mass index is 25.91 kg/m .  GENERAL :  In no apparent distress. She is accompanied by her .  SKIN: Normal color, normal temperature, texture.  No hirsutism, alopecia or purple striae.     EYES: PERRL, EOMI, No scleral icterus,  No proptosis, conjunctival redness, stare, retraction  NECK: No visible masses. No palpable adenopathy, or masses. No carotid bruits.   THYROID:  Normal, nontender, smooth / firm texture,  no nodules, no Bruit   RESP: Lungs clear to auscultation bilaterally  CARDIAC: Regular rate and rhythm, normal S1 S2, without murmurs, rubs or gallops    NEURO: awake, alert, responds appropriately to questions.  Cranial nerves intact.   Moves all extremities; Gait normal.  No tremor of the outstretched hand.    EXTREMITIES: No clubbing, cyanosis or edema.  FOOT EXAM: Strong pedal pulses bilaterally. Sensation intact to monofilament across feet. Dry skin at heels but no evidence of callus formation or skin breakdown.    DATA REVIEW  Blood sugar log copied from pt's log book.  Morning BG readings range from  mg/dl  Evening BG readings range from  mg/dl      "

## 2023-03-03 NOTE — PROGRESS NOTES
"Ethel Jay's goals for this visit include: diabetes  She requests these members of her care team be copied on today's visit information: Yes    PCP: Joann Canales    Referring Provider:  No referring provider defined for this encounter.    BP (!) 166/80 (BP Location: Left arm, Patient Position: Chair, Cuff Size: Adult Regular)   Pulse 75   Ht 1.499 m (4' 11\")   Wt 58.2 kg (128 lb 4.8 oz)   SpO2 99%   BMI 25.91 kg/m      Do you need any medication refills at today's visit? Yes    A1C was 6.4 today    "

## 2023-03-03 NOTE — LETTER
"    3/3/2023         RE: Ethel Thompson  [address]   [place] [address]        Dear Colleague,    Thank you for referring your patient, Ethel Thompson, to the [facility]. Please see a copy of my visit note below.    Outcome for 03/01/23 8:40 AM: prefers to bring meter to the clinic   Vianca Cobos CMA  Adult Endocrinology  [facility] [place]      Ethel Thompson's goals for this visit include: diabetes  She requests these members of her care team be copied on today's visit information: Yes    PCP: Joann Canales    Referring Provider:  No referring provider defined for this encounter.    BP (!) 166/80 (BP Location: Left arm, Patient Position: Chair, Cuff Size: Adult Regular)   Pulse 75   Ht 1.499 m (4' 11\")   Wt 58.2 kg (128 lb 4.8 oz)   SpO2 99%   BMI 25.91 kg/m      Do you need any medication refills at today's visit? Yes    A1C was 6.4 today      Assessment/Plan :   1. Type 2 DM, controlled. We reviewed Ethel's recent laboratory results and her current blood sugar logs. She is doing great. She has noticed some occasional abdominal discomfort after taking Rybelsus but the pain is manageable. At present time she is happy with her blood sugar readings and with her weight. I do not see any reason to change her medications, at this time. She would like us to send in a new prescription for test strips. She likes to have the option of checking twice a day and the current prescription is for once a day only.    2. Hypothyroidism. We reviewed the signs and symptoms of low and excess thyroid hormone. Ethel feels good. She has no complaints of worsening fatigue or anxiousness. It has been almost a year since her last TSH was checked. We will repeat a TSH with reflex to T4 today. I would like to get her on a standard once daily dose. We will adjust her medication, based on the result. I would like to repeat a lab test in a couple months.    We will plan on a follow-up visit in about 6 mos.      I " have independently reviewed and interpreted labs, imaging as indicated.      Chief complaint:  Ethel is a 68 year old female who comes to our office to establish care for the treatment of type 2 diabetes and hypothyroidism.    I have reviewed Care Everywhere including [facility], [facility], [facility], [facility], [facility] , [facility], [place] lab reports, imaging reports and provider notes as indicated.      HISTORY OF PRESENT ILLNESS  Ethel was diagnosed with diabetes about 2 1/2 yrs ago. She underwent a kidney transplant in 2020 and she was diagnosed a few months after the procedure. At the time of diagnosis she was started on metformin 500 mg two tablets daily. She remained stable on metformin alone for about a year and a half. Rybelsus 3 mg tablets were added to the metformin about a year ago. That dose was then increased to 7 mg after a couple months.     At present time she feels like the combination of metformin and Rybelsus is working well. She monitors her blood sugars twice daily with fingerstick testing. Her morning blood sugars are typically around  mg/dl. Towards the end of the day, she will get a few higher readings but nothing over 150 mg/dl.     She has not had any problems with worsening vision and she is up to date on her eye exams. She denies any problems with peripheral neuropathy. She is followed closely by nephrology regarding her kidney function.    Ethel also has hypothyroidism. She has been on levothyroxine for many years. She currently has a complicated dosing scheduled which consists of 1 tablet Monday, Wednesday, Thursday, and Friday, a half tablet on Tuesday and no medication on Saturday or Sunday. She has not had any problems with worsening fatigue, anxiousness, or irritability. She would like to switch to one tablet daily, if possible.     Endocrine relevant labs are as follows:   Latest Reference Range & Units 03/03/23 09:24   Hemoglobin A1C POCT 4.3 -  <5.7 % 6.4      Ref Range & Units 4 mo ago Resulting Agency   CHOLESTEROL,TOTAL 100 - 199 mg/dL 139  [facility]   TRIGLYCERIDES <150 mg/dL 228 [facility]   HDL CHOLESTEROL >40 mg/dL 47  [facility]   NON-HDL CHOLESTEROL <145 mg/dl 92  [facility]   CHOL/HDL RATIO            <4.50                 2.96  [facility]   LDL CHOLESTEROL <=130 mg/dL 46  [facility]   VLDL CHOLESTEROL <=30 mg/dL 46 [facility]       Component 04/04/22  07/16/21  10/26/20  03/03/20  02/20/20  11/29/19    TSH 0.58 4.62 0.03 Low  0.81 1.76 1.44       REVIEW OF SYSTEMS    Endocrine: positive for thyroid disorder and diabetes  Skin: negative  Eyes: negative for, visual blurring  Ears/Nose/Throat: negative  Respiratory: No shortness of breath, dyspnea on exertion, cough, or hemoptysis  Cardiovascular: negative for, irregular heart beat, chest pain and lower extremity edema  Gastrointestinal: positive for dyspepsia and abdominal pain, negative for, nausea, vomiting, constipation and diarrhea  Genitourinary: negative for, nocturia, dysuria, frequency and urgency  Musculoskeletal: negative for, muscular weakness and nocturnal cramping  Neurologic: negative for and numbness or tingling of feet  Psychiatric: negative for, anxiety and depression  Hematologic/Lymphatic/Immunologic: negative    Past Medical History  Past Medical History:   Diagnosis Date     Anemia in chronic kidney disease      Chronic renal failure      Diabetes mellitus, type 2 (H) 1/28/2021     GERD (gastroesophageal reflux disease)      Glomerulonephritis, focal sclerosing      Hepatitis B core antibody positive      Herpes simplex      Hyperlipidemia      Hypertension      Hypothyroidism      Memory deficits      SADIE on CPAP      Secondary hyperparathyroidism (H)         Medications  Current Outpatient Medications   Medication Sig Dispense Refill     acetaminophen (TYLENOL) 325 MG tablet Take 2 tablets (650 mg) by mouth every 4 hours as needed for pain 1 Bottle 0     aspirin (ASA) 81 MG EC tablet Take 1 tablet (81 mg) by mouth daily 90 tablet 3     atorvastatin (LIPITOR) 40 MG tablet Take 0.5 tablets (20 mg) by mouth daily       cholecalciferol (VITAMIN D3) 25 mcg (1000 units) capsule Take 2 capsules (50 mcg) by mouth daily 60 capsule 3     diclofenac (VOLTAREN) 1 % topical gel Apply 2 g topically 4 times daily as needed        famotidine (PEPCID) 20 MG tablet Take 20 mg by mouth every evening        fluticasone (FLONASE) 50 MCG/ACT nasal spray Spray 1 spray into both nostrils 2 times daily as needed for rhinitis or allergies       furosemide (LASIX) 20 MG tablet Take 1 tablet (20 mg) by mouth daily 90 tablet 0     levothyroxine (SYNTHROID/LEVOTHROID) 112 MCG tablet Take 112 mcg (1 tablet) by mouth every Monday, Tuesday is 1/2 tab, 1 tab Wednesday, 1 tab Thursday, tab 1 Friday       Lidocaine (LIDOCARE) 4 % Patch Place 1 patch onto the skin every 24 hours To prevent lidocaine toxicity, patient should be patch free for 12 hrs daily.  Using for back pain       losartan (COZAAR) 100 MG tablet Take 1 tablet (100 mg) by mouth daily 90 tablet 0     magnesium oxide 400 MG CAPS lunch       melatonin 3 MG tablet Take 8 mg by mouth At Bedtime       metFORMIN (GLUCOPHAGE) 500 MG tablet Take 500 mg by mouth 2 times daily (with meals)       mycophenolate (GENERIC EQUIVALENT) 250 MG capsule Take 5 capsules (1,250 mg) by mouth 2 times daily 900 capsule 3     psyllium (METAMUCIL/KONSYL) 58.6 % powder Take by mouth daily       Semaglutide (RYBELSUS) 7 MG TABS Take 7 mg by mouth daily 90 tablet 3     sulfamethoxazole-trimethoprim (BACTRIM) 400-80 MG tablet Take 1 tablet by mouth daily 90 tablet 3     tacrolimus (GENERIC EQUIVALENT) 0.5 MG capsule Take 1 capsule (0.5 mg) by mouth every evening  "Total dose = 1 mg in the AM and 0.5 mg in the PM 90 capsule 3     tacrolimus (GENERIC EQUIVALENT) 1 MG capsule Take 1 capsule (1 mg) by mouth every morning Total dose = 1 mg in the AM and 0.5 mg in the PM 90 capsule 3     fish oil-omega-3 fatty acids 1000 MG capsule Take 2 g by mouth daily        Semaglutide (RYBELSUS) 7 MG TABS Take 7 mg by mouth daily (Patient not taking: Reported on 2022)       valACYclovir (VALTREX) 500 MG tablet Take 1 tablet (500 mg) by mouth daily (Patient taking differently: Take 500 mg by mouth every other day) 90 tablet 1       Allergies  Allergies   Allergen Reactions     Amoxicillin-Pot Clavulanate Nausea and Vomiting         Family History  family history includes Anuerysm in her mother; Hypertension in her mother.    Social History  Social History     Tobacco Use     Smoking status: Former     Packs/day: 1.00     Types: Cigarettes     Start date:      Quit date:      Years since quittin.1     Smokeless tobacco: Never   Substance Use Topics     Alcohol use: No     Drug use: No       Physical Exam  BP (!) 166/80 (BP Location: Left arm, Patient Position: Chair, Cuff Size: Adult Regular)   Pulse 75   Ht 1.499 m (4' 11\")   Wt 58.2 kg (128 lb 4.8 oz)   SpO2 99%   BMI 25.91 kg/m    Body mass index is 25.91 kg/m .  GENERAL :  In no apparent distress. She is accompanied by her .  SKIN: Normal color, normal temperature, texture.  No hirsutism, alopecia or purple striae.     EYES: PERRL, EOMI, No scleral icterus,  No proptosis, conjunctival redness, stare, retraction  NECK: No visible masses. No palpable adenopathy, or masses. No carotid bruits.   THYROID:  Normal, nontender, smooth / firm texture,  no nodules, no Bruit   RESP: Lungs clear to auscultation bilaterally  CARDIAC: Regular rate and rhythm, normal S1 S2, without murmurs, rubs or gallops    NEURO: awake, alert, responds appropriately to questions.  Cranial nerves intact.   Moves all extremities; Gait " normal.  No tremor of the outstretched hand.    EXTREMITIES: No clubbing, cyanosis or edema.  FOOT EXAM: Strong pedal pulses bilaterally. Sensation intact to monofilament across feet. Dry skin at heels but no evidence of callus formation or skin breakdown.    DATA REVIEW  Blood sugar log copied from pt's log book.  Morning BG readings range from  mg/dl  Evening BG readings range from  mg/dl          Again, thank you for allowing me to participate in the care of your patient.        Sincerely,        Ciara Fonseca PA-C

## 2023-03-03 NOTE — PATIENT INSTRUCTIONS
[facility]-Department of Endocrinology  Diabetes Educators:   Bobbi Hoang, RN and Mariela Wood RN  Clinic Nurse: SUN Storm  CMA's: Rosa PARKN: Penny  Scheduling/Clinic phone number : [phone]   Clinic Fax: [phone]  On-Call Endocrine at [facility] (after hours/weekends): [phone] option 4    Please call the number below to schedule your labs.    [facility] [phone]   [facility] [phone]   [place] [phone]   [facility]  [phone]   [facility] [phone]   [place] [phone]   [facility]) [phone]   [facility] Walk-In Only   [place] [phone]   [place] [phone]   [place] [phone]   [place] [phone]     Please reach out to the following centers to schedule your imaging appointment:       Imaging (DEXA, CT, MRI, XRAY)    [facility] ([facility], [place]) [phone]   [facility] ([place], [place]) [phone]   [facility] ([facility]) [phone]   [facility] ([facility]) [phone]     Appointment Reminders:  * Please bring meter with for staff to download  * If you are due ONLY for an A1C, it is scheduled with the nurse and will be done in clinic. You do not need to schedule a lab appointment. Fasting is not required for an A1C.  * Refill request should be submitted to your pharmacy. They will contact clinic for approval.

## 2023-03-08 ENCOUNTER — MYC MEDICAL ADVICE (OUTPATIENT)
Dept: NEPHROLOGY | Facility: CLINIC | Age: 68
End: 2023-03-08
Payer: MEDICARE

## 2023-03-09 ENCOUNTER — TRANSFERRED RECORDS (OUTPATIENT)
Dept: MULTI SPECIALTY CLINIC | Facility: CLINIC | Age: 68
End: 2023-03-09

## 2023-03-09 LAB — RETINOPATHY: NORMAL

## 2023-03-10 DIAGNOSIS — R80.1 PERSISTENT PROTEINURIA: ICD-10-CM

## 2023-03-10 DIAGNOSIS — Z94.0 KIDNEY REPLACED BY TRANSPLANT: ICD-10-CM

## 2023-03-10 DIAGNOSIS — B00.1 COLD SORE: ICD-10-CM

## 2023-03-10 DIAGNOSIS — Z94.0 KIDNEY TRANSPLANTED: ICD-10-CM

## 2023-03-10 RX ORDER — VALACYCLOVIR HYDROCHLORIDE 500 MG/1
500 TABLET, FILM COATED ORAL DAILY
Qty: 30 TABLET | Refills: 0 | Status: SHIPPED | OUTPATIENT
Start: 2023-03-10 | End: 2023-06-22

## 2023-03-13 ENCOUNTER — TELEPHONE (OUTPATIENT)
Dept: TRANSPLANT | Facility: CLINIC | Age: 68
End: 2023-03-13
Payer: MEDICARE

## 2023-03-13 NOTE — TELEPHONE ENCOUNTER
My chart question regarding refilling Valtrex    Message from Dr Arvin Acosta to refill x once, but not future.primary care

## 2023-03-27 ENCOUNTER — LAB (OUTPATIENT)
Dept: LAB | Facility: CLINIC | Age: 68
End: 2023-03-27
Payer: MEDICARE

## 2023-03-27 DIAGNOSIS — D64.9 ANEMIA, UNSPECIFIED TYPE: ICD-10-CM

## 2023-03-27 DIAGNOSIS — Z48.298 AFTERCARE FOLLOWING ORGAN TRANSPLANT: ICD-10-CM

## 2023-03-27 DIAGNOSIS — Z94.0 KIDNEY REPLACED BY TRANSPLANT: ICD-10-CM

## 2023-03-27 DIAGNOSIS — Z79.899 ENCOUNTER FOR LONG-TERM CURRENT USE OF MEDICATION: ICD-10-CM

## 2023-03-27 LAB
ALBUMIN MFR UR ELPH: 14.3 MG/DL (ref 1–14)
ANION GAP SERPL CALCULATED.3IONS-SCNC: 5 MMOL/L (ref 3–14)
BUN SERPL-MCNC: 25 MG/DL (ref 7–30)
CALCIUM SERPL-MCNC: 9.8 MG/DL (ref 8.5–10.1)
CHLORIDE BLD-SCNC: 113 MMOL/L (ref 94–109)
CO2 SERPL-SCNC: 21 MMOL/L (ref 20–32)
CREAT SERPL-MCNC: 0.98 MG/DL (ref 0.52–1.04)
CREAT UR-MCNC: 147 MG/DL
ERYTHROCYTE [DISTWIDTH] IN BLOOD BY AUTOMATED COUNT: 12 % (ref 10–15)
GFR SERPL CREATININE-BSD FRML MDRD: 62 ML/MIN/1.73M2
GLUCOSE BLD-MCNC: 105 MG/DL (ref 70–99)
HCT VFR BLD AUTO: 31.5 % (ref 35–47)
HGB BLD-MCNC: 10.1 G/DL (ref 11.7–15.7)
MCH RBC QN AUTO: 32 PG (ref 26.5–33)
MCHC RBC AUTO-ENTMCNC: 32.1 G/DL (ref 31.5–36.5)
MCV RBC AUTO: 100 FL (ref 78–100)
PLATELET # BLD AUTO: 203 10E3/UL (ref 150–450)
POTASSIUM BLD-SCNC: 4.4 MMOL/L (ref 3.4–5.3)
PROT/CREAT 24H UR: 0.1 MG/MG CR (ref 0–0.2)
RBC # BLD AUTO: 3.16 10E6/UL (ref 3.8–5.2)
SODIUM SERPL-SCNC: 139 MMOL/L (ref 133–144)
TACROLIMUS BLD-MCNC: 4.6 UG/L (ref 5–15)
TME LAST DOSE: ABNORMAL H
TME LAST DOSE: ABNORMAL H
WBC # BLD AUTO: 5.9 10E3/UL (ref 4–11)

## 2023-03-27 PROCEDURE — 85027 COMPLETE CBC AUTOMATED: CPT

## 2023-03-27 PROCEDURE — 80048 BASIC METABOLIC PNL TOTAL CA: CPT

## 2023-03-27 PROCEDURE — 84156 ASSAY OF PROTEIN URINE: CPT

## 2023-03-27 PROCEDURE — 80197 ASSAY OF TACROLIMUS: CPT

## 2023-03-27 PROCEDURE — 83550 IRON BINDING TEST: CPT

## 2023-03-27 PROCEDURE — 83540 ASSAY OF IRON: CPT

## 2023-03-27 PROCEDURE — 36415 COLL VENOUS BLD VENIPUNCTURE: CPT

## 2023-03-30 ENCOUNTER — OFFICE VISIT (OUTPATIENT)
Dept: NEPHROLOGY | Facility: CLINIC | Age: 68
End: 2023-03-30
Attending: INTERNAL MEDICINE
Payer: MEDICARE

## 2023-03-30 VITALS
DIASTOLIC BLOOD PRESSURE: 75 MMHG | WEIGHT: 127 LBS | HEART RATE: 90 BPM | TEMPERATURE: 98.5 F | BODY MASS INDEX: 25.65 KG/M2 | OXYGEN SATURATION: 99 % | SYSTOLIC BLOOD PRESSURE: 135 MMHG

## 2023-03-30 DIAGNOSIS — I15.1 HTN, KIDNEY TRANSPLANT RELATED: Primary | ICD-10-CM

## 2023-03-30 DIAGNOSIS — D64.9 ANEMIA, UNSPECIFIED TYPE: ICD-10-CM

## 2023-03-30 DIAGNOSIS — D84.9 IMMUNOSUPPRESSION (H): ICD-10-CM

## 2023-03-30 DIAGNOSIS — N25.81 SECONDARY HYPERPARATHYROIDISM (H): ICD-10-CM

## 2023-03-30 DIAGNOSIS — R53.83 OTHER FATIGUE: ICD-10-CM

## 2023-03-30 DIAGNOSIS — Z48.298 AFTERCARE FOLLOWING ORGAN TRANSPLANT: ICD-10-CM

## 2023-03-30 DIAGNOSIS — E11.9 NEW ONSET TYPE 2 DIABETES MELLITUS (H): ICD-10-CM

## 2023-03-30 DIAGNOSIS — Z94.0 HTN, KIDNEY TRANSPLANT RELATED: Primary | ICD-10-CM

## 2023-03-30 LAB
IRON SATN MFR SERPL: 28 % (ref 15–46)
IRON SERPL-MCNC: 77 UG/DL (ref 35–180)
TIBC SERPL-MCNC: 273 UG/DL (ref 240–430)

## 2023-03-30 PROCEDURE — G0463 HOSPITAL OUTPT CLINIC VISIT: HCPCS | Performed by: INTERNAL MEDICINE

## 2023-03-30 PROCEDURE — 99214 OFFICE O/P EST MOD 30 MIN: CPT | Performed by: INTERNAL MEDICINE

## 2023-03-30 RX ORDER — LOSARTAN POTASSIUM 100 MG/1
100 TABLET ORAL DAILY
Qty: 90 TABLET | Refills: 3 | Status: SHIPPED | OUTPATIENT
Start: 2023-03-30 | End: 2024-04-03

## 2023-03-30 ASSESSMENT — PAIN SCALES - GENERAL: PAINLEVEL: NO PAIN (0)

## 2023-03-30 NOTE — PROGRESS NOTES
TRANSPLANT NEPHROLOGY CHRONIC POST TRANSPLANT VISIT    Assessment & Plan   # DDKT: Stable   - Baseline Creatinine:  ~ 0.8-1.0   - Proteinuria: Normal (<0.2 grams)   - Date DSA Last Checked: Jan/2023      Latest DSA: No   - BK Viremia: No   - Kidney Tx Biopsy: Mar 03, 2021; Result: No diagnostic evidence of acute rejection.  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  No interstitial fibrosis or tubular atrophy.             Feb 03, 2021; Result: No diagnostic evidence of acute rejection.  Segmental glomerular basement membrane irregularity with remodeling and lamellation.  Type IV collagen alpha5 and alpha2 staining was normal.  This was felt to be donor derived.    # Immunosuppression: Tacrolimus immediate release (goal 4-6) and Mycophenolate mofetil (dose 1250 mg every 12 hours)   - Continue with intensive monitoring of immunosuppression for efficacy and toxicity.   - Changes: No; Patient is on high dose mycophenolate, but only low normal MPA level and no obvious side effects, including normal WBC.    # Infection Prophylaxis:   - PJP: Sulfa/TMP (Bactrim)  - HSV: Valacyclovir (Valtrex)    # Hypertension: Controlled;  Goal BP: < 130/80   - Changes: No    # Diabetes: Controlled (HbA1c <7%) Last HbA1c: 6.3%   - Management as per primary care.    # Anemia in Chronic Renal Disease: Hgb: Stable      JHONATAN: No   - Iron studies: High iron saturation with last check 9/2020; Will recheck iron panel.vitamin b 12 level and folate    # Mineral Bone Disorder:   - Vitamin D; level: Low        On supplement: Yes  - Calcium; level: High normal        On supplement: No    # Electrolytes:   - Potassium; level: Normal        On supplement: No  - Magnesium; level: Not checked recently        On supplement: Yes  - Bicarbonate; level: Normal        On supplement: No    # CAD: Asymptomatic.  Coronary angiogram 4/2022 with mild non  flow-limiting disease.    # MGUS: Last check 2/2021 showed normal kappa/lambda ratio with no monoclonal peak.   - Normal immunofixation and Kappa and lambda.No proteinuria    # GERD: Occasional symptoms on H2 blocker.    # SADIE on CPAP: Patient is compliant.    # Chronic Fatigue: No significant change, but has not really improved since transplant.  TSH was normal.   - check EBV PCR.   - anemia w/up as above    # Skin Cancer Risk:    - Discussed sun protection and recommend regular follow up with Dermatology.    # Hypothyroidism: Continue with the home dose levothyroxine management as per endocrinology    # Medical Compliance: Yes    # COVID-19 Virus Review: Discussed COVID-19 virus and the potential medical risks.  Reviewed preventative health recommendations, including wearing a mask where appropriate.  Recommended COVID vaccination should be up to date with either an initial vaccination or booster shot when appropriate.  Asked the patient to inform the transplant center if they are exposed or diagnosed with this virus.    # COVID Vaccination Up To Date: Yes completed 5 series last covid booster on 9/29/2022    # Transplant History:  Etiology of Kidney Failure: Unknown etiology (Bx with global glomerulosclerosis)  Tx: DDKT  Transplant: 9/3/2020 (Kidney)  Significant changes in immunosuppression: None  Significant transplant-related complications: None    Transplant Office Phone Number: [phone]    Assessment and plan was discussed with the patient and she voiced her understanding and agreement.    Return visit: In 6 months  Marvel Chavarria MD    Chief Complaint   Ms. Thompson is a 68 year old here for kidney transplant and immunosuppression management.    History of Present Illness    Ms. Thompson reports feeling okay overall.  reports persistent fatigue since transplant, w/up so far unrevealing, no night sweats, fevers, chills. Anemia appears to be stable.    Denies any chest pain or shortness of breath with exertion.   "No leg swelling.  She does report taking Lasix as needed for swelling;dry weight is 123 lbs  Appetite is \"so-so,\" but weight is stable.  No diarrhea.  Occasional heartburn symptoms on famotidine.  No fever, sweats or chills,re.  No night sweats.  She also reports that her endocrinologist is managing her levothyroxine    Home BP:  120/80s    Problem List   Patient Active Problem List   Diagnosis    Elevated serum immunoglobulin free light chain level    Dyslipidemia    Hypothyroidism    SADIE on CPAP    Sensorineural hearing loss (SNHL) of both ears    GERD (gastroesophageal reflux disease)    HTN, kidney transplant related    Hepatitis B core antibody positive    Secondary renal hyperparathyroidism (H)    Memory deficits    Kidney replaced by transplant    Immunosuppression (H)    Aftercare following organ transplant    Vitamin D deficiency    Diabetes mellitus, type 2 (H)       Allergies   Allergies   Allergen Reactions    Amoxicillin-Pot Clavulanate Nausea and Vomiting       Medications   Current Outpatient Medications   Medication Sig    acetaminophen (TYLENOL) 325 MG tablet Take 2 tablets (650 mg) by mouth every 4 hours as needed for pain    aspirin (ASA) 81 MG EC tablet Take 1 tablet (81 mg) by mouth daily    atorvastatin (LIPITOR) 40 MG tablet Take 0.5 tablets (20 mg) by mouth daily    cholecalciferol (VITAMIN D3) 25 mcg (1000 units) capsule Take 2 capsules (50 mcg) by mouth daily    diclofenac (VOLTAREN) 1 % topical gel Apply 2 g topically 4 times daily as needed     famotidine (PEPCID) 20 MG tablet Take 20 mg by mouth every evening     fish oil-omega-3 fatty acids 1000 MG capsule Take 2 g by mouth daily     fluticasone (FLONASE) 50 MCG/ACT nasal spray Spray 1 spray into both nostrils 2 times daily as needed for rhinitis or allergies    furosemide (LASIX) 20 MG tablet Take 1 tablet (20 mg) by mouth daily    levothyroxine (SYNTHROID/LEVOTHROID) 112 MCG tablet Take 112 mcg (1 tablet) by mouth every Monday, " Tuesday is 1/2 tab, 1 tab Wednesday, 1 tab Thursday, tab 1 Friday    Lidocaine (LIDOCARE) 4 % Patch Place 1 patch onto the skin every 24 hours To prevent lidocaine toxicity, patient should be patch free for 12 hrs daily.  Using for back pain    losartan (COZAAR) 100 MG tablet Take 1 tablet (100 mg) by mouth daily    magnesium oxide 400 MG CAPS lunch    melatonin 3 MG tablet Take 8 mg by mouth At Bedtime    metFORMIN (GLUCOPHAGE) 500 MG tablet Take 500 mg by mouth 2 times daily (with meals)    mycophenolate (GENERIC EQUIVALENT) 250 MG capsule Take 5 capsules (1,250 mg) by mouth 2 times daily    psyllium (METAMUCIL/KONSYL) 58.6 % powder Take by mouth daily    Semaglutide (RYBELSUS) 7 MG tablet Take 1 tablet (7 mg) by mouth daily    sulfamethoxazole-trimethoprim (BACTRIM) 400-80 MG tablet Take 1 tablet by mouth daily    tacrolimus (GENERIC EQUIVALENT) 0.5 MG capsule Take 1 capsule (0.5 mg) by mouth every evening Total dose = 1 mg in the AM and 0.5 mg in the PM    tacrolimus (GENERIC EQUIVALENT) 1 MG capsule Take 1 capsule (1 mg) by mouth every morning Total dose = 1 mg in the AM and 0.5 mg in the PM    valACYclovir (VALTREX) 500 MG tablet Take 1 tablet (500 mg) by mouth daily    Semaglutide (RYBELSUS) 7 MG TABS Take 7 mg by mouth daily (Patient not taking: Reported on 9/22/2022)     No current facility-administered medications for this visit.     There are no discontinued medications.    Physical Exam   Vital Signs: /75   Pulse 90   Temp 98.5  F (36.9  C) (Oral)   Wt 57.6 kg (127 lb)   SpO2 99%   BMI 25.65 kg/m      GENERAL APPEARANCE: alert and no distress  HENT: mouth without ulcers or lesions  LYMPHATICS: no cervical or supraclavicular nodes  RESP: lungs clear to auscultation - no rales, rhonchi or wheezes  CV: regular rhythm, normal rate, no rub, no murmur  EDEMA: no LE edema bilaterally  ABDOMEN: soft, nondistended, nontender, bowel sounds normal  MS: extremities normal - no gross deformities noted, no  evidence of inflammation in joints, no muscle tenderness  SKIN: no rash  TX KIDNEY: normal  DIALYSIS ACCESS:  LUE AV fistula with good thrill    Data     Renal Latest Ref Rng & Units 3/27/2023 1/26/2023 12/21/2022   SODIUM 133 - 144 mmol/L 139 140 140   Na (external) 135 - 145 mmol/L - - -   K 3.4 - 5.3 mmol/L 4.4 4.4 4.5   K (external) 3.5 - 5.0 mmol/L - - -   Cl 94 - 109 mmol/L 113(H) 109 110(H)   Cl (external) 94 - 109 mmol/L 113(H) 109 110(H)   CO2 20 - 32 mmol/L 21 19(L) 23   CO2 (external) 21 - 31 mmol/L - - -   UREA NITROGEN 7 - 30 mg/dL 25 29 31(H)   BUN (external) 8 - 25 mg/dL - - -   CREATININE 0.52 - 1.04 mg/dL 0.98 0.90 0.73   Cr (external) 0.57 - 1.11 mg/dL - - -   Glucose 70 - 99 mg/dL 105(H) 106(H) 116(H)   Glucose (external) 65 - 100 mg/dL - - -   CALCIUM, TOTAL 8.5 - 10.1 mg/dL 9.8 10.5(H) 9.8   Ca (external) 8.5 - 10.5 mg/dL - - -   MAGNESIUM 1.7 - 2.3 mg/dL - - -   Mg (external) 1.6 - 2.6 mg/dL - - -     Bone Health Latest Ref Rng & Units 9/27/2021 4/19/2021 3/8/2021   PHOSPHORUS 2.5 - 4.5 mg/dL - 3.5 3.9   Phos (external) 2.3 - 4.7 mg/dL - - -   PARATHYROID HORMONE INTACT 18 - 80 pg/mL 106(H) - -   Vit D Def 20 - 75 ug/L 18(L) - -     Heme Latest Ref Rng & Units 3/27/2023 1/26/2023 12/21/2022   WBC 4.0 - 11.0 10e3/uL 5.9 5.3 4.9   WBC (external) 4.5 - 11.0 thou/cu mm - - -   Hgb 11.7 - 15.7 g/dL 10.1(L) 10.8(L) 10.8(L)   Hgb (external) 12.0 - 16.0 g/dL - - -   Plt 150 - 450 10e3/uL 203 231 231   Plt (external) 140 - 440 thou/cu mm - - -   ABSOLUTE NEUTROPHIL 1.6 - 8.3 10e9/L - - -   ABSOLUTE LYMPHOCYTES 0.8 - 5.3 10e9/L - - -   ABSOLUTE MONOCYTES 0.0 - 1.3 10e9/L - - -   ABSOLUTE EOSINOPHILS 0.0 - 0.7 10e9/L - - -   ABSOLUTE BASOPHILS 0.0 - 0.2 10e9/L - - -   ABS IMMATURE GRANULOCYTES 0 - 0.4 10e9/L - - -   ABSOLUTE NUCLEATED RBC - - - -     Liver Latest Ref Rng & Units 1/31/2022 9/27/2021 3/1/2021   AP 40 - 150 U/L 93 123 195(H)   TBili 0.2 - 1.3 mg/dL 0.6 0.4 0.6   BILIRUBIN, DIRECT 0.0 - 0.2  mg/dL 0.2 0.1 0.1   ALT 0 - 50 U/L 32 35 42   AST 0 - 45 U/L 16 25 26   Tot Protein 6.8 - 8.8 g/dL 7.2 7.2 7.0   ALBUMIN 3.4 - 5.0 g/dL 4.1 4.2 3.7     Pancreas Latest Ref Rng & Units 3/3/2023 1/31/2022 9/27/2021   A1C 0.0 - 5.6 % - 6.3(H) 7.6(H)   A1C (POC) 4.3 - <5.7 % 6.4 - -     Iron studies Latest Ref Rng & Units 9/9/2020   Iron 35 - 180 ug/dL 162   IRON SATURATION INDEX 15 - 46 % 92(H)   FERRITIN 8 - 252 ng/mL 1,757(H)     [facility] Txp Virology Latest Ref Rng & Units 9/22/2022 6/28/2021 5/17/2021   CVM DNA Quant - - - -   CMV QUANT IU/ML Not Detected IU/mL Not Detected - -   LOG IU/ML OF CMVQNT <2.1 [Log:IU]/mL - - -   BK Spec - - Plasma Plasma   BK Res BKNEG:BK Virus DNA Not Detected copies/mL - BK Virus DNA Not Detected BK Virus DNA Not Detected   BK Log <2.7 Log copies/mL - Not Calculated Not Calculated   BK Quant Log Ext - - - -   BK Quant Result Ext Negative copies/mL - - -   BK Quant Spec Ext - - - -   EBV CAPSID ANTIBODY IGG 0.0 - 0.8 AI - - -   EBV DNA COPIES/ML Not Detected copies/mL Not Detected - -   EBV DNA LOG OF COPIES <2.7 [Log:copies]/mL - - -   Hep B Core NR:Nonreactive - - -        Recent Labs   Lab Test 12/21/22  0812 01/26/23  0816 03/27/23  0812   DOSTAC 12/20/2022 1/25/2023 3/26/2023   TACROL 4.0* 6.7 4.6*     Recent Labs   Lab Test 07/25/22  0816 08/29/22  0812 09/26/22  0824   DOSMPA 7/24/2022   8:00 PM 8/28/2022   8:00 PM 9/25/2022   8:00 PM   MPACID 2.04 1.59 1.09   MPAG 153.0* 148.6* 129.8*     This patient has been seen and evaluated by me, Jordy Dewitt MD.  I have reviewed the note and agree with plan of care as documented by the fellow.

## 2023-03-30 NOTE — LETTER
3/30/2023       RE: Ethel Thompson  [address]  [place] [address]     Dear Colleague,    Thank you for referring your patient, Ethel Thompson, to the [facility] [place] at [facility]. Please see a copy of my visit note below.    TRANSPLANT NEPHROLOGY CHRONIC POST TRANSPLANT VISIT    Assessment & Plan  # DDKT: Stable   - Baseline Creatinine:  ~ 0.8-1.0   - Proteinuria: Normal (<0.2 grams)   - Date DSA Last Checked: Jan/2023      Latest DSA: No   - BK Viremia: No   - Kidney Tx Biopsy: Mar 03, 2021; Result: No diagnostic evidence of acute rejection.  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  No interstitial fibrosis or tubular atrophy.             Feb 03, 2021; Result: No diagnostic evidence of acute rejection.  Segmental glomerular basement membrane irregularity with remodeling and lamellation.  Type IV collagen alpha5 and alpha2 staining was normal.  This was felt to be donor derived.    # Immunosuppression: Tacrolimus immediate release (goal 4-6) and Mycophenolate mofetil (dose 1250 mg every 12 hours)   - Continue with intensive monitoring of immunosuppression for efficacy and toxicity.   - Changes: No; Patient is on high dose mycophenolate, but only low normal MPA level and no obvious side effects, including normal WBC.    # Infection Prophylaxis:   - PJP: Sulfa/TMP (Bactrim)  - HSV: Valacyclovir (Valtrex)    # Hypertension: Controlled;  Goal BP: < 130/80   - Changes: No    # Diabetes: Controlled (HbA1c <7%) Last HbA1c: 6.3%   - Management as per primary care.    # Anemia in Chronic Renal Disease: Hgb: Stable      JHONATAN: No   - Iron studies: High iron saturation with last check 9/2020; Will recheck iron panel.vitamin b 12 level and folate    # Mineral Bone Disorder:   - Vitamin D; level: Low        On supplement: Yes  - Calcium;  level: High normal        On supplement: No    # Electrolytes:   - Potassium; level: Normal        On supplement: No  - Magnesium; level: Not checked recently        On supplement: Yes  - Bicarbonate; level: Normal        On supplement: No    # CAD: Asymptomatic.  Coronary angiogram 4/2022 with mild non flow-limiting disease.    # MGUS: Last check 2/2021 showed normal kappa/lambda ratio with no monoclonal peak.   - Normal immunofixation and Kappa and lambda.No proteinuria    # GERD: Occasional symptoms on H2 blocker.    # SADIE on CPAP: Patient is compliant.    # Chronic Fatigue: No significant change, but has not really improved since transplant.  TSH was normal.   - check EBV PCR.   - anemia w/up as above    # Skin Cancer Risk:    - Discussed sun protection and recommend regular follow up with Dermatology.    # Hypothyroidism: Continue with the home dose levothyroxine management as per endocrinology    # Medical Compliance: Yes    # COVID-19 Virus Review: Discussed COVID-19 virus and the potential medical risks.  Reviewed preventative health recommendations, including wearing a mask where appropriate.  Recommended COVID vaccination should be up to date with either an initial vaccination or booster shot when appropriate.  Asked the patient to inform the transplant center if they are exposed or diagnosed with this virus.    # COVID Vaccination Up To Date: Yes completed 5 series last covid booster on 9/29/2022    # Transplant History:  Etiology of Kidney Failure: Unknown etiology (Bx with global glomerulosclerosis)  Tx: DDKT  Transplant: 9/3/2020 (Kidney)  Significant changes in immunosuppression: None  Significant transplant-related complications: None    Transplant Office Phone Number: [phone]    Assessment and plan was discussed with the patient and she voiced her understanding and agreement.    Return visit: In 6 months  Marvel Chavarria MD    Chief Complaint  Ms. Thompson is a 68 year old here for kidney transplant  "and immunosuppression management.    History of Present Illness   Ms. Thompson reports feeling okay overall.  reports persistent fatigue since transplant, w/up so far unrevealing, no night sweats, fevers, chills. Anemia appears to be stable.    Denies any chest pain or shortness of breath with exertion.  No leg swelling.  She does report taking Lasix as needed for swelling;dry weight is 123 lbs  Appetite is \"so-so,\" but weight is stable.  No diarrhea.  Occasional heartburn symptoms on famotidine.  No fever, sweats or chills,re.  No night sweats.  She also reports that her endocrinologist is managing her levothyroxine    Home BP:  120/80s    Problem List   Patient Active Problem List   Diagnosis     Elevated serum immunoglobulin free light chain level     Dyslipidemia     Hypothyroidism     SADIE on CPAP     Sensorineural hearing loss (SNHL) of both ears     GERD (gastroesophageal reflux disease)     HTN, kidney transplant related     Hepatitis B core antibody positive     Secondary renal hyperparathyroidism (H)     Memory deficits     Kidney replaced by transplant     Immunosuppression (H)     Aftercare following organ transplant     Vitamin D deficiency     Diabetes mellitus, type 2 (H)       Allergies  Allergies   Allergen Reactions     Amoxicillin-Pot Clavulanate Nausea and Vomiting       Medications  Current Outpatient Medications   Medication Sig     acetaminophen (TYLENOL) 325 MG tablet Take 2 tablets (650 mg) by mouth every 4 hours as needed for pain     aspirin (ASA) 81 MG EC tablet Take 1 tablet (81 mg) by mouth daily     atorvastatin (LIPITOR) 40 MG tablet Take 0.5 tablets (20 mg) by mouth daily     cholecalciferol (VITAMIN D3) 25 mcg (1000 units) capsule Take 2 capsules (50 mcg) by mouth daily     diclofenac (VOLTAREN) 1 % topical gel Apply 2 g topically 4 times daily as needed      famotidine (PEPCID) 20 MG tablet Take 20 mg by mouth every evening      fish oil-omega-3 fatty acids 1000 MG capsule Take 2 g by " mouth daily      fluticasone (FLONASE) 50 MCG/ACT nasal spray Spray 1 spray into both nostrils 2 times daily as needed for rhinitis or allergies     furosemide (LASIX) 20 MG tablet Take 1 tablet (20 mg) by mouth daily     levothyroxine (SYNTHROID/LEVOTHROID) 112 MCG tablet Take 112 mcg (1 tablet) by mouth every Monday, Tuesday is 1/2 tab, 1 tab Wednesday, 1 tab Thursday, tab 1 Friday     Lidocaine (LIDOCARE) 4 % Patch Place 1 patch onto the skin every 24 hours To prevent lidocaine toxicity, patient should be patch free for 12 hrs daily.  Using for back pain     losartan (COZAAR) 100 MG tablet Take 1 tablet (100 mg) by mouth daily     magnesium oxide 400 MG CAPS lunch     melatonin 3 MG tablet Take 8 mg by mouth At Bedtime     metFORMIN (GLUCOPHAGE) 500 MG tablet Take 500 mg by mouth 2 times daily (with meals)     mycophenolate (GENERIC EQUIVALENT) 250 MG capsule Take 5 capsules (1,250 mg) by mouth 2 times daily     psyllium (METAMUCIL/KONSYL) 58.6 % powder Take by mouth daily     Semaglutide (RYBELSUS) 7 MG tablet Take 1 tablet (7 mg) by mouth daily     sulfamethoxazole-trimethoprim (BACTRIM) 400-80 MG tablet Take 1 tablet by mouth daily     tacrolimus (GENERIC EQUIVALENT) 0.5 MG capsule Take 1 capsule (0.5 mg) by mouth every evening Total dose = 1 mg in the AM and 0.5 mg in the PM     tacrolimus (GENERIC EQUIVALENT) 1 MG capsule Take 1 capsule (1 mg) by mouth every morning Total dose = 1 mg in the AM and 0.5 mg in the PM     valACYclovir (VALTREX) 500 MG tablet Take 1 tablet (500 mg) by mouth daily     Semaglutide (RYBELSUS) 7 MG TABS Take 7 mg by mouth daily (Patient not taking: Reported on 9/22/2022)     No current facility-administered medications for this visit.     There are no discontinued medications.    Physical Exam  Vital Signs: /75   Pulse 90   Temp 98.5  F (36.9  C) (Oral)   Wt 57.6 kg (127 lb)   SpO2 99%   BMI 25.65 kg/m      GENERAL APPEARANCE: alert and no distress  HENT: mouth without  ulcers or lesions  LYMPHATICS: no cervical or supraclavicular nodes  RESP: lungs clear to auscultation - no rales, rhonchi or wheezes  CV: regular rhythm, normal rate, no rub, no murmur  EDEMA: no LE edema bilaterally  ABDOMEN: soft, nondistended, nontender, bowel sounds normal  MS: extremities normal - no gross deformities noted, no evidence of inflammation in joints, no muscle tenderness  SKIN: no rash  TX KIDNEY: normal  DIALYSIS ACCESS:  LUE AV fistula with good thrill    Data    Renal Latest Ref Rng & Units 3/27/2023 1/26/2023 12/21/2022   SODIUM 133 - 144 mmol/L 139 140 140   Na (external) 135 - 145 mmol/L - - -   K 3.4 - 5.3 mmol/L 4.4 4.4 4.5   K (external) 3.5 - 5.0 mmol/L - - -   Cl 94 - 109 mmol/L 113(H) 109 110(H)   Cl (external) 94 - 109 mmol/L 113(H) 109 110(H)   CO2 20 - 32 mmol/L 21 19(L) 23   CO2 (external) 21 - 31 mmol/L - - -   UREA NITROGEN 7 - 30 mg/dL 25 29 31(H)   BUN (external) 8 - 25 mg/dL - - -   CREATININE 0.52 - 1.04 mg/dL 0.98 0.90 0.73   Cr (external) 0.57 - 1.11 mg/dL - - -   Glucose 70 - 99 mg/dL 105(H) 106(H) 116(H)   Glucose (external) 65 - 100 mg/dL - - -   CALCIUM, TOTAL 8.5 - 10.1 mg/dL 9.8 10.5(H) 9.8   Ca (external) 8.5 - 10.5 mg/dL - - -   MAGNESIUM 1.7 - 2.3 mg/dL - - -   Mg (external) 1.6 - 2.6 mg/dL - - -     Bone Health Latest Ref Rng & Units 9/27/2021 4/19/2021 3/8/2021   PHOSPHORUS 2.5 - 4.5 mg/dL - 3.5 3.9   Phos (external) 2.3 - 4.7 mg/dL - - -   PARATHYROID HORMONE INTACT 18 - 80 pg/mL 106(H) - -   Vit D Def 20 - 75 ug/L 18(L) - -     Heme Latest Ref Rng & Units 3/27/2023 1/26/2023 12/21/2022   WBC 4.0 - 11.0 10e3/uL 5.9 5.3 4.9   WBC (external) 4.5 - 11.0 thou/cu mm - - -   Hgb 11.7 - 15.7 g/dL 10.1(L) 10.8(L) 10.8(L)   Hgb (external) 12.0 - 16.0 g/dL - - -   Plt 150 - 450 10e3/uL 203 231 231   Plt (external) 140 - 440 thou/cu mm - - -   ABSOLUTE NEUTROPHIL 1.6 - 8.3 10e9/L - - -   ABSOLUTE LYMPHOCYTES 0.8 - 5.3 10e9/L - - -   ABSOLUTE MONOCYTES 0.0 - 1.3 10e9/L - -  -   ABSOLUTE EOSINOPHILS 0.0 - 0.7 10e9/L - - -   ABSOLUTE BASOPHILS 0.0 - 0.2 10e9/L - - -   ABS IMMATURE GRANULOCYTES 0 - 0.4 10e9/L - - -   ABSOLUTE NUCLEATED RBC - - - -     Liver Latest Ref Rng & Units 1/31/2022 9/27/2021 3/1/2021   AP 40 - 150 U/L 93 123 195(H)   TBili 0.2 - 1.3 mg/dL 0.6 0.4 0.6   BILIRUBIN, DIRECT 0.0 - 0.2 mg/dL 0.2 0.1 0.1   ALT 0 - 50 U/L 32 35 42   AST 0 - 45 U/L 16 25 26   Tot Protein 6.8 - 8.8 g/dL 7.2 7.2 7.0   ALBUMIN 3.4 - 5.0 g/dL 4.1 4.2 3.7     Pancreas Latest Ref Rng & Units 3/3/2023 1/31/2022 9/27/2021   A1C 0.0 - 5.6 % - 6.3(H) 7.6(H)   A1C (POC) 4.3 - <5.7 % 6.4 - -     Iron studies Latest Ref Rng & Units 9/9/2020   Iron 35 - 180 ug/dL 162   IRON SATURATION INDEX 15 - 46 % 92(H)   FERRITIN 8 - 252 ng/mL 1,757(H)     [facility] Txp Virology Latest Ref Rng & Units 9/22/2022 6/28/2021 5/17/2021   CVM DNA Quant - - - -   CMV QUANT IU/ML Not Detected IU/mL Not Detected - -   LOG IU/ML OF CMVQNT <2.1 [Log:IU]/mL - - -   BK Spec - - Plasma Plasma   BK Res BKNEG:BK Virus DNA Not Detected copies/mL - BK Virus DNA Not Detected BK Virus DNA Not Detected   BK Log <2.7 Log copies/mL - Not Calculated Not Calculated   BK Quant Log Ext - - - -   BK Quant Result Ext Negative copies/mL - - -   BK Quant Spec Ext - - - -   EBV CAPSID ANTIBODY IGG 0.0 - 0.8 AI - - -   EBV DNA COPIES/ML Not Detected copies/mL Not Detected - -   EBV DNA LOG OF COPIES <2.7 [Log:copies]/mL - - -   Hep B Core NR:Nonreactive - - -        Recent Labs   Lab Test 12/21/22  0812 01/26/23  0816 03/27/23  0812   DOSTAC 12/20/2022 1/25/2023 3/26/2023   TACROL 4.0* 6.7 4.6*     Recent Labs   Lab Test 07/25/22  0816 08/29/22  0812 09/26/22  0824   DOSMPA 7/24/2022   8:00 PM 8/28/2022   8:00 PM 9/25/2022   8:00 PM   MPACID 2.04 1.59 1.09   MPAG 153.0* 148.6* 129.8*     This patient has been seen and evaluated by me, Jordy Dewitt MD.  I have reviewed the note and agree with plan of care as documented by the fellow.      Again,  thank you for allowing me to participate in the care of your patient.      Sincerely,    Jordy Dewitt MD

## 2023-03-30 NOTE — NURSING NOTE
Chief Complaint   Patient presents with     RECHECK       /75   Pulse 90   Temp 98.5  F (36.9  C) (Oral)   Wt 57.6 kg (127 lb)   SpO2 99%   BMI 25.65 kg/m      Bladimir Chua on 3/30/2023 at 2:11 PM

## 2023-03-31 NOTE — PATIENT INSTRUCTIONS
- establish care with a primary care physician    - continue to follow up with endocrine    Transplant Patient Information  Your Post Transplant Coordinator is: Tiffany Paul  You and your care team can contact your transplant coordinator Monday - Friday, 8am - 5pm at [phone] (Option 2 to reach the coordinator or Option 4 to schedule an appointment).  You can also reach your care team online via [employer].  After hours for urgent matters, please call [facility] at [phone].

## 2023-04-04 DIAGNOSIS — Z94.0 KIDNEY REPLACED BY TRANSPLANT: Primary | ICD-10-CM

## 2023-04-04 RX ORDER — SULFAMETHOXAZOLE AND TRIMETHOPRIM 400; 80 MG/1; MG/1
1 TABLET ORAL DAILY
Qty: 90 TABLET | Refills: 3 | Status: SHIPPED | OUTPATIENT
Start: 2023-04-04 | End: 2023-10-27

## 2023-04-15 ENCOUNTER — TELEPHONE (OUTPATIENT)
Dept: TRANSPLANT | Facility: CLINIC | Age: 68
End: 2023-04-15
Payer: MEDICARE

## 2023-04-15 NOTE — TELEPHONE ENCOUNTER
I have placed an order for iron sat ferritin and EBV for add on. If it does not get add on can we please do that lab with the next lab also vitamin B12 level and folate which I already order     Orders placed per Dr Jordy Willis

## 2023-05-30 ENCOUNTER — LAB (OUTPATIENT)
Dept: LAB | Facility: CLINIC | Age: 68
End: 2023-05-30
Payer: MEDICARE

## 2023-05-30 DIAGNOSIS — Z48.298 AFTERCARE FOLLOWING ORGAN TRANSPLANT: ICD-10-CM

## 2023-05-30 DIAGNOSIS — Z79.899 ENCOUNTER FOR LONG-TERM CURRENT USE OF MEDICATION: ICD-10-CM

## 2023-05-30 DIAGNOSIS — R53.83 OTHER FATIGUE: ICD-10-CM

## 2023-05-30 DIAGNOSIS — D64.9 ANEMIA, UNSPECIFIED TYPE: ICD-10-CM

## 2023-05-30 DIAGNOSIS — Z94.0 KIDNEY REPLACED BY TRANSPLANT: ICD-10-CM

## 2023-05-30 LAB
ANION GAP SERPL CALCULATED.3IONS-SCNC: 8 MMOL/L (ref 3–14)
BUN SERPL-MCNC: 26 MG/DL (ref 7–30)
CALCIUM SERPL-MCNC: 9.7 MG/DL (ref 8.5–10.1)
CHLORIDE BLD-SCNC: 112 MMOL/L (ref 94–109)
CO2 SERPL-SCNC: 17 MMOL/L (ref 20–32)
CREAT SERPL-MCNC: 0.94 MG/DL (ref 0.52–1.04)
ERYTHROCYTE [DISTWIDTH] IN BLOOD BY AUTOMATED COUNT: 12.6 % (ref 10–15)
FOLATE SERPL-MCNC: 16.1 NG/ML (ref 4.6–34.8)
GFR SERPL CREATININE-BSD FRML MDRD: 65 ML/MIN/1.73M2
GLUCOSE BLD-MCNC: 107 MG/DL (ref 70–99)
HCT VFR BLD AUTO: 30 % (ref 35–47)
HGB BLD-MCNC: 9.6 G/DL (ref 11.7–15.7)
MCH RBC QN AUTO: 31.4 PG (ref 26.5–33)
MCHC RBC AUTO-ENTMCNC: 32 G/DL (ref 31.5–36.5)
MCV RBC AUTO: 98 FL (ref 78–100)
PLATELET # BLD AUTO: 212 10E3/UL (ref 150–450)
POTASSIUM BLD-SCNC: 4.6 MMOL/L (ref 3.4–5.3)
RBC # BLD AUTO: 3.06 10E6/UL (ref 3.8–5.2)
SODIUM SERPL-SCNC: 137 MMOL/L (ref 133–144)
VIT B12 SERPL-MCNC: 299 PG/ML (ref 232–1245)
WBC # BLD AUTO: 6.3 10E3/UL (ref 4–11)

## 2023-05-30 PROCEDURE — 80048 BASIC METABOLIC PNL TOTAL CA: CPT

## 2023-05-30 PROCEDURE — 82607 VITAMIN B-12: CPT

## 2023-05-30 PROCEDURE — 85027 COMPLETE CBC AUTOMATED: CPT

## 2023-05-30 PROCEDURE — 82746 ASSAY OF FOLIC ACID SERUM: CPT

## 2023-05-30 PROCEDURE — 80197 ASSAY OF TACROLIMUS: CPT

## 2023-05-30 PROCEDURE — 36415 COLL VENOUS BLD VENIPUNCTURE: CPT

## 2023-05-30 PROCEDURE — 87799 DETECT AGENT NOS DNA QUANT: CPT

## 2023-05-31 LAB
EBV DNA # SPEC NAA+PROBE: NOT DETECTED COPIES/ML
TACROLIMUS BLD-MCNC: 4.5 UG/L (ref 5–15)
TME LAST DOSE: ABNORMAL H
TME LAST DOSE: ABNORMAL H

## 2023-06-01 DIAGNOSIS — Z94.0 KIDNEY TRANSPLANTED: Primary | ICD-10-CM

## 2023-06-01 RX ORDER — SODIUM BICARBONATE 650 MG/1
650 TABLET ORAL 3 TIMES DAILY
Qty: 270 TABLET | Refills: 0 | Status: SHIPPED | OUTPATIENT
Start: 2023-06-01 | End: 2023-09-15

## 2023-06-01 NOTE — TELEPHONE ENCOUNTER
Jordy Mohr MD    Low serum bicarb check if diarrhea      Start sodium bicarb 650 mg po tid     Task    Please contact the patient regarding the above         Diarrhea maybe a   reason for low bicarb-

## 2023-06-01 NOTE — TELEPHONE ENCOUNTER
Spoke to patients  Maxim who confirms Low serum bicarb, patient denies diarrhea but has had upset stomach lately.      Patient confirms start sodium bicarb 650 mg po tid

## 2023-06-22 ENCOUNTER — OFFICE VISIT (OUTPATIENT)
Dept: FAMILY MEDICINE | Facility: CLINIC | Age: 68
End: 2023-06-22
Payer: MEDICARE

## 2023-06-22 VITALS
OXYGEN SATURATION: 99 % | HEART RATE: 80 BPM | WEIGHT: 128 LBS | TEMPERATURE: 98.5 F | RESPIRATION RATE: 18 BRPM | HEIGHT: 59 IN | SYSTOLIC BLOOD PRESSURE: 139 MMHG | BODY MASS INDEX: 25.8 KG/M2 | DIASTOLIC BLOOD PRESSURE: 74 MMHG

## 2023-06-22 DIAGNOSIS — G89.29 CHRONIC MIDLINE LOW BACK PAIN WITH RIGHT-SIDED SCIATICA: ICD-10-CM

## 2023-06-22 DIAGNOSIS — M79.641 PAIN IN BOTH HANDS: ICD-10-CM

## 2023-06-22 DIAGNOSIS — E28.39 ESTROGEN DEFICIENCY: ICD-10-CM

## 2023-06-22 DIAGNOSIS — B00.1 COLD SORE: ICD-10-CM

## 2023-06-22 DIAGNOSIS — M54.41 CHRONIC MIDLINE LOW BACK PAIN WITH RIGHT-SIDED SCIATICA: ICD-10-CM

## 2023-06-22 DIAGNOSIS — Z12.11 SCREEN FOR COLON CANCER: ICD-10-CM

## 2023-06-22 DIAGNOSIS — Z94.0 KIDNEY REPLACED BY TRANSPLANT: ICD-10-CM

## 2023-06-22 DIAGNOSIS — E55.9 VITAMIN D DEFICIENCY: ICD-10-CM

## 2023-06-22 DIAGNOSIS — M85.80 OSTEOPENIA, UNSPECIFIED LOCATION: ICD-10-CM

## 2023-06-22 DIAGNOSIS — M79.642 PAIN IN BOTH HANDS: ICD-10-CM

## 2023-06-22 DIAGNOSIS — Z12.31 ENCOUNTER FOR SCREENING MAMMOGRAM FOR BREAST CANCER: ICD-10-CM

## 2023-06-22 DIAGNOSIS — R21 RASH: ICD-10-CM

## 2023-06-22 DIAGNOSIS — E11.29 TYPE 2 DIABETES MELLITUS WITH OTHER DIABETIC KIDNEY COMPLICATION, WITHOUT LONG-TERM CURRENT USE OF INSULIN (H): Primary | ICD-10-CM

## 2023-06-22 DIAGNOSIS — J30.89 SEASONAL ALLERGIC RHINITIS DUE TO OTHER ALLERGIC TRIGGER: ICD-10-CM

## 2023-06-22 PROCEDURE — 99204 OFFICE O/P NEW MOD 45 MIN: CPT | Performed by: FAMILY MEDICINE

## 2023-06-22 RX ORDER — VALACYCLOVIR HYDROCHLORIDE 500 MG/1
500 TABLET, FILM COATED ORAL DAILY
Qty: 90 TABLET | Refills: 3 | Status: SHIPPED | OUTPATIENT
Start: 2023-06-22 | End: 2024-05-01

## 2023-06-22 RX ORDER — FLUTICASONE PROPIONATE 50 MCG
1 SPRAY, SUSPENSION (ML) NASAL 2 TIMES DAILY PRN
Qty: 16 ML | Refills: 11 | Status: SHIPPED | OUTPATIENT
Start: 2023-06-22

## 2023-06-22 RX ORDER — LIDOCAINE 4 G/G
1 PATCH TOPICAL EVERY 24 HOURS
Qty: 1 PATCH | Refills: 11 | Status: SHIPPED | OUTPATIENT
Start: 2023-06-22

## 2023-06-22 RX ORDER — ATORVASTATIN CALCIUM 40 MG/1
20 TABLET, FILM COATED ORAL DAILY
Status: CANCELLED | OUTPATIENT
Start: 2023-06-22

## 2023-06-22 RX ORDER — HYDROCORTISONE 2.5 %
CREAM (GRAM) TOPICAL 2 TIMES DAILY
Qty: 30 G | Refills: 3 | Status: SHIPPED | OUTPATIENT
Start: 2023-06-22

## 2023-06-22 ASSESSMENT — PAIN SCALES - GENERAL: PAINLEVEL: NO PAIN (0)

## 2023-06-22 NOTE — PROGRESS NOTES
"  Assessment & Plan     Type 2 diabetes mellitus with other diabetic kidney complication, without long-term current use of insulin (H)  Per endo    Vitamin D deficiency  Needs refill  - DEXA HIP/PELVIS/SPINE - Future; Future  - cholecalciferol (VITAMIN D3) 25 mcg (1000 units) capsule; Take 2 capsules (50 mcg) by mouth daily    Kidney replaced by transplant  Sees nephrology  - valACYclovir (VALTREX) 500 MG tablet; Take 1 tablet (500 mg) by mouth daily    Cold sore  Refill since recurrent while on immunosuppressants  - valACYclovir (VALTREX) 500 MG tablet; Take 1 tablet (500 mg) by mouth daily    Pain in both hands  Uses as needed  - diclofenac (VOLTAREN) 1 % topical gel; Apply 2 g topically 4 times daily as needed for moderate pain    Seasonal allergic rhinitis due to other allergic trigger  refill  - fluticasone (FLONASE) 50 MCG/ACT nasal spray; Spray 1 spray into both nostrils 2 times daily as needed for rhinitis or allergies    Chronic midline low back pain with right-sided sciatica  refill  - Lidocaine (LIDOCARE) 4 % Patch; Place 1 patch onto the skin every 24 hours To prevent lidocaine toxicity, patient should be patch free for 12 hrs daily. Using for back pain    Rash  refill  - hydrocortisone 2.5 % cream; Apply topically 2 times daily    Osteopenia, unspecified location  Due for dexa  - DEXA HIP/PELVIS/SPINE - Future; Future    Encounter for screening mammogram for breast cancer  scheduled  - *MA Screening Digital Bilateral; Future    Screen for colon cancer  Pt agreeable to cologuard  - COLOGUARD([employer] SCI[employer]ES); Future    Estrogen deficiency    - DEXA HIP/PELVIS/SPINE - Future; Future             BMI:   Estimated body mass index is 25.85 kg/m  as calculated from the following:    Height as of this encounter: 1.499 m (4' 11\").    Weight as of this encounter: 58.1 kg (128 lb).           Ally Dickinson MD  [facility]   Ethel is a 68 year old, presenting for the " "following health issues:  Establish Care (From [facility]/)        6/22/2023     9:11 AM   Additional Questions   Roomed by rachel   Accompanied by , Maxim     History of Present Illness       Reason for visit:  See tong Lee    She eats 2-3 servings of fruits and vegetables daily.She consumes 2 sweetened beverage(s) daily.She exercises with enough effort to increase her heart rate 10 to 19 minutes per day.    She is taking medications regularly.       Sees endocrinology, cardiology, transplant team    Pt her to establish care within the [place] system  Was previously at [facility]  Had [facility] transplant and followed by nephrology  Has diabetes and hypothyroidism and recently established care with endocrinology  Sees cards for htn  Needs refill of some meds not covered by specialist  No particular concerns today.       Review of Systems   Constitutional, HEENT, cardiovascular, pulmonary, gi and gu systems are negative, except as otherwise noted.      Objective    /74   Pulse 80   Temp 98.5  F (36.9  C) (Oral)   Resp 18   Ht 1.499 m (4' 11\")   Wt 58.1 kg (128 lb)   SpO2 99%   BMI 25.85 kg/m    Body mass index is 25.85 kg/m .  Physical Exam   GENERAL: healthy, alert and no distress  RESP: lungs clear to auscultation - no rales, rhonchi or wheezes  CV: regular rate and rhythm, normal S1 S2, no S3 or S4, no murmur, click or rub, no peripheral edema and peripheral pulses strong  ABDOMEN: soft, nontender, no hepatosplenomegaly, no masses and bowel sounds normal  MS: no gross musculoskeletal defects noted, no edema    No results found for this or any previous visit (from the past 24 hour(s)).                "

## 2023-06-29 ENCOUNTER — LAB (OUTPATIENT)
Dept: FAMILY MEDICINE | Facility: CLINIC | Age: 68
End: 2023-06-29
Payer: MEDICARE

## 2023-06-29 DIAGNOSIS — Z12.11 SCREEN FOR COLON CANCER: ICD-10-CM

## 2023-07-10 LAB — NONINV COLON CA DNA+OCC BLD SCRN STL QL: NEGATIVE

## 2023-07-15 ENCOUNTER — HEALTH MAINTENANCE LETTER (OUTPATIENT)
Age: 68
End: 2023-07-15

## 2023-07-19 ENCOUNTER — MYC MEDICAL ADVICE (OUTPATIENT)
Dept: ENDOCRINOLOGY | Facility: CLINIC | Age: 68
End: 2023-07-19
Payer: MEDICARE

## 2023-07-19 ENCOUNTER — MYC MEDICAL ADVICE (OUTPATIENT)
Dept: FAMILY MEDICINE | Facility: CLINIC | Age: 68
End: 2023-07-19
Payer: MEDICARE

## 2023-07-19 DIAGNOSIS — E11.29 TYPE 2 DIABETES MELLITUS WITH OTHER DIABETIC KIDNEY COMPLICATION, WITHOUT LONG-TERM CURRENT USE OF INSULIN (H): Primary | ICD-10-CM

## 2023-07-19 NOTE — TELEPHONE ENCOUNTER
See [facility] message regarding follow up for diabetes. She wants to follow with PCP.    Mariela Wood RN, [facility]

## 2023-07-20 RX ORDER — METFORMIN HCL 500 MG
500 TABLET, EXTENDED RELEASE 24 HR ORAL 2 TIMES DAILY WITH MEALS
Qty: 180 TABLET | Refills: 1 | Status: SHIPPED | OUTPATIENT
Start: 2023-07-20 | End: 2023-11-01

## 2023-07-26 ENCOUNTER — ANCILLARY ORDERS (OUTPATIENT)
Dept: RADIOLOGY | Facility: CLINIC | Age: 68
End: 2023-07-26

## 2023-07-26 ENCOUNTER — ANCILLARY PROCEDURE (OUTPATIENT)
Dept: BONE DENSITY | Facility: CLINIC | Age: 68
End: 2023-07-26
Payer: MEDICARE

## 2023-07-26 ENCOUNTER — ANCILLARY PROCEDURE (OUTPATIENT)
Dept: MAMMOGRAPHY | Facility: CLINIC | Age: 68
End: 2023-07-26
Payer: MEDICARE

## 2023-07-26 DIAGNOSIS — E55.9 VITAMIN D DEFICIENCY: ICD-10-CM

## 2023-07-26 DIAGNOSIS — E28.39 ESTROGEN DEFICIENCY: ICD-10-CM

## 2023-07-26 DIAGNOSIS — M85.80 OSTEOPENIA, UNSPECIFIED LOCATION: ICD-10-CM

## 2023-07-26 DIAGNOSIS — Z12.31 ENCOUNTER FOR SCREENING MAMMOGRAM FOR BREAST CANCER: ICD-10-CM

## 2023-07-26 PROCEDURE — 77080 DXA BONE DENSITY AXIAL: CPT | Performed by: INTERNAL MEDICINE

## 2023-07-26 PROCEDURE — 77067 SCR MAMMO BI INCL CAD: CPT | Mod: TC | Performed by: RADIOLOGY

## 2023-07-31 ENCOUNTER — LAB (OUTPATIENT)
Dept: LAB | Facility: CLINIC | Age: 68
End: 2023-07-31
Payer: MEDICARE

## 2023-07-31 DIAGNOSIS — E11.29 TYPE 2 DIABETES MELLITUS WITH OTHER DIABETIC KIDNEY COMPLICATION, WITHOUT LONG-TERM CURRENT USE OF INSULIN (H): ICD-10-CM

## 2023-07-31 DIAGNOSIS — Z94.0 KIDNEY REPLACED BY TRANSPLANT: ICD-10-CM

## 2023-07-31 DIAGNOSIS — Z48.298 AFTERCARE FOLLOWING ORGAN TRANSPLANT: ICD-10-CM

## 2023-07-31 DIAGNOSIS — Z79.899 ENCOUNTER FOR LONG-TERM CURRENT USE OF MEDICATION: ICD-10-CM

## 2023-07-31 LAB
ANION GAP SERPL CALCULATED.3IONS-SCNC: 10 MMOL/L (ref 7–15)
BUN SERPL-MCNC: 23.2 MG/DL (ref 8–23)
CALCIUM SERPL-MCNC: 10.1 MG/DL (ref 8.8–10.2)
CHLORIDE SERPL-SCNC: 107 MMOL/L (ref 98–107)
CHOLEST SERPL-MCNC: 159 MG/DL
CREAT SERPL-MCNC: 0.93 MG/DL (ref 0.51–0.95)
CREAT UR-MCNC: 102 MG/DL
DEPRECATED HCO3 PLAS-SCNC: 18 MMOL/L (ref 22–29)
ERYTHROCYTE [DISTWIDTH] IN BLOOD BY AUTOMATED COUNT: 12.9 % (ref 10–15)
GFR SERPL CREATININE-BSD FRML MDRD: 66 ML/MIN/1.73M2
GLUCOSE SERPL-MCNC: 98 MG/DL (ref 70–99)
HBA1C MFR BLD: 5.9 % (ref 0–5.6)
HCT VFR BLD AUTO: 29.9 % (ref 35–47)
HDLC SERPL-MCNC: 49 MG/DL
HGB BLD-MCNC: 9.5 G/DL (ref 11.7–15.7)
LDLC SERPL CALC-MCNC: 67 MG/DL
MCH RBC QN AUTO: 31.4 PG (ref 26.5–33)
MCHC RBC AUTO-ENTMCNC: 31.8 G/DL (ref 31.5–36.5)
MCV RBC AUTO: 99 FL (ref 78–100)
MICROALBUMIN UR-MCNC: 14.4 MG/L
MICROALBUMIN/CREAT UR: 14.12 MG/G CR (ref 0–25)
NONHDLC SERPL-MCNC: 110 MG/DL
PLATELET # BLD AUTO: 213 10E3/UL (ref 150–450)
POTASSIUM SERPL-SCNC: 5 MMOL/L (ref 3.4–5.3)
RBC # BLD AUTO: 3.03 10E6/UL (ref 3.8–5.2)
SODIUM SERPL-SCNC: 135 MMOL/L (ref 136–145)
TACROLIMUS BLD-MCNC: 3.3 UG/L (ref 5–15)
TME LAST DOSE: ABNORMAL H
TME LAST DOSE: ABNORMAL H
TRIGL SERPL-MCNC: 217 MG/DL
WBC # BLD AUTO: 5.7 10E3/UL (ref 4–11)

## 2023-07-31 PROCEDURE — 82043 UR ALBUMIN QUANTITATIVE: CPT

## 2023-07-31 PROCEDURE — 85027 COMPLETE CBC AUTOMATED: CPT

## 2023-07-31 PROCEDURE — 36415 COLL VENOUS BLD VENIPUNCTURE: CPT

## 2023-07-31 PROCEDURE — 83036 HEMOGLOBIN GLYCOSYLATED A1C: CPT

## 2023-07-31 PROCEDURE — 82570 ASSAY OF URINE CREATININE: CPT

## 2023-07-31 PROCEDURE — 80048 BASIC METABOLIC PNL TOTAL CA: CPT

## 2023-07-31 PROCEDURE — 80061 LIPID PANEL: CPT

## 2023-07-31 PROCEDURE — 80197 ASSAY OF TACROLIMUS: CPT

## 2023-08-01 DIAGNOSIS — Z94.0 KIDNEY TRANSPLANTED: Primary | ICD-10-CM

## 2023-08-01 DIAGNOSIS — B00.1 COLD SORE: ICD-10-CM

## 2023-08-01 DIAGNOSIS — R80.1 PERSISTENT PROTEINURIA: ICD-10-CM

## 2023-08-01 DIAGNOSIS — Z94.0 KIDNEY REPLACED BY TRANSPLANT: ICD-10-CM

## 2023-08-01 NOTE — TELEPHONE ENCOUNTER
[employer] message sent to patient regarding:  Issue tacrolimus  level 3.3  below goal level  4 to 6       PLAN:   Please call patient and confirm this was an accurate 12-hour trough. Verify Tacrolimus IR dose 1 and 0.5  mg  in pm \. Confirm no new medications or illness. Confirm no missed doses. If accurate trough and accurate dose, increase Tacrolimus IR dose to 1.0 mg BID and repeat labs in 14 days

## 2023-08-01 NOTE — TELEPHONE ENCOUNTER
Issue tacrolimus  level 3.3  below goal level  4 to 6       PLAN:   Please call patient and confirm this was an accurate 12-hour trough. Verify Tacrolimus IR dose 1 and 0.5  mg  in pm \. Confirm no new medications or illness. Confirm no missed doses. If accurate trough and accurate dose, increase Tacrolimus IR dose to 1.0 mg BID and repeat labs in 14 days

## 2023-08-02 NOTE — TELEPHONE ENCOUNTER
Spoke to patient who   confirms this was an accurate 12-hour trough. Verified Tacrolimus IR dose 1 and 0.5  mg  in pm \. Confirmed no new medications or illness. Confirmed no missed doses.  Patient confirms increase Tacrolimus IR dose to 1.0 mg BID and repeat labs in 14 days

## 2023-08-06 RX ORDER — TACROLIMUS 1 MG/1
1 CAPSULE ORAL 2 TIMES DAILY
Qty: 180 CAPSULE | Refills: 3 | Status: SHIPPED | OUTPATIENT
Start: 2023-08-06 | End: 2023-08-21

## 2023-08-06 RX ORDER — TACROLIMUS 0.5 MG/1
CAPSULE ORAL
Qty: 90 CAPSULE | Refills: 3
Start: 2023-08-06 | End: 2023-08-21

## 2023-08-17 ENCOUNTER — LAB (OUTPATIENT)
Dept: LAB | Facility: CLINIC | Age: 68
End: 2023-08-17
Payer: MEDICARE

## 2023-08-17 DIAGNOSIS — Z94.0 KIDNEY TRANSPLANTED: ICD-10-CM

## 2023-08-17 LAB
ANION GAP SERPL CALCULATED.3IONS-SCNC: 11 MMOL/L (ref 7–15)
BUN SERPL-MCNC: 33.5 MG/DL (ref 8–23)
CALCIUM SERPL-MCNC: 10.1 MG/DL (ref 8.8–10.2)
CHLORIDE SERPL-SCNC: 106 MMOL/L (ref 98–107)
CREAT SERPL-MCNC: 1.31 MG/DL (ref 0.51–0.95)
DEPRECATED HCO3 PLAS-SCNC: 21 MMOL/L (ref 22–29)
ERYTHROCYTE [DISTWIDTH] IN BLOOD BY AUTOMATED COUNT: 12.4 % (ref 10–15)
GFR SERPL CREATININE-BSD FRML MDRD: 44 ML/MIN/1.73M2
GLUCOSE SERPL-MCNC: 99 MG/DL (ref 70–99)
HCT VFR BLD AUTO: 29.4 % (ref 35–47)
HGB BLD-MCNC: 9.5 G/DL (ref 11.7–15.7)
MCH RBC QN AUTO: 31.4 PG (ref 26.5–33)
MCHC RBC AUTO-ENTMCNC: 32.3 G/DL (ref 31.5–36.5)
MCV RBC AUTO: 97 FL (ref 78–100)
PLATELET # BLD AUTO: 188 10E3/UL (ref 150–450)
POTASSIUM SERPL-SCNC: 5 MMOL/L (ref 3.4–5.3)
RBC # BLD AUTO: 3.03 10E6/UL (ref 3.8–5.2)
SODIUM SERPL-SCNC: 138 MMOL/L (ref 136–145)
TACROLIMUS BLD-MCNC: 6.9 UG/L (ref 5–15)
TME LAST DOSE: NORMAL H
TME LAST DOSE: NORMAL H
WBC # BLD AUTO: 5.2 10E3/UL (ref 4–11)

## 2023-08-17 PROCEDURE — 85027 COMPLETE CBC AUTOMATED: CPT

## 2023-08-17 PROCEDURE — 36415 COLL VENOUS BLD VENIPUNCTURE: CPT

## 2023-08-17 PROCEDURE — 80048 BASIC METABOLIC PNL TOTAL CA: CPT

## 2023-08-17 PROCEDURE — 80197 ASSAY OF TACROLIMUS: CPT

## 2023-08-18 ENCOUNTER — TELEPHONE (OUTPATIENT)
Dept: TRANSPLANT | Facility: CLINIC | Age: 68
End: 2023-08-18
Payer: MEDICARE

## 2023-08-18 DIAGNOSIS — Z94.0 KIDNEY TRANSPLANTED: Primary | ICD-10-CM

## 2023-08-18 DIAGNOSIS — Z94.0 KIDNEY REPLACED BY TRANSPLANT: ICD-10-CM

## 2023-08-18 DIAGNOSIS — R80.1 PERSISTENT PROTEINURIA: ICD-10-CM

## 2023-08-18 DIAGNOSIS — B00.1 COLD SORE: ICD-10-CM

## 2023-08-18 NOTE — TELEPHONE ENCOUNTER
Called Ethel and Maxim   Denies any symptoms of fever , pain over kidney , BP  - overall feeling well   Some fatigue -  Maybe dehydrated from summer heat      Issue confirmed taking tacrolimus  1.0 mg twice per day    Lowered tacrolimus  1 mg and 0.5   Repeat labs         Jordy Mohr MD      Elevated serum creatinine and recommend usual assessment for fever, recent illness, pain over kidney allograft, blood pressure and volume status  FK true trough 6.9?

## 2023-08-21 RX ORDER — TACROLIMUS 1 MG/1
1 CAPSULE ORAL EVERY MORNING
Qty: 90 CAPSULE | Refills: 3 | Status: SHIPPED | OUTPATIENT
Start: 2023-08-21 | End: 2023-09-13

## 2023-08-21 RX ORDER — TACROLIMUS 0.5 MG/1
0.5 CAPSULE ORAL EVERY EVENING
Qty: 90 CAPSULE | Refills: 3 | Status: SHIPPED | OUTPATIENT
Start: 2023-08-21 | End: 2023-09-13

## 2023-08-30 ENCOUNTER — LAB (OUTPATIENT)
Dept: LAB | Facility: CLINIC | Age: 68
End: 2023-08-30
Payer: MEDICARE

## 2023-08-30 DIAGNOSIS — Z94.0 KIDNEY REPLACED BY TRANSPLANT: ICD-10-CM

## 2023-08-30 DIAGNOSIS — Z94.0 KIDNEY TRANSPLANTED: ICD-10-CM

## 2023-08-30 LAB
ANION GAP SERPL CALCULATED.3IONS-SCNC: 11 MMOL/L (ref 7–15)
BUN SERPL-MCNC: 25.5 MG/DL (ref 8–23)
CALCIUM SERPL-MCNC: 10.2 MG/DL (ref 8.8–10.2)
CHLORIDE SERPL-SCNC: 106 MMOL/L (ref 98–107)
CREAT SERPL-MCNC: 0.99 MG/DL (ref 0.51–0.95)
DEPRECATED HCO3 PLAS-SCNC: 20 MMOL/L (ref 22–29)
ERYTHROCYTE [DISTWIDTH] IN BLOOD BY AUTOMATED COUNT: 12.8 % (ref 10–15)
GFR SERPL CREATININE-BSD FRML MDRD: 61 ML/MIN/1.73M2
GLUCOSE SERPL-MCNC: 100 MG/DL (ref 70–99)
HCT VFR BLD AUTO: 30.2 % (ref 35–47)
HGB BLD-MCNC: 9.6 G/DL (ref 11.7–15.7)
MCH RBC QN AUTO: 31.5 PG (ref 26.5–33)
MCHC RBC AUTO-ENTMCNC: 31.8 G/DL (ref 31.5–36.5)
MCV RBC AUTO: 99 FL (ref 78–100)
PLATELET # BLD AUTO: 211 10E3/UL (ref 150–450)
POTASSIUM SERPL-SCNC: 5.1 MMOL/L (ref 3.4–5.3)
RBC # BLD AUTO: 3.05 10E6/UL (ref 3.8–5.2)
SODIUM SERPL-SCNC: 137 MMOL/L (ref 136–145)
TACROLIMUS BLD-MCNC: 3.9 UG/L (ref 5–15)
TME LAST DOSE: ABNORMAL H
TME LAST DOSE: ABNORMAL H
WBC # BLD AUTO: 5.3 10E3/UL (ref 4–11)

## 2023-08-30 PROCEDURE — 85027 COMPLETE CBC AUTOMATED: CPT

## 2023-08-30 PROCEDURE — 80197 ASSAY OF TACROLIMUS: CPT

## 2023-08-30 PROCEDURE — 80048 BASIC METABOLIC PNL TOTAL CA: CPT

## 2023-08-30 PROCEDURE — 80180 DRUG SCRN QUAN MYCOPHENOLATE: CPT

## 2023-08-30 PROCEDURE — 36415 COLL VENOUS BLD VENIPUNCTURE: CPT

## 2023-08-31 LAB
MYCOPHENOLATE SERPL LC/MS/MS-MCNC: 1.22 MG/L (ref 1–3.5)
MYCOPHENOLATE-G SERPL LC/MS/MS-MCNC: 188.3 MG/L (ref 30–95)
TME LAST DOSE: ABNORMAL H
TME LAST DOSE: ABNORMAL H

## 2023-09-01 ENCOUNTER — TELEPHONE (OUTPATIENT)
Dept: TRANSPLANT | Facility: CLINIC | Age: 68
End: 2023-09-01
Payer: MEDICARE

## 2023-09-01 DIAGNOSIS — Z94.0 KIDNEY REPLACED BY TRANSPLANT: Primary | ICD-10-CM

## 2023-09-13 ENCOUNTER — LAB (OUTPATIENT)
Dept: LAB | Facility: CLINIC | Age: 68
End: 2023-09-13
Payer: MEDICARE

## 2023-09-13 DIAGNOSIS — Z94.0 KIDNEY REPLACED BY TRANSPLANT: ICD-10-CM

## 2023-09-13 DIAGNOSIS — R80.1 PERSISTENT PROTEINURIA: ICD-10-CM

## 2023-09-13 DIAGNOSIS — B00.1 COLD SORE: ICD-10-CM

## 2023-09-13 DIAGNOSIS — Z94.0 KIDNEY TRANSPLANTED: ICD-10-CM

## 2023-09-13 DIAGNOSIS — Z94.0 KIDNEY TRANSPLANTED: Primary | ICD-10-CM

## 2023-09-13 LAB
ANION GAP SERPL CALCULATED.3IONS-SCNC: 14 MMOL/L (ref 7–15)
BUN SERPL-MCNC: 24 MG/DL (ref 8–23)
CALCIUM SERPL-MCNC: 10.1 MG/DL (ref 8.8–10.2)
CHLORIDE SERPL-SCNC: 108 MMOL/L (ref 98–107)
CREAT SERPL-MCNC: 0.92 MG/DL (ref 0.51–0.95)
DEPRECATED HCO3 PLAS-SCNC: 18 MMOL/L (ref 22–29)
EGFRCR SERPLBLD CKD-EPI 2021: 67 ML/MIN/1.73M2
ERYTHROCYTE [DISTWIDTH] IN BLOOD BY AUTOMATED COUNT: 12.8 % (ref 10–15)
GLUCOSE SERPL-MCNC: 99 MG/DL (ref 70–99)
HCT VFR BLD AUTO: 29.4 % (ref 35–47)
HGB BLD-MCNC: 9.6 G/DL (ref 11.7–15.7)
MCH RBC QN AUTO: 32.2 PG (ref 26.5–33)
MCHC RBC AUTO-ENTMCNC: 32.7 G/DL (ref 31.5–36.5)
MCV RBC AUTO: 99 FL (ref 78–100)
PLATELET # BLD AUTO: 217 10E3/UL (ref 150–450)
POTASSIUM SERPL-SCNC: 4.8 MMOL/L (ref 3.4–5.3)
RBC # BLD AUTO: 2.98 10E6/UL (ref 3.8–5.2)
SODIUM SERPL-SCNC: 140 MMOL/L (ref 136–145)
TACROLIMUS BLD-MCNC: 4.7 UG/L (ref 5–15)
TME LAST DOSE: ABNORMAL H
TME LAST DOSE: ABNORMAL H
WBC # BLD AUTO: 5.4 10E3/UL (ref 4–11)

## 2023-09-13 PROCEDURE — 80048 BASIC METABOLIC PNL TOTAL CA: CPT

## 2023-09-13 PROCEDURE — 36415 COLL VENOUS BLD VENIPUNCTURE: CPT

## 2023-09-13 PROCEDURE — 80197 ASSAY OF TACROLIMUS: CPT

## 2023-09-13 PROCEDURE — 80180 DRUG SCRN QUAN MYCOPHENOLATE: CPT

## 2023-09-13 PROCEDURE — 85027 COMPLETE CBC AUTOMATED: CPT

## 2023-09-13 RX ORDER — TACROLIMUS 1 MG/1
1 CAPSULE ORAL EVERY MORNING
Qty: 90 CAPSULE | Refills: 3 | Status: SHIPPED | OUTPATIENT
Start: 2023-09-13 | End: 2024-08-19

## 2023-09-13 RX ORDER — TACROLIMUS 0.5 MG/1
0.5 CAPSULE ORAL EVERY EVENING
Qty: 90 CAPSULE | Refills: 3 | Status: SHIPPED | OUTPATIENT
Start: 2023-09-13 | End: 2024-08-19

## 2023-09-13 NOTE — TELEPHONE ENCOUNTER
Chronic problem, stable, controlled on fluoxetine 20 mg daily, she does use propranolol occasionally for anxiety symptoms.     Updated RX for tacrolimus  For 90 days    Spoke to Angelica regarding the RX

## 2023-09-15 DIAGNOSIS — Z94.0 KIDNEY TRANSPLANTED: Primary | ICD-10-CM

## 2023-09-15 RX ORDER — SODIUM BICARBONATE 650 MG/1
650 TABLET ORAL 3 TIMES DAILY
Qty: 270 TABLET | Refills: 3 | Status: SHIPPED | OUTPATIENT
Start: 2023-09-15 | End: 2024-05-03

## 2023-09-19 LAB
MYCOPHENOLATE SERPL LC/MS/MS-MCNC: 1.05 MG/L (ref 1–3.5)
MYCOPHENOLATE-G SERPL LC/MS/MS-MCNC: 177.2 MG/L (ref 30–95)
TME LAST DOSE: ABNORMAL H
TME LAST DOSE: ABNORMAL H

## 2023-09-22 ENCOUNTER — TELEPHONE (OUTPATIENT)
Dept: TRANSPLANT | Facility: CLINIC | Age: 68
End: 2023-09-22
Payer: MEDICARE

## 2023-09-22 DIAGNOSIS — Z48.298 AFTERCARE FOLLOWING ORGAN TRANSPLANT: ICD-10-CM

## 2023-09-22 DIAGNOSIS — Z94.0 KIDNEY REPLACED BY TRANSPLANT: Primary | ICD-10-CM

## 2023-09-22 DIAGNOSIS — Z79.899 ENCOUNTER FOR LONG-TERM CURRENT USE OF MEDICATION: ICD-10-CM

## 2023-09-22 NOTE — LETTER
OUTPATIENT LABORATORY TEST ORDER     Patient Name: Ethel Thompson   YOB: 1955     [facility] MR# [if applicable]: [id number]   Date & Time: September 22, 2023  2:41 PM  Expiration Date: 1 year after date issued      Diagnoses: Kidney Transplant (ICD-10 Z94.0)   Long term use of medications (ICD-10 Z79.899)             Contact with or exposure to viral disease (Z20.828)            Aftercare following organ transplant (Z48.298)   Other specified postprocedural states (Z98.890)     We ask your assistance in obtaining the following laboratory tests, which are part of our routine surveillance program for Solid Organ Transplant patients.     Please fax each result to [phone], same day as resulted/available    Critical lab results page [phone]  Monday - Friday 8 am to 5 pm  Evening/Weekend/Holiday communicate Critical labs results [phone]    Every other month   CBC with platelets  Basic Metabolic Panel (Sodium, Potassium, Chloride, CO2, Creatinine, Urea Nitrogen, glucose, Calcium)  Tacrolimus drug level - 12-hour trough, please document time of last dose      Every 6 Months   Urine for protein/creatinine    At 3 years post-transplant (Due at next lab draw)  PRA/DSA level (mailers provided by the patient)    If you have any questions, please call [facility]- [phone] or [phone], Fax- [phone].      Jordy Dewitt MD

## 2023-09-22 NOTE — TELEPHONE ENCOUNTER
3 years post kidney transplant          Please update lab orders in epic and send a copy [facility] patient [email or web address]  Dr Jordy Willis       Confirmed follow up appointment  with Dr Jordy Willis

## 2023-09-25 ENCOUNTER — LAB (OUTPATIENT)
Dept: LAB | Facility: CLINIC | Age: 68
End: 2023-09-25
Payer: MEDICARE

## 2023-09-25 DIAGNOSIS — Z48.298 AFTERCARE FOLLOWING ORGAN TRANSPLANT: ICD-10-CM

## 2023-09-25 DIAGNOSIS — Z94.0 KIDNEY REPLACED BY TRANSPLANT: ICD-10-CM

## 2023-09-25 DIAGNOSIS — Z79.899 ENCOUNTER FOR LONG-TERM CURRENT USE OF MEDICATION: ICD-10-CM

## 2023-09-25 LAB
ALBUMIN MFR UR ELPH: 8 MG/DL
ANION GAP SERPL CALCULATED.3IONS-SCNC: 11 MMOL/L (ref 7–15)
BUN SERPL-MCNC: 31.2 MG/DL (ref 8–23)
CALCIUM SERPL-MCNC: 10.1 MG/DL (ref 8.8–10.2)
CHLORIDE SERPL-SCNC: 106 MMOL/L (ref 98–107)
CREAT SERPL-MCNC: 0.93 MG/DL (ref 0.51–0.95)
CREAT UR-MCNC: 77.3 MG/DL
DEPRECATED HCO3 PLAS-SCNC: 19 MMOL/L (ref 22–29)
EGFRCR SERPLBLD CKD-EPI 2021: 66 ML/MIN/1.73M2
ERYTHROCYTE [DISTWIDTH] IN BLOOD BY AUTOMATED COUNT: 12.3 % (ref 10–15)
GLUCOSE SERPL-MCNC: 102 MG/DL (ref 70–99)
HCT VFR BLD AUTO: 28.8 % (ref 35–47)
HGB BLD-MCNC: 9.4 G/DL (ref 11.7–15.7)
MCH RBC QN AUTO: 32 PG (ref 26.5–33)
MCHC RBC AUTO-ENTMCNC: 32.6 G/DL (ref 31.5–36.5)
MCV RBC AUTO: 98 FL (ref 78–100)
PLATELET # BLD AUTO: 199 10E3/UL (ref 150–450)
POTASSIUM SERPL-SCNC: 4.9 MMOL/L (ref 3.4–5.3)
PROT/CREAT 24H UR: 0.1 MG/MG CR (ref 0–0.2)
RBC # BLD AUTO: 2.94 10E6/UL (ref 3.8–5.2)
SODIUM SERPL-SCNC: 136 MMOL/L (ref 136–145)
TACROLIMUS BLD-MCNC: 4.8 UG/L (ref 5–15)
TME LAST DOSE: ABNORMAL H
TME LAST DOSE: ABNORMAL H
WBC # BLD AUTO: 6.4 10E3/UL (ref 4–11)

## 2023-09-25 PROCEDURE — 84156 ASSAY OF PROTEIN URINE: CPT

## 2023-09-25 PROCEDURE — 36415 COLL VENOUS BLD VENIPUNCTURE: CPT

## 2023-09-25 PROCEDURE — 82306 VITAMIN D 25 HYDROXY: CPT

## 2023-09-25 PROCEDURE — 85027 COMPLETE CBC AUTOMATED: CPT

## 2023-09-25 PROCEDURE — 80048 BASIC METABOLIC PNL TOTAL CA: CPT

## 2023-09-25 PROCEDURE — 86833 HLA CLASS II HIGH DEFIN QUAL: CPT

## 2023-09-25 PROCEDURE — 80197 ASSAY OF TACROLIMUS: CPT

## 2023-09-25 PROCEDURE — 86832 HLA CLASS I HIGH DEFIN QUAL: CPT

## 2023-09-28 ENCOUNTER — OFFICE VISIT (OUTPATIENT)
Dept: TRANSPLANT | Facility: CLINIC | Age: 68
End: 2023-09-28
Attending: INTERNAL MEDICINE
Payer: MEDICARE

## 2023-09-28 ENCOUNTER — TELEPHONE (OUTPATIENT)
Dept: TRANSPLANT | Facility: CLINIC | Age: 68
End: 2023-09-28

## 2023-09-28 VITALS
OXYGEN SATURATION: 99 % | HEART RATE: 82 BPM | WEIGHT: 130.2 LBS | DIASTOLIC BLOOD PRESSURE: 71 MMHG | TEMPERATURE: 97.9 F | SYSTOLIC BLOOD PRESSURE: 128 MMHG | BODY MASS INDEX: 26.3 KG/M2

## 2023-09-28 DIAGNOSIS — Z48.298 AFTERCARE FOLLOWING ORGAN TRANSPLANT: ICD-10-CM

## 2023-09-28 DIAGNOSIS — R80.1 PERSISTENT PROTEINURIA: ICD-10-CM

## 2023-09-28 DIAGNOSIS — D84.9 IMMUNOSUPPRESSION (H): ICD-10-CM

## 2023-09-28 DIAGNOSIS — B00.1 COLD SORE: ICD-10-CM

## 2023-09-28 DIAGNOSIS — E11.21 TYPE 2 DIABETES MELLITUS WITH DIABETIC NEPHROPATHY, WITHOUT LONG-TERM CURRENT USE OF INSULIN (H): ICD-10-CM

## 2023-09-28 DIAGNOSIS — Z94.0 KIDNEY REPLACED BY TRANSPLANT: Primary | ICD-10-CM

## 2023-09-28 DIAGNOSIS — Z94.0 HTN, KIDNEY TRANSPLANT RELATED: ICD-10-CM

## 2023-09-28 DIAGNOSIS — I15.1 HTN, KIDNEY TRANSPLANT RELATED: ICD-10-CM

## 2023-09-28 DIAGNOSIS — Z94.0 KIDNEY TRANSPLANTED: ICD-10-CM

## 2023-09-28 DIAGNOSIS — N25.81 SECONDARY RENAL HYPERPARATHYROIDISM (H): ICD-10-CM

## 2023-09-28 DIAGNOSIS — E55.9 VITAMIN D DEFICIENCY: ICD-10-CM

## 2023-09-28 LAB — VIT D+METAB SERPL-MCNC: 40 NG/ML (ref 20–50)

## 2023-09-28 PROCEDURE — G0463 HOSPITAL OUTPT CLINIC VISIT: HCPCS | Performed by: INTERNAL MEDICINE

## 2023-09-28 PROCEDURE — 99215 OFFICE O/P EST HI 40 MIN: CPT | Performed by: INTERNAL MEDICINE

## 2023-09-28 RX ORDER — FUROSEMIDE 20 MG
20 TABLET ORAL PRN
Qty: 90 TABLET | Refills: 0 | Status: SHIPPED | OUTPATIENT
Start: 2023-09-28 | End: 2024-05-01

## 2023-09-28 RX ORDER — MYCOPHENOLATE MOFETIL 250 MG/1
1250 CAPSULE ORAL 2 TIMES DAILY
Qty: 900 CAPSULE | Refills: 3 | Status: SHIPPED | OUTPATIENT
Start: 2023-09-28 | End: 2023-09-28

## 2023-09-28 RX ORDER — MYCOPHENOLATE MOFETIL 250 MG/1
1250 CAPSULE ORAL 2 TIMES DAILY
Qty: 900 CAPSULE | Refills: 3 | Status: SHIPPED | OUTPATIENT
Start: 2023-09-28 | End: 2024-04-26

## 2023-09-28 RX ORDER — FUROSEMIDE 20 MG
20 TABLET ORAL DAILY
Qty: 90 TABLET | Refills: 0 | Status: SHIPPED | OUTPATIENT
Start: 2023-09-28 | End: 2023-09-28

## 2023-09-28 ASSESSMENT — PAIN SCALES - GENERAL: PAINLEVEL: NO PAIN (0)

## 2023-09-28 NOTE — NURSING NOTE
Chief Complaint   Patient presents with    RECHECK     6 mo f/u       /71   Pulse 82   Temp 97.9  F (36.6  C) (Oral)   Wt 59.1 kg (130 lb 3.2 oz)   SpO2 99%   BMI 26.30 kg/m      Bladimir Chua on 9/28/2023 at 12:42 PM

## 2023-09-28 NOTE — TELEPHONE ENCOUNTER
Patient Call: General  Route to LPN    Reason for call: Charisse from [facility] lab calling to inform team that the add on lab for Parathyroid Hormone can't be ran due to the sample was spun within 2 hrs of the add on. Any questions please feel free to call.    Call back needed? Yes    Return Call Needed  Same as documented in contacts section  When to return call?: Same day: Route High Priority

## 2023-09-28 NOTE — PATIENT INSTRUCTIONS
Take sodium bicarbonate 1 tab 3 times daily.If your next labs show your level is low, we may increase your dose to 2 tabs twice daily    Avoid over the counter calcium supplement    You can use famotidine 20 mg po prn heartburn    Schedule Flu, COVID, and RSV vaccines    Transplant Patient Information  Your Post Transplant Coordinator is: Tiffany Paul  You and your care team can contact your transplant coordinator Monday - Friday, 8am - 5pm at [phone] (Option 2 to reach the coordinator or Option 4 to schedule an appointment).  You can also reach your care team online via [employer].  After hours for urgent matters, please call [facility] at [phone].

## 2023-09-28 NOTE — PROGRESS NOTES
TRANSPLANT NEPHROLOGY CHRONIC POST TRANSPLANT VISIT    Assessment & Plan   # DDKT: Stable   - Baseline Creatinine:  ~ 0.8-1.0   - Proteinuria: Normal (<0.2 grams)   - Date DSA Last Checked: Jan/2023      Latest DSA: No   - BK Viremia: No   - Kidney Tx Biopsy: Mar 03, 2021; Result: No diagnostic evidence of acute rejection.  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  No interstitial fibrosis or tubular atrophy.             Feb 03, 2021; Result: No diagnostic evidence of acute rejection.  Segmental glomerular basement membrane irregularity with remodeling and lamellation.  Type IV collagen alpha5 and alpha2 staining was normal.  This was felt to be donor derived.    # Immunosuppression: Tacrolimus immediate release (goal 4-6) and Mycophenolate mofetil (dose 1250 mg every 12 hours)   - Continue with intensive monitoring of immunosuppression for efficacy and toxicity.   - Changes: No; Patient is on high dose mycophenolate, but only low normal MPA level and no obvious side effects, including normal WBC and no GI side effects   - Note: current FK 1/0.5 alternating with 1/1 due to variable levels, she prefers to stay on tabs and avoid switching to liquid tacrolimus    # Infection Prophylaxis:   - PJP: Sulfa/TMP (Bactrim) add CD4 count to next labs  - HSV: Valacyclovir (Valtrex)    # Hypertension: Controlled;  Goal BP: < 130/80   - Changes: No    # Diabetes: Controlled (HbA1c <7%) Last HbA1c: 6.3%   - Management as per primary care.    # Anemia in Chronic Renal Disease: Hgb: Stable      JHONATAN: No   - Iron studies: High iron saturation     # Mineral Bone Disorder:   - Vitamin D; level: Low        On supplement: Yes cholecalciferol 50mcg po every day, update 25-OH vit D & PTH  - Calcium; level: High normal        On supplement: No, avoid TUMS    # Electrolytes:   - Potassium; level: Normal        On supplement: No  -  Magnesium; level: Not checked recently        On supplement: Yes  - Bicarbonate; level: low        On supplement: yes sodium bicarb 650 mg po tid, misses sometimes-will increase the dose to 1300 mg po bid    # CAD: Asymptomatic.  Coronary angiogram 4/2022 with mild non flow-limiting disease.    # Chronic fatigue:   - neg EBV, CMV pcr, nl TSH   - cardiac w/up as above    - anemia Hb near goal    # GERD: Occasional symptoms on H2 blocker prn.    # SADIE on CPAP: Patient is compliant.    # Skin Cancer Risk:    - Discussed sun protection and recommend regular follow up with Dermatology.    # Hypothyroidism: Continue with the home dose levothyroxine management as per endocrinology    # Medical Compliance: Yes    # Vaccinations:update flu, COVID, RSV    # Transplant History:  Etiology of Kidney Failure: Unknown etiology (Bx with global glomerulosclerosis)  Tx: DDKT  Transplant: 9/3/2020 (Kidney)  Significant changes in immunosuppression: None  Significant transplant-related complications: None    Transplant Office Phone Number: [phone]    Assessment and plan was discussed with the patient and she voiced her understanding and agreement.    Return visit: 1 year    Jordy Dewitt MD    Chief Complaint   Ms. Thompson is a 68 year old here for kidney transplant and immunosuppression management.    History of Present Illness    Feels good overall. She reports persistent chronic fatigue since transplant but this has not changed and prior workup has been neg for infections (EBV, CMV), thyroid d/o, or acute cardiac issues. She has mild non obstructive CAD and follows regularly with cards. She is bothered by weight gain though overall fluctuates+_5 lbs and uses lasix 20 mg prn for leg swelling. She denies any urinary symptoms. She reports loose stools a couple days ago, and uses fibers daily-metamucil which helps.  She reports some heartburn and occasionally uses TUMS. No recent illness or hospitalization    IS FK 1/0.5  (alternating with 1/1); [id number]     Home BP:  120/80s  New PCP:     Problem List   Patient Active Problem List   Diagnosis    Elevated serum immunoglobulin free light chain level    Dyslipidemia    Hypothyroidism    SADIE on CPAP    Sensorineural hearing loss (SNHL) of both ears    GERD (gastroesophageal reflux disease)    HTN, kidney transplant related    Hepatitis B core antibody positive    Secondary renal hyperparathyroidism (H)    Memory deficits    Kidney transplanted    Immunosuppression (H)    Aftercare following organ transplant    Vitamin D deficiency    Diabetes mellitus, type 2 (H)    Seasonal allergic rhinitis due to other allergic trigger    Chronic midline low back pain with right-sided sciatica    Osteopenia, unspecified location    Cold sore       Allergies   Allergies   Allergen Reactions    Amoxicillin-Pot Clavulanate Nausea and Vomiting       Medications   Current Outpatient Medications   Medication Sig    acetaminophen (TYLENOL) 325 MG tablet Take 2 tablets (650 mg) by mouth every 4 hours as needed for pain    aspirin (ASA) 81 MG EC tablet Take 1 tablet (81 mg) by mouth daily    atorvastatin (LIPITOR) 40 MG tablet Take 0.5 tablets (20 mg) by mouth daily    cholecalciferol (VITAMIN D3) 25 mcg (1000 units) capsule Take 2 capsules (50 mcg) by mouth daily    diclofenac (VOLTAREN) 1 % topical gel Apply 2 g topically 4 times daily as needed for moderate pain    famotidine (PEPCID) 20 MG tablet Take 20 mg by mouth every evening     fluticasone (FLONASE) 50 MCG/ACT nasal spray Spray 1 spray into both nostrils 2 times daily as needed for rhinitis or allergies    furosemide (LASIX) 20 MG tablet Take 1 tablet (20 mg) by mouth daily    hydrocortisone 2.5 % cream Apply topically 2 times daily    levothyroxine (SYNTHROID/LEVOTHROID) 112 MCG tablet Take 112 mcg (1 tablet) by mouth every Monday, Tuesday is 1/2 tab, 1 tab Wednesday, 1 tab Thursday, tab 1 Friday    Lidocaine (LIDOCARE) 4 % Patch Place  1 patch onto the skin every 24 hours To prevent lidocaine toxicity, patient should be patch free for 12 hrs daily. Using for back pain    losartan (COZAAR) 100 MG tablet Take 1 tablet (100 mg) by mouth daily    magnesium oxide 400 MG CAPS lunch    melatonin 3 MG tablet Take 6 mg by mouth At Bedtime    metFORMIN (GLUCOPHAGE XR) 500 MG 24 hr tablet Take 1 tablet (500 mg) by mouth 2 times daily (with meals)    mycophenolate (GENERIC EQUIVALENT) 250 MG capsule Take 5 capsules (1,250 mg) by mouth 2 times daily    psyllium (METAMUCIL/KONSYL) 58.6 % powder Take by mouth daily    Semaglutide (RYBELSUS) 7 MG tablet Take 1 tablet (7 mg) by mouth daily    sodium bicarbonate 650 MG tablet Take 1 tablet (650 mg) by mouth 3 times daily    sulfamethoxazole-trimethoprim (BACTRIM) 400-80 MG tablet Take 1 tablet by mouth daily    tacrolimus (GENERIC EQUIVALENT) 0.5 MG capsule Take 1 capsule (0.5 mg) by mouth every evening    tacrolimus (GENERIC EQUIVALENT) 1 MG capsule Take 1 capsule (1 mg) by mouth every morning    valACYclovir (VALTREX) 500 MG tablet Take 1 tablet (500 mg) by mouth daily    fish oil-omega-3 fatty acids 1000 MG capsule Take 2 g by mouth daily  (Patient not taking: Reported on 6/22/2023)    metFORMIN (GLUCOPHAGE) 500 MG tablet Take 500 mg by mouth 2 times daily (with meals) (Patient not taking: Reported on 9/28/2023)     No current facility-administered medications for this visit.     There are no discontinued medications.    Physical Exam   Vital Signs: There were no vitals taken for this visit.    GENERAL APPEARANCE: alert and no distress  HENT: mouth without ulcers or lesions  LYMPHATICS: no cervical or supraclavicular nodes  RESP: lungs clear to auscultation - no rales, rhonchi or wheezes  CV: regular rhythm, normal rate, no rub, no murmur  EDEMA: no LE edema bilaterally  ABDOMEN: soft, nondistended, nontender, bowel sounds normal  MS: extremities normal - no gross deformities noted, no evidence of inflammation  in joints, no muscle tenderness  SKIN: no rash  TX KIDNEY: normal  DIALYSIS ACCESS:  LUE AV fistula with good thrill    Data         Latest Ref Rng & Units 9/25/2023     8:17 AM 9/13/2023     8:19 AM 8/30/2023     8:23 AM   Renal   Sodium 136 - 145 mmol/L 136  140  137    K 3.4 - 5.3 mmol/L 4.9  4.8  5.1    Cl 98 - 107 mmol/L 106  108  106    Cl (external) 98 - 107 mmol/L 106  108  106    CO2 22 - 29 mmol/L 19  18  20    Urea Nitrogen 8.0 - 23.0 mg/dL 31.2  24.0  25.5    Creatinine 0.51 - 0.95 mg/dL 0.93  0.92  0.99    Glucose 70 - 99 mg/dL 102  99  100    Calcium 8.8 - 10.2 mg/dL 10.1  10.1  10.2          Latest Ref Rng & Units 9/27/2021     9:40 AM 9/27/2021     9:39 AM 4/19/2021     8:29 AM   Bone Health   Phosphorus 2.5 - 4.5 mg/dL   3.5    Parathyroid Hormone Intact 18 - 80 pg/mL  106     Vit D Def 20 - 75 ug/L 18            Latest Ref Rng & Units 9/25/2023     8:17 AM 9/13/2023     8:19 AM 8/30/2023     8:23 AM   Heme   WBC 4.0 - 11.0 10e3/uL 6.4  5.4  5.3    Hgb 11.7 - 15.7 g/dL 9.4  9.6  9.6    Plt 150 - 450 10e3/uL 199  217  211          Latest Ref Rng & Units 1/31/2022     8:16 AM 9/27/2021     9:40 AM 3/1/2021     8:06 AM   Liver   AP 40 - 150 U/L 93  123  195    TBili 0.2 - 1.3 mg/dL 0.6  0.4  0.6    Bilirubin Direct 0.0 - 0.2 mg/dL 0.2  0.1  0.1    ALT 0 - 50 U/L 32  35  42    AST 0 - 45 U/L 16  25  26    Tot Protein 6.8 - 8.8 g/dL 7.2  7.2  7.0    Albumin 3.4 - 5.0 g/dL 4.1  4.2  3.7          Latest Ref Rng & Units 7/31/2023     8:12 AM 3/3/2023     9:24 AM 1/31/2022     8:16 AM   Pancreas   A1C 0.0 - 5.6 % 5.9   6.3    A1C (POC) 4.3 - <5.7 %  6.4           Latest Ref Rng & Units 3/27/2023     8:12 AM 9/9/2020     6:37 AM   Iron studies   Iron 35 - 180 ug/dL 77  162    Iron Saturation Index 15 - 46 % 28  92    Ferritin 8 - 252 ng/mL  1,757          Latest Ref Rng & Units 5/30/2023     8:15 AM 9/22/2022     2:02 PM 6/28/2021     8:09 AM   [facility] Txp Virology   CMV QUANT IU/ML Not Detected IU/mL  Not  Detected     BK Spec    Plasma    BK Res BKNEG^BK Virus DNA Not Detected copies/mL   BK Virus DNA Not Detected    BK Log <2.7 Log copies/mL   Not Calculated    EBV DNA COPIES/ML Not Detected copies/mL Not Detected  Not Detected         Recent Labs   Lab Test 08/30/23  0823 09/13/23  0819 09/25/23  0817   DOSTAC 8/29/2023 9/12/2023 9/24/2023   TACROL 3.9* 4.7* 4.8*     Recent Labs   Lab Test 09/26/22  0824 08/30/23  0823 09/13/23  0819   DOSMPA 9/25/2022   8:00 PM 8/29/2023   8:00 PM 9/12/2023   8:00 PM   MPACID 1.09 1.22 1.05   MPAG 129.8* 188.3* 177.2*     I spent a total of 47 minutes on the date of the encounter doing chart review, performing a history and physical exam, completing documentation and any further activities as noted above.

## 2023-09-28 NOTE — LETTER
9/28/2023         RE: Ethel Thompson  [address]  [place] [address]        Dear Colleague,    Thank you for referring your patient, Ethel Thompson, to the [facility]. Please see a copy of my visit note below.    TRANSPLANT NEPHROLOGY CHRONIC POST TRANSPLANT VISIT    Assessment & Plan  # DDKT: Stable   - Baseline Creatinine:  ~ 0.8-1.0   - Proteinuria: Normal (<0.2 grams)   - Date DSA Last Checked: Jan/2023      Latest DSA: No   - BK Viremia: No   - Kidney Tx Biopsy: Mar 03, 2021; Result: No diagnostic evidence of acute rejection.  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  No interstitial fibrosis or tubular atrophy.             Feb 03, 2021; Result: No diagnostic evidence of acute rejection.  Segmental glomerular basement membrane irregularity with remodeling and lamellation.  Type IV collagen alpha5 and alpha2 staining was normal.  This was felt to be donor derived.    # Immunosuppression: Tacrolimus immediate release (goal 4-6) and Mycophenolate mofetil (dose 1250 mg every 12 hours)   - Continue with intensive monitoring of immunosuppression for efficacy and toxicity.   - Changes: No; Patient is on high dose mycophenolate, but only low normal MPA level and no obvious side effects, including normal WBC and no GI side effects   - Note: current FK 1/0.5 alternating with 1/1 due to variable levels, she prefers to stay on tabs and avoid switching to liquid tacrolimus    # Infection Prophylaxis:   - PJP: Sulfa/TMP (Bactrim) add CD4 count to next labs  - HSV: Valacyclovir (Valtrex)    # Hypertension: Controlled;  Goal BP: < 130/80   - Changes: No    # Diabetes: Controlled (HbA1c <7%) Last HbA1c: 6.3%   - Management as per primary care.    # Anemia in Chronic Renal Disease: Hgb: Stable      JHONATAN: No   - Iron studies: High iron saturation     # Mineral Bone Disorder:   - Vitamin D;  level: Low        On supplement: Yes cholecalciferol 50mcg po every day, update 25-OH vit D & PTH  - Calcium; level: High normal        On supplement: No, avoid TUMS    # Electrolytes:   - Potassium; level: Normal        On supplement: No  - Magnesium; level: Not checked recently        On supplement: Yes  - Bicarbonate; level: low        On supplement: yes sodium bicarb 650 mg po tid, misses sometimes-will increase the dose to 1300 mg po bid    # CAD: Asymptomatic.  Coronary angiogram 4/2022 with mild non flow-limiting disease.    # Chronic fatigue:   - neg EBV, CMV pcr, nl TSH   - cardiac w/up as above    - anemia Hb near goal    # GERD: Occasional symptoms on H2 blocker prn.    # SADIE on CPAP: Patient is compliant.    # Skin Cancer Risk:    - Discussed sun protection and recommend regular follow up with Dermatology.    # Hypothyroidism: Continue with the home dose levothyroxine management as per endocrinology    # Medical Compliance: Yes    # Vaccinations:update flu, COVID, RSV    # Transplant History:  Etiology of Kidney Failure: Unknown etiology (Bx with global glomerulosclerosis)  Tx: DDKT  Transplant: 9/3/2020 (Kidney)  Significant changes in immunosuppression: None  Significant transplant-related complications: None    Transplant Office Phone Number: [phone]    Assessment and plan was discussed with the patient and she voiced her understanding and agreement.    Return visit: 1 year    Jordy Dewitt MD    Chief Complaint  Ms. Thompson is a 68 year old here for kidney transplant and immunosuppression management.    History of Present Illness   Feels good overall. She reports persistent chronic fatigue since transplant but this has not changed and prior workup has been neg for infections (EBV, CMV), thyroid d/o, or acute cardiac issues. She has mild non obstructive CAD and follows regularly with cards. She is bothered by weight gain though overall fluctuates+_5 lbs and uses lasix 20 mg prn for leg swelling.  She denies any urinary symptoms. She reports loose stools a couple days ago, and uses fibers daily-metamucil which helps.  She reports some heartburn and occasionally uses TUMS. No recent illness or hospitalization    IS FK 1/0.5 (alternating with 1/1); [id number]     Home BP:  120/80s  New PCP:     Problem List  Patient Active Problem List   Diagnosis    Elevated serum immunoglobulin free light chain level    Dyslipidemia    Hypothyroidism    SADIE on CPAP    Sensorineural hearing loss (SNHL) of both ears    GERD (gastroesophageal reflux disease)    HTN, kidney transplant related    Hepatitis B core antibody positive    Secondary renal hyperparathyroidism (H)    Memory deficits    Kidney transplanted    Immunosuppression (H)    Aftercare following organ transplant    Vitamin D deficiency    Diabetes mellitus, type 2 (H)    Seasonal allergic rhinitis due to other allergic trigger    Chronic midline low back pain with right-sided sciatica    Osteopenia, unspecified location    Cold sore       Allergies  Allergies   Allergen Reactions    Amoxicillin-Pot Clavulanate Nausea and Vomiting       Medications  Current Outpatient Medications   Medication Sig    acetaminophen (TYLENOL) 325 MG tablet Take 2 tablets (650 mg) by mouth every 4 hours as needed for pain    aspirin (ASA) 81 MG EC tablet Take 1 tablet (81 mg) by mouth daily    atorvastatin (LIPITOR) 40 MG tablet Take 0.5 tablets (20 mg) by mouth daily    cholecalciferol (VITAMIN D3) 25 mcg (1000 units) capsule Take 2 capsules (50 mcg) by mouth daily    diclofenac (VOLTAREN) 1 % topical gel Apply 2 g topically 4 times daily as needed for moderate pain    famotidine (PEPCID) 20 MG tablet Take 20 mg by mouth every evening     fluticasone (FLONASE) 50 MCG/ACT nasal spray Spray 1 spray into both nostrils 2 times daily as needed for rhinitis or allergies    furosemide (LASIX) 20 MG tablet Take 1 tablet (20 mg) by mouth daily    hydrocortisone 2.5 % cream Apply  topically 2 times daily    levothyroxine (SYNTHROID/LEVOTHROID) 112 MCG tablet Take 112 mcg (1 tablet) by mouth every Monday, Tuesday is 1/2 tab, 1 tab Wednesday, 1 tab Thursday, tab 1 Friday    Lidocaine (LIDOCARE) 4 % Patch Place 1 patch onto the skin every 24 hours To prevent lidocaine toxicity, patient should be patch free for 12 hrs daily. Using for back pain    losartan (COZAAR) 100 MG tablet Take 1 tablet (100 mg) by mouth daily    magnesium oxide 400 MG CAPS lunch    melatonin 3 MG tablet Take 6 mg by mouth At Bedtime    metFORMIN (GLUCOPHAGE XR) 500 MG 24 hr tablet Take 1 tablet (500 mg) by mouth 2 times daily (with meals)    mycophenolate (GENERIC EQUIVALENT) 250 MG capsule Take 5 capsules (1,250 mg) by mouth 2 times daily    psyllium (METAMUCIL/KONSYL) 58.6 % powder Take by mouth daily    Semaglutide (RYBELSUS) 7 MG tablet Take 1 tablet (7 mg) by mouth daily    sodium bicarbonate 650 MG tablet Take 1 tablet (650 mg) by mouth 3 times daily    sulfamethoxazole-trimethoprim (BACTRIM) 400-80 MG tablet Take 1 tablet by mouth daily    tacrolimus (GENERIC EQUIVALENT) 0.5 MG capsule Take 1 capsule (0.5 mg) by mouth every evening    tacrolimus (GENERIC EQUIVALENT) 1 MG capsule Take 1 capsule (1 mg) by mouth every morning    valACYclovir (VALTREX) 500 MG tablet Take 1 tablet (500 mg) by mouth daily    fish oil-omega-3 fatty acids 1000 MG capsule Take 2 g by mouth daily  (Patient not taking: Reported on 6/22/2023)    metFORMIN (GLUCOPHAGE) 500 MG tablet Take 500 mg by mouth 2 times daily (with meals) (Patient not taking: Reported on 9/28/2023)     No current facility-administered medications for this visit.     There are no discontinued medications.    Physical Exam  Vital Signs: There were no vitals taken for this visit.    GENERAL APPEARANCE: alert and no distress  HENT: mouth without ulcers or lesions  LYMPHATICS: no cervical or supraclavicular nodes  RESP: lungs clear to auscultation - no rales, rhonchi or  wheezes  CV: regular rhythm, normal rate, no rub, no murmur  EDEMA: no LE edema bilaterally  ABDOMEN: soft, nondistended, nontender, bowel sounds normal  MS: extremities normal - no gross deformities noted, no evidence of inflammation in joints, no muscle tenderness  SKIN: no rash  TX KIDNEY: normal  DIALYSIS ACCESS:  LUE AV fistula with good thrill    Data        Latest Ref Rng & Units 9/25/2023     8:17 AM 9/13/2023     8:19 AM 8/30/2023     8:23 AM   Renal   Sodium 136 - 145 mmol/L 136  140  137    K 3.4 - 5.3 mmol/L 4.9  4.8  5.1    Cl 98 - 107 mmol/L 106  108  106    Cl (external) 98 - 107 mmol/L 106  108  106    CO2 22 - 29 mmol/L 19  18  20    Urea Nitrogen 8.0 - 23.0 mg/dL 31.2  24.0  25.5    Creatinine 0.51 - 0.95 mg/dL 0.93  0.92  0.99    Glucose 70 - 99 mg/dL 102  99  100    Calcium 8.8 - 10.2 mg/dL 10.1  10.1  10.2          Latest Ref Rng & Units 9/27/2021     9:40 AM 9/27/2021     9:39 AM 4/19/2021     8:29 AM   Bone Health   Phosphorus 2.5 - 4.5 mg/dL   3.5    Parathyroid Hormone Intact 18 - 80 pg/mL  106     Vit D Def 20 - 75 ug/L 18            Latest Ref Rng & Units 9/25/2023     8:17 AM 9/13/2023     8:19 AM 8/30/2023     8:23 AM   Heme   WBC 4.0 - 11.0 10e3/uL 6.4  5.4  5.3    Hgb 11.7 - 15.7 g/dL 9.4  9.6  9.6    Plt 150 - 450 10e3/uL 199  217  211          Latest Ref Rng & Units 1/31/2022     8:16 AM 9/27/2021     9:40 AM 3/1/2021     8:06 AM   Liver   AP 40 - 150 U/L 93  123  195    TBili 0.2 - 1.3 mg/dL 0.6  0.4  0.6    Bilirubin Direct 0.0 - 0.2 mg/dL 0.2  0.1  0.1    ALT 0 - 50 U/L 32  35  42    AST 0 - 45 U/L 16  25  26    Tot Protein 6.8 - 8.8 g/dL 7.2  7.2  7.0    Albumin 3.4 - 5.0 g/dL 4.1  4.2  3.7          Latest Ref Rng & Units 7/31/2023     8:12 AM 3/3/2023     9:24 AM 1/31/2022     8:16 AM   Pancreas   A1C 0.0 - 5.6 % 5.9   6.3    A1C (POC) 4.3 - <5.7 %  6.4           Latest Ref Rng & Units 3/27/2023     8:12 AM 9/9/2020     6:37 AM   Iron studies   Iron 35 - 180 ug/dL 77  162     Iron Saturation Index 15 - 46 % 28  92    Ferritin 8 - 252 ng/mL  1,757          Latest Ref Rng & Units 5/30/2023     8:15 AM 9/22/2022     2:02 PM 6/28/2021     8:09 AM   [facility] Txp Virology   CMV QUANT IU/ML Not Detected IU/mL  Not Detected     BK Spec    Plasma    BK Res BKNEG^BK Virus DNA Not Detected copies/mL   BK Virus DNA Not Detected    BK Log <2.7 Log copies/mL   Not Calculated    EBV DNA COPIES/ML Not Detected copies/mL Not Detected  Not Detected         Recent Labs   Lab Test 08/30/23  0823 09/13/23  0819 09/25/23  0817   DOSTAC 8/29/2023 9/12/2023 9/24/2023   TACROL 3.9* 4.7* 4.8*     Recent Labs   Lab Test 09/26/22  0824 08/30/23  0823 09/13/23  0819   DOSMPA 9/25/2022   8:00 PM 8/29/2023   8:00 PM 9/12/2023   8:00 PM   MPACID 1.09 1.22 1.05   MPAG 129.8* 188.3* 177.2*     I spent a total of 47 minutes on the date of the encounter doing chart review, performing a history and physical exam, completing documentation and any further activities as noted above.        Again, thank you for allowing me to participate in the care of your patient.      Sincerely,    Jordy Dewitt MD

## 2023-09-29 NOTE — TELEPHONE ENCOUNTER
Called [facility] could not add on PTH to blood draw - ordered 3 days after lab draw     Ordered updated to have drawn with next set of routine labs        Ketoconazole Pregnancy And Lactation Text: This medication is Pregnancy Category C and it isn't know if it is safe during pregnancy. It is also excreted in breast milk and breast feeding isn't recommended.

## 2023-10-02 LAB
DONOR IDENTIFICATION: NORMAL
DSA COMMENTS: NORMAL
DSA PRESENT: NO
DSA TEST METHOD: NORMAL
ORGAN: NORMAL
SA 1 CELL: NORMAL
SA 1 TEST METHOD: NORMAL
SA 2 CELL: NORMAL
SA 2 TEST METHOD: NORMAL
SA1 HI RISK ABY: NORMAL
SA1 MOD RISK ABY: NORMAL
SA2 HI RISK ABY: NORMAL
SA2 MOD RISK ABY: NORMAL
UNACCEPTABLE ANTIGENS: NORMAL
UNOS CPRA: 27
ZZZSA 1  COMMENTS: NORMAL
ZZZSA 2 COMMENTS: NORMAL

## 2023-10-25 ENCOUNTER — LAB (OUTPATIENT)
Dept: LAB | Facility: CLINIC | Age: 68
End: 2023-10-25
Payer: MEDICARE

## 2023-10-25 DIAGNOSIS — Z79.899 ENCOUNTER FOR LONG-TERM CURRENT USE OF MEDICATION: ICD-10-CM

## 2023-10-25 DIAGNOSIS — Z94.0 KIDNEY REPLACED BY TRANSPLANT: ICD-10-CM

## 2023-10-25 DIAGNOSIS — Z48.298 AFTERCARE FOLLOWING ORGAN TRANSPLANT: ICD-10-CM

## 2023-10-25 LAB
ALBUMIN MFR UR ELPH: 9.5 MG/DL
ANION GAP SERPL CALCULATED.3IONS-SCNC: 10 MMOL/L (ref 7–15)
BUN SERPL-MCNC: 25.5 MG/DL (ref 8–23)
CALCIUM SERPL-MCNC: 9.9 MG/DL (ref 8.8–10.2)
CD3 CELLS # BLD: 1049 CELLS/UL (ref 603–2990)
CD3 CELLS NFR BLD: 71 % (ref 49–84)
CD3+CD4+ CELLS # BLD: 622 CELLS/UL (ref 441–2156)
CD3+CD4+ CELLS NFR BLD: 42 % (ref 28–63)
CD3+CD4+ CELLS/CD3+CD8+ CLL BLD: 1.58 % (ref 1.4–2.6)
CD3+CD8+ CELLS # BLD: 394 CELLS/UL (ref 125–1312)
CD3+CD8+ CELLS NFR BLD: 27 % (ref 10–40)
CHLORIDE SERPL-SCNC: 109 MMOL/L (ref 98–107)
CREAT SERPL-MCNC: 0.93 MG/DL (ref 0.51–0.95)
CREAT UR-MCNC: 85.9 MG/DL
DEPRECATED HCO3 PLAS-SCNC: 19 MMOL/L (ref 22–29)
EGFRCR SERPLBLD CKD-EPI 2021: 66 ML/MIN/1.73M2
ERYTHROCYTE [DISTWIDTH] IN BLOOD BY AUTOMATED COUNT: 12.2 % (ref 10–15)
GLUCOSE SERPL-MCNC: 101 MG/DL (ref 70–99)
HCT VFR BLD AUTO: 29.7 % (ref 35–47)
HGB BLD-MCNC: 9.2 G/DL (ref 11.7–15.7)
MCH RBC QN AUTO: 31.1 PG (ref 26.5–33)
MCHC RBC AUTO-ENTMCNC: 31 G/DL (ref 31.5–36.5)
MCV RBC AUTO: 100 FL (ref 78–100)
PLATELET # BLD AUTO: 193 10E3/UL (ref 150–450)
POTASSIUM SERPL-SCNC: 4.6 MMOL/L (ref 3.4–5.3)
PROT/CREAT 24H UR: 0.11 MG/MG CR (ref 0–0.2)
PTH-INTACT SERPL-MCNC: 54 PG/ML (ref 15–65)
RBC # BLD AUTO: 2.96 10E6/UL (ref 3.8–5.2)
SODIUM SERPL-SCNC: 138 MMOL/L (ref 135–145)
T CELL COMMENT: NORMAL
TACROLIMUS BLD-MCNC: 4.4 UG/L (ref 5–15)
TME LAST DOSE: ABNORMAL H
TME LAST DOSE: ABNORMAL H
WBC # BLD AUTO: 5.1 10E3/UL (ref 4–11)

## 2023-10-25 PROCEDURE — 85027 COMPLETE CBC AUTOMATED: CPT

## 2023-10-25 PROCEDURE — 80048 BASIC METABOLIC PNL TOTAL CA: CPT

## 2023-10-25 PROCEDURE — 86360 T CELL ABSOLUTE COUNT/RATIO: CPT

## 2023-10-25 PROCEDURE — 84156 ASSAY OF PROTEIN URINE: CPT

## 2023-10-25 PROCEDURE — 80197 ASSAY OF TACROLIMUS: CPT

## 2023-10-25 PROCEDURE — 36415 COLL VENOUS BLD VENIPUNCTURE: CPT

## 2023-10-25 PROCEDURE — 86359 T CELLS TOTAL COUNT: CPT

## 2023-10-25 PROCEDURE — 83970 ASSAY OF PARATHORMONE: CPT

## 2023-10-27 ENCOUNTER — TELEPHONE (OUTPATIENT)
Dept: TRANSPLANT | Facility: CLINIC | Age: 68
End: 2023-10-27
Payer: MEDICARE

## 2023-10-27 NOTE — TELEPHONE ENCOUNTER
Jordy Mohr MD [place], Teresa, RN  yes      ----- Message -----  From: Noelle Max RN  Sent: 10/26/2023  12:09 PM CDT  To: Jordy Willis MD    Tac at goal  Abs CD4 ct >200, still on bactrim. STOP bactrim?  Rest of result as expected.       OUTCOME:  Called [facility] Blow and discussed stopping bactrim.  Verbalized understanding and agreement to plan.  Med list updated.

## 2023-11-01 ENCOUNTER — OFFICE VISIT (OUTPATIENT)
Dept: FAMILY MEDICINE | Facility: CLINIC | Age: 68
End: 2023-11-01
Payer: MEDICARE

## 2023-11-01 VITALS
OXYGEN SATURATION: 99 % | DIASTOLIC BLOOD PRESSURE: 68 MMHG | WEIGHT: 129 LBS | RESPIRATION RATE: 18 BRPM | HEART RATE: 73 BPM | TEMPERATURE: 98.5 F | BODY MASS INDEX: 26 KG/M2 | SYSTOLIC BLOOD PRESSURE: 138 MMHG | HEIGHT: 59 IN

## 2023-11-01 DIAGNOSIS — Z94.0 KIDNEY REPLACED BY TRANSPLANT: ICD-10-CM

## 2023-11-01 DIAGNOSIS — E11.29 TYPE 2 DIABETES MELLITUS WITH OTHER DIABETIC KIDNEY COMPLICATION, WITHOUT LONG-TERM CURRENT USE OF INSULIN (H): ICD-10-CM

## 2023-11-01 DIAGNOSIS — D84.9 IMMUNOSUPPRESSION (H): ICD-10-CM

## 2023-11-01 DIAGNOSIS — Z94.0 HTN, KIDNEY TRANSPLANT RELATED: ICD-10-CM

## 2023-11-01 DIAGNOSIS — E03.9 HYPOTHYROIDISM, UNSPECIFIED TYPE: ICD-10-CM

## 2023-11-01 DIAGNOSIS — I15.1 HTN, KIDNEY TRANSPLANT RELATED: ICD-10-CM

## 2023-11-01 DIAGNOSIS — Z00.00 ENCOUNTER FOR MEDICARE ANNUAL WELLNESS EXAM: Primary | ICD-10-CM

## 2023-11-01 DIAGNOSIS — E78.5 DYSLIPIDEMIA: ICD-10-CM

## 2023-11-01 LAB — HBA1C MFR BLD: 5.9 % (ref 0–5.6)

## 2023-11-01 PROCEDURE — 99214 OFFICE O/P EST MOD 30 MIN: CPT | Mod: 25 | Performed by: FAMILY MEDICINE

## 2023-11-01 PROCEDURE — G0008 ADMIN INFLUENZA VIRUS VAC: HCPCS | Performed by: FAMILY MEDICINE

## 2023-11-01 PROCEDURE — 90480 ADMN SARSCOV2 VAC 1/ONLY CMP: CPT | Performed by: FAMILY MEDICINE

## 2023-11-01 PROCEDURE — G0438 PPPS, INITIAL VISIT: HCPCS | Performed by: FAMILY MEDICINE

## 2023-11-01 PROCEDURE — 36415 COLL VENOUS BLD VENIPUNCTURE: CPT | Performed by: FAMILY MEDICINE

## 2023-11-01 PROCEDURE — 90662 IIV NO PRSV INCREASED AG IM: CPT | Performed by: FAMILY MEDICINE

## 2023-11-01 PROCEDURE — 91320 SARSCV2 VAC 30MCG TRS-SUC IM: CPT | Performed by: FAMILY MEDICINE

## 2023-11-01 PROCEDURE — 84443 ASSAY THYROID STIM HORMONE: CPT | Performed by: FAMILY MEDICINE

## 2023-11-01 PROCEDURE — 83036 HEMOGLOBIN GLYCOSYLATED A1C: CPT | Performed by: FAMILY MEDICINE

## 2023-11-01 PROCEDURE — 84439 ASSAY OF FREE THYROXINE: CPT | Performed by: FAMILY MEDICINE

## 2023-11-01 RX ORDER — ATORVASTATIN CALCIUM 40 MG/1
20 TABLET, FILM COATED ORAL DAILY
Qty: 90 TABLET | Refills: 3 | Status: SHIPPED | OUTPATIENT
Start: 2023-11-01 | End: 2023-12-08

## 2023-11-01 RX ORDER — LEVOTHYROXINE SODIUM 112 UG/1
112 TABLET ORAL DAILY
Qty: 90 TABLET | Refills: 3 | Status: SHIPPED | OUTPATIENT
Start: 2023-11-01

## 2023-11-01 RX ORDER — METFORMIN HCL 500 MG
500 TABLET, EXTENDED RELEASE 24 HR ORAL 2 TIMES DAILY WITH MEALS
Qty: 180 TABLET | Refills: 1 | Status: SHIPPED | OUTPATIENT
Start: 2023-11-01 | End: 2024-05-01

## 2023-11-01 RX ORDER — RESPIRATORY SYNCYTIAL VIRUS VACCINE 120MCG/0.5
0.5 KIT INTRAMUSCULAR ONCE
Qty: 1 EACH | Refills: 0 | Status: CANCELLED | OUTPATIENT
Start: 2023-11-01 | End: 2023-11-01

## 2023-11-01 ASSESSMENT — ENCOUNTER SYMPTOMS
HEMATURIA: 0
HEADACHES: 0
FEVER: 0
WEAKNESS: 1
NAUSEA: 1
DYSURIA: 0
FREQUENCY: 0
EYE PAIN: 0
PALPITATIONS: 0
HEARTBURN: 1
PARESTHESIAS: 0
CONSTIPATION: 0
DIZZINESS: 0
CHILLS: 1
ABDOMINAL PAIN: 0
DIARRHEA: 0
NERVOUS/ANXIOUS: 0
MYALGIAS: 0
SORE THROAT: 0
SHORTNESS OF BREATH: 0
HEMATOCHEZIA: 0
COUGH: 0

## 2023-11-01 ASSESSMENT — ACTIVITIES OF DAILY LIVING (ADL): CURRENT_FUNCTION: NO ASSISTANCE NEEDED

## 2023-11-01 ASSESSMENT — PAIN SCALES - GENERAL: PAINLEVEL: NO PAIN (0)

## 2023-11-01 NOTE — PROGRESS NOTES
"SUBJECTIVE:   Ethel is a 68 year old who presents for Preventive Visit.      2023    10:45 AM   Additional Questions   Roomed by rachel       Are you in the first 12 months of your Medicare coverage?  No    Healthy Habits:     In general, how would you rate your overall health?  Good    Frequency of exercise:  2-3 days/week    Duration of exercise:  15-30 minutes    Do you usually eat at least 4 servings of fruit and vegetables a day, include whole grains    & fiber and avoid regularly eating high fat or \"junk\" foods?  Yes    Taking medications regularly:  Yes    Medication side effects:  None    Ability to successfully perform activities of daily living:  No assistance needed    Home Safety:  No safety concerns identified    Hearing Impairment:  No hearing concerns    In the past 6 months, have you been bothered by leaking of urine?  No    In general, how would you rate your overall mental or emotional health?  Good    Additional concerns today:  No          Have you ever done Advance Care Planning? (For example, a Health Directive, POLST, or a discussion with a medical provider or your loved ones about your wishes): No, advance care planning information given to patient to review.  Patient declined advance care planning discussion at this time.    Has hearing aids     Fall risk  Fallen 2 or more times in the past year?: No  Any fall with injury in the past year?: No    Cognitive Screening no concerns based on direct observation    Do you have sleep apnea, excessive snoring or daytime drowsiness? : has a cpap machine     Reviewed and updated as needed this visit by clinical staff   Tobacco  Allergies  Meds              Reviewed and updated as needed this visit by Provider                 Social History     Tobacco Use    Smoking status: Former     Packs/day: 1     Types: Cigarettes     Start date:      Quit date:      Years since quittin.8    Smokeless tobacco: Never   Substance Use Topics    " Alcohol use: No           11/1/2023    10:37 AM   Alcohol Use   Prescreen: >3 drinks/day or >7 drinks/week? Not Applicable     Do you have a current opioid prescription? No  Do you use any other controlled substances or medications that are not prescribed by a provider? None      Current providers sharing in care for this patient include:   Patient Care Team:  Joann Canales MD as PCP - General (Cardiology)  Evens Encinas DO (Nephrology)  Joann Canales MD as MD (Cardiology)  Patrick Mireles [facility] as Pharmacist (Pharmacist)  Chintan Whitfield MD as MD (Endocrinology, Diabetes, and Metabolism)  Jordy Mohr MD as Assigned Nephrology Provider  Ally Dickinson MD as Assigned PCP    The following health maintenance items are reviewed in Epic and correct as of today:  Health Maintenance   Topic Date Due    ANNUAL REVIEW OF HM ORDERS  Never done    ADVANCE CARE PLANNING  Never done    EYE EXAM  Never done    RSV VACCINE 60+ (1 - 1-dose 60+ series) Never done    HEPATITIS B IMMUNIZATION (2 of 3 - Risk 3-dose series) 09/02/2019    INFLUENZA VACCINE (1) 09/01/2023    COVID-19 Vaccine (6 - 2023-24 season) 09/01/2023    A1C  10/31/2023    MEDICARE ANNUAL WELLNESS VISIT  10/31/2023    TSH W/FREE T4 REFLEX  03/03/2024    DIABETIC FOOT EXAM  03/03/2024    LIPID  07/31/2024    MICROALBUMIN  07/31/2024    BMP  10/25/2024    FALL RISK ASSESSMENT  11/01/2024    MAMMO SCREENING  07/26/2025    COLORECTAL CANCER SCREENING  07/03/2026    DTAP/TDAP/TD IMMUNIZATION (2 - Td or Tdap) 11/12/2029    DEXA  07/26/2038    HEPATITIS C SCREENING  Completed    PHQ-2 (once per calendar year)  Completed    Pneumococcal Vaccine: 65+ Years  Completed    ZOSTER IMMUNIZATION  Completed    IPV IMMUNIZATION  Aged Out    HPV IMMUNIZATION  Aged Out    MENINGITIS IMMUNIZATION  Aged Out    RSV MONOCLONAL ANTIBODY  Aged Out         FHS-7:       7/26/2023    10:25 AM   Breast CA Risk Assessment (FHS-7)   Did any of your first-degree  "relatives have breast or ovarian cancer? No   Did any of your relatives have bilateral breast cancer? No   Did any man in your family have breast cancer? No   Did any woman in your family have breast and ovarian cancer? No   Did any woman in your family have breast cancer before age 50 y? No   Do you have 2 or more relatives with breast and/or ovarian cancer? No   Do you have 2 or more relatives with breast and/or bowel cancer? No       Mammogram Screening: Recommended mammography every 1-2 years with patient discussion and risk factor consideration  Pertinent mammograms are reviewed under the imaging tab.    Review of Systems   Constitutional:  Positive for chills. Negative for fever.   HENT:  Negative for congestion, ear pain, hearing loss and sore throat.    Eyes:  Negative for pain and visual disturbance.   Respiratory:  Negative for cough and shortness of breath.    Cardiovascular:  Negative for chest pain and palpitations.   Gastrointestinal:  Positive for heartburn and nausea. Negative for abdominal pain, constipation, diarrhea and hematochezia.   Genitourinary:  Negative for dysuria, frequency, genital sores, hematuria and urgency.   Musculoskeletal:  Negative for myalgias.   Skin:  Negative for rash.   Neurological:  Positive for weakness. Negative for dizziness, headaches and paresthesias.   Psychiatric/Behavioral:  The patient is not nervous/anxious.      Constitutional, HEENT, cardiovascular, pulmonary, gi and gu systems are negative, except as otherwise noted.    OBJECTIVE:   BP (!) 150/76   Pulse 73   Temp 98.5  F (36.9  C) (Oral)   Resp 18   Ht 1.499 m (4' 11\")   Wt 58.5 kg (129 lb)   SpO2 99%   BMI 26.05 kg/m   Estimated body mass index is 26.05 kg/m  as calculated from the following:    Height as of this encounter: 1.499 m (4' 11\").    Weight as of this encounter: 58.5 kg (129 lb).  Physical Exam  GENERAL: healthy, alert and no distress  EYES: Eyes grossly normal to inspection, PERRL and " conjunctivae and sclerae normal  HENT: ear canals and TM's normal, nose and mouth without ulcers or lesions  NECK: no adenopathy, no asymmetry, masses, or scars and thyroid normal to palpation  RESP: lungs clear to auscultation - no rales, rhonchi or wheezes  CV: regular rate and rhythm, normal S1 S2, no S3 or S4, no murmur, click or rub, no peripheral edema and peripheral pulses strong  ABDOMEN: soft, nontender, no hepatosplenomegaly, no masses and bowel sounds normal  MS: no gross musculoskeletal defects noted, no edema  SKIN: no suspicious lesions or rashes  NEURO: Normal strength and tone, mentation intact and speech normal  PSYCH: mentation appears normal, affect normal/bright    Diagnostic Test Results:  Labs reviewed in Epic  Results for orders placed or performed in visit on 11/01/23 (from the past 24 hour(s))   **Hemoglobin A1c FUTURE 3mo   Result Value Ref Range    Hemoglobin A1C 5.9 (H) 0.0 - 5.6 %       ASSESSMENT / PLAN:   (Z00.00) Encounter for Medicare annual wellness exam  (primary encounter diagnosis)  Comment: completed  Plan:     (E11.29) Type 2 diabetes mellitus with other diabetic kidney complication, without long-term current use of insulin (H)  Comment: doing well on metformin and rybelsus  On aspirin, statin and ace  Due for eye exam  Plan: **Hemoglobin A1c FUTURE 3mo, metFORMIN         (GLUCOPHAGE XR) 500 MG 24 hr tablet            (Z94.0) Kidney replaced by transplant  Comment: per transplant team  Plan: atorvastatin (LIPITOR) 40 MG tablet            (E78.5) Dyslipidemia  Comment: on statin   Plan: LDL at goal    (E03.9) Hypothyroidism, unspecified type  Comment: stable on meds  Plan: levothyroxine (SYNTHROID/LEVOTHROID) 112 MCG         tablet, **TSH with free T4 reflex FUTURE 2mo            (D84.9) Immunosuppression (H24)  Comment: per transplant  Plan:     (I15.1,  Z94.0) HTN, kidney transplant related  Comment: per transplant, BP at goal on meds  Plan:     Patient has been advised of  split billing requirements and indicates understanding: Yes      COUNSELING:  Reviewed preventive health counseling, as reflected in patient instructions       Regular exercise       Healthy diet/nutrition       Vision screening       Dental care       Colon cancer screening        She reports that she quit smoking about 28 years ago. Her smoking use included cigarettes. She started smoking about 43 years ago. She smoked an average of 1 pack per day. She has never used smokeless tobacco.      Appropriate preventive services were discussed with this patient, including applicable screening as appropriate for fall prevention, nutrition, physical activity, Tobacco-use cessation, weight loss and cognition.  Checklist reviewing preventive services available has been given to the patient.    Reviewed patients plan of care and provided an AVS. The Intermediate Care Plan ( asthma action plan, low back pain action plan, and migraine action plan) for Ethel meets the Care Plan requirement. This Care Plan has been established and reviewed with the Patient and spouse.          Ally Dickinson MD  [facility]    Identified Health Risks:  I have reviewed Opioid Use Disorder and Substance Use Disorder risk factors and made any needed referrals.

## 2023-11-01 NOTE — PATIENT INSTRUCTIONS
Patient Education   Personalized Prevention Plan  You are due for the preventive services outlined below.  Your care team is available to assist you in scheduling these services.  If you have already completed any of these items, please share that information with your care team to update in your medical record.  Health Maintenance Due   Topic Date Due     ANNUAL REVIEW OF HM ORDERS  Never done     Discuss Advance Care Planning  Never done     Eye Exam  Never done     RSV VACCINE 60+ (1 - 1-dose 60+ series) Never done     Hepatitis B Vaccine (2 of 3 - Risk 3-dose series) 09/02/2019     Flu Vaccine (1) 09/01/2023     COVID-19 Vaccine (6 - 2023-24 season) 09/01/2023     A1C Lab  10/31/2023     Annual Wellness Visit  10/31/2023

## 2023-11-02 ENCOUNTER — MYC MEDICAL ADVICE (OUTPATIENT)
Dept: FAMILY MEDICINE | Facility: CLINIC | Age: 68
End: 2023-11-02
Payer: MEDICARE

## 2023-11-02 LAB
T4 FREE SERPL-MCNC: 1.87 NG/DL (ref 0.9–1.7)
TSH SERPL DL<=0.005 MIU/L-ACNC: 0.26 UIU/ML (ref 0.3–4.2)

## 2023-11-16 ENCOUNTER — TELEPHONE (OUTPATIENT)
Dept: TRANSPLANT | Facility: CLINIC | Age: 68
End: 2023-11-16
Payer: MEDICARE

## 2023-11-16 NOTE — TELEPHONE ENCOUNTER
Ros Monsivais Teresa, SUN  Good Afternoon Cesar:    Please complete the following missing items within the Patient Status Form (found in the Transplant Tab).  1. Functional Status  2. Working for income  3. Primary insurance    Please respond to this message after inputting the information.  Please let me know when an attempt is made, even if unsuccessful so I may readjust when I would reach out to you a second time thereby reducing the number of messages sent.      Thank you for partnering in our regulatory reporting to [facility]       OUTCOME:  Called patient and completed Patient status update  Staff message sent to MIKAELA Monsivais

## 2023-11-29 ENCOUNTER — LAB (OUTPATIENT)
Dept: LAB | Facility: CLINIC | Age: 68
End: 2023-11-29
Payer: MEDICARE

## 2023-11-29 DIAGNOSIS — Z94.0 KIDNEY TRANSPLANTED: ICD-10-CM

## 2023-11-29 DIAGNOSIS — Z48.298 AFTERCARE FOLLOWING ORGAN TRANSPLANT: ICD-10-CM

## 2023-11-29 DIAGNOSIS — Z48.298 AFTERCARE FOLLOWING ORGAN TRANSPLANT: Primary | ICD-10-CM

## 2023-11-29 LAB
ANION GAP SERPL CALCULATED.3IONS-SCNC: 12 MMOL/L (ref 7–15)
BASOPHILS # BLD AUTO: 0 10E3/UL (ref 0–0.2)
BASOPHILS NFR BLD AUTO: 0 %
BUN SERPL-MCNC: 23.2 MG/DL (ref 8–23)
CALCIUM SERPL-MCNC: 10.2 MG/DL (ref 8.8–10.2)
CHLORIDE SERPL-SCNC: 104 MMOL/L (ref 98–107)
CREAT SERPL-MCNC: 0.97 MG/DL (ref 0.51–0.95)
DEPRECATED HCO3 PLAS-SCNC: 23 MMOL/L (ref 22–29)
EGFRCR SERPLBLD CKD-EPI 2021: 63 ML/MIN/1.73M2
EOSINOPHIL # BLD AUTO: 0.3 10E3/UL (ref 0–0.7)
EOSINOPHIL NFR BLD AUTO: 4 %
ERYTHROCYTE [DISTWIDTH] IN BLOOD BY AUTOMATED COUNT: 12.2 % (ref 10–15)
GLUCOSE SERPL-MCNC: 99 MG/DL (ref 70–99)
HCT VFR BLD AUTO: 31.6 % (ref 35–47)
HGB BLD-MCNC: 10.1 G/DL (ref 11.7–15.7)
HOLD SPECIMEN: NORMAL
IMM GRANULOCYTES # BLD: 0 10E3/UL
IMM GRANULOCYTES NFR BLD: 0 %
LYMPHOCYTES # BLD AUTO: 1.6 10E3/UL (ref 0.8–5.3)
LYMPHOCYTES NFR BLD AUTO: 27 %
MCH RBC QN AUTO: 31.6 PG (ref 26.5–33)
MCHC RBC AUTO-ENTMCNC: 32 G/DL (ref 31.5–36.5)
MCV RBC AUTO: 99 FL (ref 78–100)
MONOCYTES # BLD AUTO: 0.5 10E3/UL (ref 0–1.3)
MONOCYTES NFR BLD AUTO: 8 %
NEUTROPHILS # BLD AUTO: 3.7 10E3/UL (ref 1.6–8.3)
NEUTROPHILS NFR BLD AUTO: 61 %
PLATELET # BLD AUTO: 225 10E3/UL (ref 150–450)
POTASSIUM SERPL-SCNC: 4.7 MMOL/L (ref 3.4–5.3)
PTH-INTACT SERPL-MCNC: 72 PG/ML (ref 15–65)
RBC # BLD AUTO: 3.2 10E6/UL (ref 3.8–5.2)
SODIUM SERPL-SCNC: 139 MMOL/L (ref 135–145)
TACROLIMUS BLD-MCNC: 5.9 UG/L (ref 5–15)
TME LAST DOSE: NORMAL H
TME LAST DOSE: NORMAL H
WBC # BLD AUTO: 6.2 10E3/UL (ref 4–11)

## 2023-11-29 PROCEDURE — 85025 COMPLETE CBC W/AUTO DIFF WBC: CPT

## 2023-11-29 PROCEDURE — 83970 ASSAY OF PARATHORMONE: CPT

## 2023-11-29 PROCEDURE — 80197 ASSAY OF TACROLIMUS: CPT

## 2023-11-29 PROCEDURE — 36415 COLL VENOUS BLD VENIPUNCTURE: CPT

## 2023-11-29 PROCEDURE — 80048 BASIC METABOLIC PNL TOTAL CA: CPT

## 2023-12-01 ENCOUNTER — TELEPHONE (OUTPATIENT)
Dept: TRANSPLANT | Facility: CLINIC | Age: 68
End: 2023-12-01
Payer: MEDICARE

## 2023-12-01 DIAGNOSIS — N25.81 SECONDARY HYPERPARATHYROIDISM (H): Primary | ICD-10-CM

## 2023-12-01 DIAGNOSIS — T86.10 COMPLICATIONS, KIDNEY TRANSPLANT: ICD-10-CM

## 2023-12-01 DIAGNOSIS — Z94.0 KIDNEY TRANSPLANTED: ICD-10-CM

## 2023-12-01 DIAGNOSIS — Z48.298 AFTERCARE FOLLOWING ORGAN TRANSPLANT: ICD-10-CM

## 2023-12-01 NOTE — TELEPHONE ENCOUNTER
PTH =  72  (11/29/23)      Jordy Mohr MD Ututalum, Teresa, RN  6 months      ----- Message -----  From: Noelle Max RN  Sent: 11/30/2023   3:36 PM CST  To: Jordy Willis MD    Repeat in 6 months?  Or sooner?  ----- Message -----  From: Jordy Mohr MD  Sent: 11/30/2023   3:23 PM CST  To: Noelle Max RN    [facility]  ----- Message -----  From: Noelle Max RN  Sent: 11/30/2023  10:33 AM CST  To: Jordy Willis MD    Tac at goal  Results as expected for patient.  PTH slight trend up        OUTCOME:  Lab order placed to check PTH in 6 months.

## 2023-12-08 ENCOUNTER — MYC MEDICAL ADVICE (OUTPATIENT)
Dept: FAMILY MEDICINE | Facility: CLINIC | Age: 68
End: 2023-12-08
Payer: MEDICARE

## 2023-12-08 DIAGNOSIS — Z94.0 KIDNEY REPLACED BY TRANSPLANT: ICD-10-CM

## 2023-12-08 RX ORDER — ATORVASTATIN CALCIUM 40 MG/1
40 TABLET, FILM COATED ORAL DAILY
Qty: 90 TABLET | Refills: 3 | Status: SHIPPED | OUTPATIENT
Start: 2023-12-08

## 2023-12-08 NOTE — TELEPHONE ENCOUNTER
RN does not see that provider purposely decreased atorvastatin.    Routing to provider to advise.  Oxana Waldrop BSN, RN

## 2023-12-27 ENCOUNTER — LAB (OUTPATIENT)
Dept: LAB | Facility: CLINIC | Age: 68
End: 2023-12-27
Payer: MEDICARE

## 2023-12-27 DIAGNOSIS — E03.9 HYPOTHYROIDISM, UNSPECIFIED TYPE: ICD-10-CM

## 2023-12-27 DIAGNOSIS — Z94.0 KIDNEY REPLACED BY TRANSPLANT: ICD-10-CM

## 2023-12-27 DIAGNOSIS — Z79.899 ENCOUNTER FOR LONG-TERM CURRENT USE OF MEDICATION: ICD-10-CM

## 2023-12-27 DIAGNOSIS — Z48.298 AFTERCARE FOLLOWING ORGAN TRANSPLANT: ICD-10-CM

## 2023-12-27 LAB
ANION GAP SERPL CALCULATED.3IONS-SCNC: 11 MMOL/L (ref 7–15)
BUN SERPL-MCNC: 25.9 MG/DL (ref 8–23)
CALCIUM SERPL-MCNC: 10.2 MG/DL (ref 8.8–10.2)
CHLORIDE SERPL-SCNC: 105 MMOL/L (ref 98–107)
CREAT SERPL-MCNC: 0.91 MG/DL (ref 0.51–0.95)
DEPRECATED HCO3 PLAS-SCNC: 20 MMOL/L (ref 22–29)
EGFRCR SERPLBLD CKD-EPI 2021: 68 ML/MIN/1.73M2
ERYTHROCYTE [DISTWIDTH] IN BLOOD BY AUTOMATED COUNT: 12.2 % (ref 10–15)
GLUCOSE SERPL-MCNC: 106 MG/DL (ref 70–99)
HCT VFR BLD AUTO: 31.2 % (ref 35–47)
HGB BLD-MCNC: 9.9 G/DL (ref 11.7–15.7)
MCH RBC QN AUTO: 31.5 PG (ref 26.5–33)
MCHC RBC AUTO-ENTMCNC: 31.7 G/DL (ref 31.5–36.5)
MCV RBC AUTO: 99 FL (ref 78–100)
PLATELET # BLD AUTO: 197 10E3/UL (ref 150–450)
POTASSIUM SERPL-SCNC: 4.8 MMOL/L (ref 3.4–5.3)
RBC # BLD AUTO: 3.14 10E6/UL (ref 3.8–5.2)
SODIUM SERPL-SCNC: 136 MMOL/L (ref 135–145)
TACROLIMUS BLD-MCNC: 5.2 UG/L (ref 5–15)
TME LAST DOSE: NORMAL H
TME LAST DOSE: NORMAL H
TSH SERPL DL<=0.005 MIU/L-ACNC: 4.05 UIU/ML (ref 0.3–4.2)
WBC # BLD AUTO: 5.2 10E3/UL (ref 4–11)

## 2023-12-27 PROCEDURE — 80048 BASIC METABOLIC PNL TOTAL CA: CPT

## 2023-12-27 PROCEDURE — 80197 ASSAY OF TACROLIMUS: CPT

## 2023-12-27 PROCEDURE — 84443 ASSAY THYROID STIM HORMONE: CPT

## 2023-12-27 PROCEDURE — 85027 COMPLETE CBC AUTOMATED: CPT

## 2023-12-27 PROCEDURE — 36415 COLL VENOUS BLD VENIPUNCTURE: CPT

## 2024-02-10 ENCOUNTER — HEALTH MAINTENANCE LETTER (OUTPATIENT)
Age: 69
End: 2024-02-10

## 2024-02-12 DIAGNOSIS — E11.29 TYPE 2 DIABETES MELLITUS WITH OTHER DIABETIC KIDNEY COMPLICATION, WITHOUT LONG-TERM CURRENT USE OF INSULIN (H): ICD-10-CM

## 2024-02-12 RX ORDER — ORAL SEMAGLUTIDE 7 MG/1
7 TABLET ORAL DAILY
Qty: 90 TABLET | Refills: 0 | Status: SHIPPED | OUTPATIENT
Start: 2024-02-12 | End: 2024-05-01

## 2024-02-27 ENCOUNTER — LAB (OUTPATIENT)
Dept: LAB | Facility: CLINIC | Age: 69
End: 2024-02-27
Payer: MEDICARE

## 2024-02-27 DIAGNOSIS — Z94.0 KIDNEY REPLACED BY TRANSPLANT: ICD-10-CM

## 2024-02-27 DIAGNOSIS — Z48.298 AFTERCARE FOLLOWING ORGAN TRANSPLANT: ICD-10-CM

## 2024-02-27 DIAGNOSIS — Z79.899 ENCOUNTER FOR LONG-TERM CURRENT USE OF MEDICATION: ICD-10-CM

## 2024-02-27 LAB
ANION GAP SERPL CALCULATED.3IONS-SCNC: 10 MMOL/L (ref 7–15)
BUN SERPL-MCNC: 22.9 MG/DL (ref 8–23)
CALCIUM SERPL-MCNC: 10 MG/DL (ref 8.8–10.2)
CHLORIDE SERPL-SCNC: 107 MMOL/L (ref 98–107)
CREAT SERPL-MCNC: 0.9 MG/DL (ref 0.51–0.95)
DEPRECATED HCO3 PLAS-SCNC: 22 MMOL/L (ref 22–29)
EGFRCR SERPLBLD CKD-EPI 2021: 68 ML/MIN/1.73M2
ERYTHROCYTE [DISTWIDTH] IN BLOOD BY AUTOMATED COUNT: 12.8 % (ref 10–15)
GLUCOSE SERPL-MCNC: 110 MG/DL (ref 70–99)
HCT VFR BLD AUTO: 31.6 % (ref 35–47)
HGB BLD-MCNC: 10 G/DL (ref 11.7–15.7)
MCH RBC QN AUTO: 31.5 PG (ref 26.5–33)
MCHC RBC AUTO-ENTMCNC: 31.6 G/DL (ref 31.5–36.5)
MCV RBC AUTO: 100 FL (ref 78–100)
PLATELET # BLD AUTO: 207 10E3/UL (ref 150–450)
POTASSIUM SERPL-SCNC: 4.6 MMOL/L (ref 3.4–5.3)
RBC # BLD AUTO: 3.17 10E6/UL (ref 3.8–5.2)
SODIUM SERPL-SCNC: 139 MMOL/L (ref 135–145)
TACROLIMUS BLD-MCNC: 4.6 UG/L (ref 5–15)
TME LAST DOSE: ABNORMAL H
TME LAST DOSE: ABNORMAL H
WBC # BLD AUTO: 5.3 10E3/UL (ref 4–11)

## 2024-02-27 PROCEDURE — 36415 COLL VENOUS BLD VENIPUNCTURE: CPT

## 2024-02-27 PROCEDURE — 80048 BASIC METABOLIC PNL TOTAL CA: CPT

## 2024-02-27 PROCEDURE — 80197 ASSAY OF TACROLIMUS: CPT

## 2024-02-27 PROCEDURE — 85027 COMPLETE CBC AUTOMATED: CPT

## 2024-03-19 ENCOUNTER — MYC MEDICAL ADVICE (OUTPATIENT)
Dept: FAMILY MEDICINE | Facility: CLINIC | Age: 69
End: 2024-03-19
Payer: MEDICARE

## 2024-03-19 ENCOUNTER — VIRTUAL VISIT (OUTPATIENT)
Dept: URGENT CARE | Facility: CLINIC | Age: 69
End: 2024-03-19
Payer: MEDICARE

## 2024-03-19 DIAGNOSIS — T73.2XXA FATIGUE DUE TO EXPOSURE, INITIAL ENCOUNTER: ICD-10-CM

## 2024-03-19 DIAGNOSIS — U07.1 SARS-COV-2 POSITIVE: Primary | ICD-10-CM

## 2024-03-19 DIAGNOSIS — R11.0 NAUSEA: ICD-10-CM

## 2024-03-19 PROCEDURE — 99442 PR PHYSICIAN TELEPHONE EVALUATION 11-20 MIN: CPT | Mod: 95

## 2024-03-19 RX ORDER — ONDANSETRON 4 MG/1
4 TABLET, ORALLY DISINTEGRATING ORAL EVERY 8 HOURS PRN
Qty: 12 TABLET | Refills: 0 | Status: SHIPPED | OUTPATIENT
Start: 2024-03-19

## 2024-03-19 NOTE — TELEPHONE ENCOUNTER
Video appointment made with provider for treatment of covid based on pt being a transplant pt and pt  saying she is struggling with the body aches and fatigue.   Monika LAUN, RN

## 2024-03-19 NOTE — PROGRESS NOTES
"Assessment:    SARS-CoV-2 positive  - Adult Post Covid Clinic  Referral    Fatigue due to exposure, initial encounter  - Adult Post Covid Clinic  Referral    Nausea  - ondansetron (ZOFRAN ODT) 4 MG ODT tab  Dispense: 12 tablet; Refill: 0       COVID-19 positive patient.  Encounter for consideration of medication intervention.    Patient does not qualify for a prescription.   Full discussion with patient including medication options, risks and benefits.   Potential drug interactions reviewed with patient.      Plan:  She is out of treatment range for Paxlovid and is also on tacromilus so it is not recommended. Referral sent to COVID clinic due to fatigue.     Subjective      Ethel  is a 69 year old  who has a confirmed new positive COVID-19 diagnosis.      She has been identified as high risk for complications of this infection, and is being evaluated via a billable     Phone visit.      Phone call duration: 12 minutes  Patient location: Home  Provider location: Clinic    Concern for COVID-19  When did symptoms begin? 3/14/24    Positive COVID test? Yes    Symptoms (Cough, trouble breathing, fever, chills, headache, sore throat, chest pain, diarrhea, body aches)?   Vomiting, fatigue, body aches, cough     3 years post kidney transplant        Constitutional, HEENT, cardiovascular, pulmonary, gi and gu systems are negative, except as otherwise noted.    Objective    Vitals:  No vitals were obtained today due to virtual visit.  Estimated body mass index is 26.05 kg/m  as calculated from the following:    Height as of 11/1/23: 1.499 m (4' 11\").    Weight as of 11/1/23: 58.5 kg (129 lb).   General: Alert, no apparent distress  PSYCH: Alert; coherent speech, normal rate and volume, able to articulate logical thoughts, appropriate insight, no tangential thoughts, no hallucination or delusions.  Affect is appropriate  RESP: No cough, no audible wheezing, able to talk in full sentences  Remainder of exam " unable to be completed due to telephone visit  GFR Estimate   Date Value Ref Range Status   02/27/2024 68 >60 mL/min/1.73m2 Final   06/28/2021 64 >60 mL/min/[1.73_m2] Final     Comment:     Non  GFR Calc  Starting 12/18/2018, serum creatinine based estimated GFR (eGFR) will be   calculated using the Chronic Kidney Disease Epidemiology Collaboration   (CKD-EPI) equation.

## 2024-03-25 ENCOUNTER — TELEPHONE (OUTPATIENT)
Dept: TRANSPLANT | Facility: CLINIC | Age: 69
End: 2024-03-25
Payer: MEDICARE

## 2024-03-25 NOTE — TELEPHONE ENCOUNTER
From:Ethel Blow       Sent:3/25/2024 12:52 PM CDT         To:Jordy Dewitt    Subject:COVID    Hi Doc,  You may have seen that Ethel has COVID.  It's around two weeks of symptoms so far.  Her cough/congestion has improved slightly, but she suffers muscle pain throughout her body that constant Tylenol and Lidocaine patches only help a little.  Any suggestions to relieve or otherwise mitigate this pain?  I sent a note to her primary care doc (Beatriz) but thought I should update her transplant team as well.    Thanks,    Maxim PEREAID-19 Vaccination completed: 11/1/23    DSA: No    Last resulted: 9/25/23    Hx of rejection: No     Baseline Immunosuppression:  Tac 1 mg / 0.5 mg (4-6)  MMF 1250 BID    tested positive (did not specify when) MyChart message sent.    3/14/24 - Symptoms started    Symptoms: Vomiting, fatigue, body aches, cough     Message sent to Dr Dewitt.

## 2024-03-26 NOTE — TELEPHONE ENCOUNTER
Jordy Mohr MD Ututalum, Teresa, RN  Please reduce MMF to 750 mg po bid x 1 week and recheck  Check MPA level on this dose and will decide if we even need to increase      ----- Message -----  From: Noelle Max, SUN  Sent: 3/25/2024   3:14 PM CDT  To: Jordy Willis MD  Subject: COVID (+)                                        Dr Dewitt,    COVID-19 Vaccination completed: 11/1/23    DSA: No    Last resulted: 9/25/23    Hx of rejection: No    Baseline Immunosuppression:    Tac 1 mg / 0.5 mg (4-6)    MMF 1250 BID    tested positive    3/14/24 - Symptoms started    Symptoms: Vomiting, fatigue, body aches, cough    She has been seen by PCP.   reports cough and congestion has slightly improved but continues to suffer muscle pains.  They have sent a message to PCP regarding this as well.    Do you want to decrease MMF for a week? If so, how much?    Thanks,  Cesar        3/18/24 - tested positive for COVID-19    PLAN:  Decrease MMF dose from 1250 to 750 mg BID x 1 week and recheck levels.  If at goal, will stay on the same dose.    OUTCOME:  MyChart message sent.

## 2024-04-03 ENCOUNTER — LAB (OUTPATIENT)
Dept: LAB | Facility: CLINIC | Age: 69
End: 2024-04-03
Payer: MEDICARE

## 2024-04-03 ENCOUNTER — DOCUMENTATION ONLY (OUTPATIENT)
Dept: LAB | Facility: CLINIC | Age: 69
End: 2024-04-03

## 2024-04-03 ENCOUNTER — TELEPHONE (OUTPATIENT)
Dept: TRANSPLANT | Facility: CLINIC | Age: 69
End: 2024-04-03

## 2024-04-03 DIAGNOSIS — Z94.0 HTN, KIDNEY TRANSPLANT RELATED: ICD-10-CM

## 2024-04-03 DIAGNOSIS — Z94.0 KIDNEY REPLACED BY TRANSPLANT: ICD-10-CM

## 2024-04-03 DIAGNOSIS — Z94.0 KIDNEY TRANSPLANTED: Primary | ICD-10-CM

## 2024-04-03 DIAGNOSIS — Z98.890 OTHER SPECIFIED POSTPROCEDURAL STATES: ICD-10-CM

## 2024-04-03 DIAGNOSIS — Z48.298 AFTERCARE FOLLOWING ORGAN TRANSPLANT: ICD-10-CM

## 2024-04-03 DIAGNOSIS — Z79.899 ENCOUNTER FOR LONG-TERM CURRENT USE OF MEDICATION: ICD-10-CM

## 2024-04-03 DIAGNOSIS — Z79.899 IMMUNOSUPPRESSIVE MANAGEMENT ENCOUNTER FOLLOWING KIDNEY TRANSPLANT: ICD-10-CM

## 2024-04-03 DIAGNOSIS — Z94.0 IMMUNOSUPPRESSIVE MANAGEMENT ENCOUNTER FOLLOWING KIDNEY TRANSPLANT: ICD-10-CM

## 2024-04-03 DIAGNOSIS — Z94.0 KIDNEY REPLACED BY TRANSPLANT: Primary | ICD-10-CM

## 2024-04-03 DIAGNOSIS — I15.1 HTN, KIDNEY TRANSPLANT RELATED: ICD-10-CM

## 2024-04-03 LAB
ANION GAP SERPL CALCULATED.3IONS-SCNC: 13 MMOL/L (ref 7–15)
BUN SERPL-MCNC: 16 MG/DL (ref 8–23)
CALCIUM SERPL-MCNC: 9.6 MG/DL (ref 8.8–10.2)
CHLORIDE SERPL-SCNC: 104 MMOL/L (ref 98–107)
CREAT SERPL-MCNC: 0.87 MG/DL (ref 0.51–0.95)
DEPRECATED HCO3 PLAS-SCNC: 19 MMOL/L (ref 22–29)
EGFRCR SERPLBLD CKD-EPI 2021: 71 ML/MIN/1.73M2
ERYTHROCYTE [DISTWIDTH] IN BLOOD BY AUTOMATED COUNT: 11.9 % (ref 10–15)
GLUCOSE SERPL-MCNC: 106 MG/DL (ref 70–99)
HCT VFR BLD AUTO: 29.7 % (ref 35–47)
HGB BLD-MCNC: 9.4 G/DL (ref 11.7–15.7)
HOLD SPECIMEN: NORMAL
MCH RBC QN AUTO: 30.8 PG (ref 26.5–33)
MCHC RBC AUTO-ENTMCNC: 31.6 G/DL (ref 31.5–36.5)
MCV RBC AUTO: 97 FL (ref 78–100)
PLATELET # BLD AUTO: 220 10E3/UL (ref 150–450)
POTASSIUM SERPL-SCNC: 4.2 MMOL/L (ref 3.4–5.3)
RBC # BLD AUTO: 3.05 10E6/UL (ref 3.8–5.2)
SODIUM SERPL-SCNC: 136 MMOL/L (ref 135–145)
TACROLIMUS BLD-MCNC: 4.6 UG/L (ref 5–15)
TME LAST DOSE: ABNORMAL H
TME LAST DOSE: ABNORMAL H
WBC # BLD AUTO: 4.4 10E3/UL (ref 4–11)

## 2024-04-03 PROCEDURE — 80197 ASSAY OF TACROLIMUS: CPT

## 2024-04-03 PROCEDURE — 80048 BASIC METABOLIC PNL TOTAL CA: CPT

## 2024-04-03 PROCEDURE — 85027 COMPLETE CBC AUTOMATED: CPT

## 2024-04-03 PROCEDURE — 36415 COLL VENOUS BLD VENIPUNCTURE: CPT

## 2024-04-03 PROCEDURE — 86832 HLA CLASS I HIGH DEFIN QUAL: CPT

## 2024-04-03 PROCEDURE — 86833 HLA CLASS II HIGH DEFIN QUAL: CPT

## 2024-04-03 RX ORDER — LOSARTAN POTASSIUM 100 MG/1
100 TABLET ORAL DAILY
Qty: 90 TABLET | Refills: 1 | Status: SHIPPED | OUTPATIENT
Start: 2024-04-03 | End: 2024-10-03

## 2024-04-03 NOTE — PROGRESS NOTES
The add-on test Mycophenolic Acid that was requested on 04.03.24 is unable to be completed due to sample needs to be centrifuged within 2 hours and it was not.. Future order is available for collection. If labs are needed before next appointment, please arrange for collection.   Thank you.  Tiffany Craft MLT at [facility]

## 2024-04-03 NOTE — TELEPHONE ENCOUNTER
[facility]  Ethel had labs drawn this AM 04/03/2024  Needs lab orders in chart.

## 2024-04-04 ENCOUNTER — TELEPHONE (OUTPATIENT)
Dept: TRANSPLANT | Facility: CLINIC | Age: 69
End: 2024-04-04
Payer: MEDICARE

## 2024-04-04 NOTE — TELEPHONE ENCOUNTER
CO2 = 19  (4/3/24)    Currently on sodium bicarbonate 650 mg TID    PLAN:  Confirm she continues to take Na bicarb      OUTCOME:  Reports she is taking Na bicarb as ordered.  Discussed checking Mycophenolate next week.  Made aware orders are in.  States they will check on Wednesday 4/10/24.

## 2024-04-10 ENCOUNTER — LAB (OUTPATIENT)
Dept: LAB | Facility: CLINIC | Age: 69
End: 2024-04-10
Payer: MEDICARE

## 2024-04-10 DIAGNOSIS — Z94.0 IMMUNOSUPPRESSIVE MANAGEMENT ENCOUNTER FOLLOWING KIDNEY TRANSPLANT: ICD-10-CM

## 2024-04-10 DIAGNOSIS — Z94.0 KIDNEY TRANSPLANTED: ICD-10-CM

## 2024-04-10 DIAGNOSIS — Z79.899 IMMUNOSUPPRESSIVE MANAGEMENT ENCOUNTER FOLLOWING KIDNEY TRANSPLANT: ICD-10-CM

## 2024-04-10 DIAGNOSIS — Z48.298 AFTERCARE FOLLOWING ORGAN TRANSPLANT: ICD-10-CM

## 2024-04-10 PROCEDURE — 80180 DRUG SCRN QUAN MYCOPHENOLATE: CPT

## 2024-04-10 PROCEDURE — 36415 COLL VENOUS BLD VENIPUNCTURE: CPT

## 2024-04-12 LAB
MYCOPHENOLATE SERPL LC/MS/MS-MCNC: 0.93 MG/L (ref 1–3.5)
MYCOPHENOLATE-G SERPL LC/MS/MS-MCNC: 103.1 MG/L (ref 30–95)
TME LAST DOSE: ABNORMAL H
TME LAST DOSE: ABNORMAL H

## 2024-04-15 ENCOUNTER — TELEPHONE (OUTPATIENT)
Dept: TRANSPLANT | Facility: CLINIC | Age: 69
End: 2024-04-15
Payer: MEDICARE

## 2024-04-15 DIAGNOSIS — Z48.298 AFTERCARE FOLLOWING ORGAN TRANSPLANT: Primary | ICD-10-CM

## 2024-04-15 NOTE — TELEPHONE ENCOUNTER
Jordy Mohr MD Ututalum, Teresa, RN  Yes and repeat level in ~ 2 weeks  If remains <1, increase to 1g po bid     ----- Message -----  From: Noelle Max RN  Sent: 4/12/2024  10:18 AM CDT  To: Jordy Willis MD    MPA after dose reduced to 750 mg BID from 1250 BID.  Keep on 750 bid?        PLAN:  Recheck MPA level in 2 weeks.  Make sure it is a good 12 hour trough.

## 2024-04-17 NOTE — TELEPHONE ENCOUNTER
Call placed to patient. Patient confirm current MPA dose and v\u to repeat level in 2 weeks. Order sent

## 2024-04-24 ENCOUNTER — LAB (OUTPATIENT)
Dept: LAB | Facility: CLINIC | Age: 69
End: 2024-04-24
Payer: MEDICARE

## 2024-04-24 DIAGNOSIS — Z48.298 AFTERCARE FOLLOWING ORGAN TRANSPLANT: ICD-10-CM

## 2024-04-24 PROCEDURE — 80180 DRUG SCRN QUAN MYCOPHENOLATE: CPT

## 2024-04-24 PROCEDURE — 36415 COLL VENOUS BLD VENIPUNCTURE: CPT

## 2024-04-26 ENCOUNTER — TELEPHONE (OUTPATIENT)
Dept: TRANSPLANT | Facility: CLINIC | Age: 69
End: 2024-04-26

## 2024-04-26 DIAGNOSIS — Z94.0 KIDNEY REPLACED BY TRANSPLANT: Primary | ICD-10-CM

## 2024-04-26 DIAGNOSIS — Z94.0 HTN, KIDNEY TRANSPLANT RELATED: ICD-10-CM

## 2024-04-26 DIAGNOSIS — R80.1 PERSISTENT PROTEINURIA: ICD-10-CM

## 2024-04-26 DIAGNOSIS — B00.1 COLD SORE: ICD-10-CM

## 2024-04-26 DIAGNOSIS — I15.1 HTN, KIDNEY TRANSPLANT RELATED: ICD-10-CM

## 2024-04-26 LAB
MYCOPHENOLATE SERPL LC/MS/MS-MCNC: 0.98 MG/L (ref 1–3.5)
MYCOPHENOLATE-G SERPL LC/MS/MS-MCNC: 119.6 MG/L (ref 30–95)
TME LAST DOSE: ABNORMAL H
TME LAST DOSE: ABNORMAL H

## 2024-04-26 RX ORDER — MYCOPHENOLATE MOFETIL 250 MG/1
1000 CAPSULE ORAL 2 TIMES DAILY
Qty: 720 CAPSULE | Refills: 3 | Status: SHIPPED | OUTPATIENT
Start: 2024-04-26

## 2024-04-26 NOTE — CONFIDENTIAL NOTE
See notes from 4/15/24. Per Dr. Ronal Willis, if MPA level <1.0 on 750 mg BID (post COVID viral infection) then increase dose MMF to 1000 mg (1 gram) PO BID.     [facility] to increase dose.   Prescription updated.     Jennie Dela Cruz, RN, BSN  Solid Organ Transplant, Post Kidney and Pancreas  Transplant Care Coordinator  [phone]

## 2024-04-26 NOTE — TELEPHONE ENCOUNTER
Jordy Mohr MD Ututalum, Teresa, RN  Yes if no recent infections      ----- Message -----  From: Noelle Max RN  Sent: 4/26/2024  10:05 AM CDT  To: Jordy iWllis MD    MPA consistent at 0.9 after dose reduced fr 1250 BID to 750 BID  Increase to 1 g BID?        OUTCOME:  MyChart message sent to assess infections.

## 2024-05-01 ENCOUNTER — OFFICE VISIT (OUTPATIENT)
Dept: FAMILY MEDICINE | Facility: CLINIC | Age: 69
End: 2024-05-01
Payer: MEDICARE

## 2024-05-01 VITALS
RESPIRATION RATE: 18 BRPM | TEMPERATURE: 97.7 F | SYSTOLIC BLOOD PRESSURE: 145 MMHG | OXYGEN SATURATION: 98 % | WEIGHT: 127 LBS | HEART RATE: 80 BPM | HEIGHT: 59 IN | BODY MASS INDEX: 25.6 KG/M2 | DIASTOLIC BLOOD PRESSURE: 70 MMHG

## 2024-05-01 DIAGNOSIS — B00.1 COLD SORE: ICD-10-CM

## 2024-05-01 DIAGNOSIS — Z94.0 KIDNEY REPLACED BY TRANSPLANT: ICD-10-CM

## 2024-05-01 DIAGNOSIS — E78.5 DYSLIPIDEMIA: ICD-10-CM

## 2024-05-01 DIAGNOSIS — Z94.0 KIDNEY TRANSPLANTED: ICD-10-CM

## 2024-05-01 DIAGNOSIS — N25.81 SECONDARY RENAL HYPERPARATHYROIDISM (H): ICD-10-CM

## 2024-05-01 DIAGNOSIS — R80.1 PERSISTENT PROTEINURIA: ICD-10-CM

## 2024-05-01 DIAGNOSIS — D84.9 IMMUNOSUPPRESSION (H): ICD-10-CM

## 2024-05-01 DIAGNOSIS — E11.21 TYPE 2 DIABETES MELLITUS WITH DIABETIC NEPHROPATHY, WITHOUT LONG-TERM CURRENT USE OF INSULIN (H): Primary | ICD-10-CM

## 2024-05-01 DIAGNOSIS — E03.9 HYPOTHYROIDISM, UNSPECIFIED TYPE: ICD-10-CM

## 2024-05-01 LAB — HBA1C MFR BLD: 5.4 % (ref 0–5.6)

## 2024-05-01 PROCEDURE — 82570 ASSAY OF URINE CREATININE: CPT | Performed by: FAMILY MEDICINE

## 2024-05-01 PROCEDURE — 80051 ELECTROLYTE PANEL: CPT | Performed by: FAMILY MEDICINE

## 2024-05-01 PROCEDURE — 80061 LIPID PANEL: CPT | Performed by: FAMILY MEDICINE

## 2024-05-01 PROCEDURE — 82043 UR ALBUMIN QUANTITATIVE: CPT | Performed by: FAMILY MEDICINE

## 2024-05-01 PROCEDURE — 84443 ASSAY THYROID STIM HORMONE: CPT | Performed by: FAMILY MEDICINE

## 2024-05-01 PROCEDURE — 84520 ASSAY OF UREA NITROGEN: CPT | Performed by: FAMILY MEDICINE

## 2024-05-01 PROCEDURE — 82310 ASSAY OF CALCIUM: CPT | Performed by: FAMILY MEDICINE

## 2024-05-01 PROCEDURE — 99214 OFFICE O/P EST MOD 30 MIN: CPT | Performed by: FAMILY MEDICINE

## 2024-05-01 PROCEDURE — 36415 COLL VENOUS BLD VENIPUNCTURE: CPT | Performed by: FAMILY MEDICINE

## 2024-05-01 PROCEDURE — 99207 PR FOOT EXAM NO CHARGE: CPT | Performed by: FAMILY MEDICINE

## 2024-05-01 PROCEDURE — 83036 HEMOGLOBIN GLYCOSYLATED A1C: CPT | Performed by: FAMILY MEDICINE

## 2024-05-01 PROCEDURE — G2211 COMPLEX E/M VISIT ADD ON: HCPCS | Performed by: FAMILY MEDICINE

## 2024-05-01 PROCEDURE — 82565 ASSAY OF CREATININE: CPT | Performed by: FAMILY MEDICINE

## 2024-05-01 RX ORDER — METFORMIN HCL 500 MG
500 TABLET, EXTENDED RELEASE 24 HR ORAL 2 TIMES DAILY WITH MEALS
Qty: 180 TABLET | Refills: 1 | Status: SHIPPED | OUTPATIENT
Start: 2024-05-01

## 2024-05-01 RX ORDER — FUROSEMIDE 20 MG
20 TABLET ORAL PRN
Qty: 90 TABLET | Refills: 0 | Status: SHIPPED | OUTPATIENT
Start: 2024-05-01 | End: 2024-07-28

## 2024-05-01 RX ORDER — VALACYCLOVIR HYDROCHLORIDE 500 MG/1
500 TABLET, FILM COATED ORAL DAILY
Qty: 90 TABLET | Refills: 3 | Status: SHIPPED | OUTPATIENT
Start: 2024-05-01

## 2024-05-01 RX ORDER — ORAL SEMAGLUTIDE 7 MG/1
7 TABLET ORAL DAILY
Qty: 90 TABLET | Refills: 1 | Status: SHIPPED | OUTPATIENT
Start: 2024-05-01 | End: 2024-05-06

## 2024-05-01 RX ORDER — RESPIRATORY SYNCYTIAL VIRUS VACCINE 120MCG/0.5
0.5 KIT INTRAMUSCULAR ONCE
Qty: 1 EACH | Refills: 0 | Status: CANCELLED | OUTPATIENT
Start: 2024-05-01 | End: 2024-05-01

## 2024-05-01 ASSESSMENT — PAIN SCALES - GENERAL: PAINLEVEL: MODERATE PAIN (5)

## 2024-05-01 NOTE — PROGRESS NOTES
Assessment & Plan     Type 2 diabetes mellitus with diabetic nephropathy, without long-term current use of insulin (H)  Well controlled on current meds  On aspirin statin and arb  Due for eye exam  - **Basic metabolic panel FUTURE 2mo; Future  - **Hemoglobin A1c FUTURE 3mo; Future  - Albumin Random Urine Quantitative with Creat Ratio; Future  - metFORMIN (GLUCOPHAGE XR) 500 MG 24 hr tablet; Take 1 tablet (500 mg) by mouth 2 times daily (with meals)  - Semaglutide (RYBELSUS) 7 MG tablet; Take 1 tablet (7 mg) by mouth daily  - **Basic metabolic panel FUTURE 2mo  - **Hemoglobin A1c FUTURE 3mo  - Albumin Random Urine Quantitative with Creat Ratio    Hypothyroidism, unspecified type  Recheck after dose adjustment  - **TSH with free T4 reflex FUTURE 2mo; Future  - **TSH with free T4 reflex FUTURE 2mo    Dyslipidemia  At goal on meds  - Lipid panel reflex to direct LDL Fasting; Future  - Lipid panel reflex to direct LDL Fasting    Immunosuppression (H24)  Per transplant team    Secondary renal hyperparathyroidism (H24)  monitoring    Kidney replaced by transplant  Per transplant team  - furosemide (LASIX) 20 MG tablet; Take 1 tablet (20 mg) by mouth as needed (swelling)  - valACYclovir (VALTREX) 500 MG tablet; Take 1 tablet (500 mg) by mouth daily    Cold sore  Use as needed  - furosemide (LASIX) 20 MG tablet; Take 1 tablet (20 mg) by mouth as needed (swelling)  - valACYclovir (VALTREX) 500 MG tablet; Take 1 tablet (500 mg) by mouth daily    Persistent proteinuria    - furosemide (LASIX) 20 MG tablet; Take 1 tablet (20 mg) by mouth as needed (swelling)    Kidney transplanted    - furosemide (LASIX) 20 MG tablet; Take 1 tablet (20 mg) by mouth as needed (swelling)      The longitudinal plan of care for the diagnosis(es)/condition(s) as documented were addressed during this visit. Due to the added complexity in care, I will continue to support Ethel in the subsequent management and with ongoing continuity of  "care.        BMI  Estimated body mass index is 26.09 kg/m  as calculated from the following:    Height as of this encounter: 1.486 m (4' 10.5\").    Weight as of this encounter: 57.6 kg (127 lb).             Steph Davenport is a 69 year old, presenting for the following health issues:  Thyroid Problem        5/1/2024    10:40 AM   Additional Questions   Roomed by rachel   Accompanied by      HPI       Diabetes Follow-up    How often are you checking your blood sugar? A few times a week  What time of day are you checking your blood sugars (select all that apply)?  Not applicable  Have you had any blood sugars above 200?  No  Have you had any blood sugars below 70?  No  What symptoms do you notice when your blood sugar is low?  None  What concerns do you have today about your diabetes? None   Do you have any of these symptoms? (Select all that apply)  Weight gain  Have you had a diabetic eye exam in the last 12 months?             Hyperlipidemia Follow-Up    Are you regularly taking any medication or supplement to lower your cholesterol?   Yes- lipitor  Are you having muscle aches or other side effects that you think could be caused by your cholesterol lowering medication?  No    Hypertension Follow-up    Do you check your blood pressure regularly outside of the clinic? No   Are you following a low salt diet? Yes  Are your blood pressures ever more than 140 on the top number (systolic) OR more   than 90 on the bottom number (diastolic), for example 140/90? No    BP Readings from Last 2 Encounters:   05/01/24 (!) 145/70   11/01/23 138/68     Hemoglobin A1C (%)   Date Value   05/01/2024 5.4   11/01/2023 5.9 (H)   03/01/2021 7.9 (H)   01/25/2021 7.3 (H)     LDL Cholesterol Calculated   Date Value   07/31/2023 67 mg/dL   09/27/2021      Comment:     Cannot estimate LDL when triglyceride exceeds 400 mg/dL   03/01/2021     Cannot estimate LDL when triglyceride exceeds 400 mg/dL mg/dL   09/03/2020     Cannot estimate " "LDL when triglyceride exceeds 400 mg/dL mg/dL     LDL Cholesterol Direct (mg/dL)   Date Value   09/27/2021 100 (H)   03/01/2021 78         Hypothyroidism Follow-up    Since last visit, patient describes the following symptoms: Weight stable, no hair loss, no skin changes, no constipation, no loose stools  How many servings of fruits and vegetables do you eat daily?  2-3  On average, how many sweetened beverages do you drink each day (Examples: soda, juice, sweet tea, etc.  Do NOT count diet or artificially sweetened beverages)?   0  How many days per week do you exercise enough to make your heart beat faster? 3 or less  How many minutes a day do you exercise enough to make your heart beat faster? 9 or less  How many days per week do you miss taking your medication? 0    Just getting over covid  Here with .   Diabetes well controlled.  Will monitor BP at home, a bit elevated today  Needs refill of meds      Review of Systems  Constitutional, HEENT, cardiovascular, pulmonary, gi and gu systems are negative, except as otherwise noted.      Objective    BP (!) 145/70   Pulse 80   Temp 97.7  F (36.5  C) (Oral)   Resp 18   Ht 1.486 m (4' 10.5\")   Wt 57.6 kg (127 lb)   SpO2 98%   BMI 26.09 kg/m    Body mass index is 26.09 kg/m .  Physical Exam   GENERAL: alert and no distress  NECK: no adenopathy, no asymmetry, masses, or scars  RESP: lungs clear to auscultation - no rales, rhonchi or wheezes  CV: regular rate and rhythm, normal S1 S2, no S3 or S4, no murmur, click or rub, no peripheral edema   ABDOMEN: soft, nontender, no hepatosplenomegaly, no masses and bowel sounds normal  MS: no gross musculoskeletal defects noted, no edema  Diabetic foot exam: normal DP and PT pulses, no trophic changes or ulcerative lesions, and normal sensory exam    Results for orders placed or performed in visit on 05/01/24 (from the past 24 hour(s))   **Hemoglobin A1c FUTURE 3mo   Result Value Ref Range    Hemoglobin A1C 5.4 0.0 - " 5.6 %           Signed Electronically by: Ally Dickinson MD

## 2024-05-02 ENCOUNTER — DOCUMENTATION ONLY (OUTPATIENT)
Dept: FAMILY MEDICINE | Facility: CLINIC | Age: 69
End: 2024-05-02
Payer: MEDICARE

## 2024-05-02 LAB
ANION GAP SERPL CALCULATED.3IONS-SCNC: 15 MMOL/L (ref 7–15)
BUN SERPL-MCNC: 21.4 MG/DL (ref 8–23)
CALCIUM SERPL-MCNC: 10 MG/DL (ref 8.8–10.2)
CHLORIDE SERPL-SCNC: 107 MMOL/L (ref 98–107)
CHOLEST SERPL-MCNC: 152 MG/DL
CREAT SERPL-MCNC: 0.91 MG/DL (ref 0.51–0.95)
CREAT UR-MCNC: 29 MG/DL
DEPRECATED HCO3 PLAS-SCNC: 16 MMOL/L (ref 22–29)
EGFRCR SERPLBLD CKD-EPI 2021: 68 ML/MIN/1.73M2
FASTING STATUS PATIENT QL REPORTED: NO
GLUCOSE SERPL-MCNC: ABNORMAL MG/DL
HDLC SERPL-MCNC: 46 MG/DL
LDLC SERPL CALC-MCNC: 56 MG/DL
MICROALBUMIN UR-MCNC: <12 MG/L
MICROALBUMIN/CREAT UR: NORMAL MG/G{CREAT}
NONHDLC SERPL-MCNC: 106 MG/DL
POTASSIUM SERPL-SCNC: 5 MMOL/L (ref 3.4–5.3)
SODIUM SERPL-SCNC: 138 MMOL/L (ref 135–145)
TRIGL SERPL-MCNC: 248 MG/DL
TSH SERPL DL<=0.005 MIU/L-ACNC: 1.45 UIU/ML (ref 0.3–4.2)

## 2024-05-03 ENCOUNTER — TELEPHONE (OUTPATIENT)
Dept: TRANSPLANT | Facility: CLINIC | Age: 69
End: 2024-05-03
Payer: MEDICARE

## 2024-05-03 ENCOUNTER — MYC MEDICAL ADVICE (OUTPATIENT)
Dept: FAMILY MEDICINE | Facility: CLINIC | Age: 69
End: 2024-05-03
Payer: MEDICARE

## 2024-05-03 DIAGNOSIS — Z94.0 KIDNEY TRANSPLANTED: ICD-10-CM

## 2024-05-03 DIAGNOSIS — E11.21 TYPE 2 DIABETES MELLITUS WITH DIABETIC NEPHROPATHY, WITHOUT LONG-TERM CURRENT USE OF INSULIN (H): ICD-10-CM

## 2024-05-03 RX ORDER — SODIUM BICARBONATE 650 MG/1
1300 TABLET ORAL 3 TIMES DAILY
Qty: 540 TABLET | Refills: 3 | Status: SHIPPED | OUTPATIENT
Start: 2024-05-03

## 2024-05-03 NOTE — TELEPHONE ENCOUNTER
Jordy Mohr MD Ututalum, Teresa, RN  1300 mg po tid      ----- Message -----  From: Noelle Max RN  Sent: 5/3/2024  12:41 PM CDT  To: Jordy Willis MD  Subject: low CO2                                          Dr Dewitt,    CO2 = 16 (5/1/24)  Continues to take Na bicarb 650 mg TID  Denies diarrhea.  How much do you want  to increase dose?    Thanks,  Cesar        OUTCOME:  Mychart message sent.  Updated prescription sent.

## 2024-05-03 NOTE — PROGRESS NOTES
Please review the corrected report for Basic Panel done on 5/1/2024.  The Glucose value has been updated.  Thank you,  Lisa Vaughan CMA([facility])

## 2024-05-06 RX ORDER — ORAL SEMAGLUTIDE 7 MG/1
7 TABLET ORAL DAILY
Qty: 90 TABLET | Refills: 1 | Status: SHIPPED | OUTPATIENT
Start: 2024-05-06

## 2024-05-08 ENCOUNTER — TELEPHONE (OUTPATIENT)
Dept: CARDIOLOGY | Facility: CLINIC | Age: 69
End: 2024-05-08
Payer: MEDICARE

## 2024-05-08 ENCOUNTER — MYC MEDICAL ADVICE (OUTPATIENT)
Dept: FAMILY MEDICINE | Facility: CLINIC | Age: 69
End: 2024-05-08
Payer: MEDICARE

## 2024-05-08 DIAGNOSIS — H90.3 SENSORINEURAL HEARING LOSS (SNHL) OF BOTH EARS: Primary | ICD-10-CM

## 2024-05-08 NOTE — TELEPHONE ENCOUNTER
----- Message from Nancy Tan sent at 5/8/2024 12:59 PM CDT -----  Regarding: audiology referral  Hi,    This patient is scheduled for a hearing evaluation. Can you please put an order in epic for this?      Thanks!    Barrett Crook.  Doctor of Audiology  [place] License # [id number]

## 2024-05-10 ENCOUNTER — OFFICE VISIT (OUTPATIENT)
Dept: AUDIOLOGY | Facility: CLINIC | Age: 69
End: 2024-05-10
Payer: MEDICARE

## 2024-05-10 DIAGNOSIS — H90.3 SENSORINEURAL HEARING LOSS (SNHL) OF BOTH EARS: Primary | ICD-10-CM

## 2024-05-10 PROCEDURE — 92550 TYMPANOMETRY & REFLEX THRESH: CPT | Performed by: AUDIOLOGIST

## 2024-05-10 PROCEDURE — 92557 COMPREHENSIVE HEARING TEST: CPT | Performed by: AUDIOLOGIST

## 2024-05-10 NOTE — PROGRESS NOTES
AUDIOLOGY REPORT    SUBJECTIVE:  Ethel Thompson is a 69 year old female who was seen in the Audiology Clinic at the [facility] for audiologic evaluation, referred by Ally Dickinson M.D.  The patient reports a longstanding hearing loss bilaterally for which she wears hearing aids. She reports a history of occupational noise exposure. The patient denies otalgia, aural fullness, otorrhea, tinnitus, and dizziness.  The patient notes difficulty with communication in a variety of listening situations.    OBJECTIVE:  Abuse Screening:  Do you feel unsafe at home or work/school? No  Do you feel threatened by someone? No  Does anyone try to keep you from having contact with others, or doing things outside of your home? No  Physical signs of abuse present? No     Fall Risk Screen:  1. Have you fallen two or more times in the past year? No  2. Have you fallen and had an injury in the past year? No    Otoscopic exam indicates ears are clear of cerumen bilaterally     Pure Tone Thresholds assessed using conventional audiometry with good  reliability from 250-8000 Hz bilaterally using insert earphones and circumaural headphones     RIGHT:  mild sloping to moderate sensorineural hearing loss    LEFT:    mild sloping to moderate-severe sensorineural hearing loss    Tympanogram:    RIGHT: slightly shallow    LEFT:   normal eardrum mobility    Reflexes (reported by stimulus ear):  RIGHT: Ipsilateral is elevated  RIGHT: Contralateral is absent at frequencies tested  LEFT:   Ipsilateral is absent at frequencies tested  LEFT:   Contralateral is absent at frequencies tested      Speech Reception Threshold:    RIGHT: 45 dB HL    LEFT:   45 dB HL  Word Recognition Score:     RIGHT: 88% at 85 dB HL using NU-6 recorded word list.    LEFT:   92% at 85 dB HL using NU-6 recorded word list.      ASSESSMENT:     ICD-10-CM    1. Sensorineural hearing loss (SNHL) of both ears  H90.3 Adult Audiology [facility] Referral      [facility] Thrld Eval & Speech Recog ([id number])     Tymps / Reflex   ([id number])          Today s results were discussed with the patient in detail.     PLAN:  Patient was counseled regarding hearing loss and impact on communication. It is recommended that the patient be retested in approximately 1 year or sooner if new concerns arise.  Please call this clinic with questions regarding these results or recommendations.        Barrett Crook.  Doctor of Audiology  [place] License # [id number]

## 2024-06-27 ENCOUNTER — LAB (OUTPATIENT)
Dept: LAB | Facility: CLINIC | Age: 69
End: 2024-06-27
Payer: MEDICARE

## 2024-06-27 DIAGNOSIS — T86.10 COMPLICATIONS, KIDNEY TRANSPLANT: ICD-10-CM

## 2024-06-27 DIAGNOSIS — N25.81 SECONDARY HYPERPARATHYROIDISM (H): ICD-10-CM

## 2024-06-27 DIAGNOSIS — Z94.0 KIDNEY TRANSPLANTED: ICD-10-CM

## 2024-06-27 DIAGNOSIS — Z48.298 AFTERCARE FOLLOWING ORGAN TRANSPLANT: ICD-10-CM

## 2024-06-27 DIAGNOSIS — Z94.0 KIDNEY REPLACED BY TRANSPLANT: ICD-10-CM

## 2024-06-27 DIAGNOSIS — Z79.899 ENCOUNTER FOR LONG-TERM CURRENT USE OF MEDICATION: ICD-10-CM

## 2024-06-27 DIAGNOSIS — Z98.890 OTHER SPECIFIED POSTPROCEDURAL STATES: ICD-10-CM

## 2024-06-27 LAB
ALBUMIN MFR UR ELPH: 11.1 MG/DL
ANION GAP SERPL CALCULATED.3IONS-SCNC: 10 MMOL/L (ref 7–15)
BUN SERPL-MCNC: 25.9 MG/DL (ref 8–23)
CALCIUM SERPL-MCNC: 10.1 MG/DL (ref 8.8–10.2)
CHLORIDE SERPL-SCNC: 105 MMOL/L (ref 98–107)
CREAT SERPL-MCNC: 0.96 MG/DL (ref 0.51–0.95)
CREAT UR-MCNC: 74.1 MG/DL
DEPRECATED HCO3 PLAS-SCNC: 22 MMOL/L (ref 22–29)
EGFRCR SERPLBLD CKD-EPI 2021: 63 ML/MIN/1.73M2
ERYTHROCYTE [DISTWIDTH] IN BLOOD BY AUTOMATED COUNT: 12.4 % (ref 10–15)
GLUCOSE SERPL-MCNC: 107 MG/DL (ref 70–99)
HCT VFR BLD AUTO: 31.4 % (ref 35–47)
HGB BLD-MCNC: 10 G/DL (ref 11.7–15.7)
MCH RBC QN AUTO: 30.7 PG (ref 26.5–33)
MCHC RBC AUTO-ENTMCNC: 31.8 G/DL (ref 31.5–36.5)
MCV RBC AUTO: 96 FL (ref 78–100)
PLATELET # BLD AUTO: 190 10E3/UL (ref 150–450)
POTASSIUM SERPL-SCNC: 5 MMOL/L (ref 3.4–5.3)
PROT/CREAT 24H UR: 0.15 MG/MG CR (ref 0–0.2)
PTH-INTACT SERPL-MCNC: 63 PG/ML (ref 15–65)
RBC # BLD AUTO: 3.26 10E6/UL (ref 3.8–5.2)
SODIUM SERPL-SCNC: 137 MMOL/L (ref 135–145)
TACROLIMUS BLD-MCNC: 7.6 UG/L (ref 5–15)
TME LAST DOSE: NORMAL H
TME LAST DOSE: NORMAL H
WBC # BLD AUTO: 5.3 10E3/UL (ref 4–11)

## 2024-06-27 PROCEDURE — 84156 ASSAY OF PROTEIN URINE: CPT

## 2024-06-27 PROCEDURE — 85027 COMPLETE CBC AUTOMATED: CPT

## 2024-06-27 PROCEDURE — 83970 ASSAY OF PARATHORMONE: CPT

## 2024-06-27 PROCEDURE — 80048 BASIC METABOLIC PNL TOTAL CA: CPT

## 2024-06-27 PROCEDURE — 36415 COLL VENOUS BLD VENIPUNCTURE: CPT

## 2024-06-27 PROCEDURE — 80197 ASSAY OF TACROLIMUS: CPT

## 2024-06-28 ENCOUNTER — TELEPHONE (OUTPATIENT)
Dept: TRANSPLANT | Facility: CLINIC | Age: 69
End: 2024-06-28
Payer: MEDICARE

## 2024-06-28 DIAGNOSIS — Z94.0 KIDNEY TRANSPLANTED: Primary | ICD-10-CM

## 2024-06-28 DIAGNOSIS — Z94.0 IMMUNOSUPPRESSIVE MANAGEMENT ENCOUNTER FOLLOWING KIDNEY TRANSPLANT: ICD-10-CM

## 2024-06-28 DIAGNOSIS — Z48.298 AFTERCARE FOLLOWING ORGAN TRANSPLANT: ICD-10-CM

## 2024-06-28 DIAGNOSIS — Z79.899 IMMUNOSUPPRESSIVE MANAGEMENT ENCOUNTER FOLLOWING KIDNEY TRANSPLANT: ICD-10-CM

## 2024-06-28 NOTE — TELEPHONE ENCOUNTER
Tacrolimus = 7.6 (6/27/24)  Goal 4-6  Current tac dose  1 mg / 0.5 mg    PLAN:   Call and confirm this was a good 12 - hour trough.   Verify current dose.   Confirm no new medications or illness (steven. Diarrhea).  If good trough and correct dose above, recommend:  stay on the same dose.     Recheck level in 2 weeks and make sure it is a good trough to avoid additional lab draws.      OUTCOME:  Called Ethel Thompson . Spoke with Ethel and .  Reports had on and off diarrhea. It is now resolved.  She will repeat tac level in 2 weeks.

## 2024-07-11 ENCOUNTER — LAB (OUTPATIENT)
Dept: LAB | Facility: CLINIC | Age: 69
End: 2024-07-11
Payer: MEDICARE

## 2024-07-11 DIAGNOSIS — Z94.0 KIDNEY TRANSPLANTED: ICD-10-CM

## 2024-07-11 DIAGNOSIS — Z94.0 IMMUNOSUPPRESSIVE MANAGEMENT ENCOUNTER FOLLOWING KIDNEY TRANSPLANT: ICD-10-CM

## 2024-07-11 DIAGNOSIS — Z79.899 IMMUNOSUPPRESSIVE MANAGEMENT ENCOUNTER FOLLOWING KIDNEY TRANSPLANT: ICD-10-CM

## 2024-07-11 DIAGNOSIS — Z48.298 AFTERCARE FOLLOWING ORGAN TRANSPLANT: ICD-10-CM

## 2024-07-11 LAB
TACROLIMUS BLD-MCNC: 6.7 UG/L (ref 5–15)
TME LAST DOSE: NORMAL H
TME LAST DOSE: NORMAL H

## 2024-07-11 PROCEDURE — 80197 ASSAY OF TACROLIMUS: CPT

## 2024-07-11 PROCEDURE — 36415 COLL VENOUS BLD VENIPUNCTURE: CPT

## 2024-07-28 DIAGNOSIS — B00.1 COLD SORE: ICD-10-CM

## 2024-07-28 DIAGNOSIS — Z94.0 KIDNEY REPLACED BY TRANSPLANT: ICD-10-CM

## 2024-07-28 DIAGNOSIS — R80.1 PERSISTENT PROTEINURIA: ICD-10-CM

## 2024-07-28 DIAGNOSIS — Z94.0 KIDNEY TRANSPLANTED: ICD-10-CM

## 2024-07-28 RX ORDER — FUROSEMIDE 20 MG
20 TABLET ORAL DAILY PRN
Qty: 90 TABLET | Refills: 0 | Status: SHIPPED | OUTPATIENT
Start: 2024-07-28

## 2024-08-19 ENCOUNTER — TELEPHONE (OUTPATIENT)
Dept: TRANSPLANT | Facility: CLINIC | Age: 69
End: 2024-08-19
Payer: MEDICARE

## 2024-08-19 DIAGNOSIS — Z94.0 KIDNEY TRANSPLANTED: ICD-10-CM

## 2024-08-19 DIAGNOSIS — R80.1 PERSISTENT PROTEINURIA: ICD-10-CM

## 2024-08-19 DIAGNOSIS — Z94.0 KIDNEY REPLACED BY TRANSPLANT: ICD-10-CM

## 2024-08-19 DIAGNOSIS — B00.1 COLD SORE: ICD-10-CM

## 2024-08-19 RX ORDER — TACROLIMUS 0.5 MG/1
0.5 CAPSULE ORAL EVERY EVENING
Qty: 90 CAPSULE | Refills: 3 | Status: SHIPPED | OUTPATIENT
Start: 2024-08-19 | End: 2024-08-28

## 2024-08-19 RX ORDER — TACROLIMUS 1 MG/1
1 CAPSULE ORAL EVERY MORNING
Qty: 90 CAPSULE | Refills: 3 | Status: SHIPPED | OUTPATIENT
Start: 2024-08-19 | End: 2024-08-28

## 2024-08-19 NOTE — TELEPHONE ENCOUNTER
Patient Call: General  Route to LPN    Reason for call: Pt's  called for refill  tacrolimus (GENERIC EQUIVALENT) 1 MG capsule    Please send to [employer] [facility] - [place], [place] - [address] [place] [address] Phone: [phone]   Fax: [phone]        Per spouse, "[employer]" has been sending requests but they have not been received. Pt will run out soon, please send refill ASAP as delivery will take time.     Please call back or send [employer] message when sent.      Call back needed? Yes    Return Call Needed  Same as documented in contacts section  When to return call?: Same day: Route High Priority

## 2024-08-27 ENCOUNTER — LAB (OUTPATIENT)
Dept: LAB | Facility: CLINIC | Age: 69
End: 2024-08-27
Payer: MEDICARE

## 2024-08-27 DIAGNOSIS — Z79.899 ENCOUNTER FOR LONG-TERM CURRENT USE OF MEDICATION: ICD-10-CM

## 2024-08-27 DIAGNOSIS — Z48.298 AFTERCARE FOLLOWING ORGAN TRANSPLANT: ICD-10-CM

## 2024-08-27 DIAGNOSIS — Z94.0 KIDNEY REPLACED BY TRANSPLANT: ICD-10-CM

## 2024-08-27 DIAGNOSIS — Z98.890 OTHER SPECIFIED POSTPROCEDURAL STATES: ICD-10-CM

## 2024-08-27 LAB
ANION GAP SERPL CALCULATED.3IONS-SCNC: 11 MMOL/L (ref 7–15)
BUN SERPL-MCNC: 25.8 MG/DL (ref 8–23)
CALCIUM SERPL-MCNC: 10.3 MG/DL (ref 8.8–10.4)
CHLORIDE SERPL-SCNC: 106 MMOL/L (ref 98–107)
CREAT SERPL-MCNC: 0.91 MG/DL (ref 0.51–0.95)
EGFRCR SERPLBLD CKD-EPI 2021: 68 ML/MIN/1.73M2
ERYTHROCYTE [DISTWIDTH] IN BLOOD BY AUTOMATED COUNT: 12.5 % (ref 10–15)
GLUCOSE SERPL-MCNC: 110 MG/DL (ref 70–99)
HCO3 SERPL-SCNC: 21 MMOL/L (ref 22–29)
HCT VFR BLD AUTO: 31.5 % (ref 35–47)
HGB BLD-MCNC: 9.9 G/DL (ref 11.7–15.7)
MCH RBC QN AUTO: 31.7 PG (ref 26.5–33)
MCHC RBC AUTO-ENTMCNC: 31.4 G/DL (ref 31.5–36.5)
MCV RBC AUTO: 101 FL (ref 78–100)
PLATELET # BLD AUTO: 188 10E3/UL (ref 150–450)
POTASSIUM SERPL-SCNC: 5.5 MMOL/L (ref 3.4–5.3)
RBC # BLD AUTO: 3.12 10E6/UL (ref 3.8–5.2)
SODIUM SERPL-SCNC: 138 MMOL/L (ref 135–145)
TACROLIMUS BLD-MCNC: 8.2 UG/L (ref 5–15)
TME LAST DOSE: NORMAL H
TME LAST DOSE: NORMAL H
WBC # BLD AUTO: 5.5 10E3/UL (ref 4–11)

## 2024-08-27 PROCEDURE — 86832 HLA CLASS I HIGH DEFIN QUAL: CPT

## 2024-08-27 PROCEDURE — 85027 COMPLETE CBC AUTOMATED: CPT

## 2024-08-27 PROCEDURE — 86833 HLA CLASS II HIGH DEFIN QUAL: CPT

## 2024-08-27 PROCEDURE — 80048 BASIC METABOLIC PNL TOTAL CA: CPT

## 2024-08-27 PROCEDURE — 80197 ASSAY OF TACROLIMUS: CPT

## 2024-08-27 PROCEDURE — 36415 COLL VENOUS BLD VENIPUNCTURE: CPT

## 2024-08-28 ENCOUNTER — TELEPHONE (OUTPATIENT)
Dept: TRANSPLANT | Facility: CLINIC | Age: 69
End: 2024-08-28
Payer: MEDICARE

## 2024-08-28 DIAGNOSIS — Z94.0 KIDNEY REPLACED BY TRANSPLANT: ICD-10-CM

## 2024-08-28 DIAGNOSIS — B00.1 COLD SORE: ICD-10-CM

## 2024-08-28 DIAGNOSIS — R80.1 PERSISTENT PROTEINURIA: ICD-10-CM

## 2024-08-28 DIAGNOSIS — Z94.0 KIDNEY TRANSPLANTED: Primary | ICD-10-CM

## 2024-08-28 NOTE — TELEPHONE ENCOUNTER
Call placed to patient. Spoke with patient and her . They confirm current dose and accurate trough level. Denies any recent illness, diarrhea or medication changes. They v\u to reduce patient tacrolimus dose to 0.5 mg bid; Reduce dietary potassium and repeat labs in one week. Order/rx sent

## 2024-08-28 NOTE — TELEPHONE ENCOUNTER
ISSUE #1  Tacrolimus = 8.2  (8/27/24)  Goal 4-6  Current 1 mg / 0.5 mg    Previous levels also above goal on current dose.    PLAN:   Call and confirm this was a good 12 - hour trough.   Verify current dose.   Confirm no new medications or illness (steven. Diarrhea).  If good trough and correct dose above, recommend:  decrease dose to 0.5 mg BID.     Is this more than a 50% increase or decrease in current IS dose: No  If YES, justification: N/A  *If > 50% change in immunosuppression dose, repeat labs in 1 week.     Recheck level in after a week of the dose change and make sure it is a good trough to avoid additional lab draws.  If level too low might have to switch to Tac suspension.      ISSUE #2  Potassium = 5.5  (8/27/24)    PLAN:  Recommend eating LESS of Potassium rich food.  Pay particular attention to dried fruit, bananas, nuts, potatoes, and orange juice.  Repeat BMP  next week.

## 2024-08-29 RX ORDER — TACROLIMUS 0.5 MG/1
0.5 CAPSULE ORAL 2 TIMES DAILY
Qty: 180 CAPSULE | Refills: 3 | Status: SHIPPED | OUTPATIENT
Start: 2024-08-29

## 2024-08-29 RX ORDER — TACROLIMUS 1 MG/1
1 CAPSULE ORAL EVERY MORNING
Status: SHIPPED
Start: 2024-08-29

## 2024-09-04 ENCOUNTER — LAB (OUTPATIENT)
Dept: LAB | Facility: CLINIC | Age: 69
End: 2024-09-04
Payer: MEDICARE

## 2024-09-04 DIAGNOSIS — Z94.0 KIDNEY REPLACED BY TRANSPLANT: ICD-10-CM

## 2024-09-04 DIAGNOSIS — R80.1 PERSISTENT PROTEINURIA: ICD-10-CM

## 2024-09-04 DIAGNOSIS — Z94.0 KIDNEY TRANSPLANTED: ICD-10-CM

## 2024-09-04 DIAGNOSIS — B00.1 COLD SORE: ICD-10-CM

## 2024-09-04 LAB
ANION GAP SERPL CALCULATED.3IONS-SCNC: 11 MMOL/L (ref 7–15)
BUN SERPL-MCNC: 32.5 MG/DL (ref 8–23)
CALCIUM SERPL-MCNC: 10.1 MG/DL (ref 8.8–10.4)
CHLORIDE SERPL-SCNC: 103 MMOL/L (ref 98–107)
CREAT SERPL-MCNC: 1 MG/DL (ref 0.51–0.95)
DONOR IDENTIFICATION: NORMAL
DSA COMMENTS: NORMAL
DSA PRESENT: NO
DSA TEST METHOD: NORMAL
EGFRCR SERPLBLD CKD-EPI 2021: 60 ML/MIN/1.73M2
GLUCOSE SERPL-MCNC: 106 MG/DL (ref 70–99)
HCO3 SERPL-SCNC: 24 MMOL/L (ref 22–29)
ORGAN: NORMAL
POTASSIUM SERPL-SCNC: 4.7 MMOL/L (ref 3.4–5.3)
SA 1  COMMENTS: NORMAL
SA 1 CELL: NORMAL
SA 1 TEST METHOD: NORMAL
SA 2 CELL: NORMAL
SA 2 COMMENTS: NORMAL
SA 2 TEST METHOD: NORMAL
SA1 HI RISK ABY: NORMAL
SA1 MOD RISK ABY: NORMAL
SA2 HI RISK ABY: NORMAL
SA2 MOD RISK ABY: NORMAL
SODIUM SERPL-SCNC: 138 MMOL/L (ref 135–145)
TACROLIMUS BLD-MCNC: 3.5 UG/L (ref 5–15)
TME LAST DOSE: ABNORMAL H
TME LAST DOSE: ABNORMAL H
UNACCEPTABLE ANTIGENS: NORMAL
UNOS CPRA: 27

## 2024-09-04 PROCEDURE — 80197 ASSAY OF TACROLIMUS: CPT

## 2024-09-04 PROCEDURE — 80048 BASIC METABOLIC PNL TOTAL CA: CPT

## 2024-09-04 PROCEDURE — 36415 COLL VENOUS BLD VENIPUNCTURE: CPT

## 2024-09-05 ENCOUNTER — TELEPHONE (OUTPATIENT)
Dept: TRANSPLANT | Facility: CLINIC | Age: 69
End: 2024-09-05
Payer: MEDICARE

## 2024-09-05 DIAGNOSIS — Z48.298 AFTERCARE FOLLOWING ORGAN TRANSPLANT: ICD-10-CM

## 2024-09-05 DIAGNOSIS — Z94.0 IMMUNOSUPPRESSIVE MANAGEMENT ENCOUNTER FOLLOWING KIDNEY TRANSPLANT: Primary | ICD-10-CM

## 2024-09-05 DIAGNOSIS — Z94.0 KIDNEY TRANSPLANTED: ICD-10-CM

## 2024-09-05 DIAGNOSIS — Z79.899 IMMUNOSUPPRESSIVE MANAGEMENT ENCOUNTER FOLLOWING KIDNEY TRANSPLANT: Primary | ICD-10-CM

## 2024-09-05 NOTE — TELEPHONE ENCOUNTER
Tacrolimus = 3.5 (9/4/24)  Goal 4-6  Current dose 0.5 mg BID    Tac level 8.2 while on 1 mg / 0.5    PLAN:  Recommend tac suspension 0.7 mg BID  Message sent to Dr Dewitt.      ADDENDUM:  Jordy Mohr MD Ututalum, Teresa, RN  yes      ----- Message -----  From: Noelle Max RN  Sent: 9/5/2024  10:25 AM CDT  To: Jordy Willis MD  Subject: Tac dose                                        Dr Dewitt,    Tacrolimus = 3.5 (9/4/24) low  Goal 4-6  Current dose 0.5 mg BID    Tac level was above goal at 8.2 while on 1 mg / 0.5 mg    Switch to tac suspension?  Planning on 0.7 mg BID    Cesar Redd      PLAN:   Confirm this was a good 12 - hour trough.   Verify current dose.   Confirm no new medications or illness (steven. Diarrhea).  Confirm any MISSED DOSES.   If good trough and correct dose above, recommend:  increase dose to 0.7 mg BID.     OUTCOME:  Per Darlene she has been taking alternating tac dose 1.5 mg and 1 mg BID.  Recommended she try taking Tac 1.5 mg BID only and repeat in 2 weeks.  Verbalized understanding and agreement to plan.  Lab order placed.

## 2024-09-18 ENCOUNTER — LAB (OUTPATIENT)
Dept: LAB | Facility: CLINIC | Age: 69
End: 2024-09-18
Payer: MEDICARE

## 2024-09-18 DIAGNOSIS — Z79.899 IMMUNOSUPPRESSIVE MANAGEMENT ENCOUNTER FOLLOWING KIDNEY TRANSPLANT: ICD-10-CM

## 2024-09-18 DIAGNOSIS — Z48.298 AFTERCARE FOLLOWING ORGAN TRANSPLANT: ICD-10-CM

## 2024-09-18 DIAGNOSIS — Z94.0 IMMUNOSUPPRESSIVE MANAGEMENT ENCOUNTER FOLLOWING KIDNEY TRANSPLANT: ICD-10-CM

## 2024-09-18 DIAGNOSIS — Z94.0 KIDNEY TRANSPLANTED: ICD-10-CM

## 2024-09-18 LAB
TACROLIMUS BLD-MCNC: 5 UG/L (ref 5–15)
TME LAST DOSE: NORMAL H
TME LAST DOSE: NORMAL H

## 2024-09-18 PROCEDURE — 80197 ASSAY OF TACROLIMUS: CPT

## 2024-09-18 PROCEDURE — 36415 COLL VENOUS BLD VENIPUNCTURE: CPT

## 2024-09-24 DIAGNOSIS — Z94.0 KIDNEY TRANSPLANTED: Primary | ICD-10-CM

## 2024-09-24 DIAGNOSIS — Z48.298 AFTERCARE FOLLOWING ORGAN TRANSPLANT: ICD-10-CM

## 2024-09-27 ENCOUNTER — MYC MEDICAL ADVICE (OUTPATIENT)
Dept: FAMILY MEDICINE | Facility: CLINIC | Age: 69
End: 2024-09-27
Payer: MEDICARE

## 2024-09-27 DIAGNOSIS — M25.562 CHRONIC PAIN OF BOTH KNEES: Primary | ICD-10-CM

## 2024-09-27 DIAGNOSIS — G89.29 CHRONIC PAIN OF BOTH KNEES: Primary | ICD-10-CM

## 2024-09-27 DIAGNOSIS — Z94.0 HTN, KIDNEY TRANSPLANT RELATED: ICD-10-CM

## 2024-09-27 DIAGNOSIS — M25.561 CHRONIC PAIN OF BOTH KNEES: Primary | ICD-10-CM

## 2024-09-27 DIAGNOSIS — I15.1 HTN, KIDNEY TRANSPLANT RELATED: ICD-10-CM

## 2024-09-30 NOTE — TELEPHONE ENCOUNTER
Dr. Henderson is out of office this week.   I can help by placing a referral to Orthopedics. Please give her contact information to make an appointment.   Kristen Kehr PA-C

## 2024-09-30 NOTE — TELEPHONE ENCOUNTER
Pt had injection by orthopedic provider at [facility] on 3/12/2020.  To provider for next step in getting another injection or to address knee pain.  Monika Villafuerte BSN, RN

## 2024-10-03 ENCOUNTER — MYC REFILL (OUTPATIENT)
Dept: FAMILY MEDICINE | Facility: CLINIC | Age: 69
End: 2024-10-03
Payer: MEDICARE

## 2024-10-03 DIAGNOSIS — Z94.0 HTN, KIDNEY TRANSPLANT RELATED: ICD-10-CM

## 2024-10-03 DIAGNOSIS — I15.1 HTN, KIDNEY TRANSPLANT RELATED: ICD-10-CM

## 2024-10-04 RX ORDER — LOSARTAN POTASSIUM 100 MG/1
100 TABLET ORAL DAILY
Qty: 90 TABLET | Refills: 0 | Status: SHIPPED | OUTPATIENT
Start: 2024-10-04

## 2024-10-04 NOTE — TELEPHONE ENCOUNTER
Patient calling to check status of this refill request as she is now out of Rx. Can this be approved by a covering provider? This is a Dr. Guerrero patient.     Hui ROBLES,    [facility]

## 2024-10-10 NOTE — TELEPHONE ENCOUNTER
REASON FOR VISIT: Chronic pain of both knees    DATE OF APPT: 10/11/2024   NOTES (FOR ALL VISITS) STATUS DETAILS   OFFICE NOTE from referring provider [facility]  Kehr, Kristen M, PA-C 9/30/2024   EMG N/A    MEDICATION LIST Internal    IMAGING  (FOR ALL VISITS)     XR N/A    MRI (HEAD, NECK, SPINE) N/A    CT (HEAD, NECK, SPINE) N/A

## 2024-10-11 ENCOUNTER — ANCILLARY PROCEDURE (OUTPATIENT)
Dept: GENERAL RADIOLOGY | Facility: CLINIC | Age: 69
End: 2024-10-11
Attending: STUDENT IN AN ORGANIZED HEALTH CARE EDUCATION/TRAINING PROGRAM
Payer: MEDICARE

## 2024-10-11 ENCOUNTER — PRE VISIT (OUTPATIENT)
Dept: ORTHOPEDICS | Facility: CLINIC | Age: 69
End: 2024-10-11

## 2024-10-11 ENCOUNTER — OFFICE VISIT (OUTPATIENT)
Dept: ORTHOPEDICS | Facility: CLINIC | Age: 69
End: 2024-10-11
Attending: PHYSICIAN ASSISTANT
Payer: MEDICARE

## 2024-10-11 DIAGNOSIS — M25.561 CHRONIC PAIN OF BOTH KNEES: ICD-10-CM

## 2024-10-11 DIAGNOSIS — G89.29 CHRONIC PAIN OF BOTH KNEES: ICD-10-CM

## 2024-10-11 DIAGNOSIS — M25.562 CHRONIC PAIN OF BOTH KNEES: ICD-10-CM

## 2024-10-11 DIAGNOSIS — M17.0 PRIMARY OSTEOARTHRITIS OF BOTH KNEES: Primary | ICD-10-CM

## 2024-10-11 PROCEDURE — 99204 OFFICE O/P NEW MOD 45 MIN: CPT | Mod: 25 | Performed by: STUDENT IN AN ORGANIZED HEALTH CARE EDUCATION/TRAINING PROGRAM

## 2024-10-11 PROCEDURE — 73562 X-RAY EXAM OF KNEE 3: CPT | Mod: RT | Performed by: RADIOLOGY

## 2024-10-11 PROCEDURE — 20610 DRAIN/INJ JOINT/BURSA W/O US: CPT | Mod: RT | Performed by: STUDENT IN AN ORGANIZED HEALTH CARE EDUCATION/TRAINING PROGRAM

## 2024-10-11 RX ADMIN — TRIAMCINOLONE ACETONIDE 40 MG: 40 INJECTION, SUSPENSION INTRA-ARTICULAR; INTRAMUSCULAR at 14:58

## 2024-10-11 RX ADMIN — BUPIVACAINE HYDROCHLORIDE 2 ML: 5 INJECTION, SOLUTION PERINEURAL at 14:58

## 2024-10-11 RX ADMIN — LIDOCAINE HYDROCHLORIDE 2 ML: 10 INJECTION, SOLUTION INFILTRATION; PERINEURAL at 14:58

## 2024-10-11 NOTE — LETTER
10/11/2024      Ethel Thompson  [address] [facility] [address]      Dear Colleague,    Thank you for referring your patient, Ethel Thompson, to the [facility] [place]. Please see a copy of my visit note below.    Ethel Thompson  :  1955  DOS: 10/11/2024  MRN: [id number]  PCP: Joann Canales    Sports Medicine Clinic Visit      HPI  Ethel Thompson is a 69 year old female who is seen in consultation at the request of  Kristen M Kehr PA-C presenting with right knee pain.    - Mechanism of Injury:    - No inciting injury  - Pertinent history and prior evaluations:    - History of known primary osteoarthritis of bilateral knees with good responses to corticosteroid injections in the past.  Left knee is doing well, right knee pain persists.  Requesting knee injection today for the right knee. Pain with walking is the worst.   - Bilateral corticosteroid injections at [facility] years ago gave great relief    - Pain Character:    - Location:  Right peripatellar region  - Character:  aching, shooting  - Duration:  Acute on chronic. Increased in pain over past few weeks  - Course:  worsening recently  - Endorses:    -  Pain as described above.  Occasional clicking/popping and grinding.  - Denies:    - swelling, mechanical locking symptoms, instability, numbness, tingling, radicular shooting pain, weakness  - Alleviating factors:    -  steroid injection , activity modifications  - Aggravating factors:    - walking, stairs: ascending  - Other treatments tried:    - Lidocaine patches and knee support, corticosteroid injections    - Patient Goals:    - get a formal diagnosis, discuss treatment options  - Social History:   - Retired.  Previous work:         Review of Systems  Musculoskeletal: as above  Remainder of review of systems is negative including constitutional, CV, pulmonary, GI, Skin and Neurologic except as noted in HPI or medical history.    Past Medical History:   Diagnosis Date      Anemia in chronic kidney disease      Chronic renal failure      Diabetes mellitus, type 2 (H) 2021     GERD (gastroesophageal reflux disease)      Glomerulonephritis, focal sclerosing      Hepatitis B core antibody positive      Herpes simplex      Hyperlipidemia      Hypertension      Hypothyroidism      Memory deficits      SADIE on CPAP      Secondary hyperparathyroidism (H)      Past Surgical History:   Procedure Laterality Date     ABDOMINOPLASTY      tummy tuck- [place]     BIOPSY  2016    kidney, [place] Dr. Hammonds     COLONOSCOPY  2010    x 2, [facility]     COSMETIC SURGERY       EYE SURGERY Bilateral 2017    cataracts-[place]     GYN SURGERY       x 2, [place], [place]     GYN SURGERY      hysterectomy     IR RENAL BIOPSY RIGHT  2/3/2021     IR RENAL BIOPSY RIGHT  3/3/2021     ORTHOPEDIC SURGERY  2016    trigger finger- both hands, [place]     TRANSPLANT KIDNEY RECIPIENT  DONOR N/A 9/3/2020    Procedure: TRANSPLANT, KIDNEY, RECIPIENT,  DONOR and removal of peritoneal dialysis catheter;  Surgeon: Heri Ribeiro MD;  Location: [facility] OR     VASCULAR SURGERY Left 2018    dialysis access-[place], [place] [facility], [place]     Family History   Problem Relation Age of Onset     Hypertension Mother      Anuerysm Mother      Cancer No family hx of      Kidney Disease No family hx of      Diabetes No family hx of          Objective  There were no vitals taken for this visit.    General: healthy, alert and in no acute distress.    HEENT: no scleral icterus or conjunctival erythema.   Skin: no suspicious lesions or rash. No jaundice.   CV: regular rhythm by palpation, 2+ distal pulses.  Resp: normal respiratory effort without conversational dyspnea.   Psych: normal mood and affect.    Gait: nonantalgic, appropriate coordination and balance.     Neuro:        - Sensation to light touch:    - Intact throughout the BLE including all peripheral nerve distributions.        -  MSR:      RLE  LLE  - Patella 2+ 2+  - Achilles 2+ 2+       - Special tests:   - Slump/SLR:  Neg    MSK - Knee:       - Inspection:    - No significant effusion, erythema, warmth, ecchymosis, lesion.        - ROM:    - Full AROM/PROM with pain during knee flexion/extension.        - Palpation:    - TTP at the medial joint line.  - NTTP elsewhere.        - Strength:  (*antalgic)  - Hip Flexion  5     - Hip Abduction 5    - Hip Adduction 5   - Knee Flexion  5   - Knee Extension 5   - Dorsiflexion  5   - Plantarflexion 5   - Ext. Tio. Longus 5   - Inversion  5   - Eversion  5        - Special tests:        - Lachman:  Neg         - A/P drawer:  Neg        - Pivot shift:  Neg    - Tawanda:  Neg      - Varus stress:  Neg for laxity or pain     - Valgus stress:  Neg for laxity or pain    - Patellar grind: Positive   - Thessaly:  Neg     Radiology  I independently reviewed the available relevant imaging in the chart with my interpretations as above in HPI.     I independently reviewed today's new relevant imaging, with the following interpretation:  - XR R knee 10/11/2024 shows mild degenerative changes of the medial compartment of the right knee without acute fracture or dislocation, no effusion.      Procedure  Large Joint Injection/Arthocentesis: R knee joint    Date/Time: 10/11/2024 2:58 PM    Performed by: Jose Ventura DO  Authorized by: Jose Ventura DO    Indications:  Pain  Needle Size:  22 G  Guidance: landmark guided    Approach:  Anterolateral  Location:  Knee      Medications:  40 mg triamcinolone 40 MG/ML; 2 mL lidocaine 1 %; 2 mL BUPivacaine 0.5 %  Outcome:  Tolerated well, no immediate complications  Procedure discussed: discussed risks, benefits, and alternatives    Consent Given by:  Patient  Prep: patient was prepped and draped in usual sterile fashion         PROCEDURE  Intraarticular Knee Joint Injection  The patient was informed of the risks and benefits of the procedure and alternatives were  discussed. A written consent was signed by the patient.   The injection site was prepped with chlorhexidine in sterile fashion.   An injectate solution containing 2 mL of 1% lidocaine, 2 mL of 0.5% bupivacaine, and 1 mL of Kenalog (40 mg/mL) was drawn up into a 5 mL syringe.  Injection was performed using sterile technique.  A 1.5-inch 22-gauge needle was used to enter the knee joint using a lateral infrapatellar approach and injectate was injected successfully. After the injection, the site was cleaned and a bandage applied. The patient tolerated the procedure well without complications.       Assessment  1. Chronic pain of both knees        Giancarlo Thompson is a pleasant 69 year old female that presents with chronic bilateral knee pain.  She has known history of primary osteoarthritis of bilateral knees with good responses to corticosteroid injections in the past.  It has been quite a while since her last injections.  Since they have worked so well for her in the past, she presents as a new patient today for primary osteoarthritis of bilateral knees and interested in corticosteroid injections.  She has utilized NSAIDs/Tylenol, ice/heat, activity modifications and rest in the past.  Radiographs revealed mild medial compartment narrowing bilaterally.  At this time, her left knee is not bothering her much and she is interested in discussing the right knee.  History and physical exam do indeed appear consistent with primary osteoarthritis.    We discussed the nature of the condition and available treatment options, and mutually agreed upon the following plan:    - Imaging:          - Reviewed and independently interpreted the relevant imaging in the chart, including any imaging ordered for today's clinic.  - Reviewed results and images with patient.   - Medications:          - Discussed pharmacologic options for pain relief.   - May use NSAIDs (Ibuprofen, Naproxen) or Acetaminophen (Tylenol) as needed for pain control.    - Do not take these if previously advised to avoid them for other medical conditions.  - May also use topical medications such as lidocaine, IcyHot, BioFreeze, or Voltaren gel as needed for pain control.    - Voltaren gel is an anti-inflammatory cream that may be used up to 4 times per day over the painful area.   - Injections:          - Discussed possible injection options and alternatives.    - Injection options include: Intra-articular knee joint injection with corticosteroid, viscosupplementation, or PRP.  - Performed a corticosteroid injection of the right knee joint today in clinic. Patient tolerated the procedure well without complications.     - Post-procedure instructions:    - Keep the injection site clean and dry.   - Do not submerge the injection site for 24 hours (no baths, pools). Showers are ok.   - Rest the area for 24-48 hours before resuming normal activities. Avoid overexerting the area for the first few weeks.   - It may take 2-3 days to start noticing the effects of the injection and up to 3-4 weeks to feel significant benefits.   - Therapy:          - Discussed the benefits of therapy vs home exercise program for optimization of range of motion, flexibility, strength, stability and function.   - Preference is for a home exercise program.   - Home Exercise Program given today in clinic and recommendation given to perform HEP daily and after exacerbations.  - Modalities:          - May use ice, heat, massage or other modalities as needed.   - Bracing:          - Discussed bracing options and recommend using OTC compression sleeves or hinged knee stability brace as needed for comfort and stability.    - Surgery:          - Discussed non-operative and operative treatment options for the patient's condition.   - Goal is to continue conservative care for as long as possible before surgical intervention would need to be considered.  - Activity:          - Encouraged to remain active and participate  in regular activities as symptoms allow.   Avoid or modify exacerbating activities as needed.  - Follow up:          - As needed for re-evaluation and update to treatment plan.  - May follow up sooner for new/worsening symptoms.  - May contact clinic by phone or MyChart for questions or concerns.       Jose Ventura DO, CAQSM  [facility]  [facility] - Department of Orthopedic Surgery       Disclaimer:  This note was prepared and written using Dragon Medical dictation software. As a result, there may be errors in the script that have gone undetected. Please consider this when interpreting the information in this note.        Again, thank you for allowing me to participate in the care of your patient.        Sincerely,        Jose Ventura DO

## 2024-10-11 NOTE — PROGRESS NOTES
Ethel Thompson  :  1955  DOS: 10/11/2024  MRN: [id number]  PCP: Joann Canales    Sports Medicine Clinic Visit      HPI  Ethel Thompson is a 69 year old female who is seen in consultation at the request of  Kristen M Kehr PA-C presenting with right knee pain.    - Mechanism of Injury:    - No inciting injury  - Pertinent history and prior evaluations:    - History of known primary osteoarthritis of bilateral knees with good responses to corticosteroid injections in the past.  Left knee is doing well, right knee pain persists.  Requesting knee injection today for the right knee. Pain with walking is the worst.   - Bilateral corticosteroid injections at [facility] years ago gave great relief    - Pain Character:    - Location:  Right peripatellar region  - Character:  aching, shooting  - Duration:  Acute on chronic. Increased in pain over past few weeks  - Course:  worsening recently  - Endorses:    -  Pain as described above.  Occasional clicking/popping and grinding.  - Denies:    - swelling, mechanical locking symptoms, instability, numbness, tingling, radicular shooting pain, weakness  - Alleviating factors:    -  steroid injection , activity modifications  - Aggravating factors:    - walking, stairs: ascending  - Other treatments tried:    - Lidocaine patches and knee support, corticosteroid injections    - Patient Goals:    - get a formal diagnosis, discuss treatment options  - Social History:   - Retired.  Previous work:         Review of Systems  Musculoskeletal: as above  Remainder of review of systems is negative including constitutional, CV, pulmonary, GI, Skin and Neurologic except as noted in HPI or medical history.    Past Medical History:   Diagnosis Date    Anemia in chronic kidney disease     Chronic renal failure     Diabetes mellitus, type 2 (H) 2021    GERD (gastroesophageal reflux disease)     Glomerulonephritis, focal sclerosing     Hepatitis B core antibody positive     Herpes simplex      Hyperlipidemia     Hypertension     Hypothyroidism     Memory deficits     SADIE on CPAP     Secondary hyperparathyroidism (H)      Past Surgical History:   Procedure Laterality Date    ABDOMINOPLASTY  2001    tummy tuck- [place]    BIOPSY  2016    kidney, [place] Dr. Hammonds    COLONOSCOPY  2010    x 2, [facility]    COSMETIC SURGERY      EYE SURGERY Bilateral 2017    cataracts-[place]    GYN SURGERY       x 2, [facility], [place]    GYN SURGERY      hysterectomy    IR RENAL BIOPSY RIGHT  2/3/2021    IR RENAL BIOPSY RIGHT  3/3/2021    ORTHOPEDIC SURGERY  2016    trigger finger- both hands, [place]    TRANSPLANT KIDNEY RECIPIENT  DONOR N/A 9/3/2020    Procedure: TRANSPLANT, KIDNEY, RECIPIENT,  DONOR and removal of peritoneal dialysis catheter;  Surgeon: Heri Ribeiro MD;  Location: [facility] OR    VASCULAR SURGERY Left 2018    dialysis access-[facility], [place] [facility] [place]     Family History   Problem Relation Age of Onset    Hypertension Mother     Anuerysm Mother     Cancer No family hx of     Kidney Disease No family hx of     Diabetes No family hx of          Objective  There were no vitals taken for this visit.    General: healthy, alert and in no acute distress.    HEENT: no scleral icterus or conjunctival erythema.   Skin: no suspicious lesions or rash. No jaundice.   CV: regular rhythm by palpation, 2+ distal pulses.  Resp: normal respiratory effort without conversational dyspnea.   Psych: normal mood and affect.    Gait: nonantalgic, appropriate coordination and balance.     Neuro:        - Sensation to light touch:    - Intact throughout the BLE including all peripheral nerve distributions.        - MSR:      RLE  LLE  - Patella 2+ 2+  - Achilles 2+ 2+       - Special tests:   - Slump/SLR:  Neg    MSK - Knee:       - Inspection:    - No significant effusion, erythema, warmth, ecchymosis, lesion.        - ROM:    - Full AROM/PROM with pain during knee flexion/extension.         - Palpation:    - TTP at the medial joint line.  - NTTP elsewhere.        - Strength:  (*antalgic)  - Hip Flexion  5     - Hip Abduction 5    - Hip Adduction 5   - Knee Flexion  5   - Knee Extension 5   - Dorsiflexion  5   - Plantarflexion 5   - Ext. Tio. Longus 5   - Inversion  5   - Eversion  5        - Special tests:        - Lachman:  Neg         - A/P drawer:  Neg        - Pivot shift:  Neg    - Tawanda:  Neg      - Varus stress:  Neg for laxity or pain     - Valgus stress:  Neg for laxity or pain    - Patellar grind: Positive   - Thessaly:  Neg     Radiology  I independently reviewed the available relevant imaging in the chart with my interpretations as above in HPI.     I independently reviewed today's new relevant imaging, with the following interpretation:  - XR R knee 10/11/2024 shows mild degenerative changes of the medial compartment of the right knee without acute fracture or dislocation, no effusion.      Procedure  Large Joint Injection/Arthocentesis: R knee joint    Date/Time: 10/11/2024 2:58 PM    Performed by: Jose Ventura DO  Authorized by: Jose Ventura DO    Indications:  Pain  Needle Size:  22 G  Guidance: landmark guided    Approach:  Anterolateral  Location:  Knee      Medications:  40 mg triamcinolone 40 MG/ML; 2 mL lidocaine 1 %; 2 mL BUPivacaine 0.5 %  Outcome:  Tolerated well, no immediate complications  Procedure discussed: discussed risks, benefits, and alternatives    Consent Given by:  Patient  Prep: patient was prepped and draped in usual sterile fashion         PROCEDURE  Intraarticular Knee Joint Injection  The patient was informed of the risks and benefits of the procedure and alternatives were discussed. A written consent was signed by the patient.   The injection site was prepped with chlorhexidine in sterile fashion.   An injectate solution containing 2 mL of 1% lidocaine, 2 mL of 0.5% bupivacaine, and 1 mL of Kenalog (40 mg/mL) was drawn up into a 5 mL  syringe.  Injection was performed using sterile technique.  A 1.5-inch 22-gauge needle was used to enter the knee joint using a lateral infrapatellar approach and injectate was injected successfully. After the injection, the site was cleaned and a bandage applied. The patient tolerated the procedure well without complications.       Assessment  1. Chronic pain of both knees        Giancarlo Thompson is a pleasant 69 year old female that presents with chronic bilateral knee pain.  She has known history of primary osteoarthritis of bilateral knees with good responses to corticosteroid injections in the past.  It has been quite a while since her last injections.  Since they have worked so well for her in the past, she presents as a new patient today for primary osteoarthritis of bilateral knees and interested in corticosteroid injections.  She has utilized NSAIDs/Tylenol, ice/heat, activity modifications and rest in the past.  Radiographs revealed mild medial compartment narrowing bilaterally.  At this time, her left knee is not bothering her much and she is interested in discussing the right knee.  History and physical exam do indeed appear consistent with primary osteoarthritis.    We discussed the nature of the condition and available treatment options, and mutually agreed upon the following plan:    - Imaging:          - Reviewed and independently interpreted the relevant imaging in the chart, including any imaging ordered for today's clinic.  - Reviewed results and images with patient.   - Medications:          - Discussed pharmacologic options for pain relief.   - May use NSAIDs (Ibuprofen, Naproxen) or Acetaminophen (Tylenol) as needed for pain control.   - Do not take these if previously advised to avoid them for other medical conditions.  - May also use topical medications such as lidocaine, IcyHot, BioFreeze, or Voltaren gel as needed for pain control.    - Voltaren gel is an anti-inflammatory cream that may be  used up to 4 times per day over the painful area.   - Injections:          - Discussed possible injection options and alternatives.    - Injection options include: Intra-articular knee joint injection with corticosteroid, viscosupplementation, or PRP.  - Performed a corticosteroid injection of the right knee joint today in clinic. Patient tolerated the procedure well without complications.     - Post-procedure instructions:    - Keep the injection site clean and dry.   - Do not submerge the injection site for 24 hours (no baths, pools). Showers are ok.   - Rest the area for 24-48 hours before resuming normal activities. Avoid overexerting the area for the first few weeks.   - It may take 2-3 days to start noticing the effects of the injection and up to 3-4 weeks to feel significant benefits.   - Therapy:          - Discussed the benefits of therapy vs home exercise program for optimization of range of motion, flexibility, strength, stability and function.   - Preference is for a home exercise program.   - Home Exercise Program given today in clinic and recommendation given to perform HEP daily and after exacerbations.  - Modalities:          - May use ice, heat, massage or other modalities as needed.   - Bracing:          - Discussed bracing options and recommend using OTC compression sleeves or hinged knee stability brace as needed for comfort and stability.    - Surgery:          - Discussed non-operative and operative treatment options for the patient's condition.   - Goal is to continue conservative care for as long as possible before surgical intervention would need to be considered.  - Activity:          - Encouraged to remain active and participate in regular activities as symptoms allow.   Avoid or modify exacerbating activities as needed.  - Follow up:          - As needed for re-evaluation and update to treatment plan.  - May follow up sooner for new/worsening symptoms.  - May contact clinic by phone or  [place] for questions or concerns.       Jose Ventura DO, CAQSM  [facility] - Department of Orthopedic Surgery       Disclaimer:  This note was prepared and written using Dragon Medical dictation software. As a result, there may be errors in the script that have gone undetected. Please consider this when interpreting the information in this note.

## 2024-10-16 ENCOUNTER — TELEPHONE (OUTPATIENT)
Dept: TRANSPLANT | Facility: CLINIC | Age: 69
End: 2024-10-16
Payer: MEDICARE

## 2024-10-16 ASSESSMENT — ENCOUNTER SYMPTOMS: NEW SYMPTOMS OF CORONARY ARTERY DISEASE: 0

## 2024-10-19 RX ORDER — LIDOCAINE HYDROCHLORIDE 10 MG/ML
2 INJECTION, SOLUTION INFILTRATION; PERINEURAL
Status: COMPLETED | OUTPATIENT
Start: 2024-10-11 | End: 2024-10-11

## 2024-10-19 RX ORDER — BUPIVACAINE HYDROCHLORIDE 5 MG/ML
2 INJECTION, SOLUTION PERINEURAL
Status: COMPLETED | OUTPATIENT
Start: 2024-10-11 | End: 2024-10-11

## 2024-10-19 RX ORDER — TRIAMCINOLONE ACETONIDE 40 MG/ML
40 INJECTION, SUSPENSION INTRA-ARTICULAR; INTRAMUSCULAR
Status: COMPLETED | OUTPATIENT
Start: 2024-10-11 | End: 2024-10-11

## 2024-10-24 DIAGNOSIS — Z94.0 KIDNEY REPLACED BY TRANSPLANT: ICD-10-CM

## 2024-10-24 DIAGNOSIS — B00.1 COLD SORE: ICD-10-CM

## 2024-10-24 DIAGNOSIS — Z94.0 KIDNEY TRANSPLANTED: ICD-10-CM

## 2024-10-24 DIAGNOSIS — R80.1 PERSISTENT PROTEINURIA: ICD-10-CM

## 2024-10-26 DIAGNOSIS — Z94.0 KIDNEY REPLACED BY TRANSPLANT: ICD-10-CM

## 2024-10-26 RX ORDER — ATORVASTATIN CALCIUM 40 MG/1
40 TABLET, FILM COATED ORAL DAILY
Qty: 90 TABLET | Refills: 1 | Status: SHIPPED | OUTPATIENT
Start: 2024-10-26

## 2024-10-28 ENCOUNTER — LAB (OUTPATIENT)
Dept: LAB | Facility: CLINIC | Age: 69
End: 2024-10-28
Payer: MEDICARE

## 2024-10-28 DIAGNOSIS — Z79.899 ENCOUNTER FOR LONG-TERM CURRENT USE OF MEDICATION: ICD-10-CM

## 2024-10-28 DIAGNOSIS — Z94.0 KIDNEY REPLACED BY TRANSPLANT: ICD-10-CM

## 2024-10-28 DIAGNOSIS — Z48.298 AFTERCARE FOLLOWING ORGAN TRANSPLANT: ICD-10-CM

## 2024-10-28 DIAGNOSIS — Z98.890 OTHER SPECIFIED POSTPROCEDURAL STATES: ICD-10-CM

## 2024-10-28 LAB
ERYTHROCYTE [DISTWIDTH] IN BLOOD BY AUTOMATED COUNT: 13 % (ref 10–15)
HCT VFR BLD AUTO: 32.9 % (ref 35–47)
HGB BLD-MCNC: 10.3 G/DL (ref 11.7–15.7)
MCH RBC QN AUTO: 32 PG (ref 26.5–33)
MCHC RBC AUTO-ENTMCNC: 31.3 G/DL (ref 31.5–36.5)
MCV RBC AUTO: 102 FL (ref 78–100)
PLATELET # BLD AUTO: 214 10E3/UL (ref 150–450)
RBC # BLD AUTO: 3.22 10E6/UL (ref 3.8–5.2)
TACROLIMUS BLD-MCNC: 6.2 UG/L (ref 5–15)
TME LAST DOSE: NORMAL H
TME LAST DOSE: NORMAL H
WBC # BLD AUTO: 6.2 10E3/UL (ref 4–11)

## 2024-10-28 PROCEDURE — 80197 ASSAY OF TACROLIMUS: CPT

## 2024-10-28 PROCEDURE — 85027 COMPLETE CBC AUTOMATED: CPT

## 2024-10-28 PROCEDURE — 80048 BASIC METABOLIC PNL TOTAL CA: CPT

## 2024-10-28 PROCEDURE — 36415 COLL VENOUS BLD VENIPUNCTURE: CPT

## 2024-10-29 LAB
ANION GAP SERPL CALCULATED.3IONS-SCNC: 13 MMOL/L (ref 7–15)
BUN SERPL-MCNC: 25.2 MG/DL (ref 8–23)
CALCIUM SERPL-MCNC: 10 MG/DL (ref 8.8–10.4)
CHLORIDE SERPL-SCNC: 104 MMOL/L (ref 98–107)
CREAT SERPL-MCNC: 1 MG/DL (ref 0.51–0.95)
EGFRCR SERPLBLD CKD-EPI 2021: 60 ML/MIN/1.73M2
GLUCOSE SERPL-MCNC: 102 MG/DL (ref 70–99)
HCO3 SERPL-SCNC: 21 MMOL/L (ref 22–29)
POTASSIUM SERPL-SCNC: 5.3 MMOL/L (ref 3.4–5.3)
SODIUM SERPL-SCNC: 138 MMOL/L (ref 135–145)

## 2024-11-01 ENCOUNTER — TRANSFERRED RECORDS (OUTPATIENT)
Dept: MULTI SPECIALTY CLINIC | Facility: CLINIC | Age: 69
End: 2024-11-01

## 2024-11-01 LAB — RETINOPATHY: NORMAL

## 2024-11-01 RX ORDER — FUROSEMIDE 20 MG/1
20 TABLET ORAL DAILY PRN
Qty: 90 TABLET | Refills: 0 | Status: SHIPPED | OUTPATIENT
Start: 2024-11-01

## 2024-11-16 ENCOUNTER — HEALTH MAINTENANCE LETTER (OUTPATIENT)
Age: 69
End: 2024-11-16

## 2024-11-30 DIAGNOSIS — E03.9 HYPOTHYROIDISM, UNSPECIFIED TYPE: ICD-10-CM

## 2024-12-01 RX ORDER — LEVOTHYROXINE SODIUM 112 UG/1
TABLET ORAL
Qty: 90 TABLET | Refills: 0 | Status: SHIPPED | OUTPATIENT
Start: 2024-12-01

## 2024-12-04 ENCOUNTER — VIRTUAL VISIT (OUTPATIENT)
Dept: TRANSPLANT | Facility: CLINIC | Age: 69
End: 2024-12-04
Attending: INTERNAL MEDICINE
Payer: MEDICARE

## 2024-12-04 DIAGNOSIS — Z94.0 KIDNEY REPLACED BY TRANSPLANT: ICD-10-CM

## 2024-12-04 DIAGNOSIS — R80.1 PERSISTENT PROTEINURIA: ICD-10-CM

## 2024-12-04 DIAGNOSIS — Z48.298 AFTERCARE FOLLOWING ORGAN TRANSPLANT: ICD-10-CM

## 2024-12-04 DIAGNOSIS — Z94.0 KIDNEY TRANSPLANTED: ICD-10-CM

## 2024-12-04 DIAGNOSIS — B00.1 COLD SORE: ICD-10-CM

## 2024-12-04 RX ORDER — TACROLIMUS 0.5 MG/1
0.5 CAPSULE ORAL EVERY EVENING
Qty: 180 CAPSULE | Refills: 3 | Status: SHIPPED | OUTPATIENT
Start: 2024-12-04

## 2024-12-04 NOTE — NURSING NOTE
Current patient location: [address] [place] [id number]    Is the patient currently in the state of [place]? YES    Visit mode:VIDEO    If the visit is dropped, the patient can be reconnected by:VIDEO VISIT: Send to e-mail at: [email or web address]    Will anyone else be joining the visit? NO  (If patient encounters technical issues they should call [phone]    Are changes needed to the allergy or medication list? No    Are refills needed on medications prescribed by this physician? NO    Rooming Documentation:  Questionnaire(s) completed    Reason for visit: RECHECK    Abdoul GARRETT

## 2024-12-04 NOTE — PROGRESS NOTES
Virtual Visit Details    Type of service:  Video Visit   Video Start Time: 10:42 AM  Video End Time: 11:02 AM    Originating Location (pt. Location): Home    Distant Location (provider location):  Off-site  Platform used for Video Visit: [facility]    TRANSPLANT NEPHROLOGY CHRONIC POST TRANSPLANT VISIT    Assessment & Plan   # DDKT: Stable   - Baseline Creatinine:  ~ 0.8-1.0   - Proteinuria: Normal (<0.2 grams)   - Date DSA Last Checked: Jan/2023      Latest DSA: No   - BK Viremia: No  - Kidney Tx Biopsy: Mar 03, 2021; Result: No diagnostic evidence of acute rejection.  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  No interstitial fibrosis or tubular atrophy.          Feb 03, 2021; Result: No diagnostic evidence of acute rejection.  Segmental glomerular basement membrane irregularity with remodeling and lamellation.    Type IV collagen alpha5 and alpha2 staining was normal.  This was felt to be donor derived.    # Immunosuppression: Tacrolimus immediate release (goal 4-6) and Mycophenolate mofetil (dose 1000 mg every 12 hours)   - Continue with intensive monitoring of immunosuppression for efficacy and toxicity.  - Changes: No; Patient is on high dose mycophenolate, but only low normal MPA level and no obvious side effects, including normal WBC and no GI side effects. She is CMV and EBV non discordant, but given age would consider dose reduction in the future     # Infection Prophylaxis:   - PJP: None CD4>200  - HSV: Valacyclovir (Valtrex)    # Hypertension: Controlled;  Goal BP: < 130/80   - Changes: No    # Diabetes: Controlled (HbA1c <7%) Last HbA1c: 5.4%   - Management as per primary care. On metformin    # Anemia in Chronic Renal Disease: Hgb: Stable      JHONATAN: No   - Iron studies: High iron saturation     # Mineral Bone Disorder:   - Borderline high normal calcium  - PTH-63 and vit D not checked recently     #  Metabolic acidosis:  - sodium bicarbonate 1300 mg po bid    # CAD: Asymptomatic.    - Coronary angiogram 4/2022 with mild non flow-limiting disease.    # Chronic fatigue:   - neg EBV, CMV pcr, nl TSH   - cardiac w/up as above    - anemia Hb near goal    # GERD: Occasional symptoms on H2 blocker prn.    # SADIE on CPAP: Patient is compliant.    # Skin Cancer Risk:    - Discussed sun protection and recommend regular follow up with Dermatology.    # Hypothyroidism: Continue with the home dose levothyroxine management as per endocrinology    # Vaccinations: UTD flu, COVID, recommend RSV    # Transplant History:  Etiology of Kidney Failure: Unknown etiology (Bx with global glomerulosclerosis)  Tx: DDKT  Transplant: 9/3/2020 (Kidney)  Significant changes in immunosuppression: None  Significant transplant-related complications: None    Transplant Office Phone Number: [phone]    Assessment and plan was discussed with the patient and she voiced her understanding and agreement.    Return visit: 1 year    Jordy Dewitt MD    Chief Complaint   Ms. Thompson is a 69 year old here for kidney transplant and immunosuppression management.    History of Present Illness     Doing well overal:  - c/o mild fatigue and excessive sleepiness  - occasional facial/leg swelling, mostly with excessive salt intake, minimal weight gain of few lbs, uses lasix 20 mg prn once 2-3 x/week  - denies any chest pain or dyspnea  - reports some heartburn controlled with famotidine, intermittent loose stools    IS FK 1/0.5 ; [id number]     Home BP: not checked, per PCP office ~130s/80s  New PCP:     Problem List   Patient Active Problem List   Diagnosis    Elevated serum immunoglobulin free light chain level    Dyslipidemia    Hypothyroidism    SADIE on CPAP    Sensorineural hearing loss (SNHL) of both ears    GERD (gastroesophageal reflux disease)    HTN, kidney transplant related    Hepatitis B core antibody positive    Secondary renal  hyperparathyroidism (H)    Memory deficits    Kidney transplanted    Immunosuppression (H)    Aftercare following organ transplant    Vitamin D deficiency    Diabetes mellitus, type 2 (H)    Seasonal allergic rhinitis due to other allergic trigger    Chronic midline low back pain with right-sided sciatica    Osteopenia, unspecified location    Cold sore       Allergies   Allergies   Allergen Reactions    Amoxicillin-Pot Clavulanate Nausea and Vomiting       Medications   Current Outpatient Medications   Medication Sig Dispense Refill    acetaminophen (TYLENOL) 325 MG tablet Take 2 tablets (650 mg) by mouth every 4 hours as needed for pain 1 Bottle 0    aspirin (ASA) 81 MG EC tablet Take 1 tablet (81 mg) by mouth daily 90 tablet 3    atorvastatin (LIPITOR) 40 MG tablet TAKE 1 TABLET(40 MG) BY MOUTH DAILY 90 tablet 1    cholecalciferol (VITAMIN D3) 25 mcg (1000 units) capsule Take 2 capsules (50 mcg) by mouth daily 60 capsule 3    diclofenac (VOLTAREN) 1 % topical gel Apply 2 g topically 4 times daily as needed for moderate pain 150 g 4    famotidine (PEPCID) 20 MG tablet Take 20 mg by mouth every evening       fluticasone (FLONASE) 50 MCG/ACT nasal spray Spray 1 spray into both nostrils 2 times daily as needed for rhinitis or allergies 16 mL 11    furosemide (LASIX) 20 MG tablet Take 1 tablet (20 mg) by mouth daily as needed. Needs to be seen for further refills 90 tablet 0    hydrocortisone 2.5 % cream Apply topically 2 times daily 30 g 3    levothyroxine (SYNTHROID/LEVOTHROID) 112 MCG tablet TAKE 1 TABLET EVERY MONDAY. TUES. TAKE 1/2 TABLET, TAKE 1 TABLET WEDNES, TAKE 1 TABLET THURS, TAKE 1 TABLET FRIDAY. 90 tablet 0    Lidocaine (LIDOCARE) 4 % Patch Place 1 patch onto the skin every 24 hours To prevent lidocaine toxicity, patient should be patch free for 12 hrs daily. Using for back pain 1 patch 11    losartan (COZAAR) 100 MG tablet Take 1 tablet (100 mg) by mouth daily. 90 tablet 0    magnesium oxide 400 MG CAPS  lunch      melatonin 3 MG tablet Take 6 mg by mouth At Bedtime      metFORMIN (GLUCOPHAGE XR) 500 MG 24 hr tablet Take 1 tablet (500 mg) by mouth 2 times daily (with meals) 180 tablet 1    mycophenolate (GENERIC EQUIVALENT) 250 MG capsule Take 4 capsules (1,000 mg) by mouth 2 times daily 720 capsule 3    ondansetron (ZOFRAN ODT) 4 MG ODT tab Take 1 tablet (4 mg) by mouth every 8 hours as needed for nausea 12 tablet 0    psyllium (METAMUCIL/KONSYL) 58.6 % powder Take by mouth daily      Semaglutide (RYBELSUS) 7 MG tablet Take 1 tablet (7 mg) by mouth daily 90 tablet 1    sodium bicarbonate 650 MG tablet Take 2 tablets (1,300 mg) by mouth 3 times daily 540 tablet 3    tacrolimus (GENERIC EQUIVALENT) 0.5 MG capsule Take 1 capsule (0.5 mg) by mouth 2 times daily. 180 capsule 3    tacrolimus (GENERIC EQUIVALENT) 1 MG capsule Take 1 capsule (1 mg) by mouth every morning. HOLD      valACYclovir (VALTREX) 500 MG tablet Take 1 tablet (500 mg) by mouth daily 90 tablet 3     No current facility-administered medications for this visit.     There are no discontinued medications.    Physical Exam   Vital Signs: There were no vitals taken for this visit.    GENERAL: alert and no distress  EYES: Eyes grossly normal to inspection.  No discharge or erythema, or obvious scleral/conjunctival abnormalities.  RESP: No audible wheeze, cough, or visible cyanosis.    SKIN: Visible skin clear. No significant rash, abnormal pigmentation or lesions.  NEURO: Cranial nerves grossly intact.  Mentation and speech appropriate for age.  PSYCH: Appropriate affect, tone, and pace of words  DIALYSIS ACCESS:  RUFUS HAYDENF    Data         Latest Ref Rng & Units 10/28/2024     7:49 AM 9/4/2024     8:00 AM 8/27/2024     8:02 AM   Renal   Sodium 135 - 145 mmol/L 138  138  138    K 3.4 - 5.3 mmol/L 5.3  4.7  5.5    Cl 98 - 107 mmol/L 104  103  106    Cl (external) 98 - 107 mmol/L 104  103  106    CO2 22 - 29 mmol/L 21  24  21    Urea Nitrogen 8.0 - 23.0 mg/dL  25.2  32.5  25.8    Creatinine 0.51 - 0.95 mg/dL 1.00  1.00  0.91    Glucose 70 - 99 mg/dL 102  106  110    Calcium 8.8 - 10.4 mg/dL 10.0  10.1  10.3          Latest Ref Rng & Units 6/27/2024     8:17 AM 11/29/2023     8:36 AM 10/25/2023     8:23 AM   Bone Health   Parathyroid Hormone Intact 15 - 65 pg/mL 63  72  54          Latest Ref Rng & Units 10/28/2024     7:49 AM 8/27/2024     8:02 AM 6/27/2024     8:17 AM   Heme   WBC 4.0 - 11.0 10e3/uL 6.2  5.5  5.3    Hgb 11.7 - 15.7 g/dL 10.3  9.9  10.0    Plt 150 - 450 10e3/uL 214  188  190          Latest Ref Rng & Units 1/31/2022     8:16 AM 9/27/2021     9:40 AM 3/1/2021     8:06 AM   Liver   AP 40 - 150 U/L 93  123  195    TBili 0.2 - 1.3 mg/dL 0.6  0.4  0.6    Bilirubin Direct 0.0 - 0.2 mg/dL 0.2  0.1  0.1    ALT 0 - 50 U/L 32  35  42    AST 0 - 45 U/L 16  25  26    Tot Protein 6.8 - 8.8 g/dL 7.2  7.2  7.0    Albumin 3.4 - 5.0 g/dL 4.1  4.2  3.7          Latest Ref Rng & Units 5/1/2024    11:29 AM 11/1/2023    11:46 AM 7/31/2023     8:12 AM   Pancreas   A1C 0.0 - 5.6 % 5.4  5.9  5.9          Latest Ref Rng & Units 3/27/2023     8:12 AM 9/9/2020     6:37 AM   Iron studies   Iron 35 - 180 ug/dL 77  162    Iron Saturation Index 15 - 46 % 28  92    Ferritin 8 - 252 ng/mL  1,757          Latest Ref Rng & Units 5/30/2023     8:15 AM 9/22/2022     2:02 PM 6/28/2021     8:09 AM   [facility] Txp Virology   CMV QUANT IU/ML Not Detected IU/mL  Not Detected     BK Spec    Plasma    BK Res BKNEG^BK Virus DNA Not Detected copies/mL   BK Virus DNA Not Detected    BK Log <2.7 Log copies/mL   Not Calculated    EBV DNA COPIES/ML Not Detected copies/mL Not Detected  Not Detected       Failed to redirect to the Timeline version of the REVFS SmartLink.  Recent Labs   Lab Test 09/04/24  0800 09/18/24  0811 10/28/24  0749   DOSTAC 9/3/2024 9/17/2024 10/27/2024   TACROL 3.5* 5.0 6.2     Recent Labs   Lab Test 09/13/23  0819 04/10/24  0816 04/24/24  0814   DOSMPA 9/12/2023   8:00 PM 4/9/2024    8:00 PM 4/23/2024   8:00 PM   MPACID 1.05 0.93* 0.98*   MPAG 177.2* 103.1* 119.6*

## 2024-12-04 NOTE — LETTER
12/4/2024      Ethel Thompson  [id number]  [place] [address]      Dear Colleague,    Thank you for referring your patient, Ethel Thompson, to the [facility]. Please see a copy of my visit note below.    Virtual Visit Details    Type of service:  Video Visit   Video Start Time: 10:42 AM  Video End Time: 11:02 AM    Originating Location (pt. Location): Home    Distant Location (provider location):  Off-site  Platform used for Video Visit: [facility]    TRANSPLANT NEPHROLOGY CHRONIC POST TRANSPLANT VISIT    Assessment & Plan  # DDKT: Stable   - Baseline Creatinine:  ~ 0.8-1.0   - Proteinuria: Normal (<0.2 grams)   - Date DSA Last Checked: Jan/2023      Latest DSA: No   - BK Viremia: No  - Kidney Tx Biopsy: Mar 03, 2021; Result: No diagnostic evidence of acute rejection.  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  No interstitial fibrosis or tubular atrophy.          Feb 03, 2021; Result: No diagnostic evidence of acute rejection.  Segmental glomerular basement membrane irregularity with remodeling and lamellation.    Type IV collagen alpha5 and alpha2 staining was normal.  This was felt to be donor derived.    # Immunosuppression: Tacrolimus immediate release (goal 4-6) and Mycophenolate mofetil (dose 1000 mg every 12 hours)   - Continue with intensive monitoring of immunosuppression for efficacy and toxicity.  - Changes: No; Patient is on high dose mycophenolate, but only low normal MPA level and no obvious side effects, including normal WBC and no GI side effects. She is CMV and EBV non discordant, but given age would consider dose reduction in the future     # Infection Prophylaxis:   - PJP: None CD4>200  - HSV: Valacyclovir (Valtrex)    # Hypertension: Controlled;  Goal BP: < 130/80   - Changes: No    # Diabetes: Controlled (HbA1c <7%) Last HbA1c: 5.4%   - Management as per primary  care. On metformin    # Anemia in Chronic Renal Disease: Hgb: Stable      JHONATAN: No   - Iron studies: High iron saturation     # Mineral Bone Disorder:   - Borderline high normal calcium  - PTH-63 and vit D not checked recently     # Metabolic acidosis:  - sodium bicarbonate 1300 mg po bid    # CAD: Asymptomatic.    - Coronary angiogram 4/2022 with mild non flow-limiting disease.    # Chronic fatigue:   - neg EBV, CMV pcr, nl TSH   - cardiac w/up as above    - anemia Hb near goal    # GERD: Occasional symptoms on H2 blocker prn.    # SADIE on CPAP: Patient is compliant.    # Skin Cancer Risk:    - Discussed sun protection and recommend regular follow up with Dermatology.    # Hypothyroidism: Continue with the home dose levothyroxine management as per endocrinology    # Vaccinations: UTD flu, COVID, recommend RSV    # Transplant History:  Etiology of Kidney Failure: Unknown etiology (Bx with global glomerulosclerosis)  Tx: DDKT  Transplant: 9/3/2020 (Kidney)  Significant changes in immunosuppression: None  Significant transplant-related complications: None    Transplant Office Phone Number: [phone]    Assessment and plan was discussed with the patient and she voiced her understanding and agreement.    Return visit: 1 year    Jordy Dewitt MD    Chief Complaint  Ms. Thompson is a 69 year old here for kidney transplant and immunosuppression management.    History of Present Illness    Doing well overal:  - c/o mild fatigue and excessive sleepiness  - occasional facial/leg swelling, mostly with excessive salt intake, minimal weight gain of few lbs, uses lasix 20 mg prn once 2-3 x/week  - denies any chest pain or dyspnea  - reports some heartburn controlled with famotidine, intermittent loose stools    IS FK 1/0.5 ; [id number]     Home BP: not checked, per PCP office ~130s/80s  New PCP:     Problem List  Patient Active Problem List   Diagnosis     Elevated serum immunoglobulin free light chain level      Dyslipidemia     Hypothyroidism     SADIE on CPAP     Sensorineural hearing loss (SNHL) of both ears     GERD (gastroesophageal reflux disease)     HTN, kidney transplant related     Hepatitis B core antibody positive     Secondary renal hyperparathyroidism (H)     Memory deficits     Kidney transplanted     Immunosuppression (H)     Aftercare following organ transplant     Vitamin D deficiency     Diabetes mellitus, type 2 (H)     Seasonal allergic rhinitis due to other allergic trigger     Chronic midline low back pain with right-sided sciatica     Osteopenia, unspecified location     Cold sore       Allergies  Allergies   Allergen Reactions     Amoxicillin-Pot Clavulanate Nausea and Vomiting       Medications  Current Outpatient Medications   Medication Sig Dispense Refill     acetaminophen (TYLENOL) 325 MG tablet Take 2 tablets (650 mg) by mouth every 4 hours as needed for pain 1 Bottle 0     aspirin (ASA) 81 MG EC tablet Take 1 tablet (81 mg) by mouth daily 90 tablet 3     atorvastatin (LIPITOR) 40 MG tablet TAKE 1 TABLET(40 MG) BY MOUTH DAILY 90 tablet 1     cholecalciferol (VITAMIN D3) 25 mcg (1000 units) capsule Take 2 capsules (50 mcg) by mouth daily 60 capsule 3     diclofenac (VOLTAREN) 1 % topical gel Apply 2 g topically 4 times daily as needed for moderate pain 150 g 4     famotidine (PEPCID) 20 MG tablet Take 20 mg by mouth every evening        fluticasone (FLONASE) 50 MCG/ACT nasal spray Spray 1 spray into both nostrils 2 times daily as needed for rhinitis or allergies 16 mL 11     furosemide (LASIX) 20 MG tablet Take 1 tablet (20 mg) by mouth daily as needed. Needs to be seen for further refills 90 tablet 0     hydrocortisone 2.5 % cream Apply topically 2 times daily 30 g 3     levothyroxine (SYNTHROID/LEVOTHROID) 112 MCG tablet TAKE 1 TABLET EVERY MONDAY. TUES. TAKE 1/2 TABLET, TAKE 1 TABLET WEDNES, TAKE 1 TABLET THURS, TAKE 1 TABLET FRIDAY. 90 tablet 0     Lidocaine (LIDOCARE) 4 % Patch Place 1 patch  onto the skin every 24 hours To prevent lidocaine toxicity, patient should be patch free for 12 hrs daily. Using for back pain 1 patch 11     losartan (COZAAR) 100 MG tablet Take 1 tablet (100 mg) by mouth daily. 90 tablet 0     magnesium oxide 400 MG CAPS lunch       melatonin 3 MG tablet Take 6 mg by mouth At Bedtime       metFORMIN (GLUCOPHAGE XR) 500 MG 24 hr tablet Take 1 tablet (500 mg) by mouth 2 times daily (with meals) 180 tablet 1     mycophenolate (GENERIC EQUIVALENT) 250 MG capsule Take 4 capsules (1,000 mg) by mouth 2 times daily 720 capsule 3     ondansetron (ZOFRAN ODT) 4 MG ODT tab Take 1 tablet (4 mg) by mouth every 8 hours as needed for nausea 12 tablet 0     psyllium (METAMUCIL/KONSYL) 58.6 % powder Take by mouth daily       Semaglutide (RYBELSUS) 7 MG tablet Take 1 tablet (7 mg) by mouth daily 90 tablet 1     sodium bicarbonate 650 MG tablet Take 2 tablets (1,300 mg) by mouth 3 times daily 540 tablet 3     tacrolimus (GENERIC EQUIVALENT) 0.5 MG capsule Take 1 capsule (0.5 mg) by mouth 2 times daily. 180 capsule 3     tacrolimus (GENERIC EQUIVALENT) 1 MG capsule Take 1 capsule (1 mg) by mouth every morning. HOLD       valACYclovir (VALTREX) 500 MG tablet Take 1 tablet (500 mg) by mouth daily 90 tablet 3     No current facility-administered medications for this visit.     There are no discontinued medications.    Physical Exam  Vital Signs: There were no vitals taken for this visit.    GENERAL: alert and no distress  EYES: Eyes grossly normal to inspection.  No discharge or erythema, or obvious scleral/conjunctival abnormalities.  RESP: No audible wheeze, cough, or visible cyanosis.    SKIN: Visible skin clear. No significant rash, abnormal pigmentation or lesions.  NEURO: Cranial nerves grossly intact.  Mentation and speech appropriate for age.  PSYCH: Appropriate affect, tone, and pace of words  DIALYSIS ACCESS:  PABLITOSHADY SHAWN    Data        Latest Ref Rng & Units 10/28/2024     7:49 AM 9/4/2024      8:00 AM 8/27/2024     8:02 AM   Renal   Sodium 135 - 145 mmol/L 138  138  138    K 3.4 - 5.3 mmol/L 5.3  4.7  5.5    Cl 98 - 107 mmol/L 104  103  106    Cl (external) 98 - 107 mmol/L 104  103  106    CO2 22 - 29 mmol/L 21  24  21    Urea Nitrogen 8.0 - 23.0 mg/dL 25.2  32.5  25.8    Creatinine 0.51 - 0.95 mg/dL 1.00  1.00  0.91    Glucose 70 - 99 mg/dL 102  106  110    Calcium 8.8 - 10.4 mg/dL 10.0  10.1  10.3          Latest Ref Rng & Units 6/27/2024     8:17 AM 11/29/2023     8:36 AM 10/25/2023     8:23 AM   Bone Health   Parathyroid Hormone Intact 15 - 65 pg/mL 63  72  54          Latest Ref Rng & Units 10/28/2024     7:49 AM 8/27/2024     8:02 AM 6/27/2024     8:17 AM   Heme   WBC 4.0 - 11.0 10e3/uL 6.2  5.5  5.3    Hgb 11.7 - 15.7 g/dL 10.3  9.9  10.0    Plt 150 - 450 10e3/uL 214  188  190          Latest Ref Rng & Units 1/31/2022     8:16 AM 9/27/2021     9:40 AM 3/1/2021     8:06 AM   Liver   AP 40 - 150 U/L 93  123  195    TBili 0.2 - 1.3 mg/dL 0.6  0.4  0.6    Bilirubin Direct 0.0 - 0.2 mg/dL 0.2  0.1  0.1    ALT 0 - 50 U/L 32  35  42    AST 0 - 45 U/L 16  25  26    Tot Protein 6.8 - 8.8 g/dL 7.2  7.2  7.0    Albumin 3.4 - 5.0 g/dL 4.1  4.2  3.7          Latest Ref Rng & Units 5/1/2024    11:29 AM 11/1/2023    11:46 AM 7/31/2023     8:12 AM   Pancreas   A1C 0.0 - 5.6 % 5.4  5.9  5.9          Latest Ref Rng & Units 3/27/2023     8:12 AM 9/9/2020     6:37 AM   Iron studies   Iron 35 - 180 ug/dL 77  162    Iron Saturation Index 15 - 46 % 28  92    Ferritin 8 - 252 ng/mL  1,757          Latest Ref Rng & Units 5/30/2023     8:15 AM 9/22/2022     2:02 PM 6/28/2021     8:09 AM   [facility] Txp Virology   CMV QUANT IU/ML Not Detected IU/mL  Not Detected     BK Spec    Plasma    BK Res BKNEG^BK Virus DNA Not Detected copies/mL   BK Virus DNA Not Detected    BK Log <2.7 Log copies/mL   Not Calculated    EBV DNA COPIES/ML Not Detected copies/mL Not Detected  Not Detected       Failed to redirect to the Timeline version of  the REVFS SmartLink.  Recent Labs   Lab Test 09/04/24  0800 09/18/24  0811 10/28/24  0749   DOSTAC 9/3/2024 9/17/2024 10/27/2024   TACROL 3.5* 5.0 6.2     Recent Labs   Lab Test 09/13/23  0819 04/10/24  0816 04/24/24  0814   DOSMPA 9/12/2023   8:00 PM 4/9/2024   8:00 PM 4/23/2024   8:00 PM   MPACID 1.05 0.93* 0.98*   MPAG 177.2* 103.1* 119.6*         Again, thank you for allowing me to participate in the care of your patient.        Sincerely,        Jordy Dewitt MD

## 2024-12-04 NOTE — PATIENT INSTRUCTIONS
Urine test in [place]    Labs every 2 months    Transplant Patient Information  Your Post Transplant Coordinator is: Cesar Max  You and your care team can contact your transplant coordinator Monday - Friday, 8am - 5pm at [phone] (Option 2 to reach the coordinator or Option 4 to schedule an appointment).  You can also reach your care team online via [employer].  After hours for urgent matters, please call [facility] at [phone].

## 2024-12-07 DIAGNOSIS — E11.21 TYPE 2 DIABETES MELLITUS WITH DIABETIC NEPHROPATHY, WITHOUT LONG-TERM CURRENT USE OF INSULIN (H): ICD-10-CM

## 2024-12-08 RX ORDER — ORAL SEMAGLUTIDE 7 MG/1
7 TABLET ORAL DAILY
Qty: 90 TABLET | Refills: 0 | Status: SHIPPED | OUTPATIENT
Start: 2024-12-08

## 2024-12-11 ENCOUNTER — OFFICE VISIT (OUTPATIENT)
Dept: FAMILY MEDICINE | Facility: CLINIC | Age: 69
End: 2024-12-11
Payer: MEDICARE

## 2024-12-11 VITALS
DIASTOLIC BLOOD PRESSURE: 71 MMHG | WEIGHT: 135 LBS | BODY MASS INDEX: 27.21 KG/M2 | TEMPERATURE: 97.6 F | HEART RATE: 78 BPM | HEIGHT: 59 IN | SYSTOLIC BLOOD PRESSURE: 157 MMHG | OXYGEN SATURATION: 97 % | RESPIRATION RATE: 16 BRPM

## 2024-12-11 DIAGNOSIS — E03.9 HYPOTHYROIDISM, UNSPECIFIED TYPE: ICD-10-CM

## 2024-12-11 DIAGNOSIS — R80.1 PERSISTENT PROTEINURIA: ICD-10-CM

## 2024-12-11 DIAGNOSIS — E55.9 VITAMIN D DEFICIENCY: ICD-10-CM

## 2024-12-11 DIAGNOSIS — I15.1 HTN, KIDNEY TRANSPLANT RELATED: ICD-10-CM

## 2024-12-11 DIAGNOSIS — E11.21 TYPE 2 DIABETES MELLITUS WITH DIABETIC NEPHROPATHY, WITHOUT LONG-TERM CURRENT USE OF INSULIN (H): Primary | ICD-10-CM

## 2024-12-11 DIAGNOSIS — Z94.0 KIDNEY TRANSPLANTED: ICD-10-CM

## 2024-12-11 DIAGNOSIS — R21 RASH: ICD-10-CM

## 2024-12-11 DIAGNOSIS — Z94.0 HTN, KIDNEY TRANSPLANT RELATED: ICD-10-CM

## 2024-12-11 LAB
EST. AVERAGE GLUCOSE BLD GHB EST-MCNC: 143 MG/DL
HBA1C MFR BLD: 6.6 % (ref 0–5.6)

## 2024-12-11 PROCEDURE — 99214 OFFICE O/P EST MOD 30 MIN: CPT | Performed by: FAMILY MEDICINE

## 2024-12-11 PROCEDURE — 83036 HEMOGLOBIN GLYCOSYLATED A1C: CPT | Performed by: FAMILY MEDICINE

## 2024-12-11 PROCEDURE — G2211 COMPLEX E/M VISIT ADD ON: HCPCS | Performed by: FAMILY MEDICINE

## 2024-12-11 PROCEDURE — 36415 COLL VENOUS BLD VENIPUNCTURE: CPT | Performed by: FAMILY MEDICINE

## 2024-12-11 RX ORDER — FUROSEMIDE 20 MG/1
20 TABLET ORAL DAILY PRN
Qty: 90 TABLET | Refills: 1 | Status: SHIPPED | OUTPATIENT
Start: 2024-12-11

## 2024-12-11 RX ORDER — METFORMIN HYDROCHLORIDE 500 MG/1
500 TABLET, EXTENDED RELEASE ORAL 2 TIMES DAILY WITH MEALS
Qty: 180 TABLET | Refills: 1 | Status: SHIPPED | OUTPATIENT
Start: 2024-12-11

## 2024-12-11 RX ORDER — LOSARTAN POTASSIUM 100 MG/1
100 TABLET ORAL DAILY
Qty: 90 TABLET | Refills: 1 | Status: SHIPPED | OUTPATIENT
Start: 2024-12-11

## 2024-12-11 RX ORDER — HYDROCORTISONE 25 MG/G
CREAM TOPICAL 2 TIMES DAILY
Qty: 30 G | Refills: 3 | Status: SHIPPED | OUTPATIENT
Start: 2024-12-11

## 2024-12-11 RX ORDER — LEVOTHYROXINE SODIUM 112 UG/1
TABLET ORAL
Qty: 90 TABLET | Refills: 0 | Status: SHIPPED | OUTPATIENT
Start: 2024-12-11

## 2024-12-11 ASSESSMENT — PAIN SCALES - GENERAL: PAINLEVEL_OUTOF10: SEVERE PAIN (6)

## 2024-12-11 NOTE — PROGRESS NOTES
"  Assessment & Plan     Type 2 diabetes mellitus with diabetic nephropathy, without long-term current use of insulin (H)  Well controlled on meds,   On arb/statin and aspirin  Due for eye exam  - metFORMIN (GLUCOPHAGE XR) 500 MG 24 hr tablet; Take 1 tablet (500 mg) by mouth 2 times daily (with meals).  - **Hemoglobin A1c FUTURE 3mo; Future  - **Hemoglobin A1c FUTURE 3mo    HTN, kidney transplant related  Elevated here, pt to monitor and report back with BP readings  - losartan (COZAAR) 100 MG tablet; Take 1 tablet (100 mg) by mouth daily.    Kidney transplanted  refill  - furosemide (LASIX) 20 MG tablet; Take 1 tablet (20 mg) by mouth daily as needed.    Hypothyroidism, unspecified type  Labs UTD, refill  - levothyroxine (SYNTHROID/LEVOTHROID) 112 MCG tablet; TAKE 1 TABLET EVERY MONDAY. TUES. TAKE 1/2 TABLET, TAKE 1 TABLET WEDNES, TAKE 1 TABLET THURS, TAKE 1 TABLET FRIDAY.    Persistent proteinuria  monitoring  - furosemide (LASIX) 20 MG tablet; Take 1 tablet (20 mg) by mouth daily as needed.    Vitamin D deficiency  refill  - cholecalciferol (VITAMIN D3) 25 mcg (1000 units) capsule; Take 2 capsules (50 mcg) by mouth daily.    Rash  Use as needed  - hydrocortisone 2.5 % cream; Apply topically 2 times daily.    The longitudinal plan of care for the diagnosis(es)/condition(s) as documented were addressed during this visit. Due to the added complexity in care, I will continue to support Ethel in the subsequent management and with ongoing continuity of care.        BMI  Estimated body mass index is 27.73 kg/m  as calculated from the following:    Height as of this encounter: 1.486 m (4' 10.5\").    Weight as of this encounter: 61.2 kg (135 lb).             Subjective   Ethel is a 69 year old, presenting for the following health issues:  A1c and Medication Request        12/11/2024    11:20 AM   Additional Questions   Roomed by Farzana   Accompanied by self         12/11/2024    11:20 AM   Patient Reported Additional " "Medications   Patient reports taking the following new medications n/a     Via the Health Maintenance questionnaire, the patient has reported the following services have been completed -Eye Exam: [facility] . 2023-03-09, this information has been sent to the abstraction team.  History of Present Illness       Diabetes:   She presents for follow up of diabetes.  She is checking home blood glucose one time daily.   She checks blood glucose before meals.  Blood glucose is never over 200 and never under 70.  When her blood glucose is low, the patient is asymptomatic for confusion, blurred vision, lethargy and reports not feeling dizzy, shaky, or weak.  She is concerned about other.   She is having excessive thirst.  The patient has had a diabetic eye exam in the last 12 months. Eye exam performed on fall. Location of last eye exam [facility].        She eats 2-3 servings of fruits and vegetables daily.She consumes 2 sweetened beverage(s) daily.She exercises with enough effort to increase her heart rate 20 to 29 minutes per day.    She is taking medications regularly.     Pt with diabetes well controlled on meds  Has kidney transplant and followed by transplant surgery  No concerns, feels well              Review of Systems  Constitutional, HEENT, cardiovascular, pulmonary, gi and gu systems are negative, except as otherwise noted.      Objective    BP (!) 160/77   Pulse 91   Temp 97.6  F (36.4  C) (Tympanic)   Resp 16   Ht 1.486 m (4' 10.5\")   Wt 61.2 kg (135 lb)   SpO2 97%   BMI 27.73 kg/m    Body mass index is 27.73 kg/m .  Physical Exam   GENERAL: alert and no distress  NECK: no adenopathy, no asymmetry, masses, or scars  RESP: lungs clear to auscultation - no rales, rhonchi or wheezes  CV: regular rate and rhythm, normal S1 S2, no S3 or S4, no murmur, click or rub, no peripheral edema   ABDOMEN: soft, nontender, no hepatosplenomegaly, no masses and bowel sounds normal  MS: no gross musculoskeletal defects " noted, no edema    Results for orders placed or performed in visit on 12/11/24 (from the past 24 hours)   **Hemoglobin A1c FUTURE 3mo   Result Value Ref Range    Estimated Average Glucose 143 (H) <117 mg/dL    Hemoglobin A1C 6.6 (H) 0.0 - 5.6 %           Signed Electronically by: Ally Dickinson MD

## 2024-12-24 DIAGNOSIS — Z94.0 KIDNEY REPLACED BY TRANSPLANT: ICD-10-CM

## 2024-12-24 DIAGNOSIS — I15.1 HTN, KIDNEY TRANSPLANT RELATED: ICD-10-CM

## 2024-12-24 DIAGNOSIS — Z94.0 HTN, KIDNEY TRANSPLANT RELATED: ICD-10-CM

## 2024-12-24 DIAGNOSIS — R80.1 PERSISTENT PROTEINURIA: ICD-10-CM

## 2024-12-24 DIAGNOSIS — B00.1 COLD SORE: ICD-10-CM

## 2024-12-24 DIAGNOSIS — Z94.0 KIDNEY TRANSPLANTED: Primary | ICD-10-CM

## 2024-12-24 RX ORDER — TACROLIMUS 1 MG/1
1 CAPSULE ORAL EVERY MORNING
Qty: 90 CAPSULE | Refills: 3 | Status: SHIPPED | OUTPATIENT
Start: 2024-12-24

## 2024-12-24 RX ORDER — TACROLIMUS 0.5 MG/1
0.5 CAPSULE ORAL EVERY EVENING
Qty: 90 CAPSULE | Refills: 3 | Status: SHIPPED | OUTPATIENT
Start: 2024-12-24

## 2024-12-26 RX ORDER — LOSARTAN POTASSIUM 100 MG/1
100 TABLET ORAL DAILY
Qty: 90 TABLET | Refills: 1 | OUTPATIENT
Start: 2024-12-26

## 2024-12-30 ENCOUNTER — LAB (OUTPATIENT)
Dept: LAB | Facility: CLINIC | Age: 69
End: 2024-12-30
Payer: MEDICARE

## 2024-12-30 DIAGNOSIS — Z98.890 OTHER SPECIFIED POSTPROCEDURAL STATES: ICD-10-CM

## 2024-12-30 DIAGNOSIS — Z48.298 AFTERCARE FOLLOWING ORGAN TRANSPLANT: ICD-10-CM

## 2024-12-30 DIAGNOSIS — Z79.899 ENCOUNTER FOR LONG-TERM CURRENT USE OF MEDICATION: ICD-10-CM

## 2024-12-30 DIAGNOSIS — Z94.0 KIDNEY TRANSPLANTED: ICD-10-CM

## 2024-12-30 DIAGNOSIS — Z94.0 KIDNEY REPLACED BY TRANSPLANT: ICD-10-CM

## 2024-12-30 LAB
ALBUMIN MFR UR ELPH: 7 MG/DL
ANION GAP SERPL CALCULATED.3IONS-SCNC: 10 MMOL/L (ref 7–15)
BUN SERPL-MCNC: 20.3 MG/DL (ref 8–23)
CALCIUM SERPL-MCNC: 10.3 MG/DL (ref 8.8–10.4)
CHLORIDE SERPL-SCNC: 105 MMOL/L (ref 98–107)
CREAT SERPL-MCNC: 0.78 MG/DL (ref 0.51–0.95)
CREAT UR-MCNC: 31.6 MG/DL
EGFRCR SERPLBLD CKD-EPI 2021: 81 ML/MIN/1.73M2
ERYTHROCYTE [DISTWIDTH] IN BLOOD BY AUTOMATED COUNT: 12.6 % (ref 10–15)
GLUCOSE SERPL-MCNC: 117 MG/DL (ref 70–99)
HCO3 SERPL-SCNC: 21 MMOL/L (ref 22–29)
HCT VFR BLD AUTO: 32 % (ref 35–47)
HGB BLD-MCNC: 10.3 G/DL (ref 11.7–15.7)
MCH RBC QN AUTO: 32 PG (ref 26.5–33)
MCHC RBC AUTO-ENTMCNC: 32.2 G/DL (ref 31.5–36.5)
MCV RBC AUTO: 99 FL (ref 78–100)
PLATELET # BLD AUTO: 184 10E3/UL (ref 150–450)
POTASSIUM SERPL-SCNC: 5.1 MMOL/L (ref 3.4–5.3)
PROT/CREAT 24H UR: 0.22 MG/MG CR (ref 0–0.2)
RBC # BLD AUTO: 3.22 10E6/UL (ref 3.8–5.2)
SODIUM SERPL-SCNC: 136 MMOL/L (ref 135–145)
TACROLIMUS BLD-MCNC: 6.9 UG/L (ref 5–15)
TME LAST DOSE: NORMAL H
TME LAST DOSE: NORMAL H
WBC # BLD AUTO: 5.4 10E3/UL (ref 4–11)

## 2024-12-30 PROCEDURE — 85027 COMPLETE CBC AUTOMATED: CPT

## 2024-12-30 PROCEDURE — 36415 COLL VENOUS BLD VENIPUNCTURE: CPT

## 2024-12-30 PROCEDURE — 84156 ASSAY OF PROTEIN URINE: CPT

## 2024-12-30 PROCEDURE — 80048 BASIC METABOLIC PNL TOTAL CA: CPT

## 2024-12-30 PROCEDURE — 80197 ASSAY OF TACROLIMUS: CPT

## 2025-01-05 ENCOUNTER — HEALTH MAINTENANCE LETTER (OUTPATIENT)
Age: 70
End: 2025-01-05

## 2025-03-04 NOTE — PROGRESS NOTES
Outcome for 03/04/25 1:53 PM: [employer] message sent  Kaia Francois CMA  Adult Endocrinology   [facility]    Outcome for 03/06/25 8:17 AM: Patient/spouse reported blood sugars run about 108-124 in the morning. Ethel normally doesn't check her blood sugar at night, but did the last 2 nights. Readings listed below:                             3/5//2024 Night   137     3/4//2024 Night  178            Malou BOUDREAUX, VF

## 2025-03-06 ENCOUNTER — LAB (OUTPATIENT)
Dept: LAB | Facility: CLINIC | Age: 70
End: 2025-03-06
Payer: MEDICARE

## 2025-03-06 ENCOUNTER — VIRTUAL VISIT (OUTPATIENT)
Dept: ENDOCRINOLOGY | Facility: CLINIC | Age: 70
End: 2025-03-06
Payer: MEDICARE

## 2025-03-06 DIAGNOSIS — E03.9 HYPOTHYROIDISM, UNSPECIFIED TYPE: Primary | ICD-10-CM

## 2025-03-06 DIAGNOSIS — E03.9 HYPOTHYROIDISM, UNSPECIFIED TYPE: ICD-10-CM

## 2025-03-06 DIAGNOSIS — E11.21 TYPE 2 DIABETES MELLITUS WITH DIABETIC NEPHROPATHY, WITHOUT LONG-TERM CURRENT USE OF INSULIN (H): ICD-10-CM

## 2025-03-06 LAB
ANION GAP SERPL CALCULATED.3IONS-SCNC: 13 MMOL/L (ref 7–15)
BUN SERPL-MCNC: 27 MG/DL (ref 8–23)
CALCIUM SERPL-MCNC: 10.5 MG/DL (ref 8.8–10.4)
CHLORIDE SERPL-SCNC: 105 MMOL/L (ref 98–107)
CREAT SERPL-MCNC: 0.86 MG/DL (ref 0.51–0.95)
EGFRCR SERPLBLD CKD-EPI 2021: 72 ML/MIN/1.73M2
EST. AVERAGE GLUCOSE BLD GHB EST-MCNC: 148 MG/DL
GLUCOSE SERPL-MCNC: 233 MG/DL (ref 70–99)
HBA1C MFR BLD: 6.8 % (ref 0–5.6)
HCO3 SERPL-SCNC: 21 MMOL/L (ref 22–29)
POTASSIUM SERPL-SCNC: 4.8 MMOL/L (ref 3.4–5.3)
SODIUM SERPL-SCNC: 139 MMOL/L (ref 135–145)
T4 FREE SERPL-MCNC: 1.42 NG/DL (ref 0.9–1.7)
TSH SERPL DL<=0.005 MIU/L-ACNC: 2.07 UIU/ML (ref 0.3–4.2)

## 2025-03-06 NOTE — LETTER
3/6/2025      Ethel Thompson  [address]   [place] [address]      Dear Colleague,    Thank you for referring your patient, Ethel Thompson, to the [facility]. Please see a copy of my visit note below.    Outcome for 03/04/25 1:53 PM: [employer] message sent  Kaia Francois CMA  Adult Endocrinology   [facility]    Outcome for 03/06/25 8:17 AM: Patient/spouse reported blood sugars run about 108-124 in the morning. Ethel normally doesn't check her blood sugar at night, but did the last 2 nights. Readings listed below:                             3/5//2024 Night   137     3/4//2024 Night  178            THO Guillen          Assessment/Plan :   Type 2 DM. Ethel is worried about her blood sugars. She has worked hard to keep her hemoglobin A1C under 6%, so she was shocked when she was told that it had increased to 6.6%. Her PCP told her to work on increasing exercise but she has a lot of knee pain related to arthritis. She is worried that her blood sugars are going to continue to increase. We discussed her current medications and reviewed her previous laboratory results. We increase the Rybelsus from 7 mg daily to 14 mg daily. We did review the possible adverse effects and if she has any problems, she will reach out to our office. We also discussed other exercise options, such as riding a stationary bike. Lastly, we will repeat laboratory testing and we will follow-up in 6 mos.  Hypothyroidism. Ethel is worried that her thyroid levels may be off. She has continued to take 112 mcg daily except for a 1/2 tablet on Tuesday. She was upset that her thyroid levels were not recent checked. We will add a thyroid lab to today's laboratory order. I will contact her with the results.     Due to the COVID 19 pandemic this visit was a telephone/video visit in order to help prevent spread of infection in this patient and the general population. The patient gave verbal consent for the visit today.  I have independently reviewed and interpreted labs, imaging as indicated.     {PROVIDER LOCATION On-site should be selected for visits conducted from your clinic location or adjoining [facility] hospital, academic office, or other nearby [facility] building. Off-site should be selected for all other provider locations, including home:[id number]}  Distant Location (provider location):  Off-site  Mode of Communication:  Video Conference via [employer]  Chart review/prep time 5    Joined the call at 3/6/2025, 8:22:16 am.  Left the call at 3/6/2025, 8:43:29 am.  You were on the call for 21 minutes 13 seconds .  30 minutes spent on the date of the encounter doing chart review, history and exam, documentation and further activities as noted above.      Chief complaint:  Ethel is a 70 year old female who returns for follow-up of type 2 diabetes and hypothyroidism.    I have reviewed Care Everywhere including [facility], [facility], [facility], [place], [facility], [facility] , [facility], [place] lab reports, imaging reports and provider notes as indicated.      HISTORY OF PRESENT ILLNESS  Ethel is worried about her overall health. She was doing so well on a combination of metformin XR 1000 mg daily and Rybelsus 7 mg daily. Unfortunately, she has been struggling with osteoarthritis in her knee, so she has not been as active. She also admits that she has not been as strict with her diet. She was shocked to hear that her hemoglobin A1C had increased to 6.6%.     Ethel uses fingerstick testing to monitor her blood sugars, as needed. She has not had any signs or symptoms of severe hyperglycemia and/or hypoglycemia. She also has not noticed any changes to her vision or worsening numbness/tingling in her feet. She did have some issues with nausea when she first increased the Rybelsus from 3 mg to 7 mg. This has completely resolved. Lastly, she is worried about her thyroid levels. She states that the dose was decreased before but her  levels were never rechecked. She worries that this may be affecting her blood sugar control.    Ethel was diagnosed with diabetes about 2 1/2 yrs ago. She underwent a kidney transplant in 2020 and she was diagnosed a few months after the procedure. At the time of diagnosis she was started on metformin 500 mg two tablets daily. She remained stable on metformin alone for about a year and a half. Rybelsus 3 mg tablets were added to the metformin about a year ago. That dose was then increased to 7 mg after a couple months. She feels like this has worked well. Her diabetes is complicated by hyperlipidemia, hypothyroidism, GERD, HTN, and history of kidney transplant. Ethel is worried that her thyroid levels may be off.     Endocrine relevant labs are as follows:   Latest Reference Range & Units 12/11/24 11:47   Hemoglobin A1C 0.0 - 5.6 % 6.6 (H)   (H): Data is abnormally high   Latest Reference Range & Units 05/01/24 11:29   Hemoglobin A1C 0.0 - 5.6 % 5.4      Latest Reference Range & Units 05/01/24 11:29   Albumin Urine mg/L mg/L <12.0     REVIEW OF SYSTEMS    Endocrine: positive for thyroid disorder and diabetes  Skin: negative  Eyes: negative for, visual blurring, redness, tearing  Ears/Nose/Throat: negative  Respiratory: No shortness of breath, dyspnea on exertion, cough, or hemoptysis  Cardiovascular: negative for, chest pain, dyspnea on exertion, lower extremity edema, and exercise intolerance  Gastrointestinal: negative for, nausea, vomiting, constipation, and diarrhea  Genitourinary: negative for, nocturia, dysuria, frequency, and urgency  Musculoskeletal: negative for, muscular weakness, nocturnal cramping, and foot pain  Neurologic: negative for, local weakness, numbness or tingling of hands, and numbness or tingling of feet  Psychiatric: positive for feeling anxious  Hematologic/Lymphatic/Immunologic: negative    Past Medical History  Past Medical History:   Diagnosis Date     Anemia in chronic kidney disease       Chronic renal failure      Diabetes mellitus, type 2 (H) 1/28/2021     GERD (gastroesophageal reflux disease)      Glomerulonephritis, focal sclerosing      Hepatitis B core antibody positive      Herpes simplex      Hyperlipidemia      Hypertension      Hypothyroidism      Memory deficits      SADIE on CPAP      Secondary hyperparathyroidism        Medications  Current Outpatient Medications   Medication Sig Dispense Refill     acetaminophen (TYLENOL) 325 MG tablet Take 2 tablets (650 mg) by mouth every 4 hours as needed for pain 1 Bottle 0     aspirin (ASA) 81 MG EC tablet Take 1 tablet (81 mg) by mouth daily 90 tablet 3     atorvastatin (LIPITOR) 40 MG tablet TAKE 1 TABLET(40 MG) BY MOUTH DAILY 90 tablet 1     cholecalciferol (VITAMIN D3) 25 mcg (1000 units) capsule Take 2 capsules (50 mcg) by mouth daily. 60 capsule 3     diclofenac (VOLTAREN) 1 % topical gel Apply 2 g topically 4 times daily as needed for moderate pain 150 g 4     famotidine (PEPCID) 20 MG tablet Take 20 mg by mouth every evening        fluticasone (FLONASE) 50 MCG/ACT nasal spray Spray 1 spray into both nostrils 2 times daily as needed for rhinitis or allergies 16 mL 11     furosemide (LASIX) 20 MG tablet Take 1 tablet (20 mg) by mouth daily as needed. 90 tablet 1     hydrocortisone 2.5 % cream Apply topically 2 times daily. 30 g 3     levothyroxine (SYNTHROID/LEVOTHROID) 112 MCG tablet TAKE 1 TABLET EVERY MONDAY. TUES. TAKE 1/2 TABLET, TAKE 1 TABLET WEDNES, TAKE 1 TABLET THURS, TAKE 1 TABLET FRIDAY. 90 tablet 0     losartan (COZAAR) 100 MG tablet Take 1 tablet (100 mg) by mouth daily. 90 tablet 1     magnesium oxide 400 MG CAPS lunch       melatonin 3 MG tablet Take 6 mg by mouth At Bedtime       metFORMIN (GLUCOPHAGE XR) 500 MG 24 hr tablet Take 1 tablet (500 mg) by mouth 2 times daily (with meals). 180 tablet 1     mycophenolate (GENERIC EQUIVALENT) 250 MG capsule Take 4 capsules (1,000 mg) by mouth 2 times daily 720 capsule 3      ondansetron (ZOFRAN ODT) 4 MG ODT tab Take 1 tablet (4 mg) by mouth every 8 hours as needed for nausea 12 tablet 0     psyllium (METAMUCIL/KONSYL) 58.6 % powder Take by mouth daily       Semaglutide (RYBELSUS) 7 MG tablet TAKE 1 TABLET DAILY 90 tablet 0     sodium bicarbonate 650 MG tablet Take 2 tablets (1,300 mg) by mouth 3 times daily 540 tablet 3     tacrolimus (GENERIC EQUIVALENT) 0.5 MG capsule Take 1 capsule (0.5 mg) by mouth every evening. 90 capsule 3     tacrolimus (GENERIC EQUIVALENT) 1 MG capsule Take 1 capsule (1 mg) by mouth every morning. 90 capsule 3     valACYclovir (VALTREX) 500 MG tablet Take 1 tablet (500 mg) by mouth daily 90 tablet 3       Allergies  Allergies   Allergen Reactions     Amoxicillin-Pot Clavulanate Nausea and Vomiting       Family History  family history includes Anuerysm in her mother; Hypertension in her mother.    Social History  Social History     Tobacco Use     Smoking status: Former     Current packs/day: 0.00     Average packs/day: 1 pack/day for 15.0 years (15.0 ttl pk-yrs)     Types: Cigarettes     Start date:      Quit date:      Years since quittin.1     Smokeless tobacco: Never   Vaping Use     Vaping status: Never Used   Substance Use Topics     Alcohol use: No     Drug use: No       Physical Exam  There is no height or weight on file to calculate BMI.  GENERAL: no distress. She is accompanied by her   SKIN: Visible skin clear. No significant rash, abnormal pigmentation or lesions.  EYES: Eyes grossly normal to inspection.  No discharge or erythema, or obvious scleral/conjunctival abnormalities.  NECK: visible goiter is not present; no visible neck masses  RESP: No audible wheeze, cough, or visible cyanosis.  No visible retractions or increased work of breathing.    NEURO: Awake, alert, responds appropriately to questions.  Mentation and speech fluent.  PSYCH:affect normal and appearance well-groomed.      DATA REVIEW  Please see BG log  3/5 137  mg/dl  3/4 178 mg/dl        Again, thank you for allowing me to participate in the care of your patient.        Sincerely,        Ciara Fonseca PA-C    Electronically signed

## 2025-03-06 NOTE — PROGRESS NOTES
Assessment/Plan :   Type 2 DM. Ethel is worried about her blood sugars. She has worked hard to keep her hemoglobin A1C under 6%, so she was shocked when she was told that it had increased to 6.6%. Her PCP told her to work on increasing exercise but she has a lot of knee pain related to arthritis. She is worried that her blood sugars are going to continue to increase. We discussed her current medications and reviewed her previous laboratory results. We increase the Rybelsus from 7 mg daily to 14 mg daily. We did review the possible adverse effects and if she has any problems, she will reach out to our office. We also discussed other exercise options, such as riding a stationary bike. Lastly, we will repeat laboratory testing and we will follow-up in 6 mos.  Hypothyroidism. Ethel is worried that her thyroid levels may be off. She has continued to take 112 mcg daily except for a 1/2 tablet on Tuesday. She was upset that her thyroid levels were not recent checked. We will add a thyroid lab to today's laboratory order. I will contact her with the results.     Due to the COVID 19 pandemic this visit was a telephone/video visit in order to help prevent spread of infection in this patient and the general population. The patient gave verbal consent for the visit today. I have independently reviewed and interpreted labs, imaging as indicated.       Distant Location (provider location):  Off-site  Mode of Communication:  Video Conference via [employer]  Chart review/prep time 5    Joined the call at 3/6/2025, 8:22:16 am.  Left the call at 3/6/2025, 8:43:29 am.  You were on the call for 21 minutes 13 seconds .  30 minutes spent on the date of the encounter doing chart review, history and exam, documentation and further activities as noted above.      Chief complaint:  Ethel is a 70 year old female who returns for follow-up of type 2 diabetes and hypothyroidism.    I have reviewed Care Everywhere including [facility], [facility],  [facility], [facility], [place], [facility], [facility] , [facility], [place] lab reports, imaging reports and provider notes as indicated.      HISTORY OF PRESENT ILLNESS  Ethel is worried about her overall health. She was doing so well on a combination of metformin XR 1000 mg daily and Rybelsus 7 mg daily. Unfortunately, she has been struggling with osteoarthritis in her knee, so she has not been as active. She also admits that she has not been as strict with her diet. She was shocked to hear that her hemoglobin A1C had increased to 6.6%.     Ethel uses fingerstick testing to monitor her blood sugars, as needed. She has not had any signs or symptoms of severe hyperglycemia and/or hypoglycemia. She also has not noticed any changes to her vision or worsening numbness/tingling in her feet. She did have some issues with nausea when she first increased the Rybelsus from 3 mg to 7 mg. This has completely resolved. Lastly, she is worried about her thyroid levels. She states that the dose was decreased before but her levels were never rechecked. She worries that this may be affecting her blood sugar control.    Ethel was diagnosed with diabetes about 2 1/2 yrs ago. She underwent a kidney transplant in 2020 and she was diagnosed a few months after the procedure. At the time of diagnosis she was started on metformin 500 mg two tablets daily. She remained stable on metformin alone for about a year and a half. Rybelsus 3 mg tablets were added to the metformin about a year ago. That dose was then increased to 7 mg after a couple months. She feels like this has worked well. Her diabetes is complicated by hyperlipidemia, hypothyroidism, GERD, HTN, and history of kidney transplant. Ethel is worried that her thyroid levels may be off.     Endocrine relevant labs are as follows:   Latest Reference Range & Units 12/11/24 11:47   Hemoglobin A1C 0.0 - 5.6 % 6.6 (H)   (H): Data is abnormally high   Latest Reference Range & Units 05/01/24  11:29   Hemoglobin A1C 0.0 - 5.6 % 5.4      Latest Reference Range & Units 05/01/24 11:29   Albumin Urine mg/L mg/L <12.0     REVIEW OF SYSTEMS    Endocrine: positive for thyroid disorder and diabetes  Skin: negative  Eyes: negative for, visual blurring, redness, tearing  Ears/Nose/Throat: negative  Respiratory: No shortness of breath, dyspnea on exertion, cough, or hemoptysis  Cardiovascular: negative for, chest pain, dyspnea on exertion, lower extremity edema, and exercise intolerance  Gastrointestinal: negative for, nausea, vomiting, constipation, and diarrhea  Genitourinary: negative for, nocturia, dysuria, frequency, and urgency  Musculoskeletal: negative for, muscular weakness, nocturnal cramping, and foot pain  Neurologic: negative for, local weakness, numbness or tingling of hands, and numbness or tingling of feet  Psychiatric: positive for feeling anxious  Hematologic/Lymphatic/Immunologic: negative    Past Medical History  Past Medical History:   Diagnosis Date    Anemia in chronic kidney disease     Chronic renal failure     Diabetes mellitus, type 2 (H) 1/28/2021    GERD (gastroesophageal reflux disease)     Glomerulonephritis, focal sclerosing     Hepatitis B core antibody positive     Herpes simplex     Hyperlipidemia     Hypertension     Hypothyroidism     Memory deficits     SADIE on CPAP     Secondary hyperparathyroidism        Medications  Current Outpatient Medications   Medication Sig Dispense Refill    acetaminophen (TYLENOL) 325 MG tablet Take 2 tablets (650 mg) by mouth every 4 hours as needed for pain 1 Bottle 0    aspirin (ASA) 81 MG EC tablet Take 1 tablet (81 mg) by mouth daily 90 tablet 3    atorvastatin (LIPITOR) 40 MG tablet TAKE 1 TABLET(40 MG) BY MOUTH DAILY 90 tablet 1    cholecalciferol (VITAMIN D3) 25 mcg (1000 units) capsule Take 2 capsules (50 mcg) by mouth daily. 60 capsule 3    diclofenac (VOLTAREN) 1 % topical gel Apply 2 g topically 4 times daily as needed for moderate pain 150  g 4    famotidine (PEPCID) 20 MG tablet Take 20 mg by mouth every evening       fluticasone (FLONASE) 50 MCG/ACT nasal spray Spray 1 spray into both nostrils 2 times daily as needed for rhinitis or allergies 16 mL 11    furosemide (LASIX) 20 MG tablet Take 1 tablet (20 mg) by mouth daily as needed. 90 tablet 1    hydrocortisone 2.5 % cream Apply topically 2 times daily. 30 g 3    levothyroxine (SYNTHROID/LEVOTHROID) 112 MCG tablet TAKE 1 TABLET EVERY MONDAY. TUES. TAKE 1/2 TABLET, TAKE 1 TABLET WEDNES, TAKE 1 TABLET THURS, TAKE 1 TABLET FRIDAY. 90 tablet 0    losartan (COZAAR) 100 MG tablet Take 1 tablet (100 mg) by mouth daily. 90 tablet 1    magnesium oxide 400 MG CAPS lunch      melatonin 3 MG tablet Take 6 mg by mouth At Bedtime      metFORMIN (GLUCOPHAGE XR) 500 MG 24 hr tablet Take 1 tablet (500 mg) by mouth 2 times daily (with meals). 180 tablet 1    mycophenolate (GENERIC EQUIVALENT) 250 MG capsule Take 4 capsules (1,000 mg) by mouth 2 times daily 720 capsule 3    ondansetron (ZOFRAN ODT) 4 MG ODT tab Take 1 tablet (4 mg) by mouth every 8 hours as needed for nausea 12 tablet 0    psyllium (METAMUCIL/KONSYL) 58.6 % powder Take by mouth daily      Semaglutide (RYBELSUS) 7 MG tablet TAKE 1 TABLET DAILY 90 tablet 0    sodium bicarbonate 650 MG tablet Take 2 tablets (1,300 mg) by mouth 3 times daily 540 tablet 3    tacrolimus (GENERIC EQUIVALENT) 0.5 MG capsule Take 1 capsule (0.5 mg) by mouth every evening. 90 capsule 3    tacrolimus (GENERIC EQUIVALENT) 1 MG capsule Take 1 capsule (1 mg) by mouth every morning. 90 capsule 3    valACYclovir (VALTREX) 500 MG tablet Take 1 tablet (500 mg) by mouth daily 90 tablet 3       Allergies  Allergies   Allergen Reactions    Amoxicillin-Pot Clavulanate Nausea and Vomiting       Family History  family history includes Anuerysm in her mother; Hypertension in her mother.    Social History  Social History     Tobacco Use    Smoking status: Former     Current packs/day: 0.00      Average packs/day: 1 pack/day for 15.0 years (15.0 ttl pk-yrs)     Types: Cigarettes     Start date:      Quit date:      Years since quittin.1    Smokeless tobacco: Never   Vaping Use    Vaping status: Never Used   Substance Use Topics    Alcohol use: No    Drug use: No       Physical Exam  There is no height or weight on file to calculate BMI.  GENERAL: no distress. She is accompanied by her   SKIN: Visible skin clear. No significant rash, abnormal pigmentation or lesions.  EYES: Eyes grossly normal to inspection.  No discharge or erythema, or obvious scleral/conjunctival abnormalities.  NECK: visible goiter is not present; no visible neck masses  RESP: No audible wheeze, cough, or visible cyanosis.  No visible retractions or increased work of breathing.    NEURO: Awake, alert, responds appropriately to questions.  Mentation and speech fluent.  PSYCH:affect normal and appearance well-groomed.      DATA REVIEW  Please see BG log  3/5 137 mg/dl  3/4 178 mg/dl

## 2025-03-06 NOTE — NURSING NOTE
Current patient location: [place]    Is the patient currently in the state of [place]? YES    Visit mode: VIDEO    If the visit is dropped, the patient can be reconnected by:VIDEO VISIT: Send to e-mail at: [email or web address]    Will anyone else be joining the visit? NO  (If patient encounters technical issues they should call [phone]    Are changes needed to the allergy or medication list? E-check in was reviewed/completed for today's visit. VF did not review e-check in information again with Ethel/ due to this.       Are refills needed on medications prescribed by this physician? Discuss with provider    Rooming Documentation:  Not applicable    Reason for visit: RECHECK (Patient's glucose levels seem to be rising. )    Malou GARRETT

## 2025-03-11 ENCOUNTER — MYC MEDICAL ADVICE (OUTPATIENT)
Dept: ENDOCRINOLOGY | Facility: CLINIC | Age: 70
End: 2025-03-11
Payer: MEDICARE

## 2025-03-17 NOTE — TELEPHONE ENCOUNTER
Patient had not viewed [employer] message.     Writer reached out to patient. Reviewed results and recommendations from Ciara Fonseca PA-C.     Patient was able to get the increased dose of Rybelsus just two days ago.    Patient will call back with any questions or concerns.    Dotty Artis RN  Endocrine Care Coordinator  [facility]

## 2025-03-18 DIAGNOSIS — I15.1 HTN, KIDNEY TRANSPLANT RELATED: ICD-10-CM

## 2025-03-18 DIAGNOSIS — Z94.0 HTN, KIDNEY TRANSPLANT RELATED: ICD-10-CM

## 2025-03-18 RX ORDER — LOSARTAN POTASSIUM 100 MG/1
100 TABLET ORAL DAILY
Qty: 90 TABLET | Refills: 1 | OUTPATIENT
Start: 2025-03-18

## 2025-03-20 DIAGNOSIS — I15.1 HTN, KIDNEY TRANSPLANT RELATED: ICD-10-CM

## 2025-03-20 DIAGNOSIS — Z94.0 KIDNEY REPLACED BY TRANSPLANT: ICD-10-CM

## 2025-03-20 DIAGNOSIS — Z94.0 HTN, KIDNEY TRANSPLANT RELATED: ICD-10-CM

## 2025-03-20 RX ORDER — MYCOPHENOLATE MOFETIL 250 MG/1
1000 CAPSULE ORAL 2 TIMES DAILY
Qty: 720 CAPSULE | Refills: 3 | Status: SHIPPED | OUTPATIENT
Start: 2025-03-20

## 2025-04-24 DIAGNOSIS — Z94.0 KIDNEY REPLACED BY TRANSPLANT: Primary | ICD-10-CM

## 2025-04-24 DIAGNOSIS — I15.1 HTN, KIDNEY TRANSPLANT RELATED: ICD-10-CM

## 2025-04-24 DIAGNOSIS — Z79.899 ENCOUNTER FOR LONG-TERM CURRENT USE OF MEDICATION: ICD-10-CM

## 2025-04-24 DIAGNOSIS — Z98.890 OTHER SPECIFIED POSTPROCEDURAL STATES: ICD-10-CM

## 2025-04-24 DIAGNOSIS — Z94.0 HTN, KIDNEY TRANSPLANT RELATED: ICD-10-CM

## 2025-04-24 DIAGNOSIS — Z48.298 AFTERCARE FOLLOWING ORGAN TRANSPLANT: ICD-10-CM

## 2025-04-24 DIAGNOSIS — Z20.828 CONTACT WITH AND (SUSPECTED) EXPOSURE TO OTHER VIRAL COMMUNICABLE DISEASES: ICD-10-CM

## 2025-04-28 ENCOUNTER — LAB (OUTPATIENT)
Dept: LAB | Facility: CLINIC | Age: 70
End: 2025-04-28
Payer: MEDICARE

## 2025-04-28 DIAGNOSIS — Z98.890 OTHER SPECIFIED POSTPROCEDURAL STATES: ICD-10-CM

## 2025-04-28 DIAGNOSIS — Z94.0 KIDNEY REPLACED BY TRANSPLANT: ICD-10-CM

## 2025-04-28 DIAGNOSIS — Z48.298 AFTERCARE FOLLOWING ORGAN TRANSPLANT: ICD-10-CM

## 2025-04-28 DIAGNOSIS — I15.1 HTN, KIDNEY TRANSPLANT RELATED: ICD-10-CM

## 2025-04-28 DIAGNOSIS — Z20.828 CONTACT WITH AND (SUSPECTED) EXPOSURE TO OTHER VIRAL COMMUNICABLE DISEASES: ICD-10-CM

## 2025-04-28 DIAGNOSIS — Z79.899 ENCOUNTER FOR LONG-TERM CURRENT USE OF MEDICATION: ICD-10-CM

## 2025-04-28 DIAGNOSIS — Z94.0 HTN, KIDNEY TRANSPLANT RELATED: ICD-10-CM

## 2025-04-28 LAB
ANION GAP SERPL CALCULATED.3IONS-SCNC: 11 MMOL/L (ref 7–15)
BUN SERPL-MCNC: 26.3 MG/DL (ref 8–23)
CALCIUM SERPL-MCNC: 10.5 MG/DL (ref 8.8–10.4)
CHLORIDE SERPL-SCNC: 106 MMOL/L (ref 98–107)
CREAT SERPL-MCNC: 0.92 MG/DL (ref 0.51–0.95)
EGFRCR SERPLBLD CKD-EPI 2021: 66 ML/MIN/1.73M2
ERYTHROCYTE [DISTWIDTH] IN BLOOD BY AUTOMATED COUNT: 12.2 % (ref 10–15)
GLUCOSE SERPL-MCNC: 102 MG/DL (ref 70–99)
HCO3 SERPL-SCNC: 21 MMOL/L (ref 22–29)
HCT VFR BLD AUTO: 33.5 % (ref 35–47)
HGB BLD-MCNC: 10.9 G/DL (ref 11.7–15.7)
MCH RBC QN AUTO: 32.1 PG (ref 26.5–33)
MCHC RBC AUTO-ENTMCNC: 32.5 G/DL (ref 31.5–36.5)
MCV RBC AUTO: 99 FL (ref 78–100)
PLATELET # BLD AUTO: 222 10E3/UL (ref 150–450)
POTASSIUM SERPL-SCNC: 5 MMOL/L (ref 3.4–5.3)
RBC # BLD AUTO: 3.4 10E6/UL (ref 3.8–5.2)
SODIUM SERPL-SCNC: 138 MMOL/L (ref 135–145)
TACROLIMUS BLD-MCNC: 5.6 UG/L (ref 5–15)
TME LAST DOSE: NORMAL H
TME LAST DOSE: NORMAL H
WBC # BLD AUTO: 7.1 10E3/UL (ref 4–11)

## 2025-04-28 PROCEDURE — 80048 BASIC METABOLIC PNL TOTAL CA: CPT

## 2025-04-28 PROCEDURE — 36415 COLL VENOUS BLD VENIPUNCTURE: CPT

## 2025-04-28 PROCEDURE — 85027 COMPLETE CBC AUTOMATED: CPT

## 2025-04-28 PROCEDURE — 80197 ASSAY OF TACROLIMUS: CPT

## 2025-05-01 DIAGNOSIS — B00.1 COLD SORE: ICD-10-CM

## 2025-05-01 DIAGNOSIS — Z94.0 KIDNEY REPLACED BY TRANSPLANT: ICD-10-CM

## 2025-05-01 RX ORDER — VALACYCLOVIR HYDROCHLORIDE 500 MG/1
500 TABLET, FILM COATED ORAL DAILY
Qty: 90 TABLET | Refills: 1 | Status: SHIPPED | OUTPATIENT
Start: 2025-05-01

## 2025-05-07 ENCOUNTER — RESULTS FOLLOW-UP (OUTPATIENT)
Dept: TRANSPLANT | Facility: CLINIC | Age: 70
End: 2025-05-07

## 2025-05-07 DIAGNOSIS — Z94.0 HTN, KIDNEY TRANSPLANT RELATED: ICD-10-CM

## 2025-05-07 DIAGNOSIS — I15.1 HTN, KIDNEY TRANSPLANT RELATED: ICD-10-CM

## 2025-05-07 LAB
DONOR IDENTIFICATION: NORMAL
DSA COMMENTS: NORMAL
DSA PRESENT: NO
DSA TEST METHOD: NORMAL
ORGAN: NORMAL
SA 1  COMMENTS: NORMAL
SA 1 CELL: NORMAL
SA 1 TEST METHOD: NORMAL
SA 2 CELL: NORMAL
SA 2 COMMENTS: NORMAL
SA 2 TEST METHOD: NORMAL
SA1 HI RISK ABY: NORMAL
SA1 MOD RISK ABY: NORMAL
SA2 HI RISK ABY: NORMAL
SA2 MOD RISK ABY: NORMAL
UNACCEPTABLE ANTIGENS: NORMAL
UNOS CPRA: 27

## 2025-05-19 DIAGNOSIS — Z94.0 KIDNEY TRANSPLANTED: ICD-10-CM

## 2025-05-19 DIAGNOSIS — Z94.0 HTN, KIDNEY TRANSPLANT RELATED: ICD-10-CM

## 2025-05-19 DIAGNOSIS — I15.1 HTN, KIDNEY TRANSPLANT RELATED: ICD-10-CM

## 2025-05-19 RX ORDER — SODIUM BICARBONATE 650 MG/1
1300 TABLET ORAL 3 TIMES DAILY
Qty: 540 TABLET | Refills: 3 | Status: SHIPPED | OUTPATIENT
Start: 2025-05-19

## 2025-06-07 DIAGNOSIS — E11.21 TYPE 2 DIABETES MELLITUS WITH DIABETIC NEPHROPATHY, WITHOUT LONG-TERM CURRENT USE OF INSULIN (H): ICD-10-CM

## 2025-06-10 ENCOUNTER — PATIENT OUTREACH (OUTPATIENT)
Dept: CARDIOLOGY | Facility: CLINIC | Age: 70
End: 2025-06-10
Payer: MEDICARE

## 2025-06-10 DIAGNOSIS — Z94.0 HTN, KIDNEY TRANSPLANT RELATED: ICD-10-CM

## 2025-06-10 DIAGNOSIS — I15.1 HTN, KIDNEY TRANSPLANT RELATED: ICD-10-CM

## 2025-06-10 RX ORDER — ORAL SEMAGLUTIDE 14 MG/1
14 TABLET ORAL DAILY
Qty: 90 TABLET | Refills: 3 | Status: SHIPPED | OUTPATIENT
Start: 2025-06-10

## 2025-06-10 NOTE — PROGRESS NOTES
Patient Quality Outreach    Patient is due for the following:   Physical Annual Wellness Visit    Action(s) Taken:   Patient has upcoming appointment, these items will be addressed at that time.    Type of outreach:        Questions for provider review:             Madelin Parks [facility]  Chart routed to None.

## 2025-06-10 NOTE — TELEPHONE ENCOUNTER
RYBELSUS TABS 30'S 14MG   Last Written Prescription:  3/7/25  #90, 0 refills  ----------------------  Last Visit Date: 3/6/25  Future Visit Date: None  ----------------------  Refill decision: Medication unable to be refilled by RN due to: Dose increase with last order      Request from pharmacy:  Requested Prescriptions   Pending Prescriptions Disp Refills    RYBELSUS 14 MG tablet [Pharmacy Med Name: RYBELSUS TABS 30'S 14MG] 90 tablet 3     Sig: TAKE 1 TABLET DAILY       GLP-1 Agonists Protocol Failed - 6/10/2025  6:44 AM                     Passed - HgbA1C in past 3 or 6 months     If HgbA1C is 8 or greater, it needs to be on file within the past 3 months.  If less than 8, must be on file within the past 6 months.     Recent Labs   Lab Test 03/06/25  1131 07/31/23  0812 03/03/23  0924   A1C 6.8*   < >  --    HEMOGLOBINA1  --   --  6.4    < > = values in this interval not displayed.             Passed - Has GFR on file in past 12 months and most recent value is normal        Passed - Recent (6 month) or future (90 days) visit with the authorizing provider's specialty (provided they have been seen in the past 9 months)     The patient must have completed an in-person or virtual visit within the past 6 months or has a future visit scheduled within the next 90 days with the authorizing provider s specialty.  Urgent care and e-visits do not quality as an office visit for this protocol.          Passed - Medication indicated for associated diagnosis     Medication is associated with one or more of the following diagnoses:     Type 2 diabetes mellitus           Passed - Patient is age 18 or older        Passed - No active pregnancy on record        Passed - No positive pregnancy test in past 12 months

## 2025-06-26 DIAGNOSIS — I15.1 HTN, KIDNEY TRANSPLANT RELATED: ICD-10-CM

## 2025-06-26 DIAGNOSIS — Z94.0 HTN, KIDNEY TRANSPLANT RELATED: ICD-10-CM

## 2025-06-30 ENCOUNTER — LAB (OUTPATIENT)
Dept: LAB | Facility: CLINIC | Age: 70
End: 2025-06-30
Payer: MEDICARE

## 2025-06-30 ENCOUNTER — RESULTS FOLLOW-UP (OUTPATIENT)
Dept: TRANSPLANT | Facility: CLINIC | Age: 70
End: 2025-06-30

## 2025-06-30 DIAGNOSIS — Z94.0 HTN, KIDNEY TRANSPLANT RELATED: ICD-10-CM

## 2025-06-30 DIAGNOSIS — I15.1 HTN, KIDNEY TRANSPLANT RELATED: ICD-10-CM

## 2025-06-30 DIAGNOSIS — Z20.828 CONTACT WITH AND (SUSPECTED) EXPOSURE TO OTHER VIRAL COMMUNICABLE DISEASES: ICD-10-CM

## 2025-06-30 DIAGNOSIS — Z94.0 KIDNEY REPLACED BY TRANSPLANT: ICD-10-CM

## 2025-06-30 DIAGNOSIS — Z79.899 ENCOUNTER FOR LONG-TERM CURRENT USE OF MEDICATION: ICD-10-CM

## 2025-06-30 DIAGNOSIS — Z48.298 AFTERCARE FOLLOWING ORGAN TRANSPLANT: ICD-10-CM

## 2025-06-30 DIAGNOSIS — Z98.890 OTHER SPECIFIED POSTPROCEDURAL STATES: ICD-10-CM

## 2025-06-30 LAB
ALBUMIN MFR UR ELPH: 14.8 MG/DL
ANION GAP SERPL CALCULATED.3IONS-SCNC: 10 MMOL/L (ref 7–15)
BUN SERPL-MCNC: 22.5 MG/DL (ref 8–23)
CALCIUM SERPL-MCNC: 10.5 MG/DL (ref 8.8–10.4)
CHLORIDE SERPL-SCNC: 106 MMOL/L (ref 98–107)
CREAT SERPL-MCNC: 0.9 MG/DL (ref 0.51–0.95)
CREAT UR-MCNC: 137 MG/DL
EGFRCR SERPLBLD CKD-EPI 2021: 68 ML/MIN/1.73M2
ERYTHROCYTE [DISTWIDTH] IN BLOOD BY AUTOMATED COUNT: 12.7 % (ref 10–15)
GLUCOSE SERPL-MCNC: 102 MG/DL (ref 70–99)
HCO3 SERPL-SCNC: 23 MMOL/L (ref 22–29)
HCT VFR BLD AUTO: 32.7 % (ref 35–47)
HGB BLD-MCNC: 10.8 G/DL (ref 11.7–15.7)
MCH RBC QN AUTO: 32.4 PG (ref 26.5–33)
MCHC RBC AUTO-ENTMCNC: 33 G/DL (ref 31.5–36.5)
MCV RBC AUTO: 98 FL (ref 78–100)
PLATELET # BLD AUTO: 201 10E3/UL (ref 150–450)
POTASSIUM SERPL-SCNC: 5 MMOL/L (ref 3.4–5.3)
PROT/CREAT 24H UR: 0.11 MG/MG CR (ref 0–0.2)
RBC # BLD AUTO: 3.33 10E6/UL (ref 3.8–5.2)
SODIUM SERPL-SCNC: 139 MMOL/L (ref 135–145)
TACROLIMUS BLD-MCNC: 6.2 UG/L (ref 5–15)
TME LAST DOSE: NORMAL H
TME LAST DOSE: NORMAL H
WBC # BLD AUTO: 5.5 10E3/UL (ref 4–11)

## 2025-06-30 PROCEDURE — 80048 BASIC METABOLIC PNL TOTAL CA: CPT

## 2025-06-30 PROCEDURE — 36415 COLL VENOUS BLD VENIPUNCTURE: CPT

## 2025-06-30 PROCEDURE — 80197 ASSAY OF TACROLIMUS: CPT

## 2025-06-30 PROCEDURE — 84156 ASSAY OF PROTEIN URINE: CPT

## 2025-06-30 PROCEDURE — 85027 COMPLETE CBC AUTOMATED: CPT

## 2025-07-01 ENCOUNTER — VIRTUAL VISIT (OUTPATIENT)
Dept: FAMILY MEDICINE | Facility: CLINIC | Age: 70
End: 2025-07-01
Payer: MEDICARE

## 2025-07-01 DIAGNOSIS — G89.29 CHRONIC LOW BACK PAIN WITHOUT SCIATICA, UNSPECIFIED BACK PAIN LATERALITY: Primary | ICD-10-CM

## 2025-07-01 DIAGNOSIS — Z12.31 ENCOUNTER FOR SCREENING MAMMOGRAM FOR BREAST CANCER: ICD-10-CM

## 2025-07-01 DIAGNOSIS — M54.50 CHRONIC LOW BACK PAIN WITHOUT SCIATICA, UNSPECIFIED BACK PAIN LATERALITY: Primary | ICD-10-CM

## 2025-07-01 DIAGNOSIS — I15.1 HTN, KIDNEY TRANSPLANT RELATED: ICD-10-CM

## 2025-07-01 DIAGNOSIS — Z94.0 HTN, KIDNEY TRANSPLANT RELATED: ICD-10-CM

## 2025-07-01 DIAGNOSIS — B00.1 COLD SORE: ICD-10-CM

## 2025-07-01 DIAGNOSIS — E11.21 TYPE 2 DIABETES MELLITUS WITH DIABETIC NEPHROPATHY, WITHOUT LONG-TERM CURRENT USE OF INSULIN (H): ICD-10-CM

## 2025-07-01 DIAGNOSIS — E78.5 DYSLIPIDEMIA: ICD-10-CM

## 2025-07-01 DIAGNOSIS — E03.9 ACQUIRED HYPOTHYROIDISM: ICD-10-CM

## 2025-07-01 DIAGNOSIS — Z94.0 KIDNEY REPLACED BY TRANSPLANT: ICD-10-CM

## 2025-07-01 PROCEDURE — 98006 SYNCH AUDIO-VIDEO EST MOD 30: CPT | Performed by: NURSE PRACTITIONER

## 2025-07-01 PROCEDURE — 3044F HG A1C LEVEL LT 7.0%: CPT | Mod: 95 | Performed by: NURSE PRACTITIONER

## 2025-07-01 RX ORDER — VALACYCLOVIR HYDROCHLORIDE 500 MG/1
500 TABLET, FILM COATED ORAL DAILY
Qty: 90 TABLET | Refills: 3 | Status: SHIPPED | OUTPATIENT
Start: 2025-07-01

## 2025-07-01 RX ORDER — LIDOCAINE 50 MG/G
1 PATCH TOPICAL EVERY 24 HOURS
Qty: 30 PATCH | Refills: 5 | Status: SHIPPED | OUTPATIENT
Start: 2025-07-01

## 2025-07-01 RX ORDER — METFORMIN HYDROCHLORIDE 500 MG/1
500 TABLET, EXTENDED RELEASE ORAL 2 TIMES DAILY WITH MEALS
Qty: 180 TABLET | Refills: 3 | Status: SHIPPED | OUTPATIENT
Start: 2025-07-01

## 2025-07-01 RX ORDER — LOSARTAN POTASSIUM 100 MG/1
100 TABLET ORAL DAILY
Qty: 90 TABLET | Refills: 1 | OUTPATIENT
Start: 2025-07-01

## 2025-07-01 RX ORDER — ATORVASTATIN CALCIUM 40 MG/1
40 TABLET, FILM COATED ORAL DAILY
Qty: 90 TABLET | Refills: 3 | Status: SHIPPED | OUTPATIENT
Start: 2025-07-01

## 2025-07-01 RX ORDER — LOSARTAN POTASSIUM 100 MG/1
100 TABLET ORAL DAILY
Qty: 90 TABLET | Refills: 3 | Status: SHIPPED | OUTPATIENT
Start: 2025-07-01

## 2025-07-01 RX ORDER — LEVOTHYROXINE SODIUM 112 UG/1
TABLET ORAL
Qty: 82 TABLET | Refills: 3 | Status: SHIPPED | OUTPATIENT
Start: 2025-07-01

## 2025-07-01 NOTE — PROGRESS NOTES
Ethel is a 70 year old who is being evaluated via a billable video visit.    How would you like to obtain your AVS? MyChart  If the video visit is dropped, the invitation should be resent by: Text to cell phone: [phone]  Will anyone else be joining your video visit? No      Assessment & Plan     Type 2 diabetes mellitus with diabetic nephropathy, without long-term current use of insulin (H)  Chronic, stable.  Continue current meds and follow-up in 6 months.   - metFORMIN (GLUCOPHAGE XR) 500 MG 24 hr tablet; Take 1 tablet (500 mg) by mouth 2 times daily (with meals).  - Hemoglobin A1c; Future  - Albumin Random Urine Quantitative with Creat Ratio; Future    Acquired hypothyroidism  Chronic, stable.  - TSH with free T4 reflex; Future  - levothyroxine (SYNTHROID/LEVOTHROID) 112 MCG tablet; Take 1/2 tablet on Tuesday.  Take 1 tablet daily the rest of the week.    Dyslipidemia  Chronic, stable.  - Lipid panel reflex to direct LDL Fasting; Future    Kidney replaced by transplant  Chronic, stable.  Follows with transplant team.    - atorvastatin (LIPITOR) 40 MG tablet; Take 1 tablet (40 mg) by mouth daily.  - valACYclovir (VALTREX) 500 MG tablet; Take 1 tablet (500 mg) by mouth daily.  - Albumin Random Urine Quantitative with Creat Ratio; Future    HTN, kidney transplant related  Chronic, stable.  - losartan (COZAAR) 100 MG tablet; Take 1 tablet (100 mg) by mouth daily.  - Albumin Random Urine Quantitative with Creat Ratio; Future    Cold sore  Chronic, stable.  - valACYclovir (VALTREX) 500 MG tablet; Take 1 tablet (500 mg) by mouth daily.    Chronic low back pain without sciatica, unspecified back pain laterality  Chronic, stable.  - lidocaine (LIDODERM) 5 % patch; Place 1 patch over 12 hours onto the skin every 24 hours. To prevent lidocaine toxicity, patient should be patch free for 12 hrs daily.    Encounter for screening mammogram for breast cancer  - MA Screen Bilateral w/Reymundo; Future          BMI  Estimated body  "mass index is 27.73 kg/m  as calculated from the following:    Height as of 12/11/24: 1.486 m (4' 10.5\").    Weight as of 12/11/24: 61.2 kg (135 lb).       Steph Davenport is a 70 year old, presenting for the following health issues:  Recheck Medication      Via the Health Maintenance questionnaire, the patient has reported the following services have been completed , this information has been sent to the abstraction team.      HPI        Video visit for med refills.   present during video as well.  No specific concerns today, just needs med refills.  Due for labs, will come in at another time.  Hx of DM2.  Also follows with transplant of hx of kidney transplant in 2020 due to CKD.  Has been stable.          Review of Systems  Constitutional, HEENT, cardiovascular, pulmonary, gi and gu systems are negative, except as otherwise noted.      Objective           Vitals:  No vitals were obtained today due to virtual visit.    Physical Exam   GENERAL: alert and no distress  EYES: Eyes grossly normal to inspection.  No discharge or erythema, or obvious scleral/conjunctival abnormalities.  RESP: No audible wheeze, cough, or visible cyanosis.    SKIN: Visible skin clear. No significant rash, abnormal pigmentation or lesions.  NEURO: Cranial nerves grossly intact.  Mentation and speech appropriate for age.  PSYCH: Appropriate affect, tone, and pace of words      Lab Results   Component Value Date    A1C 6.3 07/02/2025    A1C 6.8 03/06/2025    A1C 6.6 12/11/2024    A1C 5.4 05/01/2024    A1C 5.9 11/01/2023    A1C 7.9 03/01/2021    A1C 7.3 01/25/2021    A1C 5.4 09/03/2020     Recent Labs   Lab Test 06/30/25  0812 04/28/25  0802    138   POTASSIUM 5.0 5.0   CHLORIDE 106 106   CO2 23 21*   ANIONGAP 10 11   * 102*   BUN 22.5 26.3*   CR 0.90 0.92   EVELIO 10.5* 10.5*     Recent Labs   Lab Test 05/01/24  1129 07/31/23  0812   CHOL 152 159   HDL 46* 49*   LDL 56 67   TRIG 248* 217*     TSH   Date Value Ref Range " Status   03/06/2025 2.07 0.30 - 4.20 uIU/mL Final   03/03/2023 0.42 0.40 - 4.00 mU/L Final              Video-Visit Details    Type of service:  Video Visit   Originating Location (pt. Location): Home  Distant Location (provider location):  On-site  Platform used for Video Visit: Sri    The longitudinal plan of care for the diagnosis(es)/condition(s) as documented were addressed during this visit. Due to the added complexity in care, I will continue to support Ethel in the subsequent management and with ongoing continuity of care.     Signed Electronically by: Roxane Peterson, CNP

## 2025-07-02 ENCOUNTER — LAB (OUTPATIENT)
Dept: LAB | Facility: CLINIC | Age: 70
End: 2025-07-02
Payer: MEDICARE

## 2025-07-02 ENCOUNTER — PATIENT OUTREACH (OUTPATIENT)
Dept: CARE COORDINATION | Facility: CLINIC | Age: 70
End: 2025-07-02

## 2025-07-02 ENCOUNTER — RESULTS FOLLOW-UP (OUTPATIENT)
Dept: TRANSPLANT | Facility: CLINIC | Age: 70
End: 2025-07-02

## 2025-07-02 DIAGNOSIS — E78.5 DYSLIPIDEMIA: ICD-10-CM

## 2025-07-02 DIAGNOSIS — I15.1 HTN, KIDNEY TRANSPLANT RELATED: ICD-10-CM

## 2025-07-02 DIAGNOSIS — Z94.0 HTN, KIDNEY TRANSPLANT RELATED: ICD-10-CM

## 2025-07-02 DIAGNOSIS — E03.9 HYPOTHYROIDISM, UNSPECIFIED TYPE: ICD-10-CM

## 2025-07-02 DIAGNOSIS — E11.21 TYPE 2 DIABETES MELLITUS WITH DIABETIC NEPHROPATHY, WITHOUT LONG-TERM CURRENT USE OF INSULIN (H): ICD-10-CM

## 2025-07-02 DIAGNOSIS — Z94.0 KIDNEY REPLACED BY TRANSPLANT: ICD-10-CM

## 2025-07-02 DIAGNOSIS — E11.21 TYPE 2 DIABETES MELLITUS WITH DIABETIC NEPHROPATHY, WITHOUT LONG-TERM CURRENT USE OF INSULIN (H): Primary | ICD-10-CM

## 2025-07-02 LAB
CHOLEST SERPL-MCNC: 162 MG/DL
CREAT UR-MCNC: 75.6 MG/DL
EST. AVERAGE GLUCOSE BLD GHB EST-MCNC: 134 MG/DL
FASTING STATUS PATIENT QL REPORTED: YES
HBA1C MFR BLD: 6.3 % (ref 0–5.6)
HDLC SERPL-MCNC: 54 MG/DL
LDLC SERPL CALC-MCNC: 67 MG/DL
MICROALBUMIN UR-MCNC: 58.5 MG/L
MICROALBUMIN/CREAT UR: 77.38 MG/G CR (ref 0–25)
NONHDLC SERPL-MCNC: 108 MG/DL
TRIGL SERPL-MCNC: 206 MG/DL
TSH SERPL DL<=0.005 MIU/L-ACNC: 4.12 UIU/ML (ref 0.3–4.2)

## 2025-07-02 PROCEDURE — 36415 COLL VENOUS BLD VENIPUNCTURE: CPT

## 2025-07-02 PROCEDURE — 84443 ASSAY THYROID STIM HORMONE: CPT

## 2025-07-02 PROCEDURE — 82570 ASSAY OF URINE CREATININE: CPT

## 2025-07-02 PROCEDURE — 80061 LIPID PANEL: CPT

## 2025-07-02 PROCEDURE — 82043 UR ALBUMIN QUANTITATIVE: CPT

## 2025-07-02 PROCEDURE — 83036 HEMOGLOBIN GLYCOSYLATED A1C: CPT

## 2025-07-30 ENCOUNTER — MEDICAL CORRESPONDENCE (OUTPATIENT)
Dept: HEALTH INFORMATION MANAGEMENT | Facility: CLINIC | Age: 70
End: 2025-07-30
Payer: MEDICARE

## 2025-07-31 ENCOUNTER — TELEPHONE (OUTPATIENT)
Dept: TRANSPLANT | Facility: CLINIC | Age: 70
End: 2025-07-31
Payer: MEDICARE

## 2025-07-31 DIAGNOSIS — Z94.0 HTN, KIDNEY TRANSPLANT RELATED: ICD-10-CM

## 2025-07-31 DIAGNOSIS — I15.1 HTN, KIDNEY TRANSPLANT RELATED: ICD-10-CM

## 2025-07-31 NOTE — TELEPHONE ENCOUNTER
LM for Ethel that we received a request from [facility] asking for an approval for her diabetic supplies. Transplant does not manage diabetic requests. I did let Hillary know that this request must go to the primary doctor or endocrinologist. Please call back if questions.  Jamia Winchester LPN

## 2025-08-01 ENCOUNTER — ANCILLARY PROCEDURE (OUTPATIENT)
Dept: MAMMOGRAPHY | Facility: CLINIC | Age: 70
End: 2025-08-01
Attending: NURSE PRACTITIONER
Payer: MEDICARE

## 2025-08-01 DIAGNOSIS — Z12.31 ENCOUNTER FOR SCREENING MAMMOGRAM FOR BREAST CANCER: ICD-10-CM

## 2025-08-01 PROCEDURE — 77067 SCR MAMMO BI INCL CAD: CPT | Mod: TC | Performed by: RADIOLOGY

## 2025-08-01 PROCEDURE — 77063 BREAST TOMOSYNTHESIS BI: CPT | Mod: TC | Performed by: RADIOLOGY

## 2025-08-28 ENCOUNTER — LAB (OUTPATIENT)
Dept: LAB | Facility: CLINIC | Age: 70
End: 2025-08-28
Payer: MEDICARE

## 2025-08-28 ENCOUNTER — RESULTS FOLLOW-UP (OUTPATIENT)
Dept: TRANSPLANT | Facility: CLINIC | Age: 70
End: 2025-08-28

## 2025-08-28 ENCOUNTER — TELEPHONE (OUTPATIENT)
Dept: TRANSPLANT | Facility: CLINIC | Age: 70
End: 2025-08-28

## 2025-08-28 DIAGNOSIS — Z94.0 KIDNEY REPLACED BY TRANSPLANT: ICD-10-CM

## 2025-08-28 DIAGNOSIS — Z48.298 AFTERCARE FOLLOWING ORGAN TRANSPLANT: ICD-10-CM

## 2025-08-28 DIAGNOSIS — Z94.0 HTN, KIDNEY TRANSPLANT RELATED: ICD-10-CM

## 2025-08-28 DIAGNOSIS — Z98.890 OTHER SPECIFIED POSTPROCEDURAL STATES: ICD-10-CM

## 2025-08-28 DIAGNOSIS — I15.1 HTN, KIDNEY TRANSPLANT RELATED: ICD-10-CM

## 2025-08-28 DIAGNOSIS — Z20.828 CONTACT WITH AND (SUSPECTED) EXPOSURE TO OTHER VIRAL COMMUNICABLE DISEASES: ICD-10-CM

## 2025-08-28 DIAGNOSIS — Z79.899 ENCOUNTER FOR LONG-TERM CURRENT USE OF MEDICATION: ICD-10-CM

## 2025-08-28 DIAGNOSIS — Z94.0 KIDNEY TRANSPLANTED: Primary | ICD-10-CM

## 2025-08-28 LAB
ANION GAP SERPL CALCULATED.3IONS-SCNC: 12 MMOL/L (ref 7–15)
BUN SERPL-MCNC: 21.2 MG/DL (ref 8–23)
CALCIUM SERPL-MCNC: 10 MG/DL (ref 8.8–10.4)
CHLORIDE SERPL-SCNC: 106 MMOL/L (ref 98–107)
CREAT SERPL-MCNC: 0.83 MG/DL (ref 0.51–0.95)
EGFRCR SERPLBLD CKD-EPI 2021: 75 ML/MIN/1.73M2
ERYTHROCYTE [DISTWIDTH] IN BLOOD BY AUTOMATED COUNT: 12.2 % (ref 10–15)
GLUCOSE SERPL-MCNC: 110 MG/DL (ref 70–99)
HCO3 SERPL-SCNC: 22 MMOL/L (ref 22–29)
HCT VFR BLD AUTO: 31.3 % (ref 35–47)
HGB BLD-MCNC: 10.1 G/DL (ref 11.7–15.7)
MCH RBC QN AUTO: 32.1 PG (ref 26.5–33)
MCHC RBC AUTO-ENTMCNC: 32.3 G/DL (ref 31.5–36.5)
MCV RBC AUTO: 99.4 FL (ref 78–100)
PLATELET # BLD AUTO: 172 10E3/UL (ref 150–450)
POTASSIUM SERPL-SCNC: 4.4 MMOL/L (ref 3.4–5.3)
RBC # BLD AUTO: 3.15 10E6/UL (ref 3.8–5.2)
SODIUM SERPL-SCNC: 140 MMOL/L (ref 135–145)
TACROLIMUS BLD-MCNC: 4.7 UG/L (ref 5–15)
TME LAST DOSE: ABNORMAL H
TME LAST DOSE: ABNORMAL H
WBC # BLD AUTO: 5.57 10E3/UL (ref 4–11)

## 2025-08-28 ASSESSMENT — ENCOUNTER SYMPTOMS: NEW SYMPTOMS OF CORONARY ARTERY DISEASE: 0

## 2025-09-04 ENCOUNTER — TELEPHONE (OUTPATIENT)
Dept: TRANSPLANT | Facility: CLINIC | Age: 70
End: 2025-09-04
Payer: MEDICARE

## 2025-09-04 DIAGNOSIS — Z94.0 KIDNEY TRANSPLANTED: Primary | ICD-10-CM

## 2025-09-04 DIAGNOSIS — Z48.298 AFTERCARE FOLLOWING ORGAN TRANSPLANT: ICD-10-CM

## (undated) DEVICE — LINEN TOWEL PACK X6 WHITE 5487

## (undated) DEVICE — BLADE CLIPPER SGL USE 9680

## (undated) DEVICE — WIPES FOLEY CARE SURESTEP PROVON DFC100

## (undated) DEVICE — SU SILK 1 TIE 6X30" A307H

## (undated) DEVICE — SOL NACL 0.9% INJ 1000ML BAG 2B1324X

## (undated) DEVICE — SU VICRYL 3-0 SH 27" UND J416H

## (undated) DEVICE — TUBING IRRIG CYSTO/BLADDER SET 81" LF 2C4040

## (undated) DEVICE — SU PROLENE 6-0 RB-2DA 30" 8711H

## (undated) DEVICE — LABEL MEDICATION SYSTEM 3303-P

## (undated) DEVICE — DEVICE CATH STABILIZATION STATLOCK FOLEY 3-WAY FOL0105

## (undated) DEVICE — SU PDS II 5-0 RB-1 DA 30" Z320H

## (undated) DEVICE — SU SILK 4-0 TIE 12X30" A303H

## (undated) DEVICE — BASIN SET SINGLE STERILE 13752-624

## (undated) DEVICE — DRAPE IOBAN INCISE 23X17" 6650EZ

## (undated) DEVICE — SU SILK 3-0 SH CR 8X18" C013D

## (undated) DEVICE — SU VICRYL 3-0 SH 27" J316H

## (undated) DEVICE — DRSG MEDIPORE 3 1/2X13 3/4" 3573

## (undated) DEVICE — SU PROLENE 4-0 SHDA 36" 8521H

## (undated) DEVICE — SU PDS II 0 TP-1 60" Z991G

## (undated) DEVICE — Device

## (undated) DEVICE — DRAPE SLUSH/WARMER 66X44" ORS-320

## (undated) DEVICE — SU SILK 2-0 TIE 12X30" A305H

## (undated) DEVICE — CATH PLUG W/CAP 000076

## (undated) DEVICE — SU PDS II 6-0 RB-2DA 30" Z149H

## (undated) DEVICE — SU SILK 0 TIE 6X30" A306H

## (undated) DEVICE — SU VICRYL 0 CT-1 36" J346H

## (undated) DEVICE — PREP CHLORAPREP 26ML TINTED ORANGE  260815

## (undated) DEVICE — INSERT FOGARTY 33MM TRACTION HYDRAJAW HYDRA33

## (undated) DEVICE — CATH FOLEY 3WAY 16FR 30ML SIL 73016SI

## (undated) DEVICE — SU PDS II 4-0 RB-1 27" Z304H

## (undated) DEVICE — BNDG ABDOMINAL BINDER 15X46-62"  08140562

## (undated) DEVICE — SUCTION MANIFOLD DORNOCH ULTRA CART UL-CL500

## (undated) DEVICE — SU PROLENE 5-0 RB-1DA 36"  8556H

## (undated) DEVICE — SOL NACL 0.9% IRRIG 1000ML BOTTLE 2F7124

## (undated) DEVICE — SU MONOCRYL 4-0 PS-2 27" UND Y426H

## (undated) DEVICE — LINEN TOWEL PACK X30 5481

## (undated) DEVICE — SU SILK 3-0 TIE 12X30" A304H

## (undated) RX ORDER — FENTANYL CITRATE 50 UG/ML
INJECTION, SOLUTION INTRAMUSCULAR; INTRAVENOUS
Status: DISPENSED
Start: 2020-09-03

## (undated) RX ORDER — LIDOCAINE HYDROCHLORIDE 10 MG/ML
INJECTION, SOLUTION EPIDURAL; INFILTRATION; INTRACAUDAL; PERINEURAL
Status: DISPENSED
Start: 2021-03-03

## (undated) RX ORDER — VERAPAMIL HYDROCHLORIDE 2.5 MG/ML
INJECTION, SOLUTION INTRAVENOUS
Status: DISPENSED
Start: 2020-09-03

## (undated) RX ORDER — SODIUM CHLORIDE 9 MG/ML
INJECTION, SOLUTION INTRAVENOUS
Status: DISPENSED
Start: 2021-03-03

## (undated) RX ORDER — SODIUM CHLORIDE 9 MG/ML
INJECTION, SOLUTION INTRAVENOUS
Status: DISPENSED
Start: 2021-02-03

## (undated) RX ORDER — FENTANYL CITRATE 50 UG/ML
INJECTION, SOLUTION INTRAMUSCULAR; INTRAVENOUS
Status: DISPENSED
Start: 2020-09-04

## (undated) RX ORDER — LIDOCAINE HYDROCHLORIDE 10 MG/ML
INJECTION, SOLUTION EPIDURAL; INFILTRATION; INTRACAUDAL; PERINEURAL
Status: DISPENSED
Start: 2021-02-03

## (undated) RX ORDER — PAPAVERINE HYDROCHLORIDE 30 MG/ML
INJECTION INTRAMUSCULAR; INTRAVENOUS
Status: DISPENSED
Start: 2020-09-03

## (undated) RX ORDER — FENTANYL CITRATE 50 UG/ML
INJECTION, SOLUTION INTRAMUSCULAR; INTRAVENOUS
Status: DISPENSED
Start: 2021-03-03

## (undated) RX ORDER — MANNITOL 20 G/100ML
INJECTION, SOLUTION INTRAVENOUS
Status: DISPENSED
Start: 2020-09-03

## (undated) RX ORDER — FUROSEMIDE 10 MG/ML
INJECTION INTRAMUSCULAR; INTRAVENOUS
Status: DISPENSED
Start: 2020-09-04

## (undated) RX ORDER — LIDOCAINE HYDROCHLORIDE 20 MG/ML
INJECTION, SOLUTION EPIDURAL; INFILTRATION; INTRACAUDAL; PERINEURAL
Status: DISPENSED
Start: 2020-09-03

## (undated) RX ORDER — ALBUMIN, HUMAN INJ 5% 5 %
SOLUTION INTRAVENOUS
Status: DISPENSED
Start: 2020-09-03

## (undated) RX ORDER — DEXTROSE, SODIUM CHLORIDE, SODIUM LACTATE, POTASSIUM CHLORIDE, AND CALCIUM CHLORIDE 5; .6; .31; .03; .02 G/100ML; G/100ML; G/100ML; G/100ML; G/100ML
INJECTION, SOLUTION INTRAVENOUS
Status: DISPENSED
Start: 2020-09-04

## (undated) RX ORDER — SODIUM CHLORIDE 9 MG/ML
INJECTION, SOLUTION INTRAVENOUS
Status: DISPENSED
Start: 2020-09-04

## (undated) RX ORDER — HEPARIN SODIUM 1000 [USP'U]/ML
INJECTION, SOLUTION INTRAVENOUS; SUBCUTANEOUS
Status: DISPENSED
Start: 2020-09-03

## (undated) RX ORDER — CALCIUM CHLORIDE 100 MG/ML
INJECTION INTRAVENOUS; INTRAVENTRICULAR
Status: DISPENSED
Start: 2020-09-03

## (undated) RX ORDER — HYDROMORPHONE HYDROCHLORIDE 1 MG/ML
INJECTION, SOLUTION INTRAMUSCULAR; INTRAVENOUS; SUBCUTANEOUS
Status: DISPENSED
Start: 2020-09-04

## (undated) RX ORDER — FUROSEMIDE 10 MG/ML
INJECTION INTRAMUSCULAR; INTRAVENOUS
Status: DISPENSED
Start: 2020-09-03

## (undated) RX ORDER — ONDANSETRON 2 MG/ML
INJECTION INTRAMUSCULAR; INTRAVENOUS
Status: DISPENSED
Start: 2020-09-04

## (undated) RX ORDER — FENTANYL CITRATE 50 UG/ML
INJECTION, SOLUTION INTRAMUSCULAR; INTRAVENOUS
Status: DISPENSED
Start: 2021-02-03

## (undated) RX ORDER — FENTANYL CITRATE-0.9 % NACL/PF 10 MCG/ML
PLASTIC BAG, INJECTION (ML) INTRAVENOUS
Status: DISPENSED
Start: 2020-09-03

## (undated) RX ORDER — PROPOFOL 10 MG/ML
INJECTION, EMULSION INTRAVENOUS
Status: DISPENSED
Start: 2020-09-03